# Patient Record
Sex: MALE | Race: WHITE | NOT HISPANIC OR LATINO | Employment: FULL TIME | URBAN - METROPOLITAN AREA
[De-identification: names, ages, dates, MRNs, and addresses within clinical notes are randomized per-mention and may not be internally consistent; named-entity substitution may affect disease eponyms.]

---

## 2017-01-13 ENCOUNTER — OFFICE VISIT (OUTPATIENT)
Dept: ENDOCRINOLOGY | Facility: CLINIC | Age: 42
End: 2017-01-13

## 2017-01-13 VITALS
SYSTOLIC BLOOD PRESSURE: 123 MMHG | BODY MASS INDEX: 47.74 KG/M2 | WEIGHT: 315 LBS | OXYGEN SATURATION: 92 % | DIASTOLIC BLOOD PRESSURE: 67 MMHG | HEIGHT: 68 IN | HEART RATE: 87 BPM | TEMPERATURE: 98.5 F

## 2017-01-13 DIAGNOSIS — E66.01 MORBID OBESITY DUE TO EXCESS CALORIES (H): Primary | ICD-10-CM

## 2017-01-13 ASSESSMENT — ENCOUNTER SYMPTOMS
SINUS CONGESTION: 0
NECK MASS: 0
TROUBLE SWALLOWING: 0
HOARSE VOICE: 0
SORE THROAT: 0
TASTE DISTURBANCE: 0
SMELL DISTURBANCE: 0
SINUS PAIN: 0

## 2017-01-13 ASSESSMENT — PAIN SCALES - GENERAL: PAINLEVEL: NO PAIN (0)

## 2017-01-13 NOTE — MR AVS SNAPSHOT
After Visit Summary   1/13/2017    Refugio Kennedy    MRN: [id number]           Patient Information     Date Of Birth          1975        Visit Information        Provider Department      1/13/2017 11:45 AM Ashish Paez MD Medical Weight Management         Follow-ups after your visit        Your next 10 appointments already scheduled     Jan 23, 2017  8:00 AM   Diabetic Education with [address], RD   [facility][note 3] ([facility][note 3])    [address]  [address]   [phone]            Apr 14, 2017 10:30 AM   (Arrive by 10:15 AM)   Return Visit with Ashish Paez MD   Medical Weight Management ([facility])    [address]  [address] [facility] [address] [phone]              Who to contact     Please call your clinic at [phone] to:    Ask questions about your health    Make or cancel appointments    Discuss your medicines    Learn about your test results    Speak to your doctor   If you have compliments or concerns about an experience at your clinic, or if you wish to file a complaint, please contact [facility] Patient Relations at [phone] or email us at [email or web address]         Additional Information About Your Visit        MyChart Information     [employer] gives you secure access to your electronic health record. If you see a primary care provider, you can also send messages to your care team and make appointments. If you have questions, please call your primary care clinic.  If you do not have a primary care provider, please call [phone] and they will assist you.      [employer] is an electronic gateway that provides easy, online access to your medical records. With [employer], you can request a clinic appointment, read your test results, renew a prescription or communicate with your care team.     To access your existing account, please  "contact your [facility] or call [phone] for assistance.        Care EveryWhere ID     This is your Care EveryWhere ID. This could be used by other organizations to access your [place] medical records  [id number]        Your Vitals Were     Pulse Temperature Height BMI (Body Mass Index) Pulse Oximetry       87 98.5  F (36.9  C) (Oral) 5' 7.5\" 64.08 kg/m2 92%        Blood Pressure from Last 3 Encounters:   01/13/17 123/67   12/20/16 109/68   11/29/16 102/60    Weight from Last 3 Encounters:   01/13/17 415 lb 8 oz   12/20/16 422 lb   12/12/16 422 lb 6.4 oz              Today, you had the following     No orders found for display       Primary Care Provider Office Phone # Fax #    Erika Espino [phone]       [address] [address]        Thank you!     Thank you for choosing MEDICAL WEIGHT MANAGEMENT  for your care. Our goal is always to provide you with excellent care. Hearing back from our patients is one way we can continue to improve our services. Please take a few minutes to complete the written survey that you may receive in the mail after your visit with us. Thank you!             Your Updated Medication List - Protect others around you: Learn how to safely use, store and throw away your medicines at www.disposemymeds.org.          This list is accurate as of: 1/13/17 12:13 PM.  Always use your most recent med list.                   Brand Name Dispense Instructions for use    albuterol 108 (90 BASE) MCG/ACT Inhaler    albuterol    1 Inhaler    Inhale 2 puffs into the lungs every 4 hours as needed for shortness of breath / dyspnea       aspirin 81 MG tablet     90 tablet    Take 1 tablet by mouth daily.       blood glucose monitoring test strip    no brand specified    200 each    1 strip by In Vitro route 3 times daily Test as directed       cetirizine 10 MG tablet    zyrTEC    90 tablet    Take 1 tablet " (10 mg) by mouth every evening       * DIABETIC STERILE LANCETS device     1 Box    1 Device 3 times daily.       * ONE TOUCH LANCETS Misc     270 each    1 lancet 3 times daily.       furosemide 20 MG tablet    LASIX    360 tablet    Take 2 tablets (40 mg) by mouth 2 times daily       insulin pen needle 32G X 4 MM    BD SEVERO U/F    100 each    Use 1 daily or as directed.       liraglutide 18 MG/3ML soln    VICTOZA PEN    1 Month    Inject 1.8 mg Subcutaneous daily       lisinopril 5 MG tablet    PRINIVIL/ZESTRIL    90 tablet    Take 1 tablet (5 mg) by mouth daily       * metFORMIN modified 500 MG 24 hr tablet    GLUMETZA    90 tablet    Take 1 tablet (500 mg) by mouth daily (with breakfast)       * metFORMIN modified 500 MG 24 hr tablet    GLUMETZA    30 tablet    Take 1 tablet (500 mg) by mouth daily (with breakfast) Ok to dispense generic glucophage xr as previous       modafinil 200 MG tablet    PROVIGIL    60 tablet    Take 1/2 tablet by mouth 3-4 times daily as needed for sleepiness.       MULTIVITAMIN PO      Take 1 tablet by mouth daily.       order for DME      Equipment being ordered: BIPAP Refugio P Kennedy received a [employer] AirCurve 10 Bilevel. Pressures were set at 23/14 cm H2O.       order for DME      Auto-BiPAP: IPAP max 21 cm H2O EPAP min 15 cm H2O Pressure support 5 cm Changed in clinic Lifetime need and heated humidity.       simvastatin 10 MG tablet    ZOCOR    90 tablet    Take 1 tablet (10 mg) by mouth At Bedtime       topiramate 25 MG tablet    TOPAMAX    180 tablet    Take 4 tablets (100 mg) by mouth 2 times daily       triamcinolone 0.1 % cream    KENALOG    30 g    Apply sparingly to affected area two times daily as needed       * Notice:  This list has 4 medication(s) that are the same as other medications prescribed for you. Read the directions carefully, and ask your doctor or other care provider to review them with you.

## 2017-01-13 NOTE — Clinical Note
"2017       RE: Refugio Kennedy  [address]  [facility] [address]     Dear Colleague,    Thank you for referring your patient, Refugio Kennedy, to the MEDICAL WEIGHT MANAGEMENT at [facility]. Please see a copy of my visit note below.          Return Medical Weight Management Note     Refugio Kennedy  MRN:  [id number]  :  1975  CRISTI:  10/10/2016    Dear Dr. Espino,    We had the pleasure of seeing your patient Refugio Kennedy.  He is a 41 year old male who we are continuing to see for treatment of obesity related to: DMII, HLD, sleep apnea on CPAP daily, and hypoventilation syndrome, oxygen dependent.     CURRENT WEIGHT:   415 lbs 8 oz    Wt Readings from Last 4 Encounters:   17 188.47 kg (415 lb 8 oz)   16 191.418 kg (422 lb)   16 191.599 kg (422 lb 6.4 oz)   16 190.511 kg (420 lb)     Height:  5' 7.5\"  Body Mass Index:  Body mass index is 64.08 kg/(m^2).  Vitals:  B/P: 138/83, P: 80    Initial consult weight was 454 on 5/15/2015.  Weight change since last seen on 10/10/2016 is down 8 pounds.   Total loss is 39 pounds.    INTERVAL HISTORY:  He notices a significant decrease in cravings and emotional eating since the topamax was increased to 100 mg BID. He does not exercise. He is trying to change his snacking to vegetables and get more vegetables overall.    Diet and Activity Changes Since Last Visit Reviewed With Patient 2017   I have made the following changes to my diet since my last visit: added more greens n cut out pop completly.   With regards to my diet, I am still struggling with: stress eating,but have getting better   For breakfast, I typically eat: coffee,peanutbutter toast and eggs   For lunch, I typically eat: try to make it my salad meal if not sandwich with milk   For supper, I typically eat: mac-n-chz or sm steak with potato n corn   For snack(s), I typically eat: baby carrots or brochli,occasionaly snack bar   I have made the " following changes to my activity/exercise since my last visit: not much,but now that recorvering from hernia repair will be able to join gym to get more needed exercise   With regards to my activity/exercise, I am still struggling with: procastination       ROS    MEDICATIONS:   Current Outpatient Prescriptions   Medication     liraglutide (VICTOZA PEN) 18 MG/3ML soln     simvastatin (ZOCOR) 10 MG tablet     lisinopril (PRINIVIL,ZESTRIL) 5 MG tablet     metFORMIN modified (GLUMETZA) 500 MG 24 hr tablet     metFORMIN modified (GLUMETZA) 500 MG 24 hr tablet     modafinil (PROVIGIL) 200 MG tablet     topiramate (TOPAMAX) 25 MG tablet     furosemide (LASIX) 20 MG tablet     insulin pen needle (BD SEVERO U/F) 32G X 4 MM     cetirizine (ZYRTEC) 10 MG tablet     order for DME     blood glucose monitoring (NO BRAND SPECIFIED) test strip     albuterol (ALBUTEROL) 108 (90 BASE) MCG/ACT inhaler     triamcinolone (KENALOG) 0.1 % cream     ONE TOUCH LANCETS MISC     ORDER FOR DME     aspirin 81 MG tablet     DIABETIC STERILE LANCETS device     Multiple Vitamin (MULTIVITAMIN OR)     No current facility-administered medications for this visit.       Weight Loss Medication History Reviewed With Patient 1/13/2017   Which weight loss medications are you currently taking on a regular basis?  Topamax (topiramate), Victoza (liraglutide)   Are you having any side effects from the weight loss medication that we have prescribed you? No   If you are having side effects please describe: -     ASSESSMENT:   Patient is a 41 year old male being seen in clinic for aggressive weight management due to diminish health problesm to co-morbid conditions.  He still reports intrusive thoughts related to food and continues to eat foods high in carbohydrates and snacks throughout the day.  Maintain topiramate 100 mg BID. Reinforce food plan - focus on no between meal snacking, aggressive lowering of starches and cheese    FOLLOW-UP:    3 months   10/15  minutes spent on counseling and education    Sincerely,          Ashish Paez MD

## 2017-01-13 NOTE — PROGRESS NOTES
"      Return Medical Weight Management Note     Refugio Kennedy  MRN:  [id number]  :  1975  CRISTI:  10/10/2016    Dear Dr. Espino,    We had the pleasure of seeing your patient Refugio Kennedy.  He is a 41 year old male who we are continuing to see for treatment of obesity related to: DMII, HLD, sleep apnea on CPAP daily, and hypoventilation syndrome, oxygen dependent.     CURRENT WEIGHT:   415 lbs 8 oz    Wt Readings from Last 4 Encounters:   17 188.47 kg (415 lb 8 oz)   16 191.418 kg (422 lb)   16 191.599 kg (422 lb 6.4 oz)   16 190.511 kg (420 lb)     Height:  5' 7.5\"  Body Mass Index:  Body mass index is 64.08 kg/(m^2).  Vitals:  B/P: 138/83, P: 80    Initial consult weight was 454 on 5/15/2015.  Weight change since last seen on 10/10/2016 is down 8 pounds.   Total loss is 39 pounds.    INTERVAL HISTORY:  He notices a significant decrease in cravings and emotional eating since the topamax was increased to 100 mg BID. He does not exercise. He is trying to change his snacking to vegetables and get more vegetables overall.    Diet and Activity Changes Since Last Visit Reviewed With Patient 2017   I have made the following changes to my diet since my last visit: added more greens n cut out pop completly.   With regards to my diet, I am still struggling with: stress eating,but have getting better   For breakfast, I typically eat: coffee,peanutbutter toast and eggs   For lunch, I typically eat: try to make it my salad meal if not sandwich with milk   For supper, I typically eat: mac-n-chz or sm steak with potato n corn   For snack(s), I typically eat: baby carrots or brochli,occasionaly snack bar   I have made the following changes to my activity/exercise since my last visit: not much,but now that recorvering from hernia repair will be able to join gym to get more needed exercise   With regards to my activity/exercise, I am still struggling with: procastination       ROS    MEDICATIONS: "   Current Outpatient Prescriptions   Medication     liraglutide (VICTOZA PEN) 18 MG/3ML soln     simvastatin (ZOCOR) 10 MG tablet     lisinopril (PRINIVIL,ZESTRIL) 5 MG tablet     metFORMIN modified (GLUMETZA) 500 MG 24 hr tablet     metFORMIN modified (GLUMETZA) 500 MG 24 hr tablet     modafinil (PROVIGIL) 200 MG tablet     topiramate (TOPAMAX) 25 MG tablet     furosemide (LASIX) 20 MG tablet     insulin pen needle (BD SEVERO U/F) 32G X 4 MM     cetirizine (ZYRTEC) 10 MG tablet     order for DME     blood glucose monitoring (NO BRAND SPECIFIED) test strip     albuterol (ALBUTEROL) 108 (90 BASE) MCG/ACT inhaler     triamcinolone (KENALOG) 0.1 % cream     ONE TOUCH LANCETS MISC     ORDER FOR DME     aspirin 81 MG tablet     DIABETIC STERILE LANCETS device     Multiple Vitamin (MULTIVITAMIN OR)     No current facility-administered medications for this visit.       Weight Loss Medication History Reviewed With Patient 1/13/2017   Which weight loss medications are you currently taking on a regular basis?  Topamax (topiramate), Victoza (liraglutide)   Are you having any side effects from the weight loss medication that we have prescribed you? No   If you are having side effects please describe: -     ASSESSMENT:   Patient is a 41 year old male being seen in clinic for aggressive weight management due to diminish health problesm to co-morbid conditions.  He still reports intrusive thoughts related to food and continues to eat foods high in carbohydrates and snacks throughout the day.  Maintain topiramate 100 mg BID. Reinforce food plan - focus on no between meal snacking, aggressive lowering of starches and cheese    FOLLOW-UP:    3 months   10/15 minutes spent on counseling and education    Sincerely,

## 2017-01-13 NOTE — NURSING NOTE
"Chief Complaint   Patient presents with     Clinic Care Coordination - Follow-up     [facility]       Filed Vitals:    01/13/17 1115   BP: 123/67   Pulse: 87   Temp: 98.5  F (36.9  C)   TempSrc: Oral   Height: 5' 7.5\"   Weight: 415 lb 8 oz   SpO2: 92%       Body mass index is 64.08 kg/(m^2).      Leticia RANDLE LPN                            "

## 2017-01-23 ENCOUNTER — ALLIED HEALTH/NURSE VISIT (OUTPATIENT)
Dept: EDUCATION SERVICES | Facility: CLINIC | Age: 42
End: 2017-01-23
Payer: COMMERCIAL

## 2017-01-23 VITALS — BODY MASS INDEX: 64 KG/M2 | WEIGHT: 315 LBS

## 2017-01-23 DIAGNOSIS — E11.9 TYPE 2 DIABETES MELLITUS WITHOUT COMPLICATION, WITHOUT LONG-TERM CURRENT USE OF INSULIN (H): Primary | ICD-10-CM

## 2017-01-23 PROCEDURE — G0108 DIAB MANAGE TRN  PER INDIV: HCPCS

## 2017-01-23 NOTE — MR AVS SNAPSHOT
After Visit Summary   1/23/2017    Refugio Kennedy    MRN: [id number]           Patient Information     Date Of Birth          1975        Visit Information        Provider Department      1/23/2017 8:00 AM [address], RD [facility]        Care Instructions    1.  Continue to avoid the pop and drink more water,  avoid dry carbohydrates, watch the sweets    2.  Continue the one salad a day    3.  Walk if able        Follow-ups after your visit        Your next 10 appointments already scheduled     Mar 06, 2017  8:00 AM   Diabetic Education with [address], RD   [facility]    [address] [place] [address]   [phone]            Apr 14, 2017 10:30 AM   (Arrive by 10:15 AM)   Return Visit with Ashish Paez MD   [facility] Medical Weight Management ([facility])    [address]  [address] [facility] [address] [phone]              Who to contact     If you have questions or need follow up information about today's clinic visit or your schedule please contact [facility] directly at [phone].  Normal or non-critical lab and imaging results will be communicated to you by MyChart, letter or phone within 4 business days after the clinic has received the results. If you do not hear from us within 7 days, please contact the clinic through "[employer] or phone. If you have a critical or abnormal lab result, we will notify you by phone as soon as possible.  Submit refill requests through [employer] or call your pharmacy and they will forward the refill request to us. Please allow 3 business days for your refill to be completed.          Additional Information About Your Visit        "[employer] Information     [employer] gives you secure access to your electronic health record. If you see a primary care provider, you can also send messages to your care team and make appointments.  If you have questions, please call your primary care clinic.  If you do not have a primary care provider, please call [phone] and they will assist you.        Care EveryWhere ID     This is your Care EveryWhere ID. This could be used by other organizations to access your [place] medical records  [id number]         Blood Pressure from Last 3 Encounters:   01/13/17 123/67   12/20/16 109/68   11/29/16 102/60    Weight from Last 3 Encounters:   01/23/17 188.243 kg (415 lb)   01/13/17 188.47 kg (415 lb 8 oz)   12/20/16 191.418 kg (422 lb)              Today, you had the following     No orders found for display       Primary Care Provider Office Phone # Fax #    Erika Espino [phone]       [facility] [address] [address]        Thank you!     Thank you for choosing [facility]  for your care. Our goal is always to provide you with excellent care. Hearing back from our patients is one way we can continue to improve our services. Please take a few minutes to complete the written survey that you may receive in the mail after your visit with us. Thank you!             Your Updated Medication List - Protect others around you: Learn how to safely use, store and throw away your medicines at www.disposemymeds.org.          This list is accurate as of: 1/23/17  8:31 AM.  Always use your most recent med list.                   Brand Name Dispense Instructions for use    albuterol 108 (90 BASE) MCG/ACT Inhaler    albuterol    1 Inhaler    Inhale 2 puffs into the lungs every 4 hours as needed for shortness of breath / dyspnea       aspirin 81 MG tablet     90 tablet    Take 1 tablet by mouth daily.       blood glucose monitoring test strip    no brand specified    200 each    1 strip by In Vitro route 3 times daily Test as directed       cetirizine 10 MG tablet    zyrTEC    90 tablet    Take 1 tablet (10 mg) by mouth every evening       * DIABETIC  STERILE LANCETS device     1 Box    1 Device 3 times daily.       * ONE TOUCH LANCETS Misc     270 each    1 lancet 3 times daily.       furosemide 20 MG tablet    LASIX    360 tablet    Take 2 tablets (40 mg) by mouth 2 times daily       insulin pen needle 32G X 4 MM    BD SEVERO U/F    100 each    Use 1 daily or as directed.       liraglutide 18 MG/3ML soln    VICTOZA PEN    1 Month    Inject 1.8 mg Subcutaneous daily       lisinopril 5 MG tablet    PRINIVIL/ZESTRIL    90 tablet    Take 1 tablet (5 mg) by mouth daily       * metFORMIN modified 500 MG 24 hr tablet    GLUMETZA    90 tablet    Take 1 tablet (500 mg) by mouth daily (with breakfast)       * metFORMIN modified 500 MG 24 hr tablet    GLUMETZA    30 tablet    Take 1 tablet (500 mg) by mouth daily (with breakfast) Ok to dispense generic glucophage xr as previous       modafinil 200 MG tablet    PROVIGIL    60 tablet    Take 1/2 tablet by mouth 3-4 times daily as needed for sleepiness.       MULTIVITAMIN PO      Take 1 tablet by mouth daily.       order for DME      Equipment being ordered: BIPAP Refugio P Kennedy received a [employer] AirCurve 10 Bilevel. Pressures were set at 23/14 cm H2O.       order for DME      Auto-BiPAP: IPAP max 21 cm H2O EPAP min 15 cm H2O Pressure support 5 cm Changed in clinic Lifetime need and heated humidity.       simvastatin 10 MG tablet    ZOCOR    90 tablet    Take 1 tablet (10 mg) by mouth At Bedtime       topiramate 25 MG tablet    TOPAMAX    180 tablet    Take 4 tablets (100 mg) by mouth 2 times daily       triamcinolone 0.1 % cream    KENALOG    30 g    Apply sparingly to affected area two times daily as needed       * Notice:  This list has 4 medication(s) that are the same as other medications prescribed for you. Read the directions carefully, and ask your doctor or other care provider to review them with you.

## 2017-01-23 NOTE — PATIENT INSTRUCTIONS
1.  Continue to avoid the pop and drink more water,  avoid dry carbohydrates, watch the sweets    2.  Continue the one salad a day    3.  Walk if able

## 2017-02-01 DIAGNOSIS — E11.9 TYPE 2 DIABETES, HBA1C GOAL < 8% (H): Primary | ICD-10-CM

## 2017-02-01 NOTE — TELEPHONE ENCOUNTER
Accu-Chek Softclix Lancets Misc          Last Written Prescription Date: 8/6/16  Last Fill Quantity: 270, # refills: 1  Last Office Visit with G, P or [facility] prescribing provider:  11/29/16        BP Readings from Last 3 Encounters:   01/13/17 123/67   12/20/16 109/68   11/29/16 102/60     MICROL       <5   11/29/2016  No results found for this basename: microalbumin  CREATININE   Date Value Ref Range Status   11/29/2016 1.06 0.66 - 1.25 mg/dL Final   ]  GFR ESTIMATE   Date Value Ref Range Status   11/29/2016 76 >60 mL/min/1.7m2 Final     Comment:     Non  GFR Calc   11/16/2016 84 >60 mL/min/1.7m2 Final     Comment:     Non  GFR Calc   05/23/2016 >90  Non  GFR Calc   >60 mL/min/1.7m2 Final     GFR ESTIMATE IF BLACK   Date Value Ref Range Status   11/29/2016 >90   GFR Calc   >60 mL/min/1.7m2 Final   11/16/2016 >90   GFR Calc   >60 mL/min/1.7m2 Final   05/23/2016 >90   GFR Calc   >60 mL/min/1.7m2 Final     CHOL      130   11/29/2016  HDL       35   11/29/2016  LDL       59   11/29/2016  LDL       87   6/9/2014  TRIG      179   11/29/2016  CHOLHDLRATIO      3.2   9/29/2015  AST       25   5/23/2016  ALT       30   5/23/2016  A1C      5.9   11/29/2016  A1C      5.8   5/23/2016  A1C      6.0   9/29/2015  A1C      6.4   3/24/2015  A1C      6.5   10/24/2014  POTASSIUM   Date Value Ref Range Status   11/29/2016 3.7 3.4 - 5.3 mmol/L Final     Thank you!  Swati Rapp   [facility]  P: [phone] F: [phone]

## 2017-02-27 DIAGNOSIS — E11.9 DIABETES MELLITUS WITHOUT COMPLICATION (H): Primary | ICD-10-CM

## 2017-02-27 NOTE — TELEPHONE ENCOUNTER
Accu-Chek Cheryl Plus Strip      Last Written Prescription Date: 1/15/16  Last Fill Quantity: 200,  # refills: 3   Last Office Visit with [facility], [facility] or [facility] prescribing provider: 11/29/16    Anastacia Mathis [facility]

## 2017-02-27 NOTE — TELEPHONE ENCOUNTER
Prescription approved per [facility] Refill Protocol.    Monica Do  Pharm.D.  [facility]  [place] Pharmacist  On Behalf of [facility]

## 2017-02-28 ENCOUNTER — TELEPHONE (OUTPATIENT)
Dept: SLEEP MEDICINE | Facility: CLINIC | Age: 42
End: 2017-02-28

## 2017-02-28 NOTE — TELEPHONE ENCOUNTER
PA needed on:Modafinil 200mg qty 60 for 30 days NDC [id number]  Insurance: pmap  Ins. Phone:[phone]  Patient ID:[id number]  Please let us know when PA is granted/denied.  Thank you!  Taty Weathers CPhT  [facility] [place]  Ph [phone] Fx [phone]  [email or web address]

## 2017-03-01 NOTE — TELEPHONE ENCOUNTER
"SUBJECTIVE:  Chief Complaint   Patient presents with     Formulary Issue     Modafinil      OBJECTIVE:  Called number provided by pharmacist.   [facility] help line.   Spoke with \"B\" She informed me this is a renewal and is approved from today 03/01/2017 for one year.     ASSESSMENT/PLAN:  Pharmacy notified by phone.     "

## 2017-03-02 ENCOUNTER — OFFICE VISIT (OUTPATIENT)
Dept: FAMILY MEDICINE | Facility: CLINIC | Age: 42
End: 2017-03-02
Payer: COMMERCIAL

## 2017-03-02 VITALS
DIASTOLIC BLOOD PRESSURE: 70 MMHG | HEIGHT: 68 IN | SYSTOLIC BLOOD PRESSURE: 122 MMHG | WEIGHT: 315 LBS | HEART RATE: 80 BPM | BODY MASS INDEX: 47.74 KG/M2

## 2017-03-02 DIAGNOSIS — M72.2 PLANTAR FASCIITIS: ICD-10-CM

## 2017-03-02 DIAGNOSIS — E11.9 TYPE 2 DIABETES MELLITUS WITHOUT COMPLICATION, WITHOUT LONG-TERM CURRENT USE OF INSULIN (H): Primary | ICD-10-CM

## 2017-03-02 LAB — HBA1C MFR BLD: 5.6 % (ref 4.3–6)

## 2017-03-02 PROCEDURE — 36415 COLL VENOUS BLD VENIPUNCTURE: CPT | Performed by: FAMILY MEDICINE

## 2017-03-02 PROCEDURE — 99213 OFFICE O/P EST LOW 20 MIN: CPT | Performed by: FAMILY MEDICINE

## 2017-03-02 PROCEDURE — 83036 HEMOGLOBIN GLYCOSYLATED A1C: CPT | Performed by: FAMILY MEDICINE

## 2017-03-02 NOTE — NURSING NOTE
"Chief Complaint   Patient presents with     Diabetes     recheck       Initial /70 (BP Location: Right arm, Patient Position: Chair, Cuff Size: Adult Large)  Pulse 80  Ht 5' 7.5\" (1.715 m)  Wt (!) 418 lb 6.4 oz (189.8 kg)  BMI 64.56 kg/m2 Estimated body mass index is 64.56 kg/(m^2) as calculated from the following:    Height as of this encounter: 5' 7.5\" (1.715 m).    Weight as of this encounter: 418 lb 6.4 oz (189.8 kg).  Medication Reconciliation: complete     Prisca Grady, CMA      "

## 2017-03-02 NOTE — PROGRESS NOTES
SUBJECTIVE:                                                    Refugio Kennedy is a 41 year old male who presents to clinic today for the following health issues:  He comes with left heel pain       Diabetes Follow-up    Patient is checking blood sugars: once daily.  Results are as follows:         am -     Diabetic concerns: None     Symptoms of hypoglycemia (low blood sugar): none     Paresthesias (numbness or burning in feet) or sores: Yes foot pain and swelling in ankles and feet. Left foot pain X 1 week.      Date of last diabetic eye exam: 6/7/2016       Amount of exercise or physical activity: some walking    Problems taking medications regularly: No    Medication side effects: none  Diet: regular (no restrictions)        Problem list and histories reviewed & adjusted, as indicated.  Additional history: as documented    Patient Active Problem List   Diagnosis     FARZANA (obstructive sleep apnea)/Hypoventilation Syndrome- Severe     Morbid obesity (H)     Methamphetamine abuse     Chronic respiratory failure (H)     Hyperlipidemia LDL goal <100     Venous stasis     Pulmonary hypertension (H)     Mild intermittent asthma without complication     Type 2 diabetes mellitus without complication, without long-term current use of insulin (H)     Past Surgical History   Procedure Laterality Date     Surgical history of -   1998     UVPP     Laparoscopic herniorrhaphy ventral N/A 5/17/2016     Procedure: LAPAROSCOPIC HERNIORRHAPHY VENTRAL;  Surgeon: Yves Jimenes MD;  Location: [facility]     Laparoscopic herniorrhaphy ventral N/A 12/20/2016     Procedure: LAPAROSCOPIC HERNIORRHAPHY VENTRAL;  Surgeon: Yves Jimenes MD;  Location: [facility]       Social History   Substance Use Topics     Smoking status: Former Smoker     Packs/day: 0.50     Years: 1.00     Types: Cigarettes, Cigars     Quit date: 5/21/2012     Smokeless tobacco: Never Used     Alcohol use No     Family History   Problem Relation Age of Onset     HEART  DISEASE Maternal Grandfather      DIABETES Maternal Grandfather      Hypertension Maternal Grandfather      Other Cancer Maternal Grandfather      CANCER Paternal Grandfather      unknown     Other Cancer Paternal Grandfather      Anxiety Disorder Mother      Asthma Mother      Depression Mother      Thyroid Disease Mother      Anxiety Disorder Father      Substance Abuse Father      Depression Father      Hypertension Father      Anxiety Disorder Brother      Substance Abuse Brother      MENTAL ILLNESS Brother      Depression Brother      Other Cancer Brother      Obesity No family hx of          Current Outpatient Prescriptions   Medication Sig Dispense Refill     liraglutide (VICTOZA PEN) 18 MG/3ML soln Inject 1.8 mg Subcutaneous daily 1 Month 4     simvastatin (ZOCOR) 10 MG tablet Take 1 tablet (10 mg) by mouth At Bedtime 90 tablet 2     lisinopril (PRINIVIL,ZESTRIL) 5 MG tablet Take 1 tablet (5 mg) by mouth daily 90 tablet 1     metFORMIN modified (GLUMETZA) 500 MG 24 hr tablet Take 1 tablet (500 mg) by mouth daily (with breakfast) 90 tablet 3     metFORMIN modified (GLUMETZA) 500 MG 24 hr tablet Take 1 tablet (500 mg) by mouth daily (with breakfast) Ok to dispense generic glucophage xr as previous 30 tablet 0     modafinil (PROVIGIL) 200 MG tablet Take 1/2 tablet by mouth 3-4 times daily as needed for sleepiness. 60 tablet 5     topiramate (TOPAMAX) 25 MG tablet Take 4 tablets (100 mg) by mouth 2 times daily 180 tablet 5     furosemide (LASIX) 20 MG tablet Take 2 tablets (40 mg) by mouth 2 times daily 360 tablet 3     cetirizine (ZYRTEC) 10 MG tablet Take 1 tablet (10 mg) by mouth every evening 90 tablet 3     albuterol (ALBUTEROL) 108 (90 BASE) MCG/ACT inhaler Inhale 2 puffs into the lungs every 4 hours as needed for shortness of breath / dyspnea 1 Inhaler 5     triamcinolone (KENALOG) 0.1 % cream Apply sparingly to affected area two times daily as needed 30 g 2     aspirin 81 MG tablet Take 1 tablet by mouth  "daily. 90 tablet 3     Multiple Vitamin (MULTIVITAMIN OR) Take 1 tablet by mouth daily.       blood glucose monitoring (NO BRAND SPECIFIED) test strip 1 strip by In Vitro route 3 times daily Test as directed 200 each 0     ONE TOUCH LANCETS MISC 1 lancet 3 times daily 270 each 2     insulin pen needle (BD SEVERO U/F) 32G X 4 MM Use 1 daily or as directed. 100 each prn     order for DME Equipment being ordered: BIPAP Refugio P Kennedy received a Resmed AirCurve 10 Bilevel. Pressures were set at 23/14 cm H2O.       ORDER FOR DME Auto-BiPAP:  IPAP max 21 cm H2O  EPAP min 15 cm H2O  Pressure support 5 cm  Changed in clinic  Lifetime need and heated humidity.           DIABETIC STERILE LANCETS device 1 Device 3 times daily. 1 Box 12       Reviewed and updated as needed this visit by clinical staff  Tobacco  Allergies  Med Hx  Surg Hx  Fam Hx  Soc Hx      Reviewed and updated as needed this visit by Provider         ROS:  C: NEGATIVE for fever, chills, change in weight  E/M: NEGATIVE for ear, mouth and throat problems  R: NEGATIVE for significant cough or SOB  CV: NEGATIVE for chest pain, palpitations or peripheral edema    OBJECTIVE:                                                    /70 (BP Location: Right arm, Patient Position: Chair, Cuff Size: Adult Large)  Pulse 80  Ht 5' 7.5\" (1.715 m)  Wt (!) 418 lb 6.4 oz (189.8 kg)  BMI 64.56 kg/m2  Body mass index is 64.56 kg/(m^2).  GENERAL: healthy, alert and no distress  NECK: no adenopathy, no asymmetry, masses, or scars and thyroid normal to palpation  RESP: lungs clear to auscultation - no rales, rhonchi or wheezes  MS: left heel tenderness .          ASSESSMENT/PLAN:                                                      ASSESSMENT/PLAN:      ICD-10-CM    1. Type 2 diabetes mellitus without complication, without long-term current use of insulin (H) E11.9 Hemoglobin A1c   2. Plantar fasciitis M72.2        Patient Instructions   1. Some ibuprofen  800 mg three times a day " for one week.    2. Do the exercises    3. Call if not improving in two weeks.    4. Possible PT then.                       Bhaskar Snyder MD  [facility]

## 2017-03-02 NOTE — PATIENT INSTRUCTIONS
1. Some ibuprofen  800 mg three times a day for one week.    2. Do the exercises    3. Call if not improving in two weeks.    4. Possible PT then.

## 2017-03-02 NOTE — MR AVS SNAPSHOT
After Visit Summary   3/2/2017    Refugio Kennedy    MRN: [id number]           Patient Information     Date Of Birth          1975        Visit Information        Provider Department      3/2/2017 9:20 AM Bhaskar Snyder MD [facility]        Care Instructions    1. Some ibuprofen  800 mg three times a day for one week.    2. Do the exercises    3. Call if not improving in two weeks.    4. Possible PT then.         Follow-ups after your visit        Your next 10 appointments already scheduled     Mar 06, 2017  8:00 AM CST   Diabetic Education with [address], RD   [facility] ([facility])    [address] [place] [id number]   [phone]            Apr 14, 2017 10:30 AM CDT   (Arrive by 10:15 AM)   Return Visit with Ashish Paez MD   [facility] Medical Weight Management ([facility] [place] [facility])    [address]  [address] [place] [facility] [address] [phone]              Who to contact     If you have questions or need follow up information about today's clinic visit or your schedule please contact [facility] directly at [phone].  Normal or non-critical lab and imaging results will be communicated to you by MyChart, letter or phone within 4 business days after the clinic has received the results. If you do not hear from us within 7 days, please contact the clinic through MyChart or phone. If you have a critical or abnormal lab result, we will notify you by phone as soon as possible.  Submit refill requests through [employer] or call your pharmacy and they will forward the refill request to us. Please allow 3 business days for your refill to be completed.          Additional Information About Your Visit        [employer] Information     [employer] gives you secure access to your electronic health record. If you see a primary care provider, you can also send messages to your care team  "and make appointments. If you have questions, please call your primary care clinic.  If you do not have a primary care provider, please call [phone] and they will assist you.        Care EveryWhere ID     This is your Care EveryWhere ID. This could be used by other organizations to access your [facility] medical records  [id number]        Your Vitals Were     Pulse Height BMI (Body Mass Index)             80 5' 7.5\" (1.715 m) 64.56 kg/m2          Blood Pressure from Last 3 Encounters:   03/02/17 122/70   01/13/17 123/67   12/20/16 109/68    Weight from Last 3 Encounters:   03/02/17 (!) 418 lb 6.4 oz (189.8 kg)   01/23/17 (!) 415 lb (188.2 kg)   01/13/17 (!) 415 lb 8 oz (188.5 kg)              Today, you had the following     No orders found for display       Primary Care Provider Office Phone # Fax #    Erika Espino [phone]       [facility] [address] [place] [id number]        Thank you!     Thank you for choosing [facility]  for your care. Our goal is always to provide you with excellent care. Hearing back from our patients is one way we can continue to improve our services. Please take a few minutes to complete the written survey that you may receive in the mail after your visit with us. Thank you!             Your Updated Medication List - Protect others around you: Learn how to safely use, store and throw away your medicines at www.disposemymeds.org.          This list is accurate as of: 3/2/17 10:07 AM.  Always use your most recent med list.                   Brand Name Dispense Instructions for use    albuterol 108 (90 BASE) MCG/ACT Inhaler    albuterol    1 Inhaler    Inhale 2 puffs into the lungs every 4 hours as needed for shortness of breath / dyspnea       aspirin 81 MG tablet     90 tablet    Take 1 tablet by mouth daily.       blood glucose monitoring test strip    no brand specified    200 each    1 strip by In Vitro route 3 " times daily Test as directed       cetirizine 10 MG tablet    zyrTEC    90 tablet    Take 1 tablet (10 mg) by mouth every evening       * DIABETIC STERILE LANCETS device     1 Box    1 Device 3 times daily.       * ONE TOUCH LANCETS Misc     270 each    1 lancet 3 times daily       furosemide 20 MG tablet    LASIX    360 tablet    Take 2 tablets (40 mg) by mouth 2 times daily       insulin pen needle 32G X 4 MM    BD SEVERO U/F    100 each    Use 1 daily or as directed.       liraglutide 18 MG/3ML soln    VICTOZA PEN    1 Month    Inject 1.8 mg Subcutaneous daily       lisinopril 5 MG tablet    PRINIVIL/ZESTRIL    90 tablet    Take 1 tablet (5 mg) by mouth daily       * metFORMIN modified 500 MG 24 hr tablet    GLUMETZA    90 tablet    Take 1 tablet (500 mg) by mouth daily (with breakfast)       * metFORMIN modified 500 MG 24 hr tablet    GLUMETZA    30 tablet    Take 1 tablet (500 mg) by mouth daily (with breakfast) Ok to dispense generic glucophage xr as previous       modafinil 200 MG tablet    PROVIGIL    60 tablet    Take 1/2 tablet by mouth 3-4 times daily as needed for sleepiness.       MULTIVITAMIN PO      Take 1 tablet by mouth daily.       order for DME      Equipment being ordered: BIPAP Refugio P Kennedy received a Resmed AirCurve 10 Bilevel. Pressures were set at 23/14 cm H2O.       order for DME      Auto-BiPAP: IPAP max 21 cm H2O EPAP min 15 cm H2O Pressure support 5 cm Changed in clinic Lifetime need and heated humidity.       simvastatin 10 MG tablet    ZOCOR    90 tablet    Take 1 tablet (10 mg) by mouth At Bedtime       topiramate 25 MG tablet    TOPAMAX    180 tablet    Take 4 tablets (100 mg) by mouth 2 times daily       triamcinolone 0.1 % cream    KENALOG    30 g    Apply sparingly to affected area two times daily as needed       * Notice:  This list has 4 medication(s) that are the same as other medications prescribed for you. Read the directions carefully, and ask your doctor or other care provider  to review them with you.

## 2017-03-06 ENCOUNTER — ALLIED HEALTH/NURSE VISIT (OUTPATIENT)
Dept: EDUCATION SERVICES | Facility: CLINIC | Age: 42
End: 2017-03-06
Payer: COMMERCIAL

## 2017-03-06 VITALS — WEIGHT: 315 LBS | BODY MASS INDEX: 64.72 KG/M2

## 2017-03-06 DIAGNOSIS — E11.9 TYPE 2 DIABETES MELLITUS WITHOUT COMPLICATION, WITHOUT LONG-TERM CURRENT USE OF INSULIN (H): Primary | ICD-10-CM

## 2017-03-06 PROCEDURE — G0108 DIAB MANAGE TRN  PER INDIV: HCPCS

## 2017-03-06 NOTE — MR AVS SNAPSHOT
After Visit Summary   3/6/2017    Refugio Kennedy    MRN: [id number]           Patient Information     Date Of Birth          1975        Visit Information        Provider Department      3/6/2017 8:00 AM [address], RD [facility]        Care Instructions    1.  Will be packing to move so will be more active    2. Work hard on getting away from the habit of crackers at night.  Try to only eat fruit at night        Follow-ups after your visit        Your next 10 appointments already scheduled     Apr 14, 2017 10:30 AM CDT   (Arrive by 10:15 AM)   Return Visit with Ashish Paez MD   [facility] Medical Weight Management ([facility] [place])    [address]  4th [facility] [id number] [phone]            Apr 17, 2017  8:00 AM CDT   Diabetic Education with [address], RD   [facility] ([facility])    [address] [place] [address] [phone]              Who to contact     If you have questions or need follow up information about today's clinic visit or your schedule please contact [facility] directly at [phone].  Normal or non-critical lab and imaging results will be communicated to you by MyChart, letter or phone within 4 business days after the clinic has received the results. If you do not hear from us within 7 days, please contact the clinic through [employer] or phone. If you have a critical or abnormal lab result, we will notify you by phone as soon as possible.  Submit refill requests through [employer] or call your pharmacy and they will forward the refill request to us. Please allow 3 business days for your refill to be completed.          Additional Information About Your Visit        [employer] Information     [employer] gives you secure access to your electronic health record. If you see a primary care provider, you can also send messages to your care team and make  appointments. If you have questions, please call your primary care clinic.  If you do not have a primary care provider, please call [phone] and they will assist you.        Care EveryWhere ID     This is your Care EveryWhere ID. This could be used by other organizations to access your [facility] medical records  [id number]        Your Vitals Were     BMI (Body Mass Index)                   64.72 kg/m2            Blood Pressure from Last 3 Encounters:   03/02/17 122/70   01/13/17 123/67   12/20/16 109/68    Weight from Last 3 Encounters:   03/06/17 (!) 190.2 kg (419 lb 6.4 oz)   03/02/17 (!) 189.8 kg (418 lb 6.4 oz)   01/23/17 (!) 188.2 kg (415 lb)              Today, you had the following     No orders found for display       Primary Care Provider Office Phone # Fax #    Erika Espino [phone]       [address] [address]        Thank you!     Thank you for choosing [facility]  for your care. Our goal is always to provide you with excellent care. Hearing back from our patients is one way we can continue to improve our services. Please take a few minutes to complete the written survey that you may receive in the mail after your visit with us. Thank you!             Your Updated Medication List - Protect others around you: Learn how to safely use, store and throw away your medicines at www.disposemymeds.org.          This list is accurate as of: 3/6/17  8:33 AM.  Always use your most recent med list.                   Brand Name Dispense Instructions for use    albuterol 108 (90 BASE) MCG/ACT Inhaler    albuterol    1 Inhaler    Inhale 2 puffs into the lungs every 4 hours as needed for shortness of breath / dyspnea       aspirin 81 MG tablet     90 tablet    Take 1 tablet by mouth daily.       blood glucose monitoring test strip    no brand specified    200 each    1 strip by In Vitro route 3 times daily Test as directed        cetirizine 10 MG tablet    zyrTEC    90 tablet    Take 1 tablet (10 mg) by mouth every evening       * DIABETIC STERILE LANCETS device     1 Box    1 Device 3 times daily.       * ONE TOUCH LANCETS Misc     270 each    1 lancet 3 times daily       furosemide 20 MG tablet    LASIX    360 tablet    Take 2 tablets (40 mg) by mouth 2 times daily       insulin pen needle 32G X 4 MM    BD SEVERO U/F    100 each    Use 1 daily or as directed.       liraglutide 18 MG/3ML soln    VICTOZA PEN    1 Month    Inject 1.8 mg Subcutaneous daily       lisinopril 5 MG tablet    PRINIVIL/ZESTRIL    90 tablet    Take 1 tablet (5 mg) by mouth daily       * metFORMIN modified 500 MG 24 hr tablet    GLUMETZA    90 tablet    Take 1 tablet (500 mg) by mouth daily (with breakfast)       * metFORMIN modified 500 MG 24 hr tablet    GLUMETZA    30 tablet    Take 1 tablet (500 mg) by mouth daily (with breakfast) Ok to dispense generic glucophage xr as previous       modafinil 200 MG tablet    PROVIGIL    60 tablet    Take 1/2 tablet by mouth 3-4 times daily as needed for sleepiness.       MULTIVITAMIN PO      Take 1 tablet by mouth daily.       order for DME      Equipment being ordered: BIPAP Refugio P Kennedy received a [employer] AirCurve 10 Bilevel. Pressures were set at 23/14 cm H2O.       order for DME      Auto-BiPAP: IPAP max 21 cm H2O EPAP min 15 cm H2O Pressure support 5 cm Changed in clinic Lifetime need and heated humidity.       simvastatin 10 MG tablet    ZOCOR    90 tablet    Take 1 tablet (10 mg) by mouth At Bedtime       topiramate 25 MG tablet    TOPAMAX    180 tablet    Take 4 tablets (100 mg) by mouth 2 times daily       triamcinolone 0.1 % cream    KENALOG    30 g    Apply sparingly to affected area two times daily as needed       * Notice:  This list has 4 medication(s) that are the same as other medications prescribed for you. Read the directions carefully, and ask your doctor or other care provider to review them with you.

## 2017-03-06 NOTE — PROGRESS NOTES
Diabetes Self Management Training: Follow-up Visit, Wt loss     Refugio Kennedy presents today for education related to Type 2 diabetes and wt loss    He is accompanied by self    Patient's diabetes management related comments/concerns: has some swelling in feet, some plantar fascitis    Patient's emotional response to diabetes: expresses readiness to learn    Patient would like this visit to be focused around the following diabetes-related behaviors and goals: Healthy Eating, Being Active and Monitoring    ASSESSMENT:  Patient Problem List and Family Medical History reviewed for relevant medical history, current medical status, and diabetes risk factors.    Current Diabetes Management per Patient:  Taking diabetes medications?   yes:     Diabetes Medication(s)     Biguanides Sig    metFORMIN modified (GLUMETZA) 500 MG 24 hr tablet Take 1 tablet (500 mg) by mouth daily (with breakfast)    metFORMIN modified (GLUMETZA) 500 MG 24 hr tablet Take 1 tablet (500 mg) by mouth daily (with breakfast) Ok to dispense generic glucophage xr as previous    Incretin Mimetic Agents (GLP-1 Receptor Agonists) Sig    liraglutide (VICTOZA PEN) 18 MG/3ML soln Inject 1.8 mg Subcutaneous daily          *Abbreviated insulin dose documentation key: Insulin Trade Name (gbjawalpw-puokv-etfszb-bedtime) - i.e. Humalog 5-5-5-0 (Humalog 5 units at breakfast, 5 units at lunch, and 5 units at dinner).    Past Diabetes Education: Yes    Patient glucose self monitoring as follows: one time daily.   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    Vitals:  Wt (!) 190.2 kg (419 lb 6.4 oz)  BMI 64.72 kg/m2  Estimated body mass index is 64.72 kg/(m^2) as calculated from the following:    Height as of 3/2/17: 1.715 m (5' 7.5\").    Weight as of this encounter: 190.2 kg (419 lb 6.4 oz).   Last 3 BP:   BP Readings from Last 3 Encounters:   03/02/17 122/70   01/13/17 123/67   12/20/16 109/68       History   Smoking Status     Former Smoker     Packs/day: 0.50     Years: 1.00     Types: Cigarettes, Cigars     Quit date: 5/21/2012   Smokeless Tobacco     Never Used       Labs:  Lab Results   Component Value Date    A1C 5.6 03/02/2017     Lab Results   Component Value Date     11/29/2016     Lab Results   Component Value Date    LDL 59 11/29/2016    LDL 87 06/09/2014     HDL Cholesterol   Date Value Ref Range Status   11/29/2016 35 (L) >39 mg/dL Final   ]  GFR Estimate   Date Value Ref Range Status   11/29/2016 76 >60 mL/min/1.7m2 Final     Comment:     Non  GFR Calc     GFR Estimate If Black   Date Value Ref Range Status   11/29/2016 >90   GFR Calc   >60 mL/min/1.7m2 Final     Lab Results   Component Value Date    CR 1.06 11/29/2016     No results found for: MICROALBUMIN    Socio/Economic Considerations:    Support system: family    Health Beliefs and Attitudes:   Patient Activation Measure Survey Score:  MARY Score (Last Two) 1/25/2011   MARY Raw Score 36   Activation Score 47.4   MARY Level 2       Stage of Change: ACTION (Actively working towards change)      Diabetes knowledge and skills assessment:     Patient is knowledgeable in diabetes management concepts related to: Healthy Eating, Being Active and Monitoring    Patient needs further education on the following diabetes management concepts: Healthy Eating and Being Active    Barriers to Learning Assessment: No Barriers " identified    Based on learning assessment above, most appropriate setting for further diabetes education would be: Individual setting.    INTERVENTION:   Has had more out to eat recently, crackers in the evening  Is moving in the next month so has a lot of packing and moving to do, so will be more active  Education provided today on:  AADE Self-Care Behaviors:  Healthy Eating: consistency in amount, composition, and timing of food intake and food choices.  Has had more out to eat and crackers in evening.  Being Active: relationship to blood glucose and describe appropriate activity program    Opportunities for ongoing education and support in diabetes-self management were discussed.    Pt verbalized understanding of concepts discussed and recommendations provided today.       Education Materials Provided:  No new materials provided today    PLAN:  See Patient Instructions for co-developed, patient-stated behavior change goals.  AVS printed and provided to patient today.    FOLLOW-UP:  Follow-up appointment scheduled in one month.    Ongoing plan for education and support: Follow-up visit with diabetes educator in one month      Time Spent: 30 minutes  Encounter Type: Individual    Any diabetes medication dose changes were made via the CDE Protocol and Collaborative Practice Agreement with the patient's primary care provider. A copy of this encounter was shared with the provider.

## 2017-03-06 NOTE — PATIENT INSTRUCTIONS
1.  Will be packing to move so will be more active    2. Work hard on getting away from the habit of crackers at night.  Try to only eat fruit at night

## 2017-03-25 DIAGNOSIS — E66.01 MORBID OBESITY DUE TO EXCESS CALORIES (H): ICD-10-CM

## 2017-03-25 RX ORDER — TOPIRAMATE 25 MG/1
100 TABLET, FILM COATED ORAL 2 TIMES DAILY
Qty: 168 TABLET | Refills: 0 | Status: SHIPPED | OUTPATIENT
Start: 2017-03-25 | End: 2017-05-02

## 2017-03-25 NOTE — TELEPHONE ENCOUNTER
topiramate (TOPAMAX) 25 MG tablet  Last Written Prescription Date:  10/10/16  Last Fill Quantity: 180,   # refills: 5  Last Office Visit with [facility], [facility] or [facility] prescribing provider:  1/13/17  Future Office visit:   4/14/17

## 2017-04-04 ENCOUNTER — MEDICAL CORRESPONDENCE (OUTPATIENT)
Dept: HEALTH INFORMATION MANAGEMENT | Facility: CLINIC | Age: 42
End: 2017-04-04

## 2017-04-06 ENCOUNTER — TELEPHONE (OUTPATIENT)
Dept: FAMILY MEDICINE | Facility: CLINIC | Age: 42
End: 2017-04-06

## 2017-04-17 ENCOUNTER — ALLIED HEALTH/NURSE VISIT (OUTPATIENT)
Dept: EDUCATION SERVICES | Facility: CLINIC | Age: 42
End: 2017-04-17
Payer: COMMERCIAL

## 2017-04-17 VITALS — BODY MASS INDEX: 65.46 KG/M2 | WEIGHT: 315 LBS

## 2017-04-17 DIAGNOSIS — E11.9 TYPE 2 DIABETES MELLITUS WITHOUT COMPLICATION, WITHOUT LONG-TERM CURRENT USE OF INSULIN (H): Primary | ICD-10-CM

## 2017-04-17 DIAGNOSIS — J30.1 ALLERGIC RHINITIS DUE TO POLLEN: ICD-10-CM

## 2017-04-17 DIAGNOSIS — I27.20 PULMONARY HYPERTENSION (H): Primary | ICD-10-CM

## 2017-04-17 PROCEDURE — G0108 DIAB MANAGE TRN  PER INDIV: HCPCS

## 2017-04-17 RX ORDER — CETIRIZINE HYDROCHLORIDE 10 MG/1
10 TABLET ORAL EVERY EVENING
Qty: 90 TABLET | Refills: 3 | Status: SHIPPED | OUTPATIENT
Start: 2017-04-17 | End: 2018-04-27

## 2017-04-17 RX ORDER — LISINOPRIL 5 MG/1
5 TABLET ORAL DAILY
Qty: 90 TABLET | Refills: 0 | Status: SHIPPED | OUTPATIENT
Start: 2017-04-17 | End: 2017-07-14

## 2017-04-17 NOTE — TELEPHONE ENCOUNTER
Prescription approved per [facility]ill Protocol.    Cindi Restrepo, Pharm.D.  on behalf of [facility] Pharmacist   [email or web address]

## 2017-04-17 NOTE — TELEPHONE ENCOUNTER
Cetirizine 10mg       Last Written Prescription Date:  4/4/16  Last Fill Quantity: 90,   # refills: 3  Last Office Visit with [facility], P or [facility] prescribing provider: 3/2/17  Future Office visit:         Thank you!  Swati Rapp   [facility]  P: [phone] F: [phone]

## 2017-04-17 NOTE — MR AVS SNAPSHOT
After Visit Summary   4/17/2017    Refugio Kennedy    MRN: [id number]           Patient Information     Date Of Birth          1975        Visit Information        Provider Department      4/17/2017 8:00 AM [address], RD [facility]        Care Instructions    1.  Eat more vegetables.  If you make your meal like your rice meal yesterday, make a vegetable with it.  That has less calorie value and can be used to help keep you satisfied.    2.  Work on being more active, take some walks.           Follow-ups after your visit        Your next 10 appointments already scheduled     May 15, 2017 11:30 AM CDT   (Arrive by 11:15 AM)   Return Visit with Ashish Paez MD   [facility] ([facility])    [address] [place] [address]  4th [facility] [address]   [phone]            Jun 12, 2017  1:00 PM CDT   Diabetic Education with [address], RD   [facility] ([facility])    [address] [place] [address] [phone]              Who to contact     If you have questions or need follow up information about today's clinic visit or your schedule please contact [facility] directly at [phone].  Normal or non-critical lab and imaging results will be communicated to you by MyChart, letter or phone within 4 business days after the clinic has received the results. If you do not hear from us within 7 days, please contact the clinic through Sendmailhart or phone. If you have a critical or abnormal lab result, we will notify you by phone as soon as possible.  Submit refill requests through EasyProperty or call your pharmacy and they will forward the refill request to us. Please allow 3 business days for your refill to be completed.          Additional Information About Your Visit        SendmailharFly [facility]ishing [facility] Information     EasyProperty gives you secure access to your electronic health record. If you see a  primary care provider, you can also send messages to your care team and make appointments. If you have questions, please call your primary care clinic.  If you do not have a primary care provider, please call [phone] and they will assist you.        Care EveryWhere ID     This is your Care EveryWhere ID. This could be used by other organizations to access your [place] medical records  [id number]        Your Vitals Were     BMI (Body Mass Index)                   65.46 kg/m2            Blood Pressure from Last 3 Encounters:   03/02/17 122/70   01/13/17 123/67   12/20/16 109/68    Weight from Last 3 Encounters:   04/17/17 (!) 192.4 kg (424 lb 3.2 oz)   03/06/17 (!) 190.2 kg (419 lb 6.4 oz)   03/02/17 (!) 189.8 kg (418 lb 6.4 oz)              Today, you had the following     No orders found for display       Primary Care Provider Office Phone # Fax #    Erika Espino [phone]       [facility] [address] [place] [address]        Thank you!     Thank you for choosing [facility]  for your care. Our goal is always to provide you with excellent care. Hearing back from our patients is one way we can continue to improve our services. Please take a few minutes to complete the written survey that you may receive in the mail after your visit with us. Thank you!             Your Updated Medication List - Protect others around you: Learn how to safely use, store and throw away your medicines at www.disposemymeds.org.          This list is accurate as of: 4/17/17  8:57 AM.  Always use your most recent med list.                   Brand Name Dispense Instructions for use    albuterol 108 (90 BASE) MCG/ACT Inhaler    albuterol    1 Inhaler    Inhale 2 puffs into the lungs every 4 hours as needed for shortness of breath / dyspnea       aspirin 81 MG tablet     90 tablet    Take 1 tablet by mouth daily.       blood glucose monitoring test strip    no brand  specified    200 each    1 strip by In Vitro route 3 times daily Test as directed       cetirizine 10 MG tablet    zyrTEC    90 tablet    Take 1 tablet (10 mg) by mouth every evening       * DIABETIC STERILE LANCETS device     1 Box    1 Device 3 times daily.       * ONE TOUCH LANCETS Misc     270 each    1 lancet 3 times daily       furosemide 20 MG tablet    LASIX    360 tablet    Take 2 tablets (40 mg) by mouth 2 times daily       insulin pen needle 32G X 4 MM    BD SEVERO U/F    100 each    Use 1 daily or as directed.       liraglutide 18 MG/3ML soln    VICTOZA PEN    1 Month    Inject 1.8 mg Subcutaneous daily       lisinopril 5 MG tablet    PRINIVIL/ZESTRIL    90 tablet    Take 1 tablet (5 mg) by mouth daily       * metFORMIN modified 500 MG 24 hr tablet    GLUMETZA    90 tablet    Take 1 tablet (500 mg) by mouth daily (with breakfast)       * metFORMIN modified 500 MG 24 hr tablet    GLUMETZA    30 tablet    Take 1 tablet (500 mg) by mouth daily (with breakfast) Ok to dispense generic glucophage xr as previous       modafinil 200 MG tablet    PROVIGIL    60 tablet    Take 1/2 tablet by mouth 3-4 times daily as needed for sleepiness.       MULTIVITAMIN PO      Take 1 tablet by mouth daily.       order for DME      Equipment being ordered: BIPAP Refugio P Kennedy received a [employer] AirCurve 10 Bilevel. Pressures were set at 23/14 cm H2O.       order for DME      Auto-BiPAP: IPAP max 21 cm H2O EPAP min 15 cm H2O Pressure support 5 cm Changed in clinic Lifetime need and heated humidity.       simvastatin 10 MG tablet    ZOCOR    90 tablet    Take 1 tablet (10 mg) by mouth At Bedtime       topiramate 25 MG tablet    TOPAMAX    168 tablet    Take 4 tablets (100 mg) by mouth 2 times daily       triamcinolone 0.1 % cream    KENALOG    30 g    Apply sparingly to affected area two times daily as needed       * Notice:  This list has 4 medication(s) that are the same as other medications prescribed for you. Read the directions  carefully, and ask your doctor or other care provider to review them with you.

## 2017-04-17 NOTE — TELEPHONE ENCOUNTER
Lisinopril 5 mg      Last Written Prescription Date: 10/17/16  Last Fill Quantity: 90, # refills: 1  Last Office Visit with G, [facility] or [facility] prescribing provider: 3/2/17       Potassium   Date Value Ref Range Status   11/29/2016 3.7 3.4 - 5.3 mmol/L Final     Creatinine   Date Value Ref Range Status   11/29/2016 1.06 0.66 - 1.25 mg/dL Final     BP Readings from Last 3 Encounters:   03/02/17 122/70   01/13/17 123/67   12/20/16 109/68

## 2017-04-17 NOTE — PATIENT INSTRUCTIONS
1.  Eat more vegetables.  If you make your meal like your rice meal yesterday, make a vegetable with it.  That has less calorie value and can be used to help keep you satisfied.    2.  Work on being more active, take some walks.

## 2017-04-17 NOTE — PROGRESS NOTES
Diabetes Self Management Training: Individual Review Visit    Refugio Kennedy presents today for education related to Type 2 diabetes.    He is accompanied by self    Patient's diabetes management related comments/concerns: stress, just moved, vehicle issues    Patient's emotional response to diabetes: expresses readiness to learn    Patient would like this visit to be focused around the following diabetes-related behaviors and goals: Healthy Eating, Being Active, Monitoring and Taking Medication    ASSESSMENT:  Patient Problem List and Family Medical History reviewed for relevant medical history, current medical status, and diabetes risk factors.    Current Diabetes Management per Patient:  Taking diabetes medications?   yes:     Diabetes Medication(s)     Biguanides Sig    metFORMIN modified (GLUMETZA) 500 MG 24 hr tablet Take 1 tablet (500 mg) by mouth daily (with breakfast)    metFORMIN modified (GLUMETZA) 500 MG 24 hr tablet Take 1 tablet (500 mg) by mouth daily (with breakfast) Ok to dispense generic glucophage xr as previous    Incretin Mimetic Agents (GLP-1 Receptor Agonists) Sig    liraglutide (VICTOZA PEN) 18 MG/3ML soln Inject 1.8 mg Subcutaneous daily          *Abbreviated insulin dose documentation key: Insulin Trade Name (nczsyjful-gmmio-nzlgfo-bedtime) - i.e. Humalog 5-5-5-0 (Humalog 5 units at breakfast, 5 units at lunch, and 5 units at dinner).    Past Diabetes Education: Yes    Patient glucose self monitoring as follows: one time daily.   BG meter: Accu-Chek Cheryl meter  BG results:   Breakfast pp Lunch pp Supper  pp HS   7-Apr 112         11-Apr 112         12-Apr 111         13-Apr 152         14-Apr 118         15-Apr 135         16-Apr          17-Apr 130          #DIV/0! #DIV/0! #DIV/0! #DIV/0! #DIV/0! #DIV/0!       BG values are: higher due to eating out and poor eating due to moving   Patient's most recent   Lab Results   Component Value Date    A1C 5.6 03/02/2017    is meeting goal of  "<7.0    Nutrition:  Breakfast- toast and coffee,   Lunch-eating out random snack food and junk food  Supper- rice pouch bag with cream of chicken soup, prepackaged meals like lasagna, hamburgers.    Has been doing a lot of out to eat lately.  Physical Activity:    Limitations: wt, breathing  Recently moved so has been more active lately    Diabetes Risk Factors:  family history, age over 45 years and overweight/obesity    Diabetes Complications:  Not discussed today.    Vitals:  Wt (!) 192.4 kg (424 lb 3.2 oz)  BMI 65.46 kg/m2  Estimated body mass index is 65.46 kg/(m^2) as calculated from the following:    Height as of 3/2/17: 1.715 m (5' 7.5\").    Weight as of this encounter: 192.4 kg (424 lb 3.2 oz).   Last 3 BP:   BP Readings from Last 3 Encounters:   03/02/17 122/70   01/13/17 123/67   12/20/16 109/68       History   Smoking Status     Former Smoker     Packs/day: 0.50     Years: 1.00     Types: Cigarettes, Cigars     Quit date: 5/21/2012   Smokeless Tobacco     Never Used       Labs:  Lab Results   Component Value Date    A1C 5.6 03/02/2017     Lab Results   Component Value Date     11/29/2016     Lab Results   Component Value Date    LDL 59 11/29/2016     HDL Cholesterol   Date Value Ref Range Status   11/29/2016 35 (L) >39 mg/dL Final   ]  GFR Estimate   Date Value Ref Range Status   11/29/2016 76 >60 mL/min/1.7m2 Final     Comment:     Non  GFR Calc     GFR Estimate If Black   Date Value Ref Range Status   11/29/2016 >90   GFR Calc   >60 mL/min/1.7m2 Final     Lab Results   Component Value Date    CR 1.06 11/29/2016     No results found for: MICROALBUMIN    Socio/Economic Considerations:    Support system: family    Health Beliefs and Attitudes:   Patient Activation Measure Survey Score:  MARY Score (Last Two) 1/25/2011   MARY Raw Score 36   Activation Score 47.4   MARY Level 2       Stage of Change: ACTION (Actively working towards change)      Diabetes knowledge and " skills assessment:     Patient is knowledgeable in diabetes management concepts related to: Healthy Eating, Being Active, Monitoring and Taking Medication    Patient needs further education on the following diabetes management concepts: Taking Medication, Problem Solving, Reducing Risks and Healthy Coping    Barriers to Learning Assessment: No Barriers identified    Based on learning assessment above, most appropriate setting for further diabetes education would be: Individual setting.    INTERVENTION:   Cant do anything until the vehicle situation is better ,needs to get decent groceries  [facility] with [facility] due to no vehicle safe to drive to cities  Strongly encouraged he get himself to the grocery store.  Also encouraged adding a vegetable that he has at home with his meals.  Half plate should be vegetable.    Asked pt if he is still planning on getting surgery and he stated he would discuss with MD at next appt    Education provided today on:  AADE Self-Care Behaviors:  Healthy Eating: choosing healthy items, avoid the out to eat  Being Active: relationship to blood glucose and describe appropriate activity program    Opportunities for ongoing education and support in diabetes-self management were discussed.    Pt verbalized understanding of concepts discussed and recommendations provided today.       Education Materials Provided:  No new materials provided today    PLAN:  See Patient Instructions for co-developed, patient-stated behavior change goals.  AVS printed and provided to patient today.    FOLLOW-UP:  Follow-up appointment scheduled on JUne 12th.    Ongoing plan for education and support: Follow-up visit with diabetes educator in June 12      Time Spent: 30 minutes  Encounter Type: Individual    Any diabetes medication dose changes were made via the CDE Protocol and Collaborative Practice Agreement with the patient's primary care provider. A copy of this encounter was shared with the  provider.

## 2017-05-02 DIAGNOSIS — E66.01 MORBID OBESITY DUE TO EXCESS CALORIES (H): ICD-10-CM

## 2017-05-02 RX ORDER — TOPIRAMATE 25 MG/1
100 TABLET, FILM COATED ORAL 2 TIMES DAILY
Qty: 120 TABLET | Refills: 0 | Status: SHIPPED | OUTPATIENT
Start: 2017-05-02 | End: 2017-05-15

## 2017-05-02 NOTE — TELEPHONE ENCOUNTER
topiramate  Last Written Prescription Date:  3/25/17  Last Fill Quantity: 168,   # refills: 0  Last Office Visit : 1/13/17  appt cancelled 4/14/17  Future Office visit:  5/15/17  Refilled to appt date

## 2017-05-15 ENCOUNTER — OFFICE VISIT (OUTPATIENT)
Dept: ENDOCRINOLOGY | Facility: CLINIC | Age: 42
End: 2017-05-15

## 2017-05-15 VITALS
WEIGHT: 315 LBS | HEART RATE: 71 BPM | OXYGEN SATURATION: 96 % | DIASTOLIC BLOOD PRESSURE: 50 MMHG | HEIGHT: 68 IN | BODY MASS INDEX: 47.74 KG/M2 | TEMPERATURE: 98.2 F | SYSTOLIC BLOOD PRESSURE: 107 MMHG

## 2017-05-15 DIAGNOSIS — E66.01 MORBID OBESITY DUE TO EXCESS CALORIES (H): ICD-10-CM

## 2017-05-15 RX ORDER — TOPIRAMATE 50 MG/1
150 TABLET, FILM COATED ORAL 2 TIMES DAILY
Qty: 180 TABLET | Refills: 5 | Status: SHIPPED | OUTPATIENT
Start: 2017-05-15 | End: 2017-11-30

## 2017-05-15 RX ORDER — METFORMIN HCL 500 MG
TABLET, EXTENDED RELEASE 24 HR ORAL
COMMUNITY
Start: 2017-04-17 | End: 2017-08-31

## 2017-05-15 ASSESSMENT — ENCOUNTER SYMPTOMS
FEVER: 0
INCREASED ENERGY: 1
FATIGUE: 0
NIGHT SWEATS: 0
LIGHT-HEADEDNESS: 0
WEIGHT GAIN: 1
HALLUCINATIONS: 0
NECK PAIN: 0
DECREASED APPETITE: 0
LEG PAIN: 1
ARTHRALGIAS: 1
HYPOTENSION: 0
ALTERED TEMPERATURE REGULATION: 0
EXERCISE INTOLERANCE: 1
MUSCLE WEAKNESS: 0
POLYDIPSIA: 1
MUSCLE CRAMPS: 1
LEG SWELLING: 1
PALPITATIONS: 0
STIFFNESS: 1
SYNCOPE: 0
WEIGHT LOSS: 0
CLAUDICATION: 1
SLEEP DISTURBANCES DUE TO BREATHING: 0
ORTHOPNEA: 0
JOINT SWELLING: 1
MYALGIAS: 1
HYPERTENSION: 0
BACK PAIN: 1
TACHYCARDIA: 1
CHILLS: 0
POLYPHAGIA: 1

## 2017-05-15 ASSESSMENT — PAIN SCALES - GENERAL: PAINLEVEL: NO PAIN (0)

## 2017-05-15 NOTE — MR AVS SNAPSHOT
After Visit Summary   5/15/2017    Refugio Kennedy    MRN: [id number]           Patient Information     Date Of Birth          1975        Visit Information        Provider Department      5/15/2017 11:30 AM Ashish Paez MD [facility]        Today's Diagnoses     Morbid obesity due to excess calories (H)          Care Instructions    Increase Topiramate 150 mg twice daily (50 mg tabs)    See Taty Fowler, Health Psychology. Call [place] to schedule [phone]        Follow-ups after your visit        Follow-up notes from your care team     Return in about 3 months (around 8/15/2017).      Your next 10 appointments already scheduled     Jun 12, 2017  1:00 PM CDT   Diabetic Education with [address], MAGNOLIA   [facility] ([facility]    [address] [place] [address] [phone]              Who to contact     Please call your clinic at [phone] to:    Ask questions about your health    Make or cancel appointments    Discuss your medicines    Learn about your test results    Speak to your doctor   If you have compliments or concerns about an experience at your clinic, or if you wish to file a complaint, please contact [facility] Patient Relations at [phone] or email us at [email or web address]         Additional Information About Your Visit        MyChart Information     [employer] SYSTEM[employer] gives you secure access to your electronic health record. If you see a primary care provider, you can also send messages to your care team and make appointments. If you have questions, please call your primary care clinic.  If you do not have a primary care provider, please call [phone] and they will assist you.      Dasher is an electronic gateway that provides easy, online access to your medical records. With Dasher, you can request a clinic appointment, read your test results, renew a prescription or  "communicate with your care team.     To access your existing account, please contact your [facility] or call [phone] for assistance.        Care EveryWhere ID     This is your Care EveryWhere ID. This could be used by other organizations to access your [facility] medical records  [id number]        Your Vitals Were     Pulse Temperature Height Pulse Oximetry BMI (Body Mass Index)       71 98.2  F (36.8  C) 1.715 m (5' 7.5\") 96% 65.97 kg/m2        Blood Pressure from Last 3 Encounters:   05/15/17 107/50   03/02/17 122/70   01/13/17 123/67    Weight from Last 3 Encounters:   05/15/17 (!) 193.9 kg (427 lb 8 oz)   04/17/17 (!) 192.4 kg (424 lb 3.2 oz)   03/06/17 (!) 190.2 kg (419 lb 6.4 oz)              Today, you had the following     No orders found for display         Today's Medication Changes          These changes are accurate as of: 5/15/17 11:51 AM.  If you have any questions, ask your nurse or doctor.               These medicines have changed or have updated prescriptions.        Dose/Directions    topiramate 50 MG tablet   Commonly known as:  TOPAMAX   This may have changed:    - medication strength  - how much to take   Used for:  Morbid obesity due to excess calories (H)   Changed by:  Ashish Paez MD        Dose:  150 mg   Take 3 tablets (150 mg) by mouth 2 times daily   Quantity:  180 tablet   Refills:  5            Where to get your medicines      These medications were sent to [facility] [place] [id number]     Phone:  [phone]     topiramate 50 MG tablet                Primary Care Provider Office Phone # Fax #    Erika Espino [phone]       [address] [address]        Thank you!     Thank you for choosing [facility]  for your care. Our goal is always to provide you with " excellent care. Hearing back from our patients is one way we can continue to improve our services. Please take a few minutes to complete the written survey that you may receive in the mail after your visit with us. Thank you!             Your Updated Medication List - Protect others around you: Learn how to safely use, store and throw away your medicines at www.disposemymeds.org.          This list is accurate as of: 5/15/17 11:51 AM.  Always use your most recent med list.                   Brand Name Dispense Instructions for use    albuterol 108 (90 BASE) MCG/ACT Inhaler    albuterol    1 Inhaler    Inhale 2 puffs into the lungs every 4 hours as needed for shortness of breath / dyspnea       aspirin 81 MG tablet     90 tablet    Take 1 tablet by mouth daily.       blood glucose monitoring test strip    no brand specified    200 each    1 strip by In Vitro route 3 times daily Test as directed       cetirizine 10 MG tablet    zyrTEC    90 tablet    Take 1 tablet (10 mg) by mouth every evening       * DIABETIC STERILE LANCETS device     1 Box    1 Device 3 times daily.       * ONE TOUCH LANCETS Misc     270 each    1 lancet 3 times daily       furosemide 20 MG tablet    LASIX    360 tablet    Take 2 tablets (40 mg) by mouth 2 times daily       insulin pen needle 32G X 4 MM    BD SEVERO U/F    100 each    Use 1 daily or as directed.       liraglutide 18 MG/3ML soln    VICTOZA PEN    1 Month    Inject 1.8 mg Subcutaneous daily       lisinopril 5 MG tablet    PRINIVIL/ZESTRIL    90 tablet    Take 1 tablet (5 mg) by mouth daily       * metFORMIN modified 500 MG 24 hr tablet    GLUMETZA    90 tablet    Take 1 tablet (500 mg) by mouth daily (with breakfast)       * metFORMIN modified 500 MG 24 hr tablet    GLUMETZA    30 tablet    Take 1 tablet (500 mg) by mouth daily (with breakfast) Ok to dispense generic glucophage xr as previous       * metFORMIN 500 MG 24 hr tablet    GLUCOPHAGE-XR         modafinil 200 MG tablet     PROVIGIL    60 tablet    Take 1/2 tablet by mouth 3-4 times daily as needed for sleepiness.       MULTIVITAMIN PO      Take 1 tablet by mouth daily.       order for DME      Equipment being ordered: BIPAP Refugio P Kennedy received a [employer] AirCurve 10 Bilevel. Pressures were set at 23/14 cm H2O.       order for DME      Auto-BiPAP: IPAP max 21 cm H2O EPAP min 15 cm H2O Pressure support 5 cm Changed in clinic Lifetime need and heated humidity.       simvastatin 10 MG tablet    ZOCOR    90 tablet    Take 1 tablet (10 mg) by mouth At Bedtime       topiramate 50 MG tablet    TOPAMAX    180 tablet    Take 3 tablets (150 mg) by mouth 2 times daily       triamcinolone 0.1 % cream    KENALOG    30 g    Apply sparingly to affected area two times daily as needed       * Notice:  This list has 5 medication(s) that are the same as other medications prescribed for you. Read the directions carefully, and ask your doctor or other care provider to review them with you.

## 2017-05-15 NOTE — PROGRESS NOTES
"      Return Medical Weight Management Note     Refugio Kennedy  MRN:  [id number]  :  1975  CRISTI:  5/15/2017    Dear Dr. Espino,    We had the pleasure of seeing your patient Refugio Kennedy.  He is a 41 year old male who we are continuing to see for treatment of obesity related to: DMII, HLD, sleep apnea on CPAP daily, and hypoventilation syndrome, oxygen dependent.     CURRENT WEIGHT:   427 lbs 8 oz    Wt Readings from Last 4 Encounters:   05/15/17 (!) 193.9 kg (427 lb 8 oz)   17 (!) 192.4 kg (424 lb 3.2 oz)   17 (!) 190.2 kg (419 lb 6.4 oz)   17 (!) 189.8 kg (418 lb 6.4 oz)     Height:  5' 7.5\"  Body Mass Index:  Body mass index is 65.97 kg/(m^2).  Vitals:  B/P: 138/83, P: 80    Initial consult weight was 454 on 5/15/2015.  Weight change since last seen on 2017 is up 12 pounds.   Total loss is 27 pounds.    INTERVAL HISTORY:  Tolerating 100 mg BID. He does not exercise. He is not now trying to change his snacking to vegetables and get more vegetables overall.    Diet and Activity Changes Since Last Visit Reviewed With Patient 5/15/2017   I have made the following changes to my diet since my last visit: not enuf,snacking increased with stress-buying new house etc poor food choices   With regards to my diet, I am still struggling with: dry carbs anot enuf veggies and exercise   For breakfast, I typically eat: coffee toast and eggs   For lunch, I typically eat: sandwich and milk or hamburger and chips occasional salad   For supper, I typically eat: tacos,mac n cheese,pizza   For snack(s), I typically eat: snack packs of chips or cookies   I have made the following changes to my activity/exercise since my last visit: summer here my activity will increase tremndoulsy with flea markets n auctions but hernia is still an issue as 2nd attempt to repair did not work   With regards to my activity/exercise, I am still struggling with: just actually starting doing and maintaing even if just walking big " procatonator       MEDICATIONS:   Current Outpatient Prescriptions   Medication     metFORMIN (GLUCOPHAGE-XR) 500 MG 24 hr tablet     topiramate (TOPAMAX) 25 MG tablet     lisinopril (PRINIVIL/ZESTRIL) 5 MG tablet     cetirizine (ZYRTEC) 10 MG tablet     blood glucose monitoring (NO BRAND SPECIFIED) test strip     ONE TOUCH LANCETS MISC     liraglutide (VICTOZA PEN) 18 MG/3ML soln     simvastatin (ZOCOR) 10 MG tablet     metFORMIN modified (GLUMETZA) 500 MG 24 hr tablet     metFORMIN modified (GLUMETZA) 500 MG 24 hr tablet     modafinil (PROVIGIL) 200 MG tablet     furosemide (LASIX) 20 MG tablet     insulin pen needle (BD SEVERO U/F) 32G X 4 MM     order for DME     albuterol (ALBUTEROL) 108 (90 BASE) MCG/ACT inhaler     triamcinolone (KENALOG) 0.1 % cream     ORDER FOR DME     aspirin 81 MG tablet     DIABETIC STERILE LANCETS device     Multiple Vitamin (MULTIVITAMIN OR)     No current facility-administered medications for this visit.        Weight Loss Medication History Reviewed With Patient 5/15/2017   Which weight loss medications are you currently taking on a regular basis?  Topamax (topiramate), Victoza (liraglutide)   Are you having any side effects from the weight loss medication that we have prescribed you? No   If you are having side effects please describe: -     ASSESSMENT:   He has been busy with house buying and other issues, not paying attention to his diet.  Increase topiramate 150 mg BID. Reinforce food plan - focus on no between meal snacking, aggressive lowering of starches and cheese.   Refer to healthy psychology to assist with barriers to self care.    FOLLOW-UP:    3 months   10/15 minutes spent on counseling and education    Sincerely,

## 2017-05-15 NOTE — NURSING NOTE
"Chief Complaint   Patient presents with     Weight Problem     RMWM        Vitals:    05/15/17 1117   BP: 107/50   BP Location: Left arm   Patient Position: Chair   Cuff Size: Adult Large   Pulse: 71   Temp: 98.2  F (36.8  C)   SpO2: 96%   Weight: (!) 427 lb 8 oz   Height: 5' 7.5\"       Body mass index is 65.97 kg/(m^2).    Jana Ospina                          "

## 2017-05-15 NOTE — LETTER
"5/15/2017       RE: Refugio Kennedy  [address]  [facility] [address]     Dear Colleague,    Thank you for referring your patient, Refugio Kennedy, to the [facility] MEDICAL WEIGHT MANAGEMENT at [facility]. Please see a copy of my visit note below.          Return Medical Weight Management Note     Refugio Kennedy  MRN:  [id number]  :  1975  CRISTI:  5/15/2017    Dear Dr. Espino,    We had the pleasure of seeing your patient Refugio Kennedy.  He is a 41 year old male who we are continuing to see for treatment of obesity related to: DMII, HLD, sleep apnea on CPAP daily, and hypoventilation syndrome, oxygen dependent.     CURRENT WEIGHT:   427 lbs 8 oz    Wt Readings from Last 4 Encounters:   05/15/17 (!) 193.9 kg (427 lb 8 oz)   17 (!) 192.4 kg (424 lb 3.2 oz)   17 (!) 190.2 kg (419 lb 6.4 oz)   17 (!) 189.8 kg (418 lb 6.4 oz)     Height:  5' 7.5\"  Body Mass Index:  Body mass index is 65.97 kg/(m^2).  Vitals:  B/P: 138/83, P: 80    Initial consult weight was 454 on 5/15/2015.  Weight change since last seen on 2017 is up 12 pounds.   Total loss is 27 pounds.    INTERVAL HISTORY:  Tolerating 100 mg BID. He does not exercise. He is not now trying to change his snacking to vegetables and get more vegetables overall.    Diet and Activity Changes Since Last Visit Reviewed With Patient 5/15/2017   I have made the following changes to my diet since my last visit: not enuf,snacking increased with stress-buying new house etc poor food choices   With regards to my diet, I am still struggling with: dry carbs anot enuf veggies and exercise   For breakfast, I typically eat: coffee toast and eggs   For lunch, I typically eat: sandwich and milk or hamburger and chips occasional salad   For supper, I typically eat: tacos,mac n cheese,pizza   For snack(s), I typically eat: snack packs of chips or cookies   I have made the following changes to my activity/exercise since my last " visit: summer here my activity will increase tremndoulsy with [employer] n auctions but hernia is still an issue as 2nd attempt to repair did not work   With regards to my activity/exercise, I am still struggling with: just actually starting doing and maintaing even if just walking big procatonator       MEDICATIONS:   Current Outpatient Prescriptions   Medication     metFORMIN (GLUCOPHAGE-XR) 500 MG 24 hr tablet     topiramate (TOPAMAX) 25 MG tablet     lisinopril (PRINIVIL/ZESTRIL) 5 MG tablet     cetirizine (ZYRTEC) 10 MG tablet     blood glucose monitoring (NO BRAND SPECIFIED) test strip     ONE TOUCH LANCETS MISC     liraglutide (VICTOZA PEN) 18 MG/3ML soln     simvastatin (ZOCOR) 10 MG tablet     metFORMIN modified (GLUMETZA) 500 MG 24 hr tablet     metFORMIN modified (GLUMETZA) 500 MG 24 hr tablet     modafinil (PROVIGIL) 200 MG tablet     furosemide (LASIX) 20 MG tablet     insulin pen needle (BD SEVERO U/F) 32G X 4 MM     order for DME     albuterol (ALBUTEROL) 108 (90 BASE) MCG/ACT inhaler     triamcinolone (KENALOG) 0.1 % cream     ORDER FOR DME     aspirin 81 MG tablet     DIABETIC STERILE LANCETS device     Multiple Vitamin (MULTIVITAMIN OR)     No current facility-administered medications for this visit.        Weight Loss Medication History Reviewed With Patient 5/15/2017   Which weight loss medications are you currently taking on a regular basis?  Topamax (topiramate), Victoza (liraglutide)   Are you having any side effects from the weight loss medication that we have prescribed you? No   If you are having side effects please describe: -     ASSESSMENT:   He has been busy with house buying and other issues, not paying attention to his diet.  Increase topiramate 150 mg BID. Reinforce food plan - focus on no between meal snacking, aggressive lowering of starches and cheese.   Refer to healthy psychology to assist with barriers to self care.    FOLLOW-UP:    3 months   10/15 minutes spent on counseling  and education    Sincerely,      Ashish Paez MD

## 2017-05-16 DIAGNOSIS — G47.33 OBSTRUCTIVE SLEEP APNEA-HYPOPNEA SYNDROME: ICD-10-CM

## 2017-05-16 RX ORDER — MODAFINIL 200 MG/1
TABLET ORAL
Qty: 60 TABLET | Refills: 5 | Status: SHIPPED | OUTPATIENT
Start: 2017-05-16 | End: 2017-11-20

## 2017-05-16 NOTE — TELEPHONE ENCOUNTER
Refill request received via fax by pharmacy    Pending Prescriptions:                       Disp   Refills    modafinil (PROVIGIL) 200 MG tablet        60 tab*5            Sig: Take 1/2 tablet by mouth 3-4 times daily as           needed for sleepiness.         Last Written Prescription Date:  10/14/2016  Last Fill Quantity: 60 per 30 days,   # refills: 5  Last Office Visit with [facility] or [facility] prescribing provider: 10/13/2016  Future Office visit: one year recommended      Routing refill request to provider for review/approval because:  Drug not on the [facility], [facility] or [facility] refill protocol or controlled substance

## 2017-06-09 DIAGNOSIS — G47.33 OSA (OBSTRUCTIVE SLEEP APNEA): Primary | ICD-10-CM

## 2017-06-12 ENCOUNTER — ALLIED HEALTH/NURSE VISIT (OUTPATIENT)
Dept: EDUCATION SERVICES | Facility: CLINIC | Age: 42
End: 2017-06-12

## 2017-06-12 DIAGNOSIS — I87.8 VENOUS STASIS: ICD-10-CM

## 2017-06-12 DIAGNOSIS — E11.9 TYPE 2 DIABETES MELLITUS WITHOUT COMPLICATION, WITHOUT LONG-TERM CURRENT USE OF INSULIN (H): Primary | ICD-10-CM

## 2017-06-12 RX ORDER — FUROSEMIDE 20 MG
TABLET ORAL
Qty: 360 TABLET | Refills: 1 | Status: SHIPPED | OUTPATIENT
Start: 2017-06-12 | End: 2018-02-26

## 2017-06-12 NOTE — MR AVS SNAPSHOT
After Visit Summary   6/12/2017    Refugio Kennedy    MRN: [id number]           Patient Information     Date Of Birth          1975        Visit Information        Provider Department      6/12/2017 1:00 PM [address], [address] [facility]        Today's Diagnoses     Type 2 diabetes mellitus without complication, without long-term current use of insulin (H)    -  1       Follow-ups after your visit        Your next 10 appointments already scheduled     Jun 26, 2017  3:00 PM CDT   (Arrive by 2:45 PM)   New Patient Visit with Taty Fowler, PhD   [facility] Gastroenterology and IBD ([facility])    [address] [place] [id number] [phone]            Aug 21, 2017 10:30 AM CDT   (Arrive by 10:15 AM)   Return Visit with Ashish Paez MD   [facility] Medical Weight Management ([facility])    [address] [place] [id number] [phone]              Who to contact     If you have questions or need follow up information about today's clinic visit or your schedule please contact [facility]note 2] directly at [phone].  Normal or non-critical lab and imaging results will be communicated to you by MyChart, letter or phone within 4 business days after the clinic has received the results. If you do not hear from us within 7 days, please contact the clinic through MyChart or phone. If you have a critical or abnormal lab result, we will notify you by phone as soon as possible.  Submit refill requests through [email or web address] or call your pharmacy and they will forward the refill request to us. Please allow 3 business days for your refill to be completed.          Additional Information About Your Visit        [facility] Information     [email or web address] gives you secure access to your electronic health record. If you see a primary care provider, you can also send messages to your care team and  make appointments. If you have questions, please call your primary care clinic.  If you do not have a primary care provider, please call [phone] and they will assist you.        Care EveryWhere ID     This is your Care EveryWhere ID. This could be used by other organizations to access your [facility] medical records  [id number]        Your Vitals Were     BMI (Body Mass Index)                   64.35 kg/m2            Blood Pressure from Last 3 Encounters:   05/15/17 107/50   03/02/17 122/70   01/13/17 123/67    Weight from Last 3 Encounters:   06/13/17 (!) 189.1 kg (417 lb)   05/15/17 (!) 193.9 kg (427 lb 8 oz)   04/17/17 (!) 192.4 kg (424 lb 3.2 oz)              We Performed the Following     MNT INDIVIDUAL F/U REASSESS, EA 15 MIN          Today's Medication Changes          These changes are accurate as of: 6/12/17 11:59 PM.  If you have any questions, ask your nurse or doctor.               These medicines have changed or have updated prescriptions.        Dose/Directions    furosemide 20 MG tablet   Commonly known as:  LASIX   This may have changed:  See the new instructions.   Used for:  Venous stasis   Changed by:  Erika Espino MD        TAKE TWO TABLETS BY MOUTH TWICE A DAY   Quantity:  360 tablet   Refills:  1            Where to get your medicines      These medications were sent to [facility] [place] [address]     Phone:  [phone]     furosemide 20 MG tablet                Primary Care Provider Office Phone # Fax #    Erika Espino [phone]       [address] [address]        Thank you!     Thank you for choosing [facility]  for your care. Our goal is always to provide you with excellent care. Hearing back from our patients is one way we can continue to improve our services. Please take a few minutes to complete the written  survey that you may receive in the mail after your visit with us. Thank you!             Your Updated Medication List - Protect others around you: Learn how to safely use, store and throw away your medicines at www.disposemymeds.org.          This list is accurate as of: 6/12/17 11:59 PM.  Always use your most recent med list.                   Brand Name Dispense Instructions for use    albuterol 108 (90 BASE) MCG/ACT Inhaler    albuterol    1 Inhaler    Inhale 2 puffs into the lungs every 4 hours as needed for shortness of breath / dyspnea       aspirin 81 MG tablet     90 tablet    Take 1 tablet by mouth daily.       blood glucose monitoring test strip    no brand specified    200 each    1 strip by In Vitro route 3 times daily Test as directed       cetirizine 10 MG tablet    zyrTEC    90 tablet    Take 1 tablet (10 mg) by mouth every evening       * DIABETIC STERILE LANCETS device     1 Box    1 Device 3 times daily.       * ONE TOUCH LANCETS Misc     270 each    1 lancet 3 times daily       furosemide 20 MG tablet    LASIX    360 tablet    TAKE TWO TABLETS BY MOUTH TWICE A DAY       insulin pen needle 32G X 4 MM    BD SEVERO U/F    100 each    Use 1 daily or as directed.       liraglutide 18 MG/3ML soln    VICTOZA PEN    1 Month    Inject 1.8 mg Subcutaneous daily       lisinopril 5 MG tablet    PRINIVIL/ZESTRIL    90 tablet    Take 1 tablet (5 mg) by mouth daily       * metFORMIN modified 500 MG 24 hr tablet    GLUMETZA    90 tablet    Take 1 tablet (500 mg) by mouth daily (with breakfast)       * metFORMIN modified 500 MG 24 hr tablet    GLUMETZA    30 tablet    Take 1 tablet (500 mg) by mouth daily (with breakfast) Ok to dispense generic glucophage xr as previous       * metFORMIN 500 MG 24 hr tablet    GLUCOPHAGE-XR         modafinil 200 MG tablet    PROVIGIL    60 tablet    Take 1/2 tablet by mouth 3-4 times daily as needed for sleepiness.       MULTIVITAMIN PO      Take 1 tablet by mouth daily.       order  for DME      Equipment being ordered: BIPAP Refugio P Kennedy received a Resmed AirCurve 10 Bilevel. Pressures were set at 23/14 cm H2O.       order for DME      Auto-BiPAP: IPAP max 21 cm H2O EPAP min 15 cm H2O Pressure support 5 cm Changed in clinic Lifetime need and heated humidity.       simvastatin 10 MG tablet    ZOCOR    90 tablet    Take 1 tablet (10 mg) by mouth At Bedtime       topiramate 50 MG tablet    TOPAMAX    180 tablet    Take 3 tablets (150 mg) by mouth 2 times daily       triamcinolone 0.1 % cream    KENALOG    30 g    Apply sparingly to affected area two times daily as needed       * Notice:  This list has 5 medication(s) that are the same as other medications prescribed for you. Read the directions carefully, and ask your doctor or other care provider to review them with you.

## 2017-06-12 NOTE — TELEPHONE ENCOUNTER
furosemide (LASIX) 20 MG tablet      Last Written Prescription Date: 5/23/16  Last Fill Quantity: 360, # refills: 3  Last Office Visit with G, P or [facility] prescribing provider: 5/15/17       Potassium   Date Value Ref Range Status   11/29/2016 3.7 3.4 - 5.3 mmol/L Final     Creatinine   Date Value Ref Range Status   11/29/2016 1.06 0.66 - 1.25 mg/dL Final     BP Readings from Last 3 Encounters:   05/15/17 107/50   03/02/17 122/70   01/13/17 123/67

## 2017-06-13 VITALS — WEIGHT: 315 LBS | BODY MASS INDEX: 64.35 KG/M2

## 2017-06-14 NOTE — PROGRESS NOTES
Medical Nutrition Therapy for Diabetes  Visit Type:Reassessment and intervention    Refugio Kennedy presents today for MNT and education related to type 2 diabetes.   He is accompanied by significant other.     ASSESSMENT:   Patient comments/concerns relating to nutrition: Dr. Paez was upset over wt gain again, discussed sending to psyciatrist for issues with eating.  BG runnin-136.  Struggles with eating when mad or frustrated with dad or sig other. Blames choices available on others.    NUTRITION HISTORY:    Breakfast: toast, cereal, eggs  Lunch: mac and cheese, hotdog  Dinner: meat, starch no veggies, rare  Snacks: struggles with sweets and dry carbs like crackers, donuts        Misses meals? No    Eats out:  seldom     Previous diet education:  Yes     Food allergies/intolerances: none    Diet is high in: calories and fat (saturated)  Diet is low in: fiber, fruits and vegetables    EXERCISE: minimal exercise    SOCIO/ECONOMIC:   Lives with: father and significant other    BLOOD GLUCOSE MONITORING:  Patient glucose self monitoring as follows: one time daily.   BG meter: Accu-chek Severo meter  BG results:      BG values are: In goal  Patient's most recent   Lab Results   Component Value Date    A1C 5.6 2017    is meeting goal of <7.0    MEDICATIONS:  Current Outpatient Prescriptions   Medication     furosemide (LASIX) 20 MG tablet     modafinil (PROVIGIL) 200 MG tablet     metFORMIN (GLUCOPHAGE-XR) 500 MG 24 hr tablet     topiramate (TOPAMAX) 50 MG tablet     lisinopril (PRINIVIL/ZESTRIL) 5 MG tablet     cetirizine (ZYRTEC) 10 MG tablet     blood glucose monitoring (NO BRAND SPECIFIED) test strip     ONE TOUCH LANCETS MISC     liraglutide (VICTOZA PEN) 18 MG/3ML soln     simvastatin (ZOCOR) 10 MG tablet     metFORMIN modified (GLUMETZA) 500 MG 24 hr tablet     metFORMIN modified (GLUMETZA) 500 MG 24 hr tablet     insulin pen needle (BD SEVERO U/F) 32G X 4 MM     order for DME     albuterol (ALBUTEROL)  108 (90 BASE) MCG/ACT inhaler     triamcinolone (KENALOG) 0.1 % cream     ORDER FOR DME     aspirin 81 MG tablet     DIABETIC STERILE LANCETS device     Multiple Vitamin (MULTIVITAMIN OR)     No current facility-administered medications for this visit.        LABS:  Lab Results   Component Value Date    A1C 5.6 03/02/2017     Lab Results   Component Value Date     11/29/2016     Lab Results   Component Value Date    LDL 59 11/29/2016     HDL Cholesterol   Date Value Ref Range Status   11/29/2016 35 (L) >39 mg/dL Final   ]  GFR Estimate   Date Value Ref Range Status   11/29/2016 76 >60 mL/min/1.7m2 Final     Comment:     Non  GFR Calc     GFR Estimate If Black   Date Value Ref Range Status   11/29/2016 >90   GFR Calc   >60 mL/min/1.7m2 Final     Lab Results   Component Value Date    CR 1.06 11/29/2016     No results found for: MICROALBUMIN    ANTHROPOMETRICS:  Vitals: There were no vitals taken for this visit.  There is no height or weight on file to calculate BMI.      Wt Readings from Last 5 Encounters:   05/15/17 (!) 193.9 kg (427 lb 8 oz)   04/17/17 (!) 192.4 kg (424 lb 3.2 oz)   03/06/17 (!) 190.2 kg (419 lb 6.4 oz)   03/02/17 (!) 189.8 kg (418 lb 6.4 oz)   01/23/17 (!) 188.2 kg (415 lb)       Weight Change: down today    ESTIMATED KCAL REQUIREMENTS:  1800 kcal per day    NUTRITION DIAGNOSIS: Excessive energy intake related to morbid obesity as evidenced by eating larger quantities of food than normal servings    NUTRITION INTERVENTION:  Worked on strategies to help with choices, portions and activity    PATIENT'S BEHAVIOR CHANGE GOALS:   See Patient Instructions for patient stated behavior change goals. AVS was printed and given to patient at today's appointment.    MONITOR / EVALUATE:  RD will monitor/evaluate:  Food / Beverage / Nutrient intake   Progress toward meeting stated nutrition-related goals  Work on taking walks in am and when at flea market  Eat more  vegetables, make at supper daily, jonelle will remind you    PUt some of what you make in frig before supper to prevent you from finishing it even though you are full  FOLLOW-UP:  Follow-up appointment scheduled in july.       Time spent in minutes: 30  Encounter: Individual

## 2017-06-26 ENCOUNTER — OFFICE VISIT (OUTPATIENT)
Dept: GASTROENTEROLOGY | Facility: CLINIC | Age: 42
End: 2017-06-26

## 2017-06-26 VITALS — BODY MASS INDEX: 64.27 KG/M2 | WEIGHT: 315 LBS

## 2017-06-26 DIAGNOSIS — F54 PSYCHOLOGICAL FACTORS AFFECTING MEDICAL CONDITION: Primary | ICD-10-CM

## 2017-06-26 DIAGNOSIS — E66.01 MORBID OBESITY, UNSPECIFIED OBESITY TYPE (H): ICD-10-CM

## 2017-06-26 ASSESSMENT — ANXIETY QUESTIONNAIRES
GAD7 TOTAL SCORE: 3
2. NOT BEING ABLE TO STOP OR CONTROL WORRYING: NOT AT ALL
3. WORRYING TOO MUCH ABOUT DIFFERENT THINGS: NOT AT ALL
6. BECOMING EASILY ANNOYED OR IRRITABLE: MORE THAN HALF THE DAYS
IF YOU CHECKED OFF ANY PROBLEMS ON THIS QUESTIONNAIRE, HOW DIFFICULT HAVE THESE PROBLEMS MADE IT FOR YOU TO DO YOUR WORK, TAKE CARE OF THINGS AT HOME, OR GET ALONG WITH OTHER PEOPLE: SOMEWHAT DIFFICULT
1. FEELING NERVOUS, ANXIOUS, OR ON EDGE: SEVERAL DAYS
7. FEELING AFRAID AS IF SOMETHING AWFUL MIGHT HAPPEN: NOT AT ALL
5. BEING SO RESTLESS THAT IT IS HARD TO SIT STILL: NOT AT ALL
4. TROUBLE RELAXING: NOT AT ALL

## 2017-06-26 NOTE — MR AVS SNAPSHOT
After Visit Summary   6/26/2017    Refugio Kennedy    MRN: [id number]           Patient Information     Date Of Birth          1975        Visit Information        Provider Department      6/26/2017 3:00 PM Taty Fowler, PhD [facility]        Today's Diagnoses     Psychological factors affecting medical condition    -  1    Morbid obesity, unspecified obesity type (H)           Follow-ups after your visit        Your next 10 appointments already scheduled     Jul 17, 2017  8:20 AM CDT   SHORT with Erika Espino MD   [facility] ([facility]    [address] [place] [id number]   [phone]            Jul 24, 2017  2:00 PM CDT   (Arrive by 1:45 PM)   Return Visit with Taty Fowler PhD   [facility] ([facility])    [address] [place] [id number] [phone]            Aug 21, 2017 10:30 AM CDT   (Arrive by 10:15 AM)   Return Visit with Ashish Paez MD   [facility] ([facility])    [address] [place] [id number] [phone]              Who to contact     Please call your clinic at [phone] to:    Ask questions about your health    Make or cancel appointments    Discuss your medicines    Learn about your test results    Speak to your doctor   If you have compliments or concerns about an experience at your clinic, or if you wish to file a complaint, please contact [facility] Patient Relations at [phone] or email us at [email or web address]         Additional Information About Your Visit        MyChart Information     I Just Sharedhart gives you secure access to your electronic health record. If you see a primary care provider, you can also send messages to your care team and make appointments. If you have questions,  please call your primary care clinic.  If you do not have a primary care provider, please call [phone] and they will assist you.      [employer] is an electronic gateway that provides easy, online access to your medical records. With [employer], you can request a clinic appointment, read your test results, renew a prescription or communicate with your care team.     To access your existing account, please contact your [facility] or call [phone] for assistance.        Care EveryWhere ID     This is your Care EveryWhere ID. This could be used by other organizations to access your [facility] medical records  [id number]        Your Vitals Were     BMI (Body Mass Index)                   64.27 kg/m2            Blood Pressure from Last 3 Encounters:   05/15/17 107/50   03/02/17 122/70   01/13/17 123/67    Weight from Last 3 Encounters:   06/26/17 (!) 188.9 kg (416 lb 8 oz)   06/13/17 (!) 189.1 kg (417 lb)   05/15/17 (!) 193.9 kg (427 lb 8 oz)              Today, you had the following     No orders found for display       Primary Care Provider Office Phone # Fax #    Erika Espino [phone]       [address] [address]        Equal Access to Services     RICARDO HALL AH: Hadii aad ku hadasho Soomaali, waaxda luqadaha, qaybta kaalmada adeegyada, waxay idiin hayaan eleni winter. So [facility] [phone].    ATENCIÓN: Si habla español, tiene a clinton disposición servicios gratuitos de asistencia lingüística. Llame al [phone].    We comply with applicable federal civil rights laws and [place] laws. We do not discriminate on the basis of race, color, national origin, age, disability sex, sexual orientation or gender identity.            Thank you!     Thank you for choosing [facility]  for your care. Our goal is always to provide you with excellent care. Hearing back from our patients is one way we  can continue to improve our services. Please take a few minutes to complete the written survey that you may receive in the mail after your visit with us. Thank you!             Your Updated Medication List - Protect others around you: Learn how to safely use, store and throw away your medicines at www.disposemymeds.org.          This list is accurate as of: 6/26/17 11:59 PM.  Always use your most recent med list.                   Brand Name Dispense Instructions for use Diagnosis    albuterol 108 (90 BASE) MCG/ACT Inhaler    albuterol    1 Inhaler    Inhale 2 puffs into the lungs every 4 hours as needed for shortness of breath / dyspnea    Mild intermittent asthma without complication       aspirin 81 MG tablet     90 tablet    Take 1 tablet by mouth daily.    Type 2 diabetes, HbA1C goal < 8% (H)       blood glucose monitoring test strip    no brand specified    200 each    1 strip by In Vitro route 3 times daily Test as directed    Diabetes mellitus without complication (H)       cetirizine 10 MG tablet    zyrTEC    90 tablet    Take 1 tablet (10 mg) by mouth every evening    Allergic rhinitis due to pollen       * DIABETIC STERILE LANCETS device     1 Box    1 Device 3 times daily.    Type 2 diabetes, HbA1C goal < 8% (H)       * ONE TOUCH LANCETS Misc     270 each    1 lancet 3 times daily    Type 2 diabetes, HbA1C goal < 8% (H)       furosemide 20 MG tablet    LASIX    360 tablet    TAKE TWO TABLETS BY MOUTH TWICE A DAY    Venous stasis       insulin pen needle 32G X 4 MM    BD SEVERO U/F    100 each    Use 1 daily or as directed.    Type 2 diabetes mellitus without complications (H)       liraglutide 18 MG/3ML soln    VICTOZA PEN    1 Month    Inject 1.8 mg Subcutaneous daily    Type 2 diabetes mellitus without complications (H)       lisinopril 5 MG tablet    PRINIVIL/ZESTRIL    90 tablet    Take 1 tablet (5 mg) by mouth daily    Pulmonary hypertension (H)       * metFORMIN modified 500 MG 24 hr tablet     GLUMETZA    90 tablet    Take 1 tablet (500 mg) by mouth daily (with breakfast)    Type 2 diabetes mellitus without complication, without long-term current use of insulin (H)       * metFORMIN modified 500 MG 24 hr tablet    GLUMETZA    30 tablet    Take 1 tablet (500 mg) by mouth daily (with breakfast) Ok to dispense generic glucophage xr as previous    Type 2 diabetes mellitus without complications (H)       * metFORMIN 500 MG 24 hr tablet    GLUCOPHAGE-XR          modafinil 200 MG tablet    PROVIGIL    60 tablet    Take 1/2 tablet by mouth 3-4 times daily as needed for sleepiness.    Obstructive sleep apnea-hypopnea syndrome       MULTIVITAMIN PO      Take 1 tablet by mouth daily.        order for DME      Equipment being ordered: BIPAP Refugio P Kennedy received a [employer] AirCurve 10 Bilevel. Pressures were set at 23/14 cm H2O.        order for DME      Auto-BiPAP: IPAP max 21 cm H2O EPAP min 15 cm H2O Pressure support 5 cm Changed in clinic Lifetime need and heated humidity.    FARZANA (obstructive sleep apnea)       simvastatin 10 MG tablet    ZOCOR    90 tablet    Take 1 tablet (10 mg) by mouth At Bedtime    Hyperlipidemia LDL goal <100       topiramate 50 MG tablet    TOPAMAX    180 tablet    Take 3 tablets (150 mg) by mouth 2 times daily    Morbid obesity due to excess calories (H)       triamcinolone 0.1 % cream    KENALOG    30 g    Apply sparingly to affected area two times daily as needed    Venous stasis       * Notice:  This list has 5 medication(s) that are the same as other medications prescribed for you. Read the directions carefully, and ask your doctor or other care provider to review them with you.
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
## 2017-06-27 ASSESSMENT — PATIENT HEALTH QUESTIONNAIRE - PHQ9: SUM OF ALL RESPONSES TO PHQ QUESTIONS 1-9: 7

## 2017-06-27 ASSESSMENT — ANXIETY QUESTIONNAIRES: GAD7 TOTAL SCORE: 3

## 2017-07-13 ENCOUNTER — TRANSFERRED RECORDS (OUTPATIENT)
Dept: HEALTH INFORMATION MANAGEMENT | Facility: CLINIC | Age: 42
End: 2017-07-13

## 2017-07-13 DIAGNOSIS — E11.9 TYPE 2 DIABETES MELLITUS WITHOUT COMPLICATION, WITHOUT LONG-TERM CURRENT USE OF INSULIN (H): Primary | ICD-10-CM

## 2017-07-14 DIAGNOSIS — I27.20 PULMONARY HYPERTENSION (H): ICD-10-CM

## 2017-07-14 NOTE — TELEPHONE ENCOUNTER
lisinopril (PRINIVIL/ZESTRIL) 5 MG tablet      Last Written Prescription Date: 04/17/2017  Last Fill Quantity:90 Tablet, # refills: 0  Last Office Visit with [facility], P or [facility] prescribing provider: 03/02/2017  Next 5 appointments (look out 90 days)     Jul 17, 2017  8:20 AM CDT   [facility] with Erika Espino MD   [facility]    [address] [place] [id number] [phone]                   Potassium   Date Value Ref Range Status   11/29/2016 3.7 3.4 - 5.3 mmol/L Final     Creatinine   Date Value Ref Range Status   11/29/2016 1.06 0.66 - 1.25 mg/dL Final     BP Readings from Last 3 Encounters:   05/15/17 107/50   03/02/17 122/70   01/13/17 123/67

## 2017-07-15 DIAGNOSIS — E11.9 TYPE 2 DIABETES MELLITUS WITHOUT COMPLICATIONS (H): ICD-10-CM

## 2017-07-17 ENCOUNTER — OFFICE VISIT (OUTPATIENT)
Dept: FAMILY MEDICINE | Facility: CLINIC | Age: 42
End: 2017-07-17
Payer: COMMERCIAL

## 2017-07-17 VITALS
TEMPERATURE: 98.5 F | SYSTOLIC BLOOD PRESSURE: 106 MMHG | HEART RATE: 89 BPM | WEIGHT: 315 LBS | OXYGEN SATURATION: 96 % | BODY MASS INDEX: 64.12 KG/M2 | DIASTOLIC BLOOD PRESSURE: 62 MMHG

## 2017-07-17 DIAGNOSIS — G47.33 OSA (OBSTRUCTIVE SLEEP APNEA): ICD-10-CM

## 2017-07-17 DIAGNOSIS — E11.9 TYPE 2 DIABETES MELLITUS WITHOUT COMPLICATION, WITHOUT LONG-TERM CURRENT USE OF INSULIN (H): Primary | ICD-10-CM

## 2017-07-17 DIAGNOSIS — E66.01 MORBID OBESITY, UNSPECIFIED OBESITY TYPE (H): ICD-10-CM

## 2017-07-17 DIAGNOSIS — J45.20 MILD INTERMITTENT ASTHMA WITHOUT COMPLICATION: ICD-10-CM

## 2017-07-17 DIAGNOSIS — G89.29 CHRONIC BILATERAL LOW BACK PAIN WITHOUT SCIATICA: ICD-10-CM

## 2017-07-17 DIAGNOSIS — M54.50 CHRONIC BILATERAL LOW BACK PAIN WITHOUT SCIATICA: ICD-10-CM

## 2017-07-17 DIAGNOSIS — I27.20 PULMONARY HYPERTENSION (H): ICD-10-CM

## 2017-07-17 DIAGNOSIS — J96.11 CHRONIC RESPIRATORY FAILURE WITH HYPOXIA (H): ICD-10-CM

## 2017-07-17 DIAGNOSIS — F15.11 METHAMPHETAMINE ABUSE IN REMISSION (H): ICD-10-CM

## 2017-07-17 LAB
ANION GAP SERPL CALCULATED.3IONS-SCNC: 7 MMOL/L (ref 3–14)
BUN SERPL-MCNC: 21 MG/DL (ref 7–30)
CALCIUM SERPL-MCNC: 9.3 MG/DL (ref 8.5–10.1)
CHLORIDE SERPL-SCNC: 109 MMOL/L (ref 94–109)
CO2 SERPL-SCNC: 24 MMOL/L (ref 20–32)
CREAT SERPL-MCNC: 0.85 MG/DL (ref 0.66–1.25)
GFR SERPL CREATININE-BSD FRML MDRD: NORMAL ML/MIN/1.7M2
GLUCOSE SERPL-MCNC: 91 MG/DL (ref 70–99)
HBA1C MFR BLD: 5.6 % (ref 4.3–6)
POTASSIUM SERPL-SCNC: 3.6 MMOL/L (ref 3.4–5.3)
SODIUM SERPL-SCNC: 140 MMOL/L (ref 133–144)

## 2017-07-17 PROCEDURE — 99214 OFFICE O/P EST MOD 30 MIN: CPT | Performed by: FAMILY MEDICINE

## 2017-07-17 PROCEDURE — 83036 HEMOGLOBIN GLYCOSYLATED A1C: CPT | Performed by: FAMILY MEDICINE

## 2017-07-17 PROCEDURE — 36415 COLL VENOUS BLD VENIPUNCTURE: CPT | Performed by: FAMILY MEDICINE

## 2017-07-17 PROCEDURE — 80048 BASIC METABOLIC PNL TOTAL CA: CPT | Performed by: FAMILY MEDICINE

## 2017-07-17 PROCEDURE — 82043 UR ALBUMIN QUANTITATIVE: CPT | Performed by: FAMILY MEDICINE

## 2017-07-17 RX ORDER — LIRAGLUTIDE 6 MG/ML
INJECTION SUBCUTANEOUS
Qty: 10 ML | Refills: 0 | Status: SHIPPED | OUTPATIENT
Start: 2017-07-17 | End: 2017-08-15

## 2017-07-17 RX ORDER — LISINOPRIL 5 MG/1
TABLET ORAL
Qty: 90 TABLET | Refills: 0 | Status: SHIPPED | OUTPATIENT
Start: 2017-07-17 | End: 2017-10-17

## 2017-07-17 NOTE — LETTER
My Asthma Action Plan  Name: Refugio Kennedy   YOB: 1975  Date: 7/17/2017   My doctor: Erika Espino MD   My clinic: [facility]        My Control Medicine: none  My Rescue Medicine: Albuterol (Proair/Ventolin/Proventil) inhaler as needed   My Asthma Severity: intermittent  Avoid your asthma triggers               GREEN ZONE   Good Control    I feel good    No cough or wheeze    Can work, sleep and play without asthma symptoms       Take your asthma control medicine every day.     1. If exercise triggers your asthma, take your rescue medication    15 minutes before exercise or sports, and    During exercise if you have asthma symptoms  2. Spacer to use with inhaler: If you have a spacer, make sure to use it with your inhaler             YELLOW ZONE Getting Worse  I have ANY of these:    I do not feel good    Cough or wheeze    Chest feels tight    Wake up at night   1. Keep taking your Green Zone medications  2. Start taking your rescue medicine:    every 20 minutes for up to 1 hour. Then every 4 hours for 24-48 hours.  3. If you stay in the Yellow Zone for more than 12-24 hours, contact your doctor.  4. If you do not return to the Green Zone in 12-24 hours or you get worse, start taking your oral steroid medicine if prescribed by your provider.           RED ZONE Medical Alert - Get Help  I have ANY of these:    I feel awful    Medicine is not helping    Breathing getting harder    Trouble walking or talking    Nose opens wide to breathe       1. Take your rescue medicine NOW  2. If your provider has prescribed an oral steroid medicine, start taking it NOW  3. Call your doctor NOW  4. If you are still in the Red Zone after 20 minutes and you have not reached your doctor:    Take your rescue medicine again and    Call 911 or go to the emergency room right away    See your regular doctor within 2 weeks of an Emergency Room or Urgent Care visit for follow-up treatment.         Electronically signed by: Astrid Restrepo, July 17, 2017    Annual Reminders:  Meet with Asthma Educator,  Flu Shot in the Fall, consider Pneumonia Vaccination for patients with asthma (aged 19 and older).    Pharmacy: [place], [place] - [address] [place] [address]                    Asthma Triggers  How To Control Things That Make Your Asthma Worse    Triggers are things that make your asthma worse.  Look at the list below to help you find your triggers and what you can do about them.  You can help prevent asthma flare-ups by staying away from your triggers.      Trigger                                                          What you can do   Cigarette Smoke  Tobacco smoke can make asthma worse. Do not allow smoking in your home, car or around you.  Be sure no one smokes at a child s day care or school.  If you smoke, ask your health care provider for ways to help you quit.  Ask family members to quit too.  Ask your health care provider for a referral to Quit Plan to help you quit smoking, or call 5-503-587-PLAN.     Colds, Flu, Bronchitis  These are common triggers of asthma. Wash your hands often.  Don t touch your eyes, nose or mouth.  Get a flu shot every year.     Dust Mites  These are tiny bugs that live in cloth or carpet. They are too small to see. Wash sheets and blankets in hot water every week.   Encase pillows and mattress in dust mite proof covers.  Avoid having carpet if you can. If you have carpet, vacuum weekly.   Use a dust mask and HEPA vacuum.   Pollen and Outdoor Mold  Some people are allergic to trees, grass, or weed pollen, or molds. Try to keep your windows closed.  Limit time out doors when pollen count is high.   Ask you health care provider about taking medicine during allergy season.     Animal Dander  Some people are allergic to skin flakes, urine or saliva from pets with fur or feathers. Keep pets with fur or feathers out of your home.    If you can t keep  the pet outdoors, then keep the pet out of your bedroom.  Keep the bedroom door closed.  Keep pets off cloth furniture and away from stuffed toys.     Mice, Rats, and Cockroaches  Some people are allergic to the waste from these pests.   Cover food and garbage.  Clean up spills and food crumbs.  Store grease in the refrigerator.   Keep food out of the bedroom.   Indoor Mold  This can be a trigger if your home has high moisture. Fix leaking faucets, pipes, or other sources of water.   Clean moldy surfaces.  Dehumidify basement if it is damp and smelly.   Smoke, Strong Odors, and Sprays  These can reduce air quality. Stay away from strong odors and sprays, such as perfume, powder, hair spray, paints, smoke incense, paint, cleaning products, candles and new carpet.   Exercise or Sports  Some people with asthma have this trigger. Be active!  Ask your doctor about taking medicine before sports or exercise to prevent symptoms.    Warm up for 5-10 minutes before and after sports or exercise.     Other Triggers of Asthma  Cold air:  Cover your nose and mouth with a scarf.  Sometimes laughing or crying can be a trigger.  Some medicines and food can trigger asthma.

## 2017-07-17 NOTE — MR AVS SNAPSHOT
After Visit Summary   7/17/2017    Refugio Kennedy    MRN: [id number]           Patient Information     Date Of Birth          1975        Visit Information        Provider Department      7/17/2017 8:20 AM Erika Espino MD [facility]        Today's Diagnoses     Type 2 diabetes mellitus without complication, without long-term current use of insulin (H)    -  1    Pulmonary hypertension (H)        Chronic respiratory failure with hypoxia (H)        Mild intermittent asthma without complication        FARZANA (obstructive sleep apnea)/Hypoventilation Syndrome- Severe        Morbid obesity, unspecified obesity type (H)        Methamphetamine abuse in remission        Chronic bilateral low back pain without sciatica          Care Instructions    Labs today     No med changes at this time     Continue with weight loss efforts and following with Dr Paez    Set up Physical Therapy for low back pain           Follow-ups after your visit        Additional Services     PHYSICAL THERAPY REFERRAL       *This therapy referral will be filtered to a centralized scheduling office at [facility] and the patient will receive a call to schedule an appointment at a [place] location most convenient for them. *     [facility] provides Physical Therapy evaluation and treatment and many specialty services across the [place] system.  If requesting a specialty program, please choose from the list below.    If you have not heard from the scheduling office within 2 business days, please call [phone] for all locations, with the exception of [place], please call [phone].  Treatment: Evaluation & Treatment  Special Instructions/Modalities:   Special Programs: None    Please be aware that coverage of these services is subject to the terms and limitations of your health insurance plan.  Call member services at your health plan with any benefit or  "coverage questions.      **Note to Provider:  If you are referring outside of [facility] for the therapy appointment, please list the name of the location in the \"special instructions\" above, print the referral and give to the patient to schedule the appointment.                  Your next 10 appointments already scheduled     Jul 24, 2017  2:00 PM CDT   (Arrive by 1:45 PM)   Return Visit with Taty Fowler PhD   [facility] ([facility])    [address] [place] [id number] [phone]            Aug 21, 2017 10:30 AM CDT   (Arrive by 10:15 AM)   Return Visit with Ashish Paez MD   [facility] ([facility])    [address] [place] [id number] [phone]              Who to contact     If you have questions or need follow up information about today's clinic visit or your schedule please contact [facility] directly at [phone].  Normal or non-critical lab and imaging results will be communicated to you by MyChart, letter or phone within 4 business days after the clinic has received the results. If you do not hear from us within 7 days, please contact the clinic through [facility] or phone. If you have a critical or abnormal lab result, we will notify you by phone as soon as possible.  Submit refill requests through payworks or call your pharmacy and they will forward the refill request to us. Please allow 3 business days for your refill to be completed.          Additional Information About Your Visit        [facility] Information     payworks gives you secure access to your electronic health record. If you see a primary care provider, you can also send messages to your care team and make appointments. If you have questions, please call your primary care clinic.  If you do not have a primary care provider, please call [phone] and they " will assist you.        Care EveryWhere ID     This is your Care EveryWhere ID. This could be used by other organizations to access your [place] medical records  [id number]        Your Vitals Were     Pulse Temperature Pulse Oximetry BMI (Body Mass Index)          89 98.5  F (36.9  C) (Tympanic) 96% 64.12 kg/m2         Blood Pressure from Last 3 Encounters:   07/17/17 106/62   05/15/17 107/50   03/02/17 122/70    Weight from Last 3 Encounters:   07/17/17 (!) 415 lb 8 oz (188.5 kg)   06/26/17 (!) 416 lb 8 oz (188.9 kg)   06/13/17 (!) 417 lb (189.1 kg)              We Performed the Following     Albumin Random Urine Quantitative     Asthma Action Plan (AAP)     Basic metabolic panel     Hemoglobin A1c     PHYSICAL THERAPY REFERRAL        Primary Care Provider Office Phone # Fax #    Erika Espino [phone]       [address] [address]        Equal Access to Services     RICARDO HALL : Hadii aad ku hadasho Soomaali, waaxda luqadaha, qaybta kaalmada adeegyada, waxay idiin hayshruthin eleni rider . So [facility] [phone].    ATENCIÓN: Si habla español, tiene a clinton disposición servicios gratuitos de asistencia lingüística. Llame al [phone].    We comply with applicable federal civil rights laws and [place] laws. We do not discriminate on the basis of race, color, national origin, age, disability sex, sexual orientation or gender identity.            Thank you!     Thank you for choosing [facility]  for your care. Our goal is always to provide you with excellent care. Hearing back from our patients is one way we can continue to improve our services. Please take a few minutes to complete the written survey that you may receive in the mail after your visit with us. Thank you!             Your Updated Medication List - Protect others around you: Learn how to safely use, store and throw away your medicines at  www.disposemymeds.org.          This list is accurate as of: 7/17/17  8:51 AM.  Always use your most recent med list.                   Brand Name Dispense Instructions for use Diagnosis    albuterol 108 (90 BASE) MCG/ACT Inhaler    albuterol    1 Inhaler    Inhale 2 puffs into the lungs every 4 hours as needed for shortness of breath / dyspnea    Mild intermittent asthma without complication       aspirin 81 MG tablet     90 tablet    Take 1 tablet by mouth daily.    Type 2 diabetes, HbA1C goal < 8% (H)       blood glucose monitoring test strip    no brand specified    200 each    1 strip by In Vitro route 3 times daily Test as directed    Diabetes mellitus without complication (H)       cetirizine 10 MG tablet    zyrTEC    90 tablet    Take 1 tablet (10 mg) by mouth every evening    Allergic rhinitis due to pollen       * DIABETIC STERILE LANCETS device     1 Box    1 Device 3 times daily.    Type 2 diabetes, HbA1C goal < 8% (H)       * ONE TOUCH LANCETS Misc     270 each    1 lancet 3 times daily    Type 2 diabetes, HbA1C goal < 8% (H)       furosemide 20 MG tablet    LASIX    360 tablet    TAKE TWO TABLETS BY MOUTH TWICE A DAY    Venous stasis       insulin pen needle 32G X 4 MM    BD SEVERO U/F    100 each    Use 1 daily or as directed.    Type 2 diabetes mellitus without complications (H)       liraglutide 18 MG/3ML soln    VICTOZA PEN    1 Month    Inject 1.8 mg Subcutaneous daily    Type 2 diabetes mellitus without complications (H)       lisinopril 5 MG tablet    PRINIVIL/ZESTRIL    90 tablet    Take 1 tablet (5 mg) by mouth daily    Pulmonary hypertension (H)       * metFORMIN modified 500 MG 24 hr tablet    GLUMETZA    90 tablet    Take 1 tablet (500 mg) by mouth daily (with breakfast)    Type 2 diabetes mellitus without complication, without long-term current use of insulin (H)       * metFORMIN modified 500 MG 24 hr tablet    GLUMETZA    30 tablet    Take 1 tablet (500 mg) by mouth daily (with breakfast)  Ok to dispense generic glucophage xr as previous    Type 2 diabetes mellitus without complications (H)       * metFORMIN 500 MG 24 hr tablet    GLUCOPHAGE-XR          modafinil 200 MG tablet    PROVIGIL    60 tablet    Take 1/2 tablet by mouth 3-4 times daily as needed for sleepiness.    Obstructive sleep apnea-hypopnea syndrome       MULTIVITAMIN PO      Take 1 tablet by mouth daily.        order for DME      Equipment being ordered: BIPAP Refugio P Kennedy received a Resmed AirCurve 10 Bilevel. Pressures were set at 23/14 cm H2O.        order for DME      Auto-BiPAP: IPAP max 21 cm H2O EPAP min 15 cm H2O Pressure support 5 cm Changed in clinic Lifetime need and heated humidity.    FARZANA (obstructive sleep apnea)       simvastatin 10 MG tablet    ZOCOR    90 tablet    Take 1 tablet (10 mg) by mouth At Bedtime    Hyperlipidemia LDL goal <100       topiramate 50 MG tablet    TOPAMAX    180 tablet    Take 3 tablets (150 mg) by mouth 2 times daily    Morbid obesity due to excess calories (H)       triamcinolone 0.1 % cream    KENALOG    30 g    Apply sparingly to affected area two times daily as needed    Venous stasis       * Notice:  This list has 5 medication(s) that are the same as other medications prescribed for you. Read the directions carefully, and ask your doctor or other care provider to review them with you.

## 2017-07-17 NOTE — PATIENT INSTRUCTIONS
Labs today     No med changes at this time     Continue with weight loss efforts and following with Dr Paez    Set up Physical Therapy for low back pain

## 2017-07-17 NOTE — TELEPHONE ENCOUNTER
Victoza pen         Last Written Prescription Date: 12/21/16  Last Fill Quantity: 1 month, # refills: 4  Last Office Visit with G, P or [facility] prescribing provider:  TODAY        BP Readings from Last 3 Encounters:   07/17/17 106/62   05/15/17 107/50   03/02/17 122/70     Lab Results   Component Value Date    MICROL <5 11/29/2016     Lab Results   Component Value Date    UMALCR Unable to calculate due to low value 11/29/2016     Creatinine   Date Value Ref Range Status   11/29/2016 1.06 0.66 - 1.25 mg/dL Final   ]  GFR Estimate   Date Value Ref Range Status   11/29/2016 76 >60 mL/min/1.7m2 Final     Comment:     Non  GFR Calc   11/16/2016 84 >60 mL/min/1.7m2 Final     Comment:     Non  GFR Calc   05/23/2016 >90  Non  GFR Calc   >60 mL/min/1.7m2 Final     GFR Estimate If Black   Date Value Ref Range Status   11/29/2016 >90   GFR Calc   >60 mL/min/1.7m2 Final   11/16/2016 >90   GFR Calc   >60 mL/min/1.7m2 Final   05/23/2016 >90   GFR Calc   >60 mL/min/1.7m2 Final     Lab Results   Component Value Date    CHOL 130 11/29/2016     Lab Results   Component Value Date    HDL 35 11/29/2016     Lab Results   Component Value Date    LDL 59 11/29/2016     Lab Results   Component Value Date    TRIG 179 11/29/2016     Lab Results   Component Value Date    CHOLHDLRATIO 3.2 09/29/2015     Lab Results   Component Value Date    AST 25 05/23/2016     Lab Results   Component Value Date    ALT 30 05/23/2016     Lab Results   Component Value Date    A1C 5.6 07/17/2017    A1C 5.6 03/02/2017    A1C 5.9 11/29/2016    A1C 5.8 05/23/2016    A1C 6.0 09/29/2015     Potassium   Date Value Ref Range Status   11/29/2016 3.7 3.4 - 5.3 mmol/L Final

## 2017-07-17 NOTE — TELEPHONE ENCOUNTER
Prescription approved per [facility] Refill Protocol.    Kaylin WEI RN

## 2017-07-17 NOTE — NURSING NOTE
"Chief Complaint   Patient presents with     Diabetes       Initial /62 (BP Location: Right arm, Patient Position: Chair, Cuff Size: Adult Large)  Pulse 89  Temp 98.5  F (36.9  C) (Tympanic)  Wt (!) 415 lb 8 oz (188.5 kg)  SpO2 96%  BMI 64.12 kg/m2 Estimated body mass index is 64.12 kg/(m^2) as calculated from the following:    Height as of 5/15/17: 5' 7.5\" (1.715 m).    Weight as of this encounter: 415 lb 8 oz (188.5 kg).  Medication Reconciliation: complete    Health Maintenance that is potentially due pending provider review:  ACT    Possibly completing today per provider review.    "

## 2017-07-17 NOTE — PROGRESS NOTES
SUBJECTIVE:                                                    Refugio Kennedy is a 42 year old male who presents to clinic today for the following health issues:      Diabetes Follow-up    Patient is checking blood sugars: once daily.  Results are as follows:         am -     Diabetic concerns: None     Symptoms of hypoglycemia (low blood sugar): dizzy, Symptoms occur if sugars < 80.     Paresthesias (numbness or burning in feet) or sores: Yes random tingling and burning     Date of last diabetic eye exam: up to date      Amount of exercise or physical activity: 2-3 days/week for an average of 15-30 minutes    Problems taking medications regularly: No    Medication side effects: none    Diet: carbohydrate counting      Musculoskeletal problem/pain      Duration: many months     Description  Location: low back and tail bone area     Intensity:  moderate    Accompanying signs and symptoms: none    History  Previous similar problem: no   Previous evaluation:  none    Precipitating or alleviating factors:  Trauma or overuse: no   Aggravating factors include: sitting    Therapies tried and outcome: rest/inactivity    Asthma Follow-Up    Was ACT completed today?    Yes    ACT Total Scores 7/17/2017   ACT TOTAL SCORE (Goal Greater than or Equal to 20) 22   In the past 12 months, how many times did you visit the emergency room for your asthma without being admitted to the hospital? 0   In the past 12 months, how many times were you hospitalized overnight because of your asthma? 0       Recent asthma triggers that patient is dealing with: None      Obesity   He is following with endo   He has lost approx 15 lbs since visit in May which is a big win for him    Pulmonary HTN and chronic resp failure   currently on Oxygen now and doing well   No swelling no change in VICTORIA with Oxygen this is better   Using CPAP     Meth abuse - still sober     Problem list and histories reviewed & adjusted, as indicated.  Additional  history: as documented    Labs reviewed in EPIC    Reviewed and updated as needed this visit by clinical staff  Tobacco  Allergies  Meds  Problems  Med Hx  Surg Hx  Fam Hx  Soc Hx        Reviewed and updated as needed this visit by Provider  Allergies  Meds  Problems         ROS:  Constitutional, HEENT, cardiovascular, pulmonary, gi and gu systems are negative, except as otherwise noted.    OBJECTIVE:                                                    /62 (BP Location: Right arm, Patient Position: Chair, Cuff Size: Adult Large)  Pulse 89  Temp 98.5  F (36.9  C) (Tympanic)  Wt (!) 415 lb 8 oz (188.5 kg)  SpO2 96%  BMI 64.12 kg/m2  Body mass index is 64.12 kg/(m^2).  GENERAL APPEARANCE: healthy, alert and no distress  RESP: lungs clear to auscultation - no rales, rhonchi or wheezes  CV: regular rates and rhythm, normal S1 S2, no S3 or S4 and no murmur, click or rub  MS: pitting 1+ lower extremity edema bilaterally  ORTHO: Lumber/Thoracic Spine Exam: Tender:  Tender in the saccral area   Non-tender:    Range of Motion:  intact  Strength:  able to heel walk, able to toe walk  Special tests:  negative straight leg raises    Hip Exam: Hip ROM full    PSYCH: mentation appears normal and affect normal/bright         ASSESSMENT/PLAN:                                                    1. Type 2 diabetes mellitus without complication, without long-term current use of insulin (H)  Good control   - Hemoglobin A1c  - Basic metabolic panel  - Albumin Random Urine Quantitative    2. Pulmonary hypertension (H)  On oxygen now and stable     3. Chronic respiratory failure with hypoxia (H)  On oxygen now and stable     4. Mild intermittent asthma without complication  Stable no change in treatment plan.     5. FARZANA (obstructive sleep apnea)/Hypoventilation Syndrome- Severe  Using CPAP     6. Morbid obesity, unspecified obesity type (H)  Has now had some weight loss and is motivated to continue     7. Methamphetamine  abuse in remission  sober    8. Chronic bilateral low back pain without sciatica    - PHYSICAL THERAPY REFERRAL      Patient Instructions   Labs today     No med changes at this time     Continue with weight loss efforts and following with Dr Paez    Set up Physical Therapy for low back pain     Risks, benefits, side effects and rationale for treatment plan fully discussed with the patient and understanding expressed.   Erika Espino MD  [facility]

## 2017-07-18 LAB
CREAT UR-MCNC: 17 MG/DL
MICROALBUMIN UR-MCNC: <5 MG/L
MICROALBUMIN/CREAT UR: NORMAL MG/G CR (ref 0–17)

## 2017-07-18 ASSESSMENT — ASTHMA QUESTIONNAIRES: ACT_TOTALSCORE: 22

## 2017-07-20 ENCOUNTER — HOSPITAL ENCOUNTER (OUTPATIENT)
Dept: PHYSICAL THERAPY | Facility: CLINIC | Age: 42
Setting detail: THERAPIES SERIES
End: 2017-07-20
Attending: FAMILY MEDICINE
Payer: COMMERCIAL

## 2017-07-20 PROCEDURE — 97140 MANUAL THERAPY 1/> REGIONS: CPT | Mod: GP | Performed by: PHYSICAL THERAPIST

## 2017-07-20 PROCEDURE — 40000718 ZZHC STATISTIC PT DEPARTMENT ORTHO VISIT: Performed by: PHYSICAL THERAPIST

## 2017-07-20 PROCEDURE — 97162 PT EVAL MOD COMPLEX 30 MIN: CPT | Mod: GP | Performed by: PHYSICAL THERAPIST

## 2017-07-20 PROCEDURE — 97110 THERAPEUTIC EXERCISES: CPT | Mod: GP | Performed by: PHYSICAL THERAPIST

## 2017-07-20 NOTE — PROGRESS NOTES
07/20/17 0700   General Information   Type of Visit Initial OP Ortho PT Evaluation   Start of Care Date 07/20/17   Referring Physician Erika Espino MD    Patient/Family Goals Statement be able sit w/o pain   Orders Evaluate and Treat   Date of Order 07/17/17   Insurance Type [employer];[place]   Insurance Comments/Visits Authorized 365 - no    Medical Diagnosis Chronic bilateral low back pain without sciatica M54.5, G89.29   Surgical/Medical history reviewed Yes   Precautions/Limitations no known precautions/limitations   Body Part(s)   Body Part(s) Lumbar Spine/SI   Presentation and Etiology   Pertinent history of current problem (include personal factors and/or comorbidities that impact the POC) Pt relates LBP for years, however tailbone pain started approx. 1 month ago. Pain is worse with sitting, driving and sleeping. Pt relates occasional pain in hips and down L leg. Pt relates he tried to get a new bed but his back still hurts. Pt has no imaging at this time and does not have to return to the MD for f/u. PMH: asthma, broken collar bone, chemical dependency, diabetes, pulmonary hypertension, uvella removed, pallate trimmed, 2 umbilical hernia, waiting to get 3rd one, on O2   Impairments A. Pain;C. Swelling;D. Decreased ROM;E. Decreased flexibility   Functional Limitations perform activities of daily living;perform required work activities   Symptom Location lower back and tailbone   How/Where did it occur From insidious onset   Onset date of current episode/exacerbation 06/20/17   Chronicity Chronic   Pain rating (0-10 point scale) Worst (/10);Best (/10)   Best (/10) 2   Worst (/10) 7   Pain quality C. Aching;E. Shooting   Frequency of pain/symptoms D. Other   Pain frequency comment w activity, after sitting down, waking up   Pain/symptoms exacerbated by A. Sitting;B. Walking;M. Other   Pain exacerbation comment bed/sleeping   Pain/symptoms eased by E. Changing positions   Progression of symptoms  since onset: Worsened   Current Level of Function   Current Community Support Family/friend caregiver   Patient role/employment history H. Other  (patial disabled, self employed)   Living environment [facility]   Fall Risk Screen   Fall screen completed by PT   Per patient - Fall 2 or more times in past year? No   Per patient - Fall with injury in past year? No   Is patient a fall risk? No   System Outcome Measures   Outcome Measures Low Back Pain (see Oswestry and Buddy)   Lumbar Spine/SI Objective Findings   Observation Pt uses nasal canula and portable O2   Gait/Locomotion trendelenburg gait w increased lateral sway   Balance/Proprioception (Single Leg Stance) WNL   Flexion ROM 12 in from floor w pain   Extension %   Pelvic Screen gilets: neg, L posterior innominate rot, scaral mobs- relieved pressure w anterior tilt    Hip Flexion (L2) Strength 5/5   Hip Abduction Strength L: 3/5 R: 4/5   Knee Flexion Strength 5/5   Knee Extension (L3) Strength 5/5   Ankle Dorsiflexion (L4) Strength 5/5   Ankle Plantar Flexion (S1) Strength 5/5   Hamstring Flexibility R: 45 L:60    Slump Test neg   Palpation TTP bi SI, denies TTP bi pirformi s   Planned Therapy Interventions   Planned Therapy Interventions balance training;gait training;joint mobilization;manual therapy;neuromuscular re-education;ROM;strengthening;stretching   Planned Modality Interventions   Planned Modality Interventions Cryotherapy;Hot packs;TENS;Traction   Clinical Impression   Criteria for Skilled Therapeutic Interventions Met yes, treatment indicated   PT Diagnosis Chronic LBP w SI joint dysfunction   Influenced by the following impairments pain, limited ROM, weakness   Functional limitations due to impairments walking, sitting, standing   Clinical Presentation Evolving/Changing   Clinical Presentation Rationale Pt is pleasant 42 yr old man min chronic LBP and occasional radicular symptoms. Complicating factors include extensie co-morbidities,  current hernia needing repair and worsening symptoms.   Clinical Decision Making (Complexity) Moderate complexity   Therapy Frequency 1 time/week   Predicted Duration of Therapy Intervention (days/wks) 8 weeks   Risk & Benefits of therapy have been explained Yes   Patient, Family & other staff in agreement with plan of care Yes   Education Assessment   Preferred Learning Style Listening;Reading;Pictures/video;Demonstration   Barriers to Learning No barriers   ORTHO GOALS   PT Ortho Eval Goals 1;2;3;4   Ortho Goal 1   Goal Identifier lumbar ROM   Goal Description Pt will be able to demonstrate full lumbar ROM in order to be able to  object off of the ground without pain or radicular symptoms   Target Date 08/17/17   Ortho Goal 2   Goal Identifier sitting   Goal Description Pt will be able to sit for 45+ min with less than 1/10 back pain in order to be able to sit and eat dinner with family   Target Date 08/17/17   Ortho Goal 3   Goal Identifier walking    Goal Description Pt will be able to walk/stand 60 min with less than 1/10 pain/fatigue in order to be able to work at [employer]   Target Date 09/14/17   Ortho Goal 4   Goal Identifier ALINA   Goal Description Pt will report <15% disability on ALINA to demonstrate improved ability to complete daily activities and demonstrate significant clinical improvement.    Target Date 09/14/17   Total Evaluation Time   Total Evaluation Time 50( 20 eval, 15 TE, 15 MT)   Therapy Certification   Certification date from 07/20/17   Certification date to 09/14/17   Medical Diagnosis Chronic bilateral low back pain without sciatica M54.5, G89.29     Violet Royal  Physical Therapist  [address] [address]  [email or web address]   [email or web address]   Office: [phone] Fax: [phone]

## 2017-07-20 NOTE — PROGRESS NOTES
[facility] [place]          OUTPATIENT PHYSICAL THERAPY ORTHOPEDIC EVALUATION  PLAN OF TREATMENT FOR OUTPATIENT REHABILITATION  (COMPLETE FOR INITIAL CLAIMS ONLY)  Patient's Last Name, First Name, M.I.  YOB: 1975  Refugio Kennedy    Provider s Name:  [facility] [place]   Medical Record No.  [id number]   Start of Care Date:  07/20/17   Onset Date:  06/20/17   Type:     _X__PT   ___OT   ___SLP Medical Diagnosis:  Chronic bilateral low back pain without sciatica M54.5, G89.29     PT Diagnosis:  Chronic LBP w SI joint dysfunction   Visits from SOC:  1      _________________________________________________________________________________  Plan of Treatment/Functional Goals:  balance training, gait training, joint mobilization, manual therapy, neuromuscular re-education, ROM, strengthening, stretching     Cryotherapy, Hot packs, TENS, Traction     Goals  Goal Identifier: lumbar ROM  Goal Description: Pt will be able to demonstrate full lumbar ROM in order to be able to  object off of the ground without pain or radicular symptoms  Target Date: 08/17/17    Goal Identifier: sitting  Goal Description: Pt will be able to sit for 45+ min with less than 1/10 back pain in order to be able to sit and eat dinner with family  Target Date: 08/17/17    Goal Identifier: walking   Goal Description: Pt will be able to walk/stand 60 min with less than 1/10 pain/fatigue in order to be able to work at fle[employer]  Target Date: 09/14/17    Goal Identifier: ALINA  Goal Description: Pt will report <15% disability on ALINA to demonstrate improved ability to complete daily activities and demonstrate significant clinical improvement.   Target Date: 09/14/17           Therapy Frequency:  1 time/week  Predicted Duration of Therapy Intervention:  8 weeks    Violet Royal, PT                 I CERTIFY THE NEED FOR THESE SERVICES FURNISHED UNDER        THIS PLAN OF TREATMENT AND WHILE UNDER MY CARE      (Physician co-signature of this document indicates review and certification of the therapy plan).                         Certification Date From:  07/20/17   Certification Date To:  09/14/17    Referring Provider:  Erika Espino MD     Initial Assessment        See Epic Evaluation Start of Care Date: 07/20/17

## 2017-07-24 ENCOUNTER — OFFICE VISIT (OUTPATIENT)
Dept: GASTROENTEROLOGY | Facility: CLINIC | Age: 42
End: 2017-07-24

## 2017-07-24 VITALS — BODY MASS INDEX: 63.7 KG/M2 | WEIGHT: 315 LBS

## 2017-07-24 DIAGNOSIS — E66.01 MORBID OBESITY, UNSPECIFIED OBESITY TYPE (H): ICD-10-CM

## 2017-07-24 DIAGNOSIS — F54 PSYCHOLOGICAL FACTORS AFFECTING MEDICAL CONDITION: Primary | ICD-10-CM

## 2017-07-24 NOTE — PROGRESS NOTES
Health Psychology                  Clinic    Department of Medicine  Shireen Valentine, PhD, LP [phone]                          Clinics and Surgery Center  [facility] Alicja Barragan, PhD, LP [phone]                  3rd Floor  [place] Mail Code 741   Zenon Cornelius, PhD, ABPP, LP [phone]     [address] [facility],   [address] [facility],  Taty Fowler,  PhD, LP [phone]            [place] [address] Karen Muro, PhD, LP [phone]    Health Psychology Follow-Up Note    SUBJECTIVE:  Refugio Kennedy is a 41-year-old male with morbid obesity, multiple medical comorbidities, and history of substance use disorder in remission seen for individual psychotherapy for weight management and management of stress. A treatment plan was completed in collaboration with the patient in this session. Current weight is 412.8#, which was a decrease from the last clinic visit.  Reported this was due to reduced portion size and increased consumption of fresh produce. Reported increased stress related to relationship with girlfriend as she is not exhibiting behaviors that contribute to the family or may support their relationship. Discussed things he can and can't control in this relationship, distinguishing between problem focused or emotion focused coping strategies and matching coping to controllability of situation. Discussed alternatives/emotion focused coping strategies - worked to create plan to write list of 5-7 alternative coping strategies. Encouraged him to consider how to identify his boundaries in relationships and work to ask for needs/enforce boundaries.     OBJECTIVE/MSE:  Appearance/Behavior/Orientation:  Patient was casually groomed and dressed and demonstrated good eye contact. Alert and oriented to person, place, time, and situation. No evidence of psychomotor agitation.    Cooperation/Reliability:  Patient appeared to honestly respond to questions about  psychosocial functioning and is deemed a reliable historian.   Cognition/Memory/Judgment: Not formally assessed.  No difficulties apparent upon interview. Fund of knowledge consistent with age, level of education, and life experience. Abstract reasoning appropriate, no difficulties with judgment apparent.  Speech/Language: Speech was clear, logical and coherent, of normal rate, rhythm and volume.   Thought Content/Form: Appropriate to interview and situation. Overall logical and organized. Patient denied any difficulties with a thought disorder, including auditory or visual hallucinations.   Mood/Affect: Mood euthymic; appropriate range of affect.   Insight/Motivation: Appropriate to situation.   Suicide/Assault: Patient denies suicidal or assaultive ideation, plan, or intent.     ASSESSMENT:  Refugio Kennedy is a 41-year-old male with morbid obesity, multiple medical comorbidities, and history of substance use disorder in remission. He has experienced some success with weight loss through the use of topiramate, although he is experienced weight gain in recent months which she attributed to eating to cope with stress. His main triggers include conflict with his father and girlfriend. It appears he would benefit from developing further stress management techniques but did not include using food to cope with emotional distress. It does not appear he meets criteria for an eating disorder at this time, but this will continue to be monitored.    DIAGNOSIS:  Psychological factors affecting medical condition; morbid obesity; methamphetamine use disorder, in full remission    PLAN:  RTC for individual CBT every 2-4 weeks for weight management including stress management, relaxation training, and identification and changing of unhelpful thoughts and behaviors for mood and weight.     Time in: 2:12  Time out: 3:11  Extended session due to complexity of case and length of interval.    Taty Fowler, PhD, [facility]  Psychologist    Tx plan completed: 7/24/17  Tx plan due:  10/24/17

## 2017-07-24 NOTE — LETTER
7/24/2017       RE: Refugio Kennedy  [address]  [facility] [address]     Dear Colleague,    Thank you for referring your patient, Refugio Kennedy, to the [facility] GASTROENTEROLOGY AND IBD at [facility]. Please see a copy of my visit note below.    Health Psychology Follow-Up Note    SUBJECTIVE:  Refugio Kennedy is a 41-year-old male with morbid obesity, multiple medical comorbidities, and history of substance use disorder in remission seen for individual psychotherapy for weight management and management of stress. A treatment plan was completed in collaboration with the patient in this session. Current weight is 412.8#, which was a decrease from the last clinic visit.  Reported this was due to reduced portion size and increased consumption of fresh produce. Reported increased stress related to relationship with girlfriend as she is not exhibiting behaviors that contribute to the family or may support their relationship. Discussed things he can and can't control in this relationship, distinguishing between problem focused or emotion focused coping strategies and matching coping to controllability of situation. Discussed alternatives/emotion focused coping strategies - worked to create plan to write list of 5-7 alternative coping strategies. Encouraged him to consider how to identify his boundaries in relationships and work to ask for needs/enforce boundaries.     OBJECTIVE/MSE:  Appearance/Behavior/Orientation:  Patient was casually groomed and dressed and demonstrated good eye contact. Alert and oriented to person, place, time, and situation. No evidence of psychomotor agitation.    Cooperation/Reliability:  Patient appeared to honestly respond to questions about psychosocial functioning and is deemed a reliable historian.   Cognition/Memory/Judgment: Not formally assessed.  No difficulties apparent upon interview. Fund of knowledge consistent with age, level of education, and  life experience. Abstract reasoning appropriate, no difficulties with judgment apparent.  Speech/Language: Speech was clear, logical and coherent, of normal rate, rhythm and volume.   Thought Content/Form: Appropriate to interview and situation. Overall logical and organized. Patient denied any difficulties with a thought disorder, including auditory or visual hallucinations.   Mood/Affect: Mood euthymic; appropriate range of affect.   Insight/Motivation: Appropriate to situation.   Suicide/Assault: Patient denies suicidal or assaultive ideation, plan, or intent.     ASSESSMENT:  Refugio Kennedy is a 41-year-old male with morbid obesity, multiple medical comorbidities, and history of substance use disorder in remission. He has experienced some success with weight loss through the use of topiramate, although he is experienced weight gain in recent months which she attributed to eating to cope with stress. His main triggers include conflict with his father and girlfriend. It appears he would benefit from developing further stress management techniques but did not include using food to cope with emotional distress. It does not appear he meets criteria for an eating disorder at this time, but this will continue to be monitored.    DIAGNOSIS:  Psychological factors affecting medical condition; morbid obesity; methamphetamine use disorder, in full remission    PLAN:  RTC for individual CBT every 2-4 weeks for weight management including stress management, relaxation training, and identification and changing of unhelpful thoughts and behaviors for mood and weight.     Time in: 2:12  Time out: 3:11  Extended session due to complexity of case and length of interval.    Taty Fowler, PhD,   Clinical Health Psychologist    Tx plan completed: 7/24/17  Tx plan due:  10/24/17

## 2017-07-24 NOTE — MR AVS SNAPSHOT
After Visit Summary   7/24/2017    Refugio Kennedy    MRN: [id number]           Patient Information     Date Of Birth          1975        Visit Information        Provider Department      7/24/2017 2:00 PM Taty Fowler, PhD  Health Gastroenterology and IBD        Today's Diagnoses     Psychological factors affecting medical condition    -  1    Morbid obesity, unspecified obesity type (H)           Follow-ups after your visit        Your next 10 appointments already scheduled     Jul 27, 2017  9:30 AM CDT   Ortho Treatment with Violet Royal PT   [facility] ([facility])    [address] [place] [address]   [phone]            Aug 21, 2017 10:30 AM CDT   (Arrive by 10:15 AM)   Return Visit with Ashish Paez MD   [facility] Medical Weight Management ([facility])    [address] [place] [address] [phone]              Who to contact     Please call your clinic at [phone] to:    Ask questions about your health    Make or cancel appointments    Discuss your medicines    Learn about your test results    Speak to your doctor   If you have compliments or concerns about an experience at your clinic, or if you wish to file a complaint, please contact [facility] Phys[facility] Patient Relations at [phone] or email us at [email or web address]         Additional Information About Your Visit        MyChart Information     [facility] gives you secure access to your electronic health record. If you see a primary care provider, you can also send messages to your care team and make appointments. If you have questions, please call your primary care clinic.  If you do not have a primary care provider, please call [phone] and they will assist you.      [facility] is an electronic gateway that provides easy, online access to your medical records. With [facility],  you can request a clinic appointment, read your test results, renew a prescription or communicate with your care team.     To access your existing account, please contact your [facility] or call [phone] for assistance.        Care EveryWhere ID     This is your Care EveryWhere ID. This could be used by other organizations to access your [facility] medical records  [id number]        Your Vitals Were     BMI (Body Mass Index)                   63.7 kg/m2            Blood Pressure from Last 3 Encounters:   07/17/17 106/62   05/15/17 107/50   03/02/17 122/70    Weight from Last 3 Encounters:   07/24/17 (!) 187.2 kg (412 lb 12.8 oz)   07/17/17 (!) 188.5 kg (415 lb 8 oz)   06/26/17 (!) 188.9 kg (416 lb 8 oz)              Today, you had the following     No orders found for display       Primary Care Provider Office Phone # Fax #    Erika Espino [phone]       [address] [address]        Equal Access to Services     [facility] : Hadii aad ku hadasho Sobryan, waaxda luqadaha, qaybta kaalmada manjit, consuelo rider . So [facility] [phone].    ATENCIÓN: Si habla español, tiene a clinton disposición servicios gratuitos de asistencia lingüística. Shukri al [phone].    We comply with applicable federal civil rights laws and [place] laws. We do not discriminate on the basis of race, color, national origin, age, disability sex, sexual orientation or gender identity.            Thank you!     Thank you for choosing [facility] GASTROENTEROLOGY AND IBD  for your care. Our goal is always to provide you with excellent care. Hearing back from our patients is one way we can continue to improve our services. Please take a few minutes to complete the written survey that you may receive in the mail after your visit with us. Thank you!             Your Updated Medication List - Protect others around you:  Learn how to safely use, store and throw away your medicines at www.disposemymeds.org.          This list is accurate as of: 7/24/17  3:12 PM.  Always use your most recent med list.                   Brand Name Dispense Instructions for use Diagnosis    albuterol 108 (90 BASE) MCG/ACT Inhaler    albuterol    1 Inhaler    Inhale 2 puffs into the lungs every 4 hours as needed for shortness of breath / dyspnea    Mild intermittent asthma without complication       aspirin 81 MG tablet     90 tablet    Take 1 tablet by mouth daily.    Type 2 diabetes, HbA1C goal < 8% (H)       blood glucose monitoring test strip    no brand specified    200 each    1 strip by In Vitro route 3 times daily Test as directed    Diabetes mellitus without complication (H)       cetirizine 10 MG tablet    zyrTEC    90 tablet    Take 1 tablet (10 mg) by mouth every evening    Allergic rhinitis due to pollen       * DIABETIC STERILE LANCETS device     1 Box    1 Device 3 times daily.    Type 2 diabetes, HbA1C goal < 8% (H)       * ONE TOUCH LANCETS Misc     270 each    1 lancet 3 times daily    Type 2 diabetes, HbA1C goal < 8% (H)       furosemide 20 MG tablet    LASIX    360 tablet    TAKE TWO TABLETS BY MOUTH TWICE A DAY    Venous stasis       insulin pen needle 32G X 4 MM    BD SEVERO U/F    100 each    Use 1 daily or as directed.    Type 2 diabetes mellitus without complications (H)       lisinopril 5 MG tablet    PRINIVIL/ZESTRIL    90 tablet    TAKE ONE TABLET BY MOUTH EVERY DAY    Pulmonary hypertension (H)       * metFORMIN modified 500 MG 24 hr tablet    GLUMETZA    90 tablet    Take 1 tablet (500 mg) by mouth daily (with breakfast)    Type 2 diabetes mellitus without complication, without long-term current use of insulin (H)       * metFORMIN modified 500 MG 24 hr tablet    GLUMETZA    30 tablet    Take 1 tablet (500 mg) by mouth daily (with breakfast) Ok to dispense generic glucophage xr as previous    Type 2 diabetes mellitus without  complications (H)       * metFORMIN 500 MG 24 hr tablet    GLUCOPHAGE-XR          modafinil 200 MG tablet    PROVIGIL    60 tablet    Take 1/2 tablet by mouth 3-4 times daily as needed for sleepiness.    Obstructive sleep apnea-hypopnea syndrome       MULTIVITAMIN PO      Take 1 tablet by mouth daily.        order for DME      Equipment being ordered: BIPAP Refugio P Kennedy received a Resmed AirCurve 10 Bilevel. Pressures were set at 23/14 cm H2O.        order for DME      Auto-BiPAP: IPAP max 21 cm H2O EPAP min 15 cm H2O Pressure support 5 cm Changed in clinic Lifetime need and heated humidity.    FARZANA (obstructive sleep apnea)       simvastatin 10 MG tablet    ZOCOR    90 tablet    Take 1 tablet (10 mg) by mouth At Bedtime    Hyperlipidemia LDL goal <100       topiramate 50 MG tablet    TOPAMAX    180 tablet    Take 3 tablets (150 mg) by mouth 2 times daily    Morbid obesity due to excess calories (H)       triamcinolone 0.1 % cream    KENALOG    30 g    Apply sparingly to affected area two times daily as needed    Venous stasis       VICTOZA PEN 18 MG/3ML soln   Generic drug:  liraglutide     10 mL    INJECT 1.8MG UNDER THE SKIN ONCE DAILY    Type 2 diabetes mellitus without complications (H)       * Notice:  This list has 5 medication(s) that are the same as other medications prescribed for you. Read the directions carefully, and ask your doctor or other care provider to review them with you.

## 2017-07-27 ENCOUNTER — HOSPITAL ENCOUNTER (OUTPATIENT)
Dept: PHYSICAL THERAPY | Facility: CLINIC | Age: 42
Setting detail: THERAPIES SERIES
End: 2017-07-27
Attending: FAMILY MEDICINE
Payer: COMMERCIAL

## 2017-07-27 PROCEDURE — 97110 THERAPEUTIC EXERCISES: CPT | Mod: GP | Performed by: PHYSICAL THERAPIST

## 2017-07-27 PROCEDURE — 40000718 ZZHC STATISTIC PT DEPARTMENT ORTHO VISIT: Performed by: PHYSICAL THERAPIST

## 2017-08-03 ENCOUNTER — HOSPITAL ENCOUNTER (OUTPATIENT)
Dept: PHYSICAL THERAPY | Facility: CLINIC | Age: 42
Setting detail: THERAPIES SERIES
End: 2017-08-03
Attending: FAMILY MEDICINE
Payer: COMMERCIAL

## 2017-08-03 PROCEDURE — 40000718 ZZHC STATISTIC PT DEPARTMENT ORTHO VISIT: Performed by: PHYSICAL THERAPIST

## 2017-08-03 PROCEDURE — 97110 THERAPEUTIC EXERCISES: CPT | Mod: GP | Performed by: PHYSICAL THERAPIST

## 2017-08-11 DIAGNOSIS — E11.9 TYPE 2 DIABETES MELLITUS WITHOUT COMPLICATIONS (H): ICD-10-CM

## 2017-08-11 NOTE — TELEPHONE ENCOUNTER
insulin pen needle (BD SEVERO U/F) 32G X 4 MM         Last Written Prescription Date: 5/16/17  Last Fill Quantity: 100, # refills: prn  Last Office Visit with [facility], [facility] or [facility] prescribing provider:  6/26/17        BP Readings from Last 3 Encounters:   07/17/17 106/62   05/15/17 107/50   03/02/17 122/70     Lab Results   Component Value Date    MICROL <5 07/17/2017     Lab Results   Component Value Date    UMALCR Unable to calculate due to low value 07/17/2017     Creatinine   Date Value Ref Range Status   07/17/2017 0.85 0.66 - 1.25 mg/dL Final   ]  GFR Estimate   Date Value Ref Range Status   07/17/2017 >90  Non  GFR Calc   >60 mL/min/1.7m2 Final   11/29/2016 76 >60 mL/min/1.7m2 Final     Comment:     Non  GFR Calc   11/16/2016 84 >60 mL/min/1.7m2 Final     Comment:     Non  GFR Calc     GFR Estimate If Black   Date Value Ref Range Status   07/17/2017 >90   GFR Calc   >60 mL/min/1.7m2 Final   11/29/2016 >90   GFR Calc   >60 mL/min/1.7m2 Final   11/16/2016 >90   GFR Calc   >60 mL/min/1.7m2 Final     Lab Results   Component Value Date    CHOL 130 11/29/2016     Lab Results   Component Value Date    HDL 35 11/29/2016     Lab Results   Component Value Date    LDL 59 11/29/2016     Lab Results   Component Value Date    TRIG 179 11/29/2016     Lab Results   Component Value Date    CHOLHDLRATIO 3.2 09/29/2015     Lab Results   Component Value Date    AST 25 05/23/2016     Lab Results   Component Value Date    ALT 30 05/23/2016     Lab Results   Component Value Date    A1C 5.6 07/17/2017    A1C 5.6 03/02/2017    A1C 5.9 11/29/2016    A1C 5.8 05/23/2016    A1C 6.0 09/29/2015     Potassium   Date Value Ref Range Status   07/17/2017 3.6 3.4 - 5.3 mmol/L Final

## 2017-08-14 ENCOUNTER — HOSPITAL ENCOUNTER (OUTPATIENT)
Dept: PHYSICAL THERAPY | Facility: CLINIC | Age: 42
Setting detail: THERAPIES SERIES
End: 2017-08-14
Attending: FAMILY MEDICINE
Payer: COMMERCIAL

## 2017-08-14 ENCOUNTER — OFFICE VISIT (OUTPATIENT)
Dept: GASTROENTEROLOGY | Facility: CLINIC | Age: 42
End: 2017-08-14

## 2017-08-14 VITALS — WEIGHT: 315 LBS | BODY MASS INDEX: 63.05 KG/M2

## 2017-08-14 DIAGNOSIS — F54 PSYCHOLOGICAL FACTORS AFFECTING MEDICAL CONDITION: Primary | ICD-10-CM

## 2017-08-14 DIAGNOSIS — E66.01 MORBID OBESITY, UNSPECIFIED OBESITY TYPE (H): ICD-10-CM

## 2017-08-14 PROCEDURE — 40000718 ZZHC STATISTIC PT DEPARTMENT ORTHO VISIT: Performed by: PHYSICAL THERAPIST

## 2017-08-14 PROCEDURE — 97535 SELF CARE MNGMENT TRAINING: CPT | Mod: GP | Performed by: PHYSICAL THERAPIST

## 2017-08-14 PROCEDURE — 97140 MANUAL THERAPY 1/> REGIONS: CPT | Mod: GP | Performed by: PHYSICAL THERAPIST

## 2017-08-14 RX ORDER — PEN NEEDLE, DIABETIC 32GX 5/32"
NEEDLE, DISPOSABLE MISCELLANEOUS
Qty: 100 EACH | Refills: 3 | Status: SHIPPED | OUTPATIENT
Start: 2017-08-14 | End: 2018-09-24

## 2017-08-14 NOTE — MR AVS SNAPSHOT
After Visit Summary   8/14/2017    Refugio Kennedy    MRN: [id number]           Patient Information     Date Of Birth          1975        Visit Information        Provider Department      8/14/2017 3:00 PM Taty Fowler, PhD [facility]        Today's Diagnoses     Psychological factors affecting medical condition    -  1    Morbid obesity, unspecified obesity type (H)           Follow-ups after your visit        Your next 10 appointments already scheduled     Aug 21, 2017 10:30 AM CDT   (Arrive by 10:15 AM)   Return Visit with Ashish Paez MD   [facility] ([facility] [place])    [address]  4th [facility] [id number] [phone]            Aug 28, 2017 12:30 PM CDT   Ortho Treatment with Violet Royal PT   [facility] ([facility])    [address] [place] [address] [phone]              Who to contact     Please call your clinic at [phone] to:    Ask questions about your health    Make or cancel appointments    Discuss your medicines    Learn about your test results    Speak to your doctor   If you have compliments or concerns about an experience at your clinic, or if you wish to file a complaint, please contact [facility] Patient Relations at [phone] or email us at [email or web address]         Additional Information About Your Visit        MyChart Information     adQ gives you secure access to your electronic health record. If you see a primary care provider, you can also send messages to your care team and make appointments. If you have questions, please call your primary care clinic.  If you do not have a primary care provider, please call [phone] and they will assist you.      adQ is an electronic gateway that provides easy, online access to your medical records. With adQ,  you can request a clinic appointment, read your test results, renew a prescription or communicate with your care team.     To access your existing account, please contact your [facility] or call [phone] for assistance.        Care EveryWhere ID     This is your Care EveryWhere ID. This could be used by other organizations to access your [place] medical records  [id number]        Your Vitals Were     BMI (Body Mass Index)                   63.05 kg/m2            Blood Pressure from Last 3 Encounters:   07/17/17 106/62   05/15/17 107/50   03/02/17 122/70    Weight from Last 3 Encounters:   08/14/17 (!) 185.3 kg (408 lb 9.6 oz)   07/24/17 (!) 187.2 kg (412 lb 12.8 oz)   07/17/17 (!) 188.5 kg (415 lb 8 oz)              Today, you had the following     No orders found for display       Primary Care Provider Office Phone # Fax #    Erika Espino [phone] [address] [place], [place] [address]        Equal Access to Services     [facility]: Hadii aad ku hadasho Sobryan, waaxda luqadaha, qaybta kaalmadusty saravia, consuelo rider . So [facility] [phone].    ATENCIÓN: Si habla español, tiene a clinton disposición servicios gratuitos de asistencia lingüística. Llame al [phone].    We comply with applicable federal civil rights laws and [place] laws. We do not discriminate on the basis of race, color, national origin, age, disability sex, sexual orientation or gender identity.            Thank you!     Thank you for choosing [facility]  for your care. Our goal is always to provide you with excellent care. Hearing back from our patients is one way we can continue to improve our services. Please take a few minutes to complete the written survey that you may receive in the mail after your visit with us. Thank you!             Your Updated Medication List - Protect others around you: Learn how to safely use,  store and throw away your medicines at www.disposemymeds.org.          This list is accurate as of: 8/14/17  3:59 PM.  Always use your most recent med list.                   Brand Name Dispense Instructions for use Diagnosis    albuterol 108 (90 BASE) MCG/ACT Inhaler    albuterol    1 Inhaler    Inhale 2 puffs into the lungs every 4 hours as needed for shortness of breath / dyspnea    Mild intermittent asthma without complication       aspirin 81 MG tablet     90 tablet    Take 1 tablet by mouth daily.    Type 2 diabetes, HbA1C goal < 8% (H)       BD SEVERO U/F 32G X 4 MM   Generic drug:  insulin pen needle     100 each    USE ONE DAILY OR AS DIRECTED    Type 2 diabetes mellitus without complications (H)       blood glucose monitoring test strip    no brand specified    200 each    1 strip by In Vitro route 3 times daily Test as directed    Diabetes mellitus without complication (H)       cetirizine 10 MG tablet    zyrTEC    90 tablet    Take 1 tablet (10 mg) by mouth every evening    Allergic rhinitis due to pollen       * DIABETIC STERILE LANCETS device     1 Box    1 Device 3 times daily.    Type 2 diabetes, HbA1C goal < 8% (H)       * ONE TOUCH LANCETS Misc     270 each    1 lancet 3 times daily    Type 2 diabetes, HbA1C goal < 8% (H)       furosemide 20 MG tablet    LASIX    360 tablet    TAKE TWO TABLETS BY MOUTH TWICE A DAY    Venous stasis       lisinopril 5 MG tablet    PRINIVIL/ZESTRIL    90 tablet    TAKE ONE TABLET BY MOUTH EVERY DAY    Pulmonary hypertension (H)       * metFORMIN modified 500 MG 24 hr tablet    GLUMETZA    90 tablet    Take 1 tablet (500 mg) by mouth daily (with breakfast)    Type 2 diabetes mellitus without complication, without long-term current use of insulin (H)       * metFORMIN modified 500 MG 24 hr tablet    GLUMETZA    30 tablet    Take 1 tablet (500 mg) by mouth daily (with breakfast) Ok to dispense generic glucophage xr as previous    Type 2 diabetes mellitus without  complications (H)       * metFORMIN 500 MG 24 hr tablet    GLUCOPHAGE-XR          modafinil 200 MG tablet    PROVIGIL    60 tablet    Take 1/2 tablet by mouth 3-4 times daily as needed for sleepiness.    Obstructive sleep apnea-hypopnea syndrome       MULTIVITAMIN PO      Take 1 tablet by mouth daily.        order for DME      Equipment being ordered: BIPAP Refugio P Kennedy received a Resmed AirCurve 10 Bilevel. Pressures were set at 23/14 cm H2O.        order for DME      Auto-BiPAP: IPAP max 21 cm H2O EPAP min 15 cm H2O Pressure support 5 cm Changed in clinic Lifetime need and heated humidity.    FARZANA (obstructive sleep apnea)       simvastatin 10 MG tablet    ZOCOR    90 tablet    Take 1 tablet (10 mg) by mouth At Bedtime    Hyperlipidemia LDL goal <100       topiramate 50 MG tablet    TOPAMAX    180 tablet    Take 3 tablets (150 mg) by mouth 2 times daily    Morbid obesity due to excess calories (H)       triamcinolone 0.1 % cream    KENALOG    30 g    Apply sparingly to affected area two times daily as needed    Venous stasis       VICTOZA PEN 18 MG/3ML soln   Generic drug:  liraglutide     10 mL    INJECT 1.8MG UNDER THE SKIN ONCE DAILY    Type 2 diabetes mellitus without complications (H)       * Notice:  This list has 5 medication(s) that are the same as other medications prescribed for you. Read the directions carefully, and ask your doctor or other care provider to review them with you.

## 2017-08-14 NOTE — LETTER
8/14/2017       RE: Refugio Kennedy  [address]  [facility] [address]     Dear Colleague,    Thank you for referring your patient, Refugio Kennedy, to the [facility] at [facility]. Please see a copy of my visit note below.    Health Psychology Follow-Up Note    SUBJECTIVE:  Refugio Kennedy is a 41-year-old male with morbid obesity, multiple medical comorbidities, and history of substance use disorder in remission seen for individual psychotherapy for weight management and management of stress. Reported positive health behaviors since last session were purchasing vegetables, asking for father to bring home muffins less often, throwing out unhealthy food (frosting, muffins). Reported relationship stress is better, but continues in some ways. He attributed this to adjusting expectations related to his girlfriend - discussed ways to change expectations and appraisals of situation in order to reduce stress. He created list of alternative coping strategies , which include prayer, engage in PT exercises or activity, get rid of bad snack food.  [place] included ways to add more alternative coping strategies to his list, which included exercise, playing cards, clean and organize garage, acts of kindness for others, and travel to a location like the library.  Goals include increased vegetable consumption and review list of alternative activities to eating to cope with emotion. Goal is to get to 399. Current weight was 408.6, which he attributed to less junk food and more walking.     OBJECTIVE/MSE:  Appearance/Behavior/Orientation:  Patient was casually groomed and dressed and demonstrated good eye contact. Alert and oriented to person, place, time, and situation. No evidence of psychomotor agitation.    Cooperation/Reliability:  Patient appeared to honestly respond to questions about psychosocial functioning and is deemed a reliable historian.    Cognition/Memory/Judgment: Not formally assessed.  No difficulties apparent upon interview. Fund of knowledge consistent with age, level of education, and life experience. Abstract reasoning appropriate, no difficulties with judgment apparent.  Speech/Language: Speech was clear, logical and coherent, of normal rate, rhythm and volume.   Thought Content/Form: Appropriate to interview and situation. Overall logical and organized. Patient denied any difficulties with a thought disorder, including auditory or visual hallucinations.   Mood/Affect: Mood euthymic; appropriate range of affect.   Insight/Motivation: Appropriate to situation.   Suicide/Assault: Patient denies suicidal or assaultive ideation, plan, or intent.     ASSESSMENT:  Refugio Kennedy is a 41-year-old male with morbid obesity, multiple medical comorbidities, and history of substance use disorder in remission. He has experienced some success with weight loss through the use of topiramate, although he is experienced weight gain in recent months which she attributed to eating to cope with stress. His main triggers include conflict with his father and girlfriend. It appears he would benefit from developing further stress management techniques but did not include using food to cope with emotional distress. It does not appear he meets criteria for an eating disorder at this time, but this will continue to be monitored.    DIAGNOSIS:  Psychological factors affecting medical condition; morbid obesity; methamphetamine use disorder, in full remission    PLAN:  RTC for individual CBT every 2-4 weeks for weight management including stress management, relaxation training, and identification and changing of unhelpful thoughts and behaviors for mood and weight.     Time in: 3:15  Time out: 3:58  Extended session due to complexity of case and length of interval.    Taty Fowler, PhD,   Clinical Health Psychologist    Tx plan completed: 7/24/17  Tx plan  due:  10/24/17

## 2017-08-14 NOTE — PROGRESS NOTES
Health Psychology                  Clinic    Department of Medicine  Shireen Valentine, PhD, LP [phone]                          Clinics and Surgery Center  [facility] Alicja Barragan, PhD, LP [phone]                  3rd Floor  [place] Mail Code 741   Zenon Cornelius, PhD, ABPP, LP [phone]     [address] [facility],   [address] [facility],  Taty Fowler,  PhD, LP [phone]            [id number]  [place] [address] Karen Muro, PhD, LP [phone]    Health Psychology Follow-Up Note    SUBJECTIVE:  Refugio Kennedy is a 41-year-old male with morbid obesity, multiple medical comorbidities, and history of substance use disorder in remission seen for individual psychotherapy for weight management and management of stress. Reported positive health behaviors since last session were purchasing vegetables, asking for father to bring home muffins less often, throwing out unhealthy food (frosting, muffins). Reported relationship stress is better, but continues in some ways. He attributed this to adjusting expectations related to his girlfriend - discussed ways to change expectations and appraisals of situation in order to reduce stress. He created list of alternative coping strategies , which include prayer, engage in PT exercises or activity, get rid of bad snack food.  [place] included ways to add more alternative coping strategies to his list, which included exercise, playing cards, clean and organize garage, acts of kindness for others, and travel to a location like the library.  Goals include increased vegetable consumption and review list of alternative activities to eating to cope with emotion. Goal is to get to 399. Current weight was 408.6, which he attributed to less junk food and more walking.     OBJECTIVE/MSE:  Appearance/Behavior/Orientation:  Patient was casually groomed and dressed and demonstrated good eye contact. Alert and oriented to person, place, time, and  situation. No evidence of psychomotor agitation.    Cooperation/Reliability:  Patient appeared to honestly respond to questions about psychosocial functioning and is deemed a reliable historian.   Cognition/Memory/Judgment: Not formally assessed.  No difficulties apparent upon interview. Fund of knowledge consistent with age, level of education, and life experience. Abstract reasoning appropriate, no difficulties with judgment apparent.  Speech/Language: Speech was clear, logical and coherent, of normal rate, rhythm and volume.   Thought Content/Form: Appropriate to interview and situation. Overall logical and organized. Patient denied any difficulties with a thought disorder, including auditory or visual hallucinations.   Mood/Affect: Mood euthymic; appropriate range of affect.   Insight/Motivation: Appropriate to situation.   Suicide/Assault: Patient denies suicidal or assaultive ideation, plan, or intent.     ASSESSMENT:  Refugio Kennedy is a 41-year-old male with morbid obesity, multiple medical comorbidities, and history of substance use disorder in remission. He has experienced some success with weight loss through the use of topiramate, although he is experienced weight gain in recent months which she attributed to eating to cope with stress. His main triggers include conflict with his father and girlfriend. It appears he would benefit from developing further stress management techniques but did not include using food to cope with emotional distress. It does not appear he meets criteria for an eating disorder at this time, but this will continue to be monitored.    DIAGNOSIS:  Psychological factors affecting medical condition; morbid obesity; methamphetamine use disorder, in full remission    PLAN:  RTC for individual CBT every 2-4 weeks for weight management including stress management, relaxation training, and identification and changing of unhelpful thoughts and behaviors for mood and weight.     Time in:  3:15  Time out: 3:58  Extended session due to complexity of case and length of interval.    Taty Fowler, PhD, LP  Clinical Health Psychologist    Tx plan completed: 7/24/17  Tx plan due:  10/24/17

## 2017-08-15 DIAGNOSIS — E11.9 TYPE 2 DIABETES MELLITUS WITHOUT COMPLICATIONS (H): ICD-10-CM

## 2017-08-15 RX ORDER — LIRAGLUTIDE 6 MG/ML
INJECTION SUBCUTANEOUS
Qty: 9 ML | Refills: 3 | Status: SHIPPED | OUTPATIENT
Start: 2017-08-15 | End: 2017-12-16

## 2017-08-15 NOTE — TELEPHONE ENCOUNTER
VICTOZA PEN 18 MG/3ML soln         Last Written Prescription Date: 7/17/17  Last Fill Quantity: 10 ml, # refills: 0  Last Office Visit with G, P or [facility] prescribing provider:  7/17/17        BP Readings from Last 3 Encounters:   07/17/17 106/62   05/15/17 107/50   03/02/17 122/70     Lab Results   Component Value Date    MICROL <5 07/17/2017     Lab Results   Component Value Date    UMALCR Unable to calculate due to low value 07/17/2017     Creatinine   Date Value Ref Range Status   07/17/2017 0.85 0.66 - 1.25 mg/dL Final   ]  GFR Estimate   Date Value Ref Range Status   07/17/2017 >90  Non  GFR Calc   >60 mL/min/1.7m2 Final   11/29/2016 76 >60 mL/min/1.7m2 Final     Comment:     Non  GFR Calc   11/16/2016 84 >60 mL/min/1.7m2 Final     Comment:     Non  GFR Calc     GFR Estimate If Black   Date Value Ref Range Status   07/17/2017 >90   GFR Calc   >60 mL/min/1.7m2 Final   11/29/2016 >90   GFR Calc   >60 mL/min/1.7m2 Final   11/16/2016 >90   GFR Calc   >60 mL/min/1.7m2 Final     Lab Results   Component Value Date    CHOL 130 11/29/2016     Lab Results   Component Value Date    HDL 35 11/29/2016     Lab Results   Component Value Date    LDL 59 11/29/2016     Lab Results   Component Value Date    TRIG 179 11/29/2016     Lab Results   Component Value Date    CHOLHDLRATIO 3.2 09/29/2015     Lab Results   Component Value Date    AST 25 05/23/2016     Lab Results   Component Value Date    ALT 30 05/23/2016     Lab Results   Component Value Date    A1C 5.6 07/17/2017    A1C 5.6 03/02/2017    A1C 5.9 11/29/2016    A1C 5.8 05/23/2016    A1C 6.0 09/29/2015     Potassium   Date Value Ref Range Status   07/17/2017 3.6 3.4 - 5.3 mmol/L Final

## 2017-08-21 ENCOUNTER — OFFICE VISIT (OUTPATIENT)
Dept: ENDOCRINOLOGY | Facility: CLINIC | Age: 42
End: 2017-08-21

## 2017-08-21 VITALS
SYSTOLIC BLOOD PRESSURE: 124 MMHG | HEART RATE: 83 BPM | WEIGHT: 315 LBS | TEMPERATURE: 98.8 F | BODY MASS INDEX: 47.74 KG/M2 | DIASTOLIC BLOOD PRESSURE: 73 MMHG | OXYGEN SATURATION: 95 % | HEIGHT: 68 IN

## 2017-08-21 DIAGNOSIS — E66.01 MORBID OBESITY (H): Primary | ICD-10-CM

## 2017-08-21 ASSESSMENT — ENCOUNTER SYMPTOMS
RESPIRATORY PAIN: 0
STIFFNESS: 1
SHORTNESS OF BREATH: 1
MYALGIAS: 1
JOINT SWELLING: 1
DYSPNEA ON EXERTION: 1
MUSCLE WEAKNESS: 0
NECK PAIN: 1
ARTHRALGIAS: 1
SPUTUM PRODUCTION: 0
HEMOPTYSIS: 0
BACK PAIN: 1
COUGH: 1
MUSCLE CRAMPS: 0
POSTURAL DYSPNEA: 0
WHEEZING: 0
SNORES LOUDLY: 1
COUGH DISTURBING SLEEP: 0

## 2017-08-21 ASSESSMENT — PAIN SCALES - GENERAL: PAINLEVEL: MILD PAIN (2)

## 2017-08-21 NOTE — MR AVS SNAPSHOT
After Visit Summary   8/21/2017    Refugio Kennedy    MRN: [id number]           Patient Information     Date Of Birth          1975        Visit Information        Provider Department      8/21/2017 10:30 AM Ashish Paez MD M [facility] Medical Weight Management         Follow-ups after your visit        Your next 10 appointments already scheduled     Aug 28, 2017 12:30 PM CDT   Ortho Treatment with Violet Royal, PT   [facility] ([facility])    [address] [place] [id number]   [phone]            Sep 22, 2017  2:00 PM CDT   (Arrive by 1:45 PM)   Return Visit with Taty Fowler, PhD   [facility] Gastroenterology and IBD ([facility])    [address] [place] [id number] [phone]            Dec 04, 2017 10:30 AM CST   (Arrive by 10:15 AM)   Return Visit with MD DOROTHEA Manrique [facility] Medical Weight Management ([facility])    [address] [place] [id number] [phone]              Who to contact     Please call your clinic at [phone] to:    Ask questions about your health    Make or cancel appointments    Discuss your medicines    Learn about your test results    Speak to your doctor   If you have compliments or concerns about an experience at your clinic, or if you wish to file a complaint, please contact [facility] Patient Relations at [phone] or email us at [email or web address]         Additional Information About Your Visit        MyChart Information     [employer] gives you secure access to your electronic health record. If you see a primary care provider, you can also send messages to your care team and make appointments. If you have questions, please call your primary care clinic.  If you do not have a primary care provider, please call [phone] and they will  "assist you.      [employer] is an electronic gateway that provides easy, online access to your medical records. With [employer], you can request a clinic appointment, read your test results, renew a prescription or communicate with your care team.     To access your existing account, please contact your [facility] or call [phone] for assistance.        Care EveryWhere ID     This is your Care EveryWhere ID. This could be used by other organizations to access your [facility] medical records  [id number]        Your Vitals Were     Pulse Temperature Height Pulse Oximetry BMI (Body Mass Index)       83 98.8  F (37.1  C) 5' 8\" 95% 62.34 kg/m2        Blood Pressure from Last 3 Encounters:   08/21/17 124/73   07/17/17 106/62   05/15/17 107/50    Weight from Last 3 Encounters:   08/21/17 (!) 410 lb   08/14/17 (!) 408 lb 9.6 oz   07/24/17 (!) 412 lb 12.8 oz              Today, you had the following     No orders found for display       Primary Care Provider Office Phone # Fax #    Erika Espino [phone] [address] [place], [place] [address]        Equal Access to Services     RICARDO HALL AH: Hadii rickie fongo Sobryan, waaxda luqadaha, qaybta kaalmada adeegyada, consuelo winter. So [facility] [phone].    ATENCIÓN: Si habla español, tiene a clinton disposición servicios gratuitos de asistencia lingüística. Llame al [phone].    We comply with applicable federal civil rights laws and [place] laws. We do not discriminate on the basis of race, color, national origin, age, disability sex, sexual orientation or gender identity.            Thank you!     Thank you for choosing [facility] MEDICAL WEIGHT MANAGEMENT  for your care. Our goal is always to provide you with excellent care. Hearing back from our patients is one way we can continue to improve our services. Please take a few minutes to complete the written survey that you may receive in " the mail after your visit with us. Thank you!             Your Updated Medication List - Protect others around you: Learn how to safely use, store and throw away your medicines at www.disposemymeds.org.          This list is accurate as of: 8/21/17 11:29 AM.  Always use your most recent med list.                   Brand Name Dispense Instructions for use Diagnosis    albuterol 108 (90 BASE) MCG/ACT Inhaler    albuterol    1 Inhaler    Inhale 2 puffs into the lungs every 4 hours as needed for shortness of breath / dyspnea    Mild intermittent asthma without complication       aspirin 81 MG tablet     90 tablet    Take 1 tablet by mouth daily.    Type 2 diabetes, HbA1C goal < 8% (H)       BD SEVERO U/F 32G X 4 MM   Generic drug:  insulin pen needle     100 each    USE ONE DAILY OR AS DIRECTED    Type 2 diabetes mellitus without complications (H)       blood glucose monitoring test strip    no brand specified    200 each    1 strip by In Vitro route 3 times daily Test as directed    Diabetes mellitus without complication (H)       cetirizine 10 MG tablet    zyrTEC    90 tablet    Take 1 tablet (10 mg) by mouth every evening    Allergic rhinitis due to pollen       * DIABETIC STERILE LANCETS device     1 Box    1 Device 3 times daily.    Type 2 diabetes, HbA1C goal < 8% (H)       * ONE TOUCH LANCETS Misc     270 each    1 lancet 3 times daily    Type 2 diabetes, HbA1C goal < 8% (H)       furosemide 20 MG tablet    LASIX    360 tablet    TAKE TWO TABLETS BY MOUTH TWICE A DAY    Venous stasis       lisinopril 5 MG tablet    PRINIVIL/ZESTRIL    90 tablet    TAKE ONE TABLET BY MOUTH EVERY DAY    Pulmonary hypertension (H)       * metFORMIN modified 500 MG 24 hr tablet    GLUMETZA    90 tablet    Take 1 tablet (500 mg) by mouth daily (with breakfast)    Type 2 diabetes mellitus without complication, without long-term current use of insulin (H)       * metFORMIN 500 MG 24 hr tablet    GLUCOPHAGE-XR          modafinil 200 MG  tablet    PROVIGIL    60 tablet    Take 1/2 tablet by mouth 3-4 times daily as needed for sleepiness.    Obstructive sleep apnea-hypopnea syndrome       MULTIVITAMIN PO      Take 1 tablet by mouth daily.        order for DME      Equipment being ordered: BIPAP Refugio P Kennedy received a [employer] AirCurve 10 Bilevel. Pressures were set at 23/14 cm H2O.        order for DME      Auto-BiPAP: IPAP max 21 cm H2O EPAP min 15 cm H2O Pressure support 5 cm Changed in clinic Lifetime need and heated humidity.    FARZANA (obstructive sleep apnea)       simvastatin 10 MG tablet    ZOCOR    90 tablet    Take 1 tablet (10 mg) by mouth At Bedtime    Hyperlipidemia LDL goal <100       topiramate 50 MG tablet    TOPAMAX    180 tablet    Take 3 tablets (150 mg) by mouth 2 times daily    Morbid obesity due to excess calories (H)       triamcinolone 0.1 % cream    KENALOG    30 g    Apply sparingly to affected area two times daily as needed    Venous stasis       VICTOZA PEN 18 MG/3ML soln   Generic drug:  liraglutide     9 mL    INJECT 1.8MG UNDER THE SKIN ONCE DAILY    Type 2 diabetes mellitus without complications (H)       * Notice:  This list has 4 medication(s) that are the same as other medications prescribed for you. Read the directions carefully, and ask your doctor or other care provider to review them with you.

## 2017-08-21 NOTE — LETTER
"2017       RE: Refugio Kennedy  [address]  [facility] [address]     Dear Colleague,    Thank you for referring your patient, Refugio Kennedy, to the [facility] MEDICAL WEIGHT MANAGEMENT at [facility]. Please see a copy of my visit note below.          Return Medical Weight Management Note     Refugio Kennedy  MRN:  [id number]  :  1975  CRISTI:  2017    Dear Dr. Espino,    We had the pleasure of seeing your patient Refugio Kennedy.  He is a 41 year old male who we are continuing to see for treatment of obesity related to: DMII, HLD, sleep apnea on CPAP daily, and hypoventilation syndrome, oxygen dependent.     CURRENT WEIGHT:   410 lbs 0 oz    Wt Readings from Last 4 Encounters:   17 (!) 186 kg (410 lb)   17 (!) 185.3 kg (408 lb 9.6 oz)   17 (!) 187.2 kg (412 lb 12.8 oz)   17 (!) 188.5 kg (415 lb 8 oz)     Height:  5' 8\"  Body Mass Index:  Body mass index is 62.34 kg/(m^2).  Vitals:  B/P: 138/83, P: 80    Initial consult weight was 454 on 5/15/2015.  Weight change since last seen on 5/15/2017 is down 17 pounds.   Total loss is 44 pounds.    INTERVAL HISTORY:  Tolerating topiramate 150 mg BID. He does not exercise. He is not now trying to change his snacking to vegetables and get more vegetables overall.    Diet and Activity Changes Since Last Visit Reviewed With Patient 2017   I have made the following changes to my diet since my last visit: less junk food and snacking attempting to make better choices when eating   With regards to my diet, I am still struggling with: eating more veggies   For breakfast, I typically eat: coffee toast eggs sometimes meraz or spam   For lunch, I typically eat: sandwich and glass milk try or try to eat salad instead   For supper, I typically eat: tacos or mac n chz pizza spaghetti am trying to have veggies 3x week with meal   For snack(s), I typically eat: cheese sugar free jelo cups wheat saltines   I have made " the following changes to my activity/exercise since my last visit: i have started exercising working pt for back issues   With regards to my activity/exercise, I am still struggling with: increasing amount but that will come with time       MEDICATIONS:   Current Outpatient Prescriptions   Medication     VICTOZA PEN 18 MG/3ML soln     BD SEVERO U/F 32G X 4 MM insulin pen needle     lisinopril (PRINIVIL/ZESTRIL) 5 MG tablet     furosemide (LASIX) 20 MG tablet     modafinil (PROVIGIL) 200 MG tablet     metFORMIN (GLUCOPHAGE-XR) 500 MG 24 hr tablet     topiramate (TOPAMAX) 50 MG tablet     cetirizine (ZYRTEC) 10 MG tablet     blood glucose monitoring (NO BRAND SPECIFIED) test strip     ONE TOUCH LANCETS MISC     simvastatin (ZOCOR) 10 MG tablet     metFORMIN modified (GLUMETZA) 500 MG 24 hr tablet     [DISCONTINUED] metFORMIN modified (GLUMETZA) 500 MG 24 hr tablet     order for DME     albuterol (ALBUTEROL) 108 (90 BASE) MCG/ACT inhaler     triamcinolone (KENALOG) 0.1 % cream     ORDER FOR DME     aspirin 81 MG tablet     DIABETIC STERILE LANCETS device     Multiple Vitamin (MULTIVITAMIN OR)     No current facility-administered medications for this visit.        Weight Loss Medication History Reviewed With Patient 8/21/2017   Which weight loss medications are you currently taking on a regular basis?  Topamax (topiramate), Victoza (liraglutide)   Are you having any side effects from the weight loss medication that we have prescribed you? No   If you are having side effects please describe: -     ASSESSMENT:   Maintain topiramate 150 mg BID. Reinforce food plan - focus on no between meal snacking, aggressive lowering of starches and cheese.     FOLLOW-UP:    3 months   10/15 minutes spent on counseling and education    Sincerely,        Ashish Paez MD

## 2017-08-21 NOTE — NURSING NOTE
"Chief Complaint   Patient presents with     RECHECK     weight loss       Vitals:    08/21/17 1041   BP: 124/73   BP Location: Left arm   Patient Position: Chair   Cuff Size: Adult Regular   Pulse: 83   Temp: 98.8  F (37.1  C)   SpO2: 95%   Weight: (!) 410 lb   Height: 5' 8\"       Body mass index is 62.34 kg/(m^2).    Davina Watt MA                            "

## 2017-08-21 NOTE — PROGRESS NOTES
"      Return Medical Weight Management Note     Refugio Kennedy  MRN:  [id number]  :  1975  CRISTI:  2017    Dear Dr. Espino,    We had the pleasure of seeing your patient Refugio Kennedy.  He is a 41 year old male who we are continuing to see for treatment of obesity related to: DMII, HLD, sleep apnea on CPAP daily, and hypoventilation syndrome, oxygen dependent.     CURRENT WEIGHT:   410 lbs 0 oz    Wt Readings from Last 4 Encounters:   17 (!) 186 kg (410 lb)   17 (!) 185.3 kg (408 lb 9.6 oz)   17 (!) 187.2 kg (412 lb 12.8 oz)   17 (!) 188.5 kg (415 lb 8 oz)     Height:  5' 8\"  Body Mass Index:  Body mass index is 62.34 kg/(m^2).  Vitals:  B/P: 138/83, P: 80    Initial consult weight was 454 on 5/15/2015.  Weight change since last seen on 5/15/2017 is down 17 pounds.   Total loss is 44 pounds.    INTERVAL HISTORY:  Tolerating topiramate 150 mg BID. He does not exercise. He is not now trying to change his snacking to vegetables and get more vegetables overall.    Diet and Activity Changes Since Last Visit Reviewed With Patient 2017   I have made the following changes to my diet since my last visit: less junk food and snacking attempting to make better choices when eating   With regards to my diet, I am still struggling with: eating more veggies   For breakfast, I typically eat: coffee toast eggs sometimes meraz or spam   For lunch, I typically eat: sandwich and glass milk try or try to eat salad instead   For supper, I typically eat: tacos or mac n chz pizza spaghetti am trying to have veggies 3x week with meal   For snack(s), I typically eat: cheese sugar free jelo cups wheat saltines   I have made the following changes to my activity/exercise since my last visit: i have started exercising working pt for back issues   With regards to my activity/exercise, I am still struggling with: increasing amount but that will come with time       MEDICATIONS:   Current Outpatient Prescriptions "   Medication     VICTOZA PEN 18 MG/3ML soln     BD SEVERO U/F 32G X 4 MM insulin pen needle     lisinopril (PRINIVIL/ZESTRIL) 5 MG tablet     furosemide (LASIX) 20 MG tablet     modafinil (PROVIGIL) 200 MG tablet     metFORMIN (GLUCOPHAGE-XR) 500 MG 24 hr tablet     topiramate (TOPAMAX) 50 MG tablet     cetirizine (ZYRTEC) 10 MG tablet     blood glucose monitoring (NO BRAND SPECIFIED) test strip     ONE TOUCH LANCETS MISC     simvastatin (ZOCOR) 10 MG tablet     metFORMIN modified (GLUMETZA) 500 MG 24 hr tablet     [DISCONTINUED] metFORMIN modified (GLUMETZA) 500 MG 24 hr tablet     order for DME     albuterol (ALBUTEROL) 108 (90 BASE) MCG/ACT inhaler     triamcinolone (KENALOG) 0.1 % cream     ORDER FOR DME     aspirin 81 MG tablet     DIABETIC STERILE LANCETS device     Multiple Vitamin (MULTIVITAMIN OR)     No current facility-administered medications for this visit.        Weight Loss Medication History Reviewed With Patient 8/21/2017   Which weight loss medications are you currently taking on a regular basis?  Topamax (topiramate), Victoza (liraglutide)   Are you having any side effects from the weight loss medication that we have prescribed you? No   If you are having side effects please describe: -     ASSESSMENT:   Maintain topiramate 150 mg BID. Reinforce food plan - focus on no between meal snacking, aggressive lowering of starches and cheese.     FOLLOW-UP:    3 months   10/15 minutes spent on counseling and education    Sincerely,

## 2017-08-31 ENCOUNTER — OFFICE VISIT (OUTPATIENT)
Dept: FAMILY MEDICINE | Facility: CLINIC | Age: 42
End: 2017-08-31
Payer: COMMERCIAL

## 2017-08-31 VITALS
OXYGEN SATURATION: 94 % | HEART RATE: 87 BPM | SYSTOLIC BLOOD PRESSURE: 112 MMHG | TEMPERATURE: 98.6 F | DIASTOLIC BLOOD PRESSURE: 62 MMHG | WEIGHT: 315 LBS | BODY MASS INDEX: 61.94 KG/M2

## 2017-08-31 DIAGNOSIS — J20.9 ACUTE BRONCHITIS WITH SYMPTOMS GREATER THAN 10 DAYS: Primary | ICD-10-CM

## 2017-08-31 DIAGNOSIS — J45.20 MILD INTERMITTENT ASTHMA WITHOUT COMPLICATION: ICD-10-CM

## 2017-08-31 PROCEDURE — 99214 OFFICE O/P EST MOD 30 MIN: CPT | Performed by: PHYSICIAN ASSISTANT

## 2017-08-31 RX ORDER — ALBUTEROL SULFATE 90 UG/1
2 AEROSOL, METERED RESPIRATORY (INHALATION) EVERY 4 HOURS PRN
Qty: 1 INHALER | Refills: 5 | Status: SHIPPED | OUTPATIENT
Start: 2017-08-31 | End: 2020-12-08

## 2017-08-31 RX ORDER — AZITHROMYCIN 250 MG/1
TABLET, FILM COATED ORAL
Qty: 6 TABLET | Refills: 0 | Status: SHIPPED | OUTPATIENT
Start: 2017-08-31 | End: 2017-12-19

## 2017-08-31 ASSESSMENT — ENCOUNTER SYMPTOMS
EYE PAIN: 0
WHEEZING: 0
SPUTUM PRODUCTION: 1
HEADACHES: 1
WEAKNESS: 0
EYE DISCHARGE: 0
SORE THROAT: 0
NAUSEA: 0
STRIDOR: 0
FEVER: 0
COUGH: 1
EYE REDNESS: 0
CHILLS: 0
PSYCHIATRIC NEGATIVE: 1
VOMITING: 0
SHORTNESS OF BREATH: 0
MUSCULOSKELETAL NEGATIVE: 1

## 2017-08-31 NOTE — MR AVS SNAPSHOT
After Visit Summary   8/31/2017    Refugio Kenneyd    MRN: [id number]           Patient Information     Date Of Birth          1975        Visit Information        Provider Department      8/31/2017 12:40 PM Ulises Lu PA-C [facility]        Today's Diagnoses     Acute bronchitis with symptoms greater than 10 days    -  1    Mild intermittent asthma without complication           Follow-ups after your visit        Follow-up notes from your care team     Return if symptoms worsen or fail to improve.      Your next 10 appointments already scheduled     Sep 07, 2017  1:30 PM CDT   Ortho Treatment with Violet Royal, PT   [facility] Physical Therapy ([facility])    [address] [place] [phone]   [phone]            Sep 22, 2017  2:00 PM CDT   (Arrive by 1:45 PM)   Return Visit with Taty Fowler, PhD   [facility] Gastroenterology and IBD Clinic ([facility])    [address] [place] [id number] [phone]            Dec 04, 2017 10:30 AM CST   (Arrive by 10:15 AM)   Return Visit with Ashish Paez MD   [facility] Medical Weight Management ([facility])    [address] [place] [id number] [phone]              Who to contact     If you have questions or need follow up information about today's clinic visit or your schedule please contact [facility] directly at [phone].  Normal or non-critical lab and imaging results will be communicated to you by MyChart, letter or phone within 4 business days after the clinic has received the results. If you do not hear from us within 7 days, please contact the clinic through MyChart or phone. If you have a critical or abnormal lab result, we will notify you by phone as soon as possible.  Submit refill requests through [employer] or call your pharmacy and they will  forward the refill request to us. Please allow 3 business days for your refill to be completed.          Additional Information About Your Visit        iTher[employer] Information     [employer] gives you secure access to your electronic health record. If you see a primary care provider, you can also send messages to your care team and make appointments. If you have questions, please call your primary care clinic.  If you do not have a primary care provider, please call [phone] and they will assist you.        Care EveryWhere ID     This is your Care EveryWhere ID. This could be used by other organizations to access your [place] medical records  [id number]        Your Vitals Were     Pulse Temperature Pulse Oximetry BMI (Body Mass Index)          87 98.6  F (37  C) (Tympanic) 94% 61.94 kg/m2         Blood Pressure from Last 3 Encounters:   08/31/17 112/62   08/21/17 124/73   07/17/17 106/62    Weight from Last 3 Encounters:   08/31/17 (!) 407 lb 6.4 oz (184.8 kg)   08/21/17 (!) 410 lb (186 kg)   08/14/17 (!) 408 lb 9.6 oz (185.3 kg)              Today, you had the following     No orders found for display         Today's Medication Changes          These changes are accurate as of: 8/31/17  1:53 PM.  If you have any questions, ask your nurse or doctor.               Start taking these medicines.        Dose/Directions    azithromycin 250 MG tablet   Commonly known as:  ZITHROMAX   Used for:  Acute bronchitis with symptoms greater than 10 days   Started by:  Ulises Lu PA-C        Two tablets first day, then one tablet daily for four days.   Quantity:  6 tablet   Refills:  0         These medicines have changed or have updated prescriptions.        Dose/Directions    metFORMIN modified 500 MG 24 hr tablet   Commonly known as:  GLUMETZA   This may have changed:  Another medication with the same name was removed. Continue taking this medication, and follow the directions you see here.   Used for:  Type 2 diabetes  mellitus without complication, without long-term current use of insulin (H)   Changed by:  Erika Espino MD        Dose:  500 mg   Take 1 tablet (500 mg) by mouth daily (with breakfast)   Quantity:  90 tablet   Refills:  3            Where to get your medicines      These medications were sent to [facility] [address] [address]     Phone:  [phone]     albuterol 108 (90 BASE) MCG/ACT Inhaler    azithromycin 250 MG tablet                Primary Care Provider Office Phone # Fax #    Erika Espino [phone] [address] [place] [address]        Equal Access to Services     LENI [facility] CenterALICIA : Barb fongo Sobryan, waaxda clarissaqadaha, qaybta kaalmada eleniyadusty, consuelo rider . So [facility] [phone].    ATENCIÓN: Si habla español, tiene a clinton disposición servicios gratuitos de asistencia lingüística. Llame al [phone].    We comply with applicable federal civil rights laws and [place] laws. We do not discriminate on the basis of race, color, national origin, age, disability sex, sexual orientation or gender identity.            Thank you!     Thank you for choosing [facility]  for your care. Our goal is always to provide you with excellent care. Hearing back from our patients is one way we can continue to improve our services. Please take a few minutes to complete the written survey that you may receive in the mail after your visit with us. Thank you!             Your Updated Medication List - Protect others around you: Learn how to safely use, store and throw away your medicines at www.disposemymeds.org.          This list is accurate as of: 8/31/17  1:53 PM.  Always use your most recent med list.                   Brand Name Dispense Instructions for use Diagnosis    albuterol 108 (90 BASE) MCG/ACT Inhaler    PROAIR HFA    1 Inhaler     Inhale 2 puffs into the lungs every 4 hours as needed for shortness of breath / dyspnea    Mild intermittent asthma without complication       aspirin 81 MG tablet     90 tablet    Take 1 tablet by mouth daily.    Type 2 diabetes, HbA1C goal < 8% (H)       azithromycin 250 MG tablet    ZITHROMAX    6 tablet    Two tablets first day, then one tablet daily for four days.    Acute bronchitis with symptoms greater than 10 days       BD SEVERO U/F 32G X 4 MM   Generic drug:  insulin pen needle     100 each    USE ONE DAILY OR AS DIRECTED    Type 2 diabetes mellitus without complications (H)       blood glucose monitoring test strip    no brand specified    200 each    1 strip by In Vitro route 3 times daily Test as directed    Diabetes mellitus without complication (H)       cetirizine 10 MG tablet    zyrTEC    90 tablet    Take 1 tablet (10 mg) by mouth every evening    Allergic rhinitis due to pollen       * DIABETIC STERILE LANCETS device     1 Box    1 Device 3 times daily.    Type 2 diabetes, HbA1C goal < 8% (H)       * ONE TOUCH LANCETS Misc     270 each    1 lancet 3 times daily    Type 2 diabetes, HbA1C goal < 8% (H)       furosemide 20 MG tablet    LASIX    360 tablet    TAKE TWO TABLETS BY MOUTH TWICE A DAY    Venous stasis       lisinopril 5 MG tablet    PRINIVIL/ZESTRIL    90 tablet    TAKE ONE TABLET BY MOUTH EVERY DAY    Pulmonary hypertension (H)       metFORMIN modified 500 MG 24 hr tablet    GLUMETZA    90 tablet    Take 1 tablet (500 mg) by mouth daily (with breakfast)    Type 2 diabetes mellitus without complication, without long-term current use of insulin (H)       modafinil 200 MG tablet    PROVIGIL    60 tablet    Take 1/2 tablet by mouth 3-4 times daily as needed for sleepiness.    Obstructive sleep apnea-hypopnea syndrome       MULTIVITAMIN PO      Take 1 tablet by mouth daily.        order for DME      Equipment being ordered: BIPAP Refugio P Kennedy received a [employer] AirCurve 10 Bilevel. Pressures were  set at 23/14 cm H2O.        order for DME      Auto-BiPAP: IPAP max 21 cm H2O EPAP min 15 cm H2O Pressure support 5 cm Changed in clinic Lifetime need and heated humidity.    FARZANA (obstructive sleep apnea)       simvastatin 10 MG tablet    ZOCOR    90 tablet    Take 1 tablet (10 mg) by mouth At Bedtime    Hyperlipidemia LDL goal <100       topiramate 50 MG tablet    TOPAMAX    180 tablet    Take 3 tablets (150 mg) by mouth 2 times daily    Morbid obesity due to excess calories (H)       triamcinolone 0.1 % cream    KENALOG    30 g    Apply sparingly to affected area two times daily as needed    Venous stasis       VICTOZA PEN 18 MG/3ML soln   Generic drug:  liraglutide     9 mL    INJECT 1.8MG UNDER THE SKIN ONCE DAILY    Type 2 diabetes mellitus without complications (H)       * Notice:  This list has 2 medication(s) that are the same as other medications prescribed for you. Read the directions carefully, and ask your doctor or other care provider to review them with you.

## 2017-08-31 NOTE — PROGRESS NOTES
HPI    SUBJECTIVE:   Refugio Kennedy is a 42 year old male who presents to clinic today for nearly 2 week history of cough congestion sputum production and nasal congestion. He's been using oxygen because of the flareup of her respiratory symptoms. He denies fever    ENT Symptoms             Symptoms: cc Present Absent Comment   Fever/Chills    Thinks he may have had a low grade fever    Fatigue  x     Muscle Aches  x     Eye Irritation   x    Sneezing  x     Nasal Jesse/Drg  x     Sinus Pressure/Pain  x     Loss of smell  x     Dental pain   x    Sore Throat  x     Swollen Glands   x    Ear Pain/Fullness   x    Cough  x     Wheeze  x     Chest Pain  x  Mild pressure    Shortness of breath  x     Rash       Other         Symptom duration:  About 2 weeks   Symptom severity:     Treatments tried:     Contacts:  significant other      Problem list and histories reviewed & adjusted, as indicated.  Additional history: as documented    Patient Active Problem List   Diagnosis     FARZANA (obstructive sleep apnea)/Hypoventilation Syndrome- Severe     Morbid obesity (H)     Chronic respiratory failure (H)     Hyperlipidemia LDL goal <100     Venous stasis     Pulmonary hypertension (H)     Mild intermittent asthma without complication     Type 2 diabetes mellitus without complication, without long-term current use of insulin (H)     Methamphetamine abuse in remission     Past Surgical History:   Procedure Laterality Date     LAPAROSCOPIC HERNIORRHAPHY VENTRAL N/A 5/17/2016    Procedure: LAPAROSCOPIC HERNIORRHAPHY VENTRAL;  Surgeon: Yves Jimenes MD;  Location: [facility]     LAPAROSCOPIC HERNIORRHAPHY VENTRAL N/A 12/20/2016    Procedure: LAPAROSCOPIC HERNIORRHAPHY VENTRAL;  Surgeon: Yves Jimenes MD;  Location: [facility] OR     SURGICAL HISTORY OF -   1998    UVPP       Social History   Substance Use Topics     Smoking status: Former Smoker     Packs/day: 0.50     Years: 1.00     Types: Cigarettes, Cigars     Quit date: 5/21/2012     Smokeless  tobacco: Never Used     Alcohol use No     Family History   Problem Relation Age of Onset     HEART DISEASE Maternal Grandfather      DIABETES Maternal Grandfather      Hypertension Maternal Grandfather      Other Cancer Maternal Grandfather      CANCER Paternal Grandfather      unknown     Other Cancer Paternal Grandfather      Anxiety Disorder Mother      Asthma Mother      Depression Mother      Thyroid Disease Mother      Anxiety Disorder Father      Substance Abuse Father      Depression Father      Hypertension Father      Anxiety Disorder Brother      Substance Abuse Brother      MENTAL ILLNESS Brother      Depression Brother      Other Cancer Brother      Obesity No family hx of          Current Outpatient Prescriptions   Medication Sig Dispense Refill     albuterol (PROAIR HFA) 108 (90 BASE) MCG/ACT Inhaler Inhale 2 puffs into the lungs every 4 hours as needed for shortness of breath / dyspnea 1 Inhaler 5     azithromycin (ZITHROMAX) 250 MG tablet Two tablets first day, then one tablet daily for four days. 6 tablet 0     VICTOZA PEN 18 MG/3ML soln INJECT 1.8MG UNDER THE SKIN ONCE DAILY 9 mL 3     BD SEVERO U/F 32G X 4 MM insulin pen needle USE ONE DAILY OR AS DIRECTED 100 each 3     lisinopril (PRINIVIL/ZESTRIL) 5 MG tablet TAKE ONE TABLET BY MOUTH EVERY DAY 90 tablet 0     furosemide (LASIX) 20 MG tablet TAKE TWO TABLETS BY MOUTH TWICE A  tablet 1     modafinil (PROVIGIL) 200 MG tablet Take 1/2 tablet by mouth 3-4 times daily as needed for sleepiness. 60 tablet 5     topiramate (TOPAMAX) 50 MG tablet Take 3 tablets (150 mg) by mouth 2 times daily 180 tablet 5     cetirizine (ZYRTEC) 10 MG tablet Take 1 tablet (10 mg) by mouth every evening 90 tablet 3     blood glucose monitoring (NO BRAND SPECIFIED) test strip 1 strip by In Vitro route 3 times daily Test as directed 200 each 0     ONE TOUCH LANCETS MISC 1 lancet 3 times daily 270 each 2     simvastatin (ZOCOR) 10 MG tablet Take 1 tablet (10 mg) by  mouth At Bedtime 90 tablet 2     metFORMIN modified (GLUMETZA) 500 MG 24 hr tablet Take 1 tablet (500 mg) by mouth daily (with breakfast) 90 tablet 3     order for DME Equipment being ordered: BIPAP Refugio P Kennedy received a [employer] AirCurve 10 Bilevel. Pressures were set at 23/14 cm H2O.       triamcinolone (KENALOG) 0.1 % cream Apply sparingly to affected area two times daily as needed 30 g 2     ORDER FOR DME Auto-BiPAP:  IPAP max 21 cm H2O  EPAP min 15 cm H2O  Pressure support 5 cm  Changed in clinic  Lifetime need and heated humidity.           aspirin 81 MG tablet Take 1 tablet by mouth daily. 90 tablet 3     DIABETIC STERILE LANCETS device 1 Device 3 times daily. 1 Box 12     Multiple Vitamin (MULTIVITAMIN OR) Take 1 tablet by mouth daily.       [DISCONTINUED] metFORMIN (GLUCOPHAGE-XR) 500 MG 24 hr tablet        [DISCONTINUED] albuterol (ALBUTEROL) 108 (90 BASE) MCG/ACT inhaler Inhale 2 puffs into the lungs every 4 hours as needed for shortness of breath / dyspnea 1 Inhaler 5     No Known Allergies  Labs reviewed in EPIC      Reviewed and updated as needed this visit by clinical staffTobacco  Allergies  Meds  Med Hx  Surg Hx  Fam Hx  Soc Hx      Reviewed and updated as needed this visit by Provider       Review of Systems   Constitutional: Negative for chills and fever.   HENT: Positive for congestion. Negative for ear discharge, ear pain, hearing loss and sore throat.    Eyes: Negative for pain, discharge and redness.   Respiratory: Positive for cough and sputum production. Negative for shortness of breath, wheezing and stridor.    Cardiovascular: Negative for chest pain.   Gastrointestinal: Negative for nausea and vomiting.   Genitourinary: Negative.    Musculoskeletal: Negative.    Skin: Negative for itching and rash.   Neurological: Positive for headaches. Negative for weakness.   Endo/Heme/Allergies: Negative.    Psychiatric/Behavioral: Negative.          Physical Exam   Constitutional: He is oriented  to person, place, and time and well-developed, well-nourished, and in no distress.   HENT:   Head: Normocephalic and atraumatic.   Right Ear: Hearing, tympanic membrane, external ear and ear canal normal.   Left Ear: Hearing, tympanic membrane, external ear and ear canal normal.   Nose: Rhinorrhea present. No septal deviation. Right sinus exhibits maxillary sinus tenderness. Left sinus exhibits maxillary sinus tenderness.   Mouth/Throat: Oropharyngeal exudate, posterior oropharyngeal edema and posterior oropharyngeal erythema present. No tonsillar abscesses.   Eyes: Conjunctivae and EOM are normal. Pupils are equal, round, and reactive to light. Right eye exhibits no discharge. Left eye exhibits no discharge. No scleral icterus.   Neck: Normal range of motion. Neck supple. No thyromegaly present.   Cardiovascular: Normal rate, regular rhythm, normal heart sounds and intact distal pulses.  Exam reveals no gallop and no friction rub.    No murmur heard.  Pulmonary/Chest: Effort normal and breath sounds normal. No respiratory distress. He has no wheezes. He has no rales. He exhibits no tenderness.   Abdominal: Soft. Bowel sounds are normal. He exhibits no distension and no mass. There is no tenderness. There is no rebound and no guarding.   Musculoskeletal: Normal range of motion. He exhibits no edema or tenderness.   Lymphadenopathy:     He has no cervical adenopathy.   Neurological: He is alert and oriented to person, place, and time. He has normal reflexes. No cranial nerve deficit. He exhibits normal muscle tone. Gait normal. Coordination normal.   Skin: Skin is warm and dry. No rash noted. No erythema.   Psychiatric: Mood, memory, affect and judgment normal.       (J20.9) Acute bronchitis with symptoms greater than 10 days  (primary encounter diagnosis)  Comment:   Plan: azithromycin (ZITHROMAX) 250 MG tablet            (J45.20) Mild intermittent asthma without complication  Comment:   Plan: albuterol (PROAIR HFA)  108 (90 BASE) MCG/ACT         Inhaler            Follow-up if not improving

## 2017-08-31 NOTE — NURSING NOTE
"Chief Complaint   Patient presents with     URI       Initial /62 (BP Location: Right arm, Patient Position: Chair, Cuff Size: Adult Large)  Pulse 87  Temp 98.6  F (37  C) (Tympanic)  Wt (!) 407 lb 6.4 oz (184.8 kg)  SpO2 94%  BMI 61.94 kg/m2 Estimated body mass index is 61.94 kg/(m^2) as calculated from the following:    Height as of 8/21/17: 5' 8\" (1.727 m).    Weight as of this encounter: 407 lb 6.4 oz (184.8 kg).  Medication Reconciliation: complete    Health Maintenance that is potentially due pending provider review:  NONE    n/a    Is there anyone who you would like to be able to receive your results? Yes  If yes have patient fill out SVEN    Arlet HUTSON CMA    "

## 2017-09-20 DIAGNOSIS — E78.5 HYPERLIPIDEMIA LDL GOAL <100: ICD-10-CM

## 2017-09-20 RX ORDER — SIMVASTATIN 10 MG
TABLET ORAL
Qty: 90 TABLET | Refills: 0 | Status: SHIPPED | OUTPATIENT
Start: 2017-09-20 | End: 2018-01-01

## 2017-09-20 NOTE — TELEPHONE ENCOUNTER
simvastatin (ZOCOR) 10 MG tablet     Last Written Prescription Date: 12/09/2016  Last Fill Quantity: 90 tablet, # refills: 2  Last Office Visit with G, P or [facility] prescribing provider: 08/31/2017       Lab Results   Component Value Date    CHOL 130 11/29/2016     Lab Results   Component Value Date    HDL 35 11/29/2016     Lab Results   Component Value Date    LDL 59 11/29/2016     Lab Results   Component Value Date    TRIG 179 11/29/2016     Lab Results   Component Value Date    CHOLHDLRATIO 3.2 09/29/2015

## 2017-09-22 ENCOUNTER — OFFICE VISIT (OUTPATIENT)
Dept: GASTROENTEROLOGY | Facility: CLINIC | Age: 42
End: 2017-09-22

## 2017-09-22 VITALS — BODY MASS INDEX: 61.75 KG/M2 | WEIGHT: 315 LBS

## 2017-09-22 DIAGNOSIS — E11.9 DIABETES MELLITUS WITHOUT COMPLICATION (H): ICD-10-CM

## 2017-09-22 DIAGNOSIS — F54 PSYCHOLOGICAL FACTORS AFFECTING MEDICAL CONDITION: Primary | ICD-10-CM

## 2017-09-22 DIAGNOSIS — E66.01 MORBID OBESITY, UNSPECIFIED OBESITY TYPE (H): ICD-10-CM

## 2017-09-22 RX ORDER — BLOOD SUGAR DIAGNOSTIC
STRIP MISCELLANEOUS
Qty: 200 EACH | Refills: 0 | Status: SHIPPED | OUTPATIENT
Start: 2017-09-22 | End: 2018-04-05

## 2017-09-22 ASSESSMENT — ENCOUNTER SYMPTOMS
SHORTNESS OF BREATH: 1
NECK PAIN: 0
SNORES LOUDLY: 1
COUGH: 1
JOINT SWELLING: 1
MUSCLE WEAKNESS: 0
HEMOPTYSIS: 0
SPUTUM PRODUCTION: 0
COUGH DISTURBING SLEEP: 0
BACK PAIN: 1
MYALGIAS: 1
RESPIRATORY PAIN: 0
ARTHRALGIAS: 1
MUSCLE CRAMPS: 0
STIFFNESS: 1
DYSPNEA ON EXERTION: 1
WHEEZING: 1
POSTURAL DYSPNEA: 0

## 2017-09-22 NOTE — MR AVS SNAPSHOT
After Visit Summary   9/22/2017    Refugio Kennedy    MRN: [id number]           Patient Information     Date Of Birth          1975        Visit Information        Provider Department      9/22/2017 2:00 PM Taty Fowler, PhD [facility]        Today's Diagnoses     Psychological factors affecting medical condition    -  1    Morbid obesity, unspecified obesity type (H)           Follow-ups after your visit        Your next 10 appointments already scheduled     Oct 02, 2017  8:00 AM CDT   Diabetic Education with [address], RD   [facility]    [address] [place] [id number]   [phone]            Dec 04, 2017 10:30 AM CST   (Arrive by 10:15 AM)   Return Visit with Ashish Paez MD   [facility] ([facility])    [address] [address] [phone]              Who to contact     Please call your clinic at [phone] to:    Ask questions about your health    Make or cancel appointments    Discuss your medicines    Learn about your test results    Speak to your doctor   If you have compliments or concerns about an experience at your clinic, or if you wish to file a complaint, please contact [facility] Patient Relations at [phone] or email us at [email or web address]         Additional Information About Your Visit        MyChart Information     [employer] gives you secure access to your electronic health record. If you see a primary care provider, you can also send messages to your care team and make appointments. If you have questions, please call your primary care clinic.  If you do not have a primary care provider, please call [phone] and they will assist you.      Unda is an electronic gateway that provides easy, online access to your medical records. With Unda, you can  request a clinic appointment, read your test results, renew a prescription or communicate with your care team.     To access your existing account, please contact your [facility] or call [phone] for assistance.        Care EveryWhere ID     This is your Care EveryWhere ID. This could be used by other organizations to access your [facility] medical records  [id number]        Your Vitals Were     BMI (Body Mass Index)                   61.75 kg/m2            Blood Pressure from Last 3 Encounters:   08/31/17 112/62   08/21/17 124/73   07/17/17 106/62    Weight from Last 3 Encounters:   09/22/17 (!) 184.2 kg (406 lb 1.6 oz)   08/31/17 (!) 184.8 kg (407 lb 6.4 oz)   08/21/17 (!) 186 kg (410 lb)              Today, you had the following     No orders found for display         Today's Medication Changes          These changes are accurate as of: 9/22/17  3:01 PM.  If you have any questions, ask your nurse or doctor.               These medicines have changed or have updated prescriptions.        Dose/Directions    ACCU-CHEK CHRIS PLUS test strip   This may have changed:  See the new instructions.   Used for:  Diabetes mellitus without complication (H)   Generic drug:  blood glucose monitoring   Changed by:  Erika Espino MD        USE TO TEST BLOOD SUGARS THREE TIMES A DAY AS DIRECTED   Quantity:  200 each   Refills:  0            Where to get your medicines      These medications were sent to [facility] [place] [id number]     Phone:  [phone]     ACCU-CHEK CHRIS PLUS test strip                Primary Care Provider Office Phone # Fax #    Erika Espino [phone]       [address] [place] [address]        Equal Access to Services     RICARDO HALL AH: Barb Lin, torie wills, consuelo yi  ah. So [facility] [phone].    ATENCIÓN: Si kellee dougherty, tiene a clinton disposición servicios gratuitos de asistencia lingüística. Shukri baron [phone].    We comply with applicable federal civil rights laws and [place] laws. We do not discriminate on the basis of race, color, national origin, age, disability sex, sexual orientation or gender identity.            Thank you!     Thank you for choosing [facility]  for your care. Our goal is always to provide you with excellent care. Hearing back from our patients is one way we can continue to improve our services. Please take a few minutes to complete the written survey that you may receive in the mail after your visit with us. Thank you!             Your Updated Medication List - Protect others around you: Learn how to safely use, store and throw away your medicines at www.disposemymeds.org.          This list is accurate as of: 9/22/17  3:01 PM.  Always use your most recent med list.                   Brand Name Dispense Instructions for use Diagnosis    ACCU-CHEK CHRIS PLUS test strip   Generic drug:  blood glucose monitoring     200 each    USE TO TEST BLOOD SUGARS THREE TIMES A DAY AS DIRECTED    Diabetes mellitus without complication (H)       albuterol 108 (90 BASE) MCG/ACT Inhaler    PROAIR HFA    1 Inhaler    Inhale 2 puffs into the lungs every 4 hours as needed for shortness of breath / dyspnea    Mild intermittent asthma without complication       aspirin 81 MG tablet     90 tablet    Take 1 tablet by mouth daily.    Type 2 diabetes, HbA1C goal < 8% (H)       azithromycin 250 MG tablet    ZITHROMAX    6 tablet    Two tablets first day, then one tablet daily for four days.    Acute bronchitis with symptoms greater than 10 days       BD SEVERO U/F 32G X 4 MM   Generic drug:  insulin pen needle     100 each    USE ONE DAILY OR AS DIRECTED    Type 2 diabetes mellitus without complications (H)       cetirizine 10 MG tablet    zyrTEC     90 tablet    Take 1 tablet (10 mg) by mouth every evening    Allergic rhinitis due to pollen       * DIABETIC STERILE LANCETS device     1 Box    1 Device 3 times daily.    Type 2 diabetes, HbA1C goal < 8% (H)       * ONE TOUCH LANCETS Misc     270 each    1 lancet 3 times daily    Type 2 diabetes, HbA1C goal < 8% (H)       furosemide 20 MG tablet    LASIX    360 tablet    TAKE TWO TABLETS BY MOUTH TWICE A DAY    Venous stasis       lisinopril 5 MG tablet    PRINIVIL/ZESTRIL    90 tablet    TAKE ONE TABLET BY MOUTH EVERY DAY    Pulmonary hypertension (H)       metFORMIN modified 500 MG 24 hr tablet    GLUMETZA    90 tablet    Take 1 tablet (500 mg) by mouth daily (with breakfast)    Type 2 diabetes mellitus without complication, without long-term current use of insulin (H)       modafinil 200 MG tablet    PROVIGIL    60 tablet    Take 1/2 tablet by mouth 3-4 times daily as needed for sleepiness.    Obstructive sleep apnea-hypopnea syndrome       MULTIVITAMIN PO      Take 1 tablet by mouth daily.        order for DME      Equipment being ordered: BIPAP Refugio P Kennedy received a [employer] AirCurve 10 Bilevel. Pressures were set at 23/14 cm H2O.        order for DME      Auto-BiPAP: IPAP max 21 cm H2O EPAP min 15 cm H2O Pressure support 5 cm Changed in clinic Lifetime need and heated humidity.    FARZANA (obstructive sleep apnea)       simvastatin 10 MG tablet    ZOCOR    90 tablet    TAKE ONE TABLET BY MOUTH AT BEDTIME    Hyperlipidemia LDL goal <100       topiramate 50 MG tablet    TOPAMAX    180 tablet    Take 3 tablets (150 mg) by mouth 2 times daily    Morbid obesity due to excess calories (H)       triamcinolone 0.1 % cream    KENALOG    30 g    Apply sparingly to affected area two times daily as needed    Venous stasis       VICTOZA PEN 18 MG/3ML soln   Generic drug:  liraglutide     9 mL    INJECT 1.8MG UNDER THE SKIN ONCE DAILY    Type 2 diabetes mellitus without complications (H)       * Notice:  This list has 2  medication(s) that are the same as other medications prescribed for you. Read the directions carefully, and ask your doctor or other care provider to review them with you.

## 2017-09-22 NOTE — PROGRESS NOTES
Health Psychology                  Clinic    Department of Medicine  Shireen Valentine, PhD, LP [phone]                          Clinics and Surgery Center  [facility] Alicja Barragan, PhD, LP [phone]                  3rd Floor  [place] Mail [address]   Zenon Cornelius, PhD, ABPP, LP [phone]     [address] [facility],   [address] [facility],  Taty Fowler,  PhD, LP [phone]            [place] [address] Karen Muro, PhD, LP [phone]    Health Psychology Follow-Up Note    SUBJECTIVE:  Refugio Kennedy is a 42-year-old male with morbid obesity, multiple medical comorbidities, and history of substance use disorder in remission seen for individual psychotherapy for weight management and management of stress. Current weight was 406.1#, down 2.5 lbs since last session 4 weeks ago, which he attributed to continuing to be mindful of food choices and increased activity. He reported some dissatisfaction in slower than preferred weight loss due to episodes of eating to cope with emotion and eating fast food around family.  Reported positive health behaviors since last session were continued engagement in physical therapy, walking around flea market, plan to join gym (3 free months at a gym near home), purchasing vegetables and keeping them on the counter to increase likelihood of eating it, and minimizing keeping snack foods at home.  We agreed to focus this session on strategies to manage stress. Reported relationship stress has improved, and he reported recognizing that he takes her irritability personally but recognizes that other factors may be contributing more than factors related to him. Provided psycho education about impact of stress of weight management. Discussed strategies to manage stress, including cognitive strategies and relaxation strategies (diaphragmatic breathing. Encouraged recognition of aspects in his control vs out of control in stressful  situations. Encouraged creation of implementation intention for practice of diaphragmatic breathing to reduce emotional eating (if I feel an urge to eat, I will practice 10 deep breaths).     OBJECTIVE/MSE:  Appearance/Behavior/Orientation:  Patient was casually groomed and dressed and demonstrated good eye contact. Alert and oriented to person, place, time, and situation. No evidence of psychomotor agitation.    Cooperation/Reliability:  Patient appeared to honestly respond to questions about psychosocial functioning and is deemed a reliable historian.   Cognition/Memory/Judgment: Not formally assessed.  No difficulties apparent upon interview. Fund of knowledge consistent with age, level of education, and life experience. Abstract reasoning appropriate, no difficulties with judgment apparent.  Speech/Language: Speech was clear, logical and coherent, of normal rate, rhythm and volume.   Thought Content/Form: Appropriate to interview and situation. Overall logical and organized. Patient denied any difficulties with a thought disorder, including auditory or visual hallucinations.   Mood/Affect: Mood euthymic; appropriate range of affect.   Insight/Motivation: Appropriate to situation.   Suicide/Assault: Patient denies suicidal or assaultive ideation, plan, or intent.     ASSESSMENT:  Refugio Kennedy is a 42-year-old male with morbid obesity, multiple medical comorbidities, and history of substance use disorder in remission. He has experienced some success with weight loss through the use of topiramate, although he is experienced weight gain in recent months which she attributed to eating to cope with stress. His main triggers include conflict with his father and girlfriend. It appears he would benefit from developing further stress management techniques but did not include using food to cope with emotional distress. It does not appear he meets criteria for an eating disorder at this time, but this will continue to be  monitored.    DIAGNOSIS:  Psychological factors affecting medical condition; morbid obesity; methamphetamine use disorder, in full remission    PLAN:  RTC for individual CBT every 2-4 weeks for weight management including stress management, relaxation training, and identification and changing of unhelpful thoughts and behaviors for mood and weight.     Time in: 2:05  Time out: 3:00  Extended session due to complexity of case and length of interval.    Taty Fowler, PhD,   Clinical Health Psychologist    Tx plan completed: 7/24/17  Tx plan due:  10/24/17

## 2017-09-22 NOTE — LETTER
9/22/2017       RE: Refugio Kennedy  [address]  [facility] [address]     Dear Colleague,    Thank you for referring your patient, Refugio Kennedy, to the [facility] GASTROENTEROLOGY AND IBD CLINIC at [facility]. Please see a copy of my visit note below.      Health Psychology                  Clinic    Department of Medicine  Shireen Valentine, PhD, LP [phone]                          Clinics and Surgery Center  [facility] Alicja Barragan, PhD, LP [phone]                  3rd Floor  [place] Mail Code 749   Zenon Cornelius, PhD, ABPP, LP [phone]     [address] [facility],   [address] [facility],  Taty Fowler,  PhD, LP [phone]            [place] [address]  [place] [address] Karen Muro, PhD, LP [phone]    Health Psychology Follow-Up Note    SUBJECTIVE:  Refugio Kennedy is a 42-year-old male with morbid obesity, multiple medical comorbidities, and history of substance use disorder in remission seen for individual psychotherapy for weight management and management of stress. Current weight was 406.1#, down 2.5 lbs since last session 4 weeks ago, which he attributed to continuing to be mindful of food choices and increased activity. He reported some dissatisfaction in slower than preferred weight loss due to episodes of eating to cope with emotion and eating fast food around family.  Reported positive health behaviors since last session were continued engagement in physical therapy, walking around flea market, plan to join gym (3 free months at a gym near home), purchasing vegetables and keeping them on the counter to increase likelihood of eating it, and minimizing keeping snack foods at home.  We agreed to focus this session on strategies to manage stress. Reported relationship stress has improved, and he reported recognizing that he takes her irritability personally but recognizes that other factors may be contributing more than  factors related to him. Provided psycho education about impact of stress of weight management. Discussed strategies to manage stress, including cognitive strategies and relaxation strategies (diaphragmatic breathing. Encouraged recognition of aspects in his control vs out of control in stressful situations. Encouraged creation of implementation intention for practice of diaphragmatic breathing to reduce emotional eating (if I feel an urge to eat, I will practice 10 deep breaths).     OBJECTIVE/MSE:  Appearance/Behavior/Orientation:  Patient was casually groomed and dressed and demonstrated good eye contact. Alert and oriented to person, place, time, and situation. No evidence of psychomotor agitation.    Cooperation/Reliability:  Patient appeared to honestly respond to questions about psychosocial functioning and is deemed a reliable historian.   Cognition/Memory/Judgment: Not formally assessed.  No difficulties apparent upon interview. Fund of knowledge consistent with age, level of education, and life experience. Abstract reasoning appropriate, no difficulties with judgment apparent.  Speech/Language: Speech was clear, logical and coherent, of normal rate, rhythm and volume.   Thought Content/Form: Appropriate to interview and situation. Overall logical and organized. Patient denied any difficulties with a thought disorder, including auditory or visual hallucinations.   Mood/Affect: Mood euthymic; appropriate range of affect.   Insight/Motivation: Appropriate to situation.   Suicide/Assault: Patient denies suicidal or assaultive ideation, plan, or intent.     ASSESSMENT:  Refugio Kennedy is a 42-year-old male with morbid obesity, multiple medical comorbidities, and history of substance use disorder in remission. He has experienced some success with weight loss through the use of topiramate, although he is experienced weight gain in recent months which she attributed to eating to cope with stress. His main triggers  include conflict with his father and girlfriend. It appears he would benefit from developing further stress management techniques but did not include using food to cope with emotional distress. It does not appear he meets criteria for an eating disorder at this time, but this will continue to be monitored.    DIAGNOSIS:  Psychological factors affecting medical condition; morbid obesity; methamphetamine use disorder, in full remission    PLAN:  RTC for individual CBT every 2-4 weeks for weight management including stress management, relaxation training, and identification and changing of unhelpful thoughts and behaviors for mood and weight.     Time in: 2:05  Time out: 3:00  Extended session due to complexity of case and length of interval.    Taty Fowler, PhD,   Clinical Health Psychologist    Tx plan completed: 7/24/17  Tx plan due:  10/24/17        Again, thank you for allowing me to participate in the care of your patient.      Sincerely,    Taty Fowler, PhD

## 2017-10-02 ENCOUNTER — ALLIED HEALTH/NURSE VISIT (OUTPATIENT)
Dept: EDUCATION SERVICES | Facility: CLINIC | Age: 42
End: 2017-10-02
Payer: COMMERCIAL

## 2017-10-02 VITALS — BODY MASS INDEX: 61.73 KG/M2 | WEIGHT: 315 LBS

## 2017-10-02 DIAGNOSIS — E11.9 TYPE 2 DIABETES MELLITUS WITHOUT COMPLICATION, WITHOUT LONG-TERM CURRENT USE OF INSULIN (H): Primary | ICD-10-CM

## 2017-10-02 PROCEDURE — G0108 DIAB MANAGE TRN  PER INDIV: HCPCS

## 2017-10-02 NOTE — MR AVS SNAPSHOT
After Visit Summary   10/2/2017    Refugio Kennedy    MRN: [id number]           Patient Information     Date Of Birth          1975        Visit Information        Provider Department      10/2/2017 8:00 AM [address], RD [facility]        Care Instructions    1.  Get to the gym once flea market is done.   Goal at gym: 2-3 days.  Probably in the morning before cards.    2.  Put your can of veggies on the counter daily so you remember to fix it for your meal.          Follow-ups after your visit        Your next 10 appointments already scheduled     Oct 20, 2017  2:00 PM CDT   (Arrive by 1:45 PM)   Return Visit with Taty Fowler, PhD   [facility] Gastroenterology and IBD Clinic ([facility])    [address] [place] [id number] [phone]            Nov 13, 2017  8:00 AM CST   Diabetic Education with [address], RD   [facility] ([facility])    [address] [place] [address] [id number] [phone]            Dec 04, 2017 10:30 AM CST   (Arrive by 10:15 AM)   Return Visit with Ashish Paez MD   [facility] Medical Weight Management ([facility])    [address] [place] [id number] [phone]              Who to contact     If you have questions or need follow up information about today's clinic visit or your schedule please contact [facility] directly at [phone].  Normal or non-critical lab and imaging results will be communicated to you by MyChart, letter or phone within 4 business days after the clinic has received the results. If you do not hear from us within 7 days, please contact the clinic through [email or web address] or phone. If you have a critical or abnormal lab result, we will notify you by phone as soon as possible.  Submit refill requests through [employer] or call your pharmacy and they will forward the refill request  to us. Please allow 3 business days for your refill to be completed.          Additional Information About Your Visit        MyChart Information     [employer] gives you secure access to your electronic health record. If you see a primary care provider, you can also send messages to your care team and make appointments. If you have questions, please call your primary care clinic.  If you do not have a primary care provider, please call [phone] and they will assist you.        Care EveryWhere ID     This is your Care EveryWhere ID. This could be used by other organizations to access your [facility] medical records  [id number]        Your Vitals Were     BMI (Body Mass Index)                   61.73 kg/m2            Blood Pressure from Last 3 Encounters:   08/31/17 112/62   08/21/17 124/73   07/17/17 106/62    Weight from Last 3 Encounters:   10/02/17 (!) 184.2 kg (406 lb)   09/22/17 (!) 184.2 kg (406 lb 1.6 oz)   08/31/17 (!) 184.8 kg (407 lb 6.4 oz)              Today, you had the following     No orders found for display       Primary Care Provider Office Phone # Fax #    Erika Espino [phone] [address] [place], [place] [address]        Equal Access to Services     RICARDO HALL : Hadii aad ku hadasho Soomaali, waaxda luqadaha, qaybta kaalmada adeegyada, waxay shelbiin hayshruthin eleni chawla lafabricio winter. So [facility] [phone].    ATENCIÓN: Si habla español, tiene a clinton disposición servicios gratuitos de asistencia lingüística. Llame al [phone].    We comply with applicable federal civil rights laws and [place] laws. We do not discriminate on the basis of race, color, national origin, age, disability, sex, sexual orientation, or gender identity.            Thank you!     Thank you for choosing [facility]  for your care. Our goal is always to provide you with excellent care. Hearing back from our patients is one way we can continue to improve our services. Please take  a few minutes to complete the written survey that you may receive in the mail after your visit with us. Thank you!             Your Updated Medication List - Protect others around you: Learn how to safely use, store and throw away your medicines at www.disposemymeds.org.          This list is accurate as of: 10/2/17  8:44 AM.  Always use your most recent med list.                   Brand Name Dispense Instructions for use Diagnosis    ACCU-CHEK CHRIS PLUS test strip   Generic drug:  blood glucose monitoring     200 each    USE TO TEST BLOOD SUGARS THREE TIMES A DAY AS DIRECTED    Diabetes mellitus without complication (H)       albuterol 108 (90 BASE) MCG/ACT Inhaler    PROAIR HFA    1 Inhaler    Inhale 2 puffs into the lungs every 4 hours as needed for shortness of breath / dyspnea    Mild intermittent asthma without complication       aspirin 81 MG tablet     90 tablet    Take 1 tablet by mouth daily.    Type 2 diabetes, HbA1C goal < 8% (H)       azithromycin 250 MG tablet    ZITHROMAX    6 tablet    Two tablets first day, then one tablet daily for four days.    Acute bronchitis with symptoms greater than 10 days       BD SEVERO U/F 32G X 4 MM   Generic drug:  insulin pen needle     100 each    USE ONE DAILY OR AS DIRECTED    Type 2 diabetes mellitus without complications (H)       cetirizine 10 MG tablet    zyrTEC    90 tablet    Take 1 tablet (10 mg) by mouth every evening    Allergic rhinitis due to pollen       * DIABETIC STERILE LANCETS device     1 Box    1 Device 3 times daily.    Type 2 diabetes, HbA1C goal < 8% (H)       * ONE TOUCH LANCETS Misc     270 each    1 lancet 3 times daily    Type 2 diabetes, HbA1C goal < 8% (H)       furosemide 20 MG tablet    LASIX    360 tablet    TAKE TWO TABLETS BY MOUTH TWICE A DAY    Venous stasis       lisinopril 5 MG tablet    PRINIVIL/ZESTRIL    90 tablet    TAKE ONE TABLET BY MOUTH EVERY DAY    Pulmonary hypertension       metFORMIN modified 500 MG 24 hr tablet     GLUMETZA    90 tablet    Take 1 tablet (500 mg) by mouth daily (with breakfast)    Type 2 diabetes mellitus without complication, without long-term current use of insulin (H)       modafinil 200 MG tablet    PROVIGIL    60 tablet    Take 1/2 tablet by mouth 3-4 times daily as needed for sleepiness.    Obstructive sleep apnea-hypopnea syndrome       MULTIVITAMIN PO      Take 1 tablet by mouth daily.        order for DME      Equipment being ordered: BIPAP Refugio P Kennedy received a Resmed AirCurve 10 Bilevel. Pressures were set at 23/14 cm H2O.        order for DME      Auto-BiPAP: IPAP max 21 cm H2O EPAP min 15 cm H2O Pressure support 5 cm Changed in clinic Lifetime need and heated humidity.    FARZANA (obstructive sleep apnea)       simvastatin 10 MG tablet    ZOCOR    90 tablet    TAKE ONE TABLET BY MOUTH AT BEDTIME    Hyperlipidemia LDL goal <100       topiramate 50 MG tablet    TOPAMAX    180 tablet    Take 3 tablets (150 mg) by mouth 2 times daily    Morbid obesity due to excess calories (H)       triamcinolone 0.1 % cream    KENALOG    30 g    Apply sparingly to affected area two times daily as needed    Venous stasis       VICTOZA PEN 18 MG/3ML soln   Generic drug:  liraglutide     9 mL    INJECT 1.8MG UNDER THE SKIN ONCE DAILY    Type 2 diabetes mellitus without complications (H)       * Notice:  This list has 2 medication(s) that are the same as other medications prescribed for you. Read the directions carefully, and ask your doctor or other care provider to review them with you.

## 2017-10-02 NOTE — PATIENT INSTRUCTIONS
1.  Get to the gym once flea market is done.   Goal at gym: 2-3 days.  Probably in the morning before cards.    2.  Put your can of veggies on the counter daily so you remember to fix it for your meal.

## 2017-10-02 NOTE — PROGRESS NOTES
Diabetes Self Management Training, wt loss : Individual Review Visit    Refugio Kennedy presents today for education related to Type 2 diabetes.    He is accompanied by self    Patient's diabetes management related comments/concerns: wt is on the decline    Patient's emotional response to diabetes: expresses readiness to learn    Patient would like this visit to be focused around the following diabetes-related behaviors and goals: Healthy Eating, Being Active, Monitoring and Taking Medication    ASSESSMENT:  Patient Problem List and Family Medical History reviewed for relevant medical history, current medical status, and diabetes risk factors.    Current Diabetes Management per Patient:  Taking diabetes medications?   yes:     Diabetes Medication(s)     Biguanides Sig    metFORMIN modified (GLUMETZA) 500 MG 24 hr tablet Take 1 tablet (500 mg) by mouth daily (with breakfast)    Incretin Mimetic Agents (GLP-1 Receptor Agonists) Sig    VICTOZA PEN 18 MG/3ML soln INJECT 1.8MG UNDER THE SKIN ONCE DAILY          *Abbreviated insulin dose documentation key: Insulin Trade Name (utrwrwfen-gtsjz-ieloav-bedtime) - i.e. Humalog 5-5-5-0 (Humalog 5 units at breakfast, 5 units at lunch, and 5 units at dinner).    Past Diabetes Education: Yes    Patient glucose self monitoring as follows: one time daily.   BG meter: Accu-Chek Cheryl meter  BG results:    -120 on average, occasionally higher with sweet eating  BG values are: In goal  Patient's most recent   Lab Results   Component Value Date    A1C 5.6 07/17/2017    is meeting goal of <7.0    Nutrition:  Breakfast- coffee and toast, peanut butter then do later bkfst: eggs, meraz  Lunch- some fast food: two fish at [employer], diet pop   Supper- sub sandwich (meat, cheese and light keene) or pizza, veggies/sandwiches.  Physical Activity:    Gym members he hopes to get once October is done    Diabetes Risk Factors:  family history, overweight/obesity and inactivity    Diabetes  "Complications:  Not discussed today.    Vitals:  Wt (!) 184.2 kg (406 lb)  BMI 61.73 kg/m2  Estimated body mass index is 61.73 kg/(m^2) as calculated from the following:    Height as of 8/21/17: 1.727 m (5' 8\").    Weight as of this encounter: 184.2 kg (406 lb).   Last 3 BP:   BP Readings from Last 3 Encounters:   08/31/17 112/62   08/21/17 124/73   07/17/17 106/62       History   Smoking Status     Former Smoker     Packs/day: 0.50     Years: 1.00     Types: Cigarettes, Cigars     Quit date: 5/21/2012   Smokeless Tobacco     Never Used       Labs:  Lab Results   Component Value Date    A1C 5.6 07/17/2017     Lab Results   Component Value Date    GLC 91 07/17/2017     Lab Results   Component Value Date    LDL 59 11/29/2016     HDL Cholesterol   Date Value Ref Range Status   11/29/2016 35 (L) >39 mg/dL Final   ]  GFR Estimate   Date Value Ref Range Status   07/17/2017 >90  Non  GFR Calc   >60 mL/min/1.7m2 Final     GFR Estimate If Black   Date Value Ref Range Status   07/17/2017 >90   GFR Calc   >60 mL/min/1.7m2 Final     Lab Results   Component Value Date    CR 0.85 07/17/2017     No results found for: MICROALBUMIN    Socio/Economic Considerations:    Support system: family    Health Beliefs and Attitudes:   Patient Activation Measure Survey Score:  MARY Score (Last Two) 1/25/2011   MARY Raw Score 36   Activation Score 47.4   MARY Level 2       Stage of Change: ACTION (Actively working towards change)      Diabetes knowledge and skills assessment:     Patient is knowledgeable in diabetes management concepts related to: Healthy Eating, Being Active, Monitoring and Taking Medication    Patient needs further education on the following diabetes management concepts: Healthy Eating and Being Active    Barriers to Learning Assessment: No Barriers identified    Based on learning assessment above, most appropriate setting for further diabetes education would be: Individual " setting.    INTERVENTION:   -eating more veggies  -planning his replacement activity once [place] is done  - are thoughts working against him or for him for eating  Doing better, wt is going down  Education provided today on:  AADE Self-Care Behaviors:  Healthy Eating: portion control  Being Active: relationship to blood glucose and describe appropriate activity program    Opportunities for ongoing education and support in diabetes-self management were discussed.    Pt verbalized understanding of concepts discussed and recommendations provided today.       Education Materials Provided:  No new materials provided today    PLAN:  See Patient Instructions for co-developed, patient-stated behavior change goals.  AVS printed and provided to patient today.    FOLLOW-UP:  Follow-up appointment scheduled on November 13.    Ongoing plan for education and support: Follow-up visit with diabetes educator in one month      Time Spent: 30 minutes  Encounter Type: Individual

## 2017-10-17 DIAGNOSIS — I27.20 PULMONARY HYPERTENSION (H): ICD-10-CM

## 2017-10-17 RX ORDER — LISINOPRIL 5 MG/1
TABLET ORAL
Qty: 90 TABLET | Refills: 1 | Status: SHIPPED | OUTPATIENT
Start: 2017-10-17 | End: 2018-04-15

## 2017-10-17 NOTE — TELEPHONE ENCOUNTER
lisinopril (PRINIVIL/ZESTRIL) 5 MG tablet      Last Written Prescription Date: 7/17/2017  Last Fill Quantity: 90, # refills: 0  Last Office Visit with G, P or [facility] prescribing provider: 8/31/2017       Potassium   Date Value Ref Range Status   07/17/2017 3.6 3.4 - 5.3 mmol/L Final     Creatinine   Date Value Ref Range Status   07/17/2017 0.85 0.66 - 1.25 mg/dL Final     BP Readings from Last 3 Encounters:   08/31/17 112/62   08/21/17 124/73   07/17/17 106/62

## 2017-10-20 ENCOUNTER — OFFICE VISIT (OUTPATIENT)
Dept: GASTROENTEROLOGY | Facility: CLINIC | Age: 42
End: 2017-10-20

## 2017-10-20 VITALS — WEIGHT: 315 LBS | BODY MASS INDEX: 62.49 KG/M2

## 2017-10-20 DIAGNOSIS — F54 PSYCHOLOGICAL FACTORS AFFECTING MEDICAL CONDITION: Primary | ICD-10-CM

## 2017-10-20 DIAGNOSIS — E66.01 MORBID OBESITY (H): ICD-10-CM

## 2017-10-20 ASSESSMENT — ENCOUNTER SYMPTOMS
POSTURAL DYSPNEA: 0
COUGH: 1
MUSCLE WEAKNESS: 0
JOINT SWELLING: 1
RESPIRATORY PAIN: 0
ARTHRALGIAS: 1
NECK PAIN: 0
WHEEZING: 1
STIFFNESS: 1
MYALGIAS: 1
SHORTNESS OF BREATH: 1
BACK PAIN: 1
SPUTUM PRODUCTION: 0
DYSPNEA ON EXERTION: 1
MUSCLE CRAMPS: 0
HEMOPTYSIS: 0
COUGH DISTURBING SLEEP: 0
SNORES LOUDLY: 1

## 2017-10-20 NOTE — LETTER
10/20/2017       RE: Refugio Kennedy  [address]  [facility] [id number]     Dear Colleague,    Thank you for referring your patient, Refugio Kennedy, to the [facility]STROENTEROLOGY [facility]INIC at [facility]. Please see a copy of my visit note below.      Health Psychology                  Clinic    Department of Medicine  Shireen Valentine, PhD, LP [phone]                          Clinics and Surgery Center  [facility] Alicja Barragan, PhD, LP [phone]                  3rd Floor  [place] Mail Code 744   Zenon Cornelius, PhD, ABPP, LP [phone]     [address] [facility],   [address] [facility],  Taty Fowler,  PhD, LP [phone]            [place] [address]  [place] [address] Karen Muro, PhD, LP [phone]    Health Psychology Follow-Up Note    SUBJECTIVE:  Refugio Kennedy is a 42-year-old male with morbid obesity, multiple medical comorbidities, and history of substance use disorder in remission seen for individual psychotherapy for weight management and management of stress. Current weight was 411.1#, which is up from 406 at last session. He attributed this weight gain to eating a restaurants while on a trip to [place] for a gambling tournament. Reviewed use of stress management strategies, including consideration of controllability of the situation as well as deep breathing.     [place] session focused on discussing pros and cons of staying in the relationship versus  from his girlfriend (Khushbu). Completed decisional balance exercise related to this decision in order to clarify his opinions about this situation. In this exercise, he recognized stress in this relationship but a benefit in continuing in it due to companionship.     Verbally reviewed treatment plan, and he reported current plan (goals = stress management, relaxation training, and identification and changing of unhelpful thoughts and behaviors for mood and  weight) is satisfactory and that he doesn't have any changes or additions to the plan at this time.     OBJECTIVE/MSE:  Appearance/Behavior/Orientation:  Patient was casually groomed and dressed and demonstrated good eye contact. Alert and oriented to person, place, time, and situation. No evidence of psychomotor agitation.    Cooperation/Reliability:  Patient appeared to honestly respond to questions about psychosocial functioning and is deemed a reliable historian.   Cognition/Memory/Judgment: Not formally assessed.  No difficulties apparent upon interview. Fund of knowledge consistent with age, level of education, and life experience. Abstract reasoning appropriate, no difficulties with judgment apparent.  Speech/Language: Speech was clear, logical and coherent, of normal rate, rhythm and volume.   Thought Content/Form: Appropriate to interview and situation. Overall logical and organized. Patient denied any difficulties with a thought disorder, including auditory or visual hallucinations.   Mood/Affect: Mood euthymic; appropriate range of affect.   Insight/Motivation: Appropriate to situation.   Suicide/Assault: Patient denies suicidal or assaultive ideation, plan, or intent.     ASSESSMENT:  Refugio Kennedy is a 42-year-old male with morbid obesity, multiple medical comorbidities, and history of substance use disorder in remission. He has experienced some success with weight loss through the use of topiramate, although he is experienced weight gain in recent months which she attributed to eating to cope with stress. His main triggers include conflict with his father and girlfriend. It appears he would benefit from developing further stress management techniques but did not include using food to cope with emotional distress. It does not appear he meets criteria for an eating disorder at this time, but this will continue to be monitored.    DIAGNOSIS:  Psychological factors affecting medical condition; morbid obesity;  methamphetamine use disorder, in full remission    PLAN:  RTC for individual CBT every 2-4 weeks for weight management including stress management, relaxation training, and identification and changing of unhelpful thoughts and behaviors for mood and weight.     He plans to better manage stress by 1) joining a gym and attending 2x per week to exercise on the elliptical for 10 minutes and 2) increase vegetable intake by eating a salad or a vegetable serving 1-2 times per week.     Time in: 2:05  Time out: 2:59  Extended session due to complexity of case and length of interval.    Taty Fowler, PhD,   Clinical Health Psychologist    Initial Tx plan completed: 7/24/17  Tx plan reviewed:  10/20/17  Verbal review next due: 1/20/18        Again, thank you for allowing me to participate in the care of your patient.      Sincerely,    Taty Fowler, PhD

## 2017-10-20 NOTE — PROGRESS NOTES
Health Psychology                  Clinic    Department of Medicine  Shireen Valentine, PhD, LP [phone]                          Clinics and Surgery Center  [facility] Alicja Barragan, PhD, LP [phone]                  3rd Floor  [place] Mail Code 747   Zenon Cornelius, PhD, ABPP, LP [phone]     [address] [facility],   [address] [facility],  Taty Fowler,  PhD, LP [phone]            [place] [address] Karen Muro, PhD, LP [phone]    Health Psychology Follow-Up Note    SUBJECTIVE:  Refugio Kennedy is a 42-year-old male with morbid obesity, multiple medical comorbidities, and history of substance use disorder in remission seen for individual psychotherapy for weight management and management of stress. Current weight was 411.1#, which is up from 406 at last session. He attributed this weight gain to eating a restaurants while on a trip to [place] for a gambling tournament. Reviewed use of stress management strategies, including consideration of controllability of the situation as well as deep breathing.     Latty session focused on discussing pros and cons of staying in the relationship versus  from his girlfriend (Khushbu). Completed decisional balance exercise related to this decision in order to clarify his opinions about this situation. In this exercise, he recognized stress in this relationship but a benefit in continuing in it due to companionship.     Verbally reviewed treatment plan, and he reported current plan (goals = stress management, relaxation training, and identification and changing of unhelpful thoughts and behaviors for mood and weight) is satisfactory and that he doesn't have any changes or additions to the plan at this time.     OBJECTIVE/MSE:  Appearance/Behavior/Orientation:  Patient was casually groomed and dressed and demonstrated good eye contact. Alert and oriented to person, place, time, and situation. No evidence of  psychomotor agitation.    Cooperation/Reliability:  Patient appeared to honestly respond to questions about psychosocial functioning and is deemed a reliable historian.   Cognition/Memory/Judgment: Not formally assessed.  No difficulties apparent upon interview. Fund of knowledge consistent with age, level of education, and life experience. Abstract reasoning appropriate, no difficulties with judgment apparent.  Speech/Language: Speech was clear, logical and coherent, of normal rate, rhythm and volume.   Thought Content/Form: Appropriate to interview and situation. Overall logical and organized. Patient denied any difficulties with a thought disorder, including auditory or visual hallucinations.   Mood/Affect: Mood euthymic; appropriate range of affect.   Insight/Motivation: Appropriate to situation.   Suicide/Assault: Patient denies suicidal or assaultive ideation, plan, or intent.     ASSESSMENT:  Refugio Kennedy is a 42-year-old male with morbid obesity, multiple medical comorbidities, and history of substance use disorder in remission. He has experienced some success with weight loss through the use of topiramate, although he is experienced weight gain in recent months which she attributed to eating to cope with stress. His main triggers include conflict with his father and girlfriend. It appears he would benefit from developing further stress management techniques but did not include using food to cope with emotional distress. It does not appear he meets criteria for an eating disorder at this time, but this will continue to be monitored.    DIAGNOSIS:  Psychological factors affecting medical condition; morbid obesity; methamphetamine use disorder, in full remission    PLAN:  RTC for individual CBT every 2-4 weeks for weight management including stress management, relaxation training, and identification and changing of unhelpful thoughts and behaviors for mood and weight.     He plans to better manage stress by 1)  joining a gym and attending 2x per week to exercise on the elliptical for 10 minutes and 2) increase vegetable intake by eating a salad or a vegetable serving 1-2 times per week.     Time in: 2:05  Time out: 2:59  Extended session due to complexity of case and length of interval.    Taty Fowler, PhD,   Clinical Health Psychologist    Initial Tx plan completed: 7/24/17  Tx plan reviewed:  10/20/17  Verbal review next due: 1/20/18

## 2017-10-20 NOTE — MR AVS SNAPSHOT
After Visit Summary   10/20/2017    Refugio Kennedy    MRN: [id number]           Patient Information     Date Of Birth          1975        Visit Information        Provider Department      10/20/2017 2:00 PM Taty Fowler, PhD [facility]        Today's Diagnoses     Psychological factors affecting medical condition    -  1    Morbid obesity (H)           Follow-ups after your visit        Your next 10 appointments already scheduled     Nov 13, 2017  8:00 AM CST   Diabetic Education with Violet Bowen RD   [facility] ([facility])    [address] [place] [id number]   [phone]            Dec 04, 2017 10:30 AM CST   (Arrive by 10:15 AM)   Return Visit with Ashish Paez MD   [facility] ([facility])    [address] [address] [phone]              Who to contact     Please call your clinic at [phone] to:    Ask questions about your health    Make or cancel appointments    Discuss your medicines    Learn about your test results    Speak to your doctor   If you have compliments or concerns about an experience at your clinic, or if you wish to file a complaint, please contact [facility] Patient Relations at [phone] or email us at [email or web address]         Additional Information About Your Visit        MyChart Information     [employer]With gives you secure access to your electronic health record. If you see a primary care provider, you can also send messages to your care team and make appointments. If you have questions, please call your primary care clinic.  If you do not have a primary care provider, please call [phone] and they will assist you.      EatWith is an electronic gateway that provides easy, online access to your medical records. With EatWith, you can request a clinic  appointment, read your test results, renew a prescription or communicate with your care team.     To access your existing account, please contact your [facility] or call [phone] for assistance.        Care EveryWhere ID     This is your Care EveryWhere ID. This could be used by other organizations to access your [facility] medical records  [id number]        Your Vitals Were     BMI (Body Mass Index)                   62.49 kg/m2            Blood Pressure from Last 3 Encounters:   08/31/17 112/62   08/21/17 124/73   07/17/17 106/62    Weight from Last 3 Encounters:   10/20/17 (!) 186.4 kg (411 lb)   10/02/17 (!) 184.2 kg (406 lb)   09/22/17 (!) 184.2 kg (406 lb 1.6 oz)              Today, you had the following     No orders found for display       Primary Care Provider Office Phone # Fax #    Erika Espino [phone] [address] [place], [place] [address]        Equal Access to Services     RICARDO HALL : Hadii aad ku hadasho Soomaali, waaxda luqadaha, qaybta kaalmada adeegyada, consuelo rider . So [facility] [phone].    ATENCIÓN: Si habla español, tiene a clinton disposición servicios gratuitos de asistencia lingüística. Llame al [phone].    We comply with applicable federal civil rights laws and [place] laws. We do not discriminate on the basis of race, color, national origin, age, disability, sex, sexual orientation, or gender identity.            Thank you!     Thank you for choosing [facility]  for your care. Our goal is always to provide you with excellent care. Hearing back from our patients is one way we can continue to improve our services. Please take a few minutes to complete the written survey that you may receive in the mail after your visit with us. Thank you!             Your Updated Medication List - Protect others around you: Learn how to safely use, store and throw away your  medicines at www.disposemymeds.org.          This list is accurate as of: 10/20/17  2:59 PM.  Always use your most recent med list.                   Brand Name Dispense Instructions for use Diagnosis    ACCU-CHEK CHRIS PLUS test strip   Generic drug:  blood glucose monitoring     200 each    USE TO TEST BLOOD SUGARS THREE TIMES A DAY AS DIRECTED    Diabetes mellitus without complication (H)       albuterol 108 (90 BASE) MCG/ACT Inhaler    PROAIR HFA    1 Inhaler    Inhale 2 puffs into the lungs every 4 hours as needed for shortness of breath / dyspnea    Mild intermittent asthma without complication       aspirin 81 MG tablet     90 tablet    Take 1 tablet by mouth daily.    Type 2 diabetes, HbA1C goal < 8% (H)       azithromycin 250 MG tablet    ZITHROMAX    6 tablet    Two tablets first day, then one tablet daily for four days.    Acute bronchitis with symptoms greater than 10 days       BD SEVERO U/F 32G X 4 MM   Generic drug:  insulin pen needle     100 each    USE ONE DAILY OR AS DIRECTED    Type 2 diabetes mellitus without complications (H)       cetirizine 10 MG tablet    zyrTEC    90 tablet    Take 1 tablet (10 mg) by mouth every evening    Allergic rhinitis due to pollen       * DIABETIC STERILE LANCETS device     1 Box    1 Device 3 times daily.    Type 2 diabetes, HbA1C goal < 8% (H)       * ONE TOUCH LANCETS Misc     270 each    1 lancet 3 times daily    Type 2 diabetes, HbA1C goal < 8% (H)       furosemide 20 MG tablet    LASIX    360 tablet    TAKE TWO TABLETS BY MOUTH TWICE A DAY    Venous stasis       lisinopril 5 MG tablet    PRINIVIL/ZESTRIL    90 tablet    TAKE ONE TABLET BY MOUTH EVERY DAY    Pulmonary hypertension       metFORMIN modified 500 MG 24 hr tablet    GLUMETZA    90 tablet    Take 1 tablet (500 mg) by mouth daily (with breakfast)    Type 2 diabetes mellitus without complication, without long-term current use of insulin (H)       modafinil 200 MG tablet    PROVIGIL    60 tablet    Take  1/2 tablet by mouth 3-4 times daily as needed for sleepiness.    Obstructive sleep apnea-hypopnea syndrome       MULTIVITAMIN PO      Take 1 tablet by mouth daily.        order for DME      Equipment being ordered: BIPAP Refugio P Kennedy received a Resmed AirCurve 10 Bilevel. Pressures were set at 23/14 cm H2O.        order for DME      Auto-BiPAP: IPAP max 21 cm H2O EPAP min 15 cm H2O Pressure support 5 cm Changed in clinic Lifetime need and heated humidity.    FARZANA (obstructive sleep apnea)       simvastatin 10 MG tablet    ZOCOR    90 tablet    TAKE ONE TABLET BY MOUTH AT BEDTIME    Hyperlipidemia LDL goal <100       topiramate 50 MG tablet    TOPAMAX    180 tablet    Take 3 tablets (150 mg) by mouth 2 times daily    Morbid obesity due to excess calories (H)       triamcinolone 0.1 % cream    KENALOG    30 g    Apply sparingly to affected area two times daily as needed    Venous stasis       VICTOZA PEN 18 MG/3ML soln   Generic drug:  liraglutide     9 mL    INJECT 1.8MG UNDER THE SKIN ONCE DAILY    Type 2 diabetes mellitus without complications (H)       * Notice:  This list has 2 medication(s) that are the same as other medications prescribed for you. Read the directions carefully, and ask your doctor or other care provider to review them with you.

## 2017-11-13 ENCOUNTER — ALLIED HEALTH/NURSE VISIT (OUTPATIENT)
Dept: EDUCATION SERVICES | Facility: CLINIC | Age: 42
End: 2017-11-13
Payer: COMMERCIAL

## 2017-11-13 VITALS — BODY MASS INDEX: 62.34 KG/M2 | WEIGHT: 315 LBS

## 2017-11-13 DIAGNOSIS — E11.9 TYPE 2 DIABETES MELLITUS WITHOUT COMPLICATION, WITHOUT LONG-TERM CURRENT USE OF INSULIN (H): Primary | ICD-10-CM

## 2017-11-13 PROCEDURE — G0108 DIAB MANAGE TRN  PER INDIV: HCPCS | Performed by: DIETITIAN, REGISTERED

## 2017-11-13 NOTE — PATIENT INSTRUCTIONS
1.  Get that GYM membership....walk through the door.    2.  Continue to work on veggies with meals and the dry carbs

## 2017-11-13 NOTE — MR AVS SNAPSHOT
After Visit Summary   11/13/2017    Refugio Kenneyd    MRN: [id number]           Patient Information     Date Of Birth          1975        Visit Information        Provider Department      11/13/2017 8:00 AM Violet Bowen RD [facility]        Care Instructions    1.  Get that GYM membership....walk through the door.    2.  Continue to work on veggies with meals and the dry carbs          Follow-ups after your visit        Your next 10 appointments already scheduled     Dec 04, 2017 10:30 AM CST   (Arrive by 10:15 AM)   Return Visit with Ashish Paez MD   [facility] ([facility])    [address] [place] [id number] [phone]            Dec 05, 2017  1:00 PM CST   (Arrive by 12:45 PM)   Return Visit with Taty Fowler PhD   [facility] ([facility])    [address] [place] [id number] [phone]              Who to contact     If you have questions or need follow up information about today's clinic visit or your schedule please contact [facility] directly at [phone].  Normal or non-critical lab and imaging results will be communicated to you by iTherXhart, letter or phone within 4 business days after the clinic has received the results. If you do not hear from us within 7 days, please contact the clinic through iTherXhart or phone. If you have a critical or abnormal lab result, we will notify you by phone as soon as possible.  Submit refill requests through [employer] or call your pharmacy and they will forward the refill request to us. Please allow 3 business days for your refill to be completed.          Additional Information About Your Visit        MyChart Information     [employer] gives you secure access to your electronic health record. If you see a primary care provider, you can also send messages  to your care team and make appointments. If you have questions, please call your primary care clinic.  If you do not have a primary care provider, please call [phone] and they will assist you.        Care EveryWhere ID     This is your Care EveryWhere ID. This could be used by other organizations to access your [facility] medical records  [id number]        Your Vitals Were     BMI (Body Mass Index)                   62.34 kg/m2            Blood Pressure from Last 3 Encounters:   08/31/17 112/62   08/21/17 124/73   07/17/17 106/62    Weight from Last 3 Encounters:   11/13/17 (!) 186 kg (410 lb)   10/20/17 (!) 186.4 kg (411 lb)   10/02/17 (!) 184.2 kg (406 lb)              Today, you had the following     No orders found for display       Primary Care Provider Office Phone # Fax #    Erika Espino [phone] [address] [place], [place] [id number]        Equal Access to Services     LENI HALL : Hadii aad ku hadasho Soomaali, waaxda luqadaha, qaybta kaalmada adeegyada, waxay shelbiin hayshruthin eleni rider . So [facility] [phone].    ATENCIÓN: Si habla español, tiene a clinton disposición servicios gratuitos de asistencia lingüística. Llame al [phone].    We comply with applicable federal civil rights laws and [place] laws. We do not discriminate on the basis of race, color, national origin, age, disability, sex, sexual orientation, or gender identity.            Thank you!     Thank you for choosing [facility]  for your care. Our goal is always to provide you with excellent care. Hearing back from our patients is one way we can continue to improve our services. Please take a few minutes to complete the written survey that you may receive in the mail after your visit with us. Thank you!             Your Updated Medication List - Protect others around you: Learn how to safely use, store and throw away your medicines at www.disposemymeds.org.          This list is  accurate as of: 11/13/17  8:43 AM.  Always use your most recent med list.                   Brand Name Dispense Instructions for use Diagnosis    ACCU-CHEK CHRIS PLUS test strip   Generic drug:  blood glucose monitoring     200 each    USE TO TEST BLOOD SUGARS THREE TIMES A DAY AS DIRECTED    Diabetes mellitus without complication (H)       albuterol 108 (90 BASE) MCG/ACT Inhaler    PROAIR HFA    1 Inhaler    Inhale 2 puffs into the lungs every 4 hours as needed for shortness of breath / dyspnea    Mild intermittent asthma without complication       aspirin 81 MG tablet     90 tablet    Take 1 tablet by mouth daily.    Type 2 diabetes, HbA1C goal < 8% (H)       azithromycin 250 MG tablet    ZITHROMAX    6 tablet    Two tablets first day, then one tablet daily for four days.    Acute bronchitis with symptoms greater than 10 days       BD SEVERO U/F 32G X 4 MM   Generic drug:  insulin pen needle     100 each    USE ONE DAILY OR AS DIRECTED    Type 2 diabetes mellitus without complications (H)       cetirizine 10 MG tablet    zyrTEC    90 tablet    Take 1 tablet (10 mg) by mouth every evening    Allergic rhinitis due to pollen       * DIABETIC STERILE LANCETS device     1 Box    1 Device 3 times daily.    Type 2 diabetes, HbA1C goal < 8% (H)       * ONETOUCH LANCETS Misc     270 each    1 lancet 3 times daily    Type 2 diabetes, HbA1C goal < 8% (H)       furosemide 20 MG tablet    LASIX    360 tablet    TAKE TWO TABLETS BY MOUTH TWICE A DAY    Venous stasis       lisinopril 5 MG tablet    PRINIVIL/ZESTRIL    90 tablet    TAKE ONE TABLET BY MOUTH EVERY DAY    Pulmonary hypertension       metFORMIN modified 500 MG 24 hr tablet    GLUMETZA    90 tablet    Take 1 tablet (500 mg) by mouth daily (with breakfast)    Type 2 diabetes mellitus without complication, without long-term current use of insulin (H)       modafinil 200 MG tablet    PROVIGIL    60 tablet    Take 1/2 tablet by mouth 3-4 times daily as needed for  sleepiness.    Obstructive sleep apnea-hypopnea syndrome       MULTIVITAMIN PO      Take 1 tablet by mouth daily.        order for DME      Equipment being ordered: BIPAP Refugio P Kennedy received a Resmed AirCurve 10 Bilevel. Pressures were set at 23/14 cm H2O.        order for DME      Auto-BiPAP: IPAP max 21 cm H2O EPAP min 15 cm H2O Pressure support 5 cm Changed in clinic Lifetime need and heated humidity.    FARZANA (obstructive sleep apnea)       simvastatin 10 MG tablet    ZOCOR    90 tablet    TAKE ONE TABLET BY MOUTH AT BEDTIME    Hyperlipidemia LDL goal <100       topiramate 50 MG tablet    TOPAMAX    180 tablet    Take 3 tablets (150 mg) by mouth 2 times daily    Morbid obesity due to excess calories (H)       triamcinolone 0.1 % cream    KENALOG    30 g    Apply sparingly to affected area two times daily as needed    Venous stasis       VICTOZA PEN 18 MG/3ML soln   Generic drug:  liraglutide     9 mL    INJECT 1.8MG UNDER THE SKIN ONCE DAILY    Type 2 diabetes mellitus without complications (H)       * Notice:  This list has 2 medication(s) that are the same as other medications prescribed for you. Read the directions carefully, and ask your doctor or other care provider to review them with you.

## 2017-11-13 NOTE — PROGRESS NOTES
Diabetes Self Management Training: Individual Review Visit    Refugio Kennedy presents today for education related to Type 2 diabetes.    He is accompanied by self    Patient's diabetes management related comments/concerns: working on continued wt loss    Patient's emotional response to diabetes: expresses readiness to learn    Patient would like this visit to be focused around the following diabetes-related behaviors and goals: Healthy Eating, Being Active, Monitoring and Taking Medication    ASSESSMENT:  Patient Problem List and Family Medical History reviewed for relevant medical history, current medical status, and diabetes risk factors.    Current Diabetes Management per Patient:  Taking diabetes medications?   yes:     Diabetes Medication(s)     Biguanides Sig    metFORMIN modified (GLUMETZA) 500 MG 24 hr tablet Take 1 tablet (500 mg) by mouth daily (with breakfast)    Incretin Mimetic Agents (GLP-1 Receptor Agonists) Sig    VICTOZA PEN 18 MG/3ML soln INJECT 1.8MG UNDER THE SKIN ONCE DAILY          *Abbreviated insulin dose documentation key: Insulin Trade Name (pstktnayc-xipsz-tqqlzw-bedtime) - i.e. Humalog 5-5-5-0 (Humalog 5 units at breakfast, 5 units at lunch, and 5 units at dinner).    Past Diabetes Education: Yes    Patient glucose self monitoring as follows: one time daily.   BG meter: Accu-Chek Cheryl meter  BG results:                                    BG values are: In goal  Patient's most recent   Lab Results   Component Value Date    A1C 5.6 07/17/2017    is meeting goal of <7.0    Nutrition:  Had a trip to [place] and wt went up 5 lbs, working on getting that back off    Breakfast- eggs, sausage or eggs/toast  Lunch- ham sandwich  Supper- two cheese burgers, mashed potatoes  Snack- ice cream for dessert, has not had ice cream in two months  Beef sticks occasionally, cheese    Has started eating some vegetables, will continue to work on making a veggie with meals.  Physical Activity:    Limitations:  "wt  Still working on joining the GYM    Diabetes Risk Factors:  family history, overweight/obesity and inactivity    Diabetes Complications:  Not discussed today.    Vitals:  Wt (!) 186 kg (410 lb)  BMI 62.34 kg/m2  Estimated body mass index is 62.34 kg/(m^2) as calculated from the following:    Height as of 8/21/17: 1.727 m (5' 8\").    Weight as of this encounter: 186 kg (410 lb).   Last 3 BP:   BP Readings from Last 3 Encounters:   08/31/17 112/62   08/21/17 124/73   07/17/17 106/62       History   Smoking Status     Former Smoker     Packs/day: 0.50     Years: 1.00     Types: Cigarettes, Cigars     Quit date: 5/21/2012   Smokeless Tobacco     Never Used       Labs:  Lab Results   Component Value Date    A1C 5.6 07/17/2017     Lab Results   Component Value Date    GLC 91 07/17/2017     Lab Results   Component Value Date    LDL 59 11/29/2016     HDL Cholesterol   Date Value Ref Range Status   11/29/2016 35 (L) >39 mg/dL Final   ]  GFR Estimate   Date Value Ref Range Status   07/17/2017 >90  Non  GFR Calc   >60 mL/min/1.7m2 Final     GFR Estimate If Black   Date Value Ref Range Status   07/17/2017 >90   GFR Calc   >60 mL/min/1.7m2 Final     Lab Results   Component Value Date    CR 0.85 07/17/2017     No results found for: MICROALBUMIN    Socio/Economic Considerations:    Support system: family    Health Beliefs and Attitudes:   Patient Activation Measure Survey Score:  MARY Score (Last Two) 1/25/2011   MARY Raw Score 36   Activation Score 47.4   MARY Level 2       Stage of Change: ACTION (Actively working towards change)      Diabetes knowledge and skills assessment:     Patient is knowledgeable in diabetes management concepts related to: Healthy Eating, Being Active, Monitoring and Taking Medication    Patient needs further education on the following diabetes management concepts: Healthy Eating and Being Active    Barriers to Learning Assessment: No Barriers identified    Based on " learning assessment above, most appropriate setting for further diabetes education would be: Individual setting.    INTERVENTION:   He had a set back with his trip to [place] for cards, wt up 5 lbs  He is working on dropping that back down was 406 now 410  He is still working on avoiding dry carbs, trying to have vegetables with meals  He has a three month membership to the gym he bought on a silent auction so my biggest goal is to go and get signed up and start going.  Education provided today on:  AADE Self-Care Behaviors:  Healthy Eating: consistency in amount, composition, and timing of food intake  Being Active: relationship to blood glucose and describe appropriate activity program    Opportunities for ongoing education and support in diabetes-self management were discussed.    Pt verbalized understanding of concepts discussed and recommendations provided today.       Education Materials Provided:  No new materials provided today    PLAN:  See Patient Instructions for co-developed, patient-stated behavior change goals.  AVS printed and provided to patient today.    FOLLOW-UP:  Follow-up appointment scheduled in jan.    Ongoing plan for education and support: Follow-up visit with diabetes educator in two months      Time Spent: 30 minutes  Encounter Type: Individual    Any diabetes medication dose changes were made via the CDE Protocol and Collaborative Practice Agreement with the patient's primary care provider. A copy of this encounter was shared with the provider.

## 2017-11-16 DIAGNOSIS — F15.11 METHAMPHETAMINE ABUSE IN REMISSION (H): Primary | ICD-10-CM

## 2017-11-16 RX ORDER — METFORMIN HCL 500 MG
TABLET, EXTENDED RELEASE 24 HR ORAL
Qty: 90 TABLET | Refills: 3 | Status: SHIPPED | OUTPATIENT
Start: 2017-11-16 | End: 2018-11-26

## 2017-11-20 DIAGNOSIS — G47.33 OBSTRUCTIVE SLEEP APNEA-HYPOPNEA SYNDROME: ICD-10-CM

## 2017-11-21 ENCOUNTER — MYC MEDICAL ADVICE (OUTPATIENT)
Dept: SLEEP MEDICINE | Facility: CLINIC | Age: 42
End: 2017-11-21

## 2017-11-21 RX ORDER — MODAFINIL 200 MG/1
TABLET ORAL
Qty: 60 TABLET | Refills: 1 | Status: SHIPPED | OUTPATIENT
Start: 2017-11-21 | End: 2018-01-29

## 2017-11-21 NOTE — TELEPHONE ENCOUNTER
Chief Complaint   Patient presents with     Refill Request     Modafinil        Pending Prescriptions:                       Disp   Refills    modafinil (PROVIGIL) 200 MG tablet        60 tab*1            Sig: Take 1/2 tablet by mouth 3-4 times daily as           needed for sleepiness.         Last Written Prescription Date:  05/16/2017  Last Fill Quantity: 60 per 30 days,   # refills: 5  Last Office Visit with prescribing provider: 10/13/2016  Future Office visit:       Routing refill request to provider for review/approval because:  Drug not on the [facility] refill protocol or controlled substance

## 2017-11-24 ENCOUNTER — TELEPHONE (OUTPATIENT)
Dept: SLEEP MEDICINE | Facility: CLINIC | Age: 42
End: 2017-11-24

## 2017-11-24 NOTE — TELEPHONE ENCOUNTER
Patient is calling for a refill on modafinil (PROVIGIL) 200 MG tablet. Please follow up with patient

## 2017-11-27 NOTE — TELEPHONE ENCOUNTER
A prescription was faxed to [facility], [place] - [address] [place] [address] on 11/21/2017. Refugio has an appointment scheduled with the physician on 12/21/2017. Will reach out to patient for clarification.     Refugio informed of message via telephone call.

## 2017-11-30 DIAGNOSIS — E66.01 MORBID OBESITY DUE TO EXCESS CALORIES (H): ICD-10-CM

## 2017-12-04 ENCOUNTER — OFFICE VISIT (OUTPATIENT)
Dept: ENDOCRINOLOGY | Facility: CLINIC | Age: 42
End: 2017-12-04

## 2017-12-04 VITALS
BODY MASS INDEX: 47.74 KG/M2 | HEIGHT: 68 IN | HEART RATE: 86 BPM | DIASTOLIC BLOOD PRESSURE: 82 MMHG | OXYGEN SATURATION: 100 % | WEIGHT: 315 LBS | SYSTOLIC BLOOD PRESSURE: 133 MMHG

## 2017-12-04 DIAGNOSIS — E66.01 MORBID OBESITY (H): Primary | ICD-10-CM

## 2017-12-04 RX ORDER — TOPIRAMATE 50 MG/1
TABLET, FILM COATED ORAL
Qty: 180 TABLET | Refills: 5 | Status: SHIPPED | OUTPATIENT
Start: 2017-12-04 | End: 2018-04-16

## 2017-12-04 ASSESSMENT — ENCOUNTER SYMPTOMS
JOINT SWELLING: 1
STIFFNESS: 1
POOR WOUND HEALING: 0
MUSCLE CRAMPS: 0
SKIN CHANGES: 0
NECK PAIN: 0
ARTHRALGIAS: 1
MYALGIAS: 1
MUSCLE WEAKNESS: 0
NAIL CHANGES: 0
BACK PAIN: 0

## 2017-12-04 ASSESSMENT — PAIN SCALES - GENERAL: PAINLEVEL: NO PAIN (0)

## 2017-12-04 NOTE — PROGRESS NOTES
"      Return Medical Weight Management Note     Refugio Kennedy  MRN:  [id number]  :  1975  CRISTI:  2017    Dear Dr. Espino,    We had the pleasure of seeing your patient Refugio Kennedy.  He is a 41 year old male who we are continuing to see for treatment of obesity related to: DMII, HLD, sleep apnea on CPAP daily, and hypoventilation syndrome, oxygen dependent.     CURRENT WEIGHT:   411 lbs 9.6 oz    Wt Readings from Last 4 Encounters:   17 (!) 186.7 kg (411 lb 9.6 oz)   17 (!) 186 kg (410 lb)   10/20/17 (!) 186.4 kg (411 lb)   10/02/17 (!) 184.2 kg (406 lb)     Height:  5' 8\"  Body Mass Index:  Body mass index is 62.58 kg/(m^2).  Vitals:  B/P: 138/83, P: 80    Initial consult weight was 454 on 5/15/2015.  Weight change since last seen on 2017 is up 1 pounds.   Total loss is 43 pounds.    INTERVAL HISTORY:  Tolerating topiramate 150 mg BID. Now  exercise. He is not now trying to change his snacking to vegetables and get more vegetables overall.    Diet and Activity Changes Since Last Visit Reviewed With Patient 2017   I have made the following changes to my diet since my last visit: slowy adding more veggies to meals and snacks and i joined a gym last friday   With regards to my diet, I am still struggling with: increasing portion size of veggies and greens on plate and also number times weekly that happens   For breakfast, I typically eat: coffee toast scrambled eggs   For lunch, I typically eat: sandwich with glass of milk try to sub salad or veggies-work in progress   For supper, I typically eat: tacos,mac n chz,burgers and try include veggies 3+xweek   For snack(s), I typically eat: yougurt,beef stick   I have made the following changes to my activity/exercise since my last visit: joined gym last friday   With regards to my activity/exercise, I am still struggling with: now that i joined making myself accountable and go 2-3x week        MEDICATIONS:   Current Outpatient Prescriptions "   Medication     modafinil (PROVIGIL) 200 MG tablet     metFORMIN (GLUCOPHAGE-XR) 500 MG 24 hr tablet     lisinopril (PRINIVIL/ZESTRIL) 5 MG tablet     ACCU-CHEK CHRIS PLUS test strip     simvastatin (ZOCOR) 10 MG tablet     albuterol (PROAIR HFA) 108 (90 BASE) MCG/ACT Inhaler     azithromycin (ZITHROMAX) 250 MG tablet     VICTOZA PEN 18 MG/3ML soln     BD SEVERO U/F 32G X 4 MM insulin pen needle     furosemide (LASIX) 20 MG tablet     topiramate (TOPAMAX) 50 MG tablet     cetirizine (ZYRTEC) 10 MG tablet     ONE TOUCH LANCETS MISC     metFORMIN modified (GLUMETZA) 500 MG 24 hr tablet     order for DME     triamcinolone (KENALOG) 0.1 % cream     ORDER FOR DME     aspirin 81 MG tablet     DIABETIC STERILE LANCETS device     Multiple Vitamin (MULTIVITAMIN OR)     No current facility-administered medications for this visit.        Weight Loss Medication History Reviewed With Patient 12/4/2017   Which weight loss medications are you currently taking on a regular basis?  Topamax (topiramate)   Are you having any side effects from the weight loss medication that we have prescribed you? No   If you are having side effects please describe: -     ASSESSMENT:   Maintain topiramate 150 mg BID. Reinforce food plan - focus on no between meal snacking, aggressive lowering of starches and cheese.     FOLLOW-UP:    3 months   10/15 minutes spent on counseling and education    Sincerely,

## 2017-12-04 NOTE — MR AVS SNAPSHOT
After Visit Summary   12/4/2017    Refugio Kennedy    MRN: [id number]           Patient Information     Date Of Birth          1975        Visit Information        Provider Department      12/4/2017 10:30 AM Ashish Paez MD [facility] Medical Weight Management        Today's Diagnoses     Morbid obesity (H)    -  1       Follow-ups after your visit        Follow-up notes from your care team     Return in about 3 months (around 3/4/2018).      Your next 10 appointments already scheduled     Dec 05, 2017  1:00 PM CST   (Arrive by 12:45 PM)   Return Visit with Taty Fowler, PhD   [facility] ([facility])    [address] [place] [id number] [phone]            Dec 19, 2017  2:00 PM CST   SHORT with Erika Espino MD   [facility] ([facility])    [address] [place] [id number]   [phone]            Dec 21, 2017  9:30 AM CST   Return Sleep Patient with Bhaskar Johnson MD   [facility] ([facility])    [address] [facility] [address]   [phone]            Jan 08, 2018  9:00 AM CST   Diabetic Education with Violet Bowen RD   [facility] ([facility])    [address] [place] [address]   [phone]            Apr 16, 2018  8:00 AM CDT   (Arrive by 7:45 AM)   Return Visit with MD DOROTHEA Manrique [facility] Management ([facility])    [address] [place] [id number] [phone]              Who to contact     Please call your clinic at [phone] to:    Ask questions about your health    Make or cancel appointments    Discuss your medicines    Learn about your test results    Speak to your doctor   If you have compliments or concerns about an experience at your clinic, or if  "you wish to file a complaint, please contact [facility] Patient Relations at [phone] or email us at [email or web address]         Additional Information About Your Visit        [email or web address] Information     [employer] gives you secure access to your electronic health record. If you see a primary care provider, you can also send messages to your care team and make appointments. If you have questions, please call your primary care clinic.  If you do not have a primary care provider, please call [phone] and they will assist you.      [employer] is an electronic gateway that provides easy, online access to your medical records. With [employer], you can request a clinic appointment, read your test results, renew a prescription or communicate with your care team.     To access your existing account, please contact your [facility] or call [phone] for assistance.        Care EveryWhere ID     This is your Care EveryWhere ID. This could be used by other organizations to access your [facility] medical records  [id number]        Your Vitals Were     Pulse Height Pulse Oximetry BMI (Body Mass Index)          86 1.727 m (5' 8\") 100% 62.58 kg/m2         Blood Pressure from Last 3 Encounters:   12/04/17 133/82   08/31/17 112/62   08/21/17 124/73    Weight from Last 3 Encounters:   12/04/17 (!) 186.7 kg (411 lb 9.6 oz)   11/13/17 (!) 186 kg (410 lb)   10/20/17 (!) 186.4 kg (411 lb)              Today, you had the following     No orders found for display       Primary Care Provider Office Phone # Fax #    Erika Espino [phone] [address] [place], [place] [address]        Equal Access to Services     RICARDO HALL : Barb Lin, torie wills, gamaliel kaalmada manjit, consuelo winter. So [facility] [phone].    ATENCIÓN: Si habla español, tiene a clinton disposición servicios gratuitos de " vlada lingüística. Shukri al [phone].    We comply with applicable federal civil rights laws and [place] laws. We do not discriminate on the basis of race, color, national origin, age, disability, sex, sexual orientation, or gender identity.            Thank you!     Thank you for choosing [facility]ICAL [facility]NAG[facility]  for your care. Our goal is always to provide you with excellent care. Hearing back from our patients is one way we can continue to improve our services. Please take a few minutes to complete the written survey that you may receive in the mail after your visit with us. Thank you!             Your Updated Medication List - Protect others around you: Learn how to safely use, store and throw away your medicines at www.disposemymeds.org.          This list is accurate as of: 12/4/17 11:37 AM.  Always use your most recent med list.                   Brand Name Dispense Instructions for use Diagnosis    ACCU-CHEK CHRIS PLUS test strip   Generic drug:  blood glucose monitoring     200 each    USE TO TEST BLOOD SUGARS THREE TIMES A DAY AS DIRECTED    Diabetes mellitus without complication (H)       albuterol 108 (90 BASE) MCG/ACT Inhaler    PROAIR HFA    1 Inhaler    Inhale 2 puffs into the lungs every 4 hours as needed for shortness of breath / dyspnea    Mild intermittent asthma without complication       aspirin 81 MG tablet     90 tablet    Take 1 tablet by mouth daily.    Type 2 diabetes, HbA1C goal < 8% (H)       azithromycin 250 MG tablet    ZITHROMAX    6 tablet    Two tablets first day, then one tablet daily for four days.    Acute bronchitis with symptoms greater than 10 days       BD SEVERO U/F 32G X 4 MM   Generic drug:  insulin pen needle     100 each    USE ONE DAILY OR AS DIRECTED    Type 2 diabetes mellitus without complications (H)       cetirizine 10 MG tablet    zyrTEC    90 tablet    Take 1 tablet (10 mg) by mouth every evening    Allergic rhinitis due to pollen       *  DIABETIC STERILE LANCETS device     1 Box    1 Device 3 times daily.    Type 2 diabetes, HbA1C goal < 8% (H)       * ONETOUCH LANCETS Misc     270 each    1 lancet 3 times daily    Type 2 diabetes, HbA1C goal < 8% (H)       furosemide 20 MG tablet    LASIX    360 tablet    TAKE TWO TABLETS BY MOUTH TWICE A DAY    Venous stasis       lisinopril 5 MG tablet    PRINIVIL/ZESTRIL    90 tablet    TAKE ONE TABLET BY MOUTH EVERY DAY    Pulmonary hypertension       * metFORMIN modified 500 MG 24 hr tablet    GLUMETZA    90 tablet    Take 1 tablet (500 mg) by mouth daily (with breakfast)    Type 2 diabetes mellitus without complication, without long-term current use of insulin (H)       * metFORMIN 500 MG 24 hr tablet    GLUCOPHAGE-XR    90 tablet    TAKE ONE TABLET BY MOUTH EVERY DAY WITH BREAKFAST (NEED TO KEEP APPOINTMENT WITH DR. SANCHES END OF NOVEMBER)    Methamphetamine abuse in remission       modafinil 200 MG tablet    PROVIGIL    60 tablet    Take 1/2 tablet by mouth 3-4 times daily as needed for sleepiness.    Obstructive sleep apnea-hypopnea syndrome       MULTIVITAMIN PO      Take 1 tablet by mouth daily.        order for DME      Equipment being ordered: BIPAP Refugio P Kennedy received a [employer] AirCurve 10 Bilevel. Pressures were set at 23/14 cm H2O.        order for DME      Auto-BiPAP: IPAP max 21 cm H2O EPAP min 15 cm H2O Pressure support 5 cm Changed in clinic Lifetime need and heated humidity.    FARZANA (obstructive sleep apnea)       simvastatin 10 MG tablet    ZOCOR    90 tablet    TAKE ONE TABLET BY MOUTH AT BEDTIME    Hyperlipidemia LDL goal <100       topiramate 50 MG tablet    TOPAMAX    180 tablet    Take 3 tablets (150 mg) by mouth 2 times daily    Morbid obesity due to excess calories (H)       triamcinolone 0.1 % cream    KENALOG    30 g    Apply sparingly to affected area two times daily as needed    Venous stasis       VICTOZA PEN 18 MG/3ML soln   Generic drug:  liraglutide     9 mL    INJECT 1.8MG UNDER  THE SKIN ONCE DAILY    Type 2 diabetes mellitus without complications (H)       * Notice:  This list has 4 medication(s) that are the same as other medications prescribed for you. Read the directions carefully, and ask your doctor or other care provider to review them with you.

## 2017-12-04 NOTE — NURSING NOTE
"(   Chief Complaint   Patient presents with     RECHECK     weight loss f/u     )    ( Weight: (!) 411 lb 9.6 oz )  ( Height: 5' 8\" )  ( BMI (Calculated): 62.71 )  (   )  (   )  (   )  (   )  (   )  (   )    ( BP: 133/82 )  (   )  (   )  (   )  ( Pulse: 86 )  (   )  ( SpO2: 100 % )    (   Patient Active Problem List   Diagnosis     FARZANA (obstructive sleep apnea)/Hypoventilation Syndrome- Severe     Morbid obesity (H)     Chronic respiratory failure (H)     Hyperlipidemia LDL goal <100     Venous stasis     Pulmonary hypertension     Mild intermittent asthma without complication     Type 2 diabetes mellitus without complication, without long-term current use of insulin (H)     Methamphetamine abuse in remission    )  (   Current Outpatient Prescriptions   Medication Sig Dispense Refill     modafinil (PROVIGIL) 200 MG tablet Take 1/2 tablet by mouth 3-4 times daily as needed for sleepiness. 60 tablet 1     metFORMIN (GLUCOPHAGE-XR) 500 MG 24 hr tablet TAKE ONE TABLET BY MOUTH EVERY DAY WITH BREAKFAST (NEED TO KEEP APPOINTMENT WITH DR. SANCHES END OF NOVEMBER) 90 tablet 3     lisinopril (PRINIVIL/ZESTRIL) 5 MG tablet TAKE ONE TABLET BY MOUTH EVERY DAY 90 tablet 1     ACCU-CHEK CHRIS PLUS test strip USE TO TEST BLOOD SUGARS THREE TIMES A DAY AS DIRECTED 200 each 0     simvastatin (ZOCOR) 10 MG tablet TAKE ONE TABLET BY MOUTH AT BEDTIME 90 tablet 0     albuterol (PROAIR HFA) 108 (90 BASE) MCG/ACT Inhaler Inhale 2 puffs into the lungs every 4 hours as needed for shortness of breath / dyspnea 1 Inhaler 5     azithromycin (ZITHROMAX) 250 MG tablet Two tablets first day, then one tablet daily for four days. 6 tablet 0     VICTOZA PEN 18 MG/3ML soln INJECT 1.8MG UNDER THE SKIN ONCE DAILY 9 mL 3     BD SEVERO U/F 32G X 4 MM insulin pen needle USE ONE DAILY OR AS DIRECTED 100 each 3     furosemide (LASIX) 20 MG tablet TAKE TWO TABLETS BY MOUTH TWICE A  tablet 1     topiramate (TOPAMAX) 50 MG tablet Take 3 tablets (150 mg) by " mouth 2 times daily 180 tablet 5     cetirizine (ZYRTEC) 10 MG tablet Take 1 tablet (10 mg) by mouth every evening 90 tablet 3     ONE TOUCH LANCETS MISC 1 lancet 3 times daily 270 each 2     metFORMIN modified (GLUMETZA) 500 MG 24 hr tablet Take 1 tablet (500 mg) by mouth daily (with breakfast) 90 tablet 3     order for DME Equipment being ordered: BIPAP Refugio P Kennedy received a [employer] AirCurve 10 Bilevel. Pressures were set at 23/14 cm H2O.       triamcinolone (KENALOG) 0.1 % cream Apply sparingly to affected area two times daily as needed 30 g 2     ORDER FOR DME Auto-BiPAP:  IPAP max 21 cm H2O  EPAP min 15 cm H2O  Pressure support 5 cm  Changed in clinic  Lifetime need and heated humidity.           aspirin 81 MG tablet Take 1 tablet by mouth daily. 90 tablet 3     DIABETIC STERILE LANCETS device 1 Device 3 times daily. 1 Box 12     Multiple Vitamin (MULTIVITAMIN OR) Take 1 tablet by mouth daily.      )  ( Diabetes Eval:    )    ( Pain Eval:  No Pain (0) )    ( Wound Eval:       )    (   History   Smoking Status     Former Smoker     Packs/day: 0.50     Years: 1.00     Types: Cigarettes, Cigars     Quit date: 5/21/2012   Smokeless Tobacco     Never Used    )    ( Signed By:  Steve Baires; December 4, 2017; 10:53 AM )

## 2017-12-06 NOTE — PROGRESS NOTES
Outpatient Physical Therapy Discharge Note     Patient: Refugio JACKSON Kenndey  : 1975    Beginning/End Dates of Reporting Period:  17 to 2017    Referring Provider: Dr. Espino    Therapy Diagnosis: Chronic LBP w SI joint dysfunction     Client Self Report: Pt relates overall he is doing better howver his biggest c/o is pain with sitting. He relates doing about 50% of HEP.  Pt relates he feels weaker on L compared to R    Objective Measurements:  Objective Measure: ALINA     Objective Measure: lumbar ROM  Details: flex: 8 in from floor     Objective Measure: hip abd     Objective Measure: hip scour: neg on L, Post on R         Goals:Goal Identifier lumbar ROM   Goal Description Pt will be able to demonstrate full lumbar ROM in order to be able to  object off of the ground without pain or radicular symptoms   Target Date 17   Date Met      Progress:not met see above     Goal Identifier sitting   Goal Description Pt will be able to sit for 45+ min with less than 1/10 back pain in order to be able to sit and eat dinner with family   Target Date 17   Date Met      Progress:not met     Goal Identifier walking    Goal Description Pt will be able to walk/stand 60 min with less than 1/10 pain/fatigue in order to be able to work at [employer]   Target Date 17   Date Met      Progress:not assessed at last visit     Goal Identifier ALINA   Goal Description Pt will report <15% disability on ALINA to demonstrate improved ability to complete daily activities and demonstrate significant clinical improvement.    Target Date 17   Date Met      Progress:not assessed at last visit        Progress Toward Goals:   Progress this reporting period:Pt was seen for 4 visits in physical therapy over this POC. Objective measures are all taken from last visit. At time of last visit focused on MT due to pain and we were planning on increasing strength at next visit however  pt has failed to schedule f/u visits  within 30 days from last visit as instructed thus is being d/c from therapy at this time. Current status is unknown at this time.        Plan:  Discharge from therapy.    Discharge:    Reason for Discharge: Patient has failed to schedule further appointments.    Equipment Issued: NA    Discharge Plan: Patient to continue home program.    Violet Royal  Physical Therapist  [facility]  [address] [place] [id number]  [email or web address]   [email or web address]   Office: [phone] Fax: [phone]

## 2017-12-16 DIAGNOSIS — E11.9 TYPE 2 DIABETES MELLITUS WITHOUT COMPLICATIONS (H): ICD-10-CM

## 2017-12-18 RX ORDER — LIRAGLUTIDE 6 MG/ML
INJECTION SUBCUTANEOUS
Qty: 9 ML | Refills: 3 | Status: SHIPPED | OUTPATIENT
Start: 2017-12-18 | End: 2018-04-15

## 2017-12-19 ENCOUNTER — OFFICE VISIT (OUTPATIENT)
Dept: FAMILY MEDICINE | Facility: CLINIC | Age: 42
End: 2017-12-19
Payer: COMMERCIAL

## 2017-12-19 VITALS
HEART RATE: 96 BPM | WEIGHT: 315 LBS | TEMPERATURE: 99.4 F | DIASTOLIC BLOOD PRESSURE: 56 MMHG | BODY MASS INDEX: 61.96 KG/M2 | SYSTOLIC BLOOD PRESSURE: 102 MMHG

## 2017-12-19 DIAGNOSIS — E78.5 HYPERLIPIDEMIA LDL GOAL <100: ICD-10-CM

## 2017-12-19 DIAGNOSIS — E66.01 MORBID OBESITY (H): ICD-10-CM

## 2017-12-19 DIAGNOSIS — Z23 NEED FOR PROPHYLACTIC VACCINATION AND INOCULATION AGAINST INFLUENZA: ICD-10-CM

## 2017-12-19 DIAGNOSIS — I87.8 VENOUS STASIS: ICD-10-CM

## 2017-12-19 DIAGNOSIS — Z13.89 SCREENING FOR DIABETIC PERIPHERAL NEUROPATHY: ICD-10-CM

## 2017-12-19 DIAGNOSIS — E11.9 TYPE 2 DIABETES MELLITUS WITHOUT COMPLICATION, WITHOUT LONG-TERM CURRENT USE OF INSULIN (H): Primary | ICD-10-CM

## 2017-12-19 LAB — HBA1C MFR BLD: 5.7 % (ref 4.3–6)

## 2017-12-19 PROCEDURE — 80061 LIPID PANEL: CPT | Performed by: FAMILY MEDICINE

## 2017-12-19 PROCEDURE — 36415 COLL VENOUS BLD VENIPUNCTURE: CPT | Performed by: FAMILY MEDICINE

## 2017-12-19 PROCEDURE — 83036 HEMOGLOBIN GLYCOSYLATED A1C: CPT | Performed by: FAMILY MEDICINE

## 2017-12-19 PROCEDURE — 99207 C FOOT EXAM  NO CHARGE: CPT | Performed by: FAMILY MEDICINE

## 2017-12-19 PROCEDURE — 90686 IIV4 VACC NO PRSV 0.5 ML IM: CPT | Performed by: FAMILY MEDICINE

## 2017-12-19 PROCEDURE — 99214 OFFICE O/P EST MOD 30 MIN: CPT | Mod: 25 | Performed by: FAMILY MEDICINE

## 2017-12-19 PROCEDURE — 80048 BASIC METABOLIC PNL TOTAL CA: CPT | Performed by: FAMILY MEDICINE

## 2017-12-19 PROCEDURE — 90471 IMMUNIZATION ADMIN: CPT | Performed by: FAMILY MEDICINE

## 2017-12-19 RX ORDER — TRIAMCINOLONE ACETONIDE 1 MG/G
CREAM TOPICAL
Qty: 30 G | Refills: 2 | Status: SHIPPED | OUTPATIENT
Start: 2017-12-19 | End: 2021-09-20

## 2017-12-19 NOTE — NURSING NOTE
"Chief Complaint   Patient presents with     Diabetes       Initial /56 (BP Location: Right arm, Patient Position: Chair, Cuff Size: Adult Large)  Pulse 96  Temp 99.4  F (37.4  C) (Tympanic)  Wt (!) 407 lb 8 oz (184.8 kg)  BMI 61.96 kg/m2 Estimated body mass index is 61.96 kg/(m^2) as calculated from the following:    Height as of 12/4/17: 5' 8\" (1.727 m).    Weight as of this encounter: 407 lb 8 oz (184.8 kg).  Medication Reconciliation: complete    Health Maintenance that is potentially due pending provider review:  ACT    Possibly completing today per provider review.    Is there anyone who you would like to be able to receive your results? No  If yes have patient fill out SVEN    "

## 2017-12-19 NOTE — PROGRESS NOTES

## 2017-12-19 NOTE — PROGRESS NOTES
SUBJECTIVE:   Refugio Kennedy is a 42 year old male who presents to clinic today for the following health issues:      Diabetes Follow-up    Patient is checking blood sugars: once daily.  Results are as follows:       am - 140        Diabetic concerns: None     Symptoms of hypoglycemia (low blood sugar): none     Paresthesias (numbness or burning in feet) or sores: Yes right foot itches, derm issues left lower leg     Date of last diabetic eye exam: up to date  BP Readings from Last 2 Encounters:   12/19/17 102/56   12/04/17 133/82     Hemoglobin A1C (%)   Date Value   12/19/2017 5.7   07/17/2017 5.6     LDL Cholesterol Calculated (mg/dL)   Date Value   11/29/2016 59   09/29/2015 63         Amount of exercise or physical activity: 4-5 days/week for an average of 15-30 minutes    Problems taking medications regularly: No    Medication side effects: none    Diet: carbohydrate counting        Hyperlipidemia Follow-Up      Rate your low fat/cholesterol diet?: good    Taking statin?  Yes, no muscle aches from statin    Other lipid medications/supplements?:  none    Obesity   Working with weight loss clinic and has lost 20 lbs. He is now exercising and he is watching portions     Venous stasis   No edema  Doing well         Problem list and histories reviewed & adjusted, as indicated.  Additional history: as documented    Labs reviewed in EPIC    Reviewed and updated as needed this visit by clinical staffTobacco  Allergies  Meds  Problems  Med Hx  Surg Hx  Fam Hx  Soc Hx        Reviewed and updated as needed this visit by Provider  Allergies  Meds  Problems         ROS:  Constitutional, HEENT, cardiovascular, pulmonary, gi and gu systems are negative, except as otherwise noted.      OBJECTIVE:                                                    /56 (BP Location: Right arm, Patient Position: Chair, Cuff Size: Adult Large)  Pulse 96  Temp 99.4  F (37.4  C) (Tympanic)  Wt (!) 407 lb 8 oz (184.8 kg)  BMI  61.96 kg/m2  Body mass index is 61.96 kg/(m^2).  GENERAL APPEARANCE: healthy, alert and no distress  RESP: lungs clear to auscultation - no rales, rhonchi or wheezes  CV: regular rates and rhythm, normal S1 S2, no S3 or S4 and no murmur, click or rub  ABDOMEN: soft, nontender, without hepatosplenomegaly or masses and bowel sounds normal  MS: varicose veins bilaterally and some venous stasis hyperpig   DIABETIC FOOT EXAM: normal DP and PT pulses, no trophic changes or ulcerative lesions and normal sensory exam  PSYCH: mentation appears normal and affect normal/bright         ASSESSMENT/PLAN:                                                    1. Type 2 diabetes mellitus without complication, without long-term current use of insulin (H)  Stable no change in treatment plan.   - Basic metabolic panel  - Hemoglobin A1c    2. Screening for diabetic peripheral neuropathy    - FOOT EXAM  NO CHARGE [[id number]]    3. Venous stasis  Stable no change in treatment plan.   - triamcinolone (KENALOG) 0.1 % cream; Apply sparingly to affected area two times daily as needed  Dispense: 30 g; Refill: 2    4. Hyperlipidemia LDL goal <100  Stable no change in treatment plan.   - Lipid panel reflex to direct LDL Fasting    5. Morbid obesity (H)  Needs to really make a dent in weight loss and he is aware and motivated     6. Need for prophylactic vaccination and inoculation against influenza    - FLU VAC, SPLIT VIRUS IM > 3 YO (QUADRIVALENT) [37317]  - Vaccine Administration, Initial [39408]      Patient Instructions   Stay on the same meds     Flu shot today     Labs today     See me back in 6 months   Risks, benefits, side effects and rationale for treatment plan fully discussed with the patient and understanding expressed.     Erika Espino MD  [facility]

## 2017-12-19 NOTE — MR AVS SNAPSHOT
After Visit Summary   12/19/2017    Refugio Kennedy    MRN: [id number]           Patient Information     Date Of Birth          1975        Visit Information        Provider Department      12/19/2017 2:00 PM Erika Espino MD [facility]        Today's Diagnoses     Type 2 diabetes mellitus without complication, without long-term current use of insulin (H)    -  1    Screening for diabetic peripheral neuropathy        Venous stasis        Hyperlipidemia LDL goal <100          Care Instructions    Stay on the same meds     Flu shot today     Labs today     See me back in 6 months           Follow-ups after your visit        Your next 10 appointments already scheduled     Dec 21, 2017  9:30 AM CST   Return Sleep Patient with Bhaskar Johnson MD   [facility] ([facility])    [address]  [facility] [address]   [phone]            Jan 08, 2018  9:00 AM CST   Diabetic Education with Violet Bowen RD   [facility] ([facility])    [address] [place] [id number]   [phone]            Jan 29, 2018  2:00 PM CST   (Arrive by 1:45 PM)   Return Visit with Taty Fowler, PhD   [facility] ([facility])    [address] [place] [id number] [phone]            Apr 16, 2018  8:00 AM CDT   (Arrive by 7:45 AM)   Return Visit with Ashish Paez MD   [facility] ([facility])    [address] [place] [id number] [phone]              Who to contact     If you have questions or need follow up information about today's clinic visit or your schedule please contact [facility] directly at [phone].  Normal or non-critical lab and imaging results will be communicated to you by MyChart, letter or phone  within 4 business days after the clinic has received the results. If you do not hear from us within 7 days, please contact the clinic through [employer] or phone. If you have a critical or abnormal lab result, we will notify you by phone as soon as possible.  Submit refill requests through [employer] or call your pharmacy and they will forward the refill request to us. Please allow 3 business days for your refill to be completed.          Additional Information About Your Visit        [employer] Information     [employer] gives you secure access to your electronic health record. If you see a primary care provider, you can also send messages to your care team and make appointments. If you have questions, please call your primary care clinic.  If you do not have a primary care provider, please call [phone] and they will assist you.        Care EveryWhere ID     This is your Care EveryWhere ID. This could be used by other organizations to access your [facility] medical records  [id number]        Your Vitals Were     Pulse Temperature BMI (Body Mass Index)             96 99.4  F (37.4  C) (Tympanic) 61.96 kg/m2          Blood Pressure from Last 3 Encounters:   12/19/17 102/56   12/04/17 133/82   08/31/17 112/62    Weight from Last 3 Encounters:   12/19/17 (!) 407 lb 8 oz (184.8 kg)   12/04/17 (!) 411 lb 9.6 oz (186.7 kg)   11/13/17 (!) 410 lb (186 kg)              We Performed the Following     Basic metabolic panel     FOOT EXAM  NO CHARGE [[id number]]     Hemoglobin A1c     Lipid panel reflex to direct LDL Fasting          Where to get your medicines      These medications were sent to [facility] [place] [id number]     Phone:  [phone]     triamcinolone 0.1 % cream          Primary Care Provider Office Phone # Fax #    Erika Espino [phone]       [address] [place] [address]        Equal Access to  Services     [facility]: Hadii rickie douglas ham Sorachelali, waaxda luqadaha, qaybta kaalmada careykaridusty, consuelo rider . So [facility] [phone].    ATENCIÓN: Si habla ladonna, tiene a clinton disposición servicios gratuitos de asistencia lingüística. Llame al [phone].    We comply with applicable federal civil rights laws and [place] laws. We do not discriminate on the basis of race, color, national origin, age, disability, sex, sexual orientation, or gender identity.            Thank you!     Thank you for choosing [facility]  for your care. Our goal is always to provide you with excellent care. Hearing back from our patients is one way we can continue to improve our services. Please take a few minutes to complete the written survey that you may receive in the mail after your visit with us. Thank you!             Your Updated Medication List - Protect others around you: Learn how to safely use, store and throw away your medicines at www.disposemymeds.org.          This list is accurate as of: 12/19/17  2:26 PM.  Always use your most recent med list.                   Brand Name Dispense Instructions for use Diagnosis    ACCU-CHEK CHRIS PLUS test strip   Generic drug:  blood glucose monitoring     200 each    USE TO TEST BLOOD SUGARS THREE TIMES A DAY AS DIRECTED    Diabetes mellitus without complication (H)       albuterol 108 (90 BASE) MCG/ACT Inhaler    PROAIR HFA    1 Inhaler    Inhale 2 puffs into the lungs every 4 hours as needed for shortness of breath / dyspnea    Mild intermittent asthma without complication       aspirin 81 MG tablet     90 tablet    Take 1 tablet by mouth daily.    Type 2 diabetes, HbA1C goal < 8% (H)       BD SEVERO U/F 32G X 4 MM   Generic drug:  insulin pen needle     100 each    USE ONE DAILY OR AS DIRECTED    Type 2 diabetes mellitus without complications (H)       cetirizine 10 MG tablet    zyrTEC    90 tablet    Take 1 tablet (10 mg) by  mouth every evening    Allergic rhinitis due to pollen       * DIABETIC STERILE LANCETS device     1 Box    1 Device 3 times daily.    Type 2 diabetes, HbA1C goal < 8% (H)       * ONETOUCH LANCETS Misc     270 each    1 lancet 3 times daily    Type 2 diabetes, HbA1C goal < 8% (H)       furosemide 20 MG tablet    LASIX    360 tablet    TAKE TWO TABLETS BY MOUTH TWICE A DAY    Venous stasis       lisinopril 5 MG tablet    PRINIVIL/ZESTRIL    90 tablet    TAKE ONE TABLET BY MOUTH EVERY DAY    Pulmonary hypertension       * metFORMIN modified 500 MG 24 hr tablet    GLUMETZA    90 tablet    Take 1 tablet (500 mg) by mouth daily (with breakfast)    Type 2 diabetes mellitus without complication, without long-term current use of insulin (H)       * metFORMIN 500 MG 24 hr tablet    GLUCOPHAGE-XR    90 tablet    TAKE ONE TABLET BY MOUTH EVERY DAY WITH BREAKFAST (NEED TO KEEP APPOINTMENT WITH DR. SANCHES END OF NOVEMBER)    Methamphetamine abuse in remission       modafinil 200 MG tablet    PROVIGIL    60 tablet    Take 1/2 tablet by mouth 3-4 times daily as needed for sleepiness.    Obstructive sleep apnea-hypopnea syndrome       MULTIVITAMIN PO      Take 1 tablet by mouth daily.        order for DME      Equipment being ordered: BIPAP Refugio P Kennedy received a [employer] AirCurve 10 Bilevel. Pressures were set at 23/14 cm H2O.        order for DME      Auto-BiPAP: IPAP max 21 cm H2O EPAP min 15 cm H2O Pressure support 5 cm Changed in clinic Lifetime need and heated humidity.    FARZANA (obstructive sleep apnea)       simvastatin 10 MG tablet    ZOCOR    90 tablet    TAKE ONE TABLET BY MOUTH AT BEDTIME    Hyperlipidemia LDL goal <100       topiramate 50 MG tablet    TOPAMAX    180 tablet    TAKE 3 TABLETS BY MOUTH TWICE DAILY    Morbid obesity due to excess calories (H)       triamcinolone 0.1 % cream    KENALOG    30 g    Apply sparingly to affected area two times daily as needed    Venous stasis       VICTOZA PEN 18 MG/3ML soln    Generic drug:  liraglutide     9 mL    INJECT 1.8MG UNDER THE SKIN ONCE DAILY    Type 2 diabetes mellitus without complications (H)       * Notice:  This list has 4 medication(s) that are the same as other medications prescribed for you. Read the directions carefully, and ask your doctor or other care provider to review them with you.

## 2017-12-20 LAB
ANION GAP SERPL CALCULATED.3IONS-SCNC: 6 MMOL/L (ref 3–14)
BUN SERPL-MCNC: 21 MG/DL (ref 7–30)
CALCIUM SERPL-MCNC: 9 MG/DL (ref 8.5–10.1)
CHLORIDE SERPL-SCNC: 106 MMOL/L (ref 94–109)
CHOLEST SERPL-MCNC: 126 MG/DL
CO2 SERPL-SCNC: 27 MMOL/L (ref 20–32)
CREAT SERPL-MCNC: 0.94 MG/DL (ref 0.66–1.25)
GFR SERPL CREATININE-BSD FRML MDRD: 87 ML/MIN/1.7M2
GLUCOSE SERPL-MCNC: 85 MG/DL (ref 70–99)
HDLC SERPL-MCNC: 45 MG/DL
LDLC SERPL CALC-MCNC: 58 MG/DL
NONHDLC SERPL-MCNC: 81 MG/DL
POTASSIUM SERPL-SCNC: 3.5 MMOL/L (ref 3.4–5.3)
SODIUM SERPL-SCNC: 139 MMOL/L (ref 133–144)
TRIGL SERPL-MCNC: 115 MG/DL

## 2017-12-20 ASSESSMENT — ASTHMA QUESTIONNAIRES: ACT_TOTALSCORE: 23

## 2017-12-21 ENCOUNTER — OFFICE VISIT (OUTPATIENT)
Dept: SLEEP MEDICINE | Facility: CLINIC | Age: 42
End: 2017-12-21
Payer: COMMERCIAL

## 2017-12-21 VITALS
WEIGHT: 315 LBS | HEART RATE: 79 BPM | OXYGEN SATURATION: 96 % | SYSTOLIC BLOOD PRESSURE: 114 MMHG | BODY MASS INDEX: 47.74 KG/M2 | HEIGHT: 68 IN | DIASTOLIC BLOOD PRESSURE: 76 MMHG

## 2017-12-21 DIAGNOSIS — G47.33 OSA (OBSTRUCTIVE SLEEP APNEA): Primary | ICD-10-CM

## 2017-12-21 PROCEDURE — 99214 OFFICE O/P EST MOD 30 MIN: CPT | Performed by: FAMILY MEDICINE

## 2017-12-21 NOTE — PROGRESS NOTES
Obstructive Sleep Apnea - PAP Follow-Up Visit:    Chief Complaint   Patient presents with     CPAP Follow Up     Yearly follow up for his sleep apnea and medication . Needs orders for supplies       41 yo M with history of severe obesity (BMI > 60), severe FARZANA with profound desaturations, obesity hypoventilation, recurrent history of methamphetamine abuse, DM II, CHF and pulmonary HTN who presents for follow-up of treatment with bilevel PAP.  He has a history of very severe FARZANA with profound oxygen desaturations and hypoventilation. Earliest PSG seen was ~1998 with weight 379 lbs, AHI 34.9, guille desat 58%, elevated TCM but with CPAP 14 felt to be effective. Appears to have been treated with UPPP. Had repeat split-night PSG on 2/7/2011 with weight 420 lbs, AHI 54.9, guille desat 51%, hypoventilation (pCO2 by TCM up to 68 mmHg) with CPAP 18 nor bilevel 20/16 fully effective. Had bilevel titration PSG on 7/6/2011 with weight 420 lbs, bilevel 23/18 and 21/17 appearing effective, included supine REM, and TCM improved to mid-50's from zenith of ~60. Was started on treated with bilevel and not seen in our clinic since that time. Bilevel titration PSG on 6/25/2014 with weight 450 lbs, bilevel titrated to 23/14 cm H2O appeared optimal, included supine REM with control of obstructive events (with minimization of mask leak) and normalization of SpO2.     3/20/2015 - He feels the bilevel continues to work well. Some residual sleepiness and he reports has been previously treated with Modafinil. Last use of amphetamines of 3+ months. Review of bilevel download shows AHI < 5. Continues to work toward hopeful bariatric surgery.  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     3/7/2016 - Returns for routine follow-up.  Overall, doing well.  Some concerns that machine is starting to malfunction, multiple error messages.  Weight is down to 420  "lbs.  His machine does not appear to be recording data currently at this time. Now sober from amphetamines for ~14 months.  He feels the modafinil has been very beneficial, no concerns.  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     4/25/2016 - Returns for compliance visit for new bilevel machine.  Continues to do well, weight is currently at ~420 lbs (zenith of ~470 lbs as of 3/2015) and feels the inspiratory pressure can be too high.  Feels the modafinil continues to work very well.  Verified on prescription monitoring program that no extra rx's.  He continues to use daytime oxygen \"as needed\" at 2 LPM, he is wearing it today.  Plans to pursue an umbilical hernia repair, having significant issues with pain with defecation, able to disimpact manually.  Bilevel download from 3/25/2016 - 4/23/2016 on 23/14 cm H2O spontaneous on room air.  Average daily usage of 5:04, used >= 4 hours on 84% of nights.  Leak 95th%ile of 46.9 L/min.  AHI 9.3, with AI of 9.1 of which centrals are 7.1.  A/P plan to adjust bilevel to 21/14 cm H2O, continue Modafinil 200mg 1/2 tablet TID to QID.     10/13/2016 - Returns for 6 month follow-up.  Reports doing very well with no concerns today, modafinil continues to be effective.  Nearly 2 years sober from methamphetamines, has norman on his phone that helps keep track of this.  Bilevel download from 9/12/2016 - 10/11/2016 on 21/14 cm H2O spontaneous.  Average daily usage of 4:28, used >= 4 hours on 63% of nights.  AHI 1.  A/P to continue bilevel PAP 21/14 spontaneous, increase overall usage, continue modafinil 200mg 1/2 tablet TID to QID.    Today - Returns for annual follow-up.  Reports doing well until ~2 weeks ago with difficulty with heated hose no longer heating.  Modafinil continues to be effective.  Bilevel PAP download from 11/19/2017 - 12/18/2017 on 21/14 cm H2O spontaneous.  Used " on 30/30 days, average daily usage of 3:57, used >= 4 hours on 53% of nights.  AHI 1.  Weight is down ~15 lbs compared to last visit.        Prior Sleep Testing:  ~1998 - Split-night PSG.  Weight 379 lbs, AHI 34.9, guille desat 58%, elevated TCM, CPAP 14 effective  2/7/2011 - Split-night PSG with weight 420 lbs, AHI 54.9, guille desat 51%, hypoventilation (pCO2 by TCM up to 68 mmHg) with CPAP 18 nor bilevel 20/16 fully effective  7/6/2011 - Bilevel titration PSG with weight 420 lbs, bilevel 23/18 and 21/17 appearing effective, included supine REM, and TCM improved to mid-50's from zenith of ~60  6/25/2014 - Bilevel titration PSG with weight 450 lbs, bilevel titrated to 23/14 cm H2O appeared optimal, included supine REM with control of obstructive events (with minimization of mask leak) and normalization of SpO2.      Problem List:  Patient Active Problem List    Diagnosis Date Noted     Methamphetamine abuse in remission 07/17/2017     Priority: Medium     Type 2 diabetes mellitus without complication, without long-term current use of insulin (H) 10/17/2016     Priority: Medium     Mild intermittent asthma without complication 03/24/2015     Priority: Medium     Pulmonary hypertension 06/22/2014     Priority: Medium     Hyperlipidemia LDL goal <100 06/08/2012     Priority: Medium     Venous stasis 06/08/2012     Priority: Medium     Chronic respiratory failure (H) 02/11/2011     Priority: Medium     ABG 2/11- 7.38/51/75 RA       FARZANA (obstructive sleep apnea)/Hypoventilation Syndrome- Severe      Priority: Medium     Sleep Study 1998- AHI 51. Rx 16 cm. S/p UVPP 1998, weight loss. Sleep study 9/10/03 (weight 379)- AHI 34.9. Lo2 58%, with reported hypoventilation.  Sleep study 9/29/03- CPAP 14 cm appeared effective. MSLT- 1.8 minutes, 2 sleep onset REMs, diagnosed with 'narcolepsy', placed on Provigil (though he did not tolerate CPAP on 1st study 9/03).   Sleep study 2/11- AHI 54.9, with desaturations to 51%.  "Transcutaneous CO2 monitoring showed some elevations consistent with hypoventilation. CPAP titration: No fully effective pressure was found. CPAP was titrated to 18 cm with improvement in severe desaturations, but some persistent hypopneas and arousals. Supine REM was noted at this pressure. Brief trial of 20/16 showed persistent events. Efficacy was limited by mask leak throughout most of the study.        Morbid obesity (H)      Priority: Medium     Morbid obesity            /76  Pulse 79  Ht 1.727 m (5' 7.99\")  Wt (!) 184.8 kg (407 lb 8 oz)  SpO2 96%  BMI 61.97 kg/m2    Impression/Plan:    1.) Very severe FARZANA with profound oxygen desaturations, nocturnal hypoventilation and significant obesity hypoventilation   - Appears adequately controlled on bilevel 21/14 cm H2O spontaneous, though borderline compliance.  Discussed and recommended using on a nightly basis and increasing usage time.  - In regards to potential hernia surgery, he appears to be optimally treated at this time.     2.) Excessive daytime sleepiness  - Assumed multi-factorial with severe obesity, potential obstructive sleep apnea hypopnea syndrome  - Complicated by long-standing history of recurrent methamphetamine abuse. Patient reports prior use of Modafinil and was well tolerated.  - Sober from methamphetamines x 22 months, reviewed prescription monitoring program and no concerns.  - Continue Modafinil 200mg 1/2 tablet TID to QID (how dosed previously per patient and worked well)         Refugio JACKSON Kennedy will follow up in about 1 year(s).     Twenty-five minutes spent with patient, all of which were spent face-to-face counseling, consulting, coordinating plan of care.      Bhaskar Johnson MD, MD    CC:  Erika Espino  "

## 2017-12-21 NOTE — MR AVS SNAPSHOT
After Visit Summary   12/21/2017    Refugio Kennedy    MRN: [id number]           Patient Information     Date Of Birth          1975        Visit Information        Provider Department      12/21/2017 9:30 AM Bhaskar Johnson MD [facility]        Today's Diagnoses     FARZANA (obstructive sleep apnea)    -  1      Care Instructions      Your BMI is Body mass index is 61.97 kg/(m^2).  Weight management is a personal decision.  If you are interested in exploring weight loss strategies, the following discussion covers the approaches that may be successful. Body mass index (BMI) is one way to tell whether you are at a healthy weight, overweight, or obese. It measures your weight in relation to your height.  A BMI of 18.5 to 24.9 is in the healthy range. A person with a BMI of 25 to 29.9 is considered overweight, and someone with a BMI of 30 or greater is considered obese. More than two-thirds of American adults are considered overweight or obese.  Being overweight or obese increases the risk for further weight gain. Excess weight may lead to heart disease and diabetes.  Creating and following plans for healthy eating and physical activity may help you improve your health.  Weight control is part of healthy lifestyle and includes exercise, emotional health, and healthy eating habits. Careful eating habits lifelong are the mainstay of weight control. Though there are significant health benefits from weight loss, long-term weight loss with diet alone may be very difficult to achieve- studies show long-term success with dietary management in less than 10% of people. Attaining a healthy weight may be especially difficult to achieve in those with severe obesity. In some cases, medications, devices and surgical management might be considered.  What can you do?  If you are overweight or obese and are interested in methods for weight loss, you should discuss this with your provider.      Consider reducing daily calorie intake by 500 calories.     Keep a food journal.     Avoiding skipping meals, consider cutting portions instead.    Diet combined with exercise helps maintain muscle while optimizing fat loss. Strength training is particularly important for building and maintaining muscle mass. Exercise helps reduce stress, increase energy, and improves fitness. Increasing exercise without diet control, however, may not burn enough calories to loose weight.       Start walking three days a week 10-20 minutes at a time    Work towards walking thirty minutes five days a week     Eventually, increase the speed of your walking for 1-2 minutes at time    In addition, we recommend that you review healthy lifestyles and methods for weight loss available through the National Institutes of Health patient information sites:  http://win.niddk.nih.gov/publications/index.htm    And look into health and wellness programs that may be available through your health insurance provider, employer, local community center, or greg club.    Weight management plan: Patient was referred to their PCP to discuss a diet and exercise plan.      Your Body mass index is 61.97 kg/(m^2).  Weight management is a personal decision.  If you are interested in exploring weight loss strategies, the following discussion covers the approaches that may be successful. Body mass index (BMI) is one way to tell whether you are at a healthy weight, overweight, or obese. It measures your weight in relation to your height.  A BMI of 18.5 to 24.9 is in the healthy range. A person with a BMI of 25 to 29.9 is considered overweight, and someone with a BMI of 30 or greater is considered obese. More than two-thirds of American adults are considered overweight or obese.  Being overweight or obese increases the risk for further weight gain. Excess weight may lead to heart disease and diabetes.  Creating and following plans for healthy eating and  physical activity may help you improve your health.  Weight control is part of healthy lifestyle and includes exercise, emotional health, and healthy eating habits. Careful eating habits lifelong are the mainstay of weight control. Though there are significant health benefits from weight loss, long-term weight loss with diet alone may be very difficult to achieve- studies show long-term success with dietary management in less than 10% of people. Attaining a healthy weight may be especially difficult to achieve in those with severe obesity. In some cases, medications, devices and surgical management might be considered.  What can you do?  If you are overweight or obese and are interested in methods for weight loss, you should discuss this with your provider.     Consider reducing daily calorie intake by 500 calories.     Keep a food journal.     Avoiding skipping meals, consider cutting portions instead.    Diet combined with exercise helps maintain muscle while optimizing fat loss. Strength training is particularly important for building and maintaining muscle mass. Exercise helps reduce stress, increase energy, and improves fitness. Increasing exercise without diet control, however, may not burn enough calories to loose weight.       Start walking three days a week 10-20 minutes at a time    Work towards walking thirty minutes five days a week     Eventually, increase the speed of your walking for 1-2 minutes at time    In addition, we recommend that you review healthy lifestyles and methods for weight loss available through the National Institutes of Health patient information sites:  http://win.niddk.nih.gov/publications/index.htm    And look into health and wellness programs that may be available through your health insurance provider, employer, local community center, or greg club.                  Follow-ups after your visit        Follow-up notes from your care team     Return in about 1 year (around  12/21/2018), or if symptoms worsen or fail to improve.      Your next 10 appointments already scheduled     Jan 08, 2018  9:00 AM CST   Diabetic Education with Violet Bowen RD   [facility] ([facility])    [address] [place] [id number]   [phone]            Jan 29, 2018  2:00 PM CST   (Arrive by 1:45 PM)   Return Visit with Taty Fowler PhD   [facility] ([facility])    [address] [place] [id number] [phone]            Apr 16, 2018  8:00 AM CDT   (Arrive by 7:45 AM)   Return Visit with Ashish Paez MD   [facility] ([facility])    [address] [place] [id number] [phone]              Who to contact     If you have questions or need follow up information about today's clinic visit or your schedule please contact [facility] directly at [phone].  Normal or non-critical lab and imaging results will be communicated to you by MyChart, letter or phone within 4 business days after the clinic has received the results. If you do not hear from us within 7 days, please contact the clinic through [facility] or phone. If you have a critical or abnormal lab result, we will notify you by phone as soon as possible.  Submit refill requests through [facility] or call your pharmacy and they will forward the refill request to us. Please allow 3 business days for your refill to be completed.          Additional Information About Your Visit        [facility] Information     [facility] gives you secure access to your electronic health record. If you see a primary care provider, you can also send messages to your care team and make appointments. If you have questions, please call your primary care clinic.  If you do not have a primary care provider, please call [phone] and they will assist you.       "  Care EveryWhere ID     This is your Care EveryWhere ID. This could be used by other organizations to access your [facility] medical records  [id number]        Your Vitals Were     Pulse Height Pulse Oximetry BMI (Body Mass Index)          79 1.727 m (5' 7.99\") 96% 61.97 kg/m2         Blood Pressure from Last 3 Encounters:   12/21/17 114/76   12/19/17 102/56   12/04/17 133/82    Weight from Last 3 Encounters:   12/21/17 (!) 184.8 kg (407 lb 8 oz)   12/19/17 (!) 184.8 kg (407 lb 8 oz)   12/04/17 (!) 186.7 kg (411 lb 9.6 oz)              We Performed the Following     Sleep Comprehensive DME        Primary Care Provider Office Phone # Fax #    Erika Espino [phone] [address] [address]        Equal Access to Services     RICARDO HALL : Hadii rickie fongo Sobryan, waaxda luqadaha, qaybta kaalmada adejose albertoyada, consuelo rider . So [facility] [phone].    ATENCIÓN: Si juanla espedna, tiene a clinton disposición servicios gratuitos de asistencia lingüística. Llame al [phone].    We comply with applicable federal civil rights laws and [place] laws. We do not discriminate on the basis of race, color, national origin, age, disability, sex, sexual orientation, or gender identity.            Thank you!     Thank you for choosing [facility]  for your care. Our goal is always to provide you with excellent care. Hearing back from our patients is one way we can continue to improve our services. Please take a few minutes to complete the written survey that you may receive in the mail after your visit with us. Thank you!             Your Updated Medication List - Protect others around you: Learn how to safely use, store and throw away your medicines at www.disposemymeds.org.          This list is accurate as of: 12/21/17 10:54 AM.  Always use your most recent med list.                   Brand Name Dispense Instructions for use Diagnosis "    ACCU-CHEK CHRIS PLUS test strip   Generic drug:  blood glucose monitoring     200 each    USE TO TEST BLOOD SUGARS THREE TIMES A DAY AS DIRECTED    Diabetes mellitus without complication (H)       albuterol 108 (90 BASE) MCG/ACT Inhaler    PROAIR HFA    1 Inhaler    Inhale 2 puffs into the lungs every 4 hours as needed for shortness of breath / dyspnea    Mild intermittent asthma without complication       aspirin 81 MG tablet     90 tablet    Take 1 tablet by mouth daily.    Type 2 diabetes, HbA1C goal < 8% (H)       BD SEVERO U/F 32G X 4 MM   Generic drug:  insulin pen needle     100 each    USE ONE DAILY OR AS DIRECTED    Type 2 diabetes mellitus without complications (H)       cetirizine 10 MG tablet    zyrTEC    90 tablet    Take 1 tablet (10 mg) by mouth every evening    Allergic rhinitis due to pollen       * DIABETIC STERILE LANCETS device     1 Box    1 Device 3 times daily.    Type 2 diabetes, HbA1C goal < 8% (H)       * ONETOUCH LANCETS Misc     270 each    1 lancet 3 times daily    Type 2 diabetes, HbA1C goal < 8% (H)       furosemide 20 MG tablet    LASIX    360 tablet    TAKE TWO TABLETS BY MOUTH TWICE A DAY    Venous stasis       lisinopril 5 MG tablet    PRINIVIL/ZESTRIL    90 tablet    TAKE ONE TABLET BY MOUTH EVERY DAY    Pulmonary hypertension       * metFORMIN modified 500 MG 24 hr tablet    GLUMETZA    90 tablet    Take 1 tablet (500 mg) by mouth daily (with breakfast)    Type 2 diabetes mellitus without complication, without long-term current use of insulin (H)       * metFORMIN 500 MG 24 hr tablet    GLUCOPHAGE-XR    90 tablet    TAKE ONE TABLET BY MOUTH EVERY DAY WITH BREAKFAST (NEED TO KEEP APPOINTMENT WITH DR. SANCHES END OF NOVEMBER)    Methamphetamine abuse in remission       modafinil 200 MG tablet    PROVIGIL    60 tablet    Take 1/2 tablet by mouth 3-4 times daily as needed for sleepiness.    Obstructive sleep apnea-hypopnea syndrome       MULTIVITAMIN PO      Take 1 tablet by mouth  daily.        order for DME      Equipment being ordered: BIPAP Refugio P Kennedy received a Resmed AirCurve 10 Bilevel. Pressures were set at 23/14 cm H2O.        order for DME      Auto-BiPAP: IPAP max 21 cm H2O EPAP min 15 cm H2O Pressure support 5 cm Changed in clinic Lifetime need and heated humidity.    FARZANA (obstructive sleep apnea)       simvastatin 10 MG tablet    ZOCOR    90 tablet    TAKE ONE TABLET BY MOUTH AT BEDTIME    Hyperlipidemia LDL goal <100       topiramate 50 MG tablet    TOPAMAX    180 tablet    TAKE 3 TABLETS BY MOUTH TWICE DAILY    Morbid obesity due to excess calories (H)       triamcinolone 0.1 % cream    KENALOG    30 g    Apply sparingly to affected area two times daily as needed    Venous stasis       VICTOZA PEN 18 MG/3ML soln   Generic drug:  liraglutide     9 mL    INJECT 1.8MG UNDER THE SKIN ONCE DAILY    Type 2 diabetes mellitus without complications (H)       * Notice:  This list has 4 medication(s) that are the same as other medications prescribed for you. Read the directions carefully, and ask your doctor or other care provider to review them with you.

## 2017-12-21 NOTE — NURSING NOTE
"Chief Complaint   Patient presents with     CPAP Follow Up     Yearly follow up for his sleep apnea and medication . Needs refills and orders for supplies       Initial /76  Pulse 79  Ht 1.727 m (5' 7.99\")  Wt (!) 184.8 kg (407 lb 8 oz)  SpO2 96%  BMI 61.97 kg/m2 Estimated body mass index is 61.97 kg/(m^2) as calculated from the following:    Height as of this encounter: 1.727 m (5' 7.99\").    Weight as of this encounter: 184.8 kg (407 lb 8 oz).  Medication Reconciliation: complete  "

## 2017-12-21 NOTE — PATIENT INSTRUCTIONS

## 2018-01-01 DIAGNOSIS — E78.5 HYPERLIPIDEMIA LDL GOAL <100: ICD-10-CM

## 2018-01-02 RX ORDER — SIMVASTATIN 10 MG
TABLET ORAL
Qty: 90 TABLET | Refills: 3 | Status: SHIPPED | OUTPATIENT
Start: 2018-01-02 | End: 2019-01-04

## 2018-01-08 ENCOUNTER — ALLIED HEALTH/NURSE VISIT (OUTPATIENT)
Dept: EDUCATION SERVICES | Facility: CLINIC | Age: 43
End: 2018-01-08
Payer: COMMERCIAL

## 2018-01-08 VITALS — BODY MASS INDEX: 61.62 KG/M2 | WEIGHT: 315 LBS

## 2018-01-08 DIAGNOSIS — E11.9 TYPE 2 DIABETES MELLITUS WITHOUT COMPLICATION, WITHOUT LONG-TERM CURRENT USE OF INSULIN (H): Primary | ICD-10-CM

## 2018-01-08 PROCEDURE — G0108 DIAB MANAGE TRN  PER INDIV: HCPCS | Performed by: DIETITIAN, REGISTERED

## 2018-01-08 NOTE — PROGRESS NOTES
Diabetes Self Management Training: Individual Review Visit    Refugio Kennedy presents today for education related to Type 2 diabetes.    He is accompanied by self    Patient's diabetes management related comments/concerns: now working out    Patient's emotional response to diabetes: expresses readiness to learn    Patient would like this visit to be focused around the following diabetes-related behaviors and goals: Healthy Eating, Being Active, Monitoring and Taking Medication    ASSESSMENT:  Patient Problem List and Family Medical History reviewed for relevant medical history, current medical status, and diabetes risk factors.    Current Diabetes Management per Patient:  Taking diabetes medications?   yes:     Diabetes Medication(s)     Biguanides Sig    metFORMIN (GLUCOPHAGE-XR) 500 MG 24 hr tablet TAKE ONE TABLET BY MOUTH EVERY DAY WITH BREAKFAST (NEED TO KEEP APPOINTMENT WITH DR. SANCHES END OF NOVEMBER)    metFORMIN modified (GLUMETZA) 500 MG 24 hr tablet Take 1 tablet (500 mg) by mouth daily (with breakfast)    Incretin Mimetic Agents (GLP-1 Receptor Agonists) Sig    VICTOZA PEN 18 MG/3ML soln INJECT 1.8MG UNDER THE SKIN ONCE DAILY          *Abbreviated insulin dose documentation key: Insulin Trade Name (oulgtrlvu-rlyyo-musbpa-bedtime) - i.e. Humalog 5-5-5-0 (Humalog 5 units at breakfast, 5 units at lunch, and 5 units at dinner).    Past Diabetes Education: Yes    Patient glucose self monitoring as follows: one time daily.   BG meter: Accu-Chek Cheryl meter  BG results:       Breakfast pp Lunch pp Supper  pp HS   1-Jan 111         2-Jan 107         3-Jan 100         4-Jan 102         5-Jan 126         6-Jan 115         7-Jan 99         8-Jan 112          #DIV/0! #DIV/0! #DIV/0! #DIV/0! #DIV/0! #DIV/0!         BG values are: In goal  Patient's most recent   Lab Results   Component Value Date    A1C 5.7 12/19/2017    is meeting goal of <7.0    Nutrition:  Breakfast- toast and peanut butter toast  Scrambled  "eggs or meraz or sausage (this may be lunch too)  Lunch- veggie and sandwich or occasionally mac and cheese, not as often as he used to do this  Supper- white castle or tacos  Snacks- trying not to snack but does do some almond butter biscuits.  He has really cut the snack item out, he does not buy them himself.    Has been leaving vegetables on the counter so he remembers to eat them  Has cut back on his cheese consumption by 90%  Physical Activity:    Limitations: wt  Has started the gym, is up to 1 mile, about 32 minutes.  Trying to do 3-5 times per week    Diabetes Risk Factors:  family history and overweight/obesity    Diabetes Complications:  Not discussed today.    Vitals:  Wt (!) 183.8 kg (405 lb 3.2 oz)  BMI 61.62 kg/m2  Estimated body mass index is 61.62 kg/(m^2) as calculated from the following:    Height as of 12/21/17: 1.727 m (5' 7.99\").    Weight as of this encounter: 183.8 kg (405 lb 3.2 oz).   Last 3 BP:   BP Readings from Last 3 Encounters:   12/21/17 114/76   12/19/17 102/56   12/04/17 133/82       History   Smoking Status     Former Smoker     Packs/day: 0.50     Years: 1.00     Types: Cigarettes, Cigars     Quit date: 5/21/2012   Smokeless Tobacco     Never Used       Labs:  Lab Results   Component Value Date    A1C 5.7 12/19/2017     Lab Results   Component Value Date    GLC 85 12/19/2017     Lab Results   Component Value Date    LDL 58 12/19/2017     HDL Cholesterol   Date Value Ref Range Status   12/19/2017 45 >39 mg/dL Final   ]  GFR Estimate   Date Value Ref Range Status   12/19/2017 87 >60 mL/min/1.7m2 Final     Comment:     Non  GFR Calc     GFR Estimate If Black   Date Value Ref Range Status   12/19/2017 >90 >60 mL/min/1.7m2 Final     Comment:      GFR Calc     Lab Results   Component Value Date    CR 0.94 12/19/2017     No results found for: MICROALBUMIN    Socio/Economic Considerations:    Support system: family    Health Beliefs and Attitudes: "   Patient Activation Measure Survey Score:  MARY Score (Last Two) 1/25/2011   MARY Raw Score 36   Activation Score 47.4   MARY Level 2       Stage of Change: ACTION (Actively working towards change)      Diabetes knowledge and skills assessment:     Patient is knowledgeable in diabetes management concepts related to: Healthy Eating, Being Active, Monitoring and Taking Medication    Patient needs further education on the following diabetes management concepts: Healthy Eating, Being Active, Monitoring, Taking Medication, Problem Solving, Reducing Risks and Healthy Coping    Barriers to Learning Assessment: No Barriers identified    Based on learning assessment above, most appropriate setting for further diabetes education would be: Individual setting.    INTERVENTION:   PT doing very well with changes  -he has started the gym and is going 3-5 times a week  -he is really trying to pay attention to snacking: trying to avoid for the most part all together  -he has cut back 90% on cheese intake  Wt down 2.5 lbs in last 2.5 weeks  Education provided today on:  AADE Self-Care Behaviors:  Healthy Eating: consistency in amount, composition, and timing of food intake and portion control  Being Active: relationship to blood glucose and describe appropriate activity program    Opportunities for ongoing education and support in diabetes-self management were discussed.    Pt verbalized understanding of concepts discussed and recommendations provided today.       Education Materials Provided:  No new materials provided today    PLAN:  See Patient Instructions for co-developed, patient-stated behavior change goals.  AVS printed and provided to patient today.    FOLLOW-UP:  Follow-up appointment scheduled on feb 5th.    Ongoing plan for education and support: Follow-up visit with diabetes educator in 4 weeks      Time Spent: 30 minutes  Encounter Type: Individual    Any diabetes medication dose changes were made via the CDE Protocol and  Collaborative Practice Agreement with the patient's primary care provider. A copy of this encounter was shared with the provider.

## 2018-01-08 NOTE — MR AVS SNAPSHOT
After Visit Summary   1/8/2018    Refugio Kennedy    MRN: [id number]           Patient Information     Date Of Birth          1975        Visit Information        Provider Department      1/8/2018 9:00 AM Violet Bowen RD [facility]        Care Instructions    1.  Continue to do what you have been:    - Avoiding dry snacks  -eating vegetable with meals  -watching portions    Going to the gym.  Continue to work on increasing speed or time.  -            Follow-ups after your visit        Your next 10 appointments already scheduled     Jan 29, 2018  2:00 PM CST   (Arrive by 1:45 PM)   Return Visit with Taty Fowler, PhD   [facility] ([facility])    [address] [place] [id number] [phone]            Feb 05, 2018  8:00 AM CST   Diabetic Education with Violet Bowen RD   [facility] ([facility])    [address] [place] [id number] [phone]            Apr 16, 2018  8:00 AM CDT   (Arrive by 7:45 AM)   Return Visit with Ashish Paez MD   [facility] ([facility])    [address] [place] [address] [phone]              Who to contact     If you have questions or need follow up information about today's clinic visit or your schedule please contact [facility] directly at [phone].  Normal or non-critical lab and imaging results will be communicated to you by MyChart, letter or phone within 4 business days after the clinic has received the results. If you do not hear from us within 7 days, please contact the clinic through [employer] or phone. If you have a critical or abnormal lab result, we will notify you by phone as soon as possible.  Submit refill requests through [employer] or call your pharmacy and they will forward the  refill request to us. Please allow 3 business days for your refill to be completed.          Additional Information About Your Visit        MyChart Information     [employer] gives you secure access to your electronic health record. If you see a primary care provider, you can also send messages to your care team and make appointments. If you have questions, please call your primary care clinic.  If you do not have a primary care provider, please call [phone] and they will assist you.        Care EveryWhere ID     This is your Care EveryWhere ID. This could be used by other organizations to access your [facility] medical records  [id number]        Your Vitals Were     BMI (Body Mass Index)                   61.62 kg/m2            Blood Pressure from Last 3 Encounters:   12/21/17 114/76   12/19/17 102/56   12/04/17 133/82    Weight from Last 3 Encounters:   01/08/18 (!) 183.8 kg (405 lb 3.2 oz)   12/21/17 (!) 184.8 kg (407 lb 8 oz)   12/19/17 (!) 184.8 kg (407 lb 8 oz)              Today, you had the following     No orders found for display       Primary Care Provider Office Phone # Fax #    Erika Espino [phone] [address] [place] [address]        Equal Access to Services     RICARDO HALL : Hadii aad ku hadasho Soomaali, waaxda luqadaha, qaybta kaalmada adeegyada, waxay idiin hayshruthin eleni winter. So [facility] [phone].    ATENCIÓN: Si habla español, tiene a clinton disposición servicios gratuitos de asistencia lingüística. Llame al [phone].    We comply with applicable federal civil rights laws and [place] laws. We do not discriminate on the basis of race, color, national origin, age, disability, sex, sexual orientation, or gender identity.            Thank you!     Thank you for choosing [facility]  for your care. Our goal is always to provide you with excellent care. Hearing back from our patients is one way we can continue to  improve our services. Please take a few minutes to complete the written survey that you may receive in the mail after your visit with us. Thank you!             Your Updated Medication List - Protect others around you: Learn how to safely use, store and throw away your medicines at www.disposemymeds.org.          This list is accurate as of: 1/8/18  9:35 AM.  Always use your most recent med list.                   Brand Name Dispense Instructions for use Diagnosis    ACCU-CHEK CHRIS PLUS test strip   Generic drug:  blood glucose monitoring     200 each    USE TO TEST BLOOD SUGARS THREE TIMES A DAY AS DIRECTED    Diabetes mellitus without complication (H)       albuterol 108 (90 BASE) MCG/ACT Inhaler    PROAIR HFA    1 Inhaler    Inhale 2 puffs into the lungs every 4 hours as needed for shortness of breath / dyspnea    Mild intermittent asthma without complication       aspirin 81 MG tablet     90 tablet    Take 1 tablet by mouth daily.    Type 2 diabetes, HbA1C goal < 8% (H)       BD SEVERO U/F 32G X 4 MM   Generic drug:  insulin pen needle     100 each    USE ONE DAILY OR AS DIRECTED    Type 2 diabetes mellitus without complications (H)       cetirizine 10 MG tablet    zyrTEC    90 tablet    Take 1 tablet (10 mg) by mouth every evening    Allergic rhinitis due to pollen       * DIABETIC STERILE LANCETS device     1 Box    1 Device 3 times daily.    Type 2 diabetes, HbA1C goal < 8% (H)       * ONETOUCH LANCETS Misc     270 each    1 lancet 3 times daily    Type 2 diabetes, HbA1C goal < 8% (H)       furosemide 20 MG tablet    LASIX    360 tablet    TAKE TWO TABLETS BY MOUTH TWICE A DAY    Venous stasis       lisinopril 5 MG tablet    PRINIVIL/ZESTRIL    90 tablet    TAKE ONE TABLET BY MOUTH EVERY DAY    Pulmonary hypertension       * metFORMIN modified 500 MG 24 hr tablet    GLUMETZA    90 tablet    Take 1 tablet (500 mg) by mouth daily (with breakfast)    Type 2 diabetes mellitus without complication, without  long-term current use of insulin (H)       * metFORMIN 500 MG 24 hr tablet    GLUCOPHAGE-XR    90 tablet    TAKE ONE TABLET BY MOUTH EVERY DAY WITH BREAKFAST (NEED TO KEEP APPOINTMENT WITH DR. SANCHES END OF NOVEMBER)    Methamphetamine abuse in remission       modafinil 200 MG tablet    PROVIGIL    60 tablet    Take 1/2 tablet by mouth 3-4 times daily as needed for sleepiness.    Obstructive sleep apnea-hypopnea syndrome       MULTIVITAMIN PO      Take 1 tablet by mouth daily.        order for DME      Equipment being ordered: BIPAP Refugio P Kennedy received a CarZen AirCurve 10 Bilevel. Pressures were set at 23/14 cm H2O.        order for DME      Auto-BiPAP: IPAP max 21 cm H2O EPAP min 15 cm H2O Pressure support 5 cm Changed in clinic Lifetime need and heated humidity.    FARZANA (obstructive sleep apnea)       simvastatin 10 MG tablet    ZOCOR    90 tablet    TAKE ONE TABLET BY MOUTH AT BEDTIME    Hyperlipidemia LDL goal <100       topiramate 50 MG tablet    TOPAMAX    180 tablet    TAKE 3 TABLETS BY MOUTH TWICE DAILY    Morbid obesity due to excess calories (H)       triamcinolone 0.1 % cream    KENALOG    30 g    Apply sparingly to affected area two times daily as needed    Venous stasis       VICTOZA PEN 18 MG/3ML soln   Generic drug:  liraglutide     9 mL    INJECT 1.8MG UNDER THE SKIN ONCE DAILY    Type 2 diabetes mellitus without complications (H)       * Notice:  This list has 4 medication(s) that are the same as other medications prescribed for you. Read the directions carefully, and ask your doctor or other care provider to review them with you.

## 2018-01-08 NOTE — LETTER
1/8/2018         RE: Refugio Kennedy  [address]  [facility] [address]        Dear Colleague,    Thank you for referring your patient, Refugio Kennedy, to the [facility]. Please see a copy of my visit note below.    Diabetes Self Management Training: Individual Review Visit    Refugio Kennedy presents today for education related to Type 2 diabetes.    He is accompanied by self    Patient's diabetes management related comments/concerns: now working out    Patient's emotional response to diabetes: expresses readiness to learn    Patient would like this visit to be focused around the following diabetes-related behaviors and goals: Healthy Eating, Being Active, Monitoring and Taking Medication    ASSESSMENT:  Patient Problem List and Family Medical History reviewed for relevant medical history, current medical status, and diabetes risk factors.    Current Diabetes Management per Patient:  Taking diabetes medications?   yes:     Diabetes Medication(s)     Biguanides Sig    metFORMIN (GLUCOPHAGE-XR) 500 MG 24 hr tablet TAKE ONE TABLET BY MOUTH EVERY DAY WITH BREAKFAST (NEED TO KEEP APPOINTMENT WITH DR. SANCHES END OF NOVEMBER)    metFORMIN modified (GLUMETZA) 500 MG 24 hr tablet Take 1 tablet (500 mg) by mouth daily (with breakfast)    Incretin Mimetic Agents (GLP-1 Receptor Agonists) Sig    VICTOZA PEN 18 MG/3ML soln INJECT 1.8MG UNDER THE SKIN ONCE DAILY          *Abbreviated insulin dose documentation key: Insulin Trade Name (yhjvvkkzq-yqkwz-qgyegm-bedtime) - i.e. Humalog 5-5-5-0 (Humalog 5 units at breakfast, 5 units at lunch, and 5 units at dinner).    Past Diabetes Education: Yes    Patient glucose self monitoring as follows: one time daily.   BG meter: Accu-Chek Cheryl meter  BG results:       Breakfast pp Lunch pp Supper  pp HS   1-Jan 111         2-Jan 107         3-Jan 100         4-Jan 102         5-Jan 126         6-Jan 115         7-Jan 99         8-Jan 112          #DIV/0!  "#DIV/0! #DIV/0! #DIV/0! #DIV/0! #DIV/0!         BG values are: In goal  Patient's most recent   Lab Results   Component Value Date    A1C 5.7 12/19/2017    is meeting goal of <7.0    Nutrition:  Breakfast- toast and peanut butter toast  Scrambled eggs or meraz or sausage (this may be lunch too)  Lunch- veggie and sandwich or occasionally mac and cheese, not as often as he used to do this  Supper- white castle or tacos  Snacks- trying not to snack but does do some almond butter biscuits.  He has really cut the snack item out, he does not buy them himself.    Has been leaving vegetables on the counter so he remembers to eat them  Has cut back on his cheese consumption by 90%  Physical Activity:    Limitations: wt  Has started the gym, is up to 1 mile, about 32 minutes.  Trying to do 3-5 times per week    Diabetes Risk Factors:  family history and overweight/obesity    Diabetes Complications:  Not discussed today.    Vitals:  Wt (!) 183.8 kg (405 lb 3.2 oz)  BMI 61.62 kg/m2  Estimated body mass index is 61.62 kg/(m^2) as calculated from the following:    Height as of 12/21/17: 1.727 m (5' 7.99\").    Weight as of this encounter: 183.8 kg (405 lb 3.2 oz).   Last 3 BP:   BP Readings from Last 3 Encounters:   12/21/17 114/76   12/19/17 102/56   12/04/17 133/82       History   Smoking Status     Former Smoker     Packs/day: 0.50     Years: 1.00     Types: Cigarettes, Cigars     Quit date: 5/21/2012   Smokeless Tobacco     Never Used       Labs:  Lab Results   Component Value Date    A1C 5.7 12/19/2017     Lab Results   Component Value Date    GLC 85 12/19/2017     Lab Results   Component Value Date    LDL 58 12/19/2017     HDL Cholesterol   Date Value Ref Range Status   12/19/2017 45 >39 mg/dL Final   ]  GFR Estimate   Date Value Ref Range Status   12/19/2017 87 >60 mL/min/1.7m2 Final     Comment:     Non  GFR Calc     GFR Estimate If Black   Date Value Ref Range Status   12/19/2017 >90 >60 mL/min/1.7m2 " Final     Comment:      GFR Calc     Lab Results   Component Value Date    CR 0.94 12/19/2017     No results found for: MICROALBUMIN    Socio/Economic Considerations:    Support system: family    Health Beliefs and Attitudes:   Patient Activation Measure Survey Score:  MARY Score (Last Two) 1/25/2011   MARY Raw Score 36   Activation Score 47.4   MARY Level 2       Stage of Change: ACTION (Actively working towards change)      Diabetes knowledge and skills assessment:     Patient is knowledgeable in diabetes management concepts related to: Healthy Eating, Being Active, Monitoring and Taking Medication    Patient needs further education on the following diabetes management concepts: Healthy Eating, Being Active, Monitoring, Taking Medication, Problem Solving, Reducing Risks and Healthy Coping    Barriers to Learning Assessment: No Barriers identified    Based on learning assessment above, most appropriate setting for further diabetes education would be: Individual setting.    INTERVENTION:   PT doing very well with changes  -he has started the gym and is going 3-5 times a week  -he is really trying to pay attention to snacking: trying to avoid for the most part all together  -he has cut back 90% on cheese intake  Wt down 2.5 lbs in last 2.5 weeks  Education provided today on:  AADE Self-Care Behaviors:  Healthy Eating: consistency in amount, composition, and timing of food intake and portion control  Being Active: relationship to blood glucose and describe appropriate activity program    Opportunities for ongoing education and support in diabetes-self management were discussed.    Pt verbalized understanding of concepts discussed and recommendations provided today.       Education Materials Provided:  No new materials provided today    PLAN:  See Patient Instructions for co-developed, patient-stated behavior change goals.  AVS printed and provided to patient today.    FOLLOW-UP:  Follow-up appointment  scheduled on feb 5th.    Ongoing plan for education and support: Follow-up visit with diabetes educator in 4 weeks      Time Spent: 30 minutes  Encounter Type: Individual    Any diabetes medication dose changes were made via the CDE Protocol and Collaborative Practice Agreement with the patient's primary care provider. A copy of this encounter was shared with the provider.

## 2018-01-08 NOTE — PATIENT INSTRUCTIONS
1.  Continue to do what you have been:    - Avoiding dry snacks  -eating vegetable with meals  -watching portions    Going to the gym.  Continue to work on increasing speed or time.  -

## 2018-01-29 ENCOUNTER — OFFICE VISIT (OUTPATIENT)
Dept: GASTROENTEROLOGY | Facility: CLINIC | Age: 43
End: 2018-01-29
Payer: COMMERCIAL

## 2018-01-29 DIAGNOSIS — G47.33 OBSTRUCTIVE SLEEP APNEA-HYPOPNEA SYNDROME: ICD-10-CM

## 2018-01-29 DIAGNOSIS — E66.01 MORBID OBESITY (H): ICD-10-CM

## 2018-01-29 DIAGNOSIS — F54 PSYCHOLOGICAL FACTOR AFFECTING PHYSICAL CONDITION: Primary | ICD-10-CM

## 2018-01-29 RX ORDER — MODAFINIL 200 MG/1
TABLET ORAL
Qty: 60 TABLET | Refills: 1 | Status: SHIPPED | OUTPATIENT
Start: 2018-01-29 | End: 2018-04-13

## 2018-01-29 NOTE — PROGRESS NOTES
Health Psychology                  Clinic    Department of Medicine  Shireen Valentine, PhD, LP [phone]                          Clinics and Surgery Center  [facility] Alicja Barragan, PhD, LP [phone]                  3rd Floor  [place] Mail Code [address]   Zenon Cornelius, PhD, ABPP, LP [phone]     [address] [facility],   [address] [facility],  Taty Fowler,  PhD, LP [phone]            [place] [address] Karen Muro, PhD, LP [phone]    Health Psychology Follow-Up Note    SUBJECTIVE:  Refugio Kennedy is a 42-year-old male with morbid obesity, multiple medical comorbidities, and history of substance use disorder in remission seen for individual psychotherapy for weight management and management of stress. Current weight was 406.5# at last session. He reported joining a gym in late Dec. He walks on the treadmill for exercise (.75- 1.25 over 30 minutes each session) and he has attended 3-5 times per week since he joined. He also eating vegetables at a meal 3 times per week. He also reported reducing intake of cheese and carbs since his last session with Dr. Paez in Dec 2017. He also reported less stress eating, with only 2 episodes in the last 2 weeks. Checked-in about relationship, he is still dating his girlfriend (Khushbu) and he reported that he is seeing positive changes in her behavior, which makes him satisfied in the relationship currently. He also reported increased honesty about his needs, which helps him set and keep boundaries in the relationship.     Verbally reviewed treatment plan, and he reported current plan (goals = stress management, relaxation training, and identification and changing of unhelpful thoughts and behaviors for mood and weight) is satisfactory and that he doesn't have any changes or additions to the plan at this time.     [place] session focused on how to deal with time management at home, since his girlfriend asks a lot of  "him, which can take away from his ability to care for his own health needs. Discussed assertive communication and boundary setting in order to better align actions with values. Discussed empathetic understanding to his partner may help him function better in his relationship. Action plan is to practice using \"I statements\" 2-3 times per week.     OBJECTIVE/MSE:  Appearance/Behavior/Orientation:  Patient was casually groomed and dressed and demonstrated good eye contact. Alert and oriented to person, place, time, and situation. No evidence of psychomotor agitation.    Cooperation/Reliability:  Patient appeared to honestly respond to questions about psychosocial functioning and is deemed a reliable historian.   Cognition/Memory/Judgment: Not formally assessed.  No difficulties apparent upon interview. Fund of knowledge consistent with age, level of education, and life experience. Abstract reasoning appropriate, no difficulties with judgment apparent.  Speech/Language: Speech was clear, logical and coherent, of normal rate, rhythm and volume.   Thought Content/Form: Appropriate to interview and situation. Overall logical and organized. Patient denied any difficulties with a thought disorder, including auditory or visual hallucinations.   Mood/Affect: Mood euthymic; appropriate range of affect.   Insight/Motivation: Appropriate to situation.   Suicide/Assault: Patient denies suicidal or assaultive ideation, plan, or intent.     ASSESSMENT:  Refugio Kennedy is a 42-year-old male with morbid obesity, multiple medical comorbidities, and history of substance use disorder in remission. He has experienced some success with weight loss through the use of topiramate, although he is experienced weight gain in recent months which she attributed to eating to cope with stress. His main triggers include conflict with his father and girlfriend. It appears he would benefit from developing further stress management techniques but did not " include using food to cope with emotional distress. It does not appear he meets criteria for an eating disorder at this time, but this will continue to be monitored.    DIAGNOSIS:  Psychological factors affecting medical condition; morbid obesity; methamphetamine use disorder, in full remission    PLAN:  RTC for individual CBT every 2-4 weeks for weight management including stress management, relaxation training, and identification and changing of unhelpful thoughts and behaviors for mood and weight.      Time in: 2:14  Time out: 3:09  Extended session due to complexity of case and length of interval.    Taty Fowler, PhD,   Clinical Health Psychologist    Initial Tx plan completed: 7/24/17  Tx plan reviewed:  1/29/18  Verbal review next due: 4/29/18

## 2018-01-29 NOTE — LETTER
1/29/2018       RE: Refugio Kennedy  [address]  [facility] [address]     Dear Colleague,    Thank you for referring your patient, Refugio Kennedy, to the [facility] at [facility]. Please see a copy of my visit note below.      Health Psychology                  Clinic    Department of Medicine  Shireen Valentine, PhD, LP [phone]                          Clinics and Surgery Center  [facility] Alicja Barragan, PhD, LP [phone]                  3rd Floor  [place] Mail Code [address]   Zenon Cornelius, PhD, ABPP, LP [phone]     [address] [facility],   [address] [facility],  Taty Fowler,  PhD, LP [phone]            [place] [address]  [place] [address] Karen Muro, PhD, LP [phone]    Health Psychology Follow-Up Note    SUBJECTIVE:  Refugio Kennedy is a 42-year-old male with morbid obesity, multiple medical comorbidities, and history of substance use disorder in remission seen for individual psychotherapy for weight management and management of stress. Current weight was 406.5# at last session. He reported joining a gym in late Dec. He walks on the treadmill for exercise (.75- 1.25 over 30 minutes each session) and he has attended 3-5 times per week since he joined. He also eating vegetables at a meal 3 times per week. He also reported reducing intake of cheese and carbs since his last session with Dr. Paez in Dec 2017. He also reported less stress eating, with only 2 episodes in the last 2 weeks. Checked-in about relationship, he is still dating his girlfriend (Khushbu) and he reported that he is seeing positive changes in her behavior, which makes him satisfied in the relationship currently. He also reported increased honesty about his needs, which helps him set and keep boundaries in the relationship.     Verbally reviewed treatment plan, and he reported current plan (goals = stress management, relaxation  "training, and identification and changing of unhelpful thoughts and behaviors for mood and weight) is satisfactory and that he doesn't have any changes or additions to the plan at this time.     [place] session focused on how to deal with time management at home, since his girlfriend asks a lot of him, which can take away from his ability to care for his own health needs. Discussed assertive communication and boundary setting in order to better align actions with values. Discussed empathetic understanding to his partner may help him function better in his relationship. Action plan is to practice using \"I statements\" 2-3 times per week.     OBJECTIVE/MSE:  Appearance/Behavior/Orientation:  Patient was casually groomed and dressed and demonstrated good eye contact. Alert and oriented to person, place, time, and situation. No evidence of psychomotor agitation.    Cooperation/Reliability:  Patient appeared to honestly respond to questions about psychosocial functioning and is deemed a reliable historian.   Cognition/Memory/Judgment: Not formally assessed.  No difficulties apparent upon interview. Fund of knowledge consistent with age, level of education, and life experience. Abstract reasoning appropriate, no difficulties with judgment apparent.  Speech/Language: Speech was clear, logical and coherent, of normal rate, rhythm and volume.   Thought Content/Form: Appropriate to interview and situation. Overall logical and organized. Patient denied any difficulties with a thought disorder, including auditory or visual hallucinations.   Mood/Affect: Mood euthymic; appropriate range of affect.   Insight/Motivation: Appropriate to situation.   Suicide/Assault: Patient denies suicidal or assaultive ideation, plan, or intent.     ASSESSMENT:  Refugio Kennedy is a 42-year-old male with morbid obesity, multiple medical comorbidities, and history of substance use disorder in remission. He has experienced some success with weight loss " through the use of topiramate, although he is experienced weight gain in recent months which she attributed to eating to cope with stress. His main triggers include conflict with his father and girlfriend. It appears he would benefit from developing further stress management techniques but did not include using food to cope with emotional distress. It does not appear he meets criteria for an eating disorder at this time, but this will continue to be monitored.    DIAGNOSIS:  Psychological factors affecting medical condition; morbid obesity; methamphetamine use disorder, in full remission    PLAN:  RTC for individual CBT every 2-4 weeks for weight management including stress management, relaxation training, and identification and changing of unhelpful thoughts and behaviors for mood and weight.      Time in: 2:14  Time out: 3:09  Extended session due to complexity of case and length of interval.    Taty Fowler, PhD,   Clinical Health Psychologist    Initial Tx plan completed: 7/24/17  Tx plan reviewed:  1/29/18  Verbal review next due: 4/29/18        Again, thank you for allowing me to participate in the care of your patient.      Sincerely,    Taty Fowler, PhD

## 2018-01-29 NOTE — MR AVS SNAPSHOT
After Visit Summary   1/29/2018    Refugio Kennedy    MRN: [id number]           Patient Information     Date Of Birth          1975        Visit Information        Provider Department      1/29/2018 2:00 PM Taty Fowler, PhD [facility]        Today's Diagnoses     Psychological factor affecting physical condition    -  1    Morbid obesity (H)           Follow-ups after your visit        Your next 10 appointments already scheduled     Feb 05, 2018  8:00 AM CST   Diabetic Education with Violet Bowen RD   [facility] [place] ([facility])    [address] [place] [address]   [phone]            Apr 16, 2018  8:00 AM CDT   (Arrive by 7:45 AM)   Return Visit with Ashish Paez MD   [facility] ([facility] [place])    [address] [address] [phone]              Who to contact     Please call your clinic at [phone] to:    Ask questions about your health    Make or cancel appointments    Discuss your medicines    Learn about your test results    Speak to your doctor   If you have compliments or concerns about an experience at your clinic, or if you wish to file a complaint, please contact [facility] Patient Relations at [phone] or email us at [email or web address]         Additional Information About Your Visit        MyChart Information     Glyde gives you secure access to your electronic health record. If you see a primary care provider, you can also send messages to your care team and make appointments. If you have questions, please call your primary care clinic.  If you do not have a primary care provider, please call [phone] and they will assist you.      Glyde is an electronic gateway that provides easy, online access to your medical records. With Glyde, you can request a clinic  appointment, read your test results, renew a prescription or communicate with your care team.     To access your existing account, please contact your [facility] or call [phone] for assistance.        Care EveryWhere ID     This is your Care EveryWhere ID. This could be used by other organizations to access your [place] medical records  [id number]         Blood Pressure from Last 3 Encounters:   12/21/17 114/76   12/19/17 102/56   12/04/17 133/82    Weight from Last 3 Encounters:   01/08/18 (!) 183.8 kg (405 lb 3.2 oz)   12/21/17 (!) 184.8 kg (407 lb 8 oz)   12/19/17 (!) 184.8 kg (407 lb 8 oz)              Today, you had the following     No orders found for display         Where to get your medicines      Some of these will need a paper prescription and others can be bought over the counter.  Ask your nurse if you have questions.     Bring a paper prescription for each of these medications     modafinil 200 MG tablet          Primary Care Provider Office Phone # Fax #    Erika Espino [phone] [address] [place], [place] [address]        Equal Access to Services     RICARDO HALL : Hadii rickie fongo Sobryan, waaxda luqadaha, qaybta kaalmada manjit, consuelo winter. So [facility] [phone].    ATENCIÓN: Si habla español, tiene a clinton disposición servicios gratuitos de asistencia lingüística. Llame al [phone].    We comply with applicable federal civil rights laws and [place] laws. We do not discriminate on the basis of race, color, national origin, age, disability, sex, sexual orientation, or gender identity.            Thank you!     Thank you for choosing [facility]  for your care. Our goal is always to provide you with excellent care. Hearing back from our patients is one way we can continue to improve our services. Please take a few minutes to complete the written survey that  you may receive in the mail after your visit with us. Thank you!             Your Updated Medication List - Protect others around you: Learn how to safely use, store and throw away your medicines at www.disposemymeds.org.          This list is accurate as of 1/29/18  3:09 PM.  Always use your most recent med list.                   Brand Name Dispense Instructions for use Diagnosis    ACCU-CHEK CHRIS PLUS test strip   Generic drug:  blood glucose monitoring     200 each    USE TO TEST BLOOD SUGARS THREE TIMES A DAY AS DIRECTED    Diabetes mellitus without complication (H)       albuterol 108 (90 BASE) MCG/ACT Inhaler    PROAIR HFA    1 Inhaler    Inhale 2 puffs into the lungs every 4 hours as needed for shortness of breath / dyspnea    Mild intermittent asthma without complication       aspirin 81 MG tablet     90 tablet    Take 1 tablet by mouth daily.    Type 2 diabetes, HbA1C goal < 8% (H)       BD SEVERO U/F 32G X 4 MM   Generic drug:  insulin pen needle     100 each    USE ONE DAILY OR AS DIRECTED    Type 2 diabetes mellitus without complications (H)       cetirizine 10 MG tablet    zyrTEC    90 tablet    Take 1 tablet (10 mg) by mouth every evening    Allergic rhinitis due to pollen       * DIABETIC STERILE LANCETS device     1 Box    1 Device 3 times daily.    Type 2 diabetes, HbA1C goal < 8% (H)       * ONETOUCH LANCETS Misc     270 each    1 lancet 3 times daily    Type 2 diabetes, HbA1C goal < 8% (H)       furosemide 20 MG tablet    LASIX    360 tablet    TAKE TWO TABLETS BY MOUTH TWICE A DAY    Venous stasis       lisinopril 5 MG tablet    PRINIVIL/ZESTRIL    90 tablet    TAKE ONE TABLET BY MOUTH EVERY DAY    Pulmonary hypertension       * metFORMIN modified 500 MG 24 hr tablet    GLUMETZA    90 tablet    Take 1 tablet (500 mg) by mouth daily (with breakfast)    Type 2 diabetes mellitus without complication, without long-term current use of insulin (H)       * metFORMIN 500 MG 24 hr tablet    GLUCOPHAGE-XR     90 tablet    TAKE ONE TABLET BY MOUTH EVERY DAY WITH BREAKFAST (NEED TO KEEP APPOINTMENT WITH DR. SANCHES END OF NOVEMBER)    Methamphetamine abuse in remission       modafinil 200 MG tablet    PROVIGIL    60 tablet    Take 1/2 tablet by mouth 3-4 times daily as needed for sleepiness.    Obstructive sleep apnea-hypopnea syndrome       MULTIVITAMIN PO      Take 1 tablet by mouth daily.        order for DME      Equipment being ordered: BIPAP Refugio P Kennedy received a Resmed AirCurve 10 Bilevel. Pressures were set at 23/14 cm H2O.        order for DME      Auto-BiPAP: IPAP max 21 cm H2O EPAP min 15 cm H2O Pressure support 5 cm Changed in clinic Lifetime need and heated humidity.    FARZANA (obstructive sleep apnea)       simvastatin 10 MG tablet    ZOCOR    90 tablet    TAKE ONE TABLET BY MOUTH AT BEDTIME    Hyperlipidemia LDL goal <100       topiramate 50 MG tablet    TOPAMAX    180 tablet    TAKE 3 TABLETS BY MOUTH TWICE DAILY    Morbid obesity due to excess calories (H)       triamcinolone 0.1 % cream    KENALOG    30 g    Apply sparingly to affected area two times daily as needed    Venous stasis       VICTOZA PEN 18 MG/3ML soln   Generic drug:  liraglutide     9 mL    INJECT 1.8MG UNDER THE SKIN ONCE DAILY    Type 2 diabetes mellitus without complications (H)       * Notice:  This list has 4 medication(s) that are the same as other medications prescribed for you. Read the directions carefully, and ask your doctor or other care provider to review them with you.

## 2018-01-29 NOTE — TELEPHONE ENCOUNTER
provigil      Last Written Prescription Date:  11/21/2017  Last Fill Quantity: 60,   # refills: 1  Last Office Visit: 12/21/2017  Future Office visit:       Routing refill request to provider for review/approval because:  Controlled medication

## 2018-02-05 ENCOUNTER — ALLIED HEALTH/NURSE VISIT (OUTPATIENT)
Dept: EDUCATION SERVICES | Facility: CLINIC | Age: 43
End: 2018-02-05
Payer: COMMERCIAL

## 2018-02-05 VITALS — WEIGHT: 315 LBS | BODY MASS INDEX: 61.44 KG/M2

## 2018-02-05 DIAGNOSIS — E11.9 TYPE 2 DIABETES MELLITUS WITHOUT COMPLICATION, WITHOUT LONG-TERM CURRENT USE OF INSULIN (H): Primary | ICD-10-CM

## 2018-02-05 PROCEDURE — G0108 DIAB MANAGE TRN  PER INDIV: HCPCS | Performed by: DIETITIAN, REGISTERED

## 2018-02-05 NOTE — LETTER
2/5/2018         RE: Refugio Kennedy  [address]  [facility] [address]        Dear Colleague,    Thank you for referring your patient, Refugio Kennedy, to the [facility]. Please see a copy of my visit note below.    Diabetes Self Management Training: Individual Review Visit    Refugio Kennedy presents today for education related to Type 2 diabetes.    He is accompanied by self    Patient's diabetes management related comments/concerns: none, working hard on wt loss doing gym now    Patient's emotional response to diabetes: expresses readiness to learn    Patient would like this visit to be focused around the following diabetes-related behaviors and goals: Healthy Eating, Being Active and Monitoring    ASSESSMENT:  Patient Problem List and Family Medical History reviewed for relevant medical history, current medical status, and diabetes risk factors.    Current Diabetes Management per Patient:  Taking diabetes medications?   yes:     Diabetes Medication(s)     Biguanides Sig    metFORMIN (GLUCOPHAGE-XR) 500 MG 24 hr tablet TAKE ONE TABLET BY MOUTH EVERY DAY WITH BREAKFAST (NEED TO KEEP APPOINTMENT WITH DR. SANCHES END OF NOVEMBER)    metFORMIN modified (GLUMETZA) 500 MG 24 hr tablet Take 1 tablet (500 mg) by mouth daily (with breakfast)    Incretin Mimetic Agents (GLP-1 Receptor Agonists) Sig    VICTOZA PEN 18 MG/3ML soln INJECT 1.8MG UNDER THE SKIN ONCE DAILY          *Abbreviated insulin dose documentation key: Insulin Trade Name (pnnbnjnbq-lremi-jallpj-bedtime) - i.e. Humalog 5-5-5-0 (Humalog 5 units at breakfast, 5 units at lunch, and 5 units at dinner).    Past Diabetes Education: Yes    Patient glucose self monitoring as follows: one time daily.   BG meter: Accu-Chek Cheryl meter  BG results:                                BG values are: In goal  Patient's most recent   Lab Results   Component Value Date    A1C 5.7 12/19/2017    is meeting goal of <7.0    Nutrition:  Breakfast first  "PB toast earlier then- scrambled eggs, meraz or chix sausage  Lunch- deli meat sandwiches, 2-5 times a week a veggie  Supper- tacos, mac and cheese with tuna and peas    Still doing good with cheese, has cut potatoes down a lot (chips, fries)  Physical Activity:    Limitations: breathing  How many days are you goin-4 days  Usually: 30-40 minutes    Diabetes Risk Factors:  family history and overweight/obesity    Diabetes Complications:  Not discussed today.    Vitals:  Wt (!) 183.3 kg (404 lb)  BMI 61.44 kg/m2  Estimated body mass index is 61.44 kg/(m^2) as calculated from the following:    Height as of 17: 1.727 m (5' 7.99\").    Weight as of this encounter: 183.3 kg (404 lb).   Last 3 BP:   BP Readings from Last 3 Encounters:   17 114/76   17 102/56   17 133/82       History   Smoking Status     Former Smoker     Packs/day: 0.50     Years: 1.00     Types: Cigarettes, Cigars     Quit date: 2012   Smokeless Tobacco     Never Used       Labs:  Lab Results   Component Value Date    A1C 5.7 2017     Lab Results   Component Value Date    GLC 85 2017     Lab Results   Component Value Date    LDL 58 2017     HDL Cholesterol   Date Value Ref Range Status   2017 45 >39 mg/dL Final   ]  GFR Estimate   Date Value Ref Range Status   2017 87 >60 mL/min/1.7m2 Final     Comment:     Non  GFR Calc     GFR Estimate If Black   Date Value Ref Range Status   2017 >90 >60 mL/min/1.7m2 Final     Comment:      GFR Calc     Lab Results   Component Value Date    CR 0.94 2017     No results found for: MICROALBUMIN    Socio/Economic Considerations:    Support system: family    Health Beliefs and Attitudes:   Patient Activation Measure Survey Score:  MARY Score (Last Two) 2011   MARY Raw Score 36   Activation Score 47.4   MARY Level 2       Stage of Change: ACTION (Actively working towards change)      Diabetes knowledge and skills " assessment:     Patient is knowledgeable in diabetes management concepts related to: Healthy Eating, Being Active, Monitoring, Taking Medication, Problem Solving, Reducing Risks and Healthy Coping    Patient needs further education on the following diabetes management concepts: Healthy Eating and Being Active    Barriers to Learning Assessment: No Barriers identified    Based on learning assessment above, most appropriate setting for further diabetes education would be: Individual setting.    INTERVENTION:   Pt doing well  Continues to watch starches and eat more veggies  IS getting to the gym 2-4 times a week    Education provided today on:  AADE Self-Care Behaviors:  Healthy Eating: carbohydrate counting, consistency in amount, composition, and timing of food intake and label reading  Being Active: relationship to blood glucose and describe appropriate activity program      Opportunities for ongoing education and support in diabetes-self management were discussed.    Pt verbalized understanding of concepts discussed and recommendations provided today.       Education Materials Provided:  No new materials provided today    PLAN:  See Patient Instructions for co-developed, patient-stated behavior change goals.  AVS printed and provided to patient today.    FOLLOW-UP:  Follow-up appointment scheduled beginning of April, has appt with Jeannine in march.    Ongoing plan for education and support: Follow-up visit with diabetes educator in 2 months      Time Spent: 30 minutes  Encounter Type: Individual    Any diabetes medication dose changes were made via the CDE Protocol and Collaborative Practice Agreement with the patient's primary care provider. A copy of this encounter was shared with the provider.

## 2018-02-05 NOTE — PROGRESS NOTES
Diabetes Self Management Training: Individual Review Visit    Refugio Kennedy presents today for education related to Type 2 diabetes.    He is accompanied by self    Patient's diabetes management related comments/concerns: none, working hard on wt loss doing gym now    Patient's emotional response to diabetes: expresses readiness to learn    Patient would like this visit to be focused around the following diabetes-related behaviors and goals: Healthy Eating, Being Active and Monitoring    ASSESSMENT:  Patient Problem List and Family Medical History reviewed for relevant medical history, current medical status, and diabetes risk factors.    Current Diabetes Management per Patient:  Taking diabetes medications?   yes:     Diabetes Medication(s)     Biguanides Sig    metFORMIN (GLUCOPHAGE-XR) 500 MG 24 hr tablet TAKE ONE TABLET BY MOUTH EVERY DAY WITH BREAKFAST (NEED TO KEEP APPOINTMENT WITH DR. SANCHES END OF NOVEMBER)    metFORMIN modified (GLUMETZA) 500 MG 24 hr tablet Take 1 tablet (500 mg) by mouth daily (with breakfast)    Incretin Mimetic Agents (GLP-1 Receptor Agonists) Sig    VICTOZA PEN 18 MG/3ML soln INJECT 1.8MG UNDER THE SKIN ONCE DAILY          *Abbreviated insulin dose documentation key: Insulin Trade Name (xvosdmymj-fvfib-vjfqum-bedtime) - i.e. Humalog 5-5-5-0 (Humalog 5 units at breakfast, 5 units at lunch, and 5 units at dinner).    Past Diabetes Education: Yes    Patient glucose self monitoring as follows: one time daily.   BG meter: Accu-Chek Cheryl meter  BG results:                                BG values are: In goal  Patient's most recent   Lab Results   Component Value Date    A1C 5.7 12/19/2017    is meeting goal of <7.0    Nutrition:  Breakfast first PB toast earlier then- scrambled eggs, meraz or chix sausage  Lunch- deli meat sandwiches, 2-5 times a week a veggie  Supper- tacos, mac and cheese with tuna and peas    Still doing good with cheese, has cut potatoes down a lot (chips,  "fries)  Physical Activity:    Limitations: breathing  How many days are you goin-4 days  Usually: 30-40 minutes    Diabetes Risk Factors:  family history and overweight/obesity    Diabetes Complications:  Not discussed today.    Vitals:  Wt (!) 183.3 kg (404 lb)  BMI 61.44 kg/m2  Estimated body mass index is 61.44 kg/(m^2) as calculated from the following:    Height as of 17: 1.727 m (5' 7.99\").    Weight as of this encounter: 183.3 kg (404 lb).   Last 3 BP:   BP Readings from Last 3 Encounters:   17 114/76   17 102/56   17 133/82       History   Smoking Status     Former Smoker     Packs/day: 0.50     Years: 1.00     Types: Cigarettes, Cigars     Quit date: 2012   Smokeless Tobacco     Never Used       Labs:  Lab Results   Component Value Date    A1C 5.7 2017     Lab Results   Component Value Date    GLC 85 2017     Lab Results   Component Value Date    LDL 58 2017     HDL Cholesterol   Date Value Ref Range Status   2017 45 >39 mg/dL Final   ]  GFR Estimate   Date Value Ref Range Status   2017 87 >60 mL/min/1.7m2 Final     Comment:     Non  GFR Calc     GFR Estimate If Black   Date Value Ref Range Status   2017 >90 >60 mL/min/1.7m2 Final     Comment:      GFR Calc     Lab Results   Component Value Date    CR 0.94 2017     No results found for: MICROALBUMIN    Socio/Economic Considerations:    Support system: family    Health Beliefs and Attitudes:   Patient Activation Measure Survey Score:  MARY Score (Last Two) 2011   MARY Raw Score 36   Activation Score 47.4   MARY Level 2       Stage of Change: ACTION (Actively working towards change)      Diabetes knowledge and skills assessment:     Patient is knowledgeable in diabetes management concepts related to: Healthy Eating, Being Active, Monitoring, Taking Medication, Problem Solving, Reducing Risks and Healthy Coping    Patient needs further education on the " following diabetes management concepts: Healthy Eating and Being Active    Barriers to Learning Assessment: No Barriers identified    Based on learning assessment above, most appropriate setting for further diabetes education would be: Individual setting.    INTERVENTION:   Pt doing well  Continues to watch starches and eat more veggies  IS getting to the gym 2-4 times a week    Education provided today on:  AADE Self-Care Behaviors:  Healthy Eating: carbohydrate counting, consistency in amount, composition, and timing of food intake and label reading  Being Active: relationship to blood glucose and describe appropriate activity program      Opportunities for ongoing education and support in diabetes-self management were discussed.    Pt verbalized understanding of concepts discussed and recommendations provided today.       Education Materials Provided:  No new materials provided today    PLAN:  See Patient Instructions for co-developed, patient-stated behavior change goals.  AVS printed and provided to patient today.    FOLLOW-UP:  Follow-up appointment scheduled beginning of April, has appt with Jeannine in march.    Ongoing plan for education and support: Follow-up visit with diabetes educator in 2 months      Time Spent: 30 minutes  Encounter Type: Individual    Any diabetes medication dose changes were made via the CDE Protocol and Collaborative Practice Agreement with the patient's primary care provider. A copy of this encounter was shared with the provider.

## 2018-02-05 NOTE — MR AVS SNAPSHOT
After Visit Summary   2/5/2018    Refugio Kennedy    MRN: [id number]           Patient Information     Date Of Birth          1975        Visit Information        Provider Department      2/5/2018 8:00 AM Violet Bowen RD [facility]        Care Instructions    1.  Work on more vegetables still, still placing it on counter to remember to eat it    2.  Continue to work on exercise at gym, doing great with that.            Follow-ups after your visit        Your next 10 appointments already scheduled     Mar 06, 2018  3:00 PM CST   (Arrive by 2:45 PM)   Return Visit with Taty Fowler, PhD   [facility] ([facility])    [address] [place] [id number] [phone]            Apr 02, 2018  8:00 AM CDT   Diabetic Education with Violet Bowen RD   [facility] ([facility])    [address] [place] [id number] [phone]            Apr 16, 2018  8:00 AM CDT   (Arrive by 7:45 AM)   Return Visit with Ashish Paez MD   [facility] ([facility])    [address] [place] [id number] [phone]              Who to contact     If you have questions or need follow up information about today's clinic visit or your schedule please contact [facility] directly at [phone].  Normal or non-critical lab and imaging results will be communicated to you by MyChart, letter or phone within 4 business days after the clinic has received the results. If you do not hear from us within 7 days, please contact the clinic through MyChart or phone. If you have a critical or abnormal lab result, we will notify you by phone as soon as possible.  Submit refill requests through [employer] or call your pharmacy and they will forward the refill request to us. Please allow  3 business days for your refill to be completed.          Additional Information About Your Visit        MyChart Information     [employer] gives you secure access to your electronic health record. If you see a primary care provider, you can also send messages to your care team and make appointments. If you have questions, please call your primary care clinic.  If you do not have a primary care provider, please call [phone] and they will assist you.        Care EveryWhere ID     This is your Care EveryWhere ID. This could be used by other organizations to access your [facility] medical records  [id number]        Your Vitals Were     BMI (Body Mass Index)                   61.44 kg/m2            Blood Pressure from Last 3 Encounters:   12/21/17 114/76   12/19/17 102/56   12/04/17 133/82    Weight from Last 3 Encounters:   02/05/18 (!) 183.3 kg (404 lb)   01/08/18 (!) 183.8 kg (405 lb 3.2 oz)   12/21/17 (!) 184.8 kg (407 lb 8 oz)              Today, you had the following     No orders found for display       Primary Care Provider Office Phone # Fax #    Erika Espino [phone] [address] [place], [place] [id number]        Equal Access to Services     RICARDO HALL : Hadii rickie douglas hadasho Soomaali, waaxda luqadaha, qaybta kaalmada adeegyada, consuelo winter. So [facility] [phone].    ATENCIÓN: Si habla español, tiene a clinton disposición servicios gratuitos de asistencia lingüística. Llame al [phone].    We comply with applicable federal civil rights laws and [place] laws. We do not discriminate on the basis of race, color, national origin, age, disability, sex, sexual orientation, or gender identity.            Thank you!     Thank you for choosing [facility]  for your care. Our goal is always to provide you with excellent care. Hearing back from our patients is one way we can continue to improve our services. Please take a few  minutes to complete the written survey that you may receive in the mail after your visit with us. Thank you!             Your Updated Medication List - Protect others around you: Learn how to safely use, store and throw away your medicines at www.disposemymeds.org.          This list is accurate as of 2/5/18  8:39 AM.  Always use your most recent med list.                   Brand Name Dispense Instructions for use Diagnosis    ACCU-CHEK CHRIS PLUS test strip   Generic drug:  blood glucose monitoring     200 each    USE TO TEST BLOOD SUGARS THREE TIMES A DAY AS DIRECTED    Diabetes mellitus without complication (H)       albuterol 108 (90 BASE) MCG/ACT Inhaler    PROAIR HFA    1 Inhaler    Inhale 2 puffs into the lungs every 4 hours as needed for shortness of breath / dyspnea    Mild intermittent asthma without complication       aspirin 81 MG tablet     90 tablet    Take 1 tablet by mouth daily.    Type 2 diabetes, HbA1C goal < 8% (H)       BD SEVERO U/F 32G X 4 MM   Generic drug:  insulin pen needle     100 each    USE ONE DAILY OR AS DIRECTED    Type 2 diabetes mellitus without complications (H)       cetirizine 10 MG tablet    zyrTEC    90 tablet    Take 1 tablet (10 mg) by mouth every evening    Allergic rhinitis due to pollen       * DIABETIC STERILE LANCETS device     1 Box    1 Device 3 times daily.    Type 2 diabetes, HbA1C goal < 8% (H)       * ONETOUCH LANCETS Misc     270 each    1 lancet 3 times daily    Type 2 diabetes, HbA1C goal < 8% (H)       furosemide 20 MG tablet    LASIX    360 tablet    TAKE TWO TABLETS BY MOUTH TWICE A DAY    Venous stasis       lisinopril 5 MG tablet    PRINIVIL/ZESTRIL    90 tablet    TAKE ONE TABLET BY MOUTH EVERY DAY    Pulmonary hypertension       * metFORMIN modified 500 MG 24 hr tablet    GLUMETZA    90 tablet    Take 1 tablet (500 mg) by mouth daily (with breakfast)    Type 2 diabetes mellitus without complication, without long-term current use of insulin (H)       *  metFORMIN 500 MG 24 hr tablet    GLUCOPHAGE-XR    90 tablet    TAKE ONE TABLET BY MOUTH EVERY DAY WITH BREAKFAST (NEED TO KEEP APPOINTMENT WITH DR. SANCHES END OF NOVEMBER)    Methamphetamine abuse in remission       modafinil 200 MG tablet    PROVIGIL    60 tablet    Take 1/2 tablet by mouth 3-4 times daily as needed for sleepiness.    Obstructive sleep apnea-hypopnea syndrome       MULTIVITAMIN PO      Take 1 tablet by mouth daily.        order for DME      Equipment being ordered: BIPAP Refugio P Kennedy received a [employer] AirCurve 10 Bilevel. Pressures were set at 23/14 cm H2O.        order for DME      Auto-BiPAP: IPAP max 21 cm H2O EPAP min 15 cm H2O Pressure support 5 cm Changed in clinic Lifetime need and heated humidity.    FARZANA (obstructive sleep apnea)       simvastatin 10 MG tablet    ZOCOR    90 tablet    TAKE ONE TABLET BY MOUTH AT BEDTIME    Hyperlipidemia LDL goal <100       topiramate 50 MG tablet    TOPAMAX    180 tablet    TAKE 3 TABLETS BY MOUTH TWICE DAILY    Morbid obesity due to excess calories (H)       triamcinolone 0.1 % cream    KENALOG    30 g    Apply sparingly to affected area two times daily as needed    Venous stasis       VICTOZA PEN 18 MG/3ML soln   Generic drug:  liraglutide     9 mL    INJECT 1.8MG UNDER THE SKIN ONCE DAILY    Type 2 diabetes mellitus without complications (H)       * Notice:  This list has 4 medication(s) that are the same as other medications prescribed for you. Read the directions carefully, and ask your doctor or other care provider to review them with you.

## 2018-02-05 NOTE — PATIENT INSTRUCTIONS
1.  Work on more vegetables still, still placing it on counter to remember to eat it    2.  Continue to work on exercise at gym, doing great with that.

## 2018-02-26 DIAGNOSIS — I87.8 VENOUS STASIS: ICD-10-CM

## 2018-02-26 RX ORDER — FUROSEMIDE 20 MG
TABLET ORAL
Qty: 360 TABLET | Refills: 1 | Status: SHIPPED | OUTPATIENT
Start: 2018-02-26 | End: 2018-11-04

## 2018-04-02 ENCOUNTER — ALLIED HEALTH/NURSE VISIT (OUTPATIENT)
Dept: EDUCATION SERVICES | Facility: CLINIC | Age: 43
End: 2018-04-02
Payer: COMMERCIAL

## 2018-04-02 VITALS — WEIGHT: 315 LBS | BODY MASS INDEX: 61.47 KG/M2

## 2018-04-02 DIAGNOSIS — E11.9 TYPE 2 DIABETES MELLITUS WITHOUT COMPLICATION, WITHOUT LONG-TERM CURRENT USE OF INSULIN (H): Primary | ICD-10-CM

## 2018-04-02 PROCEDURE — G0108 DIAB MANAGE TRN  PER INDIV: HCPCS | Performed by: DIETITIAN, REGISTERED

## 2018-04-02 NOTE — PATIENT INSTRUCTIONS
1.  Work on doing three times a week of at least 20 minutes of activity: snow shoveling, walking around the store in the next month.    2. Continue to work on 3 suppers a week of adding a vegetable

## 2018-04-02 NOTE — PROGRESS NOTES
Diabetes Self Management Training: Individual Review Visit    Refugio Kennedy presents today for education related to Type 2 diabetes.    He is accompanied by self    Patient's diabetes management related comments/concerns:     Patient's emotional response to diabetes: expresses readiness to learn    Patient would like this visit to be focused around the following diabetes-related behaviors and goals: Healthy Eating, Being Active, Monitoring and Taking Medication    ASSESSMENT:  Patient Problem List and Family Medical History reviewed for relevant medical history, current medical status, and diabetes risk factors.    Current Diabetes Management per Patient:  Taking diabetes medications?   yes:     Diabetes Medication(s)     Biguanides Sig    metFORMIN (GLUCOPHAGE-XR) 500 MG 24 hr tablet TAKE ONE TABLET BY MOUTH EVERY DAY WITH BREAKFAST (NEED TO KEEP APPOINTMENT WITH DR. SANCHES END OF NOVEMBER)    metFORMIN modified (GLUMETZA) 500 MG 24 hr tablet Take 1 tablet (500 mg) by mouth daily (with breakfast)    Incretin Mimetic Agents (GLP-1 Receptor Agonists) Sig    VICTOZA PEN 18 MG/3ML soln INJECT 1.8MG UNDER THE SKIN ONCE DAILY          *Abbreviated insulin dose documentation key: Insulin Trade Name (gevtxbnkn-kndfe-nsbley-bedtime) - i.e. Humalog 5-5-5-0 (Humalog 5 units at breakfast, 5 units at lunch, and 5 units at dinner).    Past Diabetes Education: Yes    Patient glucose self monitoring as follows: one time daily.   BG meter: Accu-Chek Cheryl meter  BG results:    Breakfast pp Lunch pp Supper  pp HS   26-Mar 116         27-Mar 129         28-Mar 105         29-Mar 116         30-Mar 91         31-Mar 123         1-Apr 104         2-Apr 108          #DIV/0! #DIV/0! #DIV/0! #DIV/0! #DIV/0! #DIV/0!       BG values are: In goal  Patient's most recent   Lab Results   Component Value Date    A1C 5.7 12/19/2017    is meeting goal of <8.0    Nutrition:  Breakfast- toast and pb  Eggs later or meraz or sausage  Lunch-  "sandwich and vegetables  Supper- try for vegetable one to 3 times a week: cheeseburger and macaroni or hamburgers, chicken over potatoes  Snacks- has had a few snacks: saltine crackers  Had a few candies at [facility], got rid of some of the candy he was given           Physical Activity:    Has been going to gym but needs to decide which one he is going to pay for once his free one is up    Diabetes Risk Factors:  family history and overweight/obesity    Diabetes Complications:  Not discussed today.    Vitals:  Wt (!) 183.3 kg (404 lb 3.2 oz)  BMI 61.47 kg/m2  Estimated body mass index is 61.47 kg/(m^2) as calculated from the following:    Height as of 12/21/17: 1.727 m (5' 7.99\").    Weight as of this encounter: 183.3 kg (404 lb 3.2 oz).   Last 3 BP:   BP Readings from Last 3 Encounters:   12/21/17 114/76   12/19/17 102/56   12/04/17 133/82       History   Smoking Status     Former Smoker     Packs/day: 0.50     Years: 1.00     Types: Cigarettes, Cigars     Quit date: 5/21/2012   Smokeless Tobacco     Never Used       Labs:  Lab Results   Component Value Date    A1C 5.7 12/19/2017     Lab Results   Component Value Date    GLC 85 12/19/2017     Lab Results   Component Value Date    LDL 58 12/19/2017     HDL Cholesterol   Date Value Ref Range Status   12/19/2017 45 >39 mg/dL Final   ]  GFR Estimate   Date Value Ref Range Status   12/19/2017 87 >60 mL/min/1.7m2 Final     Comment:     Non  GFR Calc     GFR Estimate If Black   Date Value Ref Range Status   12/19/2017 >90 >60 mL/min/1.7m2 Final     Comment:      GFR Calc     Lab Results   Component Value Date    CR 0.94 12/19/2017     No results found for: MICROALBUMIN    Socio/Economic Considerations:    Support system: family    Health Beliefs and Attitudes:   Patient Activation Measure Survey Score:  MARY Score (Last Two) 1/25/2011   MARY Raw Score 36   Activation Score 47.4   MARY Level 2       Stage of Change: ACTION (Actively working " towards change)      Diabetes knowledge and skills assessment:     Patient is knowledgeable in diabetes management concepts related to: Healthy Eating, Being Active, Monitoring and Taking Medication    Patient needs further education on the following diabetes management concepts: Healthy Eating, Being Active, Monitoring, Taking Medication, Problem Solving, Reducing Risks and Healthy Coping    Barriers to Learning Assessment: No Barriers identified    Based on learning assessment above, most appropriate setting for further diabetes education would be: Individual setting.    INTERVENTION:   Pt is running out of his free gym membership he is working on figuring out which gym will be the best for him to join, I encouraged him about the one that includes going to the hotel to use their pool.  He likes the water and that will be easy on his joints.  Continue to work on more vegetables and avoiding the unhealthy snacking: chips, crackers...mindless eating.  Education provided today on:  AADE Self-Care Behaviors:  Healthy Eating: consistency in amount, composition, and timing of food intake and portion control  Being Active: relationship to blood glucose and describe appropriate activity program    Opportunities for ongoing education and support in diabetes-self management were discussed.    Pt verbalized understanding of concepts discussed and recommendations provided today.       Education Materials Provided:  No new materials provided today    PLAN:  See Patient Instructions for co-developed, patient-stated behavior change goals.  AVS printed and provided to patient today.    FOLLOW-UP:  Follow-up appointment scheduled on may  14th.    Ongoing plan for education and support: Follow-up visit with diabetes educator in one month      Time Spent: 45 minutes  Encounter Type: Individual    Any diabetes medication dose changes were made via the CDE Protocol and Collaborative Practice Agreement with the patient's primary care  provider. A copy of this encounter was shared with the provider.

## 2018-04-02 NOTE — MR AVS SNAPSHOT
After Visit Summary   4/2/2018    Refugio Kennedy    MRN: [id number]           Patient Information     Date Of Birth          1975        Visit Information        Provider Department      4/2/2018 8:00 AM Violet Bowen RD [facility]        Care Instructions    1.  Work on doing three times a week of at least 20 minutes of activity: snow shoveling, walking around the store in the next month.    2. Continue to work on 3 suppers a week of adding a vegetable          Follow-ups after your visit        Your next 10 appointments already scheduled     Apr 16, 2018  8:00 AM CDT   (Arrive by 7:45 AM)   Return Visit with Ashish Paez MD   [facility] ([facility])    [address] [place] [id number]   [phone]            May 08, 2018  2:00 PM CDT   (Arrive by 1:45 PM)   Return Visit with Taty Fowler PhD   [facility] ([facility])    [address] [place] [id number]   [phone]            May 14, 2018  8:00 AM CDT   Diabetic Education with Violet Bowen RD   [facility] ([facility])    [address] [place] [address] [phone]              Who to contact     If you have questions or need follow up information about today's clinic visit or your schedule please contact [facility] directly at [phone].  Normal or non-critical lab and imaging results will be communicated to you by MyChart, letter or phone within 4 business days after the clinic has received the results. If you do not hear from us within 7 days, please contact the clinic through MyChart or phone. If you have a critical or abnormal lab result, we will notify you by phone as soon as possible.  Submit refill requests through [employer] or call your pharmacy and they will  forward the refill request to us. Please allow 3 business days for your refill to be completed.          Additional Information About Your Visit        [employer] Information     [employer] gives you secure access to your electronic health record. If you see a primary care provider, you can also send messages to your care team and make appointments. If you have questions, please call your primary care clinic.  If you do not have a primary care provider, please call [phone] and they will assist you.        Care EveryWhere ID     This is your Care EveryWhere ID. This could be used by other organizations to access your [facility] medical records  [id number]        Your Vitals Were     BMI (Body Mass Index)                   61.47 kg/m2            Blood Pressure from Last 3 Encounters:   12/21/17 114/76   12/19/17 102/56   12/04/17 133/82    Weight from Last 3 Encounters:   04/02/18 (!) 183.3 kg (404 lb 3.2 oz)   02/05/18 (!) 183.3 kg (404 lb)   01/08/18 (!) 183.8 kg (405 lb 3.2 oz)              Today, you had the following     No orders found for display       Primary Care Provider Office Phone # Fax #    Erika Espino [phone] [address] [place] [address]        Equal Access to Services     RICARDO HALL : Hadii rickie ku hadasho Soomaali, waaxda luqadaha, qaybta kaalmada adeegyada, consuelo zhun eleni winter. So [facility] [phone].    ATENCIÓN: Si habla español, tiene a clinton disposición servicios gratuitos de asistencia lingüística. Llame al [phone].    We comply with applicable federal civil rights laws and [place] laws. We do not discriminate on the basis of race, color, national origin, age, disability, sex, sexual orientation, or gender identity.            Thank you!     Thank you for choosing [facility]  for your care. Our goal is always to provide you with excellent care. Hearing back from our patients is one way we can continue  to improve our services. Please take a few minutes to complete the written survey that you may receive in the mail after your visit with us. Thank you!             Your Updated Medication List - Protect others around you: Learn how to safely use, store and throw away your medicines at www.disposemymeds.org.          This list is accurate as of 4/2/18  8:41 AM.  Always use your most recent med list.                   Brand Name Dispense Instructions for use Diagnosis    ACCU-CHEK CHRIS PLUS test strip   Generic drug:  blood glucose monitoring     200 each    USE TO TEST BLOOD SUGARS THREE TIMES A DAY AS DIRECTED    Diabetes mellitus without complication (H)       albuterol 108 (90 BASE) MCG/ACT Inhaler    PROAIR HFA    1 Inhaler    Inhale 2 puffs into the lungs every 4 hours as needed for shortness of breath / dyspnea    Mild intermittent asthma without complication       aspirin 81 MG tablet     90 tablet    Take 1 tablet by mouth daily.    Type 2 diabetes, HbA1C goal < 8% (H)       BD SEVERO U/F 32G X 4 MM   Generic drug:  insulin pen needle     100 each    USE ONE DAILY OR AS DIRECTED    Type 2 diabetes mellitus without complications (H)       cetirizine 10 MG tablet    zyrTEC    90 tablet    Take 1 tablet (10 mg) by mouth every evening    Allergic rhinitis due to pollen       * DIABETIC STERILE LANCETS device     1 Box    1 Device 3 times daily.    Type 2 diabetes, HbA1C goal < 8% (H)       * ONETOUCH LANCETS Misc     270 each    1 lancet 3 times daily    Type 2 diabetes, HbA1C goal < 8% (H)       furosemide 20 MG tablet    LASIX    360 tablet    TAKE TWO TABLETS BY MOUTH TWICE A DAY    Venous stasis       lisinopril 5 MG tablet    PRINIVIL/ZESTRIL    90 tablet    TAKE ONE TABLET BY MOUTH EVERY DAY    Pulmonary hypertension       * metFORMIN modified 500 MG 24 hr tablet    GLUMETZA    90 tablet    Take 1 tablet (500 mg) by mouth daily (with breakfast)    Type 2 diabetes mellitus without complication, without  long-term current use of insulin (H)       * metFORMIN 500 MG 24 hr tablet    GLUCOPHAGE-XR    90 tablet    TAKE ONE TABLET BY MOUTH EVERY DAY WITH BREAKFAST (NEED TO KEEP APPOINTMENT WITH DR. SANCHES END OF NOVEMBER)    Methamphetamine abuse in remission       modafinil 200 MG tablet    PROVIGIL    60 tablet    Take 1/2 tablet by mouth 3-4 times daily as needed for sleepiness.    Obstructive sleep apnea-hypopnea syndrome       MULTIVITAMIN PO      Take 1 tablet by mouth daily.        order for DME      Equipment being ordered: BIPAP Refugio P Kennedy received a [employer] AirCurve 10 Bilevel. Pressures were set at 23/14 cm H2O.        order for DME      Auto-BiPAP: IPAP max 21 cm H2O EPAP min 15 cm H2O Pressure support 5 cm Changed in clinic Lifetime need and heated humidity.    FARZANA (obstructive sleep apnea)       simvastatin 10 MG tablet    ZOCOR    90 tablet    TAKE ONE TABLET BY MOUTH AT BEDTIME    Hyperlipidemia LDL goal <100       topiramate 50 MG tablet    TOPAMAX    180 tablet    TAKE 3 TABLETS BY MOUTH TWICE DAILY    Morbid obesity due to excess calories (H)       triamcinolone 0.1 % cream    KENALOG    30 g    Apply sparingly to affected area two times daily as needed    Venous stasis       VICTOZA PEN 18 MG/3ML soln   Generic drug:  liraglutide     9 mL    INJECT 1.8MG UNDER THE SKIN ONCE DAILY    Type 2 diabetes mellitus without complications (H)       * Notice:  This list has 4 medication(s) that are the same as other medications prescribed for you. Read the directions carefully, and ask your doctor or other care provider to review them with you.

## 2018-04-13 DIAGNOSIS — G47.33 OBSTRUCTIVE SLEEP APNEA-HYPOPNEA SYNDROME: ICD-10-CM

## 2018-04-13 NOTE — TELEPHONE ENCOUNTER
Chief Complaint   Patient presents with     Refill Request      Refill request received via fax by pharmacy [facility], [place] - [address] tel:[phone]    Pending Prescriptions:                       Disp   Refills    modafinil (PROVIGIL) 200 MG tablet        60 tab*1            Sig: Take 1/2 tablet by mouth 3-4 times daily as           needed for sleepiness.         Last Written Prescription Date:  01/29/2018  Last Fill Quantity: 60 per 30 days,   # refills: 1  Last Office Visit with prescribing provider: 12/21/2017  Future Office visit:       Routing refill request to provider for review/approval because:  Drug not on the G, P or [facility] refill protocol or controlled substance

## 2018-04-15 ENCOUNTER — NURSE TRIAGE (OUTPATIENT)
Dept: NURSING | Facility: CLINIC | Age: 43
End: 2018-04-15

## 2018-04-15 DIAGNOSIS — E11.9 TYPE 2 DIABETES MELLITUS WITHOUT COMPLICATIONS (H): ICD-10-CM

## 2018-04-15 DIAGNOSIS — I27.20 PULMONARY HYPERTENSION (H): ICD-10-CM

## 2018-04-15 NOTE — TELEPHONE ENCOUNTER
Reason for Disposition    Can't reduce the hernia (NO pain, local tenderness, or vomiting)    Additional Information    Negative: [1] Swelling of scrotum AND [2] has not previously been diagnosed with a hernia    Negative: SEVERE abdominal pain    Negative: Hernia is painful or tender to touch    Negative: [1] Vomiting AND [2] can't reduce the hernia    Negative: [1] Vomiting AND [2] abdomen looks much more swollen than usual    Negative: [1] Swollen lump in groin AND [2] pulsating (like heartbeat)    Negative: Patient sounds very sick or weak to the triager    Negative: [1] Constant abdominal pain AND [2] present > 2 hours  (NO pain or tenderness of hernia)    Protocols used: HERNIA-ADULT-  Has appointment tomorrow per guidelines.  Lisandra Cheung RN  [facility]

## 2018-04-16 ENCOUNTER — OFFICE VISIT (OUTPATIENT)
Dept: ENDOCRINOLOGY | Facility: CLINIC | Age: 43
End: 2018-04-16
Payer: COMMERCIAL

## 2018-04-16 VITALS
HEART RATE: 81 BPM | OXYGEN SATURATION: 100 % | BODY MASS INDEX: 49.44 KG/M2 | DIASTOLIC BLOOD PRESSURE: 64 MMHG | HEIGHT: 67 IN | WEIGHT: 315 LBS | SYSTOLIC BLOOD PRESSURE: 112 MMHG

## 2018-04-16 DIAGNOSIS — E66.01 MORBID OBESITY DUE TO EXCESS CALORIES (H): ICD-10-CM

## 2018-04-16 RX ORDER — MODAFINIL 200 MG/1
TABLET ORAL
Qty: 60 TABLET | Refills: 1 | Status: SHIPPED | OUTPATIENT
Start: 2018-04-16 | End: 2018-07-02

## 2018-04-16 RX ORDER — TOPIRAMATE 50 MG/1
200 TABLET, FILM COATED ORAL 2 TIMES DAILY
Qty: 240 TABLET | Refills: 5 | Status: SHIPPED | OUTPATIENT
Start: 2018-04-16 | End: 2018-11-04

## 2018-04-16 RX ORDER — LIRAGLUTIDE 6 MG/ML
INJECTION SUBCUTANEOUS
Qty: 9 ML | Refills: 3 | Status: SHIPPED | OUTPATIENT
Start: 2018-04-16 | End: 2018-08-16

## 2018-04-16 RX ORDER — LISINOPRIL 5 MG/1
TABLET ORAL
Qty: 90 TABLET | Refills: 1 | Status: SHIPPED | OUTPATIENT
Start: 2018-04-16 | End: 2018-07-24

## 2018-04-16 ASSESSMENT — ENCOUNTER SYMPTOMS
MUSCLE CRAMPS: 1
ABDOMINAL PAIN: 1
CONSTIPATION: 0
BACK PAIN: 1
VOMITING: 0
MUSCLE WEAKNESS: 0
NAUSEA: 0
BOWEL INCONTINENCE: 0
NECK PAIN: 0
BLOATING: 1
JAUNDICE: 0
BLOOD IN STOOL: 0
DIARRHEA: 0
JOINT SWELLING: 1
MYALGIAS: 0
HEARTBURN: 0
RECTAL PAIN: 0
STIFFNESS: 1
ARTHRALGIAS: 1

## 2018-04-16 ASSESSMENT — PAIN SCALES - GENERAL: PAINLEVEL: NO PAIN (0)

## 2018-04-16 NOTE — NURSING NOTE
"(   Chief Complaint   Patient presents with     Weight Check     f/u     )    ( Weight: (!) 182.3 kg (401 lb 12.8 oz) )  ( Height: 170.2 cm (5' 7\") )  ( BMI (Calculated): 63.06 )  (   )  (   )  (   )  (   )  (   )  (   )    ( BP: 112/64 )  (   )  (   )  (   )  ( Pulse: 81 )  (   )  ( SpO2: 100 % )    (   Patient Active Problem List   Diagnosis     FARZANA (obstructive sleep apnea)/Hypoventilation Syndrome- Severe     Morbid obesity (H)     Chronic respiratory failure (H)     Hyperlipidemia LDL goal <100     Venous stasis     Pulmonary hypertension     Mild intermittent asthma without complication     Type 2 diabetes mellitus without complication, without long-term current use of insulin (H)     Methamphetamine abuse in remission    )  (   Current Outpatient Prescriptions   Medication Sig Dispense Refill     ACCU-CHEK CHRIS PLUS test strip USE TO TEST BLOOD SUGARS THREE TIMES A DAY AS DIRECTED 200 each 0     blood glucose monitoring (SOFTCLIX) lancets TEST THREE TIMES A DAY OR AS DIRECTED 300 each 2     furosemide (LASIX) 20 MG tablet TAKE TWO TABLETS BY MOUTH TWICE A  tablet 1     modafinil (PROVIGIL) 200 MG tablet Take 1/2 tablet by mouth 3-4 times daily as needed for sleepiness. 60 tablet 1     simvastatin (ZOCOR) 10 MG tablet TAKE ONE TABLET BY MOUTH AT BEDTIME 90 tablet 3     triamcinolone (KENALOG) 0.1 % cream Apply sparingly to affected area two times daily as needed 30 g 2     VICTOZA PEN 18 MG/3ML soln INJECT 1.8MG UNDER THE SKIN ONCE DAILY 9 mL 3     topiramate (TOPAMAX) 50 MG tablet TAKE 3 TABLETS BY MOUTH TWICE DAILY 180 tablet 5     metFORMIN (GLUCOPHAGE-XR) 500 MG 24 hr tablet TAKE ONE TABLET BY MOUTH EVERY DAY WITH BREAKFAST (NEED TO KEEP APPOINTMENT WITH DR. SANCHES END OF NOVEMBER) 90 tablet 3     lisinopril (PRINIVIL/ZESTRIL) 5 MG tablet TAKE ONE TABLET BY MOUTH EVERY DAY 90 tablet 1     albuterol (PROAIR HFA) 108 (90 BASE) MCG/ACT Inhaler Inhale 2 puffs into the lungs every 4 hours as needed for " shortness of breath / dyspnea 1 Inhaler 5     BD SEVERO U/F 32G X 4 MM insulin pen needle USE ONE DAILY OR AS DIRECTED 100 each 3     cetirizine (ZYRTEC) 10 MG tablet Take 1 tablet (10 mg) by mouth every evening 90 tablet 3     metFORMIN modified (GLUMETZA) 500 MG 24 hr tablet Take 1 tablet (500 mg) by mouth daily (with breakfast) 90 tablet 3     order for DME Equipment being ordered: BIPAP Refugio P Kennedy received a [employer] AirCurve 10 Bilevel. Pressures were set at 23/14 cm H2O.       ORDER FOR DME Auto-BiPAP:  IPAP max 21 cm H2O  EPAP min 15 cm H2O  Pressure support 5 cm  Changed in clinic  Lifetime need and heated humidity.           aspirin 81 MG tablet Take 1 tablet by mouth daily. 90 tablet 3     DIABETIC STERILE LANCETS device 1 Device 3 times daily. 1 Box 12     Multiple Vitamin (MULTIVITAMIN OR) Take 1 tablet by mouth daily.      )  ( Diabetes Eval:    )    ( Pain Eval:  No Pain (0) )    ( Wound Eval:       )    (   History   Smoking Status     Former Smoker     Packs/day: 0.50     Years: 1.00     Types: Cigarettes, Cigars     Quit date: 5/21/2012   Smokeless Tobacco     Never Used    )    ( Signed By:  Steve Baires; April 16, 2018; 8:06 AM )

## 2018-04-16 NOTE — LETTER
"2018       RE: Refugio Kennedy  [address]  [facility] [address]     Dear Colleague,    Thank you for referring your patient, Refugio Kennedy, to the [facility] MEDICAL WEIGHT MANAGEMENT at [facility]. Please see a copy of my visit note below.          Return Medical Weight Management Note     Refugio Kennedy  MRN:  [id number]  :  1975  CRISTI:  2017    Dear Dr. Espino,    We had the pleasure of seeing your patient Refugio Kennedy.  He is a 41 year old male who we are continuing to see for treatment of obesity related to: DMII, HLD, sleep apnea on CPAP daily, and hypoventilation syndrome, oxygen dependent.     CURRENT WEIGHT:   401 lbs 12.8 oz    Wt Readings from Last 4 Encounters:   18 (!) 182.3 kg (401 lb 12.8 oz)   18 (!) 183.3 kg (404 lb 3.2 oz)   18 (!) 183.3 kg (404 lb)   18 (!) 183.8 kg (405 lb 3.2 oz)     Height:  5' 7\"  Body Mass Index:  Body mass index is 62.93 kg/(m^2).  Vitals:  B/P: 138/83, P: 80    Initial consult weight was 454 on 5/15/2015.  Weight change since last seen on 2017 is down 10 pounds.   Total loss is 53 pounds.    INTERVAL HISTORY:  Tolerating topiramate 150 mg BID. Now  exercise. He is not now trying to change his snacking to vegetables and get more vegetables overall.    Diet and Activity Changes Since Last Visit Reviewed With Patient 2018   I have made the following changes to my diet since my last visit: cut down deep coy foods 95%+ along with working on cutting way back on cheese and dry carb snacks and trying increase veggie intake   With regards to my diet, I am still struggling with: increasing amount of veggies and exercise and daily while trying to eliminate snacking along with bad foods   For breakfast, I typically eat: coffee peanut butter toast and eggs sometimes meraz or sausage   For lunch, I typically eat: sandwinch and milk try to add veggie or just have  a salad working on increasing salad and " veggie days   For supper, I typically eat: tacos or have trying bake chicken and other stuff to eat better and veggies 1-4x week working on increasing that also   For snack(s), I typically eat: yogurt no salt wheat crackers chocklate-i know its bad and its work in progress   I have made the following changes to my activity/exercise since my last visit: i joined gym and been using treadmill    With regards to my activity/exercise, I am still struggling with: keeping regular workout routine looking into working with  or accountability partner       MEDICATIONS:   Current Outpatient Prescriptions   Medication     ACCU-CHEK CHRIS PLUS test strip     blood glucose monitoring (SOFTCLIX) lancets     furosemide (LASIX) 20 MG tablet     simvastatin (ZOCOR) 10 MG tablet     triamcinolone (KENALOG) 0.1 % cream     VICTOZA PEN 18 MG/3ML soln     topiramate (TOPAMAX) 50 MG tablet     metFORMIN (GLUCOPHAGE-XR) 500 MG 24 hr tablet     lisinopril (PRINIVIL/ZESTRIL) 5 MG tablet     albuterol (PROAIR HFA) 108 (90 BASE) MCG/ACT Inhaler     BD SEVERO U/F 32G X 4 MM insulin pen needle     cetirizine (ZYRTEC) 10 MG tablet     metFORMIN modified (GLUMETZA) 500 MG 24 hr tablet     order for DME     ORDER FOR DME     aspirin 81 MG tablet     DIABETIC STERILE LANCETS device     Multiple Vitamin (MULTIVITAMIN OR)     modafinil (PROVIGIL) 200 MG tablet     No current facility-administered medications for this visit.        Weight Loss Medication History Reviewed With Patient 4/16/2018   Which weight loss medications are you currently taking on a regular basis?  Topamax (topiramate), Victoza (liraglutide)   Are you having any side effects from the weight loss medication that we have prescribed you? No   If you are having side effects please describe: -     ASSESSMENT:   Increase topiramate to 200 mg BID. Reinforce food plan - focus on no between meal snacking, aggressive lowering of starches and cheese.     FOLLOW-UP:    3 months   10/15  minutes spent on counseling and education    Sincerely,    Ashish Paez MD

## 2018-04-16 NOTE — PROGRESS NOTES
"      Return Medical Weight Management Note     Refugio Kennedy  MRN:  [id number]  :  1975  CRISTI:  2017    Dear Dr. Espino,    We had the pleasure of seeing your patient Refugio Kennedy.  He is a 41 year old male who we are continuing to see for treatment of obesity related to: DMII, HLD, sleep apnea on CPAP daily, and hypoventilation syndrome, oxygen dependent.     CURRENT WEIGHT:   401 lbs 12.8 oz    Wt Readings from Last 4 Encounters:   18 (!) 182.3 kg (401 lb 12.8 oz)   18 (!) 183.3 kg (404 lb 3.2 oz)   18 (!) 183.3 kg (404 lb)   18 (!) 183.8 kg (405 lb 3.2 oz)     Height:  5' 7\"  Body Mass Index:  Body mass index is 62.93 kg/(m^2).  Vitals:  B/P: 138/83, P: 80    Initial consult weight was 454 on 5/15/2015.  Weight change since last seen on 2017 is down 10 pounds.   Total loss is 53 pounds.    INTERVAL HISTORY:  Tolerating topiramate 150 mg BID. Now  exercise. He is not now trying to change his snacking to vegetables and get more vegetables overall.    Diet and Activity Changes Since Last Visit Reviewed With Patient 2018   I have made the following changes to my diet since my last visit: cut down deep coy foods 95%+ along with working on cutting way back on cheese and dry carb snacks and trying increase veggie intake   With regards to my diet, I am still struggling with: increasing amount of veggies and exercise and daily while trying to eliminate snacking along with bad foods   For breakfast, I typically eat: coffee peanut butter toast and eggs sometimes meraz or sausage   For lunch, I typically eat: sandwinch and milk try to add veggie or just have  a salad working on increasing salad and veggie days   For supper, I typically eat: tacos or have trying bake chicken and other stuff to eat better and veggies 1-4x week working on increasing that also   For snack(s), I typically eat: yogurt no salt wheat crackers chocklate-i know its bad and its work in progress   I have made " the following changes to my activity/exercise since my last visit: i joined gym and been using treadmill    With regards to my activity/exercise, I am still struggling with: keeping regular workout routine looking into working with  or accountability partner       MEDICATIONS:   Current Outpatient Prescriptions   Medication     ACCU-CHEK CHRIS PLUS test strip     blood glucose monitoring (SOFTCLIX) lancets     furosemide (LASIX) 20 MG tablet     simvastatin (ZOCOR) 10 MG tablet     triamcinolone (KENALOG) 0.1 % cream     VICTOZA PEN 18 MG/3ML soln     topiramate (TOPAMAX) 50 MG tablet     metFORMIN (GLUCOPHAGE-XR) 500 MG 24 hr tablet     lisinopril (PRINIVIL/ZESTRIL) 5 MG tablet     albuterol (PROAIR HFA) 108 (90 BASE) MCG/ACT Inhaler     BD SEVERO U/F 32G X 4 MM insulin pen needle     cetirizine (ZYRTEC) 10 MG tablet     metFORMIN modified (GLUMETZA) 500 MG 24 hr tablet     order for DME     ORDER FOR DME     aspirin 81 MG tablet     DIABETIC STERILE LANCETS device     Multiple Vitamin (MULTIVITAMIN OR)     modafinil (PROVIGIL) 200 MG tablet     No current facility-administered medications for this visit.        Weight Loss Medication History Reviewed With Patient 4/16/2018   Which weight loss medications are you currently taking on a regular basis?  Topamax (topiramate), Victoza (liraglutide)   Are you having any side effects from the weight loss medication that we have prescribed you? No   If you are having side effects please describe: -     ASSESSMENT:   Increase topiramate to 200 mg BID. Reinforce food plan - focus on no between meal snacking, aggressive lowering of starches and cheese.     FOLLOW-UP:    3 months   10/15 minutes spent on counseling and education    Sincerely,

## 2018-04-16 NOTE — MR AVS SNAPSHOT
After Visit Summary   4/16/2018    Refugio Kennedy    MRN: [id number]           Patient Information     Date Of Birth          1975        Visit Information        Provider Department      4/16/2018 8:00 AM Ashish Paez MD M [facility] Medical Weight Management        Today's Diagnoses     Morbid obesity due to excess calories (H)           Follow-ups after your visit        Follow-up notes from your care team     Return in about 4 months (around 8/16/2018).      Your next 10 appointments already scheduled     May 08, 2018  2:00 PM CDT   (Arrive by 1:45 PM)   Return Visit with Taty Fowler, PhD   [facility] ([facility])    [address] [place] [id number] [phone]            May 14, 2018  8:00 AM CDT   Diabetic Education with Violet Bowen RD   [facility] ([facility])    [address] [place] [address]   [phone]            Aug 20, 2018  9:45 AM CDT   (Arrive by 9:30 AM)   Return Visit with Ashish Paez MD   [facility] Medical [facility] Management ([facility])    [address] [place] [id number] [phone]              Who to contact     Please call your clinic at [phone] to:    Ask questions about your health    Make or cancel appointments    Discuss your medicines    Learn about your test results    Speak to your doctor            Additional Information About Your Visit        [employer] Information     [employer] gives you secure access to your electronic health record. If you see a primary care provider, you can also send messages to your care team and make appointments. If you have questions, please call your primary care clinic.  If you do not have a primary care provider, please call [phone] and they will assist you.      [employer] is an electronic gateway that provides easy, online  "access to your medical records. With [employer], you can request a clinic appointment, read your test results, renew a prescription or communicate with your care team.     To access your existing account, please contact your [facility] or call [phone] for assistance.        Care EveryWhere ID     This is your Care EveryWhere ID. This could be used by other organizations to access your [facility] medical records  [id number]        Your Vitals Were     Pulse Height Pulse Oximetry BMI (Body Mass Index)          81 1.702 m (5' 7\") 100% 62.93 kg/m2         Blood Pressure from Last 3 Encounters:   04/16/18 112/64   12/21/17 114/76   12/19/17 102/56    Weight from Last 3 Encounters:   04/16/18 (!) 182.3 kg (401 lb 12.8 oz)   04/02/18 (!) 183.3 kg (404 lb 3.2 oz)   02/05/18 (!) 183.3 kg (404 lb)              Today, you had the following     No orders found for display         Today's Medication Changes          These changes are accurate as of 4/16/18  8:49 AM.  If you have any questions, ask your nurse or doctor.               These medicines have changed or have updated prescriptions.        Dose/Directions    topiramate 50 MG tablet   Commonly known as:  TOPAMAX   This may have changed:  See the new instructions.   Used for:  Morbid obesity due to excess calories (H)   Changed by:  Ashish Paez MD        Dose:  200 mg   Take 4 tablets (200 mg) by mouth 2 times daily   Quantity:  240 tablet   Refills:  5            Where to get your medicines      These medications were sent to [facility] [place] [address]     Phone:  [phone]     topiramate 50 MG tablet                Primary Care Provider Office Phone # Fax #    Erika Espino [phone]       [address] [place], [place] [id number]        Equal Access to Services     RICARDO HALL AH: Barb Lin, " wajose alejandroda lughazaladaha, qaybta kabrianda saravia, consuelo sullivanmaeve ah. So [place] [phone].    ATENCIÓN: Si kellee dougherty, tiene a clinton disposición servicios gratuitos de asistencia lingüística. Shukri al [phone].    We comply with applicable federal civil rights laws and [place] laws. We do not discriminate on the basis of race, color, national origin, age, disability, sex, sexual orientation, or gender identity.            Thank you!     Thank you for choosing [facility] MEDICAL WEIGHT MANAGEMENT  for your care. Our goal is always to provide you with excellent care. Hearing back from our patients is one way we can continue to improve our services. Please take a few minutes to complete the written survey that you may receive in the mail after your visit with us. Thank you!             Your Updated Medication List - Protect others around you: Learn how to safely use, store and throw away your medicines at www.disposemymeds.org.          This list is accurate as of 4/16/18  8:49 AM.  Always use your most recent med list.                   Brand Name Dispense Instructions for use Diagnosis    ACCU-CHEK CHRIS PLUS test strip   Generic drug:  blood glucose monitoring     200 each    USE TO TEST BLOOD SUGARS THREE TIMES A DAY AS DIRECTED    Diabetes mellitus without complication (H)       albuterol 108 (90 Base) MCG/ACT Inhaler    PROAIR HFA    1 Inhaler    Inhale 2 puffs into the lungs every 4 hours as needed for shortness of breath / dyspnea    Mild intermittent asthma without complication       aspirin 81 MG tablet     90 tablet    Take 1 tablet by mouth daily.    Type 2 diabetes, HbA1C goal < 8% (H)       BD SEVERO U/F 32G X 4 MM   Generic drug:  insulin pen needle     100 each    USE ONE DAILY OR AS DIRECTED    Type 2 diabetes mellitus without complications (H)       cetirizine 10 MG tablet    zyrTEC    90 tablet    Take 1 tablet (10 mg) by mouth every evening    Allergic rhinitis due to pollen        * DIABETIC STERILE LANCETS device     1 Box    1 Device 3 times daily.    Type 2 diabetes, HbA1C goal < 8% (H)       * blood glucose monitoring lancets     300 each    TEST THREE TIMES A DAY OR AS DIRECTED    Type 2 diabetes, HbA1C goal < 8% (H)       furosemide 20 MG tablet    LASIX    360 tablet    TAKE TWO TABLETS BY MOUTH TWICE A DAY    Venous stasis       lisinopril 5 MG tablet    PRINIVIL/ZESTRIL    90 tablet    TAKE ONE TABLET BY MOUTH EVERY DAY    Pulmonary hypertension       * metFORMIN modified 500 MG 24 hr tablet    GLUMETZA    90 tablet    Take 1 tablet (500 mg) by mouth daily (with breakfast)    Type 2 diabetes mellitus without complication, without long-term current use of insulin (H)       * metFORMIN 500 MG 24 hr tablet    GLUCOPHAGE-XR    90 tablet    TAKE ONE TABLET BY MOUTH EVERY DAY WITH BREAKFAST (NEED TO KEEP APPOINTMENT WITH DR. SANCHES END OF NOVEMBER)    Methamphetamine abuse in remission       modafinil 200 MG tablet    PROVIGIL    60 tablet    Take 1/2 tablet by mouth 3-4 times daily as needed for sleepiness.    Obstructive sleep apnea-hypopnea syndrome       MULTIVITAMIN PO      Take 1 tablet by mouth daily.        order for DME      Equipment being ordered: BIPAP Refugio P Kennedy received a [employer] AirCurve 10 Bilevel. Pressures were set at 23/14 cm H2O.        order for DME      Auto-BiPAP: IPAP max 21 cm H2O EPAP min 15 cm H2O Pressure support 5 cm Changed in clinic Lifetime need and heated humidity.    FARZANA (obstructive sleep apnea)       simvastatin 10 MG tablet    ZOCOR    90 tablet    TAKE ONE TABLET BY MOUTH AT BEDTIME    Hyperlipidemia LDL goal <100       topiramate 50 MG tablet    TOPAMAX    240 tablet    Take 4 tablets (200 mg) by mouth 2 times daily    Morbid obesity due to excess calories (H)       triamcinolone 0.1 % cream    KENALOG    30 g    Apply sparingly to affected area two times daily as needed    Venous stasis       VICTOZA PEN 18 MG/3ML soln   Generic drug:  liraglutide      9 mL    INJECT 1.8MG UNDER THE SKIN ONCE DAILY    Type 2 diabetes mellitus without complications (H)       * Notice:  This list has 4 medication(s) that are the same as other medications prescribed for you. Read the directions carefully, and ask your doctor or other care provider to review them with you.

## 2018-04-16 NOTE — TELEPHONE ENCOUNTER
"Requested Prescriptions   Pending Prescriptions Disp Refills     VICTOZA PEN 18 MG/3ML soln [Pharmacy Med Name: VICTOZA 18MG/3ML SOPN] 9 mL 3     Sig: INJECT 1.8MG UNDER THE SKIN ONCE DAILY    GLP-1 Agonists Protocol Passed    4/15/2018  7:11 AM       Passed - Blood pressure less than 140/90 in past 6 months    BP Readings from Last 3 Encounters:   04/16/18 112/64   12/21/17 114/76   12/19/17 102/56                Passed - LDL on file in past 12 months    Recent Labs   Lab Test  12/19/17   1429   LDL  58            Passed - Microalbumin on file in past 12 months    Recent Labs   Lab Test  07/17/17   0850   MICROL  <5   UMALCR  Unable to calculate due to low value            Passed - HgbA1C in past 3 or 6 months    Recent Labs   Lab Test  12/19/17   1429   A1C  5.7            Passed - Patient is age 18 or older       Passed - Normal serum creatinine on file in past 12 months    Recent Labs   Lab Test  12/19/17   1429   CR  0.94            Passed - Recent (6 mo) or future (30 days) visit within the authorizing provider's specialty    Patient had office visit in the last 6 months or has a visit in the next 30 days with authorizing provider.  See \"Patient Info\" tab in inbasket, or \"Choose Columns\" in Meds & Orders section of the refill encounter.     Last Written Prescription Date:  12/18/17  Last Fill Quantity: 9 ml,  # refills: 3   Last office visit: 12/19/2017 with prescribing provider:     Future Office Visit:               lisinopril (PRINIVIL/ZESTRIL) 5 MG tablet [Pharmacy Med Name: LISINOPRIL 5MG TABS] 90 tablet 1     Sig: TAKE ONE TABLET BY MOUTH EVERY DAY    ACE Inhibitors (Including Combos) Protocol Passed    4/15/2018  7:11 AM       Passed - Blood pressure under 140/90 in past 12 months    BP Readings from Last 3 Encounters:   04/16/18 112/64   12/21/17 114/76   12/19/17 102/56                Passed - Recent (12 mo) or future (30 days) visit within the authorizing provider's specialty    Patient had office " "visit in the last 12 months or has a visit in the next 30 days with authorizing provider or within the authorizing provider's specialty.  See \"Patient Info\" tab in inbasket, or \"Choose Columns\" in Meds & Orders section of the refill encounter.     Last Written Prescription Date:  10/17/17  Last Fill Quantity: 90,  # refills: 1   Last office visit: 12/19/2017 with prescribing provider:     Future Office Visit:              Passed - Patient is age 18 or older       Passed - Normal serum creatinine on file in past 12 months    Recent Labs   Lab Test  12/19/17   1429   CR  0.94            Passed - Normal serum potassium on file in past 12 months    Recent Labs   Lab Test  12/19/17   1429   POTASSIUM  3.5               "

## 2018-04-18 ENCOUNTER — TELEPHONE (OUTPATIENT)
Dept: FAMILY MEDICINE | Facility: CLINIC | Age: 43
End: 2018-04-18

## 2018-04-18 DIAGNOSIS — I27.20 PULMONARY HYPERTENSION (H): ICD-10-CM

## 2018-04-18 RX ORDER — LISINOPRIL 5 MG/1
TABLET ORAL
Qty: 90 TABLET | Refills: 1 | Status: SHIPPED | OUTPATIENT
Start: 2018-04-18 | End: 2018-11-24

## 2018-04-18 NOTE — TELEPHONE ENCOUNTER
"Requested Prescriptions   Pending Prescriptions Disp Refills     lisinopril (PRINIVIL/ZESTRIL) 5 MG tablet [Pharmacy Med Name: LISINOPRIL 5MG TABS] 90 tablet 1     Sig: TAKE ONE TABLET BY MOUTH EVERY DAY    ACE Inhibitors (Including Combos) Protocol Passed    4/18/2018  6:09 AM       Passed - Blood pressure under 140/90 in past 12 months    BP Readings from Last 3 Encounters:   04/16/18 112/64   12/21/17 114/76   12/19/17 102/56                Passed - Recent (12 mo) or future (30 days) visit within the authorizing provider's specialty    Patient had office visit in the last 12 months or has a visit in the next 30 days with authorizing provider or within the authorizing provider's specialty.  See \"Patient Info\" tab in inbasket, or \"Choose Columns\" in Meds & Orders section of the refill encounter.           Passed - Patient is age 18 or older       Passed - Normal serum creatinine on file in past 12 months    Recent Labs   Lab Test  12/19/17   1429   CR  0.94            Passed - Normal serum potassium on file in past 12 months    Recent Labs   Lab Test  12/19/17   1429   POTASSIUM  3.5             Last Written Prescription Date:  4/18/18  Last Fill Quantity: 90,  # refills: 1   Last office visit: 12/19/2017 with prescribing provider:  Srinivas   Future Office Visit:      "

## 2018-04-24 ENCOUNTER — MEDICAL CORRESPONDENCE (OUTPATIENT)
Dept: HEALTH INFORMATION MANAGEMENT | Facility: CLINIC | Age: 43
End: 2018-04-24

## 2018-04-27 DIAGNOSIS — J30.1 ALLERGIC RHINITIS DUE TO POLLEN: ICD-10-CM

## 2018-04-30 RX ORDER — CETIRIZINE HYDROCHLORIDE 10 MG/1
TABLET ORAL
Qty: 90 TABLET | Refills: 3 | Status: SHIPPED | OUTPATIENT
Start: 2018-04-30 | End: 2019-05-03

## 2018-05-14 ENCOUNTER — ALLIED HEALTH/NURSE VISIT (OUTPATIENT)
Dept: EDUCATION SERVICES | Facility: CLINIC | Age: 43
End: 2018-05-14
Payer: COMMERCIAL

## 2018-05-14 VITALS — BODY MASS INDEX: 62.81 KG/M2 | WEIGHT: 315 LBS

## 2018-05-14 DIAGNOSIS — E11.9 TYPE 2 DIABETES MELLITUS WITHOUT COMPLICATION, WITHOUT LONG-TERM CURRENT USE OF INSULIN (H): Primary | ICD-10-CM

## 2018-05-14 PROCEDURE — G0108 DIAB MANAGE TRN  PER INDIV: HCPCS | Performed by: DIETITIAN, REGISTERED

## 2018-05-14 NOTE — PROGRESS NOTES
Diabetes Self Management Training: Individual Review Visit    Refugio Kennedy presents today for education related to Type 2 diabetes.    He is accompanied by self    Patient's diabetes management related comments/concerns: is more active    Patient's emotional response to diabetes: expresses readiness to learn    Patient would like this visit to be focused around the following diabetes-related behaviors and goals: Healthy Eating, Being Active, Monitoring and Taking Medication    ASSESSMENT:  Patient Problem List and Family Medical History reviewed for relevant medical history, current medical status, and diabetes risk factors.    Current Diabetes Management per Patient:  Taking diabetes medications?   yes:     Diabetes Medication(s)     Biguanides Sig    metFORMIN (GLUCOPHAGE-XR) 500 MG 24 hr tablet TAKE ONE TABLET BY MOUTH EVERY DAY WITH BREAKFAST (NEED TO KEEP APPOINTMENT WITH DR. SANCHES END OF NOVEMBER)    metFORMIN modified (GLUMETZA) 500 MG 24 hr tablet Take 1 tablet (500 mg) by mouth daily (with breakfast)    Incretin Mimetic Agents (GLP-1 Receptor Agonists) Sig    VICTOZA PEN 18 MG/3ML soln INJECT 1.8MG UNDER THE SKIN ONCE DAILY          *Abbreviated insulin dose documentation key: Insulin Trade Name (falqfbifk-jvgev-xouffj-bedtime) - i.e. Humalog 5-5-5-0 (Humalog 5 units at breakfast, 5 units at lunch, and 5 units at dinner).    Past Diabetes Education: Yes    Patient glucose self monitoring as follows: one time daily.   BG meter: Accu-Chek Cheryl meter  BG results:                                BG values are: In goal  Patient's most recent   Lab Results   Component Value Date    A1C 5.7 12/19/2017    is meeting goal of <7.0    Nutrition:  Breakfast- toast  Regular bkfst: scrambled eggs, meat  Lunch- sandwiches  Supper- turkey cheddar brat  Snack- garlic bread sticks with sour cream and milk and cheese  Has been eating more junk with snacking, feels it is due to stress with family and girlfriend  Physical  "Activity:    Limitations: none  Has been busier with flea market stuff    Diabetes Risk Factors:  family history and age over 45 years    Diabetes Complications:  Not discussed today.    Vitals:  There were no vitals taken for this visit.  Estimated body mass index is 62.93 kg/(m^2) as calculated from the following:    Height as of 4/16/18: 1.702 m (5' 7\").    Weight as of 4/16/18: 182.3 kg (401 lb 12.8 oz).   Last 3 BP:   BP Readings from Last 3 Encounters:   04/16/18 112/64   12/21/17 114/76   12/19/17 102/56       History   Smoking Status     Former Smoker     Packs/day: 0.50     Years: 1.00     Types: Cigarettes, Cigars     Quit date: 5/21/2012   Smokeless Tobacco     Never Used       Labs:  Lab Results   Component Value Date    A1C 5.7 12/19/2017     Lab Results   Component Value Date    GLC 85 12/19/2017     Lab Results   Component Value Date    LDL 58 12/19/2017     HDL Cholesterol   Date Value Ref Range Status   12/19/2017 45 >39 mg/dL Final   ]  GFR Estimate   Date Value Ref Range Status   12/19/2017 87 >60 mL/min/1.7m2 Final     Comment:     Non  GFR Calc     GFR Estimate If Black   Date Value Ref Range Status   12/19/2017 >90 >60 mL/min/1.7m2 Final     Comment:      GFR Calc     Lab Results   Component Value Date    CR 0.94 12/19/2017     No results found for: MICROALBUMIN    Socio/Economic Considerations:    Support system: family    Health Beliefs and Attitudes:   Patient Activation Measure Survey Score:  MARY Score (Last Two) 1/25/2011   MARY Raw Score 36   Activation Score 47.4   MARY Level 2       Stage of Change: MAINTENANCE (Working to maintain change, with risk of relapse)      Diabetes knowledge and skills assessment:     Patient is knowledgeable in diabetes management concepts related to: Healthy Eating, Being Active, Monitoring and Taking Medication    Patient needs further education on the following diabetes management concepts: Healthy Eating, Being Active, " Monitoring, Taking Medication, Problem Solving, Reducing Risks and Healthy Coping    Barriers to Learning Assessment: No Barriers identified    Based on learning assessment above, most appropriate setting for further diabetes education would be: Individual setting.    INTERVENTION:   Pt still struggles with 'outside stress' for his eating triggers: girlfriend, dad  He has slipped back a little with his snacking...doing some dry carbs and cheese again will work on that  His wt is down about 1/2 a lb he would like to be losing about 3 lbs a week.  He was surprised that he lost any wt at all with how he had been eating.  We discussed ways to stay busy and he will work on getting the junk out of the house so he is not tempted to eat it  Education provided today on:  AADE Self-Care Behaviors:  Healthy Eating: consistency in amount, composition, and timing of food intake and portion control  Being Active: relationship to blood glucose and describe appropriate activity program    Opportunities for ongoing education and support in diabetes-self management were discussed.    Pt verbalized understanding of concepts discussed and recommendations provided today.       Education Materials Provided:  No new materials provided today    PLAN:  See Patient Instructions for co-developed, patient-stated behavior change goals.  AVS printed and provided to patient today.    FOLLOW-UP:  Follow-up appointment scheduled in one month.    Ongoing plan for education and support: Follow-up visit with diabetes educator in [facility]      Time Spent: 30 minutes  Encounter Type: Individual    Any diabetes medication dose changes were made via the CDE Protocol and Collaborative Practice Agreement with the patient's primary care provider. A copy of this encounter was shared with the provider.

## 2018-05-14 NOTE — PATIENT INSTRUCTIONS
1.  Work on taking walks outside, try mornings when you are less busy.  Check with  to see if he can do a walk with you.    2.  Work on getting snacks back out of the house, watch the dry carbs and cheese.  Bring veggies and fruit to the flea markets when you are working for your snacks.    IF you can stay busy without all the snacking you should be able to reach your weight goal

## 2018-05-14 NOTE — MR AVS SNAPSHOT
After Visit Summary   5/14/2018    Refugio Kennedy    MRN: [id number]           Patient Information     Date Of Birth          1975        Visit Information        Provider Department      5/14/2018 8:00 AM Violet Bowen RD [place] [facility]        Care Instructions    1.  Work on taking walks outside, try mornings when you are less busy.  Check with  to see if he can do a walk with you.    2.  Work on getting snacks back out of the house, watch the dry carbs and cheese.  Bring veggies and fruit to the flea markets when you are working for your snacks.    IF you can stay busy without all the snacking you should be able to reach your weight goal                Follow-ups after your visit        Your next 10 appointments already scheduled     Jun 28, 2018  8:00 AM CDT   Diabetic Education with Violet Bowen RD   [place] [facility] ([facility])    [address] [place] [id number]   [phone]            Aug 20, 2018  9:45 AM CDT   (Arrive by 9:30 AM)   Return Visit with Ashish Paez MD   [facility] ([facility])    [address]  [address] [facility] [address] [phone]              Who to contact     If you have questions or need follow up information about today's clinic visit or your schedule please contact [facility] directly at [phone].  Normal or non-critical lab and imaging results will be communicated to you by MyChart, letter or phone within 4 business days after the clinic has received the results. If you do not hear from us within 7 days, please contact the clinic through MyChart or phone. If you have a critical or abnormal lab result, we will notify you by phone as soon as possible.  Submit refill requests through [employer] or call your pharmacy and they will forward the refill request to us. Please allow 3  business days for your refill to be completed.          Additional Information About Your Visit        MyChart Information     [employer] gives you secure access to your electronic health record. If you see a primary care provider, you can also send messages to your care team and make appointments. If you have questions, please call your primary care clinic.  If you do not have a primary care provider, please call [phone] and they will assist you.        Care EveryWhere ID     This is your Care EveryWhere ID. This could be used by other organizations to access your [facility] medical records  [id number]        Your Vitals Were     BMI (Body Mass Index)                   62.81 kg/m2            Blood Pressure from Last 3 Encounters:   04/16/18 112/64   12/21/17 114/76   12/19/17 102/56    Weight from Last 3 Encounters:   05/14/18 (!) 181.9 kg (401 lb)   04/16/18 (!) 182.3 kg (401 lb 12.8 oz)   04/02/18 (!) 183.3 kg (404 lb 3.2 oz)              Today, you had the following     No orders found for display       Primary Care Provider Office Phone # Fax #    Erika Espino [phone] [address] [place], [place] [address]        Equal Access to Services     RICARDO HALL : Hadii aad ku hadasho Soomaali, waaxda luqadaha, qaybta kaalmada adeegyada, consuelo zhun eleni winter. So [facility] [phone].    ATENCIÓN: Si habla español, tiene a clinton disposición servicios gratuitos de asistencia lingüística. Llame al [phone].    We comply with applicable federal civil rights laws and [place] laws. We do not discriminate on the basis of race, color, national origin, age, disability, sex, sexual orientation, or gender identity.            Thank you!     Thank you for choosing [facility]  for your care. Our goal is always to provide you with excellent care. Hearing back from our patients is one way we can continue to improve our services. Please take a few  minutes to complete the written survey that you may receive in the mail after your visit with us. Thank you!             Your Updated Medication List - Protect others around you: Learn how to safely use, store and throw away your medicines at www.disposemymeds.org.          This list is accurate as of 5/14/18  8:50 AM.  Always use your most recent med list.                   Brand Name Dispense Instructions for use Diagnosis    ACCU-CHEK CHRIS PLUS test strip   Generic drug:  blood glucose monitoring     200 each    USE TO TEST BLOOD SUGARS THREE TIMES A DAY AS DIRECTED    Diabetes mellitus without complication (H)       albuterol 108 (90 Base) MCG/ACT Inhaler    PROAIR HFA    1 Inhaler    Inhale 2 puffs into the lungs every 4 hours as needed for shortness of breath / dyspnea    Mild intermittent asthma without complication       aspirin 81 MG tablet     90 tablet    Take 1 tablet by mouth daily.    Type 2 diabetes, HbA1C goal < 8% (H)       BD SEVERO U/F 32G X 4 MM   Generic drug:  insulin pen needle     100 each    USE ONE DAILY OR AS DIRECTED    Type 2 diabetes mellitus without complications (H)       cetirizine 10 MG tablet    zyrTEC    90 tablet    TAKE ONE TABLET (10 MG) BY MOUTH EVERY EVENING    Allergic rhinitis due to pollen       * DIABETIC STERILE LANCETS device     1 Box    1 Device 3 times daily.    Type 2 diabetes, HbA1C goal < 8% (H)       * blood glucose monitoring lancets     300 each    TEST THREE TIMES A DAY OR AS DIRECTED    Type 2 diabetes, HbA1C goal < 8% (H)       furosemide 20 MG tablet    LASIX    360 tablet    TAKE TWO TABLETS BY MOUTH TWICE A DAY    Venous stasis       * lisinopril 5 MG tablet    PRINIVIL/ZESTRIL    90 tablet    TAKE ONE TABLET BY MOUTH EVERY DAY    Pulmonary hypertension       * lisinopril 5 MG tablet    PRINIVIL/ZESTRIL    90 tablet    TAKE ONE TABLET BY MOUTH EVERY DAY    Pulmonary hypertension       * metFORMIN modified 500 MG 24 hr tablet    GLUMETZA    90 tablet     Take 1 tablet (500 mg) by mouth daily (with breakfast)    Type 2 diabetes mellitus without complication, without long-term current use of insulin (H)       * metFORMIN 500 MG 24 hr tablet    GLUCOPHAGE-XR    90 tablet    TAKE ONE TABLET BY MOUTH EVERY DAY WITH BREAKFAST (NEED TO KEEP APPOINTMENT WITH DR. SANCHES END OF NOVEMBER)    Methamphetamine abuse in remission       modafinil 200 MG tablet    PROVIGIL    60 tablet    Take 1/2 tablet by mouth 3-4 times daily as needed for sleepiness.    Obstructive sleep apnea-hypopnea syndrome       MULTIVITAMIN PO      Take 1 tablet by mouth daily.        order for DME      Equipment being ordered: BIPAP Refugio P Kennedy received a [employer] AirCurve 10 Bilevel. Pressures were set at 23/14 cm H2O.        order for DME      Auto-BiPAP: IPAP max 21 cm H2O EPAP min 15 cm H2O Pressure support 5 cm Changed in clinic Lifetime need and heated humidity.    FARZANA (obstructive sleep apnea)       simvastatin 10 MG tablet    ZOCOR    90 tablet    TAKE ONE TABLET BY MOUTH AT BEDTIME    Hyperlipidemia LDL goal <100       topiramate 50 MG tablet    TOPAMAX    240 tablet    Take 4 tablets (200 mg) by mouth 2 times daily    Morbid obesity due to excess calories (H)       triamcinolone 0.1 % cream    KENALOG    30 g    Apply sparingly to affected area two times daily as needed    Venous stasis       VICTOZA PEN 18 MG/3ML soln   Generic drug:  liraglutide     9 mL    INJECT 1.8MG UNDER THE SKIN ONCE DAILY    Type 2 diabetes mellitus without complications (H)       * Notice:  This list has 6 medication(s) that are the same as other medications prescribed for you. Read the directions carefully, and ask your doctor or other care provider to review them with you.

## 2018-05-21 ENCOUNTER — OFFICE VISIT (OUTPATIENT)
Dept: FAMILY MEDICINE | Facility: CLINIC | Age: 43
End: 2018-05-21
Payer: COMMERCIAL

## 2018-05-21 ENCOUNTER — RADIANT APPOINTMENT (OUTPATIENT)
Dept: GENERAL RADIOLOGY | Facility: CLINIC | Age: 43
End: 2018-05-21
Attending: NURSE PRACTITIONER
Payer: COMMERCIAL

## 2018-05-21 VITALS
SYSTOLIC BLOOD PRESSURE: 108 MMHG | BODY MASS INDEX: 49.44 KG/M2 | TEMPERATURE: 98.3 F | WEIGHT: 315 LBS | HEART RATE: 80 BPM | HEIGHT: 67 IN | RESPIRATION RATE: 24 BRPM | DIASTOLIC BLOOD PRESSURE: 70 MMHG

## 2018-05-21 DIAGNOSIS — J20.9 ACUTE BRONCHITIS, UNSPECIFIED ORGANISM: ICD-10-CM

## 2018-05-21 DIAGNOSIS — J01.90 ACUTE SINUSITIS WITH SYMPTOMS > 10 DAYS: Primary | ICD-10-CM

## 2018-05-21 DIAGNOSIS — R05.9 COUGH: ICD-10-CM

## 2018-05-21 PROCEDURE — 71046 X-RAY EXAM CHEST 2 VIEWS: CPT | Mod: FY

## 2018-05-21 PROCEDURE — 99214 OFFICE O/P EST MOD 30 MIN: CPT | Performed by: NURSE PRACTITIONER

## 2018-05-21 RX ORDER — PREDNISONE 20 MG/1
TABLET ORAL
Qty: 10 TABLET | Refills: 0 | Status: SHIPPED | OUTPATIENT
Start: 2018-05-21 | End: 2018-05-21

## 2018-05-21 RX ORDER — LEVOFLOXACIN 750 MG/1
750 TABLET, FILM COATED ORAL DAILY
Qty: 5 TABLET | Refills: 0 | Status: SHIPPED | OUTPATIENT
Start: 2018-05-21 | End: 2018-06-08

## 2018-05-21 RX ORDER — FLUTICASONE PROPIONATE 50 MCG
1-2 SPRAY, SUSPENSION (ML) NASAL DAILY
Qty: 16 G | Refills: 0 | Status: SHIPPED | OUTPATIENT
Start: 2018-05-21

## 2018-05-21 NOTE — NURSING NOTE
"Chief Complaint   Patient presents with     Cough       Initial /70  Pulse 80  Temp 98.3  F (36.8  C) (Tympanic)  Resp 24  Ht 5' 7\" (1.702 m)  Wt (!) 399 lb (181 kg)  BMI 62.49 kg/m2 Estimated body mass index is 62.49 kg/(m^2) as calculated from the following:    Height as of this encounter: 5' 7\" (1.702 m).    Weight as of this encounter: 399 lb (181 kg).      Health Maintenance that is potentially due pending provider review:  NONE    n/a    Is there anyone who you would like to be able to receive your results? No  If yes have patient fill out SVEN      "

## 2018-05-21 NOTE — PROGRESS NOTES
SUBJECTIVE:   Refugio Kennedy is a 42 year old male who presents to clinic today for the following health issues:      Cough       Duration: last Tuesday     Description (location/character/radiation): cough and chest congestion     Intensity:  moderate    Accompanying signs and symptoms: nasal congestion, sinus pressure, headache, post nasal drip, migrated to chest, hurts to cough, phlegm, raspy voice, runny nose, no fever, no ear pain, extra shortness of breath and wheezing, had to take more breaks from doing things     History (similar episodes/previous evaluation): None    Precipitating or alleviating factors: None    Therapies tried and outcome: nyquil, dayquil, cold and sinus medicine       Problem list and histories reviewed & adjusted, as indicated.  Additional history: as documented    Patient Active Problem List   Diagnosis     FARZANA (obstructive sleep apnea)/Hypoventilation Syndrome- Severe     Morbid obesity (H)     Chronic respiratory failure (H)     Hyperlipidemia LDL goal <100     Venous stasis     Pulmonary hypertension     Mild intermittent asthma without complication     Type 2 diabetes mellitus without complication, without long-term current use of insulin (H)     Methamphetamine abuse in remission     Past Surgical History:   Procedure Laterality Date     LAPAROSCOPIC HERNIORRHAPHY VENTRAL N/A 5/17/2016    Procedure: LAPAROSCOPIC HERNIORRHAPHY VENTRAL;  Surgeon: Yves Jimenes MD;  Location: [facility]     LAPAROSCOPIC HERNIORRHAPHY VENTRAL N/A 12/20/2016    Procedure: LAPAROSCOPIC HERNIORRHAPHY VENTRAL;  Surgeon: Yves Jimenes MD;  Location: [facility]     SURGICAL HISTORY OF -   1998    [facility]       Social History   Substance Use Topics     Smoking status: Former Smoker     Packs/day: 0.50     Years: 1.00     Types: Cigarettes, Cigars     Quit date: 5/21/2012     Smokeless tobacco: Never Used     Alcohol use No     Family History   Problem Relation Age of Onset     HEART DISEASE Maternal Grandfather       DIABETES Maternal Grandfather      Hypertension Maternal Grandfather      Other Cancer Maternal Grandfather      CANCER Paternal Grandfather      unknown     Other Cancer Paternal Grandfather      Anxiety Disorder Mother      Asthma Mother      Depression Mother      Thyroid Disease Mother      Anxiety Disorder Father      Substance Abuse Father      Depression Father      Hypertension Father      Anxiety Disorder Brother      Substance Abuse Brother      MENTAL ILLNESS Brother      Depression Brother      Other Cancer Brother      Obesity No family hx of          Current Outpatient Prescriptions   Medication Sig Dispense Refill     ACCU-CHEK CHRIS PLUS test strip USE TO TEST BLOOD SUGARS THREE TIMES A DAY AS DIRECTED 200 each 0     albuterol (PROAIR HFA) 108 (90 BASE) MCG/ACT Inhaler Inhale 2 puffs into the lungs every 4 hours as needed for shortness of breath / dyspnea 1 Inhaler 5     aspirin 81 MG tablet Take 1 tablet by mouth daily. 90 tablet 3     BD SEVERO U/F 32G X 4 MM insulin pen needle USE ONE DAILY OR AS DIRECTED 100 each 3     blood glucose monitoring (SOFTCLIX) lancets TEST THREE TIMES A DAY OR AS DIRECTED 300 each 2     cetirizine (ZYRTEC) 10 MG tablet TAKE ONE TABLET (10 MG) BY MOUTH EVERY EVENING 90 tablet 3     DIABETIC STERILE LANCETS device 1 Device 3 times daily. 1 Box 12     furosemide (LASIX) 20 MG tablet TAKE TWO TABLETS BY MOUTH TWICE A  tablet 1     lisinopril (PRINIVIL/ZESTRIL) 5 MG tablet TAKE ONE TABLET BY MOUTH EVERY DAY 90 tablet 1     lisinopril (PRINIVIL/ZESTRIL) 5 MG tablet TAKE ONE TABLET BY MOUTH EVERY DAY 90 tablet 1     metFORMIN (GLUCOPHAGE-XR) 500 MG 24 hr tablet TAKE ONE TABLET BY MOUTH EVERY DAY WITH BREAKFAST (NEED TO KEEP APPOINTMENT WITH DR. SANCHES END OF NOVEMBER) 90 tablet 3     metFORMIN modified (GLUMETZA) 500 MG 24 hr tablet Take 1 tablet (500 mg) by mouth daily (with breakfast) 90 tablet 3     modafinil (PROVIGIL) 200 MG tablet Take 1/2 tablet by mouth 3-4 times  "daily as needed for sleepiness. 60 tablet 1     Multiple Vitamin (MULTIVITAMIN OR) Take 1 tablet by mouth daily.       ORDER FOR DME Auto-BiPAP:  IPAP max 21 cm H2O  EPAP min 15 cm H2O  Pressure support 5 cm  Changed in clinic  Lifetime need and heated humidity.           order for DME Equipment being ordered: BIPAP Refugio P Kennedy received a Resmed AirCurve 10 Bilevel. Pressures were set at 23/14 cm H2O.       simvastatin (ZOCOR) 10 MG tablet TAKE ONE TABLET BY MOUTH AT BEDTIME 90 tablet 3     topiramate (TOPAMAX) 50 MG tablet Take 4 tablets (200 mg) by mouth 2 times daily 240 tablet 5     triamcinolone (KENALOG) 0.1 % cream Apply sparingly to affected area two times daily as needed 30 g 2     VICTOZA PEN 18 MG/3ML soln INJECT 1.8MG UNDER THE SKIN ONCE DAILY 9 mL 3     No Known Allergies    Reviewed and updated as needed this visit by clinical staff  Tobacco  Allergies  Meds  Med Hx  Surg Hx  Fam Hx  Soc Hx      Reviewed and updated as needed this visit by Provider         ROS:  Constitutional, HEENT, cardiovascular, pulmonary, gi and gu systems are negative, except as otherwise noted.    OBJECTIVE:     /70  Pulse 80  Temp 98.3  F (36.8  C) (Tympanic)  Resp 24  Ht 5' 7\" (1.702 m)  Wt (!) 399 lb (181 kg)  BMI 62.49 kg/m2  Body mass index is 62.49 kg/(m^2).   GENERAL: healthy, alert and no distress  EYES: Eyes grossly normal to inspection, PERRL and conjunctivae and sclerae normal  HENT: ear canals and TM's normal, nose and mouth without ulcers or lesions  NECK: no adenopathy, no asymmetry, masses, or scars and thyroid normal to palpation  RESP: lungs clear to auscultation - no rales, rhonchi or wheezes  CV: regular rate and rhythm, normal S1 S2, no S3 or S4, no murmur, click or rub, no peripheral edema and peripheral pulses strong  MS: no gross musculoskeletal defects noted, no edema  SKIN: no suspicious lesions or rashes  NEURO: Normal strength and tone, mentation intact and speech normal  PSYCH: " mentation appears normal, affect normal/bright    CHEST TWO VIEWS 5/21/2018 12:06 PM      HISTORY: Fevers. Cough.     COMPARISON: 2/7/2015     FINDINGS: Heart size and pulmonary vascularity are within normal  limits. The lungs are clear. No pneumothorax or pleural effusion.          IMPRESSION: No radiographic evidence of acute chest abnormality.   ASSESSMENT:       ICD-10-CM    1. Acute sinusitis with symptoms > 10 days J01.90 fluticasone (FLONASE) 50 MCG/ACT spray     levofloxacin (LEVAQUIN) 750 MG tablet   2. Acute bronchitis, unspecified organism J20.9 DISCONTINUED: predniSONE (DELTASONE) 20 MG tablet   3. Cough R05 XR Chest 2 Views         PLAN:     Patient Instructions   Medications as directed   Symptom Relief: Afdrin do not use greater then 3 days  Intranasal corticosteroid   Flonase has  been prescribed.     Tylenol or Ibuprofen if able to take for fevers and discomfort.  Do not exceed 4 grams of Tylenol in a 24 hour period.  Take Ibuprofen with food.  Hydrate with fluids, rest, cool humidifier.  May use acetaminophen, ibuprofen prn.    For your Cough   The Buckwheat Honey Dose: Given   hour Prior to Bedtime  For children age under 1 year -Do not use due to botulism risk     2-5 years -  tsp (2.5 mL)    6-11 years -1 tsp (5 mL)    12-18 years -2 tsp (10 mL)     Guaifenesin     Adult dose -Not to exceed 2.4 g (2400mg) per day    Child age 6-12 years -100 mg every 4 hr, not to exceed 1.2 g (1200mg) per day     Pediatric, 2-6 years -50 mg every 4 hr as needed, not to exceed 600 mg per day    Cough medications is not recommended in children under 2 years.  With use of cough medications have combination medications be aware of products in the cough medication you are using to avoid overdose    Follow up with PCP in 2 weeks  .    Go to Emergency Room if sx worsen or change, Shortness of breath, chest pain, persistent fevers, or painful breathing occur.     Patient voiced understanding of instructions given.       Follow-up with your primary care provider next week and as needed.    Indications for emergent return to emergency department discussed with patient, who verbalized good understanding and agreement.  Patient understands the limitations of today's evaluation.         Sinusitis (Antibiotic Treatment)    The sinuses are air-filled spaces within the bones of the face. They connect to the inside of the nose. Sinusitis is an inflammation of the tissue that lines the sinuses. Sinusitis can occur during a cold. It can also happen due to allergies to pollens and other particles in the air. Sinusitis can cause symptoms of sinus congestion and a feeling of fullness. A sinus infection causes fever, headache, and facial pain. There is often green or yellow fluid draining from the nose or into the back of the throat (post-nasal drip). You have been given antibiotics to treat this condition.  Home care    Take the full course of antibiotics as instructed. Do not stop taking them, even when you feel better.    Drink plenty of water, hot tea, and other liquids. This may help thin nasal mucus. It also may help your sinuses drain fluids.    Heat may help soothe painful areas of your face. Use a towel soaked in hot water. Or,  the shower and direct the warm spray onto your face. Using a vaporizer along with a menthol rub at night may also help soothe symptoms.     An expectorant with guaifenesin may help thin nasal mucus and help your sinuses drain fluids.    You can use an over-the-counter decongestant, unless a similar medicine was prescribed to you. Nasal sprays work the fastest. Use one that contains phenylephrine or oxymetazoline. First blow your nose gently. Then use the spray. Do not use these medicines more often than directed on the label. If you do, your symptoms may get worse. You may also take pills that contain pseudoephedrine. Don t use products that combine multiple medicines. This is because side effects may  be increased. Read labels. You can also ask the pharmacist for help. (People with high blood pressure should not use decongestants. They can raise blood pressure.)    Over-the-counter antihistamines may help if allergies contributed to your sinusitis.      Do not use nasal rinses or irrigation during an acute sinus infection, unless your healthcare provider tells you to. Rinsing may spread the infection to other areas in your sinuses.    Use acetaminophen or ibuprofen to control pain, unless another pain medicine was prescribed to you. If you have chronic liver or kidney disease or ever had a stomach ulcer, talk with your healthcare provider before using these medicines. (Aspirin should never be taken by anyone under age 18 who is ill with a fever. It may cause severe liver damage.)    Don't smoke. This can make symptoms worse.  Follow-up care  Follow up with your healthcare provider or our staff if you are better in 1 week.  When to seek medical advice  Call your healthcare provider if any of these occur:    Facial pain or headache that gets worse    Stiff neck    Unusual drowsiness or confusion    Swelling of your forehead or eyelids    Vision problems, such as blurred or double vision    Fever of 100.4 F (38 C) or higher, or as directed by your healthcare provider    Seizure    Breathing problems    Symptoms don't go away in 10 days  Prevention  Here are steps you can take to help prevent an infection:    Keep good hand washing habits.    Don t have close contact with people who have sore throats, colds, or other upper respiratory infections.    Don t smoke, and stay away from secondhand smoke.    Stay up to date with of your vaccines.  Date Last Reviewed: 11/1/2017 2000-2017 [employer]. [address] [place] [address], [place] [address]. All rights reserved. This information is not intended as a substitute for professional medical care. Always follow your healthcare professional's  instructions.        Bronchitis, Antibiotic Treatment (Adult)    Bronchitis is an infection of the air passages (bronchial tubes) in your lungs. It often occurs when you have a cold. This illness is contagious during the first few days and is spread through the air by coughing and sneezing, or by direct contact (touching the sick person and then touching your own eyes, nose, or mouth).  Symptoms of bronchitis include cough with mucus (phlegm) and low-grade fever. Bronchitis usually lasts 7 to 14 days. Mild cases can be treated with simple home remedies. More severe infection is treated with an antibiotic.  Home care  Follow these guidelines when caring for yourself at home:    If your symptoms are severe, rest at home for the first 2 to 3 days. When you go back to your usual activities, don't let yourself get too tired.    Do not smoke. Also avoid being exposed to secondhand smoke.    You may use over-the-counter medicines to control fever or pain, unless another medicine was prescribed. If you have chronic liver or kidney disease or have ever had a stomach ulcer or gastrointestinal bleeding, talk with your healthcare provider before using these medicines. Also talk to your provider if you are taking medicine to prevent blood clots. Aspirin should never be given to anyone younger than 18 years of age who is ill with a viral infection or fever. It may cause severe liver or brain damage.    Your appetite may be poor, so a light diet is fine. Avoid dehydration by drinking 6 to 8 glasses of fluids per day (such as water, soft drinks, sports drinks, juices, tea, or soup). Extra fluids will help loosen secretions in the nose and lungs.    Over-the-counter cough, cold, and sore-throat medicines will not shorten the length of the illness, but they may be helpful to reduce symptoms. (Note: Do not use decongestants if you have high blood pressure.)    Finish all antibiotic medicine. Do this even if you are feeling better  after only a few days.  Follow-up care  Follow up with your healthcare provider, or as advised. If you had an X-ray or ECG (electrocardiogram), a specialist will review it. You will be notified of any new findings that may affect your care.  If you are age 65 or older, or if you have a chronic lung disease or condition that affects your immune system, or you smoke, ask your healthcare provider about getting a pneumococcal vaccine and a yearly flu shot (influenza vaccine).  When to seek medical advice  Call your healthcare provider right away if any of these occur:    Fever of 100.4 F (38 C) or higher, or as directed by your healthcare provider    Coughing up increased amounts of colored sputum    Weakness, drowsiness, headache, facial pain, ear pain, or a stiff neck  Call 911  Call 911 if any of these occur.    Coughing up blood    Worsening weakness, drowsiness, headache, or stiff neck    Trouble breathing, wheezing, or pain with breathing  Date Last Reviewed: 9/13/2015 2000-2017 [employer]. [address] [place] [address], [place] [address]. All rights reserved. This information is not intended as a substitute for professional medical care. Always follow your healthcare professional's instructions.          NANCY Bliss [facility]

## 2018-05-21 NOTE — MR AVS SNAPSHOT
After Visit Summary   5/21/2018    Refugio Kennedy    MRN: [id number]           Patient Information     Date Of Birth          1975        Visit Information        Provider Department      5/21/2018 11:20 AM Stacey Willingham APRN [facility]        Today's Diagnoses     Cough    -  1    Acute sinusitis with symptoms > 10 days        Acute bronchitis, unspecified organism          Care Instructions    Medications as directed   Symptom Relief: Afdrin do not use greater then 3 days  Intranasal corticosteroid   Flonase has  been prescribed.     Tylenol or Ibuprofen if able to take for fevers and discomfort.  Do not exceed 4 grams of Tylenol in a 24 hour period.  Take Ibuprofen with food.  Hydrate with fluids, rest, cool humidifier.  May use acetaminophen, ibuprofen prn.    For your Cough   The Buckwheat Honey Dose: Given   hour Prior to Bedtime  For children age under 1 year -Do not use due to botulism risk     2-5 years -  tsp (2.5 mL)    6-11 years -1 tsp (5 mL)    12-18 years -2 tsp (10 mL)     Guaifenesin     Adult dose -Not to exceed 2.4 g (2400mg) per day    Child age 6-12 years -100 mg every 4 hr, not to exceed 1.2 g (1200mg) per day     Pediatric, 2-6 years -50 mg every 4 hr as needed, not to exceed 600 mg per day    Cough medications is not recommended in children under 2 years.  With use of cough medications have combination medications be aware of products in the cough medication you are using to avoid overdose    Follow up with PCP in 2 weeks  .    Go to Emergency Room if sx worsen or change, Shortness of breath, chest pain, persistent fevers, or painful breathing occur.     Patient voiced understanding of instructions given.      Follow-up with your primary care provider next week and as needed.    Indications for emergent return to emergency department discussed with patient, who verbalized good understanding and agreement.  Patient understands the limitations of  today's evaluation.         Sinusitis (Antibiotic Treatment)    The sinuses are air-filled spaces within the bones of the face. They connect to the inside of the nose. Sinusitis is an inflammation of the tissue that lines the sinuses. Sinusitis can occur during a cold. It can also happen due to allergies to pollens and other particles in the air. Sinusitis can cause symptoms of sinus congestion and a feeling of fullness. A sinus infection causes fever, headache, and facial pain. There is often green or yellow fluid draining from the nose or into the back of the throat (post-nasal drip). You have been given antibiotics to treat this condition.  Home care    Take the full course of antibiotics as instructed. Do not stop taking them, even when you feel better.    Drink plenty of water, hot tea, and other liquids. This may help thin nasal mucus. It also may help your sinuses drain fluids.    Heat may help soothe painful areas of your face. Use a towel soaked in hot water. Or,  the shower and direct the warm spray onto your face. Using a vaporizer along with a menthol rub at night may also help soothe symptoms.     An expectorant with guaifenesin may help thin nasal mucus and help your sinuses drain fluids.    You can use an over-the-counter decongestant, unless a similar medicine was prescribed to you. Nasal sprays work the fastest. Use one that contains phenylephrine or oxymetazoline. First blow your nose gently. Then use the spray. Do not use these medicines more often than directed on the label. If you do, your symptoms may get worse. You may also take pills that contain pseudoephedrine. Don t use products that combine multiple medicines. This is because side effects may be increased. Read labels. You can also ask the pharmacist for help. (People with high blood pressure should not use decongestants. They can raise blood pressure.)    Over-the-counter antihistamines may help if allergies contributed to your  sinusitis.      Do not use nasal rinses or irrigation during an acute sinus infection, unless your healthcare provider tells you to. Rinsing may spread the infection to other areas in your sinuses.    Use acetaminophen or ibuprofen to control pain, unless another pain medicine was prescribed to you. If you have chronic liver or kidney disease or ever had a stomach ulcer, talk with your healthcare provider before using these medicines. (Aspirin should never be taken by anyone under age 18 who is ill with a fever. It may cause severe liver damage.)    Don't smoke. This can make symptoms worse.  Follow-up care  Follow up with your healthcare provider or our staff if you are better in 1 week.  When to seek medical advice  Call your healthcare provider if any of these occur:    Facial pain or headache that gets worse    Stiff neck    Unusual drowsiness or confusion    Swelling of your forehead or eyelids    Vision problems, such as blurred or double vision    Fever of 100.4 F (38 C) or higher, or as directed by your healthcare provider    Seizure    Breathing problems    Symptoms don't go away in 10 days  Prevention  Here are steps you can take to help prevent an infection:    Keep good hand washing habits.    Don t have close contact with people who have sore throats, colds, or other upper respiratory infections.    Don t smoke, and stay away from secondhand smoke.    Stay up to date with of your vaccines.  Date Last Reviewed: 11/1/2017 2000-2017 [employer]. [address] [place] [address]. All rights reserved. This information is not intended as a substitute for professional medical care. Always follow your healthcare professional's instructions.        Bronchitis, Antibiotic Treatment (Adult)    Bronchitis is an infection of the air passages (bronchial tubes) in your lungs. It often occurs when you have a cold. This illness is contagious during the first few days and is spread through the  air by coughing and sneezing, or by direct contact (touching the sick person and then touching your own eyes, nose, or mouth).  Symptoms of bronchitis include cough with mucus (phlegm) and low-grade fever. Bronchitis usually lasts 7 to 14 days. Mild cases can be treated with simple home remedies. More severe infection is treated with an antibiotic.  Home care  Follow these guidelines when caring for yourself at home:    If your symptoms are severe, rest at home for the first 2 to 3 days. When you go back to your usual activities, don't let yourself get too tired.    Do not smoke. Also avoid being exposed to secondhand smoke.    You may use over-the-counter medicines to control fever or pain, unless another medicine was prescribed. If you have chronic liver or kidney disease or have ever had a stomach ulcer or gastrointestinal bleeding, talk with your healthcare provider before using these medicines. Also talk to your provider if you are taking medicine to prevent blood clots. Aspirin should never be given to anyone younger than 18 years of age who is ill with a viral infection or fever. It may cause severe liver or brain damage.    Your appetite may be poor, so a light diet is fine. Avoid dehydration by drinking 6 to 8 glasses of fluids per day (such as water, soft drinks, sports drinks, juices, tea, or soup). Extra fluids will help loosen secretions in the nose and lungs.    Over-the-counter cough, cold, and sore-throat medicines will not shorten the length of the illness, but they may be helpful to reduce symptoms. (Note: Do not use decongestants if you have high blood pressure.)    Finish all antibiotic medicine. Do this even if you are feeling better after only a few days.  Follow-up care  Follow up with your healthcare provider, or as advised. If you had an X-ray or ECG (electrocardiogram), a specialist will review it. You will be notified of any new findings that may affect your care.  If you are age 65 or  older, or if you have a chronic lung disease or condition that affects your immune system, or you smoke, ask your healthcare provider about getting a pneumococcal vaccine and a yearly flu shot (influenza vaccine).  When to seek medical advice  Call your healthcare provider right away if any of these occur:    Fever of 100.4 F (38 C) or higher, or as directed by your healthcare provider    Coughing up increased amounts of colored sputum    Weakness, drowsiness, headache, facial pain, ear pain, or a stiff neck  Call 911  Call 911 if any of these occur.    Coughing up blood    Worsening weakness, drowsiness, headache, or stiff neck    Trouble breathing, wheezing, or pain with breathing  Date Last Reviewed: 9/13/2015 2000-2017 [employer]. [address] [place] [address]. All rights reserved. This information is not intended as a substitute for professional medical care. Always follow your healthcare professional's instructions.                Follow-ups after your visit        Your next 10 appointments already scheduled     Jun 28, 2018  8:00 AM CDT   Diabetic Education with Violet Bowen RD   [facility] ([facility])    [address] [place] [id number]   [phone]            Aug 20, 2018  9:45 AM CDT   (Arrive by 9:30 AM)   Return Visit with Ashish Paez MD   [facility] ([facility])    [address] [place] [id number] [phone]              Who to contact     If you have questions or need follow up information about today's clinic visit or your schedule please contact [facility] directly at [phone].  Normal or non-critical lab and imaging results will be communicated to you by MyChart, letter or phone within 4 business days after the clinic has received the results. If you do not hear from us within 7 days, please  "contact the clinic through [employer] or phone. If you have a critical or abnormal lab result, we will notify you by phone as soon as possible.  Submit refill requests through [employer] or call your pharmacy and they will forward the refill request to us. Please allow 3 business days for your refill to be completed.          Additional Information About Your Visit        [employer] Information     [employer] gives you secure access to your electronic health record. If you see a primary care provider, you can also send messages to your care team and make appointments. If you have questions, please call your primary care clinic.  If you do not have a primary care provider, please call [phone] and they will assist you.        Care EveryWhere ID     This is your Care EveryWhere ID. This could be used by other organizations to access your [place] medical records  [id number]        Your Vitals Were     Pulse Temperature Respirations Height BMI (Body Mass Index)       80 98.3  F (36.8  C) (Tympanic) 24 5' 7\" (1.702 m) 62.49 kg/m2        Blood Pressure from Last 3 Encounters:   05/21/18 108/70   04/16/18 112/64   12/21/17 114/76    Weight from Last 3 Encounters:   05/21/18 (!) 399 lb (181 kg)   05/14/18 (!) 401 lb (181.9 kg)   04/16/18 (!) 401 lb 12.8 oz (182.3 kg)                 Today's Medication Changes          These changes are accurate as of 5/21/18 12:07 PM.  If you have any questions, ask your nurse or doctor.               Start taking these medicines.        Dose/Directions    fluticasone 50 MCG/ACT spray   Commonly known as:  FLONASE   Used for:  Acute sinusitis with symptoms > 10 days   Started by:  Stacey Willingham APRN CNP        Dose:  1-2 spray   Spray 1-2 sprays into both nostrils daily   Quantity:  16 g   Refills:  0       levofloxacin 750 MG tablet   Commonly known as:  LEVAQUIN   Used for:  Acute sinusitis with symptoms > 10 days   Started by:  Satcey Willingham APRN CNP        Dose:  750 mg   Take 1 " tablet (750 mg) by mouth daily   Quantity:  5 tablet   Refills:  0            Where to get your medicines      These medications were sent to [facility] [address] [address]     Phone:  [phone]     fluticasone 50 MCG/ACT spray    levofloxacin 750 MG tablet                Primary Care Provider Office Phone # Fax #    Erika Espino [phone] [address] [place] [id number]        Equal Access to Services     [facility] HospitalALICIA : Hadii aad ku hadasho Soomaali, waaxda luqadaha, qaybta kaalmada adeegyada, waxay shelbiin hayshruthin eleni rider . So [facility] [phone].    ATENCIÓN: Si habla español, tiene a clinton disposición servicios gratuitos de asistencia lingüística. [facility] [phone].    We comply with applicable federal civil rights laws and [place] laws. We do not discriminate on the basis of race, color, national origin, age, disability, sex, sexual orientation, or gender identity.            Thank you!     Thank you for choosing [facility]  for your care. Our goal is always to provide you with excellent care. Hearing back from our patients is one way we can continue to improve our services. Please take a few minutes to complete the written survey that you may receive in the mail after your visit with us. Thank you!             Your Updated Medication List - Protect others around you: Learn how to safely use, store and throw away your medicines at www.disposemymeds.org.          This list is accurate as of 5/21/18 12:07 PM.  Always use your most recent med list.                   Brand Name Dispense Instructions for use Diagnosis    ACCU-CHEK CHRIS PLUS test strip   Generic drug:  blood glucose monitoring     200 each    USE TO TEST BLOOD SUGARS THREE TIMES A DAY AS DIRECTED    Diabetes mellitus without complication (H)       albuterol 108 (90 Base) MCG/ACT Inhaler    PROAIR  HFA    1 Inhaler    Inhale 2 puffs into the lungs every 4 hours as needed for shortness of breath / dyspnea    Mild intermittent asthma without complication       aspirin 81 MG tablet     90 tablet    Take 1 tablet by mouth daily.    Type 2 diabetes, HbA1C goal < 8% (H)       BD SEVERO U/F 32G X 4 MM   Generic drug:  insulin pen needle     100 each    USE ONE DAILY OR AS DIRECTED    Type 2 diabetes mellitus without complications (H)       cetirizine 10 MG tablet    zyrTEC    90 tablet    TAKE ONE TABLET (10 MG) BY MOUTH EVERY EVENING    Allergic rhinitis due to pollen       * DIABETIC STERILE LANCETS device     1 Box    1 Device 3 times daily.    Type 2 diabetes, HbA1C goal < 8% (H)       * blood glucose monitoring lancets     300 each    TEST THREE TIMES A DAY OR AS DIRECTED    Type 2 diabetes, HbA1C goal < 8% (H)       fluticasone 50 MCG/ACT spray    FLONASE    16 g    Spray 1-2 sprays into both nostrils daily    Acute sinusitis with symptoms > 10 days       furosemide 20 MG tablet    LASIX    360 tablet    TAKE TWO TABLETS BY MOUTH TWICE A DAY    Venous stasis       levofloxacin 750 MG tablet    LEVAQUIN    5 tablet    Take 1 tablet (750 mg) by mouth daily    Acute sinusitis with symptoms > 10 days       * lisinopril 5 MG tablet    PRINIVIL/ZESTRIL    90 tablet    TAKE ONE TABLET BY MOUTH EVERY DAY    Pulmonary hypertension       * lisinopril 5 MG tablet    PRINIVIL/ZESTRIL    90 tablet    TAKE ONE TABLET BY MOUTH EVERY DAY    Pulmonary hypertension       * metFORMIN modified 500 MG 24 hr tablet    GLUMETZA    90 tablet    Take 1 tablet (500 mg) by mouth daily (with breakfast)    Type 2 diabetes mellitus without complication, without long-term current use of insulin (H)       * metFORMIN 500 MG 24 hr tablet    GLUCOPHAGE-XR    90 tablet    TAKE ONE TABLET BY MOUTH EVERY DAY WITH BREAKFAST (NEED TO KEEP APPOINTMENT WITH DR. SANCHES END OF NOVEMBER)    Methamphetamine abuse in remission       modafinil 200 MG tablet     PROVIGIL    60 tablet    Take 1/2 tablet by mouth 3-4 times daily as needed for sleepiness.    Obstructive sleep apnea-hypopnea syndrome       MULTIVITAMIN PO      Take 1 tablet by mouth daily.        order for DME      Equipment being ordered: BIPAP Refugio P Kennedy received a [employer]med AirCurve 10 Bilevel. Pressures were set at 23/14 cm H2O.        order for DME      Auto-BiPAP: IPAP max 21 cm H2O EPAP min 15 cm H2O Pressure support 5 cm Changed in clinic Lifetime need and heated humidity.    FARZANA (obstructive sleep apnea)       simvastatin 10 MG tablet    ZOCOR    90 tablet    TAKE ONE TABLET BY MOUTH AT BEDTIME    Hyperlipidemia LDL goal <100       topiramate 50 MG tablet    TOPAMAX    240 tablet    Take 4 tablets (200 mg) by mouth 2 times daily    Morbid obesity due to excess calories (H)       triamcinolone 0.1 % cream    KENALOG    30 g    Apply sparingly to affected area two times daily as needed    Venous stasis       VICTOZA PEN 18 MG/3ML soln   Generic drug:  liraglutide     9 mL    INJECT 1.8MG UNDER THE SKIN ONCE DAILY    Type 2 diabetes mellitus without complications (H)       * Notice:  This list has 6 medication(s) that are the same as other medications prescribed for you. Read the directions carefully, and ask your doctor or other care provider to review them with you.

## 2018-05-21 NOTE — PATIENT INSTRUCTIONS
Medications as directed   Symptom Relief: Afdrin do not use greater then 3 days  Intranasal corticosteroid   Flonase has  been prescribed.     Tylenol or Ibuprofen if able to take for fevers and discomfort.  Do not exceed 4 grams of Tylenol in a 24 hour period.  Take Ibuprofen with food.  Hydrate with fluids, rest, cool humidifier.  May use acetaminophen, ibuprofen prn.    For your Cough   The [employer] Honey Dose: Given   hour Prior to Bedtime  For children age under 1 year -Do not use due to botulism risk     2-5 years -  tsp (2.5 mL)    6-11 years -1 tsp (5 mL)    12-18 years -2 tsp (10 mL)     Guaifenesin     Adult dose -Not to exceed 2.4 g (2400mg) per day    Child age 6-12 years -100 mg every 4 hr, not to exceed 1.2 g (1200mg) per day     Pediatric, 2-6 years -50 mg every 4 hr as needed, not to exceed 600 mg per day    Cough medications is not recommended in children under 2 years.  With use of cough medications have combination medications be aware of products in the cough medication you are using to avoid overdose    Follow up with PCP in 2 weeks  .    Go to Emergency Room if sx worsen or change, Shortness of breath, chest pain, persistent fevers, or painful breathing occur.     Patient voiced understanding of instructions given.      Follow-up with your primary care provider next week and as needed.    Indications for emergent return to emergency department discussed with patient, who verbalized good understanding and agreement.  Patient understands the limitations of today's evaluation.         Sinusitis (Antibiotic Treatment)    The sinuses are air-filled spaces within the bones of the face. They connect to the inside of the nose. Sinusitis is an inflammation of the tissue that lines the sinuses. Sinusitis can occur during a cold. It can also happen due to allergies to pollens and other particles in the air. Sinusitis can cause symptoms of sinus congestion and a feeling of fullness. A sinus infection  causes fever, headache, and facial pain. There is often green or yellow fluid draining from the nose or into the back of the throat (post-nasal drip). You have been given antibiotics to treat this condition.  Home care    Take the full course of antibiotics as instructed. Do not stop taking them, even when you feel better.    Drink plenty of water, hot tea, and other liquids. This may help thin nasal mucus. It also may help your sinuses drain fluids.    Heat may help soothe painful areas of your face. Use a towel soaked in hot water. Or,  the shower and direct the warm spray onto your face. Using a vaporizer along with a menthol rub at night may also help soothe symptoms.     An expectorant with guaifenesin may help thin nasal mucus and help your sinuses drain fluids.    You can use an over-the-counter decongestant, unless a similar medicine was prescribed to you. Nasal sprays work the fastest. Use one that contains phenylephrine or oxymetazoline. First blow your nose gently. Then use the spray. Do not use these medicines more often than directed on the label. If you do, your symptoms may get worse. You may also take pills that contain pseudoephedrine. Don t use products that combine multiple medicines. This is because side effects may be increased. Read labels. You can also ask the pharmacist for help. (People with high blood pressure should not use decongestants. They can raise blood pressure.)    Over-the-counter antihistamines may help if allergies contributed to your sinusitis.      Do not use nasal rinses or irrigation during an acute sinus infection, unless your healthcare provider tells you to. Rinsing may spread the infection to other areas in your sinuses.    Use acetaminophen or ibuprofen to control pain, unless another pain medicine was prescribed to you. If you have chronic liver or kidney disease or ever had a stomach ulcer, talk with your healthcare provider before using these medicines.  (Aspirin should never be taken by anyone under age 18 who is ill with a fever. It may cause severe liver damage.)    Don't smoke. This can make symptoms worse.  Follow-up care  Follow up with your healthcare provider or our staff if you are better in 1 week.  When to seek medical advice  Call your healthcare provider if any of these occur:    Facial pain or headache that gets worse    Stiff neck    Unusual drowsiness or confusion    Swelling of your forehead or eyelids    Vision problems, such as blurred or double vision    Fever of 100.4 F (38 C) or higher, or as directed by your healthcare provider    Seizure    Breathing problems    Symptoms don't go away in 10 days  Prevention  Here are steps you can take to help prevent an infection:    Keep good hand washing habits.    Don t have close contact with people who have sore throats, colds, or other upper respiratory infections.    Don t smoke, and stay away from secondhand smoke.    Stay up to date with of your vaccines.  Date Last Reviewed: 11/1/2017 2000-2017 [employer]. [address] [place] [address]. All rights reserved. This information is not intended as a substitute for professional medical care. Always follow your healthcare professional's instructions.        Bronchitis, Antibiotic Treatment (Adult)    Bronchitis is an infection of the air passages (bronchial tubes) in your lungs. It often occurs when you have a cold. This illness is contagious during the first few days and is spread through the air by coughing and sneezing, or by direct contact (touching the sick person and then touching your own eyes, nose, or mouth).  Symptoms of bronchitis include cough with mucus (phlegm) and low-grade fever. Bronchitis usually lasts 7 to 14 days. Mild cases can be treated with simple home remedies. More severe infection is treated with an antibiotic.  Home care  Follow these guidelines when caring for yourself at home:    If your symptoms  are severe, rest at home for the first 2 to 3 days. When you go back to your usual activities, don't let yourself get too tired.    Do not smoke. Also avoid being exposed to secondhand smoke.    You may use over-the-counter medicines to control fever or pain, unless another medicine was prescribed. If you have chronic liver or kidney disease or have ever had a stomach ulcer or gastrointestinal bleeding, talk with your healthcare provider before using these medicines. Also talk to your provider if you are taking medicine to prevent blood clots. Aspirin should never be given to anyone younger than 18 years of age who is ill with a viral infection or fever. It may cause severe liver or brain damage.    Your appetite may be poor, so a light diet is fine. Avoid dehydration by drinking 6 to 8 glasses of fluids per day (such as water, soft drinks, sports drinks, juices, tea, or soup). Extra fluids will help loosen secretions in the nose and lungs.    Over-the-counter cough, cold, and sore-throat medicines will not shorten the length of the illness, but they may be helpful to reduce symptoms. (Note: Do not use decongestants if you have high blood pressure.)    Finish all antibiotic medicine. Do this even if you are feeling better after only a few days.  Follow-up care  Follow up with your healthcare provider, or as advised. If you had an X-ray or ECG (electrocardiogram), a specialist will review it. You will be notified of any new findings that may affect your care.  If you are age 65 or older, or if you have a chronic lung disease or condition that affects your immune system, or you smoke, ask your healthcare provider about getting a pneumococcal vaccine and a yearly flu shot (influenza vaccine).  When to seek medical advice  Call your healthcare provider right away if any of these occur:    Fever of 100.4 F (38 C) or higher, or as directed by your healthcare provider    Coughing up increased amounts of colored  sputum    Weakness, drowsiness, headache, facial pain, ear pain, or a stiff neck  Call 911  Call 911 if any of these occur.    Coughing up blood    Worsening weakness, drowsiness, headache, or stiff neck    Trouble breathing, wheezing, or pain with breathing  Date Last Reviewed: 9/13/2015 2000-2017 [employer]. [address] [place], [place] [id number]. All rights reserved. This information is not intended as a substitute for professional medical care. Always follow your healthcare professional's instructions.

## 2018-06-08 ENCOUNTER — OFFICE VISIT (OUTPATIENT)
Dept: FAMILY MEDICINE | Facility: CLINIC | Age: 43
End: 2018-06-08
Payer: COMMERCIAL

## 2018-06-08 ENCOUNTER — TELEPHONE (OUTPATIENT)
Dept: FAMILY MEDICINE | Facility: CLINIC | Age: 43
End: 2018-06-08

## 2018-06-08 VITALS
TEMPERATURE: 99.5 F | DIASTOLIC BLOOD PRESSURE: 58 MMHG | HEART RATE: 86 BPM | SYSTOLIC BLOOD PRESSURE: 112 MMHG | WEIGHT: 315 LBS | RESPIRATION RATE: 20 BRPM | OXYGEN SATURATION: 95 % | BODY MASS INDEX: 62.18 KG/M2

## 2018-06-08 DIAGNOSIS — J45.41 MODERATE PERSISTENT ASTHMA WITH EXACERBATION: Primary | ICD-10-CM

## 2018-06-08 PROCEDURE — 94640 AIRWAY INHALATION TREATMENT: CPT | Performed by: NURSE PRACTITIONER

## 2018-06-08 PROCEDURE — 99214 OFFICE O/P EST MOD 30 MIN: CPT | Mod: 25 | Performed by: NURSE PRACTITIONER

## 2018-06-08 RX ORDER — ALBUTEROL SULFATE 0.83 MG/ML
2.5 SOLUTION RESPIRATORY (INHALATION) EVERY 6 HOURS PRN
Qty: 1 VIAL | Refills: 0 | Status: SHIPPED | OUTPATIENT
Start: 2018-06-08 | End: 2018-07-24

## 2018-06-08 RX ORDER — ALBUTEROL SULFATE 0.83 MG/ML
2.5 SOLUTION RESPIRATORY (INHALATION) EVERY 4 HOURS PRN
Qty: 25 VIAL | Refills: 0 | Status: SHIPPED | OUTPATIENT
Start: 2018-06-08

## 2018-06-08 NOTE — TELEPHONE ENCOUNTER
Please notify patient last seen he was treated with Levaquin and prednisone and has an albuterol inhaler if not improving would recommend he be  Reevaluated.    Based on medical history unsure why he is still not having good breathing control would require further workup rather than just prescribing medications over the phone as we are limited on what we can prescribe without seeing.    Stacey Willingham CNP

## 2018-06-08 NOTE — PATIENT INSTRUCTIONS
Continue to monitor if not improving follow-up with your Primary     Follow-up with your primary care provider next week and as needed.    Indications for emergent return to emergency department discussed with patient, who verbalized good understanding and agreement.  Patient understands the limitations of today's evaluation.         Discharge Instructions for Asthma  You have been diagnosed with an asthma attack. With the help of your healthcare provider, you can keep your asthma under control and have less emergency department visits and stays in the hospital.    Managing asthma    Take your asthma medicines exactly as your provider tells you. Do this even if you feel that your athma is under control.    Learn how to monitor your asthma. Some people watch for early changes of symptoms getting worse. Some use a peak flow meter. Your healthcare provider may decide to give you an asthma action plan.    Be sure to always have a quick-relief inhaler with you. If you were given a prescription, make sure you go to a pharmacy to get it filled as soon as possible.  Controlling asthma triggers  Triggers are those things that make your asthma symptoms worse or cause asthma attacks. Many people with asthma have allergies that can be triggers. Your healthcare provider may have you get allergy testing to find out what you are allergic to. This can help you stay away from triggers.  Dust or dust mites are a common asthma trigger. To avoid a dust mites, do the following:    Use dust-proof covers on your mattress and pillows. Wash the sheets and blankets on your bed once a week in very hot water.    Don t sleep or lie on cloth-covered cushions or furniture.    Ask someone else to vacuum and dust your house.    If you do vacuum and dust yourself, wear a dust mask. You can buy them from the [employer] store.    Use a vacuum with a double-layered bag or HEPA (high-efficiency particulate air) filter.  Pets with fur or feathers are  triggers for some people. If you must have pets, take these precautions:    Keep pets out of your bedroom and off your bed. Keep the bedroom door closed.    Cover the air vents in your bedroom with heavy material to filter the air.    Don't use carpets or cloth-covered furniture in your home. If this is not possible, keep pets out of rooms with these items.    Have someone bathe your pets every week. And brush them often.  If you smoke, do your best to quit.    Enroll in a stop-smoking program to increase your chance of success.    Ask your healthcare provider about medicines or other methods to help you quit.    Ask family members to quit smoking as well.    Don t allow anyone to smoke in your home, in your car, or around you.  Other steps to take    Make sure you know what to do if exercise is a trigger for you. Many people use quick-relief inhalers before exercise or physical activity.    Get a flu shot every year and get pneumonia shots as advised by your healthcare provider.    Try to keep your windows closed during pollen seasons and when mold counts are high.    On cold or windy days, cover your nose and mouth with a scarf.    Try to stay away from people who are sick with colds or the flu. Wash your hands often or use a hand . If respiratory infections like colds or flu trigger your asthma, use your quick-relief medicines as soon as you begin to notice respiratory symptoms. They may include a runny or stuffy nose, sore throat, or a cough.  Follow-up care  Make a follow-up appointment as directed. Follow your asthma action plan if you were given one.  Call 911  Call 911 right away if you have:    Severe wheezing    Shortness of breath that is not relieved by your quick-relief medicine    Trouble walking or talking because of shortness of breath    Blue lips or fingernails    If you monitor symptoms with a peak flow meter, readings less than 50% of your personal best   Date Last Reviewed: 2/3/2017     [id number] [employer]. [address] [place] [address]. All rights reserved. This information is not intended as a substitute for professional medical care. Always follow your healthcare professional's instructions.        Understanding Asthma    Asthma causes swelling and narrowing of the airways in your lungs. Medical experts are not exactly sure what causes asthma. It is believed to be caused by a mix of inherited and environmental factors.  Healthy lungs  Inside the lungs there are branching airways made of stretchy tissue. Each airway is wrapped with bands of muscle. The airways become more narrow as they go deeper into the lungs. The smallest airways end in clusters of tiny balloon-like air sacs (alveoli). These clusters are surrounded by blood vessels. When you breathe in (inhale), air enters the lungs. It travels down through the airways until it reaches the air sacs. When you breathe out (exhale), air travels up through the airways and out of the lungs. The airways produce mucus that traps particles you breathe in. Normally, the mucus is then swept out of the lungs by tiny hairs (cilia) that line the airways. The mucus is swallowed or coughed up.  What the lungs do  The air you inhale contains oxygen. When oxygen reaches the air sacs, it passes into the blood vessels surrounding the sacs. Your blood then delivers oxygen to all of your cells. As you exhale, carbon dioxide is removed in a similar way from the blood in the air sacs, and from the body.  When you have asthma  People with asthma have very sensitive airways. This means the airways react to certain things called triggers (such as pollen, dust, or smoke) and become swollen and narrowed. Inflammation makes the airways swollen and narrowed. This is a long-lasting (chronic) problem. The airways may not always be narrowed enough to notice breathing problems.  Symptoms of chronic inflammation:     Coughing    Chest  tightness    Shortness of breath    Wheezing (a whistling noise, especially when breathing out)    Low energy or feeling tired  In some people, over time chronic mild inflammation can lead to lasting (permanent) scarring of airways and loss of lung function.  Moderate flare-ups  When sensitive airways are irritated by a trigger, the muscles around the airways tighten. The lining of the airways swells. Thick, sticky mucus increases and partly clogs the airways. All of this makes you work harder to keep breathing.  Symptoms of moderate flare-ups:    Coughing, especially at night    Getting tired or out of breath easily    Wheezing    Chest tightness    Faster breathing when at rest  Severe flare-ups  Severe flare-ups are life-threatening. In a severe flare-up, the muscle tightening, swelling, and mucus production are even worse. It s very hard to breathe. Your body can't get enough oxygen and can't remove carbon dioxide. Waste gas is trapped in the alveoli, and gas exchange can t occur. The body is not getting enough oxygen. Without oxygen, body tissues, especially brain tissue, begin to get damaged. If this goes on for long, it can lead to severe brain damage or death.  Call 911 (or have someone call for you) if you have any of these symptoms and they are not relieved right away by taking your quick-relief medicine as prescribed:    Severe trouble breathing    Too short of breath to talk or walk    Lips or fingers turning blue    Feeling lightheaded or dizzy, as though you are about to pass out    Peak flow less than 50% of your personal best, if you use peak flow monitoring  Asthma is a long-term condition. So it s important to work with your healthcare provider to manage it. If you smoke, get help to quit. Know your triggers and figure out how to avoid them. It s also very important to take your medicines as directed. That means taking them even when you feel good.  Date Last Reviewed: 12/1/2016 2000-2017 [employer]. [address] [place] [address], [place] [address]. All rights reserved. This information is not intended as a substitute for professional medical care. Always follow your healthcare professional's instructions.

## 2018-06-08 NOTE — NURSING NOTE
Prior to injection verified patient identity using patient's name and date of birth.  Due to injection administration, patient instructed to remain in clinic for 15 minutes  afterwards, and to report any adverse reaction to me immediately.    Carlos Peterson M.A.

## 2018-06-08 NOTE — TELEPHONE ENCOUNTER
Reason for Call:  Other call back    Detailed comments: Pt requesting a call back as he was seen 5/21 for sinus problems and antibiotic did not seem to clear symptoms, he is feeling better but has a cough that will not go away, wondering if something could be done without returning to clinic. Pt would use [facility].    Phone Number Patient can be reached at: Home number on file [phone] (home)    Best Time: any    Can we leave a detailed message on this number? YES    Call taken on 6/8/2018 at 9:11 AM by Brittney Metz

## 2018-06-08 NOTE — MR AVS SNAPSHOT
After Visit Summary   6/8/2018    Refugio Kennedy    MRN: [id number]           Patient Information     Date Of Birth          1975        Visit Information        Provider Department      6/8/2018 1:40 PM Stacey Willingham APRN [facility]        Today's Diagnoses     Moderate persistent asthma with exacerbation    -  1      Care Instructions    Continue to monitor if not improving follow-up with your Primary     Follow-up with your primary care provider next week and as needed.    Indications for emergent return to emergency department discussed with patient, who verbalized good understanding and agreement.  Patient understands the limitations of today's evaluation.         Discharge Instructions for Asthma  You have been diagnosed with an asthma attack. With the help of your healthcare provider, you can keep your asthma under control and have less emergency department visits and stays in the hospital.    Managing asthma    Take your asthma medicines exactly as your provider tells you. Do this even if you feel that your athma is under control.    Learn how to monitor your asthma. Some people watch for early changes of symptoms getting worse. Some use a peak flow meter. Your healthcare provider may decide to give you an asthma action plan.    Be sure to always have a quick-relief inhaler with you. If you were given a prescription, make sure you go to a pharmacy to get it filled as soon as possible.  Controlling asthma triggers  Triggers are those things that make your asthma symptoms worse or cause asthma attacks. Many people with asthma have allergies that can be triggers. Your healthcare provider may have you get allergy testing to find out what you are allergic to. This can help you stay away from triggers.  Dust or dust mites are a common asthma trigger. To avoid a dust mites, do the following:    Use dust-proof covers on your mattress and pillows. Wash the sheets and blankets  on your bed once a week in very hot water.    Don t sleep or lie on cloth-covered cushions or furniture.    Ask someone else to vacuum and dust your house.    If you do vacuum and dust yourself, wear a dust mask. You can buy them from the [employer] store.    Use a vacuum with a double-layered bag or HEPA (high-efficiency particulate air) filter.  Pets with fur or feathers are triggers for some people. If you must have pets, take these precautions:    Keep pets out of your bedroom and off your bed. Keep the bedroom door closed.    Cover the air vents in your bedroom with heavy material to filter the air.    Don't use carpets or cloth-covered furniture in your home. If this is not possible, keep pets out of rooms with these items.    Have someone bathe your pets every week. And brush them often.  If you smoke, do your best to quit.    Enroll in a stop-smoking program to increase your chance of success.    Ask your healthcare provider about medicines or other methods to help you quit.    Ask family members to quit smoking as well.    Don t allow anyone to smoke in your home, in your car, or around you.  Other steps to take    Make sure you know what to do if exercise is a trigger for you. Many people use quick-relief inhalers before exercise or physical activity.    Get a flu shot every year and get pneumonia shots as advised by your healthcare provider.    Try to keep your windows closed during pollen seasons and when mold counts are high.    On cold or windy days, cover your nose and mouth with a scarf.    Try to stay away from people who are sick with colds or the flu. Wash your hands often or use a hand . If respiratory infections like colds or flu trigger your asthma, use your quick-relief medicines as soon as you begin to notice respiratory symptoms. They may include a runny or stuffy nose, sore throat, or a cough.  Follow-up care  Make a follow-up appointment as directed. Follow your asthma action plan  if you were given one.  Call 911  Call 911 right away if you have:    Severe wheezing    Shortness of breath that is not relieved by your quick-relief medicine    Trouble walking or talking because of shortness of breath    Blue lips or fingernails    If you monitor symptoms with a peak flow meter, readings less than 50% of your personal best   Date Last Reviewed: 2/3/2017    4235-8654 [employer]. [address] [place] [address], [place] [address]. All rights reserved. This information is not intended as a substitute for professional medical care. Always follow your healthcare professional's instructions.        Understanding Asthma    Asthma causes swelling and narrowing of the airways in your lungs. Medical experts are not exactly sure what causes asthma. It is believed to be caused by a mix of inherited and environmental factors.  Healthy lungs  Inside the lungs there are branching airways made of stretchy tissue. Each airway is wrapped with bands of muscle. The airways become more narrow as they go deeper into the lungs. The smallest airways end in clusters of tiny balloon-like air sacs (alveoli). These clusters are surrounded by blood vessels. When you breathe in (inhale), air enters the lungs. It travels down through the airways until it reaches the air sacs. When you breathe out (exhale), air travels up through the airways and out of the lungs. The airways produce mucus that traps particles you breathe in. Normally, the mucus is then swept out of the lungs by tiny hairs (cilia) that line the airways. The mucus is swallowed or coughed up.  What the lungs do  The air you inhale contains oxygen. When oxygen reaches the air sacs, it passes into the blood vessels surrounding the sacs. Your blood then delivers oxygen to all of your cells. As you exhale, carbon dioxide is removed in a similar way from the blood in the air sacs, and from the body.  When you have asthma  People with asthma have very sensitive  airways. This means the airways react to certain things called triggers (such as pollen, dust, or smoke) and become swollen and narrowed. Inflammation makes the airways swollen and narrowed. This is a long-lasting (chronic) problem. The airways may not always be narrowed enough to notice breathing problems.  Symptoms of chronic inflammation:     Coughing    Chest tightness    Shortness of breath    Wheezing (a whistling noise, especially when breathing out)    Low energy or feeling tired  In some people, over time chronic mild inflammation can lead to lasting (permanent) scarring of airways and loss of lung function.  Moderate flare-ups  When sensitive airways are irritated by a trigger, the muscles around the airways tighten. The lining of the airways swells. Thick, sticky mucus increases and partly clogs the airways. All of this makes you work harder to keep breathing.  Symptoms of moderate flare-ups:    Coughing, especially at night    Getting tired or out of breath easily    Wheezing    Chest tightness    Faster breathing when at rest  Severe flare-ups  Severe flare-ups are life-threatening. In a severe flare-up, the muscle tightening, swelling, and mucus production are even worse. It s very hard to breathe. Your body can't get enough oxygen and can't remove carbon dioxide. Waste gas is trapped in the alveoli, and gas exchange can t occur. The body is not getting enough oxygen. Without oxygen, body tissues, especially brain tissue, begin to get damaged. If this goes on for long, it can lead to severe brain damage or death.  Call 911 (or have someone call for you) if you have any of these symptoms and they are not relieved right away by taking your quick-relief medicine as prescribed:    Severe trouble breathing    Too short of breath to talk or walk    Lips or fingers turning blue    Feeling lightheaded or dizzy, as though you are about to pass out    Peak flow less than 50% of your personal best, if you use  peak flow monitoring  Asthma is a long-term condition. So it s important to work with your healthcare provider to manage it. If you smoke, get help to quit. Know your triggers and figure out how to avoid them. It s also very important to take your medicines as directed. That means taking them even when you feel good.  Date Last Reviewed: 12/1/2016 2000-2017 [employer]. [address] [place] [address], [place] [address]. All rights reserved. This information is not intended as a substitute for professional medical care. Always follow your healthcare professional's instructions.                Follow-ups after your visit        Your next 10 appointments already scheduled     Jun 28, 2018  8:00 AM CDT   Diabetic Education with Violet Bowen RD   [facility] ([facility])    [address] [place] [id number]   [phone]            Aug 20, 2018  9:45 AM CDT   (Arrive by 9:30 AM)   Return Visit with Ashish Paez MD   [facility] ([facility])    [address] [place] [id number] [phone]              Who to contact     If you have questions or need follow up information about today's clinic visit or your schedule please contact [facility] directly at [phone].  Normal or non-critical lab and imaging results will be communicated to you by MyChart, letter or phone within 4 business days after the clinic has received the results. If you do not hear from us within 7 days, please contact the clinic through MyChart or phone. If you have a critical or abnormal lab result, we will notify you by phone as soon as possible.  Submit refill requests through [employer] or call your pharmacy and they will forward the refill request to us. Please allow 3 business days for your refill to be completed.          Additional Information About Your Visit        [facility]  Information     [employer] gives you secure access to your electronic health record. If you see a primary care provider, you can also send messages to your care team and make appointments. If you have questions, please call your primary care clinic.  If you do not have a primary care provider, please call [phone] and they will assist you.        Care EveryWhere ID     This is your Care EveryWhere ID. This could be used by other organizations to access your [facility] medical records  [id number]        Your Vitals Were     Pulse Temperature Respirations Pulse Oximetry BMI (Body Mass Index)       86 99.5  F (37.5  C) (Tympanic) 20 95% 62.18 kg/m2        Blood Pressure from Last 3 Encounters:   06/08/18 112/58   05/21/18 108/70   04/16/18 112/64    Weight from Last 3 Encounters:   06/08/18 (!) 397 lb (180.1 kg)   05/21/18 (!) 399 lb (181 kg)   05/14/18 (!) 401 lb (181.9 kg)              We Performed the Following     ALBUTEROL UNIT DOSE, 1 MG -      INHALATION/NEBULIZER TREATMENT, INITIAL          Today's Medication Changes          These changes are accurate as of 6/8/18  2:43 PM.  If you have any questions, ask your nurse or doctor.               Start taking these medicines.        Dose/Directions    order for DME   Used for:  Moderate persistent asthma with exacerbation   Started by:  Stacey Willingham APRN CNP        Nebulizer   Quantity:  1 Units   Refills:  0         These medicines have changed or have updated prescriptions.        Dose/Directions    * albuterol 108 (90 Base) MCG/ACT Inhaler   Commonly known as:  PROAIR HFA   This may have changed:  Another medication with the same name was added. Make sure you understand how and when to take each.   Used for:  Mild intermittent asthma without complication   Changed by:  Stacey Willingham APRN CNP        Dose:  2 puff   Inhale 2 puffs into the lungs every 4 hours as needed for shortness of breath / dyspnea   Quantity:  1 Inhaler   Refills:  5       *  albuterol (2.5 MG/3ML) 0.083% neb solution   This may have changed:  You were already taking a medication with the same name, and this prescription was added. Make sure you understand how and when to take each.   Used for:  Moderate persistent asthma with exacerbation   Changed by:  Stacey Willingham APRN CNP        Dose:  2.5 mg   Take 1 vial (2.5 mg) by nebulization every 6 hours as needed for shortness of breath / dyspnea or wheezing   Quantity:  1 vial   Refills:  0       * albuterol (2.5 MG/3ML) 0.083% neb solution   This may have changed:  You were already taking a medication with the same name, and this prescription was added. Make sure you understand how and when to take each.   Used for:  Moderate persistent asthma with exacerbation   Changed by:  Stacey Willingham APRN CNP        Dose:  2.5 mg   Take 1 vial (2.5 mg) by nebulization every 4 hours as needed for shortness of breath / dyspnea or wheezing   Quantity:  25 vial   Refills:  0       * Notice:  This list has 3 medication(s) that are the same as other medications prescribed for you. Read the directions carefully, and ask your doctor or other care provider to review them with you.         Where to get your medicines      These medications were sent to [facility] [id number]     Phone:  [phone]     albuterol (2.5 MG/3ML) 0.083% neb solution         Some of these will need a paper prescription and others can be bought over the counter.  Ask your nurse if you have questions.     Bring a paper prescription for each of these medications     albuterol (2.5 MG/3ML) 0.083% neb solution    order for DME                Primary Care Provider Office Phone # Fax #    Erika Espino [phone]       [address] [place], [place] [id number]        Equal Access to Services     RICARDO HALL AH: Barb Lin, torie wills, gamaliel real  consuelo saraviajose alberto wilson'aan ah. So [facility] [phone].    ATENCIÓN: Si habla ladonna, tiene a clinton disposición servicios gratuitos de asistencia lingüística. Shukri al [phone].    We comply with applicable federal civil rights laws and [place] laws. We do not discriminate on the basis of race, color, national origin, age, disability, sex, sexual orientation, or gender identity.            Thank you!     Thank you for choosing [facility]  for your care. Our goal is always to provide you with excellent care. Hearing back from our patients is one way we can continue to improve our services. Please take a few minutes to complete the written survey that you may receive in the mail after your visit with us. Thank you!             Your Updated Medication List - Protect others around you: Learn how to safely use, store and throw away your medicines at www.disposemymeds.org.          This list is accurate as of 6/8/18  2:43 PM.  Always use your most recent med list.                   Brand Name Dispense Instructions for use Diagnosis    ACCU-CHEK CHRIS PLUS test strip   Generic drug:  blood glucose monitoring     200 each    USE TO TEST BLOOD SUGARS THREE TIMES A DAY AS DIRECTED    Diabetes mellitus without complication (H)       * albuterol 108 (90 Base) MCG/ACT Inhaler    PROAIR HFA    1 Inhaler    Inhale 2 puffs into the lungs every 4 hours as needed for shortness of breath / dyspnea    Mild intermittent asthma without complication       * albuterol (2.5 MG/3ML) 0.083% neb solution     1 vial    Take 1 vial (2.5 mg) by nebulization every 6 hours as needed for shortness of breath / dyspnea or wheezing    Moderate persistent asthma with exacerbation       * albuterol (2.5 MG/3ML) 0.083% neb solution     25 vial    Take 1 vial (2.5 mg) by nebulization every 4 hours as needed for shortness of breath / dyspnea or wheezing    Moderate persistent asthma with exacerbation       aspirin  81 MG tablet     90 tablet    Take 1 tablet by mouth daily.    Type 2 diabetes, HbA1C goal < 8% (H)       BD SEVERO U/F 32G X 4 MM   Generic drug:  insulin pen needle     100 each    USE ONE DAILY OR AS DIRECTED    Type 2 diabetes mellitus without complications (H)       cetirizine 10 MG tablet    zyrTEC    90 tablet    TAKE ONE TABLET (10 MG) BY MOUTH EVERY EVENING    Allergic rhinitis due to pollen       * DIABETIC STERILE LANCETS device     1 Box    1 Device 3 times daily.    Type 2 diabetes, HbA1C goal < 8% (H)       * blood glucose monitoring lancets     300 each    TEST THREE TIMES A DAY OR AS DIRECTED    Type 2 diabetes, HbA1C goal < 8% (H)       fluticasone 50 MCG/ACT spray    FLONASE    16 g    Spray 1-2 sprays into both nostrils daily    Acute sinusitis with symptoms > 10 days       furosemide 20 MG tablet    LASIX    360 tablet    TAKE TWO TABLETS BY MOUTH TWICE A DAY    Venous stasis       * lisinopril 5 MG tablet    PRINIVIL/ZESTRIL    90 tablet    TAKE ONE TABLET BY MOUTH EVERY DAY    Pulmonary hypertension       * lisinopril 5 MG tablet    PRINIVIL/ZESTRIL    90 tablet    TAKE ONE TABLET BY MOUTH EVERY DAY    Pulmonary hypertension       * metFORMIN modified 500 MG 24 hr tablet    GLUMETZA    90 tablet    Take 1 tablet (500 mg) by mouth daily (with breakfast)    Type 2 diabetes mellitus without complication, without long-term current use of insulin (H)       * metFORMIN 500 MG 24 hr tablet    GLUCOPHAGE-XR    90 tablet    TAKE ONE TABLET BY MOUTH EVERY DAY WITH BREAKFAST (NEED TO KEEP APPOINTMENT WITH DR. SANCHES END OF NOVEMBER)    Methamphetamine abuse in remission       modafinil 200 MG tablet    PROVIGIL    60 tablet    Take 1/2 tablet by mouth 3-4 times daily as needed for sleepiness.    Obstructive sleep apnea-hypopnea syndrome       MULTIVITAMIN PO      Take 1 tablet by mouth daily.        order for DME      Equipment being ordered: [facility] received a [employer] AirCurve 10 Bilevel.  Pressures were set at 23/14 cm H2O.        order for DME      Auto-BiPAP: IPAP max 21 cm H2O EPAP min 15 cm H2O Pressure support 5 cm Changed in clinic Lifetime need and heated humidity.    FARZANA (obstructive sleep apnea)       order for DME     1 Units    Nebulizer    Moderate persistent asthma with exacerbation       simvastatin 10 MG tablet    ZOCOR    90 tablet    TAKE ONE TABLET BY MOUTH AT BEDTIME    Hyperlipidemia LDL goal <100       topiramate 50 MG tablet    TOPAMAX    240 tablet    Take 4 tablets (200 mg) by mouth 2 times daily    Morbid obesity due to excess calories (H)       triamcinolone 0.1 % cream    KENALOG    30 g    Apply sparingly to affected area two times daily as needed    Venous stasis       VICTOZA PEN 18 MG/3ML soln   Generic drug:  liraglutide     9 mL    INJECT 1.8MG UNDER THE SKIN ONCE DAILY    Type 2 diabetes mellitus without complications (H)       * Notice:  This list has 9 medication(s) that are the same as other medications prescribed for you. Read the directions carefully, and ask your doctor or other care provider to review them with you.

## 2018-06-08 NOTE — PROGRESS NOTES
SUBJECTIVE:   Refugio Kennedy is a 42 year old male who presents to clinic today for the following health issues:    ENT Symptoms             Symptoms: cc Present Absent Comment   Fever/Chills       Fatigue       Muscle Aches  x  Achy joints    Eye Irritation  x     Sneezing  x     Nasal Jesse/Drg       Sinus Pressure/Pain       Loss of smell       Dental pain       Sore Throat       Swollen Glands       Ear Pain/Fullness       Cough x   Productive- varies colors    Wheeze       Chest Pain  x  Tightness, and pressure    Shortness of breath  x  Worse than usual the last 4 days. On 0.5 L of O2, if walking then on 2 L of O2   Rash       Other         Symptom duration:  Last seen 5/21   Symptom severity:  cough saying the same    Treatments tried:  Levaquin on 5/21/2018 other symptoms improved but cough still there    Contacts:  None          Problem list and histories reviewed & adjusted, as indicated.  Additional history: as documented    Patient Active Problem List   Diagnosis     FARZANA (obstructive sleep apnea)/Hypoventilation Syndrome- Severe     Morbid obesity (H)     Chronic respiratory failure (H)     Hyperlipidemia LDL goal <100     Venous stasis     Pulmonary hypertension     Mild intermittent asthma without complication     Type 2 diabetes mellitus without complication, without long-term current use of insulin (H)     Methamphetamine abuse in remission     Past Surgical History:   Procedure Laterality Date     LAPAROSCOPIC HERNIORRHAPHY VENTRAL N/A 5/17/2016    Procedure: LAPAROSCOPIC HERNIORRHAPHY VENTRAL;  Surgeon: Yves Jimenes MD;  Location: [facility]     LAPAROSCOPIC HERNIORRHAPHY VENTRAL N/A 12/20/2016    Procedure: LAPAROSCOPIC HERNIORRHAPHY VENTRAL;  Surgeon: Yves Jimenes MD;  Location: [facility]     SURGICAL HISTORY OF -   1998    [facility]       Social History   Substance Use Topics     Smoking status: Former Smoker     Packs/day: 0.50     Years: 1.00     Types: Cigarettes, Cigars     Quit date: 5/21/2012      Smokeless tobacco: Never Used     Alcohol use No     Family History   Problem Relation Age of Onset     HEART DISEASE Maternal Grandfather      DIABETES Maternal Grandfather      Hypertension Maternal Grandfather      Other Cancer Maternal Grandfather      CANCER Paternal Grandfather      unknown     Other Cancer Paternal Grandfather      Anxiety Disorder Mother      Asthma Mother      Depression Mother      Thyroid Disease Mother      Anxiety Disorder Father      Substance Abuse Father      Depression Father      Hypertension Father      Anxiety Disorder Brother      Substance Abuse Brother      MENTAL ILLNESS Brother      Depression Brother      Other Cancer Brother      Obesity No family hx of          Current Outpatient Prescriptions   Medication Sig Dispense Refill     ACCU-CHEK CHRIS PLUS test strip USE TO TEST BLOOD SUGARS THREE TIMES A DAY AS DIRECTED 200 each 0     albuterol (PROAIR HFA) 108 (90 BASE) MCG/ACT Inhaler Inhale 2 puffs into the lungs every 4 hours as needed for shortness of breath / dyspnea 1 Inhaler 5     aspirin 81 MG tablet Take 1 tablet by mouth daily. 90 tablet 3     BD SEVERO U/F 32G X 4 MM insulin pen needle USE ONE DAILY OR AS DIRECTED 100 each 3     blood glucose monitoring (SOFTCLIX) lancets TEST THREE TIMES A DAY OR AS DIRECTED 300 each 2     cetirizine (ZYRTEC) 10 MG tablet TAKE ONE TABLET (10 MG) BY MOUTH EVERY EVENING 90 tablet 3     DIABETIC STERILE LANCETS device 1 Device 3 times daily. 1 Box 12     fluticasone (FLONASE) 50 MCG/ACT spray Spray 1-2 sprays into both nostrils daily 16 g 0     furosemide (LASIX) 20 MG tablet TAKE TWO TABLETS BY MOUTH TWICE A  tablet 1     lisinopril (PRINIVIL/ZESTRIL) 5 MG tablet TAKE ONE TABLET BY MOUTH EVERY DAY 90 tablet 1     lisinopril (PRINIVIL/ZESTRIL) 5 MG tablet TAKE ONE TABLET BY MOUTH EVERY DAY 90 tablet 1     metFORMIN (GLUCOPHAGE-XR) 500 MG 24 hr tablet TAKE ONE TABLET BY MOUTH EVERY DAY WITH BREAKFAST (NEED TO KEEP APPOINTMENT  WITH DR. SANCHES END OF NOVEMBER) 90 tablet 3     metFORMIN modified (GLUMETZA) 500 MG 24 hr tablet Take 1 tablet (500 mg) by mouth daily (with breakfast) 90 tablet 3     modafinil (PROVIGIL) 200 MG tablet Take 1/2 tablet by mouth 3-4 times daily as needed for sleepiness. 60 tablet 1     Multiple Vitamin (MULTIVITAMIN OR) Take 1 tablet by mouth daily.       order for DME Equipment being ordered: BIPAP Refugio P Kennedy received a [employer] AirCurve 10 Bilevel. Pressures were set at 23/14 cm H2O.       ORDER FOR DME Auto-BiPAP:  IPAP max 21 cm H2O  EPAP min 15 cm H2O  Pressure support 5 cm  Changed in clinic  Lifetime need and heated humidity.           simvastatin (ZOCOR) 10 MG tablet TAKE ONE TABLET BY MOUTH AT BEDTIME 90 tablet 3     topiramate (TOPAMAX) 50 MG tablet Take 4 tablets (200 mg) by mouth 2 times daily 240 tablet 5     triamcinolone (KENALOG) 0.1 % cream Apply sparingly to affected area two times daily as needed 30 g 2     VICTOZA PEN 18 MG/3ML soln INJECT 1.8MG UNDER THE SKIN ONCE DAILY 9 mL 3     No Known Allergies    Reviewed and updated as needed this visit by clinical staff       Reviewed and updated as needed this visit by Provider         ROS:  Constitutional, HEENT, cardiovascular, pulmonary, gi and gu systems are negative, except as otherwise noted.    OBJECTIVE:     /58 (BP Location: Right arm, Cuff Size: Adult Large)  Pulse 86  Temp 99.5  F (37.5  C) (Tympanic)  Resp 20  Wt (!) 397 lb (180.1 kg)  SpO2 95%  BMI 62.18 kg/m2  Body mass index is 62.18 kg/(m^2).   GENERAL: healthy, alert and no distress  EYES: Eyes grossly normal to inspection, PERRL and conjunctivae and sclerae normal  HENT: ear canals and TM's normal, nose and mouth without ulcers or lesions  NECK: no adenopathy, no asymmetry, masses, or scars and thyroid normal to palpation  RESP: lungs clear to auscultation - no rales, rhonchi or wheezes  CV: regular rate and rhythm, normal S1 S2, no S3 or S4, no murmur, click or rub, no  peripheral edema and peripheral pulses strong  MS: no gross musculoskeletal defects noted, no edema  SKIN: no suspicious lesions or rashes  NEURO: Normal strength and tone, mentation intact and speech normal  PSYCH: mentation appears normal, affect normal/bright        ASSESSMENT:       ICD-10-CM    1. Moderate persistent asthma with exacerbation J45.41 albuterol (2.5 MG/3ML) 0.083% neb solution     ALBUTEROL UNIT DOSE, 1 MG -      INHALATION/NEBULIZER TREATMENT, INITIAL     albuterol (2.5 MG/3ML) 0.083% neb solution     order for DME         PLAN:   Patient was given an in office albuterol nebulizer treatment while in the office with clearing of their wheezing.   Patient recently had an acute bronchitis with an asthma exacerbation was treated with a course of prednisone 5 day course of  40 mg daily and a course of Levaquin.  States he is improved however still continues to have components of an asthma exacerbation but did not use any albuterol during the course of his treatment did have him DuoNeb today which he responded to recommend that he continues with neb treatments for the next 2-3 days to see if his symptoms improve if not he should be re-seen and reevaluated for his asthma is underlying lung functions    Patient Instructions     Continue to monitor if not improving follow-up with your Primary     Follow-up with your primary care provider next week and as needed.    Indications for emergent return to emergency department discussed with patient, who verbalized good understanding and agreement.  Patient understands the limitations of today's evaluation.         Discharge Instructions for Asthma  You have been diagnosed with an asthma attack. With the help of your healthcare provider, you can keep your asthma under control and have less emergency department visits and stays in the hospital.    Managing asthma    Take your asthma medicines exactly as your provider tells you. Do this even if you feel that  your athma is under control.    Learn how to monitor your asthma. Some people watch for early changes of symptoms getting worse. Some use a peak flow meter. Your healthcare provider may decide to give you an asthma action plan.    Be sure to always have a quick-relief inhaler with you. If you were given a prescription, make sure you go to a pharmacy to get it filled as soon as possible.  Controlling asthma triggers  Triggers are those things that make your asthma symptoms worse or cause asthma attacks. Many people with asthma have allergies that can be triggers. Your healthcare provider may have you get allergy testing to find out what you are allergic to. This can help you stay away from triggers.  Dust or dust mites are a common asthma trigger. To avoid a dust mites, do the following:    Use dust-proof covers on your mattress and pillows. Wash the sheets and blankets on your bed once a week in very hot water.    Don t sleep or lie on cloth-covered cushions or furniture.    Ask someone else to vacuum and dust your house.    If you do vacuum and dust yourself, wear a dust mask. You can buy them from the [employer] store.    Use a vacuum with a double-layered bag or HEPA (high-efficiency particulate air) filter.  Pets with fur or feathers are triggers for some people. If you must have pets, take these precautions:    Keep pets out of your bedroom and off your bed. Keep the bedroom door closed.    Cover the air vents in your bedroom with heavy material to filter the air.    Don't use carpets or cloth-covered furniture in your home. If this is not possible, keep pets out of rooms with these items.    Have someone bathe your pets every week. And brush them often.  If you smoke, do your best to quit.    Enroll in a stop-smoking program to increase your chance of success.    Ask your healthcare provider about medicines or other methods to help you quit.    Ask family members to quit smoking as well.    Don t allow anyone to  smoke in your home, in your car, or around you.  Other steps to take    Make sure you know what to do if exercise is a trigger for you. Many people use quick-relief inhalers before exercise or physical activity.    Get a flu shot every year and get pneumonia shots as advised by your healthcare provider.    Try to keep your windows closed during pollen seasons and when mold counts are high.    On cold or windy days, cover your nose and mouth with a scarf.    Try to stay away from people who are sick with colds or the flu. Wash your hands often or use a hand . If respiratory infections like colds or flu trigger your asthma, use your quick-relief medicines as soon as you begin to notice respiratory symptoms. They may include a runny or stuffy nose, sore throat, or a cough.  Follow-up care  Make a follow-up appointment as directed. Follow your asthma action plan if you were given one.  Call 911  Call 911 right away if you have:    Severe wheezing    Shortness of breath that is not relieved by your quick-relief medicine    Trouble walking or talking because of shortness of breath    Blue lips or fingernails    If you monitor symptoms with a peak flow meter, readings less than 50% of your personal best   Date Last Reviewed: 2/3/2017    3623-8584 [employer]. [address] [place] [address], [place] [address]. All rights reserved. This information is not intended as a substitute for professional medical care. Always follow your healthcare professional's instructions.        Understanding Asthma    Asthma causes swelling and narrowing of the airways in your lungs. Medical experts are not exactly sure what causes asthma. It is believed to be caused by a mix of inherited and environmental factors.  Healthy lungs  Inside the lungs there are branching airways made of stretchy tissue. Each airway is wrapped with bands of muscle. The airways become more narrow as they go deeper into the lungs. The smallest airways  end in clusters of tiny balloon-like air sacs (alveoli). These clusters are surrounded by blood vessels. When you breathe in (inhale), air enters the lungs. It travels down through the airways until it reaches the air sacs. When you breathe out (exhale), air travels up through the airways and out of the lungs. The airways produce mucus that traps particles you breathe in. Normally, the mucus is then swept out of the lungs by tiny hairs (cilia) that line the airways. The mucus is swallowed or coughed up.  What the lungs do  The air you inhale contains oxygen. When oxygen reaches the air sacs, it passes into the blood vessels surrounding the sacs. Your blood then delivers oxygen to all of your cells. As you exhale, carbon dioxide is removed in a similar way from the blood in the air sacs, and from the body.  When you have asthma  People with asthma have very sensitive airways. This means the airways react to certain things called triggers (such as pollen, dust, or smoke) and become swollen and narrowed. Inflammation makes the airways swollen and narrowed. This is a long-lasting (chronic) problem. The airways may not always be narrowed enough to notice breathing problems.  Symptoms of chronic inflammation:     Coughing    Chest tightness    Shortness of breath    Wheezing (a whistling noise, especially when breathing out)    Low energy or feeling tired  In some people, over time chronic mild inflammation can lead to lasting (permanent) scarring of airways and loss of lung function.  Moderate flare-ups  When sensitive airways are irritated by a trigger, the muscles around the airways tighten. The lining of the airways swells. Thick, sticky mucus increases and partly clogs the airways. All of this makes you work harder to keep breathing.  Symptoms of moderate flare-ups:    Coughing, especially at night    Getting tired or out of breath easily    Wheezing    Chest tightness    Faster breathing when at rest  Severe  flare-ups  Severe flare-ups are life-threatening. In a severe flare-up, the muscle tightening, swelling, and mucus production are even worse. It s very hard to breathe. Your body can't get enough oxygen and can't remove carbon dioxide. Waste gas is trapped in the alveoli, and gas exchange can t occur. The body is not getting enough oxygen. Without oxygen, body tissues, especially brain tissue, begin to get damaged. If this goes on for long, it can lead to severe brain damage or death.  Call 911 (or have someone call for you) if you have any of these symptoms and they are not relieved right away by taking your quick-relief medicine as prescribed:    Severe trouble breathing    Too short of breath to talk or walk    Lips or fingers turning blue    Feeling lightheaded or dizzy, as though you are about to pass out    Peak flow less than 50% of your personal best, if you use peak flow monitoring  Asthma is a long-term condition. So it s important to work with your healthcare provider to manage it. If you smoke, get help to quit. Know your triggers and figure out how to avoid them. It s also very important to take your medicines as directed. That means taking them even when you feel good.  Date Last Reviewed: 12/1/2016 2000-2017 [employer]. [address] [facility], [place] [address]. All rights reserved. This information is not intended as a substitute for professional medical care. Always follow your healthcare professional's instructions.            NANCY Bliss [facility]

## 2018-06-08 NOTE — NURSING NOTE
"Chief Complaint   Patient presents with     Cough       Initial /58 (BP Location: Right arm, Cuff Size: Adult Large)  Pulse 86  Temp 99.5  F (37.5  C) (Tympanic)  Resp 20  Wt (!) 397 lb (180.1 kg)  SpO2 95%  BMI 62.18 kg/m2 Estimated body mass index is 62.18 kg/(m^2) as calculated from the following:    Height as of 5/21/18: 5' 7\" (1.702 m).    Weight as of this encounter: 397 lb (180.1 kg).      Health Maintenance that is potentially due pending provider review:  NONE    n/a    Is there anyone who you would like to be able to receive your results? Not Applicable  If yes have patient fill out SVEN  Carlos Peterson M.A.          "

## 2018-06-08 NOTE — TELEPHONE ENCOUNTER
Pt called. States he is feeling better. States his chest is still feeling heavy and breathing is not improved. No fever but having muscle and joint aches. Pt states he is coughing up yellow/green sputum. Pt would prefer to not to have to come in today and would like a prescription sen tot he pharmacy. States he saw Stacey last and she told him to call if he wasn't improving. Kadi Plummer RN

## 2018-06-28 ENCOUNTER — ALLIED HEALTH/NURSE VISIT (OUTPATIENT)
Dept: EDUCATION SERVICES | Facility: CLINIC | Age: 43
End: 2018-06-28
Payer: COMMERCIAL

## 2018-06-28 VITALS — BODY MASS INDEX: 62.6 KG/M2 | WEIGHT: 315 LBS

## 2018-06-28 DIAGNOSIS — E11.9 TYPE 2 DIABETES MELLITUS WITHOUT COMPLICATION, WITHOUT LONG-TERM CURRENT USE OF INSULIN (H): Primary | ICD-10-CM

## 2018-06-28 PROCEDURE — G0108 DIAB MANAGE TRN  PER INDIV: HCPCS | Performed by: DIETITIAN, REGISTERED

## 2018-06-28 NOTE — PATIENT INSTRUCTIONS
1.  Get back to the gym    2.  Work on fruits and vegetables     3.  Work on cutting back on the snacking again or making good choices

## 2018-06-28 NOTE — MR AVS SNAPSHOT
After Visit Summary   6/28/2018    Refugio Kennedy    MRN: [id number]           Patient Information     Date Of Birth          1975        Visit Information        Provider Department      6/28/2018 8:00 AM Violet Bowen RD [facility]        Care Instructions    1.  Get back to the gym    2.  Work on fruits and vegetables     3.  Work on cutting back on the snacking again or making good choices          Follow-ups after your visit        Your next 10 appointments already scheduled     Aug 20, 2018  9:45 AM CDT   (Arrive by 9:30 AM)   Return Visit with Ashish Paez MD   [facility] ([facility] [place])    [address]  4th Floor  [facility] [place] [address] [phone]              Who to contact     If you have questions or need follow up information about today's clinic visit or your schedule please contact [facility] directly at [phone].  Normal or non-critical lab and imaging results will be communicated to you by [employer], letter or phone within 4 business days after the clinic has received the results. If you do not hear from us within 7 days, please contact the clinic through [employer] or phone. If you have a critical or abnormal lab result, we will notify you by phone as soon as possible.  Submit refill requests through What [employer] Like or call your pharmacy and they will forward the refill request to us. Please allow 3 business days for your refill to be completed.          Additional Information About Your Visit        MyChart Information     What [employer] Like gives you secure access to your electronic health record. If you see a primary care provider, you can also send messages to your care team and make appointments. If you have questions, please call your primary care clinic.  If you do not have a primary care provider, please call [phone] and they will assist you.        Care EveryWhere  ID     This is your Care EveryWhere ID. This could be used by other organizations to access your [facility] medical records  [id number]        Your Vitals Were     BMI (Body Mass Index)                   62.6 kg/m2            Blood Pressure from Last 3 Encounters:   06/08/18 112/58   05/21/18 108/70   04/16/18 112/64    Weight from Last 3 Encounters:   06/28/18 (!) 181.3 kg (399 lb 11.2 oz)   06/08/18 (!) 180.1 kg (397 lb)   05/21/18 (!) 181 kg (399 lb)              Today, you had the following     No orders found for display       Primary Care Provider Office Phone # Fax #    Erika Espino [phone] [address] [place] [address]        Equal Access to Services     [facility]: Hadii aad ku hadasho Soomaali, waaxda luqadaha, qaybta kaalmada adejose albertoyadusty, consuelo rider . So [facility] [phone].    ATENCIÓN: Si habla español, tiene a clinton disposición servicios gratuitos de asistencia lingüística. Llame al [phone].    We comply with applicable federal civil rights laws and [place] laws. We do not discriminate on the basis of race, color, national origin, age, disability, sex, sexual orientation, or gender identity.            Thank you!     Thank you for choosing [facility]  for your care. Our goal is always to provide you with excellent care. Hearing back from our patients is one way we can continue to improve our services. Please take a few minutes to complete the written survey that you may receive in the mail after your visit with us. Thank you!             Your Updated Medication List - Protect others around you: Learn how to safely use, store and throw away your medicines at www.disposemymeds.org.          This list is accurate as of 6/28/18  8:28 AM.  Always use your most recent med list.                   Brand Name Dispense Instructions for use Diagnosis    ACCU-CHEK CHRIS PLUS test strip   Generic drug:  blood  glucose monitoring     200 each    USE TO TEST BLOOD SUGARS THREE TIMES A DAY AS DIRECTED    Diabetes mellitus without complication (H)       * albuterol 108 (90 Base) MCG/ACT Inhaler    PROAIR HFA    1 Inhaler    Inhale 2 puffs into the lungs every 4 hours as needed for shortness of breath / dyspnea    Mild intermittent asthma without complication       * albuterol (2.5 MG/3ML) 0.083% neb solution     1 vial    Take 1 vial (2.5 mg) by nebulization every 6 hours as needed for shortness of breath / dyspnea or wheezing    Moderate persistent asthma with exacerbation       * albuterol (2.5 MG/3ML) 0.083% neb solution     25 vial    Take 1 vial (2.5 mg) by nebulization every 4 hours as needed for shortness of breath / dyspnea or wheezing    Moderate persistent asthma with exacerbation       aspirin 81 MG tablet     90 tablet    Take 1 tablet by mouth daily.    Type 2 diabetes, HbA1C goal < 8% (H)       BD SEVERO U/F 32G X 4 MM   Generic drug:  insulin pen needle     100 each    USE ONE DAILY OR AS DIRECTED    Type 2 diabetes mellitus without complications (H)       cetirizine 10 MG tablet    zyrTEC    90 tablet    TAKE ONE TABLET (10 MG) BY MOUTH EVERY EVENING    Allergic rhinitis due to pollen       * DIABETIC STERILE LANCETS device     1 Box    1 Device 3 times daily.    Type 2 diabetes, HbA1C goal < 8% (H)       * blood glucose monitoring lancets     300 each    TEST THREE TIMES A DAY OR AS DIRECTED    Type 2 diabetes, HbA1C goal < 8% (H)       fluticasone 50 MCG/ACT spray    FLONASE    16 g    Spray 1-2 sprays into both nostrils daily    Acute sinusitis with symptoms > 10 days       furosemide 20 MG tablet    LASIX    360 tablet    TAKE TWO TABLETS BY MOUTH TWICE A DAY    Venous stasis       * lisinopril 5 MG tablet    PRINIVIL/ZESTRIL    90 tablet    TAKE ONE TABLET BY MOUTH EVERY DAY    Pulmonary hypertension       * lisinopril 5 MG tablet    PRINIVIL/ZESTRIL    90 tablet    TAKE ONE TABLET BY MOUTH EVERY DAY     Pulmonary hypertension       * metFORMIN modified 500 MG 24 hr tablet    GLUMETZA    90 tablet    Take 1 tablet (500 mg) by mouth daily (with breakfast)    Type 2 diabetes mellitus without complication, without long-term current use of insulin (H)       * metFORMIN 500 MG 24 hr tablet    GLUCOPHAGE-XR    90 tablet    TAKE ONE TABLET BY MOUTH EVERY DAY WITH BREAKFAST (NEED TO KEEP APPOINTMENT WITH DR. SANCHES END OF NOVEMBER)    Methamphetamine abuse in remission       modafinil 200 MG tablet    PROVIGIL    60 tablet    Take 1/2 tablet by mouth 3-4 times daily as needed for sleepiness.    Obstructive sleep apnea-hypopnea syndrome       MULTIVITAMIN PO      Take 1 tablet by mouth daily.        order for DME      Equipment being ordered: BIPAP Refugio P Kennedy received a [employer] AirCurve 10 Bilevel. Pressures were set at 23/14 cm H2O.        order for DME      Auto-BiPAP: IPAP max 21 cm H2O EPAP min 15 cm H2O Pressure support 5 cm Changed in clinic Lifetime need and heated humidity.    FARZANA (obstructive sleep apnea)       order for DME     1 Units    Nebulizer    Moderate persistent asthma with exacerbation       simvastatin 10 MG tablet    ZOCOR    90 tablet    TAKE ONE TABLET BY MOUTH AT BEDTIME    Hyperlipidemia LDL goal <100       topiramate 50 MG tablet    TOPAMAX    240 tablet    Take 4 tablets (200 mg) by mouth 2 times daily    Morbid obesity due to excess calories (H)       triamcinolone 0.1 % cream    KENALOG    30 g    Apply sparingly to affected area two times daily as needed    Venous stasis       VICTOZA PEN 18 MG/3ML soln   Generic drug:  liraglutide     9 mL    INJECT 1.8MG UNDER THE SKIN ONCE DAILY    Type 2 diabetes mellitus without complications (H)       * Notice:  This list has 9 medication(s) that are the same as other medications prescribed for you. Read the directions carefully, and ask your doctor or other care provider to review them with you.

## 2018-06-28 NOTE — PROGRESS NOTES
Diabetes Self Management Training: Individual Review Visit    Refugio Kennedy presents today for education related to Type 2 diabetes.    He is accompanied by self    Patient's diabetes management related comments/concerns: review    Patient's emotional response to diabetes: expresses readiness to learn    Patient would like this visit to be focused around the following diabetes-related behaviors and goals: Healthy Eating, Being Active and Monitoring    ASSESSMENT:  Patient Problem List and Family Medical History reviewed for relevant medical history, current medical status, and diabetes risk factors.    Current Diabetes Management per Patient:  Taking diabetes medications?   yes:     Diabetes Medication(s)     Biguanides Sig    metFORMIN (GLUCOPHAGE-XR) 500 MG 24 hr tablet TAKE ONE TABLET BY MOUTH EVERY DAY WITH BREAKFAST (NEED TO KEEP APPOINTMENT WITH DR. SANCHES END OF NOVEMBER)    metFORMIN modified (GLUMETZA) 500 MG 24 hr tablet Take 1 tablet (500 mg) by mouth daily (with breakfast)    Incretin Mimetic Agents (GLP-1 Receptor Agonists) Sig    VICTOZA PEN 18 MG/3ML soln INJECT 1.8MG UNDER THE SKIN ONCE DAILY          Do you have any difficulty affording your medications or glucose monitoring supplies?     No     *Abbreviated insulin dose documentation key: Insulin trade name ([id number]-xeqno-geziid-bedtime) - i.e. Humalog 5-5-5-0 (Humalog 5 units at breakfast, 5 units at lunch, and 5 units at dinner).     Patient glucose self monitoring as follows: one time daily.   BG meter: Accu-Chek Cheryl meter  BG results:                                      BG values are: In goal  Patient's most recent   Lab Results   Component Value Date    A1C 5.7 12/19/2017    is meeting goal of <7.0    Nutrition:  Breakfast- toast/coffee  Eggs and meraz a bit later  Lunch- sandwiches  Supper- mac and cheese and hotdogs, no veggie    He had done well with the veggie for a while, now he need to work on that again. Does better if he sets the  "vegetable out then he remember to make it  Physical Activity:    Limitations: asthma, breathing issues  Not able to do as much lately, especially if hot and humid out.  His dad said he would help him with the gym payment or his insurance may cover it  Diabetes Risk Factors:  family history and age over 45 years    Relevant co-morbidities and related health problems:  Significant for:  none    Diabetes Complications:  Not discussed today.    Recent health service and resource utilization related to diabetes (hyperglycemia, hypoglycemia, etc):   None    Vitals:  There were no vitals taken for this visit.  Estimated body mass index is 62.18 kg/(m^2) as calculated from the following:    Height as of 5/21/18: 1.702 m (5' 7\").    Weight as of 6/8/18: 180.1 kg (397 lb).   Last 3 BP:   BP Readings from Last 3 Encounters:   06/08/18 112/58   05/21/18 108/70   04/16/18 112/64       History   Smoking Status     Former Smoker     Packs/day: 0.50     Years: 1.00     Types: Cigarettes, Cigars     Quit date: 5/21/2012   Smokeless Tobacco     Never Used       Labs:  Lab Results   Component Value Date    A1C 5.7 12/19/2017     Lab Results   Component Value Date    GLC 85 12/19/2017     Lab Results   Component Value Date    LDL 58 12/19/2017     HDL Cholesterol   Date Value Ref Range Status   12/19/2017 45 >39 mg/dL Final   ]  GFR Estimate   Date Value Ref Range Status   12/19/2017 87 >60 mL/min/1.7m2 Final     Comment:     Non  GFR Calc     GFR Estimate If Black   Date Value Ref Range Status   12/19/2017 >90 >60 mL/min/1.7m2 Final     Comment:      GFR Calc     Lab Results   Component Value Date    CR 0.94 12/19/2017     No results found for: MICROALBUMIN    Socio/Economic/Cultural Considerations:    Support system: family    Cultural Influences/Ethnic Background:  American      Health Literacy/Numeracy:  \"With diabetes, it's helpful to use forms and log books to write down blood sugars and what " you're eating at times to help understand how foods affect your blood sugars. With this in mind how confident are you at filling out medical forms, such as these, by yourself?  Extremely    Health Beliefs and Attitudes:   Patient Activation Measure Survey Score:  MARY Score (Last Two) 1/25/2011   MARY Raw Score 36   Activation Score 47.4   MARY Level 2       Stage of Change: ACTION (Actively working towards change)      Diabetes knowledge and skills assessment:     Patient is knowledgeable in diabetes management concepts related to: Healthy Eating, Being Active, Monitoring and Taking Medication    Patient needs further education on the following diabetes management concepts: Healthy Eating, Being Active, Monitoring, Taking Medication, Problem Solving, Reducing Risks and Healthy Coping    Barriers to Learning Assessment: No Barriers identified    Based on learning assessment above, most appropriate setting for further diabetes education would be: Individual setting.    INTERVENTION:   Work on doing veggies at meals again  Figure out a way to do your activity, if you need air conditioning get back to the gym. IF still struggling with breathing/wheezing need to get back in with primary  Wt up slightly due to vacation and not exercising due to asthma  Education provided today on:  AADE Self-Care Behaviors:  Healthy Eating: portion control and choices  Being Active: relationship to blood glucose and describe appropriate activity program    Opportunities for ongoing education and support in diabetes-self management were discussed.    Pt verbalized understanding of concepts discussed and recommendations provided today.       Education Materials Provided:  No new materials provided today    PLAN:  See Patient Instructions for co-developed, patient-stated behavior change goals.  AVS printed and provided to patient today.    FOLLOW-UP:  Follow-up appointment scheduled on July 23rd at 8 am.    Ongoing plan for education and  support: Follow-up visit with diabetes educator in one month      Time Spent: 30 minutes  Encounter Type: Individual    Any diabetes medication dose changes were made via the CDE Protocol and Collaborative Practice Agreement with the patient's primary care provider. A copy of this encounter was shared with the provider.

## 2018-07-02 DIAGNOSIS — G47.33 OBSTRUCTIVE SLEEP APNEA-HYPOPNEA SYNDROME: ICD-10-CM

## 2018-07-02 RX ORDER — MODAFINIL 200 MG/1
TABLET ORAL
Qty: 60 TABLET | Refills: 5 | Status: SHIPPED | OUTPATIENT
Start: 2018-07-02 | End: 2019-01-22

## 2018-07-02 NOTE — TELEPHONE ENCOUNTER
Chief Complaint   Patient presents with     Refill Request     modafinil (PROVIGIL) 200 MG tablet      Refill request received via fax by pharmacy   [facility], [place] - [address] [place] [address] tel:[phone]    Pending Prescriptions:                       Disp   Refills    modafinil (PROVIGIL) 200 MG tablet        60 tab*1            Sig: Take 1/2 tablet by mouth 3-4 times daily as           needed for sleepiness.         Last Written Prescription Date:  04/16/18  Last Fill Quantity: 60 per 30 days,   # refills: 1  Last Office Visit with prescribing provider: 12/2017  Future Office visit:       Routing refill request to provider for review/approval because:  Drug not on the G, P or [facility] refill protocol or controlled substance

## 2018-07-23 ENCOUNTER — ALLIED HEALTH/NURSE VISIT (OUTPATIENT)
Dept: EDUCATION SERVICES | Facility: CLINIC | Age: 43
End: 2018-07-23
Payer: COMMERCIAL

## 2018-07-23 VITALS — BODY MASS INDEX: 63.53 KG/M2 | WEIGHT: 315 LBS

## 2018-07-23 DIAGNOSIS — E11.9 TYPE 2 DIABETES MELLITUS WITHOUT COMPLICATION, WITHOUT LONG-TERM CURRENT USE OF INSULIN (H): Primary | ICD-10-CM

## 2018-07-23 PROCEDURE — G0108 DIAB MANAGE TRN  PER INDIV: HCPCS | Performed by: DIETITIAN, REGISTERED

## 2018-07-23 NOTE — PATIENT INSTRUCTIONS
1.  Work hard on not buying the snacks, so they are not around.  Ask Khushbu to help you with not making poor snack choices.      2.  Get back to the gym, it really made a difference.  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

## 2018-07-23 NOTE — PROGRESS NOTES
Diabetes Self Management Training: Individual Review Visit    Refugio Kennedy presents today for education related to Type 2 diabetes, wt loss    He is accompanied by self    Patient's diabetes management related comments/concerns: struggling with snacking    Patient's emotional response to diabetes: expresses readiness to learn    Patient would like this visit to be focused around the following diabetes-related behaviors and goals: Healthy Eating, Being Active, Monitoring and Taking Medication    ASSESSMENT:  Patient Problem List and Family Medical History reviewed for relevant medical history, current medical status, and diabetes risk factors.    Current Diabetes Management per Patient:  Taking diabetes medications?   yes:     Diabetes Medication(s)     Biguanides Sig    metFORMIN (GLUCOPHAGE-XR) 500 MG 24 hr tablet TAKE ONE TABLET BY MOUTH EVERY DAY WITH BREAKFAST (NEED TO KEEP APPOINTMENT WITH DR. SANCHES END OF NOVEMBER)    metFORMIN modified (GLUMETZA) 500 MG 24 hr tablet Take 1 tablet (500 mg) by mouth daily (with breakfast)    Incretin Mimetic Agents (GLP-1 Receptor Agonists) Sig    VICTOZA PEN 18 MG/3ML soln INJECT 1.8MG UNDER THE SKIN ONCE DAILY          Do you have any difficulty affording your medications or glucose monitoring supplies?     No     *Abbreviated insulin dose documentation key: Insulin trade name (wobienfhg-ykpbf-rhwsyd-bedtime) - i.e. Humalog 5-5-5-0 (Humalog 5 units at breakfast, 5 units at lunch, and 5 units at dinner).     Patient glucose self monitoring as follows: one time daily.   BG meter: Accu-Chek Cheryl meter  BG results:     BG values are: In goal  Patient's most recent   Lab Results   Component Value Date    A1C 5.7 12/19/2017    is meeting goal of <7.0    Nutrition:  Breakfast- peanut butter toast  Later: eggs, meraz or sausage (turkey)  Lunch: sandwiches, mac and cheese  Snack: STRUGGLE area, has been doing sweets, and at the flea market,   Supper: cup of soup and ham  "sandwich  Snacks: has started snacking in the evenings as well  Physical Activity:    Limitations: size, only able to do so much  Need to get back to the gym    Diabetes Risk Factors:  family history    Relevant co-morbidities and related health problems:  Significant for:  obesity and sleep apnea    Diabetes Complications:  Not discussed today.    Recent health service and resource utilization related to diabetes (hyperglycemia, hypoglycemia, etc):   None    Vitals:  Wt (!) 184 kg (405 lb 9.6 oz)  BMI 63.53 kg/m2  Estimated body mass index is 63.53 kg/(m^2) as calculated from the following:    Height as of 5/21/18: 1.702 m (5' 7\").    Weight as of this encounter: 184 kg (405 lb 9.6 oz).   Last 3 BP:   BP Readings from Last 3 Encounters:   06/08/18 112/58   05/21/18 108/70   04/16/18 112/64       History   Smoking Status     Former Smoker     Packs/day: 0.50     Years: 1.00     Types: Cigarettes, Cigars     Quit date: 5/21/2012   Smokeless Tobacco     Never Used       Labs:  Lab Results   Component Value Date    A1C 5.7 12/19/2017     Lab Results   Component Value Date    GLC 85 12/19/2017     Lab Results   Component Value Date    LDL 58 12/19/2017     HDL Cholesterol   Date Value Ref Range Status   12/19/2017 45 >39 mg/dL Final   ]  GFR Estimate   Date Value Ref Range Status   12/19/2017 87 >60 mL/min/1.7m2 Final     Comment:     Non  GFR Calc     GFR Estimate If Black   Date Value Ref Range Status   12/19/2017 >90 >60 mL/min/1.7m2 Final     Comment:      GFR Calc     Lab Results   Component Value Date    CR 0.94 12/19/2017     No results found for: MICROALBUMIN    Socio/Economic/Cultural Considerations:    Support system: family    Cultural Influences/Ethnic Background:  American      Health Literacy/Numeracy:  \"With diabetes, it's helpful to use forms and log books to write down blood sugars and what you're eating at times to help understand how foods affect your blood sugars. " With this in mind how confident are you at filling out medical forms, such as these, by yourself?  Extremely    Health Beliefs and Attitudes:   Patient Activation Measure Survey Score:  MARY Score (Last Two) 1/25/2011   MARY Raw Score 36   Activation Score 47.4   MARY Level 2       Stage of Change: RELAPSE (Returned to unhealthy behavior)      Diabetes knowledge and skills assessment:     Patient is knowledgeable in diabetes management concepts related to: Healthy Eating, Being Active, Monitoring and Taking Medication    Patient needs further education on the following diabetes management concepts: Healthy Eating, Problem Solving and Healthy Coping    Barriers to Learning Assessment: No Barriers identified    Based on learning assessment above, most appropriate setting for further diabetes education would be: Individual setting.    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    Pt verbalized understanding of concepts discussed and recommendations provided today.       Education Materials Provided:  No new materials provided today    PLAN:  See Patient Instructions for co-developed, patient-stated behavior change goals.  AVS printed and provided to patient today.    FOLLOW-UP:  Follow-up appointment scheduled in two months, see's wt loss MD in one month.    Ongoing plan for education and support: Follow-up visit with diabetes educator in two months      Time Spent: 30 minutes  Encounter Type: Individual    Any diabetes medication dose  changes were made via the CDE Protocol and Collaborative Practice Agreement with the patient's primary care provider. A copy of this encounter was shared with the provider.

## 2018-07-23 NOTE — MR AVS SNAPSHOT
After Visit Summary   7/23/2018    Refugio Kennedy    MRN: [id number]           Patient Information     Date Of Birth          1975        Visit Information        Provider Department      7/23/2018 8:00 AM Violet Bowen RD [facility]        Care Instructions    1.  Work hard on not buying the snacks, so they are not around.  Ask Khushbu to help you with not making poor snack choices.      2.  Get back to the gym, it really made a difference.  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          Follow-ups after your visit        Your next 10 appointments already scheduled     Jul 24, 2018  3:20 PM CDT   SHORT with Erika Espino MD   [facility] ([facility])    [address] [place] [address]   [phone]            Aug 20, 2018  9:45 AM CDT   (Arrive by 9:30 AM)   Return Visit with Ashish Paez MD   [facility] ([facility] [place])    [address] [place] [id number] [phone]            Sep 17, 2018  8:00 AM CDT   Diabetic Education with Violet Bowen RD   [facility] ([facility])    [address] [place] [id number] [phone]              Who to contact     If you have questions or need follow up information about today's clinic visit or your schedule please contact [facility] directly at [phone].  Normal or non-critical lab and imaging results will be communicated to you by MyChart, letter or phone within 4 business days after the clinic has received the results. If you do not hear from us within 7 days, please contact the clinic through Advisityhart or phone. If you have a critical or abnormal lab result, we will notify you by phone as soon as possible.  Submit refill requests through [employer] or call your pharmacy and they will forward  the refill request to us. Please allow 3 business days for your refill to be completed.          Additional Information About Your Visit        MyChart Information     [facility] gives you secure access to your electronic health record. If you see a primary care provider, you can also send messages to your care team and make appointments. If you have questions, please call your primary care clinic.  If you do not have a primary care provider, please call [phone] and they will assist you.        Care EveryWhere ID     This is your Care EveryWhere ID. This could be used by other organizations to access your [place] medical records  [id number]        Your Vitals Were     BMI (Body Mass Index)                   63.53 kg/m2            Blood Pressure from Last 3 Encounters:   06/08/18 112/58   05/21/18 108/70   04/16/18 112/64    Weight from Last 3 Encounters:   07/23/18 (!) 184 kg (405 lb 9.6 oz)   06/28/18 (!) 181.3 kg (399 lb 11.2 oz)   06/08/18 (!) 180.1 kg (397 lb)              Today, you had the following     No orders found for display       Primary Care Provider Office Phone # Fax #    Erika Espino [phone] [address] [place], [place] [address]        Equal Access to Services     RICARDO HALL : Hadii rickie ku hadasho Soomaali, waaxda luqadaha, qaybta kaalmada adeegyada, waxkay zhun eleni winter. So [facility] [phone].    ATENCIÓN: Si habla español, tiene a clinton disposición servicios gratuitos de asistencia lingüística. Llame al [phone].    We comply with applicable federal civil rights laws and [place] laws. We do not discriminate on the basis of race, color, national origin, age, disability, sex, sexual orientation, or gender identity.            Thank you!     Thank you for choosing [facility]  for your care. Our goal is always to provide you with excellent care. Hearing back from our patients is one way we can continue to  improve our services. Please take a few minutes to complete the written survey that you may receive in the mail after your visit with us. Thank you!             Your Updated Medication List - Protect others around you: Learn how to safely use, store and throw away your medicines at www.disposemymeds.org.          This list is accurate as of 7/23/18  8:40 AM.  Always use your most recent med list.                   Brand Name Dispense Instructions for use Diagnosis    ACCU-CHEK CHRIS PLUS test strip   Generic drug:  blood glucose monitoring     200 each    USE TO TEST BLOOD SUGARS THREE TIMES A DAY AS DIRECTED    Diabetes mellitus without complication (H)       * albuterol 108 (90 Base) MCG/ACT Inhaler    PROAIR HFA    1 Inhaler    Inhale 2 puffs into the lungs every 4 hours as needed for shortness of breath / dyspnea    Mild intermittent asthma without complication       * albuterol (2.5 MG/3ML) 0.083% neb solution     1 vial    Take 1 vial (2.5 mg) by nebulization every 6 hours as needed for shortness of breath / dyspnea or wheezing    Moderate persistent asthma with exacerbation       * albuterol (2.5 MG/3ML) 0.083% neb solution     25 vial    Take 1 vial (2.5 mg) by nebulization every 4 hours as needed for shortness of breath / dyspnea or wheezing    Moderate persistent asthma with exacerbation       aspirin 81 MG tablet     90 tablet    Take 1 tablet by mouth daily.    Type 2 diabetes, HbA1C goal < 8% (H)       BD SEVERO U/F 32G X 4 MM   Generic drug:  insulin pen needle     100 each    USE ONE DAILY OR AS DIRECTED    Type 2 diabetes mellitus without complications (H)       cetirizine 10 MG tablet    zyrTEC    90 tablet    TAKE ONE TABLET (10 MG) BY MOUTH EVERY EVENING    Allergic rhinitis due to pollen       * DIABETIC STERILE LANCETS device     1 Box    1 Device 3 times daily.    Type 2 diabetes, HbA1C goal < 8% (H)       * blood glucose monitoring lancets     300 each    TEST THREE TIMES A DAY OR AS DIRECTED     Type 2 diabetes, HbA1C goal < 8% (H)       fluticasone 50 MCG/ACT spray    FLONASE    16 g    Spray 1-2 sprays into both nostrils daily    Acute sinusitis with symptoms > 10 days       furosemide 20 MG tablet    LASIX    360 tablet    TAKE TWO TABLETS BY MOUTH TWICE A DAY    Venous stasis       * lisinopril 5 MG tablet    PRINIVIL/ZESTRIL    90 tablet    TAKE ONE TABLET BY MOUTH EVERY DAY    Pulmonary hypertension       * lisinopril 5 MG tablet    PRINIVIL/ZESTRIL    90 tablet    TAKE ONE TABLET BY MOUTH EVERY DAY    Pulmonary hypertension       * metFORMIN modified 500 MG 24 hr tablet    GLUMETZA    90 tablet    Take 1 tablet (500 mg) by mouth daily (with breakfast)    Type 2 diabetes mellitus without complication, without long-term current use of insulin (H)       * metFORMIN 500 MG 24 hr tablet    GLUCOPHAGE-XR    90 tablet    TAKE ONE TABLET BY MOUTH EVERY DAY WITH BREAKFAST (NEED TO KEEP APPOINTMENT WITH DR. SANCHES END OF NOVEMBER)    Methamphetamine abuse in remission       modafinil 200 MG tablet    PROVIGIL    60 tablet    Take 1/2 tablet by mouth 3-4 times daily as needed for sleepiness.    Obstructive sleep apnea-hypopnea syndrome       MULTIVITAMIN PO      Take 1 tablet by mouth daily.        order for DME      Equipment being ordered: BIPAP Refugio P Kennedy received a [employer] AirCurve 10 Bilevel. Pressures were set at 23/14 cm H2O.        order for DME      Auto-BiPAP: IPAP max 21 cm H2O EPAP min 15 cm H2O Pressure support 5 cm Changed in clinic Lifetime need and heated humidity.    FARZANA (obstructive sleep apnea)       order for DME     1 Units    Nebulizer    Moderate persistent asthma with exacerbation       simvastatin 10 MG tablet    ZOCOR    90 tablet    TAKE ONE TABLET BY MOUTH AT BEDTIME    Hyperlipidemia LDL goal <100       topiramate 50 MG tablet    TOPAMAX    240 tablet    Take 4 tablets (200 mg) by mouth 2 times daily    Morbid obesity due to excess calories (H)       triamcinolone 0.1 % cream     KENALOG    30 g    Apply sparingly to affected area two times daily as needed    Venous stasis       VICTOZA PEN 18 MG/3ML soln   Generic drug:  liraglutide     9 mL    INJECT 1.8MG UNDER THE SKIN ONCE DAILY    Type 2 diabetes mellitus without complications (H)       * Notice:  This list has 9 medication(s) that are the same as other medications prescribed for you. Read the directions carefully, and ask your doctor or other care provider to review them with you.

## 2018-07-24 ENCOUNTER — TRANSFERRED RECORDS (OUTPATIENT)
Dept: HEALTH INFORMATION MANAGEMENT | Facility: CLINIC | Age: 43
End: 2018-07-24

## 2018-07-24 ENCOUNTER — OFFICE VISIT (OUTPATIENT)
Dept: FAMILY MEDICINE | Facility: CLINIC | Age: 43
End: 2018-07-24
Payer: COMMERCIAL

## 2018-07-24 VITALS
BODY MASS INDEX: 62.81 KG/M2 | OXYGEN SATURATION: 97 % | SYSTOLIC BLOOD PRESSURE: 120 MMHG | DIASTOLIC BLOOD PRESSURE: 76 MMHG | TEMPERATURE: 97.3 F | HEART RATE: 85 BPM | WEIGHT: 315 LBS | RESPIRATION RATE: 14 BRPM

## 2018-07-24 DIAGNOSIS — I27.20 PULMONARY HYPERTENSION (H): ICD-10-CM

## 2018-07-24 DIAGNOSIS — Z11.3 SCREEN FOR STD (SEXUALLY TRANSMITTED DISEASE): ICD-10-CM

## 2018-07-24 DIAGNOSIS — J96.11 CHRONIC RESPIRATORY FAILURE WITH HYPOXIA (H): ICD-10-CM

## 2018-07-24 DIAGNOSIS — E11.9 TYPE 2 DIABETES MELLITUS WITHOUT COMPLICATION, WITHOUT LONG-TERM CURRENT USE OF INSULIN (H): Primary | ICD-10-CM

## 2018-07-24 LAB
ANION GAP SERPL CALCULATED.3IONS-SCNC: 8 MMOL/L (ref 3–14)
BUN SERPL-MCNC: 22 MG/DL (ref 7–30)
CALCIUM SERPL-MCNC: 8.4 MG/DL (ref 8.5–10.1)
CHLORIDE SERPL-SCNC: 111 MMOL/L (ref 94–109)
CO2 SERPL-SCNC: 22 MMOL/L (ref 20–32)
CREAT SERPL-MCNC: 0.94 MG/DL (ref 0.66–1.25)
CREAT UR-MCNC: 223 MG/DL
GFR SERPL CREATININE-BSD FRML MDRD: 87 ML/MIN/1.7M2
GLUCOSE SERPL-MCNC: 85 MG/DL (ref 70–99)
HBA1C MFR BLD: 5.5 % (ref 0–5.6)
MICROALBUMIN UR-MCNC: 9 MG/L
MICROALBUMIN/CREAT UR: 3.86 MG/G CR (ref 0–17)
POTASSIUM SERPL-SCNC: 4 MMOL/L (ref 3.4–5.3)
SODIUM SERPL-SCNC: 141 MMOL/L (ref 133–144)
TSH SERPL DL<=0.005 MIU/L-ACNC: 1.48 MU/L (ref 0.4–4)

## 2018-07-24 PROCEDURE — 86780 TREPONEMA PALLIDUM: CPT | Performed by: FAMILY MEDICINE

## 2018-07-24 PROCEDURE — 80048 BASIC METABOLIC PNL TOTAL CA: CPT | Performed by: FAMILY MEDICINE

## 2018-07-24 PROCEDURE — 83036 HEMOGLOBIN GLYCOSYLATED A1C: CPT | Performed by: FAMILY MEDICINE

## 2018-07-24 PROCEDURE — 99214 OFFICE O/P EST MOD 30 MIN: CPT | Performed by: FAMILY MEDICINE

## 2018-07-24 PROCEDURE — 36415 COLL VENOUS BLD VENIPUNCTURE: CPT | Performed by: FAMILY MEDICINE

## 2018-07-24 PROCEDURE — 84443 ASSAY THYROID STIM HORMONE: CPT | Performed by: FAMILY MEDICINE

## 2018-07-24 PROCEDURE — 86803 HEPATITIS C AB TEST: CPT | Performed by: FAMILY MEDICINE

## 2018-07-24 PROCEDURE — 87389 HIV-1 AG W/HIV-1&-2 AB AG IA: CPT | Performed by: FAMILY MEDICINE

## 2018-07-24 PROCEDURE — 82043 UR ALBUMIN QUANTITATIVE: CPT | Performed by: FAMILY MEDICINE

## 2018-07-24 ASSESSMENT — PAIN SCALES - GENERAL: PAINLEVEL: NO PAIN (0)

## 2018-07-24 NOTE — MR AVS SNAPSHOT
After Visit Summary   7/24/2018    Refugio Kennedy    MRN: [id number]           Patient Information     Date Of Birth          1975        Visit Information        Provider Department      7/24/2018 3:20 PM Erika Espino MD [facility]        Today's Diagnoses     Type 2 diabetes mellitus without complication, without long-term current use of insulin (H)    -  1    Chronic respiratory failure with hypoxia (H)        Pulmonary hypertension        Screen for STD (sexually transmitted disease)          Care Instructions    Labs today     Stay on the same medication               Follow-ups after your visit        Your next 10 appointments already scheduled     Aug 20, 2018  9:45 AM CDT   (Arrive by 9:30 AM)   Return Visit with Ashish Paez MD   [facility] ([facility] [place])    [address]  [address] [facility] [address] [phone]            Sep 17, 2018  8:00 AM CDT   Diabetic Education with Violet Bowen RD   [facility] ([facility])    [address] [place] [address] [phone]              Who to contact     If you have questions or need follow up information about today's clinic visit or your schedule please contact [facility] directly at [phone].  Normal or non-critical lab and imaging results will be communicated to you by MyChart, letter or phone within 4 business days after the clinic has received the results. If you do not hear from us within 7 days, please contact the clinic through MyChart or phone. If you have a critical or abnormal lab result, we will notify you by phone as soon as possible.  Submit refill requests through [email or web address] or call your pharmacy and they will forward the refill request to us. Please allow 3 business days for your refill to be completed.          Additional Information About Your Visit         [employer] Information     [employer] gives you secure access to your electronic health record. If you see a primary care provider, you can also send messages to your care team and make appointments. If you have questions, please call your primary care clinic.  If you do not have a primary care provider, please call [phone] and they will assist you.        Care EveryWhere ID     This is your Care EveryWhere ID. This could be used by other organizations to access your [place] medical records  [id number]        Your Vitals Were     Pulse Temperature Respirations Pulse Oximetry BMI (Body Mass Index)       85 97.3  F (36.3  C) (Tympanic) 14 97% 62.81 kg/m2        Blood Pressure from Last 3 Encounters:   07/24/18 120/76   06/08/18 112/58   05/21/18 108/70    Weight from Last 3 Encounters:   07/24/18 (!) 401 lb (181.9 kg)   07/23/18 (!) 405 lb 9.6 oz (184 kg)   06/28/18 (!) 399 lb 11.2 oz (181.3 kg)              We Performed the Following     **Hepatitis C Screen Reflex to RNA FUTURE anytime     Albumin Random Urine Quantitative with Creat Ratio     Asthma Action Plan (AAP)     Basic metabolic panel     HEMOGLOBIN A1C     HIV Antigen Antibody Combo     Treponema Abs w Reflex to RPR and Titer     TSH WITH FREE T4 REFLEX          Today's Medication Changes          These changes are accurate as of 7/24/18  3:44 PM.  If you have any questions, ask your nurse or doctor.               These medicines have changed or have updated prescriptions.        Dose/Directions    * albuterol 108 (90 Base) MCG/ACT Inhaler   Commonly known as:  PROAIR HFA   This may have changed:  Another medication with the same name was removed. Continue taking this medication, and follow the directions you see here.   Used for:  Mild intermittent asthma without complication   Changed by:  Erika Espino MD        Dose:  2 puff   Inhale 2 puffs into the lungs every 4 hours as needed for shortness of breath / dyspnea   Quantity:  1 Inhaler    Refills:  5       * albuterol (2.5 MG/3ML) 0.083% neb solution   This may have changed:  Another medication with the same name was removed. Continue taking this medication, and follow the directions you see here.   Used for:  Moderate persistent asthma with exacerbation   Changed by:  Erika Espion MD        Dose:  2.5 mg   Take 1 vial (2.5 mg) by nebulization every 4 hours as needed for shortness of breath / dyspnea or wheezing   Quantity:  25 vial   Refills:  0       lisinopril 5 MG tablet   Commonly known as:  PRINIVIL/ZESTRIL   This may have changed:  Another medication with the same name was removed. Continue taking this medication, and follow the directions you see here.   Used for:  Pulmonary hypertension   Changed by:  Erika Espino MD        TAKE ONE TABLET BY MOUTH EVERY DAY   Quantity:  90 tablet   Refills:  1       * Notice:  This list has 2 medication(s) that are the same as other medications prescribed for you. Read the directions carefully, and ask your doctor or other care provider to review them with you.             Primary Care Provider Office Phone # Fax #    Erika Espino [phone] [address] [place], [place] [address]        Equal Access to Services     LENI HALL : Hadii rickie douglas hadasho Sorachelali, waaxda luqadaha, qaybta kaalmada adeegyada, consuelo winter. So [facility] [phone].    ATENCIÓN: Si habla español, tiene a clinton disposición servicios gratuitos de asistencia lingüística. Llame al [phone].    We comply with applicable federal civil rights laws and [place] laws. We do not discriminate on the basis of race, color, national origin, age, disability, sex, sexual orientation, or gender identity.            Thank you!     Thank you for choosing [facility]  for your care. Our goal is always to provide you with excellent care. Hearing back from our patients is one way we can continue to improve  our services. Please take a few minutes to complete the written survey that you may receive in the mail after your visit with us. Thank you!             Your Updated Medication List - Protect others around you: Learn how to safely use, store and throw away your medicines at www.disposemymeds.org.          This list is accurate as of 7/24/18  3:44 PM.  Always use your most recent med list.                   Brand Name Dispense Instructions for use Diagnosis    ACCU-CHEK CHRIS PLUS test strip   Generic drug:  blood glucose monitoring     200 each    USE TO TEST BLOOD SUGARS THREE TIMES A DAY AS DIRECTED    Diabetes mellitus without complication (H)       * albuterol 108 (90 Base) MCG/ACT Inhaler    PROAIR HFA    1 Inhaler    Inhale 2 puffs into the lungs every 4 hours as needed for shortness of breath / dyspnea    Mild intermittent asthma without complication       * albuterol (2.5 MG/3ML) 0.083% neb solution     25 vial    Take 1 vial (2.5 mg) by nebulization every 4 hours as needed for shortness of breath / dyspnea or wheezing    Moderate persistent asthma with exacerbation       aspirin 81 MG tablet     90 tablet    Take 1 tablet by mouth daily.    Type 2 diabetes, HbA1C goal < 8% (H)       BD SEVERO U/F 32G X 4 MM   Generic drug:  insulin pen needle     100 each    USE ONE DAILY OR AS DIRECTED    Type 2 diabetes mellitus without complications (H)       cetirizine 10 MG tablet    zyrTEC    90 tablet    TAKE ONE TABLET (10 MG) BY MOUTH EVERY EVENING    Allergic rhinitis due to pollen       * DIABETIC STERILE LANCETS device     1 Box    1 Device 3 times daily.    Type 2 diabetes, HbA1C goal < 8% (H)       * blood glucose monitoring lancets     300 each    TEST THREE TIMES A DAY OR AS DIRECTED    Type 2 diabetes, HbA1C goal < 8% (H)       fluticasone 50 MCG/ACT spray    FLONASE    16 g    Spray 1-2 sprays into both nostrils daily    Acute sinusitis with symptoms > 10 days       furosemide 20 MG tablet    LASIX    360  tablet    TAKE TWO TABLETS BY MOUTH TWICE A DAY    Venous stasis       lisinopril 5 MG tablet    PRINIVIL/ZESTRIL    90 tablet    TAKE ONE TABLET BY MOUTH EVERY DAY    Pulmonary hypertension       * metFORMIN modified 500 MG 24 hr tablet    GLUMETZA    90 tablet    Take 1 tablet (500 mg) by mouth daily (with breakfast)    Type 2 diabetes mellitus without complication, without long-term current use of insulin (H)       * metFORMIN 500 MG 24 hr tablet    GLUCOPHAGE-XR    90 tablet    TAKE ONE TABLET BY MOUTH EVERY DAY WITH BREAKFAST (NEED TO KEEP APPOINTMENT WITH DR. SANCHES END OF NOVEMBER)    Methamphetamine abuse in remission       modafinil 200 MG tablet    PROVIGIL    60 tablet    Take 1/2 tablet by mouth 3-4 times daily as needed for sleepiness.    Obstructive sleep apnea-hypopnea syndrome       MULTIVITAMIN PO      Take 1 tablet by mouth daily.        order for DME      Equipment being ordered: BIPAP Refugio P Kennedy received a [employer] AirCurve 10 Bilevel. Pressures were set at 23/14 cm H2O.        order for DME      Auto-BiPAP: IPAP max 21 cm H2O EPAP min 15 cm H2O Pressure support 5 cm Changed in clinic Lifetime need and heated humidity.    FARZANA (obstructive sleep apnea)       order for DME     1 Units    Nebulizer    Moderate persistent asthma with exacerbation       simvastatin 10 MG tablet    ZOCOR    90 tablet    TAKE ONE TABLET BY MOUTH AT BEDTIME    Hyperlipidemia LDL goal <100       topiramate 50 MG tablet    TOPAMAX    240 tablet    Take 4 tablets (200 mg) by mouth 2 times daily    Morbid obesity due to excess calories (H)       triamcinolone 0.1 % cream    KENALOG    30 g    Apply sparingly to affected area two times daily as needed    Venous stasis       VICTOZA PEN 18 MG/3ML soln   Generic drug:  liraglutide     9 mL    INJECT 1.8MG UNDER THE SKIN ONCE DAILY    Type 2 diabetes mellitus without complications (H)       * Notice:  This list has 6 medication(s) that are the same as other medications prescribed  for you. Read the directions carefully, and ask your doctor or other care provider to review them with you.

## 2018-07-24 NOTE — PROGRESS NOTES
SUBJECTIVE:   Refugio Kennedy is a 43 year old male who presents to clinic today for the following health issues:      Diabetes Follow-up    Patient is checking blood sugars: once daily.  Results are as follows:         am - 120    Diabetic concerns: None     Symptoms of hypoglycemia (low blood sugar): none     Paresthesias (numbness or burning in feet) or sores: No     Date of last diabetic eye exam: COMING UP     BP Readings from Last 2 Encounters:   07/24/18 120/76   06/08/18 112/58     Hemoglobin A1C (%)   Date Value   07/24/2018 5.5   12/19/2017 5.7     LDL Cholesterol Calculated (mg/dL)   Date Value   12/19/2017 58   11/29/2016 59     Wt Readings from Last 4 Encounters:   07/24/18 (!) 401 lb (181.9 kg)   07/23/18 (!) 405 lb 9.6 oz (184 kg)   06/28/18 (!) 399 lb 11.2 oz (181.3 kg)   06/08/18 (!) 397 lb (180.1 kg)     Working on weight loss   Met with diabetic education yesterday going well                      Pulmonary HTN   On Oxygen and doing well     Would like STD screen     Problem list and histories reviewed & adjusted, as indicated.  Additional history: as documented    Labs reviewed in EPIC    Reviewed and updated as needed this visit by clinical staff  Tobacco  Allergies  Meds  Problems       Reviewed and updated as needed this visit by Provider  Allergies  Meds  Problems         ROS:  Constitutional, HEENT, cardiovascular, pulmonary, gi and gu systems are negative, except as otherwise noted.    OBJECTIVE:                                                    /76  Pulse 85  Temp 97.3  F (36.3  C) (Tympanic)  Resp 14  Wt (!) 401 lb (181.9 kg)  SpO2 97%  BMI 62.81 kg/m2  Body mass index is 62.81 kg/(m^2).  GENERAL APPEARANCE: healthy, alert and no distress  RESP: lungs clear to auscultation - no rales, rhonchi or wheezes  CV: regular rates and rhythm, normal S1 S2, no S3 or S4 and no murmur, click or rub  MS: extremities normal- no gross deformities noted  SKIN: no suspicious lesions or  rashes  PSYCH: mentation appears normal and affect normal/bright         ASSESSMENT/PLAN:                                                    1. Type 2 diabetes mellitus without complication, without long-term current use of insulin (H)  Adjust meds as indicated by above labs.   - HEMOGLOBIN A1C  - Albumin Random Urine Quantitative with Creat Ratio  - TSH WITH FREE T4 REFLEX  - Basic metabolic panel    2. Chronic respiratory failure with hypoxia (H)  Stable no change in treatment plan. On Oxygen  - Asthma Action Plan (AAP)    3. Pulmonary hypertension  Stable no change in treatment plan.     4. Screen for STD (sexually transmitted disease)    - HIV Antigen Antibody Combo  - Treponema Abs w Reflex to RPR and Titer  - **Hepatitis C Screen Reflex to RNA FUTURE anytime      Patient Instructions   Labs today     Stay on the same medication           Erika Espino MD  [facility]

## 2018-07-25 LAB
HCV AB SERPL QL IA: NONREACTIVE
HIV 1+2 AB+HIV1 P24 AG SERPL QL IA: NONREACTIVE
T PALLIDUM AB SER QL: NONREACTIVE

## 2018-07-25 ASSESSMENT — ASTHMA QUESTIONNAIRES: ACT_TOTALSCORE: 19

## 2018-08-14 DIAGNOSIS — E11.9 TYPE 2 DIABETES MELLITUS WITHOUT COMPLICATION, WITHOUT LONG-TERM CURRENT USE OF INSULIN (H): Primary | ICD-10-CM

## 2018-08-16 DIAGNOSIS — E11.9 TYPE 2 DIABETES MELLITUS WITHOUT COMPLICATION, WITHOUT LONG-TERM CURRENT USE OF INSULIN (H): Primary | ICD-10-CM

## 2018-08-16 RX ORDER — LIRAGLUTIDE 6 MG/ML
INJECTION SUBCUTANEOUS
Qty: 9 ML | Refills: 3 | Status: SHIPPED | OUTPATIENT
Start: 2018-08-16 | End: 2018-12-20

## 2018-08-16 NOTE — TELEPHONE ENCOUNTER
"Requested Prescriptions   Pending Prescriptions Disp Refills     liraglutide (VICTOZA PEN) 18 MG/3ML soln 9 mL 3    GLP-1 Agonists Protocol Passed    8/16/2018  8:33 AM       Passed - Blood pressure less than 140/90 in past 6 months    BP Readings from Last 3 Encounters:   07/24/18 120/76   06/08/18 112/58   05/21/18 108/70                Passed - LDL on file in past 12 months    Recent Labs   Lab Test  12/19/17   1429   LDL  58            Passed - Microalbumin on file in past 12 months    Recent Labs   Lab Test  07/24/18   1550   MICROL  9   UMALCR  3.86            Passed - HgbA1C in past 3 or 6 months    If HgbA1C is 8 or greater, it needs to be on file within the past 3 months.  If less than 8, must be on file within the past 6 months.     Recent Labs   Lab Test  07/24/18   1546   A1C  5.5            Passed - Patient is age 18 or older       Passed - Normal serum creatinine on file in past 12 months    Recent Labs   Lab Test  07/24/18   1546   CR  0.94            Passed - Recent (6 mo) or future (30 days) visit within the authorizing provider's specialty    Patient had office visit in the last 6 months or has a visit in the next 30 days with authorizing provider.  See \"Patient Info\" tab in inbasket, or \"Choose Columns\" in Meds & Orders section of the refill encounter.            Last Written Prescription Date:  4/16/18  Last Fill Quantity: 9 ml,  # refills: 3   Last office visit: 7/24/2018 with prescribing provider:  RENETTA Espino   Future Office Visit:      "

## 2018-08-20 ENCOUNTER — OFFICE VISIT (OUTPATIENT)
Dept: ENDOCRINOLOGY | Facility: CLINIC | Age: 43
End: 2018-08-20
Payer: COMMERCIAL

## 2018-08-20 VITALS
DIASTOLIC BLOOD PRESSURE: 60 MMHG | OXYGEN SATURATION: 94 % | HEIGHT: 68 IN | BODY MASS INDEX: 47.74 KG/M2 | RESPIRATION RATE: 16 BRPM | SYSTOLIC BLOOD PRESSURE: 105 MMHG | WEIGHT: 315 LBS | TEMPERATURE: 98.3 F | HEART RATE: 73 BPM

## 2018-08-20 DIAGNOSIS — E66.01 MORBID OBESITY (H): Primary | ICD-10-CM

## 2018-08-20 ASSESSMENT — PAIN SCALES - GENERAL: PAINLEVEL: MILD PAIN (3)

## 2018-08-20 NOTE — LETTER
"2018       RE: Refugio Kennedy  [address]  [facility] [address]     Dear Colleague,    Thank you for referring your patient, Refugio Kennedy, to the [facility] MEDICAL WEIGHT MANAGEMENT at [facility]. Please see a copy of my visit note below.          Return Medical Weight Management Note     Refugio Kennedy  MRN:  [id number]  :  1975  CRISTI:  2017    Dear Dr. Espino,    We had the pleasure of seeing your patient Refugio Kennedy.  He is a 41 year old male who we are continuing to see for treatment of obesity related to: DMII, HLD, sleep apnea on CPAP daily, and hypoventilation syndrome, oxygen dependent.     CURRENT WEIGHT:   398 lbs 0 oz    Wt Readings from Last 4 Encounters:   18 (!) 180.5 kg (398 lb)   18 (!) 181.9 kg (401 lb)   18 (!) 184 kg (405 lb 9.6 oz)   18 (!) 181.3 kg (399 lb 11.2 oz)     Height:  5' 7.5\"  Body Mass Index:  Body mass index is 61.42 kg/(m^2).  Vitals:  B/P: 138/83, P: 80    Initial consult weight was 454 on 5/15/2015.  Weight change since last seen on 2018 is down 3 pounds.   Total loss is 56 pounds.    INTERVAL HISTORY:  Tolerating topiramate 150 mg BID. Now exercise. He is not now trying to change his snacking to vegetables and get more vegetables overall.    Diet and Activity Changes Since Last Visit Reviewed With Patient 2018   I have made the following changes to my diet since my last visit: still struggling with eating enuff veggies daily with meals and instead of snacking   With regards to my diet, I am still struggling with: snacking and not veggies   For breakfast, I typically eat: -   For lunch, I typically eat: -   For supper, I typically eat: -   For snack(s), I typically eat: -   I have made the following changes to my activity/exercise since my last visit: less of it,i am able to afford joining now and plan on doing so   With regards to my activity/exercise, I am still struggling with: making a " plan and sticking to it for extended time period to achive my goals       MEDICATIONS:   Current Outpatient Prescriptions   Medication     ACCU-CHEK CHRIS PLUS test strip     albuterol (2.5 MG/3ML) 0.083% neb solution     albuterol (PROAIR HFA) 108 (90 BASE) MCG/ACT Inhaler     aspirin 81 MG tablet     BD SEVERO U/F 32G X 4 MM insulin pen needle     blood glucose monitoring (SOFTCLIX) lancets     cetirizine (ZYRTEC) 10 MG tablet     DIABETIC STERILE LANCETS device     fluticasone (FLONASE) 50 MCG/ACT spray     furosemide (LASIX) 20 MG tablet     liraglutide (VICTOZA PEN) 18 MG/3ML soln     lisinopril (PRINIVIL/ZESTRIL) 5 MG tablet     metFORMIN (GLUCOPHAGE-XR) 500 MG 24 hr tablet     metFORMIN modified (GLUMETZA) 500 MG 24 hr tablet     modafinil (PROVIGIL) 200 MG tablet     Multiple Vitamin (MULTIVITAMIN OR)     order for DME     order for DME     ORDER FOR DME     simvastatin (ZOCOR) 10 MG tablet     topiramate (TOPAMAX) 50 MG tablet     triamcinolone (KENALOG) 0.1 % cream     No current facility-administered medications for this visit.        Weight Loss Medication History Reviewed With Patient 8/20/2018   Which weight loss medications are you currently taking on a regular basis?  Topamax (topiramate), Victoza (liraglutide)   Are you having any side effects from the weight loss medication that we have prescribed you? No   If you are having side effects please describe: -     ASSESSMENT:   Maintain topiramate 200 mg BID. Reinforce food plan - focus on no between meal snacking, aggressive lowering of starches and cheese.     FOLLOW-UP:    3 months   10/15 minutes spent on counseling and education    Again, thank you for allowing me to participate in the care of your patient.      Sincerely,    Ashish Paez MD

## 2018-08-20 NOTE — NURSING NOTE
"Chief Complaint   Patient presents with     RECHECK     Pt here for weight managment follow up       Vitals:    08/20/18 0953   BP: 105/60   BP Location: Left arm   Patient Position: Sitting   Cuff Size: Adult Large   Pulse: 73   Resp: 16   Temp: 98.3  F (36.8  C)   TempSrc: Oral   SpO2: 94%   Weight: (!) 398 lb   Height: 5' 7.5\"       Body mass index is 61.42 kg/(m^2).      JUAN CARLOS Appiah, EMT                      "

## 2018-08-20 NOTE — PROGRESS NOTES
"      Return Medical Weight Management Note     Refugio Kennedy  MRN:  [id number]  :  1975  CRISTI:  2017    Dear Dr. Espino,    We had the pleasure of seeing your patient Refugio Kennedy.  He is a 41 year old male who we are continuing to see for treatment of obesity related to: DMII, HLD, sleep apnea on CPAP daily, and hypoventilation syndrome, oxygen dependent.     CURRENT WEIGHT:   398 lbs 0 oz    Wt Readings from Last 4 Encounters:   18 (!) 180.5 kg (398 lb)   18 (!) 181.9 kg (401 lb)   18 (!) 184 kg (405 lb 9.6 oz)   18 (!) 181.3 kg (399 lb 11.2 oz)     Height:  5' 7.5\"  Body Mass Index:  Body mass index is 61.42 kg/(m^2).  Vitals:  B/P: 138/83, P: 80    Initial consult weight was 454 on 5/15/2015.  Weight change since last seen on 2018 is down 3 pounds.   Total loss is 56 pounds.    INTERVAL HISTORY:  Tolerating topiramate 150 mg BID. Now exercise. He is not now trying to change his snacking to vegetables and get more vegetables overall.    Diet and Activity Changes Since Last Visit Reviewed With Patient 2018   I have made the following changes to my diet since my last visit: still struggling with eating enuff veggies daily with meals and instead of snacking   With regards to my diet, I am still struggling with: snacking and not veggies   For breakfast, I typically eat: -   For lunch, I typically eat: -   For supper, I typically eat: -   For snack(s), I typically eat: -   I have made the following changes to my activity/exercise since my last visit: less of it,i am able to afford joining now and plan on doing so   With regards to my activity/exercise, I am still struggling with: making a plan and sticking to it for extended time period to achive my goals       MEDICATIONS:   Current Outpatient Prescriptions   Medication     ACCU-CHEK CHRIS PLUS test strip     albuterol (2.5 MG/3ML) 0.083% neb solution     albuterol (PROAIR HFA) 108 (90 BASE) MCG/ACT Inhaler     aspirin 81 MG " tablet     BD SEVERO U/F 32G X 4 MM insulin pen needle     blood glucose monitoring (SOFTCLIX) lancets     cetirizine (ZYRTEC) 10 MG tablet     DIABETIC STERILE LANCETS device     fluticasone (FLONASE) 50 MCG/ACT spray     furosemide (LASIX) 20 MG tablet     liraglutide (VICTOZA PEN) 18 MG/3ML soln     lisinopril (PRINIVIL/ZESTRIL) 5 MG tablet     metFORMIN (GLUCOPHAGE-XR) 500 MG 24 hr tablet     metFORMIN modified (GLUMETZA) 500 MG 24 hr tablet     modafinil (PROVIGIL) 200 MG tablet     Multiple Vitamin (MULTIVITAMIN OR)     order for DME     order for DME     ORDER FOR DME     simvastatin (ZOCOR) 10 MG tablet     topiramate (TOPAMAX) 50 MG tablet     triamcinolone (KENALOG) 0.1 % cream     No current facility-administered medications for this visit.        Weight Loss Medication History Reviewed With Patient 8/20/2018   Which weight loss medications are you currently taking on a regular basis?  Topamax (topiramate), Victoza (liraglutide)   Are you having any side effects from the weight loss medication that we have prescribed you? No   If you are having side effects please describe: -     ASSESSMENT:   Maintain topiramate 200 mg BID. Reinforce food plan - focus on no between meal snacking, aggressive lowering of starches and cheese.     FOLLOW-UP:    3 months   10/15 minutes spent on counseling and education    Sincerely,

## 2018-08-20 NOTE — MR AVS SNAPSHOT
After Visit Summary   8/20/2018    Refugio Kennedy    MRN: [id number]           Patient Information     Date Of Birth          1975        Visit Information        Provider Department      8/20/2018 9:45 AM Ashish Paez MD [facility] Medical Weight Management        Today's Diagnoses     Morbid obesity (H)    -  1       Follow-ups after your visit        Follow-up notes from your care team     Return in about 4 months (around 12/20/2018).      Your next 10 appointments already scheduled     Sep 17, 2018  8:00 AM CDT   Diabetic Education with Violet Bowen RD   [facility] [place] ([facility])    [address] [place] [address] [phone]              Who to contact     Please call your clinic at [phone] to:    Ask questions about your health    Make or cancel appointments    Discuss your medicines    Learn about your test results    Speak to your doctor            Additional Information About Your Visit        MyChart Information     [employer] gives you secure access to your electronic health record. If you see a primary care provider, you can also send messages to your care team and make appointments. If you have questions, please call your primary care clinic.  If you do not have a primary care provider, please call [phone] and they will assist you.      [employer] is an electronic gateway that provides easy, online access to your medical records. With [employer], you can request a clinic appointment, read your test results, renew a prescription or communicate with your care team.     To access your existing account, please contact your [facility] or call [phone] for assistance.        Care EveryWhere ID     This is your Care EveryWhere ID. This could be used by other organizations to access your [facility] medical records  [id number]        Your Vitals Were     Pulse Temperature Respirations Height Pulse  "Oximetry BMI (Body Mass Index)    73 98.3  F (36.8  C) (Oral) 16 1.715 m (5' 7.5\") 94% 61.42 kg/m2       Blood Pressure from Last 3 Encounters:   08/20/18 105/60   07/24/18 120/76   06/08/18 112/58    Weight from Last 3 Encounters:   08/20/18 (!) 180.5 kg (398 lb)   07/24/18 (!) 181.9 kg (401 lb)   07/23/18 (!) 184 kg (405 lb 9.6 oz)              Today, you had the following     No orders found for display       Primary Care Provider Office Phone # Fax #    Erika Espino [phone] [address] [place] [address]        Equal Access to Services     [facility]: Barb cheek Sobryan, waaxda luqadaha, qaybta kaalmada manjit, consuelo rider . So [facility] [phone].    ATENCIÓN: Si habla español, tiene a clinton disposición servicios gratuitos de asistencia lingüística. [facility] [phone].    We comply with applicable federal civil rights laws and [place] laws. We do not discriminate on the basis of race, color, national origin, age, disability, sex, sexual orientation, or gender identity.            Thank you!     Thank you for choosing [facility] WEIGHT MANAGEMENT  for your care. Our goal is always to provide you with excellent care. Hearing back from our patients is one way we can continue to improve our services. Please take a few minutes to complete the written survey that you may receive in the mail after your visit with us. Thank you!             Your Updated Medication List - Protect others around you: Learn how to safely use, store and throw away your medicines at www.disposemymeds.org.          This list is accurate as of 8/20/18 10:19 AM.  Always use your most recent med list.                   Brand Name Dispense Instructions for use Diagnosis    ACCU-CHEK CHRIS PLUS test strip   Generic drug:  blood glucose monitoring     200 each    USE TO TEST BLOOD SUGARS THREE TIMES A DAY AS DIRECTED    Diabetes mellitus without " complication (H)       * albuterol 108 (90 Base) MCG/ACT inhaler    PROAIR HFA    1 Inhaler    Inhale 2 puffs into the lungs every 4 hours as needed for shortness of breath / dyspnea    Mild intermittent asthma without complication       * albuterol (2.5 MG/3ML) 0.083% neb solution     25 vial    Take 1 vial (2.5 mg) by nebulization every 4 hours as needed for shortness of breath / dyspnea or wheezing    Moderate persistent asthma with exacerbation       aspirin 81 MG tablet     90 tablet    Take 1 tablet by mouth daily.    Type 2 diabetes, HbA1C goal < 8% (H)       BD SEVERO U/F 32G X 4 MM   Generic drug:  insulin pen needle     100 each    USE ONE DAILY OR AS DIRECTED    Type 2 diabetes mellitus without complications (H)       cetirizine 10 MG tablet    zyrTEC    90 tablet    TAKE ONE TABLET (10 MG) BY MOUTH EVERY EVENING    Allergic rhinitis due to pollen       * DIABETIC STERILE LANCETS device     1 Box    1 Device 3 times daily.    Type 2 diabetes, HbA1C goal < 8% (H)       * blood glucose monitoring lancets     300 each    TEST THREE TIMES A DAY OR AS DIRECTED    Type 2 diabetes, HbA1C goal < 8% (H)       fluticasone 50 MCG/ACT spray    FLONASE    16 g    Spray 1-2 sprays into both nostrils daily    Acute sinusitis with symptoms > 10 days       furosemide 20 MG tablet    LASIX    360 tablet    TAKE TWO TABLETS BY MOUTH TWICE A DAY    Venous stasis       liraglutide 18 MG/3ML soln    VICTOZA PEN    9 mL    INJECT 1.8MG UNDER THE SKIN ONCE DAILY    Type 2 diabetes mellitus without complication, without long-term current use of insulin (H)       lisinopril 5 MG tablet    PRINIVIL/ZESTRIL    90 tablet    TAKE ONE TABLET BY MOUTH EVERY DAY    Pulmonary hypertension       * metFORMIN modified 500 MG 24 hr tablet    GLUMETZA    90 tablet    Take 1 tablet (500 mg) by mouth daily (with breakfast)    Type 2 diabetes mellitus without complication, without long-term current use of insulin (H)       * metFORMIN 500 MG 24 hr  tablet    GLUCOPHAGE-XR    90 tablet    TAKE ONE TABLET BY MOUTH EVERY DAY WITH BREAKFAST (NEED TO KEEP APPOINTMENT WITH DR. SANCHES END OF NOVEMBER)    Methamphetamine abuse in remission       modafinil 200 MG tablet    PROVIGIL    60 tablet    Take 1/2 tablet by mouth 3-4 times daily as needed for sleepiness.    Obstructive sleep apnea-hypopnea syndrome       MULTIVITAMIN PO      Take 1 tablet by mouth daily.        order for DME      Equipment being ordered: BIPAP Refugio P Kennedy received a [employer] AirCurve 10 Bilevel. Pressures were set at 23/14 cm H2O.        order for DME      Auto-BiPAP: IPAP max 21 cm H2O EPAP min 15 cm H2O Pressure support 5 cm Changed in clinic Lifetime need and heated humidity.    FARZANA (obstructive sleep apnea)       order for DME     1 Units    Nebulizer    Moderate persistent asthma with exacerbation       simvastatin 10 MG tablet    ZOCOR    90 tablet    TAKE ONE TABLET BY MOUTH AT BEDTIME    Hyperlipidemia LDL goal <100       topiramate 50 MG tablet    TOPAMAX    240 tablet    Take 4 tablets (200 mg) by mouth 2 times daily    Morbid obesity due to excess calories (H)       triamcinolone 0.1 % cream    KENALOG    30 g    Apply sparingly to affected area two times daily as needed    Venous stasis       * Notice:  This list has 6 medication(s) that are the same as other medications prescribed for you. Read the directions carefully, and ask your doctor or other care provider to review them with you.

## 2018-09-16 ENCOUNTER — APPOINTMENT (OUTPATIENT)
Dept: CT IMAGING | Facility: CLINIC | Age: 43
End: 2018-09-16
Attending: EMERGENCY MEDICINE
Payer: COMMERCIAL

## 2018-09-16 ENCOUNTER — OFFICE VISIT (OUTPATIENT)
Dept: URGENT CARE | Facility: URGENT CARE | Age: 43
End: 2018-09-16
Payer: COMMERCIAL

## 2018-09-16 ENCOUNTER — HOSPITAL ENCOUNTER (EMERGENCY)
Facility: CLINIC | Age: 43
Discharge: HOME | End: 2018-09-16
Attending: EMERGENCY MEDICINE | Admitting: EMERGENCY MEDICINE
Payer: COMMERCIAL

## 2018-09-16 VITALS
TEMPERATURE: 98.2 F | WEIGHT: 315 LBS | DIASTOLIC BLOOD PRESSURE: 62 MMHG | BODY MASS INDEX: 61.11 KG/M2 | OXYGEN SATURATION: 93 % | RESPIRATION RATE: 18 BRPM | SYSTOLIC BLOOD PRESSURE: 98 MMHG

## 2018-09-16 VITALS — BODY MASS INDEX: 61.11 KG/M2 | WEIGHT: 315 LBS

## 2018-09-16 DIAGNOSIS — K43.9 VENTRAL HERNIA WITHOUT OBSTRUCTION OR GANGRENE: Primary | ICD-10-CM

## 2018-09-16 DIAGNOSIS — R19.8 LAXITY OF ABDOMINAL WALL: ICD-10-CM

## 2018-09-16 DIAGNOSIS — R10.33 PERIUMBILICAL ABDOMINAL PAIN: ICD-10-CM

## 2018-09-16 LAB
ALBUMIN UR-MCNC: NEGATIVE MG/DL
ANION GAP SERPL CALCULATED.3IONS-SCNC: 4 MMOL/L (ref 3–14)
APPEARANCE UR: CLEAR
BASOPHILS # BLD AUTO: 0.1 10E9/L (ref 0–0.2)
BASOPHILS NFR BLD AUTO: 0.5 %
BILIRUB UR QL STRIP: NEGATIVE
BUN SERPL-MCNC: 23 MG/DL (ref 7–30)
CALCIUM SERPL-MCNC: 8.6 MG/DL (ref 8.5–10.1)
CHLORIDE SERPL-SCNC: 107 MMOL/L (ref 94–109)
CO2 SERPL-SCNC: 26 MMOL/L (ref 20–32)
COLOR UR AUTO: COLORLESS
CREAT SERPL-MCNC: 0.99 MG/DL (ref 0.66–1.25)
DIFFERENTIAL METHOD BLD: ABNORMAL
EOSINOPHIL # BLD AUTO: 0.8 10E9/L (ref 0–0.7)
EOSINOPHIL NFR BLD AUTO: 6.9 %
ERYTHROCYTE [DISTWIDTH] IN BLOOD BY AUTOMATED COUNT: 12.6 % (ref 10–15)
GFR SERPL CREATININE-BSD FRML MDRD: 82 ML/MIN/1.7M2
GLUCOSE SERPL-MCNC: 110 MG/DL (ref 70–99)
GLUCOSE UR STRIP-MCNC: NEGATIVE MG/DL
HCT VFR BLD AUTO: 46.3 % (ref 40–53)
HGB BLD-MCNC: 15.3 G/DL (ref 13.3–17.7)
HGB UR QL STRIP: NEGATIVE
IMM GRANULOCYTES # BLD: 0 10E9/L (ref 0–0.4)
IMM GRANULOCYTES NFR BLD: 0.3 %
KETONES UR STRIP-MCNC: NEGATIVE MG/DL
LEUKOCYTE ESTERASE UR QL STRIP: NEGATIVE
LYMPHOCYTES # BLD AUTO: 3.7 10E9/L (ref 0.8–5.3)
LYMPHOCYTES NFR BLD AUTO: 34 %
MCH RBC QN AUTO: 30.6 PG (ref 26.5–33)
MCHC RBC AUTO-ENTMCNC: 33 G/DL (ref 31.5–36.5)
MCV RBC AUTO: 93 FL (ref 78–100)
MONOCYTES # BLD AUTO: 0.9 10E9/L (ref 0–1.3)
MONOCYTES NFR BLD AUTO: 8.3 %
NEUTROPHILS # BLD AUTO: 5.4 10E9/L (ref 1.6–8.3)
NEUTROPHILS NFR BLD AUTO: 50 %
NITRATE UR QL: NEGATIVE
NRBC # BLD AUTO: 0 10*3/UL
NRBC BLD AUTO-RTO: 0 /100
PH UR STRIP: 5 PH (ref 5–7)
PLATELET # BLD AUTO: 281 10E9/L (ref 150–450)
POTASSIUM SERPL-SCNC: 3.6 MMOL/L (ref 3.4–5.3)
RBC # BLD AUTO: 5 10E12/L (ref 4.4–5.9)
SODIUM SERPL-SCNC: 137 MMOL/L (ref 133–144)
SOURCE: NORMAL
SP GR UR STRIP: 1.01 (ref 1–1.03)
UROBILINOGEN UR STRIP-MCNC: 0 MG/DL (ref 0–2)
WBC # BLD AUTO: 10.8 10E9/L (ref 4–11)

## 2018-09-16 PROCEDURE — 99285 EMERGENCY DEPT VISIT HI MDM: CPT | Mod: 25 | Performed by: EMERGENCY MEDICINE

## 2018-09-16 PROCEDURE — 81003 URINALYSIS AUTO W/O SCOPE: CPT | Performed by: EMERGENCY MEDICINE

## 2018-09-16 PROCEDURE — 25000128 H RX IP 250 OP 636: Performed by: EMERGENCY MEDICINE

## 2018-09-16 PROCEDURE — 99285 EMERGENCY DEPT VISIT HI MDM: CPT | Mod: Z6 | Performed by: EMERGENCY MEDICINE

## 2018-09-16 PROCEDURE — 96374 THER/PROPH/DIAG INJ IV PUSH: CPT | Mod: 59 | Performed by: EMERGENCY MEDICINE

## 2018-09-16 PROCEDURE — 25000125 ZZHC RX 250: Performed by: EMERGENCY MEDICINE

## 2018-09-16 PROCEDURE — 85025 COMPLETE CBC W/AUTO DIFF WBC: CPT | Performed by: EMERGENCY MEDICINE

## 2018-09-16 PROCEDURE — 74177 CT ABD & PELVIS W/CONTRAST: CPT

## 2018-09-16 PROCEDURE — 80048 BASIC METABOLIC PNL TOTAL CA: CPT | Performed by: EMERGENCY MEDICINE

## 2018-09-16 RX ORDER — IOPAMIDOL 755 MG/ML
100 INJECTION, SOLUTION INTRAVASCULAR ONCE
Status: COMPLETED | OUTPATIENT
Start: 2018-09-16 | End: 2018-09-16

## 2018-09-16 RX ORDER — HYDROMORPHONE HYDROCHLORIDE 1 MG/ML
0.5 INJECTION, SOLUTION INTRAMUSCULAR; INTRAVENOUS; SUBCUTANEOUS ONCE
Status: COMPLETED | OUTPATIENT
Start: 2018-09-16 | End: 2018-09-16

## 2018-09-16 RX ADMIN — IOPAMIDOL 100 ML: 755 INJECTION, SOLUTION INTRAVENOUS at 18:07

## 2018-09-16 RX ADMIN — SODIUM CHLORIDE 70 ML: 9 INJECTION, SOLUTION INTRAVENOUS at 18:07

## 2018-09-16 RX ADMIN — Medication 0.5 MG: at 16:50

## 2018-09-16 ASSESSMENT — ENCOUNTER SYMPTOMS
FEVER: 0
APPETITE CHANGE: 0
DIARRHEA: 0
BACK PAIN: 0
DYSURIA: 0
VOMITING: 0
WHEEZING: 0
SHORTNESS OF BREATH: 0
BRUISES/BLEEDS EASILY: 0
WEAKNESS: 0
NAUSEA: 0
NUMBNESS: 0
CONFUSION: 0
ACTIVITY CHANGE: 1
CONSTIPATION: 0
NECK PAIN: 0
HEADACHES: 0
DIZZINESS: 0
TROUBLE SWALLOWING: 0
CHILLS: 0
LIGHT-HEADEDNESS: 0
ABDOMINAL PAIN: 1

## 2018-09-16 NOTE — MR AVS SNAPSHOT
After Visit Summary   9/16/2018    Refugio Kennedy    MRN: [id number]           Patient Information     Date Of Birth          1975        Visit Information        Provider Department      9/16/2018 3:45 PM Mimi Talbert APRN CNP [facility]        Today's Diagnoses     Ventral hernia without obstruction or gangrene    -  1       Follow-ups after your visit        Your next 10 appointments already scheduled     Sep 17, 2018  8:00 AM CDT   Diabetes Education with Violet Bowen RD   [facility] [place] ([facility])    [address] [place] [address]   [phone]            Dec 03, 2018  9:15 AM CST   (Arrive by 9:00 AM)   Return Weight Management Visit with Ashish Paez MD   [facility] Medical Weight Management ([facility])    [address] [facility]  [address] [facility] [address] [phone]              Who to contact     If you have questions or need follow up information about today's clinic visit or your schedule please contact [facility] URGENT CARE directly at [phone].  Normal or non-critical lab and imaging results will be communicated to you by MyChart, letter or phone within 4 business days after the clinic has received the results. If you do not hear from us within 7 days, please contact the clinic through [employer] or phone. If you have a critical or abnormal lab result, we will notify you by phone as soon as possible.  Submit refill requests through [employer] or call your pharmacy and they will forward the refill request to us. Please allow 3 business days for your refill to be completed.          Additional Information About Your Visit        MyChart Information     [employer] gives you secure access to your electronic health record. If you see a primary care provider, you can also send messages to your care team and make appointments. If you have  questions, please call your primary care clinic.  If you do not have a primary care provider, please call [phone] and they will assist you.        Care EveryWhere ID     This is your Care EveryWhere ID. This could be used by other organizations to access your [facility] medical records  [id number]        Your Vitals Were     BMI (Body Mass Index)                   61.11 kg/m2            Blood Pressure from Last 3 Encounters:   08/20/18 105/60   07/24/18 120/76   06/08/18 112/58    Weight from Last 3 Encounters:   09/16/18 (!) 396 lb (179.6 kg)   08/20/18 (!) 398 lb (180.5 kg)   07/24/18 (!) 401 lb (181.9 kg)              Today, you had the following     No orders found for display       Primary Care Provider Office Phone # Fax #    Erika Espino [phone] [address] [address]        Equal Access to Services     RICARDO HALL : Hadii aad ku hadasho Soomaali, waaxda luqadaha, qaybta kaalmada adeegyada, waxay shelbiin hayshruthin eleni rider . So [facility] [phone].    ATENCIÓN: Si habla español, tiene a clinton disposición servicios gratuitos de asistencia lingüística. Llame al [phone].    We comply with applicable federal civil rights laws and [place] laws. We do not discriminate on the basis of race, color, national origin, age, disability, sex, sexual orientation, or gender identity.            Thank you!     Thank you for choosing [facility] URGENT CARE  for your care. Our goal is always to provide you with excellent care. Hearing back from our patients is one way we can continue to improve our services. Please take a few minutes to complete the written survey that you may receive in the mail after your visit with us. Thank you!             Your Updated Medication List - Protect others around you: Learn how to safely use, store and throw away your medicines at www.disposemymeds.org.          This list is accurate as of 9/16/18  4:01 PM.   Always use your most recent med list.                   Brand Name Dispense Instructions for use Diagnosis    ACCU-CHEK CHRIS PLUS test strip   Generic drug:  blood glucose monitoring     200 each    USE TO TEST BLOOD SUGARS THREE TIMES A DAY AS DIRECTED    Diabetes mellitus without complication (H)       * albuterol 108 (90 Base) MCG/ACT inhaler    PROAIR HFA    1 Inhaler    Inhale 2 puffs into the lungs every 4 hours as needed for shortness of breath / dyspnea    Mild intermittent asthma without complication       * albuterol (2.5 MG/3ML) 0.083% neb solution     25 vial    Take 1 vial (2.5 mg) by nebulization every 4 hours as needed for shortness of breath / dyspnea or wheezing    Moderate persistent asthma with exacerbation       aspirin 81 MG tablet     90 tablet    Take 1 tablet by mouth daily.    Type 2 diabetes, HbA1C goal < 8% (H)       BD SEVERO U/F 32G X 4 MM   Generic drug:  insulin pen needle     100 each    USE ONE DAILY OR AS DIRECTED    Type 2 diabetes mellitus without complications (H)       cetirizine 10 MG tablet    zyrTEC    90 tablet    TAKE ONE TABLET (10 MG) BY MOUTH EVERY EVENING    Allergic rhinitis due to pollen       * DIABETIC STERILE LANCETS device     1 Box    1 Device 3 times daily.    Type 2 diabetes, HbA1C goal < 8% (H)       * blood glucose monitoring lancets     300 each    TEST THREE TIMES A DAY OR AS DIRECTED    Type 2 diabetes, HbA1C goal < 8% (H)       fluticasone 50 MCG/ACT spray    FLONASE    16 g    Spray 1-2 sprays into both nostrils daily    Acute sinusitis with symptoms > 10 days       furosemide 20 MG tablet    LASIX    360 tablet    TAKE TWO TABLETS BY MOUTH TWICE A DAY    Venous stasis       liraglutide 18 MG/3ML soln    VICTOZA PEN    9 mL    INJECT 1.8MG UNDER THE SKIN ONCE DAILY    Type 2 diabetes mellitus without complication, without long-term current use of insulin (H)       lisinopril 5 MG tablet    PRINIVIL/ZESTRIL    90 tablet    TAKE ONE TABLET BY MOUTH EVERY  DAY    Pulmonary hypertension       * metFORMIN modified 500 MG 24 hr tablet    GLUMETZA    90 tablet    Take 1 tablet (500 mg) by mouth daily (with breakfast)    Type 2 diabetes mellitus without complication, without long-term current use of insulin (H)       * metFORMIN 500 MG 24 hr tablet    GLUCOPHAGE-XR    90 tablet    TAKE ONE TABLET BY MOUTH EVERY DAY WITH BREAKFAST (NEED TO KEEP APPOINTMENT WITH DR. SANCHES END OF NOVEMBER)    Methamphetamine abuse in remission       modafinil 200 MG tablet    PROVIGIL    60 tablet    Take 1/2 tablet by mouth 3-4 times daily as needed for sleepiness.    Obstructive sleep apnea-hypopnea syndrome       MULTIVITAMIN PO      Take 1 tablet by mouth daily.        order for DME      Equipment being ordered: BIPAP Refugio P Kennedy received a [employer] AirCurve 10 Bilevel. Pressures were set at 23/14 cm H2O.        order for DME      Auto-BiPAP: IPAP max 21 cm H2O EPAP min 15 cm H2O Pressure support 5 cm Changed in clinic Lifetime need and heated humidity.    FARZANA (obstructive sleep apnea)       order for DME     1 Units    Nebulizer    Moderate persistent asthma with exacerbation       simvastatin 10 MG tablet    ZOCOR    90 tablet    TAKE ONE TABLET BY MOUTH AT BEDTIME    Hyperlipidemia LDL goal <100       topiramate 50 MG tablet    TOPAMAX    240 tablet    Take 4 tablets (200 mg) by mouth 2 times daily    Morbid obesity due to excess calories (H)       triamcinolone 0.1 % cream    KENALOG    30 g    Apply sparingly to affected area two times daily as needed    Venous stasis       * Notice:  This list has 6 medication(s) that are the same as other medications prescribed for you. Read the directions carefully, and ask your doctor or other care provider to review them with you.

## 2018-09-16 NOTE — ED NOTES
Pt presents to ED with abd pain.  Hx of umbilical hernia.  Pt states he has had 2 surgeries since 2015.  Pt was setting up for garage sale this weekend when symptoms of pain started, exp pain for approx 24-36 hours.  No nausea or vomiting.  Has not taken anything for pain, states pain is manageable but has intermittent episodes that are 10/10 sharp pains. Last BM last night, normal.  Uses home O2 as needed, 2L.  Pt O2 sats stable on RA.  Afebrile.  A&Ox4.

## 2018-09-16 NOTE — PROGRESS NOTES
Assessed pt and sending him to ER as he has hernia above umbilicus that he has been unable to reduce for the past 24-36 hours and it is painful. His last hernia scan was 11-30-16.

## 2018-09-16 NOTE — ED AVS SNAPSHOT
[facility] Emergency Department    [address] [facility] [address]    Phone:  [phone]    Fax:  [phone]                                       Refugio Kennedy   MRN: [id number]    Department:  [facility]   Date of Visit:  9/16/2018           Patient Information     Date Of Birth          1975        Your diagnoses for this visit were:     Periumbilical abdominal pain     Laxity of abdominal wall with inflammed abdominal fat       You were seen by Gokul Stover MD.      Follow-up Information     Follow up with Erika Espino MD.    Specialty:  Family Practice    Why:  As needed    Contact information:    [address] [place] [address]  [phone]          Follow up with [facility].    Specialty:  EMERGENCY MEDICINE    Why:  If symptoms worsen    Contact information:    [address] [place] [address]  [phone]    Additional information:    The medical center is located at   [address] [place] [address]. (between [address] [place]   [address] in [place], [place] miles north   of [place]).        Discharge Instructions       Return if symptoms worsen or new symptoms develop.  Follow-up with primary care physician next available.  Drink plenty of fluids.  Follow-up with your surgeon next available.  Take pain medication as directed.  If any worsening abdominal pain fevers chills nausea vomiting or other symptoms present please return for recheck.    Your next 10 appointments already scheduled     Sep 17, 2018  8:00 AM CDT   Diabetes Education with Violet Bowen RD   [facility] ([facility])    [address] [facility] [address]   [phone]            Dec 03, 2018  9:15 AM CST   (Arrive by 9:00 AM)   Return Weight Management Visit with Ashish Paez MD   [facility] ([facility])    [address] [place] [address]  [facility] [address] [phone]              24 Hour Appointment Hotline       To make an appointment at any [facility], call [phone] ([phone]). If you don't have a family doctor or clinic, we will help you find one. [facility] clinics are conveniently located to serve the needs of you and your family.          ED Discharge Orders     GENERAL SURG ADULT REFERRAL       Your provider has referred you to: FM[facility]: [facility] [phone]   [email or web address] in the [email or web address]    Please be aware that coverage of these services is subject to the terms and limitations of your health insurance plan.  Call member services at your health plan with any benefit or coverage questions.      Please bring the following with you to your appointment:    (1) Any X-Rays, CTs or MRIs which have been performed.  Contact the facility where they were done to arrange for  prior to your scheduled appointment.   (2) List of current medications   (3) This referral request   (4) Any documents/labs given to you for this referral                     Review of your medicines      START taking        Dose / Directions Last dose taken    oxyCODONE 5 mg   Commonly known as:  ROXICODONE   Dose:  5 mg   Quantity:  8 tablet        Take 1 tablet (5 mg) by mouth every 4 hours as needed for moderate to severe pain   Refills:  0          Our records show that you are taking the medicines listed below. If these are incorrect, please call your family doctor or clinic.        Dose / Directions Last dose taken    ACCU-CHEK CHRIS PLUS test strip   Quantity:  200 each   Generic drug:  blood glucose monitoring        USE TO TEST BLOOD SUGARS THREE TIMES A DAY AS DIRECTED   Refills:  0        * albuterol 108 (90 Base) MCG/ACT inhaler   Commonly known as:  PROAIR HFA   Dose:  2 puff   Quantity:  1 Inhaler        Inhale 2 puffs into the lungs every 4 hours as needed for shortness of breath /  dyspnea   Refills:  5        * albuterol (2.5 MG/3ML) 0.083% neb solution   Dose:  2.5 mg   Quantity:  25 vial        Take 1 vial (2.5 mg) by nebulization every 4 hours as needed for shortness of breath / dyspnea or wheezing   Refills:  0        aspirin 81 MG tablet   Dose:  1 tablet   Quantity:  90 tablet        Take 1 tablet by mouth daily.   Refills:  3        BD SEVERO U/F 32G X 4 MM   Quantity:  100 each   Generic drug:  insulin pen needle        USE ONE DAILY OR AS DIRECTED   Refills:  3        cetirizine 10 MG tablet   Commonly known as:  zyrTEC   Quantity:  90 tablet        TAKE ONE TABLET (10 MG) BY MOUTH EVERY EVENING   Refills:  3        * DIABETIC STERILE LANCETS device   Dose:  1 Device   Quantity:  1 Box        1 Device 3 times daily.   Refills:  12        * blood glucose monitoring lancets   Quantity:  300 each        TEST THREE TIMES A DAY OR AS DIRECTED   Refills:  2        fluticasone 50 MCG/ACT spray   Commonly known as:  FLONASE   Dose:  1-2 spray   Quantity:  16 g        Spray 1-2 sprays into both nostrils daily   Refills:  0        furosemide 20 MG tablet   Commonly known as:  LASIX   Quantity:  360 tablet        TAKE TWO TABLETS BY MOUTH TWICE A DAY   Refills:  1        liraglutide 18 MG/3ML soln   Commonly known as:  VICTOZA PEN   Quantity:  9 mL        INJECT 1.8MG UNDER THE SKIN ONCE DAILY   Refills:  3        lisinopril 5 MG tablet   Commonly known as:  PRINIVIL/ZESTRIL   Quantity:  90 tablet        TAKE ONE TABLET BY MOUTH EVERY DAY   Refills:  1        metFORMIN 500 MG 24 hr tablet   Commonly known as:  GLUCOPHAGE-XR   Quantity:  90 tablet        TAKE ONE TABLET BY MOUTH EVERY DAY WITH BREAKFAST (NEED TO KEEP APPOINTMENT WITH DR. SANCHES END OF NOVEMBER)   Refills:  3        modafinil 200 MG tablet   Commonly known as:  PROVIGIL   Quantity:  60 tablet        Take 1/2 tablet by mouth 3-4 times daily as needed for sleepiness.   Refills:  5        MULTIVITAMIN PO   Dose:  1 tablet        Take  1 tablet by mouth daily.   Refills:  0        order for DME        Equipment being ordered: BIPAP Refugio P Kennedy received a Resmed AirCurve 10 Bilevel. Pressures were set at 23/14 cm H2O.   Refills:  0        order for DME        Auto-BiPAP: IPAP max 21 cm H2O EPAP min 15 cm H2O Pressure support 5 cm Changed in clinic Lifetime need and heated humidity.   Refills:  0        order for DME   Quantity:  1 Units        Nebulizer   Refills:  0        simvastatin 10 MG tablet   Commonly known as:  ZOCOR   Quantity:  90 tablet        TAKE ONE TABLET BY MOUTH AT BEDTIME   Refills:  3        topiramate 50 MG tablet   Commonly known as:  TOPAMAX   Dose:  200 mg   Quantity:  240 tablet        Take 4 tablets (200 mg) by mouth 2 times daily   Refills:  5        triamcinolone 0.1 % cream   Commonly known as:  KENALOG   Quantity:  30 g        Apply sparingly to affected area two times daily as needed   Refills:  2        * Notice:  This list has 4 medication(s) that are the same as other medications prescribed for you. Read the directions carefully, and ask your doctor or other care provider to review them with you.            Information about OPIOIDS     PRESCRIPTION OPIOIDS: WHAT YOU NEED TO KNOW   We gave you an opioid (narcotic) pain medicine. It is important to manage your pain, but opioids are not always the best choice. You should first try all the other options your care team gave you. Take this medicine for as short a time (and as few doses) as possible.    Some activities can increase your pain, such as bandage changes or therapy sessions. It may help to take your pain medicine 30 to 60 minutes before these activities. Reduce your stress by getting enough sleep, working on hobbies you enjoy and practicing relaxation or meditation. Talk to your care team about ways to manage your pain beyond prescription opioids.    These medicines have risks:    DO NOT drive when on new or higher doses of pain medicine. These medicines can  affect your alertness and reaction times, and you could be arrested for driving under the influence (DUI). If you need to use opioids long-term, talk to your care team about driving.    DO NOT operate heavy machinery    DO NOT do any other dangerous activities while taking these medicines.    DO NOT drink any alcohol while taking these medicines.     If the opioid prescribed includes acetaminophen, DO NOT take with any other medicines that contain acetaminophen. Read all labels carefully. Look for the word  acetaminophen  or  Tylenol.  Ask your pharmacist if you have questions or are unsure.    You can get addicted to pain medicines, especially if you have a history of addiction (chemical, alcohol or substance dependence). Talk to your care team about ways to reduce this risk.    All opioids tend to cause constipation. Drink plenty of water and eat foods that have a lot of fiber, such as fruits, vegetables, prune juice, apple juice and high-fiber cereal. Take a laxative (Miralax, milk of magnesia, Colace, Senna) if you don t move your bowels at least every other day. Other side effects include upset stomach, sleepiness, dizziness, throwing up, tolerance (needing more of the medicine to have the same effect), physical dependence and slowed breathing.    Store your pills in a secure place, locked if possible. We will not replace any lost or stolen medicine. If you don t finish your medicine, please throw away (dispose) as directed by your pharmacist. The [place] [employer]ution [employer] has more information about safe disposal: [email or web address]/[email or web address]-medications        Prescriptions were sent or printed at these locations (1 Prescription)                   [facility] [address] [address]    Telephone:  [phone]   Fax:  [phone]   Hours:                  Printed at Department/Unit printer (1 of  1)         oxyCODONE (ROXICODONE) 5 mg                Procedures and tests performed during your visit     Basic metabolic panel    CBC with platelets, differential    CT Abdomen Pelvis w Contrast    UA reflex to Microscopic      Orders Needing Specimen Collection     None      Pending Results     Date and Time Order Name Status Description    9/16/2018 1738 CT Abdomen Pelvis w Contrast Preliminary             Pending Culture Results     No orders found from 9/14/2018 to 9/17/2018.            Pending Results Instructions     If you had any lab results that were not finalized at the time of your Discharge, you can call the ED Lab Result RN at [phone]. You will be contacted by this team for any positive Lab results or changes in treatment. The nurses are available 7 days a week from 10A to 6:30P.  You can leave a message 24 hours per day and they will return your call.        Test Results From Your Hospital Stay        9/16/2018  4:54 PM      Component Results     Component Value Ref Range & Units Status    WBC 10.8 4.0 - 11.0 10e9/L Final    RBC Count 5.00 4.4 - 5.9 10e12/L Final    Hemoglobin 15.3 13.3 - 17.7 g/dL Final    Hematocrit 46.3 40.0 - 53.0 % Final    MCV 93 78 - 100 fl Final    MCH 30.6 26.5 - 33.0 pg Final    MCHC 33.0 31.5 - 36.5 g/dL Final    RDW 12.6 10.0 - 15.0 % Final    Platelet Count 281 150 - 450 10e9/L Final    Diff Method Automated Method  Final    % Neutrophils 50.0 % Final    % Lymphocytes 34.0 % Final    % Monocytes 8.3 % Final    % Eosinophils 6.9 % Final    % Basophils 0.5 % Final    % Immature Granulocytes 0.3 % Final    Nucleated RBCs 0 0 /100 Final    Absolute Neutrophil 5.4 1.6 - 8.3 10e9/L Final    Absolute Lymphocytes 3.7 0.8 - 5.3 10e9/L Final    Absolute Monocytes 0.9 0.0 - 1.3 10e9/L Final    Absolute Eosinophils 0.8 (H) 0.0 - 0.7 10e9/L Final    Absolute Basophils 0.1 0.0 - 0.2 10e9/L Final    Abs Immature Granulocytes 0.0 0 - 0.4 10e9/L Final    Absolute Nucleated RBC 0.0   Final         9/16/2018  5:07 PM      Component Results     Component Value Ref Range & Units Status    Sodium 137 133 - 144 mmol/L Final    Potassium 3.6 3.4 - 5.3 mmol/L Final    Chloride 107 94 - 109 mmol/L Final    Carbon Dioxide 26 20 - 32 mmol/L Final    Anion Gap 4 3 - 14 mmol/L Final    Glucose 110 (H) 70 - 99 mg/dL Final    Urea Nitrogen 23 7 - 30 mg/dL Final    Creatinine 0.99 0.66 - 1.25 mg/dL Final    GFR Estimate 82 >60 mL/min/1.7m2 Final    Non  GFR Calc    GFR Estimate If Black >90 >60 mL/min/1.7m2 Final    African American GFR Calc    Calcium 8.6 8.5 - 10.1 mg/dL Final         9/16/2018  5:46 PM      Component Results     Component Value Ref Range & Units Status    Color Urine Colorless  Final    Appearance Urine Clear  Final    Glucose Urine Negative NEG^Negative mg/dL Final    Bilirubin Urine Negative NEG^Negative Final    Ketones Urine Negative NEG^Negative mg/dL Final    Specific Gravity Urine 1.008 1.003 - 1.035 Final    Blood Urine Negative NEG^Negative Final    pH Urine 5.0 5.0 - 7.0 pH Final    Protein Albumin Urine Negative NEG^Negative mg/dL Final    Urobilinogen mg/dL 0.0 0.0 - 2.0 mg/dL Final    Nitrite Urine Negative NEG^Negative Final    Leukocyte Esterase Urine Negative NEG^Negative Final    Source Midstream Urine  Final         9/16/2018  6:46 PM      Narrative     CT ABDOMEN PELVIS WITH CONTRAST 9/16/2018 6:36 PM    HISTORY: Periumbilical abdominal pain. Evaluate for hernia.    TECHNIQUE: Helical axial scans from dome of liver through pubic  symphysis with 100 mL Isovue 370 IV contrast. Radiation dose for this  scan was reduced using automated exposure control, adjustment of the  mA and/or kV according to patient size, or iterative reconstruction  technique.    COMPARISON: 11/30/2016.    FINDINGS: The liver, spleen, pancreas, bilateral adrenal glands and  left kidney are unremarkable. There are at least three small right  renal cysts, largest measuring 1.5 cm. These  are difficult to detect  on the previous noncontrast study. The right kidney is otherwise  unremarkable. Again seen is focal thinning and laxity of the midline  anterior abdominal wall fascia at the level of the umbilicus. This  shows some outpouching and is similar in size to the prior exam.  However, the fat contained within this outpouching now shows some mild  induration which could indicate inflammatory change or ischemia. No  definite hernia defect is seen. The remainder of the bowel and  mesentery in the upper abdomen are unremarkable.    Scans through the pelvis show no acute abnormality or free fluid. The  appendix is not definitely identified but there is no CT evidence for  appendicitis.        Impression     IMPRESSION:  1. Focal thinning and laxity of midline anterior abdominal wall at the  level of the umbilicus without a definite abdominal wall defect. The  fat in a small area of outpouching now appears indurated which could  indicate nonspecific inflammation or ischemia.  2. Small benign cysts right kidney.                  Thank you for choosing [facility]       Thank you for choosing [facility] for your care. Our goal is always to provide you with excellent care. Hearing back from our patients is one way we can continue to improve our services. Please take a few minutes to complete the written survey that you may receive in the mail after you visit with us. Thank you!        [facility] Information     [facility] gives you secure access to your electronic health record. If you see a primary care provider, you can also send messages to your care team and make appointments. If you have questions, please call your primary care clinic.  If you do not have a primary care provider, please call [phone] and they will assist you.        Care EveryWhere ID     This is your Care EveryWhere ID. This could be used by other organizations to access your [facility] medical records  [id number]        Equal Access to  Services     [facility]: Barb Lin, wajose alejandroda luqadaha, qaybta kaconsuelo omalley. So [facility] [phone].    ATENCIÓN: Si habla español, tiene a clinton disposición servicios gratuitos de asistencia lingüística. Llame al [phone].    We comply with applicable federal civil rights laws and [place] laws. We do not discriminate on the basis of race, color, national origin, age, disability, sex, sexual orientation, or gender identity.            After Visit Summary       This is your record. Keep this with you and show to your community pharmacist(s) and doctor(s) at your next visit.

## 2018-09-16 NOTE — ED AVS SNAPSHOT
[facility] Emergency Department    [address] [facility] [address]    Phone:  [phone]    Fax:  [phone]                                       Refugio Kennedy   MRN: [id number]    Department:  [facility] Emergency Department   Date of Visit:  9/16/2018           After Visit Summary Signature Page     I have received my discharge instructions, and my questions have been answered. I have discussed any challenges I see with this plan with the nurse or doctor.    ..........................................................................................................................................  Patient/Patient Representative Signature      ..........................................................................................................................................  Patient Representative Print Name and Relationship to Patient    ..................................................               ................................................  Date                                   Time    ..........................................................................................................................................  Reviewed by Signature/Title    ...................................................              ..............................................  Date                                               Time          22EPIC Rev 08/18

## 2018-09-16 NOTE — ED PROVIDER NOTES
History     Chief Complaint   Patient presents with     Abdominal Pain     possible hernia  protrucing  tender area at umbilicus    has had pain for 24 to 36 hours  Coming from N/B clinic      HPI  Refugio Kennedy is a 43 year old male who resents the emergency department with umbilical abdominal pain.  Patient has had hernia surgery ×2 around this region and states about a year ago he noticed that the masses come back.  He has had intermittent bulging at that time over the last 24-36 hours has had pain in this region.  The pain is worsened with movement and he cannot seem to get the bulge to go away.  He denies any fever or chills.  He has not had any nausea or vomiting.  He has had normal bowel movements.  He was seen in [facility] and was told to come to the emergency department for further evaluation and care.  Patient currently rates his pain a 5 out of 10 worsened with activity.    Problem List:    Patient Active Problem List    Diagnosis Date Noted     Methamphetamine abuse in remission 07/17/2017     Priority: Medium     Type 2 diabetes mellitus without complication, without long-term current use of insulin (H) 10/17/2016     Priority: Medium     Mild intermittent asthma without complication 03/24/2015     Priority: Medium     Pulmonary hypertension 06/22/2014     Priority: Medium     Hyperlipidemia LDL goal <100 06/08/2012     Priority: Medium     Venous stasis 06/08/2012     Priority: Medium     Chronic respiratory failure (H) 02/11/2011     Priority: Medium     ABG 2/11- 7.38/51/75 RA       FARZANA (obstructive sleep apnea)/Hypoventilation Syndrome- Severe      Priority: Medium     Sleep Study 1998- AHI 51. Rx 16 cm. S/p UVPP 1998, weight loss. Sleep study 9/10/03 (weight 379)- AHI 34.9. Lo2 58%, with reported hypoventilation.  Sleep study 9/29/03- CPAP 14 cm appeared effective. MSLT- 1.8 minutes, 2 sleep onset REMs, diagnosed with 'narcolepsy', placed on Provigil (though he did not tolerate CPAP on  1st study 9/03).   Sleep study 2/11- AHI 54.9, with desaturations to 51%. Transcutaneous CO2 monitoring showed some elevations consistent with hypoventilation. CPAP titration: No fully effective pressure was found. CPAP was titrated to 18 cm with improvement in severe desaturations, but some persistent hypopneas and arousals. Supine REM was noted at this pressure. Brief trial of 20/16 showed persistent events. Efficacy was limited by mask leak throughout most of the study.        Morbid obesity (H)      Priority: Medium     Morbid obesity          Past Medical History:    Past Medical History:   Diagnosis Date     Cataplexy and narcolepsy      Cellulitis      Diabetes      Hypoventilation      Obesity      FARZANA (obstructive sleep apnea)        Past Surgical History:    Past Surgical History:   Procedure Laterality Date     LAPAROSCOPIC HERNIORRHAPHY VENTRAL N/A 5/17/2016    Procedure: LAPAROSCOPIC HERNIORRHAPHY VENTRAL;  Surgeon: Yves Jimenes MD;  Location: [facility] OR     LAPAROSCOPIC HERNIORRHAPHY VENTRAL N/A 12/20/2016    Procedure: LAPAROSCOPIC HERNIORRHAPHY VENTRAL;  Surgeon: Yves Jimenes MD;  Location: [facility] OR     SURGICAL HISTORY OF -   1998    UVPP       Family History:    Family History   Problem Relation Age of Onset     HEART DISEASE Maternal Grandfather      Diabetes Maternal Grandfather      Hypertension Maternal Grandfather      Other Cancer Maternal Grandfather      Cancer Paternal Grandfather      unknown     Other Cancer Paternal Grandfather      Anxiety Disorder Mother      Asthma Mother      Depression Mother      Thyroid Disease Mother      Anxiety Disorder Father      Substance Abuse Father      Depression Father      Hypertension Father      Anxiety Disorder Brother      Substance Abuse Brother      Mental Illness Brother      Depression Brother      Other Cancer Brother      Obesity No family hx of        Social History:  Marital Status:  Single [1]  Social History   Substance Use Topics      Smoking status: Former Smoker     Packs/day: 0.50     Years: 1.00     Types: Cigarettes, Cigars     Quit date: 5/21/2012     Smokeless tobacco: Never Used     Alcohol use No        Medications:      ACCU-CHEK CHRIS PLUS test strip   albuterol (2.5 MG/3ML) 0.083% neb solution   albuterol (PROAIR HFA) 108 (90 BASE) MCG/ACT Inhaler   aspirin 81 MG tablet   BD SEVERO U/F 32G X 4 MM insulin pen needle   blood glucose monitoring (SOFTCLIX) lancets   cetirizine (ZYRTEC) 10 MG tablet   DIABETIC STERILE LANCETS device   fluticasone (FLONASE) 50 MCG/ACT spray   furosemide (LASIX) 20 MG tablet   liraglutide (VICTOZA PEN) 18 MG/3ML soln   lisinopril (PRINIVIL/ZESTRIL) 5 MG tablet   metFORMIN (GLUCOPHAGE-XR) 500 MG 24 hr tablet   metFORMIN modified (GLUMETZA) 500 MG 24 hr tablet   modafinil (PROVIGIL) 200 MG tablet   Multiple Vitamin (MULTIVITAMIN OR)   order for DME   order for DME   ORDER FOR DME   simvastatin (ZOCOR) 10 MG tablet   topiramate (TOPAMAX) 50 MG tablet   triamcinolone (KENALOG) 0.1 % cream         Review of Systems   Constitutional: Positive for activity change. Negative for appetite change, chills and fever.   HENT: Negative for congestion and trouble swallowing.    Respiratory: Negative for shortness of breath and wheezing.    Cardiovascular: Negative for chest pain and leg swelling.   Gastrointestinal: Positive for abdominal pain. Negative for constipation, diarrhea, nausea and vomiting.   Genitourinary: Negative for decreased urine volume and dysuria.   Musculoskeletal: Negative for back pain and neck pain.   Skin: Negative for rash.   Neurological: Negative for dizziness, weakness, light-headedness, numbness and headaches.   Hematological: Does not bruise/bleed easily.   Psychiatric/Behavioral: Negative for confusion.       Physical Exam   BP: 114/75  Heart Rate: 84  Temp: 98.2  F (36.8  C)  Resp: 18  Weight: (!) 179.6 kg (396 lb)  SpO2: 94 %      Physical Exam   Constitutional: He appears well-developed and  well-nourished. No distress.   HENT:   Head: Normocephalic.   Mouth/Throat: Oropharynx is clear and moist.   Eyes: Conjunctivae are normal.   Neck: Normal range of motion. Neck supple. No JVD present.   Cardiovascular: Normal rate, regular rhythm, normal heart sounds and intact distal pulses.    No murmur heard.  Pulmonary/Chest: Effort normal and breath sounds normal. He has no wheezes. He has no rales.   Abdominal: Soft.   Morbidly obese.  There is a supraumbilical bulge noted with tenderness to palpation.  Difficult to feel an obvious mass or herniation.  This area is significantly tender to palpation.  Bowel sounds positive no other tenderness.   Musculoskeletal: Normal range of motion. He exhibits no tenderness.   Neurological: He is alert. He exhibits normal muscle tone.   Skin: Skin is warm and dry. No rash noted.   Psychiatric: He has a normal mood and affect.   Nursing note and vitals reviewed.      ED Course     ED Course     Procedures               Critical Care time:  none               Results for orders placed or performed during the hospital encounter of 09/16/18 (from the past 24 hour(s))   CBC with platelets, differential   Result Value Ref Range    WBC 10.8 4.0 - 11.0 10e9/L    RBC Count 5.00 4.4 - 5.9 10e12/L    Hemoglobin 15.3 13.3 - 17.7 g/dL    Hematocrit 46.3 40.0 - 53.0 %    MCV 93 78 - 100 fl    MCH 30.6 26.5 - 33.0 pg    MCHC 33.0 31.5 - 36.5 g/dL    RDW 12.6 10.0 - 15.0 %    Platelet Count 281 150 - 450 10e9/L    Diff Method Automated Method     % Neutrophils 50.0 %    % Lymphocytes 34.0 %    % Monocytes 8.3 %    % Eosinophils 6.9 %    % Basophils 0.5 %    % Immature Granulocytes 0.3 %    Nucleated RBCs 0 0 /100    Absolute Neutrophil 5.4 1.6 - 8.3 10e9/L    Absolute Lymphocytes 3.7 0.8 - 5.3 10e9/L    Absolute Monocytes 0.9 0.0 - 1.3 10e9/L    Absolute Eosinophils 0.8 (H) 0.0 - 0.7 10e9/L    Absolute Basophils 0.1 0.0 - 0.2 10e9/L    Abs Immature Granulocytes 0.0 0 - 0.4 10e9/L     Absolute Nucleated RBC 0.0    Basic metabolic panel   Result Value Ref Range    Sodium 137 133 - 144 mmol/L    Potassium 3.6 3.4 - 5.3 mmol/L    Chloride 107 94 - 109 mmol/L    Carbon Dioxide 26 20 - 32 mmol/L    Anion Gap 4 3 - 14 mmol/L    Glucose 110 (H) 70 - 99 mg/dL    Urea Nitrogen 23 7 - 30 mg/dL    Creatinine 0.99 0.66 - 1.25 mg/dL    GFR Estimate 82 >60 mL/min/1.7m2    GFR Estimate If Black >90 >60 mL/min/1.7m2    Calcium 8.6 8.5 - 10.1 mg/dL   UA reflex to Microscopic   Result Value Ref Range    Color Urine Colorless     Appearance Urine Clear     Glucose Urine Negative NEG^Negative mg/dL    Bilirubin Urine Negative NEG^Negative    Ketones Urine Negative NEG^Negative mg/dL    Specific Gravity Urine 1.008 1.003 - 1.035    Blood Urine Negative NEG^Negative    pH Urine 5.0 5.0 - 7.0 pH    Protein Albumin Urine Negative NEG^Negative mg/dL    Urobilinogen mg/dL 0.0 0.0 - 2.0 mg/dL    Nitrite Urine Negative NEG^Negative    Leukocyte Esterase Urine Negative NEG^Negative    Source Midstream Urine    CT Abdomen Pelvis w Contrast    Narrative    CT ABDOMEN PELVIS WITH CONTRAST 9/16/2018 6:36 PM    HISTORY: Periumbilical abdominal pain. Evaluate for hernia.    TECHNIQUE: Helical axial scans from dome of liver through pubic  symphysis with 100 mL Isovue 370 IV contrast. Radiation dose for this  scan was reduced using automated exposure control, adjustment of the  mA and/or kV according to patient size, or iterative reconstruction  technique.    COMPARISON: 11/30/2016.    FINDINGS: The liver, spleen, pancreas, bilateral adrenal glands and  left kidney are unremarkable. There are at least three small right  renal cysts, largest measuring 1.5 cm. These are difficult to detect  on the previous noncontrast study. The right kidney is otherwise  unremarkable. Again seen is focal thinning and laxity of the midline  anterior abdominal wall fascia at the level of the umbilicus. This  shows some outpouching and is similar in  size to the prior exam.  However, the fat contained within this outpouching now shows some mild  induration which could indicate inflammatory change or ischemia. No  definite hernia defect is seen. The remainder of the bowel and  mesentery in the upper abdomen are unremarkable.    Scans through the pelvis show no acute abnormality or free fluid. The  appendix is not definitely identified but there is no CT evidence for  appendicitis.      Impression    IMPRESSION:  1. Focal thinning and laxity of midline anterior abdominal wall at the  level of the umbilicus without a definite abdominal wall defect. The  fat in a small area of outpouching now appears indurated which could  indicate nonspecific inflammation or ischemia.  2. Small benign cysts right kidney.      LEELEE EPPS MD       Medications - No data to display    Assessments & Plan (with Medical Decision Making) records were reviewed.  Labs were obtained.  Patient was given Dilaudid for pain.  CT scan of the abdomen pelvis was obtained to rule out incarceration or strangulation.  Patient's white count was 10.8 hemoglobin 15.3 platelet count 281 there is no significant left shift.  Basic metabolic panel are unremarkable.  UA negative.  CT scan of abdomen pelvis revealed focal thinning and laxity of midline anterior abdominal wall at the level of the umbilicus without definite abdominal wall defect the fat in a small area of outpouching now appears indurated which could be indicate nonspecific inflammation or ischemia.  This finding was discussed with Dr. Jimenes general surgeon at [facility] who reviewed the CT and did not think there is any significant inflammatory change noted.  He did not think emergent surgery or antibiotics were warranted and he would give the patient some pain pills.  He is previously operated on this patient and was glad to see him in clinic later this week.  Findings were discussed with patient who had improvement of his pain with  medication.  He will return if worsening pain fever abdominal distention or other symptoms present.     I have reviewed the nursing notes.    I have reviewed the findings, diagnosis, plan and need for follow up with the patient.       New Prescriptions    No medications on file       Final diagnoses:   Periumbilical abdominal pain   Laxity of abdominal wall - with inflammed abdominal fat       9/16/2018   [facility] EMERGENCY DEPARTMENT     Gokul Stover MD  09/17/18 [id number]

## 2018-09-17 ENCOUNTER — ALLIED HEALTH/NURSE VISIT (OUTPATIENT)
Dept: EDUCATION SERVICES | Facility: CLINIC | Age: 43
End: 2018-09-17
Payer: COMMERCIAL

## 2018-09-17 ENCOUNTER — ALLIED HEALTH/NURSE VISIT (OUTPATIENT)
Dept: FAMILY MEDICINE | Facility: CLINIC | Age: 43
End: 2018-09-17
Payer: COMMERCIAL

## 2018-09-17 VITALS — BODY MASS INDEX: 61.01 KG/M2 | WEIGHT: 315 LBS

## 2018-09-17 DIAGNOSIS — Z53.9 DIAGNOSIS NOT YET DEFINED: Primary | ICD-10-CM

## 2018-09-17 DIAGNOSIS — E11.9 TYPE 2 DIABETES MELLITUS WITHOUT COMPLICATION, WITHOUT LONG-TERM CURRENT USE OF INSULIN (H): Primary | ICD-10-CM

## 2018-09-17 PROCEDURE — G0108 DIAB MANAGE TRN  PER INDIV: HCPCS | Performed by: DIETITIAN, REGISTERED

## 2018-09-17 NOTE — PATIENT INSTRUCTIONS
Goals:  Get back to gym at least twice a week  Keep off the snacks  Make sure you have a veggie with main meal

## 2018-09-17 NOTE — DISCHARGE INSTRUCTIONS
Return if symptoms worsen or new symptoms develop.  Follow-up with primary care physician next available.  Drink plenty of fluids.  Follow-up with your surgeon next available.  Take pain medication as directed.  If any worsening abdominal pain fevers chills nausea vomiting or other symptoms present please return for recheck.

## 2018-09-17 NOTE — NURSING NOTE
Patient was seeing Violet Bowen and had nursing questions regarding urethral concerns. Was able to help patient sign up for Mychart and message his careteam for medical advise and see if he needs to be seen for this. Patient was very happy to have help with this service. Amira Restrepo MA

## 2018-09-17 NOTE — MR AVS SNAPSHOT
After Visit Summary   9/17/2018    Refugio Kennedy    MRN: [id number]           Patient Information     Date Of Birth          1975        Visit Information        Provider Department      9/17/2018 8:00 AM Violet Bowen RD [facility]        Care Instructions    Goals:  Get back to gym at least twice a week  Keep off the snacks  Make sure you have a veggie with main meals            Follow-ups after your visit        Your next 10 appointments already scheduled     Oct 29, 2018  9:00 AM CDT   Diabetes Education with Violet Bowen RD   [facility] ([facility])    [address] [place] [id number]   [phone]            Dec 03, 2018  9:15 AM CST   (Arrive by 9:00 AM)   Return Weight Management Visit with Ashish Paez MD   [facility] ([facility])    [address] [facility]  [address] [facility] [address] [phone]              Who to contact     If you have questions or need follow up information about today's clinic visit or your schedule please contact [facility] directly at [phone].  Normal or non-critical lab and imaging results will be communicated to you by MyChart, letter or phone within 4 business days after the clinic has received the results. If you do not hear from us within 7 days, please contact the clinic through MyChart or phone. If you have a critical or abnormal lab result, we will notify you by phone as soon as possible.  Submit refill requests through [employer] or call your pharmacy and they will forward the refill request to us. Please allow 3 business days for your refill to be completed.          Additional Information About Your Visit        [employer] Information     [employer] gives you secure access to your electronic health record. If you see a primary care provider, you can also send messages to your care team  and make appointments. If you have questions, please call your primary care clinic.  If you do not have a primary care provider, please call [phone] and they will assist you.        Care EveryWhere ID     This is your Care EveryWhere ID. This could be used by other organizations to access your [place] medical records  [id number]        Your Vitals Were     BMI (Body Mass Index)                   61.01 kg/m2            Blood Pressure from Last 3 Encounters:   09/16/18 98/62   08/20/18 105/60   07/24/18 120/76    Weight from Last 3 Encounters:   09/17/18 (!) 179.4 kg (395 lb 6.4 oz)   09/16/18 (!) 179.6 kg (396 lb)   09/16/18 (!) 179.6 kg (396 lb)              Today, you had the following     No orders found for display       Primary Care Provider Office Phone # Fax #    Erika Espino [phone] [address] [place] [address]        Goals        Exercise    Exercise 3x per week (30 min per time)     Notes - Note created  9/17/2018  8:20 AM by Violet Bowen, MANGOLIA    Goal Statement: I will go to the gym 2 times a week    Measure of Success: 2 times a week getting to gym, renew membership    Strengths: motivated to lose wt and he knows activity really helps  Ideas to overcome barriers: time: set days and times and money and hernia issue    Date to Achieve By: next month        Equal Access to Services     RICARDO HALL AH: Hadii rickie douglas hadbalo Sobryan, waaxda luqadaha, qaybta kaalmada manjit, consuelo winter. So [facility] [phone].    ATENCIÓN: Si habla español, tiene a clinton disposición servicios gratuitos de asistencia lingüística. Llame al [phone].    We comply with applicable federal civil rights laws and [place] laws. We do not discriminate on the basis of race, color, national origin, age, disability, sex, sexual orientation, or gender identity.            Thank you!     Thank you for choosing [facility]  for your  care. Our goal is always to provide you with excellent care. Hearing back from our patients is one way we can continue to improve our services. Please take a few minutes to complete the written survey that you may receive in the mail after your visit with us. Thank you!             Your Updated Medication List - Protect others around you: Learn how to safely use, store and throw away your medicines at www.disposemymeds.org.          This list is accurate as of 9/17/18  8:32 AM.  Always use your most recent med list.                   Brand Name Dispense Instructions for use Diagnosis    ACCU-CHEK CHRIS PLUS test strip   Generic drug:  blood glucose monitoring     200 each    USE TO TEST BLOOD SUGARS THREE TIMES A DAY AS DIRECTED    Diabetes mellitus without complication (H)       * albuterol 108 (90 Base) MCG/ACT inhaler    PROAIR HFA    1 Inhaler    Inhale 2 puffs into the lungs every 4 hours as needed for shortness of breath / dyspnea    Mild intermittent asthma without complication       * albuterol (2.5 MG/3ML) 0.083% neb solution     25 vial    Take 1 vial (2.5 mg) by nebulization every 4 hours as needed for shortness of breath / dyspnea or wheezing    Moderate persistent asthma with exacerbation       aspirin 81 MG tablet     90 tablet    Take 1 tablet by mouth daily.    Type 2 diabetes, HbA1C goal < 8% (H)       BD SEVERO U/F 32G X 4 MM   Generic drug:  insulin pen needle     100 each    USE ONE DAILY OR AS DIRECTED    Type 2 diabetes mellitus without complications (H)       cetirizine 10 MG tablet    zyrTEC    90 tablet    TAKE ONE TABLET (10 MG) BY MOUTH EVERY EVENING    Allergic rhinitis due to pollen       * DIABETIC STERILE LANCETS device     1 Box    1 Device 3 times daily.    Type 2 diabetes, HbA1C goal < 8% (H)       * blood glucose monitoring lancets     300 each    TEST THREE TIMES A DAY OR AS DIRECTED    Type 2 diabetes, HbA1C goal < 8% (H)       fluticasone 50 MCG/ACT spray    FLONASE    16 g     Spray 1-2 sprays into both nostrils daily    Acute sinusitis with symptoms > 10 days       furosemide 20 MG tablet    LASIX    360 tablet    TAKE TWO TABLETS BY MOUTH TWICE A DAY    Venous stasis       liraglutide 18 MG/3ML soln    VICTOZA PEN    9 mL    INJECT 1.8MG UNDER THE SKIN ONCE DAILY    Type 2 diabetes mellitus without complication, without long-term current use of insulin (H)       lisinopril 5 MG tablet    PRINIVIL/ZESTRIL    90 tablet    TAKE ONE TABLET BY MOUTH EVERY DAY    Pulmonary hypertension       metFORMIN 500 MG 24 hr tablet    GLUCOPHAGE-XR    90 tablet    TAKE ONE TABLET BY MOUTH EVERY DAY WITH BREAKFAST (NEED TO KEEP APPOINTMENT WITH DR. SANCHES END OF NOVEMBER)    Methamphetamine abuse in remission       modafinil 200 MG tablet    PROVIGIL    60 tablet    Take 1/2 tablet by mouth 3-4 times daily as needed for sleepiness.    Obstructive sleep apnea-hypopnea syndrome       MULTIVITAMIN PO      Take 1 tablet by mouth daily.        order for DME      Equipment being ordered: BIPAP Refugio P Kennedy received a Resmed AirCurve 10 Bilevel. Pressures were set at 23/14 cm H2O.        order for DME      Auto-BiPAP: IPAP max 21 cm H2O EPAP min 15 cm H2O Pressure support 5 cm Changed in clinic Lifetime need and heated humidity.    FARZANA (obstructive sleep apnea)       order for DME     1 Units    Nebulizer    Moderate persistent asthma with exacerbation       oxyCODONE 5 mg    ROXICODONE    8 tablet    Take 1 tablet (5 mg) by mouth every 4 hours as needed for moderate to severe pain        simvastatin 10 MG tablet    ZOCOR    90 tablet    TAKE ONE TABLET BY MOUTH AT BEDTIME    Hyperlipidemia LDL goal <100       topiramate 50 MG tablet    TOPAMAX    240 tablet    Take 4 tablets (200 mg) by mouth 2 times daily    Morbid obesity due to excess calories (H)       triamcinolone 0.1 % cream    KENALOG    30 g    Apply sparingly to affected area two times daily as needed    Venous stasis       * Notice:  This list has 4  medication(s) that are the same as other medications prescribed for you. Read the directions carefully, and ask your doctor or other care provider to review them with you.

## 2018-09-17 NOTE — MR AVS SNAPSHOT
After Visit Summary   9/17/2018    Refugio Kennedy    MRN: [id number]           Patient Information     Date Of Birth          1975        Visit Information        Provider Department      9/17/2018 9:00 AM FL SOFIA UGALDE/LPN [facility]        Today's Diagnoses     DIAGNOSIS NOT YET DEFINED    -  1       Follow-ups after your visit        Your next 10 appointments already scheduled     Oct 29, 2018  9:00 AM CDT   Diabetes Education with Violet Bowen RD   [place] [facility] [place] ([facility])    [address] [place] [address]   [phone]            Dec 03, 2018  9:15 AM CST   (Arrive by 9:00 AM)   Return Weight Management Visit with Ashish Paez MD   [facility] ([facility] [place])    [address] [place] [address] [phone]              Who to contact     If you have questions or need follow up information about today's clinic visit or your schedule please contact [facility] directly at [phone].  Normal or non-critical lab and imaging results will be communicated to you by [employer], letter or phone within 4 business days after the clinic has received the results. If you do not hear from us within 7 days, please contact the clinic through [employer] or phone. If you have a critical or abnormal lab result, we will notify you by phone as soon as possible.  Submit refill requests through [employer] or call your pharmacy and they will forward the refill request to us. Please allow 3 business days for your refill to be completed.          Additional Information About Your Visit        MyChart Information     [employer] gives you secure access to your electronic health record. If you see a primary care provider, you can also send messages to your care team and make appointments. If you have questions, please call your primary care clinic.  If you do not have a  primary care provider, please call [phone] and they will assist you.        Care EveryWhere ID     This is your Care EveryWhere ID. This could be used by other organizations to access your [place] medical records  [id number]         Blood Pressure from Last 3 Encounters:   09/16/18 98/62   08/20/18 105/60   07/24/18 120/76    Weight from Last 3 Encounters:   09/17/18 (!) 395 lb 6.4 oz (179.4 kg)   09/16/18 (!) 396 lb (179.6 kg)   09/16/18 (!) 396 lb (179.6 kg)              Today, you had the following     No orders found for display       Primary Care Provider Office Phone # Fax #    Erika Espino [phone] [address] [place] [address]        Goals        Exercise    Exercise 3x per week (30 min per time)     Notes - Note created  9/17/2018  8:20 AM by Violet Bowen, MAGNOLIA    Goal Statement: I will go to the gym 2 times a week    Measure of Success: 2 times a week getting to gym, renew membership    Strengths: motivated to lose wt and he knows activity really helps  Ideas to overcome barriers: time: set days and times and money and hernia issue    Date to Achieve By: next month        Equal Access to Services     RICARDO HALL AH: Hadii rickie ku hadasho Soomaali, waaxda luqadaha, qaybta kaalmada adeegyada, waxay shelbiin mango winter. So [facility] [phone].    ATENCIÓN: Si habla español, tiene a clinton disposición servicios gratuitos de asistencia lingüística. Llame al [phone].    We comply with applicable federal civil rights laws and [place] laws. We do not discriminate on the basis of race, color, national origin, age, disability, sex, sexual orientation, or gender identity.            Thank you!     Thank you for choosing [facility]  for your care. Our goal is always to provide you with excellent care. Hearing back from our patients is one way we can continue to improve our services. Please take a few minutes to complete the written survey  that you may receive in the mail after your visit with us. Thank you!             Your Updated Medication List - Protect others around you: Learn how to safely use, store and throw away your medicines at www.disposemymeds.org.          This list is accurate as of 9/17/18  9:01 AM.  Always use your most recent med list.                   Brand Name Dispense Instructions for use Diagnosis    ACCU-CHEK CHRIS PLUS test strip   Generic drug:  blood glucose monitoring     200 each    USE TO TEST BLOOD SUGARS THREE TIMES A DAY AS DIRECTED    Diabetes mellitus without complication (H)       * albuterol 108 (90 Base) MCG/ACT inhaler    PROAIR HFA    1 Inhaler    Inhale 2 puffs into the lungs every 4 hours as needed for shortness of breath / dyspnea    Mild intermittent asthma without complication       * albuterol (2.5 MG/3ML) 0.083% neb solution     25 vial    Take 1 vial (2.5 mg) by nebulization every 4 hours as needed for shortness of breath / dyspnea or wheezing    Moderate persistent asthma with exacerbation       aspirin 81 MG tablet     90 tablet    Take 1 tablet by mouth daily.    Type 2 diabetes, HbA1C goal < 8% (H)       BD SEVERO U/F 32G X 4 MM   Generic drug:  insulin pen needle     100 each    USE ONE DAILY OR AS DIRECTED    Type 2 diabetes mellitus without complications (H)       cetirizine 10 MG tablet    zyrTEC    90 tablet    TAKE ONE TABLET (10 MG) BY MOUTH EVERY EVENING    Allergic rhinitis due to pollen       * DIABETIC STERILE LANCETS device     1 Box    1 Device 3 times daily.    Type 2 diabetes, HbA1C goal < 8% (H)       * blood glucose monitoring lancets     300 each    TEST THREE TIMES A DAY OR AS DIRECTED    Type 2 diabetes, HbA1C goal < 8% (H)       fluticasone 50 MCG/ACT spray    FLONASE    16 g    Spray 1-2 sprays into both nostrils daily    Acute sinusitis with symptoms > 10 days       furosemide 20 MG tablet    LASIX    360 tablet    TAKE TWO TABLETS BY MOUTH TWICE A DAY    Venous stasis        liraglutide 18 MG/3ML soln    VICTOZA PEN    9 mL    INJECT 1.8MG UNDER THE SKIN ONCE DAILY    Type 2 diabetes mellitus without complication, without long-term current use of insulin (H)       lisinopril 5 MG tablet    PRINIVIL/ZESTRIL    90 tablet    TAKE ONE TABLET BY MOUTH EVERY DAY    Pulmonary hypertension       metFORMIN 500 MG 24 hr tablet    GLUCOPHAGE-XR    90 tablet    TAKE ONE TABLET BY MOUTH EVERY DAY WITH BREAKFAST (NEED TO KEEP APPOINTMENT WITH DR. SANCHES END OF NOVEMBER)    Methamphetamine abuse in remission       modafinil 200 MG tablet    PROVIGIL    60 tablet    Take 1/2 tablet by mouth 3-4 times daily as needed for sleepiness.    Obstructive sleep apnea-hypopnea syndrome       MULTIVITAMIN PO      Take 1 tablet by mouth daily.        order for DME      Equipment being ordered: BIPAP Refugio P Kennedy received a [employer] AirCurve 10 Bilevel. Pressures were set at 23/14 cm H2O.        order for DME      Auto-BiPAP: IPAP max 21 cm H2O EPAP min 15 cm H2O Pressure support 5 cm Changed in clinic Lifetime need and heated humidity.    FARZANA (obstructive sleep apnea)       order for DME     1 Units    Nebulizer    Moderate persistent asthma with exacerbation       oxyCODONE 5 mg    ROXICODONE    8 tablet    Take 1 tablet (5 mg) by mouth every 4 hours as needed for moderate to severe pain        simvastatin 10 MG tablet    ZOCOR    90 tablet    TAKE ONE TABLET BY MOUTH AT BEDTIME    Hyperlipidemia LDL goal <100       topiramate 50 MG tablet    TOPAMAX    240 tablet    Take 4 tablets (200 mg) by mouth 2 times daily    Morbid obesity due to excess calories (H)       triamcinolone 0.1 % cream    KENALOG    30 g    Apply sparingly to affected area two times daily as needed    Venous stasis       * Notice:  This list has 4 medication(s) that are the same as other medications prescribed for you. Read the directions carefully, and ask your doctor or other care provider to review them with you.

## 2018-09-17 NOTE — PROGRESS NOTES
"Diabetes Self-Management Education & Support    Diabetes Education Self Management & Training    SUBJECTIVE/OBJECTIVE:  Presents for: Individual review  Accompanied by: Self  Diabetes education in the past 24mo: Yes  Focus of Visit: Healthy Coping, Healthy Eating, Being Active  Diabetes type: Type 2  Disease course: Stable  How confident are you filling out medical forms by yourself:: Extremely  Transportation concerns: No  Other concerns:: None  Cultural Influences/Ethnic Background:  American      Diabetes Symptoms & Complications  Weight trend: Decreasing steadily  Peripheral neuropathy: Yes    Patient Problem List and Family Medical History reviewed for relevant medical history, current medical status, and diabetes risk factors.    Vitals:  Wt (!) 179.4 kg (395 lb 6.4 oz)  BMI 61.01 kg/m2  Estimated body mass index is 61.01 kg/(m^2) as calculated from the following:    Height as of 8/20/18: 1.715 m (5' 7.5\").    Weight as of this encounter: 179.4 kg (395 lb 6.4 oz).   Last 3 BP:   BP Readings from Last 3 Encounters:   09/16/18 98/62   08/20/18 105/60   07/24/18 120/76       History   Smoking Status     Former Smoker     Packs/day: 0.50     Years: 1.00     Types: Cigarettes, Cigars     Quit date: 5/21/2012   Smokeless Tobacco     Never Used                               Labs:  Lab Results   Component Value Date    A1C 5.5 07/24/2018     Lab Results   Component Value Date     09/16/2018     Lab Results   Component Value Date    LDL 58 12/19/2017     HDL Cholesterol   Date Value Ref Range Status   12/19/2017 45 >39 mg/dL Final   ]  GFR Estimate   Date Value Ref Range Status   09/16/2018 82 >60 mL/min/1.7m2 Final     Comment:     Non  GFR Calc     GFR Estimate If Black   Date Value Ref Range Status   09/16/2018 >90 >60 mL/min/1.7m2 Final     Comment:      GFR Calc     Lab Results   Component Value Date    CR 0.99 09/16/2018     No results found for: " MICROALBUMIN          Taking Medications  Diabetes Medication(s)     Biguanides Sig    metFORMIN (GLUCOPHAGE-XR) 500 MG 24 hr tablet TAKE ONE TABLET BY MOUTH EVERY DAY WITH BREAKFAST (NEED TO KEEP APPOINTMENT WITH DR. SANCHES END OF NOVEMBER)    Incretin Mimetic Agents (GLP-1 Receptor Agonists) Sig    liraglutide (VICTOZA PEN) 18 MG/3ML soln INJECT 1.8MG UNDER THE SKIN ONCE DAILY           meal plan:  Eggs/toast, turkey sausage  [place]  Two chicken sandwiches  Snacks are down at the house, which is good, he will try to keep it that way         Healthy Coping     Patient Activation Measure Survey Score:  MARY Score (Last Two) 1/25/2011   MARY Raw Score 36   Activation Score 47.4   MARY Level 2       ASSESSMENT:  Has been busy which is helping with weight loss  Snacks are down at the house which is helpful for him    Goals:  Get back to gym at least twice a week  Keep off the snacks  Make sure you have a veggie with main meals        Patient's most recent   Lab Results   Component Value Date    A1C 5.5 07/24/2018    is meeting goal of <7.0    INTERVENTION:   Diabetes knowledge and skills assessment:     Patient is knowledgeable in diabetes management concepts related to: Healthy Eating, Being Active, Monitoring and Taking Medication    Patient needs further education on the following diabetes management concepts: Healthy Eating, Being Active, Monitoring, Taking Medication, Problem Solving, Reducing Risks and Healthy Coping    Based on learning assessment above, most appropriate setting for further diabetes education would be: Individual setting.    Education provided today on:  AADE Self-Care Behaviors:  Healthy Eating: carbohydrate counting, consistency in amount, composition, and timing of food intake and label reading  Being Active: relationship to blood glucose and describe appropriate activity program    Opportunities for ongoing education and support in diabetes-self management were discussed.    Pt verbalized  understanding of concepts discussed and recommendations provided today.       Education Materials Provided:  No new materials provided today    PLAN:  See Patient Instructions for co-developed, patient-stated behavior change goals.  AVS printed and provided to patient today. See Follow-Up section for recommended follow-up.      Time Spent: 30 minutes  Encounter Type: Individual    Any diabetes medication dose changes were made via the CDE Protocol and Collaborative Practice Agreement with the patient's primary care provider. A copy of this encounter was shared with the provider.

## 2018-09-24 DIAGNOSIS — E11.9 TYPE 2 DIABETES MELLITUS WITHOUT COMPLICATIONS (H): ICD-10-CM

## 2018-09-24 NOTE — TELEPHONE ENCOUNTER
Requested Prescriptions   Pending Prescriptions Disp Refills     insulin pen needle (BD SEVERO U/F) 32G X 4  each 3     Sig: Use  pen needles daily or as directed.    There is no refill protocol information for this order        Last Written Prescription Date:  8/14/17  Last Fill Quantity: 100,  # refills: 3   Last office visit: No previous visit found with prescribing provider:  7/24/18 Dr. Espino  Future Office Visit:

## 2018-09-28 ENCOUNTER — OFFICE VISIT (OUTPATIENT)
Dept: SURGERY | Facility: CLINIC | Age: 43
End: 2018-09-28
Payer: COMMERCIAL

## 2018-09-28 VITALS
HEART RATE: 83 BPM | DIASTOLIC BLOOD PRESSURE: 79 MMHG | RESPIRATION RATE: 18 BRPM | SYSTOLIC BLOOD PRESSURE: 127 MMHG | TEMPERATURE: 97.9 F

## 2018-09-28 DIAGNOSIS — M62.08 DIASTASIS RECTI: Primary | ICD-10-CM

## 2018-09-28 PROCEDURE — 99212 OFFICE O/P EST SF 10 MIN: CPT | Performed by: SURGERY

## 2018-09-28 NOTE — LETTER
9/28/2018         RE: Refugio Kennedy  [address]  [facility] [address]        Dear Colleague,    Thank you for referring your patient, Refugio Kennedy, to the [facility]. Please see a copy of my visit note below.    43-year-old male here for evaluation of supraumbilical bulge.  Patient has had 2 hernia repairs by me, the last one included a piece of mesh.  Patient was seen in the emergency room with these complaints and a CT scan showed a diastases but no stefan hernia.  I show the CT scan to the patient explained that the diastases was not a true hernia and he had no risk of incarceration or strangulation.  Given his body habitus and supplemental oxygen needs, I do not recommend any additional operative intervention.  I explained that if he did form a hernia, recurrent in this area, the prudent thing to do would be to avoid surgery if at all possible.  If he does herniate, he is welcome to return and we can discuss options.    Yves Jimenes MD     Again, thank you for allowing me to participate in the care of your patient.        Sincerely,        Yves Jimenes MD

## 2018-09-28 NOTE — PROGRESS NOTES
43-year-old male here for evaluation of supraumbilical bulge.  Patient has had 2 hernia repairs by me, the last one included a piece of mesh.  Patient was seen in the emergency room with these complaints and a CT scan showed a diastases but no stefan hernia.  I show the CT scan to the patient explained that the diastases was not a true hernia and he had no risk of incarceration or strangulation.  Given his body habitus and supplemental oxygen needs, I do not recommend any additional operative intervention.  I explained that if he did form a hernia, recurrent in this area, the prudent thing to do would be to avoid surgery if at all possible.  If he does herniate, he is welcome to return and we can discuss options.    Yves Jimenes MD

## 2018-09-28 NOTE — MR AVS SNAPSHOT
After Visit Summary   9/28/2018    Refugio Kennedy    MRN: [id number]           Patient Information     Date Of Birth          1975        Visit Information        Provider Department      9/28/2018 8:30 AM Yves Jimenes MD [facility]        Today's Diagnoses     Diastasis recti    -  1       Follow-ups after your visit        Your next 10 appointments already scheduled     Oct 23, 2018  9:15 AM CDT   New Visit with LIZ Longo MD   [facility] ([facility])    [address] [facility] [address]   [phone]            Oct 29, 2018  9:00 AM CDT   Diabetes Education with Violet Bowen RD   [place] [facility] [place] ([facility])    [address] [place] [address]   [phone]            Dec 03, 2018  9:15 AM CST   (Arrive by 9:00 AM)   Return Weight Management Visit with Ashish Paez MD   [facility] ([facility] [place])    [address] [address] [phone]              Who to contact     If you have questions or need follow up information about today's clinic visit or your schedule please contact [facility] directly at [phone].  Normal or non-critical lab and imaging results will be communicated to you by MyChart, letter or phone within 4 business days after the clinic has received the results. If you do not hear from us within 7 days, please contact the clinic through MyChart or phone. If you have a critical or abnormal lab result, we will notify you by phone as soon as possible.  Submit refill requests through [employer] or call your pharmacy and they will forward the refill request to us. Please allow 3 business days for your refill to be completed.          Additional Information About Your Visit        [employer] Information     [employer] gives you secure access to your electronic health record. If you  see a primary care provider, you can also send messages to your care team and make appointments. If you have questions, please call your primary care clinic.  If you do not have a primary care provider, please call [phone] and they will assist you.        Care EveryWhere ID     This is your Care EveryWhere ID. This could be used by other organizations to access your [place] medical records  [id number]        Your Vitals Were     Pulse Temperature Respirations             83 97.9  F (36.6  C) (Tympanic) 18          Blood Pressure from Last 3 Encounters:   09/28/18 127/79   09/16/18 98/62   08/20/18 105/60    Weight from Last 3 Encounters:   09/17/18 (!) 179.4 kg (395 lb 6.4 oz)   09/16/18 (!) 179.6 kg (396 lb)   09/16/18 (!) 179.6 kg (396 lb)              Today, you had the following     No orders found for display       Primary Care Provider Office Phone # Fax #    Erika Espino [phone] [address] [place] [address]        Goals        Exercise    Exercise 3x per week (30 min per time)     Notes - Note created  9/17/2018  8:20 AM by Violet Bowen RD    Goal Statement: I will go to the gym 2 times a week    Measure of Success: 2 times a week getting to gym, renew membership    Strengths: motivated to lose wt and he knows activity really helps  Ideas to overcome barriers: time: set days and times and money and hernia issue    Date to Achieve By: next month        Equal Access to Services     RICARDO HALL AH: Hadii rickie ku hadasho Soomaali, waaxda luqadaha, qaybta kaalmada qianada, consuelo winter. So [facility] [phone].    ATENCIÓN: Si habla español, tiene a clinton disposición servicios gratuitos de asistencia lingüística. Llame al [phone].    We comply with applicable federal civil rights laws and [place] laws. We do not discriminate on the basis of race, color, national origin, age, disability, sex, sexual orientation, or gender  identity.            Thank you!     Thank you for choosing [facility]  for your care. Our goal is always to provide you with excellent care. Hearing back from our patients is one way we can continue to improve our services. Please take a few minutes to complete the written survey that you may receive in the mail after your visit with us. Thank you!             Your Updated Medication List - Protect others around you: Learn how to safely use, store and throw away your medicines at www.disposemymeds.org.          This list is accurate as of 9/28/18  8:57 AM.  Always use your most recent med list.                   Brand Name Dispense Instructions for use Diagnosis    ACCU-CHEK CHRIS PLUS test strip   Generic drug:  blood glucose monitoring     200 each    USE TO TEST BLOOD SUGARS THREE TIMES A DAY AS DIRECTED    Diabetes mellitus without complication (H)       * albuterol 108 (90 Base) MCG/ACT inhaler    PROAIR HFA    1 Inhaler    Inhale 2 puffs into the lungs every 4 hours as needed for shortness of breath / dyspnea    Mild intermittent asthma without complication       * albuterol (2.5 MG/3ML) 0.083% neb solution     25 vial    Take 1 vial (2.5 mg) by nebulization every 4 hours as needed for shortness of breath / dyspnea or wheezing    Moderate persistent asthma with exacerbation       aspirin 81 MG tablet     90 tablet    Take 1 tablet by mouth daily.    Type 2 diabetes, HbA1C goal < 8% (H)       cetirizine 10 MG tablet    zyrTEC    90 tablet    TAKE ONE TABLET (10 MG) BY MOUTH EVERY EVENING    Allergic rhinitis due to pollen       * DIABETIC STERILE LANCETS device     1 Box    1 Device 3 times daily.    Type 2 diabetes, HbA1C goal < 8% (H)       * blood glucose monitoring lancets     300 each    TEST THREE TIMES A DAY OR AS DIRECTED    Type 2 diabetes, HbA1C goal < 8% (H)       fluticasone 50 MCG/ACT spray    FLONASE    16 g    Spray 1-2 sprays into both nostrils daily    Acute sinusitis with  symptoms > 10 days       furosemide 20 MG tablet    LASIX    360 tablet    TAKE TWO TABLETS BY MOUTH TWICE A DAY    Venous stasis       insulin pen needle 32G X 4 MM    BD SEVERO U/F    100 each    Use 1 pen needles daily or as directed.    Type 2 diabetes mellitus without complications (H)       liraglutide 18 MG/3ML soln    VICTOZA PEN    9 mL    INJECT 1.8MG UNDER THE SKIN ONCE DAILY    Type 2 diabetes mellitus without complication, without long-term current use of insulin (H)       lisinopril 5 MG tablet    PRINIVIL/ZESTRIL    90 tablet    TAKE ONE TABLET BY MOUTH EVERY DAY    Pulmonary hypertension       metFORMIN 500 MG 24 hr tablet    GLUCOPHAGE-XR    90 tablet    TAKE ONE TABLET BY MOUTH EVERY DAY WITH BREAKFAST (NEED TO KEEP APPOINTMENT WITH DR. SANCHES END OF NOVEMBER)    Methamphetamine abuse in remission       modafinil 200 MG tablet    PROVIGIL    60 tablet    Take 1/2 tablet by mouth 3-4 times daily as needed for sleepiness.    Obstructive sleep apnea-hypopnea syndrome       MULTIVITAMIN PO      Take 1 tablet by mouth daily.        order for DME      Equipment being ordered: BIPAP Refugio P Kennedy received a [employer] AirCurve 10 Bilevel. Pressures were set at 23/14 cm H2O.        order for DME      Auto-BiPAP: IPAP max 21 cm H2O EPAP min 15 cm H2O Pressure support 5 cm Changed in clinic Lifetime need and heated humidity.    FARZANA (obstructive sleep apnea)       order for DME     1 Units    Nebulizer    Moderate persistent asthma with exacerbation       oxyCODONE 5 mg    ROXICODONE    8 tablet    Take 1 tablet (5 mg) by mouth every 4 hours as needed for moderate to severe pain        simvastatin 10 MG tablet    ZOCOR    90 tablet    TAKE ONE TABLET BY MOUTH AT BEDTIME    Hyperlipidemia LDL goal <100       topiramate 50 MG tablet    TOPAMAX    240 tablet    Take 4 tablets (200 mg) by mouth 2 times daily    Morbid obesity due to excess calories (H)       triamcinolone 0.1 % cream    KENALOG    30 g    Apply sparingly  to affected area two times daily as needed    Venous stasis       * Notice:  This list has 4 medication(s) that are the same as other medications prescribed for you. Read the directions carefully, and ask your doctor or other care provider to review them with you.

## 2018-09-28 NOTE — NURSING NOTE
"Chief Complaint   Patient presents with     Hernia     Patient has umbillical hernia- 2 previous surgeries. Patient has had discomfort and was seen in the ER they did a CT at that.   Initial /79  Pulse 83  Temp 97.9  F (36.6  C) (Tympanic)  Resp 18 Estimated body mass index is 61.01 kg/(m^2) as calculated from the following:    Height as of 8/20/18: 5' 7.5\" (1.715 m).    Weight as of 9/17/18: 395 lb 6.4 oz (179.4 kg). .      Piper VERA CMA    "

## 2018-10-17 DIAGNOSIS — E11.9 DIABETES MELLITUS WITHOUT COMPLICATION (H): ICD-10-CM

## 2018-10-17 NOTE — TELEPHONE ENCOUNTER
"Requested Prescriptions   Pending Prescriptions Disp Refills     blood glucose monitoring (ACCU-CHEK CHRIS PLUS) test strip 200 each 0     Sig: Use to test blood sugar 4/5/18 times daily or as directed.    Diabetic Supplies Protocol Passed    10/17/2018  8:03 AM       Passed - Patient is 18 years of age or older       Passed - Recent (6 mo) or future (30 days) visit within the authorizing provider's specialty    Patient had office visit in the last 6 months or has a visit in the next 30 days with authorizing provider.  See \"Patient Info\" tab in inbasket, or \"Choose Columns\" in Meds & Orders section of the refill encounter.            Last Written Prescription Date: 4/5/18  Last Fill Quantity:200,  # refills: 0   Last office visit: No previous visit found with prescribing provider:  7/24/18   Future Office Visit:      "

## 2018-10-20 ASSESSMENT — ASTHMA QUESTIONNAIRES: ACT_TOTALSCORE: 22

## 2018-10-23 ENCOUNTER — OFFICE VISIT (OUTPATIENT)
Dept: UROLOGY | Facility: CLINIC | Age: 43
End: 2018-10-23
Payer: COMMERCIAL

## 2018-10-23 VITALS — HEART RATE: 94 BPM | RESPIRATION RATE: 20 BRPM | DIASTOLIC BLOOD PRESSURE: 76 MMHG | SYSTOLIC BLOOD PRESSURE: 121 MMHG

## 2018-10-23 DIAGNOSIS — N48.6 PEYRONIE'S DISEASE: Primary | ICD-10-CM

## 2018-10-23 PROCEDURE — 99203 OFFICE O/P NEW LOW 30 MIN: CPT | Performed by: UROLOGY

## 2018-10-23 NOTE — MR AVS SNAPSHOT
After Visit Summary   10/23/2018    Refugio Kennedy    MRN: [id number]           Patient Information     Date Of Birth          1975        Visit Information        Provider Department      10/23/2018 9:15 AM LIZ Longo MD [facility]        Today's Diagnoses     Peyronie's disease    -  1       Follow-ups after your visit        Your next 10 appointments already scheduled     Oct 29, 2018  9:00 AM CDT   Diabetes Education with Violet Bowen RD   [facility] [place] ([facility])    [address] [place] [id number] [phone]            Dec 03, 2018  9:15 AM CST   (Arrive by 9:00 AM)   Return Weight Management Visit with Ashish Paez MD   [facility] ([facility])    [address] [address] [phone]              Who to contact     If you have questions or need follow up information about today's clinic visit or your schedule please contact [facility] directly at [phone].  Normal or non-critical lab and imaging results will be communicated to you by Tagbrandhart, letter or phone within 4 business days after the clinic has received the results. If you do not hear from us within 7 days, please contact the clinic through Tagbrandhart or phone. If you have a critical or abnormal lab result, we will notify you by phone as soon as possible.  Submit refill requests through [employer] or call your pharmacy and they will forward the refill request to us. Please allow 3 business days for your refill to be completed.          Additional Information About Your Visit        Tagbrandhart Information     Data[employer] gives you secure access to your electronic health record. If you see a primary care provider, you can also send messages to your care team and make appointments. If you have questions, please call your primary care clinic.  If you do not have a primary  care provider, please call [phone] and they will assist you.        Care EveryWhere ID     This is your Care EveryWhere ID. This could be used by other organizations to access your [facility] medical records  [id number]        Your Vitals Were     Pulse Respirations                94 20           Blood Pressure from Last 3 Encounters:   10/23/18 121/76   09/28/18 127/79   09/16/18 98/62    Weight from Last 3 Encounters:   09/17/18 (!) 179.4 kg (395 lb 6.4 oz)   09/16/18 (!) 179.6 kg (396 lb)   09/16/18 (!) 179.6 kg (396 lb)              Today, you had the following     No orders found for display       Primary Care Provider Office Phone # Fax #    Erika Espino [phone] [address] [place] [id number]        Goals        Exercise    Exercise 3x per week (30 min per time)     Notes - Note created  9/17/2018  8:20 AM by Violet Bowen RD    Goal Statement: I will go to the gym 2 times a week    Measure of Success: 2 times a week getting to gym, renew membership    Strengths: motivated to lose wt and he knows activity really helps  Ideas to overcome barriers: time: set days and times and money and hernia issue    Date to Achieve By: next month        Equal Access to Services     RICARDO HALL AH: Hadii rickie fongo Sobryan, waaxda luqadaha, qaybta kaalmada adeegyada, consuelo winter. So [facility] [phone].    ATENCIÓN: Si habla español, tiene a clinton disposición servicios gratuitos de asistencia lingüística. Llame al [phone].    We comply with applicable federal civil rights laws and [place] laws. We do not discriminate on the basis of race, color, national origin, age, disability, sex, sexual orientation, or gender identity.            Thank you!     Thank you for choosing [facility]  for your care. Our goal is always to provide you with excellent care. Hearing back from our patients is one way we can continue to improve our services.  Please take a few minutes to complete the written survey that you may receive in the mail after your visit with us. Thank you!             Your Updated Medication List - Protect others around you: Learn how to safely use, store and throw away your medicines at www.disposemymeds.org.          This list is accurate as of 10/23/18  9:47 PM.  Always use your most recent med list.                   Brand Name Dispense Instructions for use Diagnosis    * albuterol 108 (90 Base) MCG/ACT inhaler    PROAIR HFA    1 Inhaler    Inhale 2 puffs into the lungs every 4 hours as needed for shortness of breath / dyspnea    Mild intermittent asthma without complication       * albuterol (2.5 MG/3ML) 0.083% neb solution     25 vial    Take 1 vial (2.5 mg) by nebulization every 4 hours as needed for shortness of breath / dyspnea or wheezing    Moderate persistent asthma with exacerbation       aspirin 81 MG tablet     90 tablet    Take 1 tablet by mouth daily.    Type 2 diabetes, HbA1C goal < 8% (H)       blood glucose monitoring test strip    ACCU-CHEK CHRIS PLUS    200 each    USE TO TEST BLOOD SUGARS THREE TIMES A DAY AS DIRECTED    Diabetes mellitus without complication (H)       cetirizine 10 MG tablet    zyrTEC    90 tablet    TAKE ONE TABLET (10 MG) BY MOUTH EVERY EVENING    Allergic rhinitis due to pollen       * DIABETIC STERILE LANCETS device     1 Box    1 Device 3 times daily.    Type 2 diabetes, HbA1C goal < 8% (H)       * blood glucose monitoring lancets     300 each    TEST THREE TIMES A DAY OR AS DIRECTED    Type 2 diabetes, HbA1C goal < 8% (H)       fluticasone 50 MCG/ACT spray    FLONASE    16 g    Spray 1-2 sprays into both nostrils daily    Acute sinusitis with symptoms > 10 days       furosemide 20 MG tablet    LASIX    360 tablet    TAKE TWO TABLETS BY MOUTH TWICE A DAY    Venous stasis       insulin pen needle 32G X 4 MM    BD SEVERO U/F    100 each    Use 1 pen needles daily or as directed.    Type 2 diabetes  mellitus without complications (H)       liraglutide 18 MG/3ML soln    VICTOZA PEN    9 mL    INJECT 1.8MG UNDER THE SKIN ONCE DAILY    Type 2 diabetes mellitus without complication, without long-term current use of insulin (H)       lisinopril 5 MG tablet    PRINIVIL/ZESTRIL    90 tablet    TAKE ONE TABLET BY MOUTH EVERY DAY    Pulmonary hypertension (H)       metFORMIN 500 MG 24 hr tablet    GLUCOPHAGE-XR    90 tablet    TAKE ONE TABLET BY MOUTH EVERY DAY WITH BREAKFAST (NEED TO KEEP APPOINTMENT WITH DR. SANCHES END OF NOVEMBER)    Methamphetamine abuse in remission (H)       modafinil 200 MG tablet    PROVIGIL    60 tablet    Take 1/2 tablet by mouth 3-4 times daily as needed for sleepiness.    Obstructive sleep apnea-hypopnea syndrome       MULTIVITAMIN PO      Take 1 tablet by mouth daily.        order for DME      Equipment being ordered: BIPAP Refugio P Kennedy received a Resmed AirCurve 10 Bilevel. Pressures were set at 23/14 cm H2O.        order for DME      Auto-BiPAP: IPAP max 21 cm H2O EPAP min 15 cm H2O Pressure support 5 cm Changed in clinic Lifetime need and heated humidity.    FARZANA (obstructive sleep apnea)       order for DME     1 Units    Nebulizer    Moderate persistent asthma with exacerbation       oxyCODONE 5 mg    ROXICODONE    8 tablet    Take 1 tablet (5 mg) by mouth every 4 hours as needed for moderate to severe pain        simvastatin 10 MG tablet    ZOCOR    90 tablet    TAKE ONE TABLET BY MOUTH AT BEDTIME    Hyperlipidemia LDL goal <100       topiramate 50 MG tablet    TOPAMAX    240 tablet    Take 4 tablets (200 mg) by mouth 2 times daily    Morbid obesity due to excess calories (H)       triamcinolone 0.1 % cream    KENALOG    30 g    Apply sparingly to affected area two times daily as needed    Venous stasis       * Notice:  This list has 4 medication(s) that are the same as other medications prescribed for you. Read the directions carefully, and ask your doctor or other care provider to  review them with you.

## 2018-10-23 NOTE — NURSING NOTE
"Initial /76 (BP Location: Left arm, Patient Position: Chair, Cuff Size: Adult Large)  Pulse 94  Resp 20 Estimated body mass index is 61.01 kg/(m^2) as calculated from the following:    Height as of 8/20/18: 1.715 m (5' 7.5\").    Weight as of 9/17/18: 179.4 kg (395 lb 6.4 oz). .    Patient is here for a consult for pyroines.  ashli weiss LPN    "

## 2018-10-24 NOTE — PROGRESS NOTES
Appointment source: New Patient  Patient name: Refugio JACKSON [place]  Urology Staff: Rubio Longo MD    Subjective: This is a 43 year old year old male complaining of penile mass    Objective:  States that he noticed a induration of the penile shaft and some distortion of the penis during erection. The distortion is not severe enough to prevent intercourse.    Family history: no history of prostate cancer      Social history: reviewed from primary care clinic visit.       ROS: comprehensive 10 point ROS obtained and otherwise negative other than chief complaint and problem list    Examination:     Morbidly obese male  HEENT: anicteric sclera, normal extraocular movements  Respiratory: normal, non labored breathing  Musculoskeletal:Normal muscular movements  Skin normal temperature, no rash  Psychiatric: appropriate affect     Abdomen benign  No evidence of inguinal hernia  No evidence of inguinal adenopathy  Phallus has midline midshaft induration consistent with peyronie's plaque.  Scrotal contents normal    Assessment:  Peyronie's disease    Plan:  No intervention as intercourse is not being prevented.

## 2018-10-29 ENCOUNTER — ALLIED HEALTH/NURSE VISIT (OUTPATIENT)
Dept: EDUCATION SERVICES | Facility: CLINIC | Age: 43
End: 2018-10-29
Payer: COMMERCIAL

## 2018-10-29 ENCOUNTER — OFFICE VISIT (OUTPATIENT)
Dept: FAMILY MEDICINE | Facility: CLINIC | Age: 43
End: 2018-10-29
Payer: COMMERCIAL

## 2018-10-29 ENCOUNTER — ALLIED HEALTH/NURSE VISIT (OUTPATIENT)
Dept: FAMILY MEDICINE | Facility: CLINIC | Age: 43
End: 2018-10-29
Payer: COMMERCIAL

## 2018-10-29 ENCOUNTER — HOSPITAL ENCOUNTER (OUTPATIENT)
Dept: ULTRASOUND IMAGING | Facility: CLINIC | Age: 43
Discharge: HOME | End: 2018-10-29
Attending: NURSE PRACTITIONER | Admitting: NURSE PRACTITIONER
Payer: COMMERCIAL

## 2018-10-29 VITALS
DIASTOLIC BLOOD PRESSURE: 83 MMHG | HEART RATE: 84 BPM | WEIGHT: 315 LBS | TEMPERATURE: 97.9 F | HEIGHT: 68 IN | OXYGEN SATURATION: 97 % | SYSTOLIC BLOOD PRESSURE: 121 MMHG | RESPIRATION RATE: 20 BRPM | BODY MASS INDEX: 47.74 KG/M2

## 2018-10-29 VITALS — BODY MASS INDEX: 61.88 KG/M2 | WEIGHT: 315 LBS

## 2018-10-29 DIAGNOSIS — I83.12 VARICOSE VEINS OF LEFT LOWER EXTREMITY WITH INFLAMMATION: Primary | ICD-10-CM

## 2018-10-29 DIAGNOSIS — Z53.9 DIAGNOSIS NOT YET DEFINED: Primary | ICD-10-CM

## 2018-10-29 DIAGNOSIS — I83.12 VARICOSE VEINS OF LEFT LOWER EXTREMITY WITH INFLAMMATION: ICD-10-CM

## 2018-10-29 DIAGNOSIS — E11.9 TYPE 2 DIABETES MELLITUS WITHOUT COMPLICATION, WITHOUT LONG-TERM CURRENT USE OF INSULIN (H): Primary | ICD-10-CM

## 2018-10-29 PROCEDURE — G0108 DIAB MANAGE TRN  PER INDIV: HCPCS | Performed by: DIETITIAN, REGISTERED

## 2018-10-29 PROCEDURE — 99207 ZZC NO CHARGE NURSE ONLY: CPT

## 2018-10-29 PROCEDURE — 99214 OFFICE O/P EST MOD 30 MIN: CPT | Performed by: NURSE PRACTITIONER

## 2018-10-29 PROCEDURE — 93971 EXTREMITY STUDY: CPT | Mod: LT

## 2018-10-29 NOTE — MR AVS SNAPSHOT
After Visit Summary   10/29/2018    Refugio Kennedy    MRN: [id number]           Patient Information     Date Of Birth          1975        Visit Information        Provider Department      10/29/2018 8:45 AM FL SOFIA UGALDE/LPN [facility]        Today's Diagnoses     DIAGNOSIS NOT YET DEFINED    -  1       Follow-ups after your visit        Your next 10 appointments already scheduled     Oct 29, 2018  3:40 PM CDT   [facility]lin [facility]   [facility]    [address] [place] [id number] [phone]           Bring a current list of meds and any records pertaining to this visit. For Physicals, please bring immunization records and any forms needing to be filled out. Please arrive 10 minutes early to complete paperwork.            Nov 26, 2018  9:00 AM CST   Diabetes Education with Violet Bowen RD   [facility] [place] ([facility])    [address] [place] [id number] [phone]            Dec 03, 2018  9:15 AM CST   (Arrive by 9:00 AM)   Return Weight Management Visit with Ashish Paez MD   [facility] ([facility])    [address] [facility]  [facility] [address] [phone]              Who to contact     If you have questions or need follow up information about today's clinic visit or your schedule please contact [facility] directly at [phone].  Normal or non-critical lab and imaging results will be communicated to you by MyChart, letter or phone within 4 business days after the clinic has received the results. If you do not hear from us within 7 days, please contact the clinic through MyChart or phone. If you have a critical or abnormal lab result, we will notify you by phone as soon as possible.  Submit refill requests through [employer] ([employer]) or call your pharmacy and they  will forward the refill request to us. Please allow 3 business days for your refill to be completed.          Additional Information About Your Visit        Toldohart Information     [employer] gives you secure access to your electronic health record. If you see a primary care provider, you can also send messages to your care team and make appointments. If you have questions, please call your primary care clinic.  If you do not have a primary care provider, please call [phone] and they will assist you.        Care EveryWhere ID     This is your Care EveryWhere ID. This could be used by other organizations to access your [facility] medical records  [id number]         Blood Pressure from Last 3 Encounters:   10/23/18 121/76   09/28/18 127/79   09/16/18 98/62    Weight from Last 3 Encounters:   10/29/18 (!) 401 lb (181.9 kg)   09/17/18 (!) 395 lb 6.4 oz (179.4 kg)   09/16/18 (!) 396 lb (179.6 kg)              Today, you had the following     No orders found for display       Primary Care Provider Office Phone # Fax #    Erika Espino [phone] [address] [place], [place] [id number]        Goals        Exercise    Exercise 3x per week (30 min per time)     Notes - Note edited  10/29/2018  9:13 AM by Violet Bowen, RD    Goal Statement: I will go to the gym 2 times a week    Measure of Success: 2 times a week getting to gym, renew membership    Strengths: motivated to lose wt and he knows activity really helps  Ideas to overcome barriers: time: set days and times and money and hernia issue  GO before cards two days a week.  Date to Achieve By: next month        Equal Access to Services     RICARDO HALL AH: Hadii rickie Lin, wajose alejandroda elma, qaybhorace nguyễnalconsuelo mcqueen. So [facility] [phone].    ATENCIÓN: Si habla español, tiene a clinton disposición servicios gratuitos de asistencia lingüística. Llame al [phone].    We comply with applicable  federal civil rights laws and [place] laws. We do not discriminate on the basis of race, color, national origin, age, disability, sex, sexual orientation, or gender identity.            Thank you!     Thank you for choosing [facility]  for your care. Our goal is always to provide you with excellent care. Hearing back from our patients is one way we can continue to improve our services. Please take a few minutes to complete the written survey that you may receive in the mail after your visit with us. Thank you!             Your Updated Medication List - Protect others around you: Learn how to safely use, store and throw away your medicines at www.disposemymeds.org.          This list is accurate as of 10/29/18 10:18 AM.  Always use your most recent med list.                   Brand Name Dispense Instructions for use Diagnosis    * albuterol 108 (90 Base) MCG/ACT inhaler    PROAIR HFA    1 Inhaler    Inhale 2 puffs into the lungs every 4 hours as needed for shortness of breath / dyspnea    Mild intermittent asthma without complication       * albuterol (2.5 MG/3ML) 0.083% neb solution     25 vial    Take 1 vial (2.5 mg) by nebulization every 4 hours as needed for shortness of breath / dyspnea or wheezing    Moderate persistent asthma with exacerbation       aspirin 81 MG tablet     90 tablet    Take 1 tablet by mouth daily.    Type 2 diabetes, HbA1C goal < 8% (H)       blood glucose monitoring test strip    ACCU-CHEK CHRIS PLUS    200 each    USE TO TEST BLOOD SUGARS THREE TIMES A DAY AS DIRECTED    Diabetes mellitus without complication (H)       cetirizine 10 MG tablet    zyrTEC    90 tablet    TAKE ONE TABLET (10 MG) BY MOUTH EVERY EVENING    Allergic rhinitis due to pollen       * DIABETIC STERILE LANCETS device     1 Box    1 Device 3 times daily.    Type 2 diabetes, HbA1C goal < 8% (H)       * blood glucose monitoring lancets     300 each    TEST THREE TIMES A DAY OR AS DIRECTED    Type 2  diabetes, HbA1C goal < 8% (H)       fluticasone 50 MCG/ACT spray    FLONASE    16 g    Spray 1-2 sprays into both nostrils daily    Acute sinusitis with symptoms > 10 days       furosemide 20 MG tablet    LASIX    360 tablet    TAKE TWO TABLETS BY MOUTH TWICE A DAY    Venous stasis       insulin pen needle 32G X 4 MM    BD SEVERO U/F    100 each    Use 1 pen needles daily or as directed.    Type 2 diabetes mellitus without complications (H)       liraglutide 18 MG/3ML soln    VICTOZA PEN    9 mL    INJECT 1.8MG UNDER THE SKIN ONCE DAILY    Type 2 diabetes mellitus without complication, without long-term current use of insulin (H)       lisinopril 5 MG tablet    PRINIVIL/ZESTRIL    90 tablet    TAKE ONE TABLET BY MOUTH EVERY DAY    Pulmonary hypertension (H)       metFORMIN 500 MG 24 hr tablet    GLUCOPHAGE-XR    90 tablet    TAKE ONE TABLET BY MOUTH EVERY DAY WITH BREAKFAST (NEED TO KEEP APPOINTMENT WITH DR. SANCHES END OF NOVEMBER)    Methamphetamine abuse in remission (H)       modafinil 200 MG tablet    PROVIGIL    60 tablet    Take 1/2 tablet by mouth 3-4 times daily as needed for sleepiness.    Obstructive sleep apnea-hypopnea syndrome       MULTIVITAMIN PO      Take 1 tablet by mouth daily.        order for DME      Equipment being ordered: BIPAP Refugio P Kennedy received a Resmed AirCurve 10 Bilevel. Pressures were set at 23/14 cm H2O.        order for DME      Auto-BiPAP: IPAP max 21 cm H2O EPAP min 15 cm H2O Pressure support 5 cm Changed in clinic Lifetime need and heated humidity.    FARZANA (obstructive sleep apnea)       order for DME     1 Units    Nebulizer    Moderate persistent asthma with exacerbation       oxyCODONE 5 mg    ROXICODONE    8 tablet    Take 1 tablet (5 mg) by mouth every 4 hours as needed for moderate to severe pain        simvastatin 10 MG tablet    ZOCOR    90 tablet    TAKE ONE TABLET BY MOUTH AT BEDTIME    Hyperlipidemia LDL goal <100       topiramate 50 MG tablet    TOPAMAX    240 tablet     Take 4 tablets (200 mg) by mouth 2 times daily    Morbid obesity due to excess calories (H)       triamcinolone 0.1 % cream    KENALOG    30 g    Apply sparingly to affected area two times daily as needed    Venous stasis       * Notice:  This list has 4 medication(s) that are the same as other medications prescribed for you. Read the directions carefully, and ask your doctor or other care provider to review them with you.

## 2018-10-29 NOTE — PROGRESS NOTES
SUBJECTIVE:   Refugio Kennedy is a 43 year old male who presents to clinic today for the following health issues:      Concern - Leg pain  Onset:  2 days    Description:   Pt states that 2 days ago he started to have problems with an area on his left outer thigh. He believes this area is an enlarged vein that has turned red and painful. He states that it is warm to the touch.    Intensity: 3/10    Progression of Symptoms:  worsening and constant    Accompanying Signs & Symptoms:  Redness, Swelling, tenderness, pain at 3/10, warmth    Previous history of similar problem:   Yes, Pt was told that he had a blockage in a vein on the same leg (Left) x 3 years ago.     Precipitating factors:   Worsened by: Walking and touching the area makes the pain increase.    Alleviating factors:  Improved by: Applying pressure to that area helped it feel better last night when he slept on it.    Therapies Tried and outcome: None    Problem list and histories reviewed & adjusted, as indicated.  Additional history: as documented    Patient Active Problem List   Diagnosis     FARZANA (obstructive sleep apnea)/Hypoventilation Syndrome- Severe     Morbid obesity (H)     Chronic respiratory failure (H)     Hyperlipidemia LDL goal <100     Venous stasis     Pulmonary hypertension (H)     Mild intermittent asthma without complication     Type 2 diabetes mellitus without complication, without long-term current use of insulin (H)     Methamphetamine abuse in remission (H)     Past Surgical History:   Procedure Laterality Date     LAPAROSCOPIC HERNIORRHAPHY VENTRAL N/A 5/17/2016    Procedure: LAPAROSCOPIC HERNIORRHAPHY VENTRAL;  Surgeon: Yves Jimenes MD;  Location: [facility]     LAPAROSCOPIC HERNIORRHAPHY VENTRAL N/A 12/20/2016    Procedure: LAPAROSCOPIC HERNIORRHAPHY VENTRAL;  Surgeon: Yves Jimenes MD;  Location: [facility]     SURGICAL HISTORY OF -   1998    UV       Social History   Substance Use Topics     Smoking status: Former Smoker     Packs/day:  0.50     Years: 1.00     Types: Cigarettes, Cigars     Quit date: 5/21/2012     Smokeless tobacco: Never Used     Alcohol use No     Family History   Problem Relation Age of Onset     HEART DISEASE Maternal Grandfather      Diabetes Maternal Grandfather      Hypertension Maternal Grandfather      Other Cancer Maternal Grandfather      Cancer Paternal Grandfather      unknown     Other Cancer Paternal Grandfather      Anxiety Disorder Mother      Asthma Mother      Depression Mother      Thyroid Disease Mother      Anxiety Disorder Father      Substance Abuse Father      Depression Father      Hypertension Father      Anxiety Disorder Brother      Substance Abuse Brother      Mental Illness Brother      Depression Brother      Other Cancer Brother      Obesity No family hx of          Current Outpatient Prescriptions   Medication Sig Dispense Refill     albuterol (2.5 MG/3ML) 0.083% neb solution Take 1 vial (2.5 mg) by nebulization every 4 hours as needed for shortness of breath / dyspnea or wheezing 25 vial 0     albuterol (PROAIR HFA) 108 (90 BASE) MCG/ACT Inhaler Inhale 2 puffs into the lungs every 4 hours as needed for shortness of breath / dyspnea 1 Inhaler 5     aspirin 81 MG tablet Take 1 tablet by mouth daily. 90 tablet 3     blood glucose monitoring (ACCU-CHEK CHRIS PLUS) test strip USE TO TEST BLOOD SUGARS THREE TIMES A DAY AS DIRECTED 200 each 3     blood glucose monitoring (SOFTCLIX) lancets TEST THREE TIMES A DAY OR AS DIRECTED 300 each 2     cetirizine (ZYRTEC) 10 MG tablet TAKE ONE TABLET (10 MG) BY MOUTH EVERY EVENING 90 tablet 3     DIABETIC STERILE LANCETS device 1 Device 3 times daily. 1 Box 12     fluticasone (FLONASE) 50 MCG/ACT spray Spray 1-2 sprays into both nostrils daily 16 g 0     furosemide (LASIX) 20 MG tablet TAKE TWO TABLETS BY MOUTH TWICE A  tablet 1     insulin pen needle (BD SEVERO U/F) 32G X 4 MM Use 1 pen needles daily or as directed. 100 each 3     liraglutide (VICTOZA PEN)  18 MG/3ML soln INJECT 1.8MG UNDER THE SKIN ONCE DAILY 9 mL 3     lisinopril (PRINIVIL/ZESTRIL) 5 MG tablet TAKE ONE TABLET BY MOUTH EVERY DAY 90 tablet 1     metFORMIN (GLUCOPHAGE-XR) 500 MG 24 hr tablet TAKE ONE TABLET BY MOUTH EVERY DAY WITH BREAKFAST (NEED TO KEEP APPOINTMENT WITH DR. SANCHES END OF NOVEMBER) 90 tablet 3     modafinil (PROVIGIL) 200 MG tablet Take 1/2 tablet by mouth 3-4 times daily as needed for sleepiness. 60 tablet 5     Multiple Vitamin (MULTIVITAMIN OR) Take 1 tablet by mouth daily.       order for DME Nebulizer 1 Units 0     order for DME Equipment being ordered: BIPAP Refugio P Kennedy received a Eqlim AirCurve 10 Bilevel. Pressures were set at 23/14 cm H2O.       ORDER FOR DME Auto-BiPAP:  IPAP max 21 cm H2O  EPAP min 15 cm H2O  Pressure support 5 cm  Changed in clinic  Lifetime need and heated humidity.           oxyCODONE (ROXICODONE) 5 mg Take 1 tablet (5 mg) by mouth every 4 hours as needed for moderate to severe pain 8 tablet 0     simvastatin (ZOCOR) 10 MG tablet TAKE ONE TABLET BY MOUTH AT BEDTIME 90 tablet 3     topiramate (TOPAMAX) 50 MG tablet Take 4 tablets (200 mg) by mouth 2 times daily 240 tablet 5     triamcinolone (KENALOG) 0.1 % cream Apply sparingly to affected area two times daily as needed 30 g 2     No Known Allergies  Recent Labs   Lab Test  09/16/18   1631  07/24/18   1546  12/19/17   1429  07/17/17   0901   11/29/16   1438   05/23/16   1329   09/29/15   0748  03/24/15   1040  02/07/15   0410   A1C   --   5.5  5.7  5.6   < >  5.9   --   5.8   --   6.0  6.4*   --    LDL   --    --   58   --    --   59   --    --    --   63   --    --    HDL   --    --   45   --    --   35*   --    --    --   40*   --    --    TRIG   --    --   115   --    --   179*   --    --    --   127   --    --    ALT   --    --    --    --    --    --    --   30   --    --   30  34   CR  0.99  0.94  0.94  0.85   --   1.06   < >  0.86   < >   --   0.79  0.83   GFRESTIMATED  82  87  87  >90  Non   "American GFR Calc     --   76   < >  >90  Non  GFR Calc     < >   --   >90  Non  GFR Calc    >90  Non  GFR Calc     GFRESTBLACK  >90  >90  >90  >90  African American GFR Calc     --   >90   GFR Calc     < >  >90   GFR Calc     < >   --   >90   GFR Calc    >90   GFR Calc     POTASSIUM  3.6  4.0  3.5  3.6   --   3.7   < >  4.0   < >   --   4.4  4.4   TSH   --   1.48   --    --    --    --    --   2.90   --    --    --    --     < > = values in this interval not displayed.      BP Readings from Last 3 Encounters:   10/29/18 121/83   10/23/18 121/76   09/28/18 127/79    Wt Readings from Last 3 Encounters:   10/29/18 (!) 401 lb (181.9 kg)   10/29/18 (!) 401 lb (181.9 kg)   09/17/18 (!) 395 lb 6.4 oz (179.4 kg)         Reviewed and updated as needed this visit by clinical staff  Tobacco  Allergies  Meds  Problems  Med Hx  Surg Hx  Fam Hx  Soc Hx        Reviewed and updated as needed this visit by Provider  Allergies  Meds  Problems         ROS:  CONSTITUTIONAL: NEGATIVE for fever, chills, change in weight  INTEGUMENTARY/SKIN: POSITIVE for upper left leg redness warmth and painful to touch, pt not taking anything for symptoms at this time.  RESP: NEGATIVE for significant cough or SOB  CV: NEGATIVE for chest pain, palpitations or peripheral edema  PSYCHIATRIC: NEGATIVE for changes in mood or affect  ROS otherwise negative    OBJECTIVE:     /83  Pulse 84  Temp 97.9  F (36.6  C) (Tympanic)  Resp 20  Ht 5' 7.5\" (1.715 m)  Wt (!) 401 lb (181.9 kg)  SpO2 97%  BMI 61.88 kg/m2  Body mass index is 61.88 kg/(m^2).  GENERAL: healthy, alert and no distress  RESP: lungs clear to auscultation - no rales, rhonchi or wheezes  CV: regular rate and rhythm, normal S1 S2, no S3 or S4, no murmur, click or rub, no peripheral edema and peripheral pulses strong  MS: Upper left leg anterior to lateral horizontal " vein that is red, warm and painful with palpation, no cellulitis noted on the skin, Patient is obese and has varicose veins in the entire leg  PSYCH: mentation appears normal, affect normal/bright    Diagnostic Test Results:  none     ASSESSMENT/PLAN:     1. Varicose veins of left lower extremity with inflammation  Superficial phlebitis or thrombophlebitis vs. DVT.  Hard to assess swelling due to obesity.  Patient sent for STAT doppler US of left lower extremity.  Instructions given if phlebitis or superficial thrombophlebitis with ibuprofen, ice/heat, elevation and compression with ace bandage with f/u in clinic in 1 week if symptoms are not resolving.  If US positive for DVT, recommend ER for treatment.  - US Lower Extremity Venous Duplex Left; Future    See Patient Instructions    Kaylin Glass NP  [facility]

## 2018-10-29 NOTE — PROGRESS NOTES
"Diabetes Self-Management Education & Support    Diabetes Education Self Management & Training    SUBJECTIVE/OBJECTIVE:  Presents for: Individual review  Accompanied by: Self  Diabetes education in the past 24mo: Yes  Focus of Visit: Healthy Coping, Healthy Eating, Being Active  Diabetes type: Type 2  Disease course: Stable  How confident are you filling out medical forms by yourself:: Extremely  Transportation concerns: No  Other concerns:: None  Cultural Influences/Ethnic Background:  American      Diabetes Symptoms & Complications  Weight trend: Decreasing steadily  Peripheral neuropathy: Yes    Patient Problem List and Family Medical History reviewed for relevant medical history, current medical status, and diabetes risk factors.    Vitals:  Wt (!) 181.9 kg (401 lb)  BMI 61.88 kg/m2  Estimated body mass index is 61.88 kg/(m^2) as calculated from the following:    Height as of 8/20/18: 1.715 m (5' 7.5\").    Weight as of this encounter: 181.9 kg (401 lb).   Last 3 BP:   BP Readings from Last 3 Encounters:   10/23/18 121/76   09/28/18 127/79   09/16/18 98/62       History   Smoking Status     Former Smoker     Packs/day: 0.50     Years: 1.00     Types: Cigarettes, Cigars     Quit date: 5/21/2012   Smokeless Tobacco     Never Used       Labs:  Lab Results   Component Value Date    A1C 5.5 07/24/2018     Lab Results   Component Value Date     09/16/2018     Lab Results   Component Value Date    LDL 58 12/19/2017     HDL Cholesterol   Date Value Ref Range Status   12/19/2017 45 >39 mg/dL Final   ]  GFR Estimate   Date Value Ref Range Status   09/16/2018 82 >60 mL/min/1.7m2 Final     Comment:     Non  GFR Calc     GFR Estimate If Black   Date Value Ref Range Status   09/16/2018 >90 >60 mL/min/1.7m2 Final     Comment:      GFR Calc     Lab Results   Component Value Date    CR 0.99 09/16/2018     No results found for: MICROALBUMIN    Healthy Eating  Healthy Eating Assessed Today: " Yes  Cultural/Latter-day diet restrictions?: No  Meal planning: Avoiding sweets, Smaller portions  Meals include: Breakfast, Lunch, Dinner, Snacks  Breakfast: toast and peanut butter early and then has eggs and meat later  Lunch: sandwich and veggie two days a week  Dinner: chicken fingers, fried chicken/fries.  [place] there'  Snacks: crackers, harder to avoid snacks at cards  Beverages: Water, Diet soda  Has patient met with a dietitian in the past?: Yes    Being Active  Exercise:: Yes (currently has not been doing a much)    Monitoring  Monitoring Assessed Today: Yes  Did patient bring glucose meter to appointment? : Yes  Blood Glucose Meter: Accu-check                                Taking Medications  Diabetes Medication(s)     Biguanides Sig    metFORMIN (GLUCOPHAGE-XR) 500 MG 24 hr tablet TAKE ONE TABLET BY MOUTH EVERY DAY WITH BREAKFAST (NEED TO KEEP APPOINTMENT WITH DR. SANCHES END OF NOVEMBER)    Incretin Mimetic Agents (GLP-1 Receptor Agonists) Sig    liraglutide (VICTOZA PEN) 18 MG/3ML soln INJECT 1.8MG UNDER THE SKIN ONCE DAILY          Problems taking diabetes medications regularly?: No  Diabetes medication side effects?: No  Treatment Compliance: All of the time    Problem Solving     Not assessed            Reducing Risks  Reducing Risks Assessed Today: Yes    Healthy Coping     Patient Activation Measure Survey Score:  MARY Score (Last Two) 1/25/2011   MARY Raw Score 36   Activation Score 47.4   MARY Level 2       ASSESSMENT:  Wt is up, less active since flea market are done for the season, will work hard at getting to the gym.    Goals Addressed as of 10/29/2018 at 9:16 AM       Exercise 3x per week (30 min per time)     Added 9/17/18 by Violet Bowen, RD           Goal Statement: I will go to the gym 2 times a week    Measure of Success: 2 times a week getting to gym, renew membership    Strengths: motivated to lose wt and he knows activity really  helps  Ideas to overcome barriers: time: set days and times and money and hernia issue  GO before cards two days a week.  Date to Achieve By: next month            Patient's most recent   Lab Results   Component Value Date    A1C 5.5 07/24/2018    is meeting goal of <7.0    INTERVENTION:   Diabetes knowledge and skills assessment:     Patient is knowledgeable in diabetes management concepts related to: Healthy Eating, Being Active, Monitoring and Taking Medication    Patient needs further education on the following diabetes management concepts: Taking Medication, Problem Solving, Reducing Risks and Healthy Coping    Based on learning assessment above, most appropriate setting for further diabetes education would be: Individual setting.    Education provided today on:  AADE Self-Care Behaviors:  Healthy Eating: portion control, work on the snacks.  Being Active: relationship to blood glucose and describe appropriate activity program  Monitoring: individual blood glucose targets    Opportunities for ongoing education and support in diabetes-self management were discussed.    Pt verbalized understanding of concepts discussed and recommendations provided today.       Education Materials Provided:  No new materials provided today    PLAN:  See Patient Instructions for co-developed, patient-stated behavior change goals.  AVS printed and provided to patient today. See Follow-Up section for recommended follow-up.      Time Spent: 30 minutes  Encounter Type: Individual    Any diabetes medication dose changes were made via the CDE Protocol and Collaborative Practice Agreement with the patient's primary care provider. A copy of this encounter was shared with the provider.

## 2018-10-29 NOTE — NURSING NOTE
Patient in to see Violet TURCIOS and informed her that he has a vein on his rt leg and that is painful. I looked at his leg and there was a vein bulging, red and warm to touch - I informed him I would message Dr. Espino's nurse and he would probably need to be seen today. Astrid ALVAREZ said to have him see same day and appointment was made with Kaylin VERA this afternoon who had 1 opening left. Violet was pleased with this due to past history of him waiting to long to seek care and being admitted. Amira Restrepo MA

## 2018-10-29 NOTE — MR AVS SNAPSHOT
After Visit Summary   10/29/2018    Refugio Kennedy    MRN: [id number]           Patient Information     Date Of Birth          1975        Visit Information        Provider Department      10/29/2018 3:40 PM Kaylin Glass NP [facility]        Today's Diagnoses     Varicose veins of left lower extremity with inflammation    -  1      Care Instructions    1.  Go to [facility] Radiology department for US.  If there is a clot, you will need to go to ER for treatment.  2.  If there is no clot, use ice/heat 3-4 times daily for 15-20 minutes, ibuprofen as needed, elevation of the left leg and wrap with ace bandage for treatment and follow-up in 1 week in clinic if persistent symptoms or sooner if worsening.            Follow-ups after your visit        Follow-up notes from your care team     Return in about 1 week (around 11/5/2018), or if symptoms worsen or fail to improve.      Your next 10 appointments already scheduled     Nov 26, 2018  9:00 AM CST   Diabetes Education with Violet Bowen RD   [facility] [place] ([facility])    [address] [place] [id number] [phone]            Dec 03, 2018  9:15 AM CST   (Arrive by 9:00 AM)   Return Weight Management Visit with Ashish Paez MD   [facility] ([facility])    [address] [place] [id number] [phone]              Future tests that were ordered for you today     Open Future Orders        Priority Expected Expires Ordered    US Lower Extremity Venous Duplex Left STAT  10/29/2019 10/29/2018            Who to contact     If you have questions or need follow up information about today's clinic visit or your schedule please contact [facility] directly at [phone].  Normal or non-critical lab and imaging results will be communicated to you by MyChart, letter or phone  "within 4 business days after the clinic has received the results. If you do not hear from us within 7 days, please contact the clinic through [employer] or phone. If you have a critical or abnormal lab result, we will notify you by phone as soon as possible.  Submit refill requests through [employer] or call your pharmacy and they will forward the refill request to us. Please allow 3 business days for your refill to be completed.          Additional Information About Your Visit        [employer] Information     [employer] gives you secure access to your electronic health record. If you see a primary care provider, you can also send messages to your care team and make appointments. If you have questions, please call your primary care clinic.  If you do not have a primary care provider, please call [phone] and they will assist you.        Care EveryWhere ID     This is your Care EveryWhere ID. This could be used by other organizations to access your [facility] medical records  [id number]        Your Vitals Were     Pulse Temperature Respirations Height Pulse Oximetry BMI (Body Mass Index)    84 97.9  F (36.6  C) (Tympanic) 20 5' 7.5\" (1.715 m) 97% 61.88 kg/m2       Blood Pressure from Last 3 Encounters:   10/29/18 121/83   10/23/18 121/76   09/28/18 127/79    Weight from Last 3 Encounters:   10/29/18 (!) 401 lb (181.9 kg)   10/29/18 (!) 401 lb (181.9 kg)   09/17/18 (!) 395 lb 6.4 oz (179.4 kg)               Primary Care Provider Office Phone # Fax #    Erika Espino [phone] [address] [place] [id number]        Goals        Exercise    Exercise 3x per week (30 min per time)     Notes - Note edited  10/29/2018  9:13 AM by Violet Bowen RD    Goal Statement: I will go to the gym 2 times a week    Measure of Success: 2 times a week getting to gym, renew membership    Strengths: motivated to lose wt and he knows activity really helps  Ideas to overcome barriers: time: set days and times " and money and hernia issue  GO before cards two days a week.  Date to Achieve By: next month        Equal Access to Services     RICARDO HALL : Hadii rickie Lin, wajose alejandroda marenmelitonha, qaybta kadanielda manjit, consuelo kimashleymaryjane winter. So [facility] [phone].    ATENCIÓN: Si habla español, tiene a clinton disposición servicios gratuitos de asistencia lingüística. Llame al [phone].    We comply with applicable federal civil rights laws and [place] laws. We do not discriminate on the basis of race, color, national origin, age, disability, sex, sexual orientation, or gender identity.            Thank you!     Thank you for choosing [facility]  for your care. Our goal is always to provide you with excellent care. Hearing back from our patients is one way we can continue to improve our services. Please take a few minutes to complete the written survey that you may receive in the mail after your visit with us. Thank you!             Your Updated Medication List - Protect others around you: Learn how to safely use, store and throw away your medicines at www.disposemymeds.org.          This list is accurate as of 10/29/18  4:08 PM.  Always use your most recent med list.                   Brand Name Dispense Instructions for use Diagnosis    * albuterol 108 (90 Base) MCG/ACT inhaler    PROAIR HFA    1 Inhaler    Inhale 2 puffs into the lungs every 4 hours as needed for shortness of breath / dyspnea    Mild intermittent asthma without complication       * albuterol (2.5 MG/3ML) 0.083% neb solution     25 vial    Take 1 vial (2.5 mg) by nebulization every 4 hours as needed for shortness of breath / dyspnea or wheezing    Moderate persistent asthma with exacerbation       aspirin 81 MG tablet     90 tablet    Take 1 tablet by mouth daily.    Type 2 diabetes, HbA1C goal < 8% (H)       blood glucose monitoring test strip    ACCU-CHEK CHRIS PLUS    200 each    USE TO TEST BLOOD SUGARS  THREE TIMES A DAY AS DIRECTED    Diabetes mellitus without complication (H)       cetirizine 10 MG tablet    zyrTEC    90 tablet    TAKE ONE TABLET (10 MG) BY MOUTH EVERY EVENING    Allergic rhinitis due to pollen       * DIABETIC STERILE LANCETS device     1 Box    1 Device 3 times daily.    Type 2 diabetes, HbA1C goal < 8% (H)       * blood glucose monitoring lancets     300 each    TEST THREE TIMES A DAY OR AS DIRECTED    Type 2 diabetes, HbA1C goal < 8% (H)       fluticasone 50 MCG/ACT spray    FLONASE    16 g    Spray 1-2 sprays into both nostrils daily    Acute sinusitis with symptoms > 10 days       furosemide 20 MG tablet    LASIX    360 tablet    TAKE TWO TABLETS BY MOUTH TWICE A DAY    Venous stasis       insulin pen needle 32G X 4 MM    BD SEVERO U/F    100 each    Use 1 pen needles daily or as directed.    Type 2 diabetes mellitus without complications (H)       liraglutide 18 MG/3ML soln    VICTOZA PEN    9 mL    INJECT 1.8MG UNDER THE SKIN ONCE DAILY    Type 2 diabetes mellitus without complication, without long-term current use of insulin (H)       lisinopril 5 MG tablet    PRINIVIL/ZESTRIL    90 tablet    TAKE ONE TABLET BY MOUTH EVERY DAY    Pulmonary hypertension (H)       metFORMIN 500 MG 24 hr tablet    GLUCOPHAGE-XR    90 tablet    TAKE ONE TABLET BY MOUTH EVERY DAY WITH BREAKFAST (NEED TO KEEP APPOINTMENT WITH DR. SANCHES END OF NOVEMBER)    Methamphetamine abuse in remission (H)       modafinil 200 MG tablet    PROVIGIL    60 tablet    Take 1/2 tablet by mouth 3-4 times daily as needed for sleepiness.    Obstructive sleep apnea-hypopnea syndrome       MULTIVITAMIN PO      Take 1 tablet by mouth daily.        order for DME      Equipment being ordered: BIPAP Refugio P Kennedy received a Resmed AirCurve 10 Bilevel. Pressures were set at 23/14 cm H2O.        order for DME      Auto-BiPAP: IPAP max 21 cm H2O EPAP min 15 cm H2O Pressure support 5 cm Changed in clinic Lifetime need and heated humidity.     FARZANA (obstructive sleep apnea)       order for DME     1 Units    Nebulizer    Moderate persistent asthma with exacerbation       oxyCODONE 5 mg    ROXICODONE    8 tablet    Take 1 tablet (5 mg) by mouth every 4 hours as needed for moderate to severe pain        simvastatin 10 MG tablet    ZOCOR    90 tablet    TAKE ONE TABLET BY MOUTH AT BEDTIME    Hyperlipidemia LDL goal <100       topiramate 50 MG tablet    TOPAMAX    240 tablet    Take 4 tablets (200 mg) by mouth 2 times daily    Morbid obesity due to excess calories (H)       triamcinolone 0.1 % cream    KENALOG    30 g    Apply sparingly to affected area two times daily as needed    Venous stasis       * Notice:  This list has 4 medication(s) that are the same as other medications prescribed for you. Read the directions carefully, and ask your doctor or other care provider to review them with you.

## 2018-10-29 NOTE — MR AVS SNAPSHOT
After Visit Summary   10/29/2018    Refugio Kennedy    MRN: [id number]           Patient Information     Date Of Birth          1975        Visit Information        Provider Department      10/29/2018 9:00 AM Violet Bowen RD [facility]        Care Instructions    Work on healthier snacks or no snacks.    Ideas: bring sugar free jello, veggies and dip, boiled eggs to [facility] instead of eating the junk there.    Get to gym 2 days a week before [facility].  Try to do a 10 minute walk after devotions in the am.          Follow-ups after your visit        Your next 10 appointments already scheduled     Oct 29, 2018  3:40 PM CDT   Office Visit with Kaylin Glass NP   [facility] ([facility])    [address] [place] [address] [phone]           Bring a current list of meds and any records pertaining to this visit. For Physicals, please bring immunization records and any forms needing to be filled out. Please arrive 10 minutes early to complete paperwork.            Dec 03, 2018  9:15 AM CST   (Arrive by 9:00 AM)   Return Weight Management Visit with Ashish Paez MD   [facility] ([facility])    [address]  [address] [facility] [address] [phone]              Who to contact     If you have questions or need follow up information about today's clinic visit or your schedule please contact [facility] directly at [phone].  Normal or non-critical lab and imaging results will be communicated to you by MyChart, letter or phone within 4 business days after the clinic has received the results. If you do not hear from us within 7 days, please contact the clinic through [employer] or phone. If you have a critical or abnormal lab result, we will notify you by phone as soon as possible.  Submit refill requests through [employer]  or call your pharmacy and they will forward the refill request to us. Please allow 3 business days for your refill to be completed.          Additional Information About Your Visit        [employer] Information     [employer] gives you secure access to your electronic health record. If you see a primary care provider, you can also send messages to your care team and make appointments. If you have questions, please call your primary care clinic.  If you do not have a primary care provider, please call [phone] and they will assist you.        Care EveryWhere ID     This is your Care EveryWhere ID. This could be used by other organizations to access your [facility] medical records  [id number]        Your Vitals Were     BMI (Body Mass Index)                   61.88 kg/m2            Blood Pressure from Last 3 Encounters:   10/23/18 121/76   09/28/18 127/79   09/16/18 98/62    Weight from Last 3 Encounters:   10/29/18 (!) 181.9 kg (401 lb)   09/17/18 (!) 179.4 kg (395 lb 6.4 oz)   09/16/18 (!) 179.6 kg (396 lb)              Today, you had the following     No orders found for display       Primary Care Provider Office Phone # Fax #    Erika Espino [phone] [address] [place] [id number]        Goals        Exercise    Exercise 3x per week (30 min per time)     Notes - Note edited  10/29/2018  9:13 AM by Violet Bowen, MAGNOLIA    Goal Statement: I will go to the gym 2 times a week    Measure of Success: 2 times a week getting to gym, renew membership    Strengths: motivated to lose wt and he knows activity really helps  Ideas to overcome barriers: time: set days and times and money and hernia issue  GO before [facility] two days a week.  Date to Achieve By: next month        Equal Access to Services     RICARDO HALL AH: Barb Lin, torie wills, consuelo yi. So [facility] [phone].    ATENCIÓN: charles Hagen  clinton disposición servicios gratuitos de asistencia lingüística. Shukri baron [phone].    We comply with applicable federal civil rights laws and [place] laws. We do not discriminate on the basis of race, color, national origin, age, disability, sex, sexual orientation, or gender identity.            Thank you!     Thank you for choosing [facility]  for your care. Our goal is always to provide you with excellent care. Hearing back from our patients is one way we can continue to improve our services. Please take a few minutes to complete the written survey that you may receive in the mail after your visit with us. Thank you!             Your Updated Medication List - Protect others around you: Learn how to safely use, store and throw away your medicines at www.disposemymeds.org.          This list is accurate as of 10/29/18  9:25 AM.  Always use your most recent med list.                   Brand Name Dispense Instructions for use Diagnosis    * albuterol 108 (90 Base) MCG/ACT inhaler    PROAIR HFA    1 Inhaler    Inhale 2 puffs into the lungs every 4 hours as needed for shortness of breath / dyspnea    Mild intermittent asthma without complication       * albuterol (2.5 MG/3ML) 0.083% neb solution     25 vial    Take 1 vial (2.5 mg) by nebulization every 4 hours as needed for shortness of breath / dyspnea or wheezing    Moderate persistent asthma with exacerbation       aspirin 81 MG tablet     90 tablet    Take 1 tablet by mouth daily.    Type 2 diabetes, HbA1C goal < 8% (H)       blood glucose monitoring test strip    ACCU-CHEK CHRIS PLUS    200 each    USE TO TEST BLOOD SUGARS THREE TIMES A DAY AS DIRECTED    Diabetes mellitus without complication (H)       cetirizine 10 MG tablet    zyrTEC    90 tablet    TAKE ONE TABLET (10 MG) BY MOUTH EVERY EVENING    Allergic rhinitis due to pollen       * DIABETIC STERILE LANCETS device     1 Box    1 Device 3 times daily.    Type 2 diabetes, HbA1C  goal < 8% (H)       * blood glucose monitoring lancets     300 each    TEST THREE TIMES A DAY OR AS DIRECTED    Type 2 diabetes, HbA1C goal < 8% (H)       fluticasone 50 MCG/ACT spray    FLONASE    16 g    Spray 1-2 sprays into both nostrils daily    Acute sinusitis with symptoms > 10 days       furosemide 20 MG tablet    LASIX    360 tablet    TAKE TWO TABLETS BY MOUTH TWICE A DAY    Venous stasis       insulin pen needle 32G X 4 MM    BD SEVERO U/F    100 each    Use 1 pen needles daily or as directed.    Type 2 diabetes mellitus without complications (H)       liraglutide 18 MG/3ML soln    VICTOZA PEN    9 mL    INJECT 1.8MG UNDER THE SKIN ONCE DAILY    Type 2 diabetes mellitus without complication, without long-term current use of insulin (H)       lisinopril 5 MG tablet    PRINIVIL/ZESTRIL    90 tablet    TAKE ONE TABLET BY MOUTH EVERY DAY    Pulmonary hypertension (H)       metFORMIN 500 MG 24 hr tablet    GLUCOPHAGE-XR    90 tablet    TAKE ONE TABLET BY MOUTH EVERY DAY WITH BREAKFAST (NEED TO KEEP APPOINTMENT WITH DR. SANCHES END OF NOVEMBER)    Methamphetamine abuse in remission (H)       modafinil 200 MG tablet    PROVIGIL    60 tablet    Take 1/2 tablet by mouth 3-4 times daily as needed for sleepiness.    Obstructive sleep apnea-hypopnea syndrome       MULTIVITAMIN PO      Take 1 tablet by mouth daily.        order for DME      Equipment being ordered: BIPAP Refugio P Kennedy received a Resmed AirCurve 10 Bilevel. Pressures were set at 23/14 cm H2O.        order for DME      Auto-BiPAP: IPAP max 21 cm H2O EPAP min 15 cm H2O Pressure support 5 cm Changed in clinic Lifetime need and heated humidity.    FARZANA (obstructive sleep apnea)       order for DME     1 Units    Nebulizer    Moderate persistent asthma with exacerbation       oxyCODONE 5 mg    ROXICODONE    8 tablet    Take 1 tablet (5 mg) by mouth every 4 hours as needed for moderate to severe pain        simvastatin 10 MG tablet    ZOCOR    90 tablet    TAKE  ONE TABLET BY MOUTH AT BEDTIME    Hyperlipidemia LDL goal <100       topiramate 50 MG tablet    TOPAMAX    240 tablet    Take 4 tablets (200 mg) by mouth 2 times daily    Morbid obesity due to excess calories (H)       triamcinolone 0.1 % cream    KENALOG    30 g    Apply sparingly to affected area two times daily as needed    Venous stasis       * Notice:  This list has 4 medication(s) that are the same as other medications prescribed for you. Read the directions carefully, and ask your doctor or other care provider to review them with you.

## 2018-10-29 NOTE — PATIENT INSTRUCTIONS
Work on healthier snacks or no snacks.    Ideas: bring sugar free jello, veggies and dip, boiled eggs to [facility] instead of eating the junk there.    Get to gym 2 days a week before [facility].  Try to do a 10 minute walk after devotions in the am.

## 2018-11-04 DIAGNOSIS — E66.01 MORBID OBESITY DUE TO EXCESS CALORIES (H): ICD-10-CM

## 2018-11-04 DIAGNOSIS — I87.8 VENOUS STASIS: ICD-10-CM

## 2018-11-04 NOTE — TELEPHONE ENCOUNTER
"Requested Prescriptions   Pending Prescriptions Disp Refills     furosemide (LASIX) 20 MG tablet  Last Written Prescription Date:  2/26/18  Last Fill Quantity: 360,  # refills: 1   Last office visit: 7/24/2018 with prescribing provider:  JERMAINE Espino   Future Office Visit:     360 tablet 1    Diuretics (Including Combos) Protocol Passed    11/4/2018  2:59 PM       Passed - Blood pressure under 140/90 in past 12 months    BP Readings from Last 3 Encounters:   10/29/18 121/83   10/23/18 121/76   09/28/18 127/79                Passed - Recent (12 mo) or future (30 days) visit within the authorizing provider's specialty    Patient had office visit in the last 12 months or has a visit in the next 30 days with authorizing provider or within the authorizing provider's specialty.  See \"Patient Info\" tab in inbasket, or \"Choose Columns\" in Meds & Orders section of the refill encounter.             Passed - Patient is age 18 or older       Passed - Normal serum creatinine on file in past 12 months    Recent Labs   Lab Test  09/16/18   1631   CR  0.99             Passed - Normal serum potassium on file in past 12 months    Recent Labs   Lab Test  09/16/18   1631   POTASSIUM  3.6                   Passed - Normal serum sodium on file in past 12 months    Recent Labs   Lab Test  09/16/18   1631   NA  137                "

## 2018-11-05 RX ORDER — TOPIRAMATE 50 MG/1
200 TABLET, FILM COATED ORAL 2 TIMES DAILY
Qty: 240 TABLET | Refills: 0 | Status: SHIPPED | OUTPATIENT
Start: 2018-11-05 | End: 2018-12-17

## 2018-11-05 RX ORDER — FUROSEMIDE 20 MG
TABLET ORAL
Qty: 360 TABLET | Refills: 1 | Status: SHIPPED | OUTPATIENT
Start: 2018-11-05 | End: 2019-06-07

## 2018-11-08 ENCOUNTER — OFFICE VISIT (OUTPATIENT)
Dept: FAMILY MEDICINE | Facility: CLINIC | Age: 43
End: 2018-11-08
Payer: COMMERCIAL

## 2018-11-08 ENCOUNTER — TELEPHONE (OUTPATIENT)
Dept: OTHER | Facility: CLINIC | Age: 43
End: 2018-11-08

## 2018-11-08 VITALS
DIASTOLIC BLOOD PRESSURE: 72 MMHG | HEART RATE: 82 BPM | WEIGHT: 315 LBS | BODY MASS INDEX: 62.26 KG/M2 | SYSTOLIC BLOOD PRESSURE: 122 MMHG | TEMPERATURE: 98.1 F | OXYGEN SATURATION: 94 % | RESPIRATION RATE: 30 BRPM

## 2018-11-08 DIAGNOSIS — I80.02 THROMBOPHLEBITIS OF SUPERFICIAL VEINS OF LEFT LOWER EXTREMITY: ICD-10-CM

## 2018-11-08 DIAGNOSIS — I83.93 VARICOSE VEINS OF BOTH LOWER EXTREMITIES, UNSPECIFIED WHETHER COMPLICATED: Primary | ICD-10-CM

## 2018-11-08 PROCEDURE — 99214 OFFICE O/P EST MOD 30 MIN: CPT | Performed by: NURSE PRACTITIONER

## 2018-11-08 ASSESSMENT — PAIN SCALES - GENERAL: PAINLEVEL: MILD PAIN (2)

## 2018-11-08 NOTE — PROGRESS NOTES
SUBJECTIVE:   Refugio Kennedy is a 43 year old male who presents to clinic today for the following health issues:      Concern - Leg pain  Onset:  10/27/2018    Description:   Pt states he started to have problems with an area on his left outer thigh. He believes this area is an enlarged vein that has turned red and painful. He states that it is warm to the touch.    Intensity: 3/10    Progression of Symptoms:  worsening and constant    Accompanying Signs & Symptoms:  Redness, Swelling, tenderness, pain at 3/10, warmth    Previous history of similar problem:     Office visit 10/29/2018  10/29/2018 US:IMPRESSION:  1. Superficial clot in mid left lateral thigh.  2. No evidence for deep venous thrombosis. The peroneal veins of the  calf were not visualized.     Yes, Pt was told that he had a blockage in a vein on the same leg (Left) x 3 years ago.     Precipitating factors:   Worsened by: Walking and touching the area makes the pain increase.    Alleviating factors:  Improved by: Applying pressure to that area helped it feel better  when he slept on it.       Therapies Tried and outcome:ice, heat, elevation, ace bandage,  Ace bandage did not stay in place so quit using after 2 evenings, has not injected that leg since redness,   ice and elevation has helped some, continues to have sx  Now lower leg has a dull ache, redness is still there      Problem list and histories reviewed & adjusted, as indicated.  Additional history: as documented    Patient Active Problem List   Diagnosis     FARZANA (obstructive sleep apnea)/Hypoventilation Syndrome- Severe     Morbid obesity (H)     Chronic respiratory failure (H)     Hyperlipidemia LDL goal <100     Venous stasis     Pulmonary hypertension (H)     Mild intermittent asthma without complication     Type 2 diabetes mellitus without complication, without long-term current use of insulin (H)     Methamphetamine abuse in remission (H)     Past Surgical History:   Procedure Laterality  Date     LAPAROSCOPIC HERNIORRHAPHY VENTRAL N/A 5/17/2016    Procedure: LAPAROSCOPIC HERNIORRHAPHY VENTRAL;  Surgeon: Yves Jimenes MD;  Location: [facility]     LAPAROSCOPIC HERNIORRHAPHY VENTRAL N/A 12/20/2016    Procedure: LAPAROSCOPIC HERNIORRHAPHY VENTRAL;  Surgeon: Yves Jimenes MD;  Location: [facility]     SURGICAL HISTORY OF -   1998    [facility]       Social History   Substance Use Topics     Smoking status: Former Smoker     Packs/day: 0.50     Years: 1.00     Types: Cigarettes, Cigars     Quit date: 5/21/2012     Smokeless tobacco: Never Used     Alcohol use No     Family History   Problem Relation Age of Onset     HEART DISEASE Maternal Grandfather      Diabetes Maternal Grandfather      Hypertension Maternal Grandfather      Other Cancer Maternal Grandfather      Cancer Paternal Grandfather      unknown     Other Cancer Paternal Grandfather      Anxiety Disorder Mother      Asthma Mother      Depression Mother      Thyroid Disease Mother      Anxiety Disorder Father      Substance Abuse Father      Depression Father      Hypertension Father      Anxiety Disorder Brother      Substance Abuse Brother      Mental Illness Brother      Depression Brother      Other Cancer Brother      Obesity No family hx of          Current Outpatient Prescriptions   Medication Sig Dispense Refill     albuterol (2.5 MG/3ML) 0.083% neb solution Take 1 vial (2.5 mg) by nebulization every 4 hours as needed for shortness of breath / dyspnea or wheezing 25 vial 0     albuterol (PROAIR HFA) 108 (90 BASE) MCG/ACT Inhaler Inhale 2 puffs into the lungs every 4 hours as needed for shortness of breath / dyspnea 1 Inhaler 5     aspirin 81 MG tablet Take 1 tablet by mouth daily. 90 tablet 3     blood glucose monitoring (ACCU-CHEK CHRIS PLUS) test strip USE TO TEST BLOOD SUGARS THREE TIMES A DAY AS DIRECTED 200 each 3     blood glucose monitoring (SOFTCLIX) lancets TEST THREE TIMES A DAY OR AS DIRECTED 300 each 2     cetirizine (ZYRTEC) 10 MG tablet  TAKE ONE TABLET (10 MG) BY MOUTH EVERY EVENING 90 tablet 3     DIABETIC STERILE LANCETS device 1 Device 3 times daily. 1 Box 12     fluticasone (FLONASE) 50 MCG/ACT spray Spray 1-2 sprays into both nostrils daily 16 g 0     furosemide (LASIX) 20 MG tablet TAKE TWO TABLETS BY MOUTH TWICE A  tablet 1     insulin pen needle (BD SEVERO U/F) 32G X 4 MM Use 1 pen needles daily or as directed. 100 each 3     liraglutide (VICTOZA PEN) 18 MG/3ML soln INJECT 1.8MG UNDER THE SKIN ONCE DAILY 9 mL 3     lisinopril (PRINIVIL/ZESTRIL) 5 MG tablet TAKE ONE TABLET BY MOUTH EVERY DAY 90 tablet 1     metFORMIN (GLUCOPHAGE-XR) 500 MG 24 hr tablet TAKE ONE TABLET BY MOUTH EVERY DAY WITH BREAKFAST (NEED TO KEEP APPOINTMENT WITH DR. SANCHES END OF NOVEMBER) 90 tablet 3     modafinil (PROVIGIL) 200 MG tablet Take 1/2 tablet by mouth 3-4 times daily as needed for sleepiness. 60 tablet 5     Multiple Vitamin (MULTIVITAMIN OR) Take 1 tablet by mouth daily.       order for DME Nebulizer 1 Units 0     order for DME Equipment being ordered: BIPAP Refugio P Kennedy received a Resmed AirCurve 10 Bilevel. Pressures were set at 23/14 cm H2O.       ORDER FOR DME Auto-BiPAP:  IPAP max 21 cm H2O  EPAP min 15 cm H2O  Pressure support 5 cm  Changed in clinic  Lifetime need and heated humidity.           oxyCODONE (ROXICODONE) 5 mg Take 1 tablet (5 mg) by mouth every 4 hours as needed for moderate to severe pain 8 tablet 0     simvastatin (ZOCOR) 10 MG tablet TAKE ONE TABLET BY MOUTH AT BEDTIME 90 tablet 3     topiramate (TOPAMAX) 50 MG tablet Take 4 tablets (200 mg) by mouth 2 times daily 240 tablet 0     triamcinolone (KENALOG) 0.1 % cream Apply sparingly to affected area two times daily as needed 30 g 2     No Known Allergies  Labs reviewed in EPIC    Reviewed and updated as needed this visit by clinical staff  Tobacco  Allergies  Meds  Problems       Reviewed and updated as needed this visit by Provider  Allergies  Meds  Problems          ROS:  Constitutional, HEENT, cardiovascular, pulmonary, GI, , musculoskeletal, neuro, skin, endocrine and psych systems are negative, except as otherwise noted.    OBJECTIVE:     /72  Pulse 82  Temp 98.1  F (36.7  C)  Resp 30  Wt (!) 403 lb 8 oz (183 kg)  SpO2 94%  BMI 62.26 kg/m2  Body mass index is 62.26 kg/(m^2).   GENERAL: healthy, alert and no distress, nontoxic in appearance, on oxygen NC  EYES: Eyes grossly normal to inspection, PERRL and conjunctivae and sclerae normal  HENT: normocephalic  NECK: supple with full ROM  RESP: lungs clear to auscultation - no rales, rhonchi or wheezes  CV: regular rate and rhythm, normal S1 S2, no S3 or S4, no murmur, click or rub, no peripheral edema and peripheral pulses strong  ABDOMEN: soft, nontender body habitus too large to  Accurately assess masses or megaly  MS: no gross musculoskeletal defects noted, no edema  Left lateral thigh has no redness and looks like it is improving but he still has discomfort that comes and goes.    Diagnostic Test Results:  No results found for this or any previous visit (from the past 24 hour(s)).    ASSESSMENT/PLAN:   Pt has never seen a vascular surgeon for his legs. Will refer  Problem List Items Addressed This Visit     None      Visit Diagnoses     Varicose veins of both lower extremities, unspecified whether complicated    -  Primary    Relevant Orders    VASCULAR SURGERY REFERRAL    Thrombophlebitis of superficial veins of left lower extremity        Relevant Orders    VASCULAR SURGERY REFERRAL               Patient Instructions     Call and set up appointment with vascular surgery.    If your symptoms worsen before that appointment follow up with your primary care provider.    Follow-up with your primary care provider next week and as needed.    Indications for emergent return to emergency department discussed with patient, who verbalized good understanding and agreement.  Patient understands the limitations of  today's evaluation.         Varicose Veins     Varicose veins are swollen, enlarged veins most often found in the legs. They are usually blue or purple in color and may bulge, twist, and stand out under the skin.  Normally, veins return blood from the body to the heart. The leg veins have one-way valves that prevent blood from flowing backward in the vein. When the valves are weak or damaged, blood backs up in the veins. This may cause some of the veins to swell and bulge and become varicose veins.  Symptoms  Varicose veins may or may not cause symptoms. If symptoms do occur, they can include:    Legs that feel tired, achy, heavy, or itchy    Leg muscle cramps    Skin changes, such as discoloration, dryness, redness, or rash (in more severe cases, you may also have sores on the skin called venous leg ulcers)  Risk factors  There are a number of factors that increase the risk for varicose veins. These can include:    Being a woman    Being older    Sitting or standing for long periods    Being overweight    Being pregnant    Having a family history of varicose veins  Treatment starts with simple self-help measures (see below). If these don t help, there are many procedures that can be done to shrink or remove varicose veins. Your healthcare provider can tell you more about these options, if needed.  Home care    Support or compression stockings will likely be prescribed. If so, be sure to wear them as directed. They may help improve blood flow.    Exercising helps strengthen your leg muscles and improve blood flow. To get the most benefit, choose exercises such as walking, swimming, or cycling. Also try to exercise for at least 30 minutes on most days.    Raising (elevating) your legs lets gravity help blood flow back to the heart. Sit or lie with your feet above heart level a few times throughout the day, or as directed.    Don't sit or stand for long periods. Change positions often. Also, move your ankles, toes and  knees often. This may also help improve blood flow.    If you are overweight, talk with your healthcare provider about setting up a weight-loss plan. Maintaining a healthy weight can help reduce the strain on your veins. It may also improve symptoms, such as swelling and aching.    If you have dryness and itching, ask your provider about special lotions that can be applied to the skin to help improve symptoms.  Follow-up care  Follow up with your healthcare provider, or as directed. If imaging tests were done, you ll be told the results and if there are any new findings that affect your care.  When to seek medical advice  Call your healthcare provider right away if any of these occur:    Sudden, severe leg swelling, pain, or redness    Symptoms worsen, or they don t improve with self-care    Bleeding from any affected veins    Ulcers form on the legs, ankles, or feet    Fever of 100.4 F (38 C) or higher, or as advised by your provider   Date Last Reviewed: 4/1/2018 2000-2018 [employer]. [address] [place] [address]. All rights reserved. This information is not intended as a substitute for professional medical care. Always follow your healthcare professional's instructions.        Superficial Thrombophlebitis   The superficial veins are the veins near the surface of the skin. Superficial thrombophlebitis is a problem that occurs when one or more of these veins become red, irritated, and swollen. This is most often because of a blood clot.  Causes  The problem may occur after injury to a vein. It may also occur after having an intravenous (IV) line placed. Other factors that can make the problem more likely include:    Varicose veins    Venous insufficiency    Bleeding disorders    Prolonged periods of rest and not moving around    IV drug abuse    Pregnancy    Use of birth control pills or estrogen therapy  Symptoms  Symptoms may appear in the affected area. They can  include:    Pain    Tenderness    Redness    Warmth    Swelling    Hardening of the vein  In most cases, superficial thrombophlebitis resolves on its own with no problems. Treatment is focused on relieving symptoms.  Sometimes, there is a risk that the deep veins in the body may also be involved. This can lead to more serious problems. In such cases, further testing and treatments may be needed. Your healthcare provider can tell you more about this.  Home care  To help relieve pain and swelling, you may be told to:    Apply heat or cold to the affected area. Do this for up to 10 minutes as often as directed.  ? Heat: Use a warm compress, such as a heating pad.  ? Cold: Use a cold compress, such as a cold pack or bag of ice wrapped in a thin towel.    Take nonsteroidal anti-inflammatory drugs (NSAIDS), such as ibuprofen. In some cases, other pain medicines may be prescribed.    Keep the affected limb (arm or leg) raised above heart level as directed.    Wear elastic compression stockings or bandages as directed.    Don't sit or stand for long periods. Get up and walk often.  To help treat a blood clot, a blood thinner (anticoagulant) may be prescribed. If this is needed, be sure to take the medicine exactly as directed.  Follow-up care  Follow up with your healthcare provider as advised. If imaging tests are done, they will be reviewed by a doctor. You ll be told the results and any new findings that may affect your treatment.  When to seek medical advice  Call your healthcare provider right away if any of these occur:    Fever of 100.4 F (38 C) or higher, or as directed by your healthcare provider    Increasing pain, swelling, or tenderness in the affected area    Spreading warmth or redness in the affected area  Call 911  Call 911 if any of these occur:    Trouble breathing    Chest pain or discomfort that worsens with deep breathing or coughing    Coughing (may cough up blood)    Fast or irregular  heartbeat    Sweating    Anxiety    Lightheadedness, dizziness, or fainting    Extreme confusion    Extreme drowsiness or trouble waking up    New pain in the chest, arm, shoulder, neck, or upper back   Date Last Reviewed: 4/1/2018 2000-2018 [employer]. [address] [place] [address]. All rights reserved. This information is not intended as a substitute for professional medical care. Always follow your healthcare professional's instructions.            NANCY Gallagher [facility]

## 2018-11-08 NOTE — PATIENT INSTRUCTIONS
Call and set up appointment with vascular surgery.    If your symptoms worsen before that appointment follow up with your primary care provider.    Follow-up with your primary care provider next week and as needed.    Indications for emergent return to emergency department discussed with patient, who verbalized good understanding and agreement.  Patient understands the limitations of today's evaluation.         Varicose Veins     Varicose veins are swollen, enlarged veins most often found in the legs. They are usually blue or purple in color and may bulge, twist, and stand out under the skin.  Normally, veins return blood from the body to the heart. The leg veins have one-way valves that prevent blood from flowing backward in the vein. When the valves are weak or damaged, blood backs up in the veins. This may cause some of the veins to swell and bulge and become varicose veins.  Symptoms  Varicose veins may or may not cause symptoms. If symptoms do occur, they can include:    Legs that feel tired, achy, heavy, or itchy    Leg muscle cramps    Skin changes, such as discoloration, dryness, redness, or rash (in more severe cases, you may also have sores on the skin called venous leg ulcers)  Risk factors  There are a number of factors that increase the risk for varicose veins. These can include:    Being a woman    Being older    Sitting or standing for long periods    Being overweight    Being pregnant    Having a family history of varicose veins  Treatment starts with simple self-help measures (see below). If these don t help, there are many procedures that can be done to shrink or remove varicose veins. Your healthcare provider can tell you more about these options, if needed.  Home care    Support or compression stockings will likely be prescribed. If so, be sure to wear them as directed. They may help improve blood flow.    Exercising helps strengthen your leg muscles and improve blood flow. To get the most  benefit, choose exercises such as walking, swimming, or cycling. Also try to exercise for at least 30 minutes on most days.    Raising (elevating) your legs lets gravity help blood flow back to the heart. Sit or lie with your feet above heart level a few times throughout the day, or as directed.    Don't sit or stand for long periods. Change positions often. Also, move your ankles, toes and knees often. This may also help improve blood flow.    If you are overweight, talk with your healthcare provider about setting up a weight-loss plan. Maintaining a healthy weight can help reduce the strain on your veins. It may also improve symptoms, such as swelling and aching.    If you have dryness and itching, ask your provider about special lotions that can be applied to the skin to help improve symptoms.  Follow-up care  Follow up with your healthcare provider, or as directed. If imaging tests were done, you ll be told the results and if there are any new findings that affect your care.  When to seek medical advice  Call your healthcare provider right away if any of these occur:    Sudden, severe leg swelling, pain, or redness    Symptoms worsen, or they don t improve with self-care    Bleeding from any affected veins    Ulcers form on the legs, ankles, or feet    Fever of 100.4 F (38 C) or higher, or as advised by your provider   Date Last Reviewed: 4/1/2018 2000-2018 [employer]. [address] [place] [address]. All rights reserved. This information is not intended as a substitute for professional medical care. Always follow your healthcare professional's instructions.        Superficial Thrombophlebitis   The superficial veins are the veins near the surface of the skin. Superficial thrombophlebitis is a problem that occurs when one or more of these veins become red, irritated, and swollen. This is most often because of a blood clot.  Causes  The problem may occur after injury to a vein. It may  also occur after having an intravenous (IV) line placed. Other factors that can make the problem more likely include:    Varicose veins    Venous insufficiency    Bleeding disorders    Prolonged periods of rest and not moving around    IV drug abuse    Pregnancy    Use of birth control pills or estrogen therapy  Symptoms  Symptoms may appear in the affected area. They can include:    Pain    Tenderness    Redness    Warmth    Swelling    Hardening of the vein  In most cases, superficial thrombophlebitis resolves on its own with no problems. Treatment is focused on relieving symptoms.  Sometimes, there is a risk that the deep veins in the body may also be involved. This can lead to more serious problems. In such cases, further testing and treatments may be needed. Your healthcare provider can tell you more about this.  Home care  To help relieve pain and swelling, you may be told to:    Apply heat or cold to the affected area. Do this for up to 10 minutes as often as directed.  ? Heat: Use a warm compress, such as a heating pad.  ? Cold: Use a cold compress, such as a cold pack or bag of ice wrapped in a thin towel.    Take nonsteroidal anti-inflammatory drugs (NSAIDS), such as ibuprofen. In some cases, other pain medicines may be prescribed.    Keep the affected limb (arm or leg) raised above heart level as directed.    Wear elastic compression stockings or bandages as directed.    Don't sit or stand for long periods. Get up and walk often.  To help treat a blood clot, a blood thinner (anticoagulant) may be prescribed. If this is needed, be sure to take the medicine exactly as directed.  Follow-up care  Follow up with your healthcare provider as advised. If imaging tests are done, they will be reviewed by a doctor. You ll be told the results and any new findings that may affect your treatment.  When to seek medical advice  Call your healthcare provider right away if any of these occur:    Fever of 100.4 F (38 C) or  higher, or as directed by your healthcare provider    Increasing pain, swelling, or tenderness in the affected area    Spreading warmth or redness in the affected area  Call 911  Call 911 if any of these occur:    Trouble breathing    Chest pain or discomfort that worsens with deep breathing or coughing    Coughing (may cough up blood)    Fast or irregular heartbeat    Sweating    Anxiety    Lightheadedness, dizziness, or fainting    Extreme confusion    Extreme drowsiness or trouble waking up    New pain in the chest, arm, shoulder, neck, or upper back   Date Last Reviewed: 4/1/2018 2000-2018 [employer]. [address] [place] [address]. All rights reserved. This information is not intended as a substitute for professional medical care. Always follow your healthcare professional's instructions.

## 2018-11-08 NOTE — TELEPHONE ENCOUNTER
"Referral received via EPIC \"in box\", per  guidelines referral forwarded to [facility].     Bisi Vera, NERYN, RN    "

## 2018-11-08 NOTE — MR AVS SNAPSHOT
After Visit Summary   11/8/2018    Refugio Kennedy    MRN: [id number]           Patient Information     Date Of Birth          1975        Visit Information        Provider Department      11/8/2018 2:40 PM Mimi Talbert APRN [facility]        Today's Diagnoses     Varicose veins of both lower extremities, unspecified whether complicated    -  1    Thrombophlebitis of superficial veins of left lower extremity          Care Instructions    Call and set up appointment with vascular surgery.    If your symptoms worsen before that appointment follow up with your primary care provider.    Follow-up with your primary care provider next week and as needed.    Indications for emergent return to emergency department discussed with patient, who verbalized good understanding and agreement.  Patient understands the limitations of today's evaluation.         Varicose Veins     Varicose veins are swollen, enlarged veins most often found in the legs. They are usually blue or purple in color and may bulge, twist, and stand out under the skin.  Normally, veins return blood from the body to the heart. The leg veins have one-way valves that prevent blood from flowing backward in the vein. When the valves are weak or damaged, blood backs up in the veins. This may cause some of the veins to swell and bulge and become varicose veins.  Symptoms  Varicose veins may or may not cause symptoms. If symptoms do occur, they can include:    Legs that feel tired, achy, heavy, or itchy    Leg muscle cramps    Skin changes, such as discoloration, dryness, redness, or rash (in more severe cases, you may also have sores on the skin called venous leg ulcers)  Risk factors  There are a number of factors that increase the risk for varicose veins. These can include:    Being a woman    Being older    Sitting or standing for long periods    Being overweight    Being pregnant    Having a family history of  varicose veins  Treatment starts with simple self-help measures (see below). If these don t help, there are many procedures that can be done to shrink or remove varicose veins. Your healthcare provider can tell you more about these options, if needed.  Home care    Support or compression stockings will likely be prescribed. If so, be sure to wear them as directed. They may help improve blood flow.    Exercising helps strengthen your leg muscles and improve blood flow. To get the most benefit, choose exercises such as walking, swimming, or cycling. Also try to exercise for at least 30 minutes on most days.    Raising (elevating) your legs lets gravity help blood flow back to the heart. Sit or lie with your feet above heart level a few times throughout the day, or as directed.    Don't sit or stand for long periods. Change positions often. Also, move your ankles, toes and knees often. This may also help improve blood flow.    If you are overweight, talk with your healthcare provider about setting up a weight-loss plan. Maintaining a healthy weight can help reduce the strain on your veins. It may also improve symptoms, such as swelling and aching.    If you have dryness and itching, ask your provider about special lotions that can be applied to the skin to help improve symptoms.  Follow-up care  Follow up with your healthcare provider, or as directed. If imaging tests were done, you ll be told the results and if there are any new findings that affect your care.  When to seek medical advice  Call your healthcare provider right away if any of these occur:    Sudden, severe leg swelling, pain, or redness    Symptoms worsen, or they don t improve with self-care    Bleeding from any affected veins    Ulcers form on the legs, ankles, or feet    Fever of 100.4 F (38 C) or higher, or as advised by your provider   Date Last Reviewed: 4/1/2018 2000-2018 [employer]. [address] [place] [address], [place] [address]. All  rights reserved. This information is not intended as a substitute for professional medical care. Always follow your healthcare professional's instructions.        Superficial Thrombophlebitis   The superficial veins are the veins near the surface of the skin. Superficial thrombophlebitis is a problem that occurs when one or more of these veins become red, irritated, and swollen. This is most often because of a blood clot.  Causes  The problem may occur after injury to a vein. It may also occur after having an intravenous (IV) line placed. Other factors that can make the problem more likely include:    Varicose veins    Venous insufficiency    Bleeding disorders    Prolonged periods of rest and not moving around    IV drug abuse    Pregnancy    Use of birth control pills or estrogen therapy  Symptoms  Symptoms may appear in the affected area. They can include:    Pain    Tenderness    Redness    Warmth    Swelling    Hardening of the vein  In most cases, superficial thrombophlebitis resolves on its own with no problems. Treatment is focused on relieving symptoms.  Sometimes, there is a risk that the deep veins in the body may also be involved. This can lead to more serious problems. In such cases, further testing and treatments may be needed. Your healthcare provider can tell you more about this.  Home care  To help relieve pain and swelling, you may be told to:    Apply heat or cold to the affected area. Do this for up to 10 minutes as often as directed.  ? Heat: Use a warm compress, such as a heating pad.  ? Cold: Use a cold compress, such as a cold pack or bag of ice wrapped in a thin towel.    Take nonsteroidal anti-inflammatory drugs (NSAIDS), such as ibuprofen. In some cases, other pain medicines may be prescribed.    Keep the affected limb (arm or leg) raised above heart level as directed.    Wear elastic compression stockings or bandages as directed.    Don't sit or stand for long periods. Get up and walk  often.  To help treat a blood clot, a blood thinner (anticoagulant) may be prescribed. If this is needed, be sure to take the medicine exactly as directed.  Follow-up care  Follow up with your healthcare provider as advised. If imaging tests are done, they will be reviewed by a doctor. You ll be told the results and any new findings that may affect your treatment.  When to seek medical advice  Call your healthcare provider right away if any of these occur:    Fever of 100.4 F (38 C) or higher, or as directed by your healthcare provider    Increasing pain, swelling, or tenderness in the affected area    Spreading warmth or redness in the affected area  Call 911  Call 911 if any of these occur:    Trouble breathing    Chest pain or discomfort that worsens with deep breathing or coughing    Coughing (may cough up blood)    Fast or irregular heartbeat    Sweating    Anxiety    Lightheadedness, dizziness, or fainting    Extreme confusion    Extreme drowsiness or trouble waking up    New pain in the chest, arm, shoulder, neck, or upper back   Date Last Reviewed: 4/1/2018 2000-2018 [employer]. [address] [place] [address]. All rights reserved. This information is not intended as a substitute for professional medical care. Always follow your healthcare professional's instructions.                Follow-ups after your visit        Additional Services     VASCULAR SURGERY REFERRAL       Your provider has referred you to: [facility]: [facility] - Vascular  Services [phone] - Varicose Veins and Other superficial thrombophlebitis    [email or web address]    Please be aware that coverage of these services is subject to the terms and limitations of your health insurance plan.  Call member services at your health plan with any benefit or coverage questions.      Please bring the following with you to your appointment:    (1) Any X-Rays, CTs  or MRIs which have been performed.  Contact the facility where they were done to arrange for  prior to your scheduled appointment.    (2) List of current medications   (3) This referral request   (4) Any documents/labs given to you for this referral                  Follow-up notes from your care team     See patient instructions section of the AVS Return in about 1 week (around 11/15/2018) for Follow up with your specialist.      Your next 10 appointments already scheduled     Nov 26, 2018  9:00 AM CST   Diabetes Education with Violet Bowen RD   [facility] ([facility])    [address] [place] [id number] [phone]            Dec 03, 2018  9:15 AM CST   (Arrive by 9:00 AM)   Return Weight Management Visit with Ashish Paez MD   [facility] ([facility])    [address]  [address] [facility] [address] [phone]              Who to contact     If you have questions or need follow up information about today's clinic visit or your schedule please contact [facility] directly at [phone].  Normal or non-critical lab and imaging results will be communicated to you by MyChart, letter or phone within 4 business days after the clinic has received the results. If you do not hear from us within 7 days, please contact the clinic through MyChart or phone. If you have a critical or abnormal lab result, we will notify you by phone as soon as possible.  Submit refill requests through [employer] or call your pharmacy and they will forward the refill request to us. Please allow 3 business days for your refill to be completed.          Additional Information About Your Visit        MyChart Information     [employer] gives you secure access to your electronic health record. If you see a primary care provider, you can also send messages to your care team and make appointments. If you  have questions, please call your primary care clinic.  If you do not have a primary care provider, please call [phone] and they will assist you.        Care EveryWhere ID     This is your Care EveryWhere ID. This could be used by other organizations to access your [place] medical records  [id number]        Your Vitals Were     Pulse Temperature Respirations Pulse Oximetry BMI (Body Mass Index)       82 98.1  F (36.7  C) 30 94% 62.26 kg/m2        Blood Pressure from Last 3 Encounters:   11/08/18 122/72   10/29/18 121/83   10/23/18 121/76    Weight from Last 3 Encounters:   11/08/18 (!) 403 lb 8 oz (183 kg)   10/29/18 (!) 401 lb (181.9 kg)   10/29/18 (!) 401 lb (181.9 kg)              We Performed the Following     VASCULAR SURGERY REFERRAL        Primary Care Provider Office Phone # Fax #    Erika Espino [phone] [address] [place] [address]        Goals        Exercise    Exercise 3x per week (30 min per time)     Notes - Note edited  10/29/2018  9:13 AM by Violet Bowen RD    Goal Statement: I will go to the gym 2 times a week    Measure of Success: 2 times a week getting to gym, renew membership    Strengths: motivated to lose wt and he knows activity really helps  Ideas to overcome barriers: time: set days and times and money and hernia issue  GO before cards two days a week.  Date to Achieve By: next month        Equal Access to Services     RICARDO HALL AH: Hadii rickie fongo Sobryan, waaxda luqadaha, qaybta kaalmada consuelo saravia . So [facility] [phone].    ATENCIÓN: Si habla aidaañol, tiene a clinton disposición servicios gratuitos de asistencia lingüística. Llame al [phone].    We comply with applicable federal civil rights laws and [place] laws. We do not discriminate on the basis of race, color, national origin, age, disability, sex, sexual orientation, or gender identity.            Thank you!     Thank you for  choosing [facility]  for your care. Our goal is always to provide you with excellent care. Hearing back from our patients is one way we can continue to improve our services. Please take a few minutes to complete the written survey that you may receive in the mail after your visit with us. Thank you!             Your Updated Medication List - Protect others around you: Learn how to safely use, store and throw away your medicines at www.disposemymeds.org.          This list is accurate as of 11/8/18  3:39 PM.  Always use your most recent med list.                   Brand Name Dispense Instructions for use Diagnosis    * albuterol 108 (90 Base) MCG/ACT inhaler    PROAIR HFA    1 Inhaler    Inhale 2 puffs into the lungs every 4 hours as needed for shortness of breath / dyspnea    Mild intermittent asthma without complication       * albuterol (2.5 MG/3ML) 0.083% neb solution     25 vial    Take 1 vial (2.5 mg) by nebulization every 4 hours as needed for shortness of breath / dyspnea or wheezing    Moderate persistent asthma with exacerbation       aspirin 81 MG tablet     90 tablet    Take 1 tablet by mouth daily.    Type 2 diabetes, HbA1C goal < 8% (H)       blood glucose monitoring test strip    ACCU-CHEK CHRIS PLUS    200 each    USE TO TEST BLOOD SUGARS THREE TIMES A DAY AS DIRECTED    Diabetes mellitus without complication (H)       cetirizine 10 MG tablet    zyrTEC    90 tablet    TAKE ONE TABLET (10 MG) BY MOUTH EVERY EVENING    Allergic rhinitis due to pollen       * DIABETIC STERILE LANCETS device     1 Box    1 Device 3 times daily.    Type 2 diabetes, HbA1C goal < 8% (H)       * blood glucose monitoring lancets     300 each    TEST THREE TIMES A DAY OR AS DIRECTED    Type 2 diabetes, HbA1C goal < 8% (H)       fluticasone 50 MCG/ACT spray    FLONASE    16 g    Spray 1-2 sprays into both nostrils daily    Acute sinusitis with symptoms > 10 days       furosemide 20 MG tablet    LASIX    360  tablet    TAKE TWO TABLETS BY MOUTH TWICE A DAY    Venous stasis       insulin pen needle 32G X 4 MM    BD SEVERO U/F    100 each    Use 1 pen needles daily or as directed.    Type 2 diabetes mellitus without complications (H)       liraglutide 18 MG/3ML soln    VICTOZA PEN    9 mL    INJECT 1.8MG UNDER THE SKIN ONCE DAILY    Type 2 diabetes mellitus without complication, without long-term current use of insulin (H)       lisinopril 5 MG tablet    PRINIVIL/ZESTRIL    90 tablet    TAKE ONE TABLET BY MOUTH EVERY DAY    Pulmonary hypertension (H)       metFORMIN 500 MG 24 hr tablet    GLUCOPHAGE-XR    90 tablet    TAKE ONE TABLET BY MOUTH EVERY DAY WITH BREAKFAST (NEED TO KEEP APPOINTMENT WITH DR. SANCHES END OF NOVEMBER)    Methamphetamine abuse in remission (H)       modafinil 200 MG tablet    PROVIGIL    60 tablet    Take 1/2 tablet by mouth 3-4 times daily as needed for sleepiness.    Obstructive sleep apnea-hypopnea syndrome       MULTIVITAMIN PO      Take 1 tablet by mouth daily.        order for DME      Equipment being ordered: BIPAP Refugio P Kennedy received a Resmed AirCurve 10 Bilevel. Pressures were set at 23/14 cm H2O.        order for DME      Auto-BiPAP: IPAP max 21 cm H2O EPAP min 15 cm H2O Pressure support 5 cm Changed in clinic Lifetime need and heated humidity.    FARZANA (obstructive sleep apnea)       order for DME     1 Units    Nebulizer    Moderate persistent asthma with exacerbation       oxyCODONE 5 mg    ROXICODONE    8 tablet    Take 1 tablet (5 mg) by mouth every 4 hours as needed for moderate to severe pain        simvastatin 10 MG tablet    ZOCOR    90 tablet    TAKE ONE TABLET BY MOUTH AT BEDTIME    Hyperlipidemia LDL goal <100       topiramate 50 MG tablet    TOPAMAX    240 tablet    Take 4 tablets (200 mg) by mouth 2 times daily    Morbid obesity due to excess calories (H)       triamcinolone 0.1 % cream    KENALOG    30 g    Apply sparingly to affected area two times daily as needed    Venous  stasis       * Notice:  This list has 4 medication(s) that are the same as other medications prescribed for you. Read the directions carefully, and ask your doctor or other care provider to review them with you.

## 2018-11-24 DIAGNOSIS — I27.20 PULMONARY HYPERTENSION (H): ICD-10-CM

## 2018-11-24 NOTE — TELEPHONE ENCOUNTER
"Requested Prescriptions   Pending Prescriptions Disp Refills     lisinopril (PRINIVIL/ZESTRIL) 5 MG tablet  Last Written Prescription Date:  4/18/18  Last Fill Quantity: 90,  # refills: 1   Last office visit: 7/24/2018 with prescribing provider:  JERMAINE Espino   Future Office Visit:     90 tablet 1     Sig: Take 1 tablet (5 mg) by mouth daily    ACE Inhibitors (Including Combos) Protocol Passed    11/24/2018 12:44 PM       Passed - Blood pressure under 140/90 in past 12 months    BP Readings from Last 3 Encounters:   11/08/18 122/72   10/29/18 121/83   10/23/18 121/76                Passed - Recent (12 mo) or future (30 days) visit within the authorizing provider's specialty    Patient had office visit in the last 12 months or has a visit in the next 30 days with authorizing provider or within the authorizing provider's specialty.  See \"Patient Info\" tab in inbasket, or \"Choose Columns\" in Meds & Orders section of the refill encounter.             Passed - Patient is age 18 or older       Passed - Normal serum creatinine on file in past 12 months    Recent Labs   Lab Test  09/16/18   1631   CR  0.99            Passed - Normal serum potassium on file in past 12 months    Recent Labs   Lab Test  09/16/18   1631   POTASSIUM  3.6               "

## 2018-11-26 ENCOUNTER — ALLIED HEALTH/NURSE VISIT (OUTPATIENT)
Dept: FAMILY MEDICINE | Facility: CLINIC | Age: 43
End: 2018-11-26
Payer: COMMERCIAL

## 2018-11-26 ENCOUNTER — ALLIED HEALTH/NURSE VISIT (OUTPATIENT)
Dept: EDUCATION SERVICES | Facility: CLINIC | Age: 43
End: 2018-11-26
Payer: COMMERCIAL

## 2018-11-26 VITALS — BODY MASS INDEX: 62.09 KG/M2 | WEIGHT: 315 LBS

## 2018-11-26 DIAGNOSIS — E11.9 TYPE 2 DIABETES MELLITUS WITHOUT COMPLICATION, WITHOUT LONG-TERM CURRENT USE OF INSULIN (H): Primary | ICD-10-CM

## 2018-11-26 DIAGNOSIS — Z53.9 DIAGNOSIS NOT YET DEFINED: Primary | ICD-10-CM

## 2018-11-26 DIAGNOSIS — F15.11 METHAMPHETAMINE ABUSE IN REMISSION (H): ICD-10-CM

## 2018-11-26 PROCEDURE — G0108 DIAB MANAGE TRN  PER INDIV: HCPCS | Performed by: DIETITIAN, REGISTERED

## 2018-11-26 PROCEDURE — 99207 ZZC NO CHARGE NURSE ONLY: CPT

## 2018-11-26 RX ORDER — METFORMIN HCL 500 MG
TABLET, EXTENDED RELEASE 24 HR ORAL
Qty: 90 TABLET | Refills: 1 | Status: SHIPPED | OUTPATIENT
Start: 2018-11-26 | End: 2019-05-23

## 2018-11-26 RX ORDER — LISINOPRIL 5 MG/1
5 TABLET ORAL DAILY
Qty: 90 TABLET | Refills: 1 | Status: SHIPPED | OUTPATIENT
Start: 2018-11-26 | End: 2019-05-23

## 2018-11-26 NOTE — MR AVS SNAPSHOT
After Visit Summary   11/26/2018    Refugio Kennedy    MRN: [id number]           Patient Information     Date Of Birth          1975        Visit Information        Provider Department      11/26/2018 9:00 AM Violet Bowen RD [facility]        Care Instructions    1. Check on the insurance to see when the discount would kick in for 20 dollars.  GET TO THE GYM.    2. Work on snack packs.  Veggies, clementines, apples.  Some type of fruits/veggies.    3.  Buy more veggies.            Follow-ups after your visit        Your next 10 appointments already scheduled     Dec 03, 2018  9:15 AM CST   (Arrive by 9:00 AM)   Return Weight Management Visit with Ashish Paez MD   [facility] ([facility] [place])    [address] [place] [id number] [phone]            Jan 07, 2019  8:00 AM CST   Diabetes Education with Violet Bowen RD   [facility] ([facility])    [address] [address] [phone]              Who to contact     If you have questions or need follow up information about today's clinic visit or your schedule please contact [facility] directly at [phone].  Normal or non-critical lab and imaging results will be communicated to you by MyChart, letter or phone within 4 business days after the clinic has received the results. If you do not hear from us within 7 days, please contact the clinic through MyChart or phone. If you have a critical or abnormal lab result, we will notify you by phone as soon as possible.  Submit refill requests through [employer] or call your pharmacy and they will forward the refill request to us. Please allow 3 business days for your refill to be completed.          Additional Information About Your Visit        [employer] Information     [employer] gives you secure access to your  electronic health record. If you see a primary care provider, you can also send messages to your care team and make appointments. If you have questions, please call your primary care clinic.  If you do not have a primary care provider, please call [phone] and they will assist you.        Care EveryWhere ID     This is your Care EveryWhere ID. This could be used by other organizations to access your [place] medical records  [id number]        Your Vitals Were     BMI (Body Mass Index)                   62.09 kg/m2            Blood Pressure from Last 3 Encounters:   11/08/18 122/72   10/29/18 121/83   10/23/18 121/76    Weight from Last 3 Encounters:   11/26/18 (!) 182.5 kg (402 lb 6.4 oz)   11/08/18 (!) 183 kg (403 lb 8 oz)   10/29/18 (!) 181.9 kg (401 lb)              Today, you had the following     No orders found for display       Primary Care Provider Office Phone # Fax #    Erika Espino [phone] [address] [place] [id number]        Goals        Exercise    Exercise 3x per week (30 min per time)     Notes - Note edited  10/29/2018  9:13 AM by Violet Bowen RD    Goal Statement: I will go to the gym 2 times a week    Measure of Success: 2 times a week getting to gym, renew membership    Strengths: motivated to lose wt and he knows activity really helps  Ideas to overcome barriers: time: set days and times and money and hernia issue  GO before cards two days a week.  Date to Achieve By: next month        Equal Access to Services     RICARDO HALL AH: Hadii aad ku hadasho Sorachelali, waaxda luqadaha, qaybta kaalmada adeegyada, consuelo winter. So [facility] [phone].    ATENCIÓN: Si habla español, tiene a clinton disposición servicios gratuitos de asistencia lingüística. Llame al [phone].    We comply with applicable federal civil rights laws and [place] laws. We do not discriminate on the basis of race, color, national origin, age, disability,  sex, sexual orientation, or gender identity.            Thank you!     Thank you for choosing [facility]  for your care. Our goal is always to provide you with excellent care. Hearing back from our patients is one way we can continue to improve our services. Please take a few minutes to complete the written survey that you may receive in the mail after your visit with us. Thank you!             Your Updated Medication List - Protect others around you: Learn how to safely use, store and throw away your medicines at www.disposemymeds.org.          This list is accurate as of 11/26/18  9:30 AM.  Always use your most recent med list.                   Brand Name Dispense Instructions for use Diagnosis    * albuterol 108 (90 Base) MCG/ACT inhaler    PROAIR HFA    1 Inhaler    Inhale 2 puffs into the lungs every 4 hours as needed for shortness of breath / dyspnea    Mild intermittent asthma without complication       * albuterol (2.5 MG/3ML) 0.083% neb solution    PROVENTIL    25 vial    Take 1 vial (2.5 mg) by nebulization every 4 hours as needed for shortness of breath / dyspnea or wheezing    Moderate persistent asthma with exacerbation       aspirin 81 MG tablet    ASA    90 tablet    Take 1 tablet by mouth daily.    Type 2 diabetes, HbA1C goal < 8% (H)       blood glucose monitoring test strip    ACCU-CHEK CHRIS PLUS    200 each    USE TO TEST BLOOD SUGARS THREE TIMES A DAY AS DIRECTED    Diabetes mellitus without complication (H)       cetirizine 10 MG tablet    zyrTEC    90 tablet    TAKE ONE TABLET (10 MG) BY MOUTH EVERY EVENING    Allergic rhinitis due to pollen       * DIABETIC STERILE LANCETS device     1 Box    1 Device 3 times daily.    Type 2 diabetes, HbA1C goal < 8% (H)       * blood glucose monitoring lancets     300 each    TEST THREE TIMES A DAY OR AS DIRECTED    Type 2 diabetes, HbA1C goal < 8% (H)       fluticasone 50 MCG/ACT spray    FLONASE    16 g    Spray 1-2 sprays into  both nostrils daily    Acute sinusitis with symptoms > 10 days       furosemide 20 MG tablet    LASIX    360 tablet    TAKE TWO TABLETS BY MOUTH TWICE A DAY    Venous stasis       insulin pen needle 32G X 4 MM miscellaneous    BD SEVERO U/F    100 each    Use 1 pen needles daily or as directed.    Type 2 diabetes mellitus without complications (H)       liraglutide 18 MG/3ML solution    VICTOZA PEN    9 mL    INJECT 1.8MG UNDER THE SKIN ONCE DAILY    Type 2 diabetes mellitus without complication, without long-term current use of insulin (H)       lisinopril 5 MG tablet    PRINIVIL/ZESTRIL    90 tablet    Take 1 tablet (5 mg) by mouth daily    Pulmonary hypertension (H)       metFORMIN 500 MG 24 hr tablet    GLUCOPHAGE-XR    90 tablet    TAKE ONE TABLET BY MOUTH EVERY DAY WITH BREAKFAST (NEED TO KEEP APPOINTMENT WITH DR. SANCHES END OF NOVEMBER)    Methamphetamine abuse in remission (H)       modafinil 200 MG tablet    PROVIGIL    60 tablet    Take 1/2 tablet by mouth 3-4 times daily as needed for sleepiness.    Obstructive sleep apnea-hypopnea syndrome       MULTIVITAMIN PO      Take 1 tablet by mouth daily.        order for DME      Equipment being ordered: BIPAP Refugio P Kennedy received a [employer] AirCurve 10 Bilevel. Pressures were set at 23/14 cm H2O.        order for DME      Auto-BiPAP: IPAP max 21 cm H2O EPAP min 15 cm H2O Pressure support 5 cm Changed in clinic Lifetime need and heated humidity.    FARZANA (obstructive sleep apnea)       order for DME     1 Units    Nebulizer    Moderate persistent asthma with exacerbation       oxyCODONE 5 mg    ROXICODONE    8 tablet    Take 1 tablet (5 mg) by mouth every 4 hours as needed for moderate to severe pain        simvastatin 10 MG tablet    ZOCOR    90 tablet    TAKE ONE TABLET BY MOUTH AT BEDTIME    Hyperlipidemia LDL goal <100       topiramate 50 MG tablet    TOPAMAX    240 tablet    Take 4 tablets (200 mg) by mouth 2 times daily    Morbid obesity due to excess calories  (H)       triamcinolone 0.1 % cream    KENALOG    30 g    Apply sparingly to affected area two times daily as needed    Venous stasis       * Notice:  This list has 4 medication(s) that are the same as other medications prescribed for you. Read the directions carefully, and ask your doctor or other care provider to review them with you.

## 2018-11-26 NOTE — PATIENT INSTRUCTIONS
1. Check on the insurance to see when the discount would kick in for 20 dollars.  GET TO THE GYM.    2. Work on snack packs.  Veggies, clementines, apples.  Some type of fruits/veggies.    3.  Buy more veggies.

## 2018-11-26 NOTE — PROGRESS NOTES
Patient was seen by Violet Bowen and was wanting to schedule his diabetic follow up with Dr. Espino - Made appointment. Amira Restrepo MA

## 2018-11-26 NOTE — PROGRESS NOTES
"Diabetes Self-Management Education & Support    Diabetes Education Self Management & Training    SUBJECTIVE/OBJECTIVE:  Presents for: Individual review  Accompanied by: Self  Diabetes education in the past 24mo: Yes  Focus of Visit: Healthy Coping, Healthy Eating, Being Active  Diabetes type: Type 2  Disease course: Stable  How confident are you filling out medical forms by yourself:: Extremely  Transportation concerns: No  Other concerns:: None  Cultural Influences/Ethnic Background:  American      Diabetes Symptoms & Complications  Weight trend: Decreasing steadily  Peripheral neuropathy: Yes    Patient Problem List and Family Medical History reviewed for relevant medical history, current medical status, and diabetes risk factors.    Vitals:  Wt (!) 182.5 kg (402 lb 6.4 oz)  BMI 62.09 kg/m2  Estimated body mass index is 62.09 kg/(m^2) as calculated from the following:    Height as of 10/29/18: 1.715 m (5' 7.5\").    Weight as of this encounter: 182.5 kg (402 lb 6.4 oz).   Last 3 BP:   BP Readings from Last 3 Encounters:   11/08/18 122/72   10/29/18 121/83   10/23/18 121/76       History   Smoking Status     Former Smoker     Packs/day: 0.50     Years: 1.00     Types: Cigarettes, Cigars     Quit date: 5/21/2012   Smokeless Tobacco     Never Used       Labs:  Lab Results   Component Value Date    A1C 5.5 07/24/2018     Lab Results   Component Value Date     09/16/2018     Lab Results   Component Value Date    LDL 58 12/19/2017     HDL Cholesterol   Date Value Ref Range Status   12/19/2017 45 >39 mg/dL Final   ]  GFR Estimate   Date Value Ref Range Status   09/16/2018 82 >60 mL/min/1.7m2 Final     Comment:     Non  GFR Calc     GFR Estimate If Black   Date Value Ref Range Status   09/16/2018 >90 >60 mL/min/1.7m2 Final     Comment:      GFR Calc     Lab Results   Component Value Date    CR 0.99 09/16/2018     No results found for: MICROALBUMIN    Healthy Eating   still struggling " with eating stuff at cards and snacking at home.  We discussed this and how it has to be his decision to make the right choice, not anyone elses.  He will work on bringing clementines, carrots/celery to [facility] on so he has something to replace the junk others bring.    Being Active   He will go today and sign up for the gym, I will check with him later to see if he got it accomplished.    Monitoring           Taking Medications  Diabetes Medication(s)     Biguanides Sig    metFORMIN (GLUCOPHAGE-XR) 500 MG 24 hr tablet TAKE ONE TABLET BY MOUTH EVERY DAY WITH BREAKFAST (NEED TO KEEP APPOINTMENT WITH DR. SANCHES END OF NOVEMBER)    Incretin Mimetic Agents (GLP-1 Receptor Agonists) Sig    liraglutide (VICTOZA PEN) 18 MG/3ML soln INJECT 1.8MG UNDER THE SKIN ONCE DAILY               Problem Solving   working on dealing with why he struggles with eating, he will be working with this with therapist            Reducing Risks   not assessed    Healthy Coping     Patient Activation Measure Survey Score:  MARY Score (Last Two) 1/25/2011   MARY Raw Score 36   Activation Score 47.4   MARY Level 2       ASSESSMENT:  Wt up 1 lb from last review  Still not at gym, he will go sign up today, I will check later today with him  Buy $1 dollar frozen veggies at [facility]  Set up fruit or veggies snack bags for cards        Patient's most recent   Lab Results   Component Value Date    A1C 5.5 07/24/2018    is meeting goal of <7.0    INTERVENTION:   Diabetes knowledge and skills assessment:     Patient is knowledgeable in diabetes management concepts related to: Healthy Eating, Being Active, Monitoring and Taking Medication    Patient needs further education on the following diabetes management concepts: Healthy Eating, Being Active, Monitoring, Taking Medication, Problem Solving, Reducing Risks and Healthy Coping    Based on learning assessment above, most appropriate setting for further diabetes education would be: Individual setting.    Education  provided today on:  AADE Self-Care Behaviors:  Healthy Eating: consistency in amount, composition, and timing of food intake and snack choices  Being Active: relationship to blood glucose and describe appropriate activity program    Opportunities for ongoing education and support in diabetes-self management were discussed.    Pt verbalized understanding of concepts discussed and recommendations provided today.       Education Materials Provided:  No new materials provided today    PLAN:  See Patient Instructions for co-developed, patient-stated behavior change goals.  AVS printed and provided to patient today. See Follow-Up section for recommended follow-up.      Time Spent: 30 minutes  Encounter Type: Individual    Any diabetes medication dose changes were made via the CDE Protocol and Collaborative Practice Agreement with the patient's primary care provider. A copy of this encounter was shared with the provider.

## 2018-11-26 NOTE — MR AVS SNAPSHOT
After Visit Summary   11/26/2018    Refugio Kennedy    MRN: [id number]           Patient Information     Date Of Birth          1975        Visit Information        Provider Department      11/26/2018 10:00 AM [place] [facility]        Today's Diagnoses     DIAGNOSIS NOT YET DEFINED    -  1       Follow-ups after your visit        Your next 10 appointments already scheduled     Dec 03, 2018  9:15 AM CST   (Arrive by 9:00 AM)   Return Weight Management Visit with Ashish Paez MD   [facility] ([facility] [place])    [address] [facility]  [address] [facility] [address]   [phone]            Jan 07, 2019  8:00 AM CST   Diabetes Education with Violet Bowen RD   [facility] ([facility])    [address] [place] [id number] [phone]            Jan 14, 2019  3:20 PM CST   Office Visit with Erika Espino MD   [facility] ([facility])    [address] [place] [id number] [phone]           Bring a current list of meds and any records pertaining to this visit. For Physicals, please bring immunization records and any forms needing to be filled out. Please arrive 10 minutes early to complete paperwork.              Who to contact     If you have questions or need follow up information about today's clinic visit or your schedule please contact [facility] directly at [phone].  Normal or non-critical lab and imaging results will be communicated to you by MyChart, letter or phone within 4 business days after the clinic has received the results. If you do not hear from us within 7 days, please contact the clinic through MyChart or phone. If you have a critical or abnormal lab result, we will notify you by phone as soon as possible.  Submit refill requests through [employer] or call your pharmacy and they  will forward the refill request to us. Please allow 3 business days for your refill to be completed.          Additional Information About Your Visit        [employer] Information     [employer] gives you secure access to your electronic health record. If you see a primary care provider, you can also send messages to your care team and make appointments. If you have questions, please call your primary care clinic.  If you do not have a primary care provider, please call [phone] and they will assist you.        Care EveryWhere ID     This is your Care EveryWhere ID. This could be used by other organizations to access your [place] medical records  [id number]         Blood Pressure from Last 3 Encounters:   11/08/18 122/72   10/29/18 121/83   10/23/18 121/76    Weight from Last 3 Encounters:   11/26/18 (!) 402 lb 6.4 oz (182.5 kg)   11/08/18 (!) 403 lb 8 oz (183 kg)   10/29/18 (!) 401 lb (181.9 kg)              Today, you had the following     No orders found for display       Primary Care Provider Office Phone # Fax #    Erika Espino [phone] [address] [place], [place] [id number]        Goals        Exercise    Exercise 3x per week (30 min per time)     Notes - Note edited  10/29/2018  9:13 AM by Violet Bowen, RD    Goal Statement: I will go to the gym 2 times a week    Measure of Success: 2 times a week getting to gym, renew membership    Strengths: motivated to lose wt and he knows activity really helps  Ideas to overcome barriers: time: set days and times and money and hernia issue  GO before cards two days a week.  Date to Achieve By: next month        Equal Access to Services     RICARDO HALL AH: Hadii rickie Lin, waaxda luqadaha, qaybta kaalconsuelo mcqueen. So [facility] [phone].    ATENCIÓN: Si habla español, tiene a clinton disposición servicios gratuitos de asistencia lingüística. Llame al [phone].    We comply with  applicable federal civil rights laws and [place] laws. We do not discriminate on the basis of race, color, national origin, age, disability, sex, sexual orientation, or gender identity.            Thank you!     Thank you for choosing [facility]  for your care. Our goal is always to provide you with excellent care. Hearing back from our patients is one way we can continue to improve our services. Please take a few minutes to complete the written survey that you may receive in the mail after your visit with us. Thank you!             Your Updated Medication List - Protect others around you: Learn how to safely use, store and throw away your medicines at www.disposemymeds.org.          This list is accurate as of 11/26/18 10:43 AM.  Always use your most recent med list.                   Brand Name Dispense Instructions for use Diagnosis    * albuterol 108 (90 Base) MCG/ACT inhaler    PROAIR HFA    1 Inhaler    Inhale 2 puffs into the lungs every 4 hours as needed for shortness of breath / dyspnea    Mild intermittent asthma without complication       * albuterol (2.5 MG/3ML) 0.083% neb solution    PROVENTIL    25 vial    Take 1 vial (2.5 mg) by nebulization every 4 hours as needed for shortness of breath / dyspnea or wheezing    Moderate persistent asthma with exacerbation       aspirin 81 MG tablet    ASA    90 tablet    Take 1 tablet by mouth daily.    Type 2 diabetes, HbA1C goal < 8% (H)       blood glucose monitoring test strip    ACCU-CHEK CHRIS PLUS    200 each    USE TO TEST BLOOD SUGARS THREE TIMES A DAY AS DIRECTED    Diabetes mellitus without complication (H)       cetirizine 10 MG tablet    zyrTEC    90 tablet    TAKE ONE TABLET (10 MG) BY MOUTH EVERY EVENING    Allergic rhinitis due to pollen       * DIABETIC STERILE LANCETS device     1 Box    1 Device 3 times daily.    Type 2 diabetes, HbA1C goal < 8% (H)       * blood glucose monitoring lancets     300 each    TEST THREE TIMES A DAY  OR AS DIRECTED    Type 2 diabetes, HbA1C goal < 8% (H)       fluticasone 50 MCG/ACT spray    FLONASE    16 g    Spray 1-2 sprays into both nostrils daily    Acute sinusitis with symptoms > 10 days       furosemide 20 MG tablet    LASIX    360 tablet    TAKE TWO TABLETS BY MOUTH TWICE A DAY    Venous stasis       insulin pen needle 32G X 4 MM miscellaneous    BD SEVERO U/F    100 each    Use 1 pen needles daily or as directed.    Type 2 diabetes mellitus without complications (H)       liraglutide 18 MG/3ML solution    VICTOZA PEN    9 mL    INJECT 1.8MG UNDER THE SKIN ONCE DAILY    Type 2 diabetes mellitus without complication, without long-term current use of insulin (H)       lisinopril 5 MG tablet    PRINIVIL/ZESTRIL    90 tablet    Take 1 tablet (5 mg) by mouth daily    Pulmonary hypertension (H)       metFORMIN 500 MG 24 hr tablet    GLUCOPHAGE-XR    90 tablet    TAKE ONE TABLET BY MOUTH EVERY DAY WITH BREAKFAST (NEED TO KEEP APPOINTMENT WITH DR. SANCHES END OF NOVEMBER)    Methamphetamine abuse in remission (H)       modafinil 200 MG tablet    PROVIGIL    60 tablet    Take 1/2 tablet by mouth 3-4 times daily as needed for sleepiness.    Obstructive sleep apnea-hypopnea syndrome       MULTIVITAMIN PO      Take 1 tablet by mouth daily.        order for DME      Equipment being ordered: BIPAP Refugio P Kennedy received a [employer] AirCurve 10 Bilevel. Pressures were set at 23/14 cm H2O.        order for DME      Auto-BiPAP: IPAP max 21 cm H2O EPAP min 15 cm H2O Pressure support 5 cm Changed in clinic Lifetime need and heated humidity.    FARZANA (obstructive sleep apnea)       order for DME     1 Units    Nebulizer    Moderate persistent asthma with exacerbation       oxyCODONE 5 mg    ROXICODONE    8 tablet    Take 1 tablet (5 mg) by mouth every 4 hours as needed for moderate to severe pain        simvastatin 10 MG tablet    ZOCOR    90 tablet    TAKE ONE TABLET BY MOUTH AT BEDTIME    Hyperlipidemia LDL goal <100        topiramate 50 MG tablet    TOPAMAX    240 tablet    Take 4 tablets (200 mg) by mouth 2 times daily    Morbid obesity due to excess calories (H)       triamcinolone 0.1 % cream    KENALOG    30 g    Apply sparingly to affected area two times daily as needed    Venous stasis       * Notice:  This list has 4 medication(s) that are the same as other medications prescribed for you. Read the directions carefully, and ask your doctor or other care provider to review them with you.

## 2018-12-03 ENCOUNTER — OFFICE VISIT (OUTPATIENT)
Dept: ENDOCRINOLOGY | Facility: CLINIC | Age: 43
End: 2018-12-03
Payer: COMMERCIAL

## 2018-12-03 VITALS
HEART RATE: 83 BPM | DIASTOLIC BLOOD PRESSURE: 53 MMHG | BODY MASS INDEX: 47.74 KG/M2 | HEIGHT: 68 IN | WEIGHT: 315 LBS | TEMPERATURE: 98.9 F | SYSTOLIC BLOOD PRESSURE: 106 MMHG | OXYGEN SATURATION: 94 % | RESPIRATION RATE: 19 BRPM

## 2018-12-03 DIAGNOSIS — E66.01 MORBID OBESITY (H): Primary | ICD-10-CM

## 2018-12-03 ASSESSMENT — PAIN SCALES - GENERAL: PAINLEVEL: NO PAIN (0)

## 2018-12-03 NOTE — PROGRESS NOTES
"      Return Medical Weight Management Note     Refugio Kennedy  MRN:  [id number]  :  1975  CRISTI:  2017    Dear Dr. Espino,    We had the pleasure of seeing your patient Refugio Kennedy.  He is a 41 year old male who we are continuing to see for treatment of obesity related to: DMII, HLD, sleep apnea on CPAP daily, and hypoventilation syndrome, oxygen dependent.     CURRENT WEIGHT:   404 lbs 4.8 oz    Wt Readings from Last 4 Encounters:   18 (!) 183.4 kg (404 lb 4.8 oz)   18 (!) 182.5 kg (402 lb 6.4 oz)   18 (!) 183 kg (403 lb 8 oz)   10/29/18 (!) 181.9 kg (401 lb)     Height:  5' 7.5\"  Body Mass Index:  Body mass index is 62.39 kg/(m^2).  Vitals:  B/P: 138/83, P: 80    Initial consult weight was 454 on 5/15/2015.  Weight change since last seen on 2018 is up 6 pounds.   Total loss is 50 pounds.    INTERVAL HISTORY:  Tolerating topiramate 200 mg BID. Again considering exercise. He is may now try to change to vegetables and get more vegetables overall.    Diet and Activity Changes Since Last Visit Reviewed With Patient 12/3/2018   I have made the following changes to my diet since my last visit: added more veggies in last 2 weeks   With regards to my diet, I am still struggling with: snacking-not as much as before but still ongoing struggle   For breakfast, I typically eat: -   For lunch, I typically eat: -   For supper, I typically eat: -   For snack(s), I typically eat: -   I have made the following changes to my activity/exercise since my last visit: i finally got my gym membership back and have started going to the gym   With regards to my activity/exercise, I am still struggling with: a strong accountability partner to hold me accountalbe about my exercise -going to gym 2-3x week is the goal       MEDICATIONS:   Current Outpatient Prescriptions   Medication     albuterol (2.5 MG/3ML) 0.083% neb solution     albuterol (PROAIR HFA) 108 (90 BASE) MCG/ACT Inhaler     aspirin 81 MG tablet     " blood glucose monitoring (ACCU-CHEK CHRIS PLUS) test strip     blood glucose monitoring (SOFTCLIX) lancets     cetirizine (ZYRTEC) 10 MG tablet     DIABETIC STERILE LANCETS device     fluticasone (FLONASE) 50 MCG/ACT spray     furosemide (LASIX) 20 MG tablet     insulin pen needle (BD SEVERO U/F) 32G X 4 MM     liraglutide (VICTOZA PEN) 18 MG/3ML soln     lisinopril (PRINIVIL/ZESTRIL) 5 MG tablet     metFORMIN (GLUCOPHAGE-XR) 500 MG 24 hr tablet     modafinil (PROVIGIL) 200 MG tablet     Multiple Vitamin (MULTIVITAMIN OR)     order for DME     order for DME     ORDER FOR DME     oxyCODONE (ROXICODONE) 5 mg     simvastatin (ZOCOR) 10 MG tablet     topiramate (TOPAMAX) 50 MG tablet     triamcinolone (KENALOG) 0.1 % cream     No current facility-administered medications for this visit.        Weight Loss Medication History Reviewed With Patient 12/3/2018   Which weight loss medications are you currently taking on a regular basis?  Topamax (topiramate), Victoza (liraglutide)   Are you having any side effects from the weight loss medication that we have prescribed you? No   If you are having side effects please describe: -     ASSESSMENT:   Maintain topiramate 200 mg BID. Reinforce food plan - focus on no between meal snacking, aggressive lowering of starches and cheese.     FOLLOW-UP:    3 months   10/15 minutes spent on counseling and education    Sincerely,

## 2018-12-03 NOTE — LETTER
"12/3/2018       RE: Refugio Kennedy  [address]  [facility] [address]     Dear Colleague,    Thank you for referring your patient, Refugio Kennedy, to the [facility] MEDICAL WEIGHT MANAGEMENT at [facility]. Please see a copy of my visit note below.          Return Medical Weight Management Note     Refugio Kennedy  MRN:  [id number]  :  1975  CRISTI:  2017    Dear Dr. Espino,    We had the pleasure of seeing your patient Refugio Kennedy.  He is a 41 year old male who we are continuing to see for treatment of obesity related to: DMII, HLD, sleep apnea on CPAP daily, and hypoventilation syndrome, oxygen dependent.     CURRENT WEIGHT:   404 lbs 4.8 oz    Wt Readings from Last 4 Encounters:   18 (!) 183.4 kg (404 lb 4.8 oz)   18 (!) 182.5 kg (402 lb 6.4 oz)   18 (!) 183 kg (403 lb 8 oz)   10/29/18 (!) 181.9 kg (401 lb)     Height:  5' 7.5\"  Body Mass Index:  Body mass index is 62.39 kg/(m^2).  Vitals:  B/P: 138/83, P: 80    Initial consult weight was 454 on 5/15/2015.  Weight change since last seen on 2018 is up 6 pounds.   Total loss is 50 pounds.    INTERVAL HISTORY:  Tolerating topiramate 200 mg BID. Again considering exercise. He is may now try to change to vegetables and get more vegetables overall.    Diet and Activity Changes Since Last Visit Reviewed With Patient 12/3/2018   I have made the following changes to my diet since my last visit: added more veggies in last 2 weeks   With regards to my diet, I am still struggling with: snacking-not as much as before but still ongoing struggle   For breakfast, I typically eat: -   For lunch, I typically eat: -   For supper, I typically eat: -   For snack(s), I typically eat: -   I have made the following changes to my activity/exercise since my last visit: i finally got my gym membership back and have started going to the gym   With regards to my activity/exercise, I am still struggling with: a strong " accountability partner to hold me accountalbe about my exercise -going to gym 2-3x week is the goal       MEDICATIONS:   Current Outpatient Prescriptions   Medication     albuterol (2.5 MG/3ML) 0.083% neb solution     albuterol (PROAIR HFA) 108 (90 BASE) MCG/ACT Inhaler     aspirin 81 MG tablet     blood glucose monitoring (ACCU-CHEK CHRIS PLUS) test strip     blood glucose monitoring (SOFTCLIX) lancets     cetirizine (ZYRTEC) 10 MG tablet     DIABETIC STERILE LANCETS device     fluticasone (FLONASE) 50 MCG/ACT spray     furosemide (LASIX) 20 MG tablet     insulin pen needle (BD SEVERO U/F) 32G X 4 MM     liraglutide (VICTOZA PEN) 18 MG/3ML soln     lisinopril (PRINIVIL/ZESTRIL) 5 MG tablet     metFORMIN (GLUCOPHAGE-XR) 500 MG 24 hr tablet     modafinil (PROVIGIL) 200 MG tablet     Multiple Vitamin (MULTIVITAMIN OR)     order for DME     order for DME     ORDER FOR DME     oxyCODONE (ROXICODONE) 5 mg     simvastatin (ZOCOR) 10 MG tablet     topiramate (TOPAMAX) 50 MG tablet     triamcinolone (KENALOG) 0.1 % cream     No current facility-administered medications for this visit.        Weight Loss Medication History Reviewed With Patient 12/3/2018   Which weight loss medications are you currently taking on a regular basis?  Topamax (topiramate), Victoza (liraglutide)   Are you having any side effects from the weight loss medication that we have prescribed you? No   If you are having side effects please describe: -     ASSESSMENT:   Maintain topiramate 200 mg BID. Reinforce food plan - focus on no between meal snacking, aggressive lowering of starches and cheese.     FOLLOW-UP:    3 months   10/15 minutes spent on counseling and education      Ashish Paez MD

## 2018-12-03 NOTE — MR AVS SNAPSHOT
After Visit Summary   12/3/2018    Refugio Kennedy    MRN: [id number]           Patient Information     Date Of Birth          1975        Visit Information        Provider Department      12/3/2018 9:15 AM Ashish Paez MD M [facility] Medical Weight Management        Today's Diagnoses     Morbid obesity (H)    -  1       Follow-ups after your visit        Follow-up notes from your care team     Return in about 4 months (around 4/3/2019).      Your next 10 appointments already scheduled     Jan 07, 2019  8:00 AM CST   Diabetes Education with Violet Bowen RD   [facility] ([facility])    [address] [place] [id number]   [phone]            Jan 14, 2019  3:20 PM CST   Office Visit with Erika Espino MD   [facility] ([facility]    [address] [place] [address]   [phone]           Bring a current list of meds and any records pertaining to this visit. For Physicals, please bring immunization records and any forms needing to be filled out. Please arrive 10 minutes early to complete paperwork.            Apr 01, 2019  9:45 AM CDT   (Arrive by 9:30 AM)   Return Weight Management Visit with MD DOROTHEA Manrique [facility] Medical Weight Management ([facility])    [address] [address] [phone]              Who to contact     Please call your clinic at [phone] to:    Ask questions about your health    Make or cancel appointments    Discuss your medicines    Learn about your test results    Speak to your doctor            Additional Information About Your Visit        MyChart Information     [employer] gives you secure access to your electronic health record. If you see a primary care provider, you can also send messages to your care team and make appointments. If you have questions, please call your primary  "care clinic.  If you do not have a primary care provider, please call [phone] and they will assist you.      [employer] is an electronic gateway that provides easy, online access to your medical records. With [employer], you can request a clinic appointment, read your test results, renew a prescription or communicate with your care team.     To access your existing account, please contact your [facility] or call [phone] for assistance.        Care EveryWhere ID     This is your Care EveryWhere ID. This could be used by other organizations to access your [place] medical records  [id number]        Your Vitals Were     Pulse Temperature Respirations Height Pulse Oximetry BMI (Body Mass Index)    83 98.9  F (37.2  C) (Oral) 19 1.715 m (5' 7.5\") 94% 62.39 kg/m2       Blood Pressure from Last 3 Encounters:   12/03/18 106/53   11/08/18 122/72   10/29/18 121/83    Weight from Last 3 Encounters:   12/03/18 (!) 183.4 kg (404 lb 4.8 oz)   11/26/18 (!) 182.5 kg (402 lb 6.4 oz)   11/08/18 (!) 183 kg (403 lb 8 oz)              Today, you had the following     No orders found for display       Primary Care Provider Office Phone # Fax #    Erika Espino [phone] [address] [place], [place] [id number]        Goals        Exercise    Exercise 3x per week (30 min per time)     Notes - Note edited  10/29/2018  9:13 AM by Violet Bowen RD    Goal Statement: I will go to the gym 2 times a week    Measure of Success: 2 times a week getting to gym, renew membership    Strengths: motivated to lose wt and he knows activity really helps  Ideas to overcome barriers: time: set days and times and money and hernia issue  GO before cards two days a week.  Date to Achieve By: next month        Equal Access to Services     RICARDO HALL AH: Barb Lin, waaxda luqadaha, qaybta kaalmadusty knowlesyaconsuelo montano ah. So wa " [phone].    ATENCIÓN: Si kellee dougherty, tiene a clinton disposición servicios gratuitos de asistencia lingüística. Shukri baron [phone].    We comply with applicable federal civil rights laws and [place] laws. We do not discriminate on the basis of race, color, national origin, age, disability, sex, sexual orientation, or gender identity.            Thank you!     Thank you for choosing [facility] MEDICAL WEIGHT MANAGEMENT  for your care. Our goal is always to provide you with excellent care. Hearing back from our patients is one way we can continue to improve our services. Please take a few minutes to complete the written survey that you may receive in the mail after your visit with us. Thank you!             Your Updated Medication List - Protect others around you: Learn how to safely use, store and throw away your medicines at www.disposemymeds.org.          This list is accurate as of 12/3/18 11:57 AM.  Always use your most recent med list.                   Brand Name Dispense Instructions for use Diagnosis    * albuterol 108 (90 Base) MCG/ACT inhaler    PROAIR HFA    1 Inhaler    Inhale 2 puffs into the lungs every 4 hours as needed for shortness of breath / dyspnea    Mild intermittent asthma without complication       * albuterol (2.5 MG/3ML) 0.083% neb solution    PROVENTIL    25 vial    Take 1 vial (2.5 mg) by nebulization every 4 hours as needed for shortness of breath / dyspnea or wheezing    Moderate persistent asthma with exacerbation       aspirin 81 MG tablet    ASA    90 tablet    Take 1 tablet by mouth daily.    Type 2 diabetes, HbA1C goal < 8% (H)       blood glucose monitoring test strip    ACCU-CHEK CHRIS PLUS    200 each    USE TO TEST BLOOD SUGARS THREE TIMES A DAY AS DIRECTED    Diabetes mellitus without complication (H)       cetirizine 10 MG tablet    zyrTEC    90 tablet    TAKE ONE TABLET (10 MG) BY MOUTH EVERY EVENING    Allergic rhinitis due to pollen       * DIABETIC STERILE LANCETS  device     1 Box    1 Device 3 times daily.    Type 2 diabetes, HbA1C goal < 8% (H)       * blood glucose monitoring lancets     300 each    TEST THREE TIMES A DAY OR AS DIRECTED    Type 2 diabetes, HbA1C goal < 8% (H)       fluticasone 50 MCG/ACT nasal spray    FLONASE    16 g    Spray 1-2 sprays into both nostrils daily    Acute sinusitis with symptoms > 10 days       furosemide 20 MG tablet    LASIX    360 tablet    TAKE TWO TABLETS BY MOUTH TWICE A DAY    Venous stasis       insulin pen needle 32G X 4 MM miscellaneous    BD SEVERO U/F    100 each    Use 1 pen needles daily or as directed.    Type 2 diabetes mellitus without complications (H)       liraglutide 18 MG/3ML solution    VICTOZA PEN    9 mL    INJECT 1.8MG UNDER THE SKIN ONCE DAILY    Type 2 diabetes mellitus without complication, without long-term current use of insulin (H)       lisinopril 5 MG tablet    PRINIVIL/ZESTRIL    90 tablet    Take 1 tablet (5 mg) by mouth daily    Pulmonary hypertension (H)       metFORMIN 500 MG 24 hr tablet    GLUCOPHAGE-XR    90 tablet    TAKE ONE TABLET BY MOUTH EVERY DAY WITH BREAKFAST (NEED TO KEEP APPOINTMENT WITH DR. SANCHES END OF NOVEMBER)    Type 2 diabetes mellitus without complication, without long-term current use of insulin (H)       modafinil 200 MG tablet    PROVIGIL    60 tablet    Take 1/2 tablet by mouth 3-4 times daily as needed for sleepiness.    Obstructive sleep apnea-hypopnea syndrome       MULTIVITAMIN PO      Take 1 tablet by mouth daily.        order for DME      Equipment being ordered: BIPAP Refugio P Kennedy received a Resmed AirCurve 10 Bilevel. Pressures were set at 23/14 cm H2O.        order for DME      Auto-BiPAP: IPAP max 21 cm H2O EPAP min 15 cm H2O Pressure support 5 cm Changed in clinic Lifetime need and heated humidity.    FARZANA (obstructive sleep apnea)       order for DME     1 Units    Nebulizer    Moderate persistent asthma with exacerbation       oxyCODONE 5 mg    ROXICODONE    8 tablet     Take 1 tablet (5 mg) by mouth every 4 hours as needed for moderate to severe pain        simvastatin 10 MG tablet    ZOCOR    90 tablet    TAKE ONE TABLET BY MOUTH AT BEDTIME    Hyperlipidemia LDL goal <100       topiramate 50 MG tablet    TOPAMAX    240 tablet    Take 4 tablets (200 mg) by mouth 2 times daily    Morbid obesity due to excess calories (H)       triamcinolone 0.1 % external cream    KENALOG    30 g    Apply sparingly to affected area two times daily as needed    Venous stasis       * Notice:  This list has 4 medication(s) that are the same as other medications prescribed for you. Read the directions carefully, and ask your doctor or other care provider to review them with you.

## 2018-12-03 NOTE — NURSING NOTE
"Chief Complaint   Patient presents with     Weight Problem     PT here for weight management follow up       Vitals:    12/03/18 0931   BP: 106/53   BP Location: Left arm   Patient Position: Sitting   Cuff Size: Adult Large   Pulse: 83   Resp: 19   Temp: 98.9  F (37.2  C)   TempSrc: Oral   SpO2: 94%   Weight: (!) 404 lb 4.8 oz   Height: 5' 7.5\"       Body mass index is 62.39 kg/(m^2).      JUAN CARLOS Appiah, EMT                      "

## 2018-12-05 DIAGNOSIS — E66.01 MORBID OBESITY DUE TO EXCESS CALORIES (H): ICD-10-CM

## 2018-12-10 RX ORDER — TOPIRAMATE 100 MG/1
200 TABLET, FILM COATED ORAL 2 TIMES DAILY
Qty: 120 TABLET | Refills: 3 | Status: CANCELLED | OUTPATIENT
Start: 2018-12-10

## 2018-12-10 NOTE — TELEPHONE ENCOUNTER
topiramate (TOPAMAX) 50 MG   Last Written Prescription Date:  11/5/18  Last Fill Quantity: 240,   # refills: 0  Last Office Visit : 12/3/18  Future Office visit:  4/1/19    Routing refill request to provider for review/approval because:  PLEASE AUTH/SIGN CHG (PT REQUEST)  CHG FROM 50 MG TABS  MG TABS    PENDING  RF UNTIL NEXT APPT.

## 2018-12-15 DIAGNOSIS — E66.01 MORBID OBESITY DUE TO EXCESS CALORIES (H): ICD-10-CM

## 2018-12-15 NOTE — TELEPHONE ENCOUNTER
Reason for call:  Medication   If this is a refill request, has the caller requested the refill from the pharmacy already? Yes  Will the patient be using a [facility]?yes   Name of the pharmacy and phone number for the current request:  [facility]     Name of the medication requested: topiramate (TOPAMAX) 50 MG tablet     Other request:     Phone number to reach patient:  Cell number on file:    Telephone Information:   Mobile [phone]       Best Time:  Anytime     Can we leave a detailed message on this number?  YES

## 2018-12-17 RX ORDER — TOPIRAMATE 50 MG/1
200 TABLET, FILM COATED ORAL 2 TIMES DAILY
Qty: 240 TABLET | Refills: 3 | Status: SHIPPED | OUTPATIENT
Start: 2018-12-17 | End: 2019-04-05

## 2018-12-17 NOTE — TELEPHONE ENCOUNTER
topiramate (TOPAMAX) 50 MG tablet  Last Written Prescription Date:  11/5/18  Last Fill Quantity: 240,   # refills: 0  Last Office Visit : 12/3/18  Future Office visit: 4/1/19    Routed because:   Alt/ast

## 2018-12-20 DIAGNOSIS — E11.9 TYPE 2 DIABETES MELLITUS WITHOUT COMPLICATION, WITHOUT LONG-TERM CURRENT USE OF INSULIN (H): ICD-10-CM

## 2018-12-20 RX ORDER — LIRAGLUTIDE 6 MG/ML
INJECTION SUBCUTANEOUS
Qty: 9 ML | Refills: 3 | Status: SHIPPED | OUTPATIENT
Start: 2018-12-20 | End: 2019-04-19

## 2018-12-20 NOTE — TELEPHONE ENCOUNTER
"Requested Prescriptions   Pending Prescriptions Disp Refills     liraglutide (VICTOZA PEN) 18 MG/3ML solution 9 mL 3     Sig: INJECT 1.8MG UNDER THE SKIN ONCE DAILY    GLP-1 Agonists Protocol Failed - 12/20/2018 10:55 AM       Failed - LDL on file in past 12 months    Recent Labs   Lab Test 12/19/17  1429   LDL 58            Passed - Blood pressure less than 140/90 in past 6 months    BP Readings from Last 3 Encounters:   12/03/18 106/53   11/08/18 122/72   10/29/18 121/83                Passed - Microalbumin on file in past 12 months    Recent Labs   Lab Test 07/24/18  1550   MICROL 9   UMALCR 3.86            Passed - HgbA1C in past 3 or 6 months    If HgbA1C is 8 or greater, it needs to be on file within the past 3 months.  If less than 8, must be on file within the past 6 months.     Recent Labs   Lab Test 07/24/18  1546   A1C 5.5            Passed - Patient is age 18 or older       Passed - Normal serum creatinine on file in past 12 months    Recent Labs   Lab Test 09/16/18  1631   CR 0.99            Passed - Recent (6 mo) or future (30 days) visit within the authorizing provider's specialty    Patient had office visit in the last 6 months or has a visit in the next 30 days with authorizing provider.  See \"Patient Info\" tab in inbasket, or \"Choose Columns\" in Meds & Orders section of the refill encounter.      Last Written Prescription Date:  8/16/18  Last Fill Quantity: 9 ml,  # refills: 3   Last office visit: 11/26/2018 with prescribing provider:     Future Office Visit:   Next 5 appointments (look out 90 days)    Jan 14, 2019  3:20 PM CST  Office Visit with Erika [facility]ino MD  [facility] [address] [place] [address] [phone]                   "

## 2019-01-04 DIAGNOSIS — E11.9 TYPE 2 DIABETES MELLITUS WITHOUT COMPLICATION, WITHOUT LONG-TERM CURRENT USE OF INSULIN (H): Primary | ICD-10-CM

## 2019-01-04 DIAGNOSIS — E78.5 HYPERLIPIDEMIA LDL GOAL <100: ICD-10-CM

## 2019-01-04 RX ORDER — SIMVASTATIN 10 MG
TABLET ORAL
Qty: 90 TABLET | Refills: 0 | Status: SHIPPED | OUTPATIENT
Start: 2019-01-04 | End: 2019-04-05

## 2019-01-04 NOTE — TELEPHONE ENCOUNTER
"Requested Prescriptions   Pending Prescriptions Disp Refills     simvastatin (ZOCOR) 10 MG tablet 90 tablet 3     Sig: Take 1 tablet (10 mg) by mouth    Statins Protocol Failed - 1/4/2019  7:53 AM       Failed - LDL on file in past 12 months    Recent Labs   Lab Test 12/19/17  1429   LDL 58            Passed - No abnormal creatine kinase in past 12 months    No lab results found.            Passed - Recent (12 mo) or future (30 days) visit within the authorizing provider's specialty    Patient had office visit in the last 12 months or has a visit in the next 30 days with authorizing provider or within the authorizing provider's specialty.  See \"Patient Info\" tab in inbasket, or \"Choose Columns\" in Meds & Orders section of the refill encounter.             Passed - Patient is age 18 or older      Last Written Prescription Date:  01/02/2018  Last Fill Quantity: 90,  # refills: 3   Last office visit: 11/26/2018 with prescribing provider:  Nitish   Future Office Visit:   Next 5 appointments (look out 90 days)    Jan 14, 2019  3:20 PM CST  Office Visit with Erika Espino MD  [facility]) [address] [place] [address] [phone]           "

## 2019-01-07 ENCOUNTER — ALLIED HEALTH/NURSE VISIT (OUTPATIENT)
Dept: EDUCATION SERVICES | Facility: CLINIC | Age: 44
End: 2019-01-07
Payer: COMMERCIAL

## 2019-01-07 ENCOUNTER — TELEPHONE (OUTPATIENT)
Dept: ENDOCRINOLOGY | Facility: CLINIC | Age: 44
End: 2019-01-07

## 2019-01-07 VITALS — WEIGHT: 315 LBS | BODY MASS INDEX: 63.27 KG/M2

## 2019-01-07 DIAGNOSIS — E11.9 TYPE 2 DIABETES MELLITUS WITHOUT COMPLICATION, WITHOUT LONG-TERM CURRENT USE OF INSULIN (H): Primary | ICD-10-CM

## 2019-01-07 PROCEDURE — G0108 DIAB MANAGE TRN  PER INDIV: HCPCS | Performed by: DIETITIAN, REGISTERED

## 2019-01-07 RX ORDER — ORLISTAT 120 MG/1
CAPSULE ORAL
Qty: 90 CAPSULE | Refills: 3 | COMMUNITY
Start: 2019-01-07 | End: 2019-02-04

## 2019-01-07 NOTE — TELEPHONE ENCOUNTER
Called and spoke to patient in regards to Xenical prescription. Per Dr. Paez, patient can try Xenical 120mg tabs to be taken up to 3 times daily with fatty foods. Advised patient of potential side effects. Will call into pharmacy.

## 2019-01-07 NOTE — PROGRESS NOTES
"Diabetes Self-Management Education & Support    Diabetes Education Self Management & Training    SUBJECTIVE/OBJECTIVE:  Presents for: Individual review  Accompanied by: Self  Diabetes education in the past 24mo: Yes  Focus of Visit: Healthy Coping, Healthy Eating, Being Active  Diabetes type: Type 2  Disease course: Stable  How confident are you filling out medical forms by yourself:: Extremely  Transportation concerns: No  Other concerns:: None  Cultural Influences/Ethnic Background:  American      Diabetes Symptoms & Complications  Weight trend: Decreasing steadily  Peripheral neuropathy: Yes    Patient Problem List and Family Medical History reviewed for relevant medical history, current medical status, and diabetes risk factors.    Vitals:  Wt (!) 186 kg (410 lb)   BMI 63.27 kg/m    Estimated body mass index is 63.27 kg/m  as calculated from the following:    Height as of 12/3/18: 1.715 m (5' 7.5\").    Weight as of this encounter: 186 kg (410 lb).   Last 3 BP:   BP Readings from Last 3 Encounters:   12/03/18 106/53   11/08/18 122/72   10/29/18 121/83       History   Smoking Status     Former Smoker     Packs/day: 0.50     Years: 1.00     Types: Cigarettes, Cigars     Quit date: 5/21/2012   Smokeless Tobacco     Never Used       Labs:  Lab Results   Component Value Date    A1C 5.5 07/24/2018     Lab Results   Component Value Date     09/16/2018     Lab Results   Component Value Date    LDL 58 12/19/2017     HDL Cholesterol   Date Value Ref Range Status   12/19/2017 45 >39 mg/dL Final   ]  GFR Estimate   Date Value Ref Range Status   09/16/2018 82 >60 mL/min/1.7m2 Final     Comment:     Non  GFR Calc     GFR Estimate If Black   Date Value Ref Range Status   09/16/2018 >90 >60 mL/min/1.7m2 Final     Comment:      GFR Calc     Lab Results   Component Value Date    CR 0.99 09/16/2018     No results found for: MICROALBUMIN    Healthy Eating  Healthy Eating Assessed Today: " Yes  Cultural/Moravian diet restrictions?: No  Meal planning: Avoiding sweets, Smaller portions  Meals include: Breakfast, Lunch, Dinner, Snacks  Breakfast: toast and peanut butter early and then has eggs and meat later  Lunch: sandwiches and vegetables  Dinner: turkey burgers and vegetables. Eating the vegetables first. Last night soup and brats.  Snacks: has struggled over the holidays, eating more cheese as well.  got rid of deep fried foods.  Beverages: Water, Diet soda  Has patient met with a dietitian in the past?: Yes    Being Active  Exercise:: Yes(currently has not been doing a much)  Days per week of moderate to strenuous exercise (like a brisk walk): 3  On average, minutes per day of exercise at this level: 20(25 minute mile)  How intense was your typical exercise? : Light (like stretching or slow walking)  Exercise Minutes per Week: 60  Was able to do 1 mile, 25 minutes at gym the last time he went, encouraged him about this, doing well.  Monitoring  Monitoring Assessed Today: Yes  Did patient bring glucose meter to appointment? : Yes  Blood Glucose Meter: Accu-check                            Taking Medications  Diabetes Medication(s)     Biguanides Sig    metFORMIN (GLUCOPHAGE-XR) 500 MG 24 hr tablet TAKE ONE TABLET BY MOUTH EVERY DAY WITH BREAKFAST (NEED TO KEEP APPOINTMENT WITH DR. SANCHES END OF NOVEMBER)    Incretin Mimetic Agents (GLP-1 Receptor Agonists) Sig    liraglutide (VICTOZA PEN) 18 MG/3ML solution INJECT 1.8MG UNDER THE SKIN ONCE DAILY          Problems taking diabetes medications regularly?: No  Diabetes medication side effects?: No  Treatment Compliance: All of the time    Problem Solving                 Reducing Risks  Reducing Risks Assessed Today: Yes    Healthy Coping     Patient Activation Measure Survey Score:  MARY Score (Last Two) 1/25/2011   MARY Raw Score 36   Activation Score 47.4   MARY Level 2       ASSESSMENT:  Wt up due to holidays  He is back on focusing on meal plan.   Doing more vegetables, trying to do half the plate and eating veggies first.  Was interested in Xenical, he will ask Dr. Paez if this is something that would be beneficial for him.  He is motivated to get the wt he gained over holidays back off.  He is striving to cut out snacks, not have all the cheese in house as this is a weakness for him.        Patient's most recent   Lab Results   Component Value Date    A1C 5.5 07/24/2018    is not meeting goal of <7.0    INTERVENTION:   Diabetes knowledge and skills assessment:     Patient is knowledgeable in diabetes management concepts related to: Healthy Eating, Being Active, Monitoring and Taking Medication    Patient needs further education on the following diabetes management concepts: Healthy Eating, Being Active, Monitoring, Taking Medication, Problem Solving, Reducing Risks and Healthy Coping    Based on learning assessment above, most appropriate setting for further diabetes education would be: Individual setting.    Education provided today on:  AADE Self-Care Behaviors:  Healthy Eating: consistency in amount, composition, and timing of food intake and portion control.  Work on the veggies again, avoiding snacks.  Asked about Alexandru.  Being Active: discussed plan, wants to do three times week    Opportunities for ongoing education and support in diabetes-self management were discussed.    Pt verbalized understanding of concepts discussed and recommendations provided today.       Education Materials Provided:  No new materials provided today    PLAN:  See Patient Instructions for co-developed, patient-stated behavior change goals.  AVS printed and provided to patient today. See Follow-Up section for recommended follow-up.      Time Spent: 30 minutes  Encounter Type: Individual    Any diabetes medication dose changes were made via the CDE Protocol and Collaborative Practice Agreement with the patient's primary care provider. A copy of this encounter was shared  with the provider.

## 2019-01-07 NOTE — PATIENT INSTRUCTIONS
1.  Ask Dr. Paez about using Xenical, see if it can be ordered and what cost would be.    2.  Continue to work on eating vegetables with meals, eating that part of meal first.  Vegetables should be at least 50% of your plate.  Work on lean meats.    3.  Get to the gym at least three times per week, keep working towards the mile

## 2019-01-07 NOTE — TELEPHONE ENCOUNTER
Reason for call:  Other   Patient called regarding (reason for call): call back  Additional comments: Patient wants to try Xenical - was told by diabatic doctor to check with Dr. Paez to see if this would be good for him.      Phone number to reach patient:  Home number on file [phone] (home)    Best Time:  anytime    Can we leave a detailed message on this number?  YES

## 2019-01-09 ENCOUNTER — TELEPHONE (OUTPATIENT)
Dept: ENDOCRINOLOGY | Facility: CLINIC | Age: 44
End: 2019-01-09

## 2019-01-10 NOTE — TELEPHONE ENCOUNTER
PA Initiation    Medication: orlistat (XENICAL) 120 MG capsule -   Insurance Company: [employer] - Phone [phone] Fax [phone]  Pharmacy Filling the Rx: [facility] [place], [place] - [address] [place] [address]  Filling Pharmacy Phone: [phone]  Filling Pharmacy Fax: [phone]  Start Date: 1/10/2019

## 2019-01-11 NOTE — TELEPHONE ENCOUNTER
PRIOR AUTHORIZATION DENIED    Medication: orlistat (XENICAL) 120 MG capsule - DENIED    Denial Date: 1/10/2019    Denial Rational:         Appeal Information:

## 2019-01-14 ENCOUNTER — OFFICE VISIT (OUTPATIENT)
Dept: FAMILY MEDICINE | Facility: CLINIC | Age: 44
End: 2019-01-14
Payer: COMMERCIAL

## 2019-01-14 VITALS
WEIGHT: 315 LBS | DIASTOLIC BLOOD PRESSURE: 60 MMHG | TEMPERATURE: 98.2 F | SYSTOLIC BLOOD PRESSURE: 106 MMHG | HEART RATE: 89 BPM | OXYGEN SATURATION: 96 % | HEIGHT: 68 IN | BODY MASS INDEX: 47.74 KG/M2

## 2019-01-14 DIAGNOSIS — E66.01 MORBID OBESITY (H): ICD-10-CM

## 2019-01-14 DIAGNOSIS — Z23 NEED FOR PROPHYLACTIC VACCINATION AND INOCULATION AGAINST INFLUENZA: ICD-10-CM

## 2019-01-14 DIAGNOSIS — E11.9 TYPE 2 DIABETES MELLITUS WITHOUT COMPLICATION, WITHOUT LONG-TERM CURRENT USE OF INSULIN (H): Primary | ICD-10-CM

## 2019-01-14 DIAGNOSIS — I27.20 PULMONARY HYPERTENSION (H): ICD-10-CM

## 2019-01-14 DIAGNOSIS — Z13.89 SCREENING FOR DIABETIC PERIPHERAL NEUROPATHY: ICD-10-CM

## 2019-01-14 DIAGNOSIS — G47.33 OSA (OBSTRUCTIVE SLEEP APNEA): ICD-10-CM

## 2019-01-14 LAB
ALBUMIN SERPL-MCNC: 3.7 G/DL (ref 3.4–5)
ALP SERPL-CCNC: 120 U/L (ref 40–150)
ALT SERPL W P-5'-P-CCNC: 29 U/L (ref 0–70)
ANION GAP SERPL CALCULATED.3IONS-SCNC: 7 MMOL/L (ref 3–14)
AST SERPL W P-5'-P-CCNC: 15 U/L (ref 0–45)
BASOPHILS # BLD AUTO: 0 10E9/L (ref 0–0.2)
BASOPHILS NFR BLD AUTO: 0.3 %
BILIRUB DIRECT SERPL-MCNC: <0.1 MG/DL (ref 0–0.2)
BILIRUB SERPL-MCNC: 0.3 MG/DL (ref 0.2–1.3)
BUN SERPL-MCNC: 25 MG/DL (ref 7–30)
CALCIUM SERPL-MCNC: 8.8 MG/DL (ref 8.5–10.1)
CHLORIDE SERPL-SCNC: 107 MMOL/L (ref 94–109)
CHOLEST SERPL-MCNC: 133 MG/DL
CO2 SERPL-SCNC: 27 MMOL/L (ref 20–32)
CREAT SERPL-MCNC: 0.96 MG/DL (ref 0.66–1.25)
CREAT UR-MCNC: 166 MG/DL
DIFFERENTIAL METHOD BLD: NORMAL
EOSINOPHIL # BLD AUTO: 0.5 10E9/L (ref 0–0.7)
EOSINOPHIL NFR BLD AUTO: 5.2 %
ERYTHROCYTE [DISTWIDTH] IN BLOOD BY AUTOMATED COUNT: 13.3 % (ref 10–15)
GFR SERPL CREATININE-BSD FRML MDRD: >90 ML/MIN/{1.73_M2}
GLUCOSE SERPL-MCNC: 92 MG/DL (ref 70–99)
HBA1C MFR BLD: 5.7 % (ref 0–5.6)
HCT VFR BLD AUTO: 43.7 % (ref 40–53)
HDLC SERPL-MCNC: 37 MG/DL
HGB BLD-MCNC: 14.5 G/DL (ref 13.3–17.7)
LDLC SERPL CALC-MCNC: 55 MG/DL
LYMPHOCYTES # BLD AUTO: 3.2 10E9/L (ref 0.8–5.3)
LYMPHOCYTES NFR BLD AUTO: 31.9 %
MCH RBC QN AUTO: 30.9 PG (ref 26.5–33)
MCHC RBC AUTO-ENTMCNC: 33.2 G/DL (ref 31.5–36.5)
MCV RBC AUTO: 93 FL (ref 78–100)
MICROALBUMIN UR-MCNC: 7 MG/L
MICROALBUMIN/CREAT UR: 4.23 MG/G CR (ref 0–17)
MONOCYTES # BLD AUTO: 1 10E9/L (ref 0–1.3)
MONOCYTES NFR BLD AUTO: 10.5 %
NEUTROPHILS # BLD AUTO: 5.2 10E9/L (ref 1.6–8.3)
NEUTROPHILS NFR BLD AUTO: 52.1 %
NONHDLC SERPL-MCNC: 96 MG/DL
PLATELET # BLD AUTO: 253 10E9/L (ref 150–450)
POTASSIUM SERPL-SCNC: 3.8 MMOL/L (ref 3.4–5.3)
PROT SERPL-MCNC: 7.8 G/DL (ref 6.8–8.8)
RBC # BLD AUTO: 4.69 10E12/L (ref 4.4–5.9)
SODIUM SERPL-SCNC: 141 MMOL/L (ref 133–144)
TRIGL SERPL-MCNC: 207 MG/DL
TSH SERPL DL<=0.005 MIU/L-ACNC: 1.66 MU/L (ref 0.4–4)
WBC # BLD AUTO: 9.9 10E9/L (ref 4–11)

## 2019-01-14 PROCEDURE — 83036 HEMOGLOBIN GLYCOSYLATED A1C: CPT | Performed by: FAMILY MEDICINE

## 2019-01-14 PROCEDURE — 99207 C FOOT EXAM  NO CHARGE: CPT | Performed by: FAMILY MEDICINE

## 2019-01-14 PROCEDURE — 84443 ASSAY THYROID STIM HORMONE: CPT | Performed by: FAMILY MEDICINE

## 2019-01-14 PROCEDURE — 99214 OFFICE O/P EST MOD 30 MIN: CPT | Mod: 25 | Performed by: FAMILY MEDICINE

## 2019-01-14 PROCEDURE — 82043 UR ALBUMIN QUANTITATIVE: CPT | Performed by: FAMILY MEDICINE

## 2019-01-14 PROCEDURE — 90686 IIV4 VACC NO PRSV 0.5 ML IM: CPT | Performed by: FAMILY MEDICINE

## 2019-01-14 PROCEDURE — 80076 HEPATIC FUNCTION PANEL: CPT | Performed by: FAMILY MEDICINE

## 2019-01-14 PROCEDURE — 80048 BASIC METABOLIC PNL TOTAL CA: CPT | Performed by: FAMILY MEDICINE

## 2019-01-14 PROCEDURE — 90471 IMMUNIZATION ADMIN: CPT | Performed by: FAMILY MEDICINE

## 2019-01-14 PROCEDURE — 85025 COMPLETE CBC W/AUTO DIFF WBC: CPT | Performed by: FAMILY MEDICINE

## 2019-01-14 PROCEDURE — 80061 LIPID PANEL: CPT | Performed by: FAMILY MEDICINE

## 2019-01-14 PROCEDURE — 36415 COLL VENOUS BLD VENIPUNCTURE: CPT | Performed by: FAMILY MEDICINE

## 2019-01-14 ASSESSMENT — MIFFLIN-ST. JEOR: SCORE: 2716.75

## 2019-01-14 NOTE — NURSING NOTE
"Chief Complaint   Patient presents with     Diabetes       Initial /60 (BP Location: Right arm, Patient Position: Chair, Cuff Size: Thigh)   Pulse 89   Temp 98.2  F (36.8  C) (Tympanic)   Ht 1.715 m (5' 7.5\")   Wt (!) 186.5 kg (411 lb 3.2 oz)   SpO2 96%   BMI 63.45 kg/m   Estimated body mass index is 63.45 kg/m  as calculated from the following:    Height as of this encounter: 1.715 m (5' 7.5\").    Weight as of this encounter: 186.5 kg (411 lb 3.2 oz).    Patient presents to the clinic using No DME    Health Maintenance that is potentially due pending provider review:  NONE    n/a    Is there anyone who you would like to be able to receive your results? Yes  If yes have patient fill out SVEN    "

## 2019-01-14 NOTE — PROGRESS NOTES

## 2019-01-14 NOTE — PROGRESS NOTES
SUBJECTIVE:   Refugio Kennedy is a 43 year old male who presents to clinic today for the following health issues:      Diabetes Follow-up    Patient is checking blood sugars: once daily.  Results are as follows:         am -     Diabetic concerns: None     Symptoms of hypoglycemia (low blood sugar): none     Paresthesias (numbness or burning in feet) or sores: Yes burning and tingling     Date of last diabetic eye exam: up to date    BP Readings from Last 2 Encounters:   01/14/19 106/60   12/03/18 106/53     Hemoglobin A1C (%)   Date Value   01/14/2019 5.7 (H)   07/24/2018 5.5     LDL Cholesterol Calculated (mg/dL)   Date Value   12/19/2017 58   11/29/2016 59       Diabetes Management Resources    Amount of exercise or physical activity: 2-3 days/week for an average of 15-30 minutes    Problems taking medications regularly: No    Medication side effects: none    Diet: diabetic        Hyperlipidemia Follow-Up      Rate your low fat/cholesterol diet?: good    Taking statin?  Yes, no muscle aches from statin    Other lipid medications/supplements?:  none    Hypertension Follow-up      Outpatient blood pressures are not being checked.    Low Salt Diet: no added salt    Pulmonary HTN   Stable on Oxygen     Obesity   Still really not making much progress on weight loss  Is working with bariatric clinic     Problem list and histories reviewed & adjusted, as indicated.  Additional history: as documented    Labs reviewed in EPIC    Reviewed and updated as needed this visit by clinical staff  Tobacco  Allergies  Meds  Problems  Med Hx  Surg Hx  Fam Hx  Soc Hx        Reviewed and updated as needed this visit by Provider  Tobacco  Allergies  Meds  Problems  Med Hx  Surg Hx  Fam Hx         ROS:  Constitutional, HEENT, cardiovascular, pulmonary, gi and gu systems are negative, except as otherwise noted.    OBJECTIVE:                                                    /60 (BP Location: Right arm, Patient  "Position: Chair, Cuff Size: Thigh)   Pulse 89   Temp 98.2  F (36.8  C) (Tympanic)   Ht 1.715 m (5' 7.5\")   Wt (!) 186.5 kg (411 lb 3.2 oz)   SpO2 96%   BMI 63.45 kg/m     Body mass index is 63.45 kg/m .  GENERAL APPEARANCE: healthy, alert and no distress  NECK: no adenopathy, no asymmetry, masses, or scars and thyroid normal to palpation  RESP: lungs clear to auscultation - no rales, rhonchi or wheezes  CV: regular rates and rhythm, normal S1 S2, no S3 or S4 and no murmur, click or rub  DIABETIC FOOT EXAM: normal DP and PT pulses, no trophic changes or ulcerative lesions and normal sensory exam  PSYCH: mentation appears normal and affect normal/bright         ASSESSMENT/PLAN:                                                    1. Type 2 diabetes mellitus without complication, without long-term current use of insulin (H)  Stable no change in treatment plan.   - HEMOGLOBIN A1C  - Lipid panel reflex to direct LDL Fasting  - Basic metabolic panel  - Hepatic panel  - TSH with free T4 reflex  - CBC with platelets differential  - Albumin Random Urine Quantitative with Creat Ratio    2. Pulmonary hypertension (H)  Stable no change in treatment plan.     3. FARZANA (obstructive sleep apnea)/Hypoventilation Syndrome- Severe  Needs to recheck with sleep for mechanical issues with his machine     4. Morbid obesity (H)  Working on diet and with bariatrics     5. Screening for diabetic peripheral neuropathy    - FOOT EXAM  NO CHARGE [04259.114]    6. Need for prophylactic vaccination and inoculation against influenza    - FLU VACCINE, SPLIT VIRUS, IM (QUADRIVALENT) [19247]- >3 YRS  - Vaccine Administration, Initial [99205]      Patient Instructions   Labs today     Continue all current medications    Flu shot         Risks, benefits, side effects and rationale for treatment plan fully discussed with the patient and understanding expressed.   Erika Espino MD  [facility]  "

## 2019-01-22 DIAGNOSIS — G47.33 OBSTRUCTIVE SLEEP APNEA-HYPOPNEA SYNDROME: ICD-10-CM

## 2019-01-22 RX ORDER — MODAFINIL 200 MG/1
TABLET ORAL
Qty: 60 TABLET | Refills: 5 | Status: SHIPPED | OUTPATIENT
Start: 2019-01-22 | End: 2019-07-22

## 2019-01-22 NOTE — TELEPHONE ENCOUNTER
Chief Complaint   Patient presents with     Refill Request     modafinil (PROVIGIL) 200 MG tablet      Refill request received via fax by pharmacy   [facility], [place] - [address] [place] [address] tel:[phone]    Pending Prescriptions:                       Disp   Refills    modafinil (PROVIGIL) 200 MG tablet        60 tab*5            Sig: Take 1/2 tablet by mouth 3-4 times daily as           needed for sleepiness.         Last Written Prescription Date:  07/02/2018  Last Fill Quantity: 60 per 30 days,   # refills: 5  Last Office Visit with prescribing provider: 12/21/2017  Future Office visit: 03/14/2019      Refugio informed of message via telephone call.  Yearly physician follow ups are required for refills on all sleep aid, scheduled and federally regulated medications.   Your last appt with Dr. Bhaskar Johnson MD was on 12/21/2017 . Please call central scheduling at [phone] to set up an appt.  We will give you one month worth of medication to enable you to get an appt.  Please feel free to call [phone] if you have any questions.           Routing refill request to provider for review/approval because:  Drug not on the [facility], [facility] or [facility] refill protocol or controlled substance

## 2019-02-04 ENCOUNTER — OFFICE VISIT (OUTPATIENT)
Dept: FAMILY MEDICINE | Facility: CLINIC | Age: 44
End: 2019-02-04
Payer: COMMERCIAL

## 2019-02-04 ENCOUNTER — ALLIED HEALTH/NURSE VISIT (OUTPATIENT)
Dept: EDUCATION SERVICES | Facility: CLINIC | Age: 44
End: 2019-02-04
Payer: COMMERCIAL

## 2019-02-04 VITALS
TEMPERATURE: 98.3 F | SYSTOLIC BLOOD PRESSURE: 108 MMHG | HEART RATE: 82 BPM | DIASTOLIC BLOOD PRESSURE: 64 MMHG | RESPIRATION RATE: 24 BRPM | WEIGHT: 315 LBS | OXYGEN SATURATION: 95 % | BODY MASS INDEX: 62.19 KG/M2

## 2019-02-04 VITALS — WEIGHT: 315 LBS | BODY MASS INDEX: 62.25 KG/M2

## 2019-02-04 DIAGNOSIS — E11.9 TYPE 2 DIABETES MELLITUS WITHOUT COMPLICATION, WITHOUT LONG-TERM CURRENT USE OF INSULIN (H): Primary | ICD-10-CM

## 2019-02-04 DIAGNOSIS — J06.9 VIRAL URI WITH COUGH: Primary | ICD-10-CM

## 2019-02-04 PROCEDURE — G0108 DIAB MANAGE TRN  PER INDIV: HCPCS | Performed by: DIETITIAN, REGISTERED

## 2019-02-04 PROCEDURE — 99214 OFFICE O/P EST MOD 30 MIN: CPT | Performed by: NURSE PRACTITIONER

## 2019-02-04 ASSESSMENT — PAIN SCALES - GENERAL: PAINLEVEL: NO PAIN (0)

## 2019-02-04 NOTE — PATIENT INSTRUCTIONS
Suspect viral URI   Continue OVER THE COUNTER medications   Restart the flonase       If not better by the end of the week call clinic and let me know       Patient Education     Viral Upper Respiratory Illness (Adult)  You have a viral upper respiratory illness (URI), which is another term for the common cold. This illness is contagious during the first few days. It is spread through the air by coughing and sneezing. It may also be spread by direct contact (touching the sick person and then touching your own eyes, nose, or mouth). Frequent handwashing will decrease risk of spread. Most viral illnesses go away within 7 to 10 days with rest and simple home remedies. Sometimes the illness may last for several weeks. Antibiotics will not kill a virus, and they are generally not prescribed for this condition.    Home care    If symptoms are severe, rest at home for the first 2 to 3 days. When you resume activity, don't let yourself get too tired.    Don't smoke. If you need help stopping, talk with your healthcare provider.    Avoid being exposed to cigarette smoke (yours or others ).    You may use acetaminophen or ibuprofen to control pain and fever, unless another medicine was prescribed. If you have chronic liver or kidney disease, have ever had a stomach ulcer or gastrointestinal bleeding, or are taking blood-thinning medicines, talk with your healthcare provider before using these medicines. Aspirin should never be given to anyone under 18 years of age who is ill with a viral infection or fever. It may cause severe liver or brain damage.    Your appetite may be poor, so a light diet is fine. Stay well hydrated by drinking 6 to 8 glasses of fluids per day (water, soft drinks, juices, tea, or soup). Extra fluids will help loosen secretions in the nose and lungs.    Over-the-counter cold medicines will not shorten the length of time you re sick, but they may be helpful for the following symptoms: cough, sore throat,  and nasal and sinus congestion. If you take prescription medicines, ask your healthcare provider or pharmacist which over-the-counter medicines are safe to use. (Note: Don't use decongestants if you have high blood pressure.)  Follow-up care  Follow up with your healthcare provider, or as advised.  When to seek medical advice  Call your healthcare provider right away if any of these occur:    Cough with lots of colored sputum (mucus)    Severe headache; face, neck, or ear pain    Difficulty swallowing due to throat pain    Fever of 100.4 F (38 C) or higher, or as directed by your healthcare provider  Call 911  Call 911 if any of these occur:    Chest pain, shortness of breath, wheezing, or difficulty breathing    Coughing up blood    Very severe pain with swallowing, especially if it goes along with a muffled voice   Date Last Reviewed: 6/1/2018 2000-2018 [employer]. [address] [place] [address], [place] [address]. All rights reserved. This information is not intended as a substitute for professional medical care. Always follow your healthcare professional's instructions.

## 2019-02-04 NOTE — PATIENT INSTRUCTIONS
1.  Get back to the gym as soon as you can      2.  Work on always doing the vegetables with lunch and dinner.  Add them to your meat making like a stirfry.

## 2019-02-04 NOTE — PROGRESS NOTES
SUBJECTIVE:   Refugio Kennedy is a 43 year old male who presents to clinic today for the following health issues:      ENT Symptoms             Symptoms: cc Present Absent Comment   Fever/Chills   x    Fatigue  x  Little bit   Muscle Aches  x     Eye Irritation  x  Scratchy, watery   Sneezing  x     Nasal Jesse/Drg x      Sinus Pressure/Pain x      Loss of smell  x     Dental pain   x    Sore Throat  x  Little bit   Swollen Glands   x    Ear Pain/Fullness   x Usually has earaches every morning - cpap   Cough  x  Productive cough    Wheeze  x     Chest Pain   x discomfort   Shortness of breath  x     Rash   x    Other         Symptom duration:  5 days   Symptom severity:  moderate   Treatments tried:  nyquil, dayquil, ricola   Contacts:  girlfriend was sick     Uses albuterol occasional     No formal diagnosis of COPD/Asthma     Reports had pneumonia 4 years ago while in tx for meth, has remained sober        Problem list and histories reviewed & adjusted, as indicated.  Additional history: as documented    Patient Active Problem List   Diagnosis     FARZANA (obstructive sleep apnea)/Hypoventilation Syndrome- Severe     Morbid obesity (H)     Chronic respiratory failure (H)     Hyperlipidemia LDL goal <100     Venous stasis     Pulmonary hypertension (H)     Mild intermittent asthma without complication     Type 2 diabetes mellitus without complication, without long-term current use of insulin (H)     Methamphetamine abuse in remission (H)     Past Surgical History:   Procedure Laterality Date     LAPAROSCOPIC HERNIORRHAPHY VENTRAL N/A 5/17/2016    Procedure: LAPAROSCOPIC HERNIORRHAPHY VENTRAL;  Surgeon: Yves Jimenes MD;  Location: [facility]     LAPAROSCOPIC HERNIORRHAPHY VENTRAL N/A 12/20/2016    Procedure: LAPAROSCOPIC HERNIORRHAPHY VENTRAL;  Surgeon: Yves Jimenes MD;  Location: [facility] OR     SURGICAL HISTORY OF -   1998    [facility]       Social History     Tobacco Use     Smoking status: Former Smoker     Packs/day: 0.50      Years: 1.00     Pack years: 0.50     Types: Cigarettes, Cigars     Last attempt to quit: 2012     Years since quittin.7     Smokeless tobacco: Never Used   Substance Use Topics     Alcohol use: No     Alcohol/week: 0.0 oz     Family History   Problem Relation Age of Onset     Heart Disease Maternal Grandfather      Diabetes Maternal Grandfather      Hypertension Maternal Grandfather      Other Cancer Maternal Grandfather      Cancer Paternal Grandfather         unknown     Other Cancer Paternal Grandfather      Anxiety Disorder Mother      Asthma Mother      Depression Mother      Thyroid Disease Mother      Anxiety Disorder Father      Substance Abuse Father      Depression Father      Hypertension Father      Anxiety Disorder Brother      Substance Abuse Brother      Mental Illness Brother      Depression Brother      Other Cancer Brother      Obesity No family hx of            Reviewed and updated as needed this visit by clinical staff  Tobacco  Allergies  Meds       Reviewed and updated as needed this visit by Provider         ROS:  Constitutional, HEENT, cardiovascular, pulmonary, gi and gu systems are negative, except as otherwise noted.    OBJECTIVE:                                                    /64   Pulse 82   Temp 98.3  F (36.8  C) (Tympanic)   Resp 24   Wt (!) 182.8 kg (403 lb)   SpO2 95%   BMI 62.19 kg/m    Body mass index is 62.19 kg/m .  GENERAL APPEARANCE: healthy, alert and no distress  HENT: ear canals and TM's normal and nose and mouth without ulcers or lesions, nasal congestion noted   RESP: lungs clear to auscultation - no rales, rhonchi or wheezes  CV: regular rates and rhythm, normal S1 S2, no S3 or S4 and no murmur, click or rub  ABDOMEN: soft, nontender, without hepatosplenomegaly or masses and bowel sounds normal  MS: extremities normal- no gross deformities noted  SKIN: no suspicious lesions or rashes  PSYCH: mentation appears normal and affect  normal/bright    Diagnostic test results:  Diagnostic Test Results:  none      ASSESSMENT/PLAN:                                                    1. Viral URI with cough  He is afebrile, unremarkable exam   Not hypoxic   Suspect viral URI   Symptomatic care as below       Patient Instructions     Suspect viral URI   Continue OVER THE COUNTER medications   Restart the flonase       If not better by the end of the week call clinic and let me know       Patient Education     Viral Upper Respiratory Illness (Adult)  You have a viral upper respiratory illness (URI), which is another term for the common cold. This illness is contagious during the first few days. It is spread through the air by coughing and sneezing. It may also be spread by direct contact (touching the sick person and then touching your own eyes, nose, or mouth). Frequent handwashing will decrease risk of spread. Most viral illnesses go away within 7 to 10 days with rest and simple home remedies. Sometimes the illness may last for several weeks. Antibiotics will not kill a virus, and they are generally not prescribed for this condition.    Home care    If symptoms are severe, rest at home for the first 2 to 3 days. When you resume activity, don't let yourself get too tired.    Don't smoke. If you need help stopping, talk with your healthcare provider.    Avoid being exposed to cigarette smoke (yours or others ).    You may use acetaminophen or ibuprofen to control pain and fever, unless another medicine was prescribed. If you have chronic liver or kidney disease, have ever had a stomach ulcer or gastrointestinal bleeding, or are taking blood-thinning medicines, talk with your healthcare provider before using these medicines. Aspirin should never be given to anyone under 18 years of age who is ill with a viral infection or fever. It may cause severe liver or brain damage.    Your appetite may be poor, so a light diet is fine. Stay well hydrated by  drinking 6 to 8 glasses of fluids per day (water, soft drinks, juices, tea, or soup). Extra fluids will help loosen secretions in the nose and lungs.    Over-the-counter cold medicines will not shorten the length of time you re sick, but they may be helpful for the following symptoms: cough, sore throat, and nasal and sinus congestion. If you take prescription medicines, ask your healthcare provider or pharmacist which over-the-counter medicines are safe to use. (Note: Don't use decongestants if you have high blood pressure.)  Follow-up care  Follow up with your healthcare provider, or as advised.  When to seek medical advice  Call your healthcare provider right away if any of these occur:    Cough with lots of colored sputum (mucus)    Severe headache; face, neck, or ear pain    Difficulty swallowing due to throat pain    Fever of 100.4 F (38 C) or higher, or as directed by your healthcare provider  Call 911  Call 911 if any of these occur:    Chest pain, shortness of breath, wheezing, or difficulty breathing    Coughing up blood    Very severe pain with swallowing, especially if it goes along with a muffled voice   Date Last Reviewed: 6/1/2018 2000-2018 [employer]. [address] [place] [address], [place] [address]. All rights reserved. This information is not intended as a substitute for professional medical care. Always follow your healthcare professional's instructions.               Brittney Soto NP  [facility]

## 2019-02-04 NOTE — NURSING NOTE
"Chief Complaint   Patient presents with     Sinus Problem     4-5 DAYS       Initial /64   Pulse 82   Temp 98.3  F (36.8  C) (Tympanic)   Resp 24   Wt (!) 182.8 kg (403 lb)   SpO2 95%   BMI 62.19 kg/m   Estimated body mass index is 62.19 kg/m  as calculated from the following:    Height as of 1/14/19: 1.715 m (5' 7.5\").    Weight as of this encounter: 182.8 kg (403 lb).    Patient presents to the clinic using No DME    Health Maintenance that is potentially due pending provider review:  NONE    n/a    Is there anyone who you would like to be able to receive your results? No  If yes have patient fill out SVEN      "

## 2019-02-04 NOTE — PROGRESS NOTES
"Diabetes Self-Management Education & Support    Diabetes Education Self Management & Training    SUBJECTIVE/OBJECTIVE:  Presents for: Individual review  Accompanied by: Self  Diabetes education in the past 24mo: Yes  Focus of Visit: Healthy Coping, Healthy Eating, Being Active  Diabetes type: Type 2  Disease course: Stable  How confident are you filling out medical forms by yourself:: Extremely  Transportation concerns: No  Other concerns:: None  Cultural Influences/Ethnic Background:  American      Diabetes Symptoms & Complications  Weight trend: Decreasing steadily  Peripheral neuropathy: Yes    Patient Problem List and Family Medical History reviewed for relevant medical history, current medical status, and diabetes risk factors.    Vitals:  Wt (!) 183 kg (403 lb 6.4 oz)   BMI 62.25 kg/m    Estimated body mass index is 62.25 kg/m  as calculated from the following:    Height as of 1/14/19: 1.715 m (5' 7.5\").    Weight as of this encounter: 183 kg (403 lb 6.4 oz).   Last 3 BP:   BP Readings from Last 3 Encounters:   01/14/19 106/60   12/03/18 106/53   11/08/18 122/72       History   Smoking Status     Former Smoker     Packs/day: 0.50     Years: 1.00     Types: Cigarettes, Cigars     Quit date: 5/21/2012   Smokeless Tobacco     Never Used       Labs:  Lab Results   Component Value Date    A1C 5.7 01/14/2019     Lab Results   Component Value Date    GLC 92 01/14/2019     Lab Results   Component Value Date    LDL 55 01/14/2019     HDL Cholesterol   Date Value Ref Range Status   01/14/2019 37 (L) >39 mg/dL Final   ]  GFR Estimate   Date Value Ref Range Status   01/14/2019 >90 >60 mL/min/[1.73_m2] Final     Comment:     Non  GFR Calc  Starting 12/18/2018, serum creatinine based estimated GFR (eGFR) will be   calculated using the Chronic Kidney Disease Epidemiology Collaboration   (CKD-EPI) equation.       GFR Estimate If Black   Date Value Ref Range Status   01/14/2019 >90 >60 mL/min/[1.73_m2] Final    "  Comment:      GFR Calc  Starting 12/18/2018, serum creatinine based estimated GFR (eGFR) will be   calculated using the Chronic Kidney Disease Epidemiology Collaboration   (CKD-EPI) equation.       Lab Results   Component Value Date    CR 0.96 01/14/2019     No results found for: MICROALBUMIN    Healthy Eating  Healthy Eating Assessed Today: Yes  Cultural/Congregational diet restrictions?: No  Meal planning: Avoiding sweets, Smaller portions  Meals include: Breakfast, Lunch, Dinner, Snacks  Breakfast: toast and peanut butter early and then has eggs and meat later  Lunch: turkey brats and vegetables  Dinner: has been trying to add the vegetables.  Meat, potato and vegetables.  Snacks: has struggled over the holidays, eating more cheese as well.  got rid of deep fried foods.  Beverages: Water, Diet soda  Has patient met with a dietitian in the past?: Yes    Being Active  Exercise:: Currently not exercising(has been sick for 5 days)  How intense was your typical exercise? : Moderate (like brisk walking)  Barrier to exercise: Physical limitation    Monitoring  Monitoring Assessed Today: Yes  Did patient bring glucose meter to appointment? : Yes  Blood Glucose Meter: Accu-check                          Taking Medications  Diabetes Medication(s)     Biguanides Sig    metFORMIN (GLUCOPHAGE-XR) 500 MG 24 hr tablet TAKE ONE TABLET BY MOUTH EVERY DAY WITH BREAKFAST (NEED TO KEEP APPOINTMENT WITH DR. SANCHES END OF NOVEMBER)    Incretin Mimetic Agents (GLP-1 Receptor Agonists) Sig    liraglutide (VICTOZA PEN) 18 MG/3ML solution INJECT 1.8MG UNDER THE SKIN ONCE DAILY          Problems taking diabetes medications regularly?: No  Diabetes medication side effects?: No  Treatment Compliance: All of the time    Problem Solving     Not assessed            Reducing Risks  Reducing Risks Assessed Today: Yes    Healthy Coping     Patient Activation Measure Survey Score:  MARY Score (Last Two) 1/25/2011   MARY Raw Score 36    Activation Score 47.4   MARY Level 2       ASSESSMENT:  Doing well with meal plan, wt is down.  Is taking Alexandru now to assist with wt loss.  Has not been able to be as active lately due to sick.  He is trying to keep an eye on his fat choices now, he is learning that if a lot of fat it may be low in carbs.  If you eat too much fat the Alexandru makes it bad for you.  Continue to make vegetables and watch fats.        Patient's most recent   Lab Results   Component Value Date    A1C 5.7 01/14/2019    is meeting goal of <7.0    INTERVENTION:   Diabetes knowledge and skills assessment:     Patient is knowledgeable in diabetes management concepts related to: Healthy Eating, Being Active, Monitoring and Taking Medication    Patient needs further education on the following diabetes management concepts: Healthy Eating, Being Active, Monitoring, Taking Medication, Problem Solving, Reducing Risks and Healthy Coping    Based on learning assessment above, most appropriate setting for further diabetes education would be: Individual setting.    Education provided today on:  AADE Self-Care Behaviors:  Healthy Eating: consistency in amount, composition, and timing of food intake, portion control and vegetables  Being Active: relationship to blood glucose and describe appropriate activity program    Opportunities for ongoing education and support in diabetes-self management were discussed.    Pt verbalized understanding of concepts discussed and recommendations provided today.       Education Materials Provided:  No new materials provided today    PLAN:  See Patient Instructions for co-developed, patient-stated behavior change goals.  AVS printed and provided to patient today. See Follow-Up section for recommended follow-up.      Time Spent: 30 minutes  Encounter Type: Individual    Any diabetes medication dose changes were made via the CDE Protocol and Collaborative Practice Agreement with the patient's primary care provider. A copy of  this encounter was shared with the provider.

## 2019-02-08 ENCOUNTER — TELEPHONE (OUTPATIENT)
Dept: VASCULAR SURGERY | Facility: CLINIC | Age: 44
End: 2019-02-08

## 2019-02-15 ENCOUNTER — APPOINTMENT (OUTPATIENT)
Dept: VASCULAR SURGERY | Facility: CLINIC | Age: 44
End: 2019-02-15
Payer: COMMERCIAL

## 2019-02-15 ENCOUNTER — OFFICE VISIT (OUTPATIENT)
Dept: VASCULAR SURGERY | Facility: CLINIC | Age: 44
End: 2019-02-15
Payer: COMMERCIAL

## 2019-02-15 DIAGNOSIS — Z53.9 ERRONEOUS ENCOUNTER--DISREGARD: Primary | ICD-10-CM

## 2019-02-15 PROCEDURE — 93971 EXTREMITY STUDY: CPT | Performed by: SURGERY

## 2019-02-15 PROCEDURE — 99214 OFFICE O/P EST MOD 30 MIN: CPT | Performed by: SURGERY

## 2019-03-04 ENCOUNTER — ALLIED HEALTH/NURSE VISIT (OUTPATIENT)
Dept: EDUCATION SERVICES | Facility: CLINIC | Age: 44
End: 2019-03-04
Payer: COMMERCIAL

## 2019-03-04 VITALS — WEIGHT: 315 LBS | BODY MASS INDEX: 62.03 KG/M2

## 2019-03-04 DIAGNOSIS — E11.9 TYPE 2 DIABETES MELLITUS WITHOUT COMPLICATION, WITHOUT LONG-TERM CURRENT USE OF INSULIN (H): Primary | ICD-10-CM

## 2019-03-04 PROCEDURE — G0108 DIAB MANAGE TRN  PER INDIV: HCPCS | Performed by: DIETITIAN, REGISTERED

## 2019-03-04 NOTE — PROGRESS NOTES
"Diabetes Self-Management Education & Support    Diabetes Education Self Management & Training    SUBJECTIVE/OBJECTIVE:  Presents for: Individual review  Accompanied by: Self  Diabetes education in the past 24mo: Yes  Focus of Visit: Healthy Coping, Healthy Eating, Being Active  Diabetes type: Type 2  Disease course: Stable  How confident are you filling out medical forms by yourself:: Extremely  Transportation concerns: No  Other concerns:: None  Cultural Influences/Ethnic Background:  American      Diabetes Symptoms & Complications  Weight trend: Decreasing steadily  Peripheral neuropathy: Yes    Patient Problem List and Family Medical History reviewed for relevant medical history, current medical status, and diabetes risk factors.    Vitals:  Wt (!) 182.3 kg (402 lb)   BMI 62.03 kg/m    Estimated body mass index is 62.03 kg/m  as calculated from the following:    Height as of 1/14/19: 1.715 m (5' 7.5\").    Weight as of this encounter: 182.3 kg (402 lb).   Last 3 BP:   BP Readings from Last 3 Encounters:   02/04/19 108/64   01/14/19 106/60   12/03/18 106/53       History   Smoking Status     Former Smoker     Packs/day: 0.50     Years: 1.00     Types: Cigarettes, Cigars     Quit date: 5/21/2012   Smokeless Tobacco     Never Used       Labs:  Lab Results   Component Value Date    A1C 5.7 01/14/2019     Lab Results   Component Value Date    GLC 92 01/14/2019     Lab Results   Component Value Date    LDL 55 01/14/2019     HDL Cholesterol   Date Value Ref Range Status   01/14/2019 37 (L) >39 mg/dL Final   ]  GFR Estimate   Date Value Ref Range Status   01/14/2019 >90 >60 mL/min/[1.73_m2] Final     Comment:     Non  GFR Calc  Starting 12/18/2018, serum creatinine based estimated GFR (eGFR) will be   calculated using the Chronic Kidney Disease Epidemiology Collaboration   (CKD-EPI) equation.       GFR Estimate If Black   Date Value Ref Range Status   01/14/2019 >90 >60 mL/min/[1.73_m2] Final     Comment: "      GFR Calc  Starting 12/18/2018, serum creatinine based estimated GFR (eGFR) will be   calculated using the Chronic Kidney Disease Epidemiology Collaboration   (CKD-EPI) equation.       Lab Results   Component Value Date    CR 0.96 01/14/2019     No results found for: MICROALBUMIN    Healthy Eating  Healthy Eating Assessed Today: Yes  Cultural/Amish diet restrictions?: No  Meal planning: Avoiding sweets, Smaller portions  Meals include: Breakfast, Lunch, Dinner, Snacks  Breakfast: toast and peanut butter early and then has eggs and meat later  Lunch: sandwiches, occasional going out to eat, hadnt gone to the grocery store  Dinner: sandwiches, pork chops, rice dish.  chicken brats.  Recently bought vegetables again.    Snacks: has struggled over the holidays, eating more cheese as well.  got rid of deep fried foods.  Beverages: Water, Diet soda  Has patient met with a dietitian in the past?: Yes    Being Active  Exercise:: Currently not exercising(dealing with leg veins, needs surgery)  Barrier to exercise: Physical limitation    Monitoring  Monitoring Assessed Today: Yes  Did patient bring glucose meter to appointment? : Yes  Blood Glucose Meter: Accu-check                              Taking Medications  Diabetes Medication(s)     Biguanides       metFORMIN (GLUCOPHAGE-XR) 500 MG 24 hr tablet    TAKE ONE TABLET BY MOUTH EVERY DAY WITH BREAKFAST (NEED TO KEEP APPOINTMENT WITH DR. SANCHES END OF NOVEMBER)    Incretin Mimetic Agents (GLP-1 Receptor Agonists)       liraglutide (VICTOZA PEN) 18 MG/3ML solution    INJECT 1.8MG UNDER THE SKIN ONCE DAILY          Problems taking diabetes medications regularly?: No  Diabetes medication side effects?: No  Treatment Compliance: All of the time    Problem Solving   he is learning more about labels              Reducing Risks  Reducing Risks Assessed Today: Yes    Healthy Coping     Patient Activation Measure Survey Score:  MARY Score (Last Two) 1/25/2011    MARY Raw Score 36   Activation Score 47.4   MARY Level 2       ASSESSMENT:  Wt down 1 lb.  Unable to go to the gym due to vein issues in the leg.  Doing better on meal plan avoiding cheese and snack foods.  Plan for gym once leg healed, hoping to be back to gym by end of month        Patient's most recent   Lab Results   Component Value Date    A1C 5.7 01/14/2019    is meeting goal of <7.0    INTERVENTION:   Diabetes knowledge and skills assessment:     Patient is knowledgeable in diabetes management concepts related to: Healthy Eating, Being Active and Monitoring    Patient needs further education on the following diabetes management concepts: Healthy Eating, Being Active, Monitoring, Taking Medication, Problem Solving, Reducing Risks and Healthy Coping    Based on learning assessment above, most appropriate setting for further diabetes education would be: Individual setting.    Education provided today on:  AADE Self-Care Behaviors:  Healthy Eating: consistency in amount, composition, and timing of food intake and snack foods, fruits/veggies  Being Active: relationship to blood glucose and describe appropriate activity program    Opportunities for ongoing education and support in diabetes-self management were discussed.    Pt verbalized understanding of concepts discussed and recommendations provided today.       Education Materials Provided:  No new materials provided today    PLAN:  See Patient Instructions for co-developed, patient-stated behavior change goals.  AVS printed and provided to patient today. See Follow-Up section for recommended follow-up.      Time Spent: 30 minutes  Encounter Type: Individual    Any diabetes medication dose changes were made via the CDE Protocol and Collaborative Practice Agreement with the patient's primary care provider. A copy of this encounter was shared with the provider.

## 2019-03-04 NOTE — PATIENT INSTRUCTIONS
1.  Really make a point of focusing on your food choices since you are not able to be a active this month.  You are doing much better on cheese and snack foods.    2.  Work on doing arm and leg movements while sitting and watching TV during commercial.  Try for 1 minute of each commercial

## 2019-03-11 ENCOUNTER — APPOINTMENT (OUTPATIENT)
Dept: VASCULAR SURGERY | Facility: CLINIC | Age: 44
End: 2019-03-11
Payer: COMMERCIAL

## 2019-03-11 ENCOUNTER — OFFICE VISIT (OUTPATIENT)
Dept: VASCULAR SURGERY | Facility: CLINIC | Age: 44
End: 2019-03-11
Payer: COMMERCIAL

## 2019-03-11 DIAGNOSIS — Z53.9 ERRONEOUS ENCOUNTER--DISREGARD: Primary | ICD-10-CM

## 2019-03-11 PROCEDURE — 37765 STAB PHLEB VEINS XTR 10-20: CPT | Mod: LT | Performed by: SURGERY

## 2019-03-11 PROCEDURE — 36475 ENDOVENOUS RF 1ST VEIN: CPT | Mod: LT | Performed by: SURGERY

## 2019-03-11 NOTE — PROGRESS NOTES
[facility] Operative Report    Preoperative diagnosis:    1.  CEAP 4 chronic venous insufficiency left lower extremity with complications of superficial thrombophlebitis  2.  Symptomatic left great saphenous vein incompetence  3.  Symptomatic left great saphenous vein tributaries    Post operative diagnosis:  Same    Procedure:  1.  Radiofrequency ablation left great saphenous vein  2.  Multiple stab phlebectomies left leg (medically necessary-phlebitis, 8 stabs)    Preoperative medications: 3 mg ativan, 0.1 mg clonidine    Surgeon  Ashish Leroy MD    First assistant  Alexa Houselog CST/CSFA    Operative description  Indications: Left leg pain with complication of superficial thrombophlebitis, recalcitrant to conservative management    Procedure: Details of the procedure including risks of bleeding, infection, nerve injury, scarring, hyperpigmentation, deep vein thrombosis, recanalization of the ablated veins and recurrent varicose veins were discussed.  He understood that the cluster of varicosities on his left lateral thigh would likely need to be treated with ultrasound-guided sclerotherapy.  Some of the tributaries in this cluster have had superficial thrombophlebitis.  Informed consent was obtained.    I had the patient stand and marked varicosities coursing along the left medial calf, anteriorly, down the anterolateral left leg to just cephalad of the ankle and posteriorly along the left medial calf to the posterior calf with an indelible marker.    VNUS: We proceeded to the operating room, had the patient lie supine the operative table, then prepped and draped the left groin and entire left lower extremity sterilely.  We took a timeout to confirm the appropriate operative site and procedure: Radiofrequency ablation in the left great saphenous vein with medically necessary phlebectomies.    I interrogated the left leg with ultrasound and clearly identified the left great  saphenous vein.  We traced the vein distally to the proximal calf and distal to the point of origin of the last major tributary.  I accessed the vein approximately 10 cm below the knee after infiltrating the skin overlying the vein with 1% lidocaine with bicarbonate.  We placed a micropuncture needle vein followed by guidewire and a 7 [place] sheath.  We then passed our closure fast device positioning the tip of the device 2.19 cm from the saphenofemoral junction under ultrasound guidance.  Visualization of the saphenofemoral junction was difficult given the patient's body habitus.    After confirming catheter position with the operative table in Trendelenburg position, injected tumescent anesthetic along the course the great saphenous vein under ultrasound guidance.  Prior to infiltrating the tissues around the saphenofemoral junction, I confirmed catheter tip position.  Tumescent anesthetic was then injected along the course of each of the marked varicosities and the block allowed to take effect.    I once again confirmed that the catheter tip was 2.19 cm from the saphenofemoral junction, applied compression with the ultrasound probe with addition with the 2 fingerbreadths, then treated the first 3 segments of the great saphenous vein with 2 RF sessions each.  Remaining vein was treated with 1 RF session to each 7 cm increment down to below the knee.  The patient was comfortable throughout.  I reimaged the vein found to be closed, noting that the saphenofemoral junction and common femoral vein were fully compressible and free of thrombus.  The sheath and the catheter were removed and hemostasis secured with pressure.    I made stab wounds beside each of the marked varicosities with an 11 scalpel, retrieve the veins with either vein hooks or jama hooks and avulsed them with mosquito clamps.  Hemostasis was secured with pressure.    After completing the phlebectomies, we cleaned the leg with saline solution and  applied petroleum jelly to the skin.  We then dressed the leg with ABD pads, cast padding and an Ace bandage from the toes to groin.  We moved the patient to recovery and observed him for 30 minutes to ensure excellent hemostasis.  Postoperative instructions were given in verbal and written form to the patient and his father.  We then took him to his car in a wheelchair.    He tolerated the procedure well without evidence of complications or allergic reaction.  He will return in 72 hours for a postoperative venous duplex ultrasound.        Bonita Ballard RN monitored blood pressure, pulse and pulse oximetry continuously throughout the procedure.    VNUS Procedure:    Pharmacologic agents:   Local anesthesia:   10 mL Lidocaine 1% with Epinephrine + 1 mL Sodium Bicarbonate 8.4%    Total injected volume: 2 mL    Tumescent Anesthesia: 500 ml bag 0.9% Sodium Chloride +60 ml 1% lidocaine with epi 1:100,000 +12 ml 8.4% Sodium Bicarbonate (total volume: 572ml per bag)  Total injected volume: 572 mL    Use of ultrasound guidance:  yes  Start time: 1318  End time: 1410  Catheter insertion site: Approximately 8 cm below the knee  Length of vein treated: 55 cm  Treatment with 11 RF cycles for 3 minutes and 40 seconds      Ashish Leroy MD    Please send a copy of this dictation to Erika Espino

## 2019-03-14 ENCOUNTER — DOCUMENTATION ONLY (OUTPATIENT)
Dept: SLEEP MEDICINE | Facility: CLINIC | Age: 44
End: 2019-03-14
Payer: COMMERCIAL

## 2019-03-14 ENCOUNTER — APPOINTMENT (OUTPATIENT)
Dept: VASCULAR SURGERY | Facility: CLINIC | Age: 44
End: 2019-03-14
Payer: COMMERCIAL

## 2019-03-14 ENCOUNTER — OFFICE VISIT (OUTPATIENT)
Dept: SLEEP MEDICINE | Facility: CLINIC | Age: 44
End: 2019-03-14
Payer: COMMERCIAL

## 2019-03-14 VITALS
OXYGEN SATURATION: 97 % | HEART RATE: 88 BPM | SYSTOLIC BLOOD PRESSURE: 114 MMHG | WEIGHT: 315 LBS | BODY MASS INDEX: 47.74 KG/M2 | HEIGHT: 68 IN | DIASTOLIC BLOOD PRESSURE: 68 MMHG

## 2019-03-14 DIAGNOSIS — G47.33 OSA (OBSTRUCTIVE SLEEP APNEA): Primary | ICD-10-CM

## 2019-03-14 PROCEDURE — 93971 EXTREMITY STUDY: CPT | Performed by: SURGERY

## 2019-03-14 PROCEDURE — 99207 ZZC VEINSOLUTIONS 48 HOUR: CPT | Performed by: SURGERY

## 2019-03-14 PROCEDURE — 99214 OFFICE O/P EST MOD 30 MIN: CPT | Performed by: FAMILY MEDICINE

## 2019-03-14 ASSESSMENT — MIFFLIN-ST. JEOR: SCORE: 2679.56

## 2019-03-14 NOTE — PROGRESS NOTES
"Pt seen at [facility], states he is waking up very dry and his heated tubing does not appear to warm up as it used to. Pt current settings on machine is humidifier at 6 and tube temp at 80F degrees. Reviewed patients account in [employer], does appear patients ambient temperature of room is really dry. Educate patient that tube temperature and the humidity level are not related on how much humidity the machine will produce. Called [employer] with patient in office. Rep at [facility] states that machine is functioning as expected the heat plate is working for the humidifier and will turn on to keep the humidity at 85%, no matter what setting it is at, it does not go higher than that to prevent \"rain out\"/condensation in tube. Pt appears to understand that the tube temperature does not relate to the humidity. [employer] rep recommended pt to turn down tube temperature to room temp and turn up humidity if needed. I also discussed with patient that his dry mouth may be related to medications and could use an over the counter oral moisturizer such as Biotene. Provided patient with new filters this date and also went over reorder supply schedule of items for CPAP  Machine.   "

## 2019-03-14 NOTE — PATIENT INSTRUCTIONS
Your BMI is Body mass index is 62.19 kg/m .  Weight management is a personal decision.  If you are interested in exploring weight loss strategies, the following discussion covers the approaches that may be successful. Body mass index (BMI) is one way to tell whether you are at a healthy weight, overweight, or obese. It measures your weight in relation to your height.  A BMI of 18.5 to 24.9 is in the healthy range. A person with a BMI of 25 to 29.9 is considered overweight, and someone with a BMI of 30 or greater is considered obese. More than two-thirds of American adults are considered overweight or obese.  Being overweight or obese increases the risk for further weight gain. Excess weight may lead to heart disease and diabetes.  Creating and following plans for healthy eating and physical activity may help you improve your health.  Weight control is part of healthy lifestyle and includes exercise, emotional health, and healthy eating habits. Careful eating habits lifelong are the mainstay of weight control. Though there are significant health benefits from weight loss, long-term weight loss with diet alone may be very difficult to achieve- studies show long-term success with dietary management in less than 10% of people. Attaining a healthy weight may be especially difficult to achieve in those with severe obesity. In some cases, medications, devices and surgical management might be considered.  What can you do?  If you are overweight or obese and are interested in methods for weight loss, you should discuss this with your provider.     Consider reducing daily calorie intake by 500 calories.     Keep a food journal.     Avoiding skipping meals, consider cutting portions instead.    Diet combined with exercise helps maintain muscle while optimizing fat loss. Strength training is particularly important for building and maintaining muscle mass. Exercise helps reduce stress, increase energy, and improves fitness.  Increasing exercise without diet control, however, may not burn enough calories to loose weight.       Start walking three days a week 10-20 minutes at a time    Work towards walking thirty minutes five days a week     Eventually, increase the speed of your walking for 1-2 minutes at time    In addition, we recommend that you review healthy lifestyles and methods for weight loss available through the National Institutes of Health patient information sites:  http://win.niddk.nih.gov/publications/index.htm    And look into health and wellness programs that may be available through your health insurance provider, employer, local community center, or greg club.            Your Body mass index is 62.19 kg/m .  Weight management is a personal decision.  If you are interested in exploring weight loss strategies, the following discussion covers the approaches that may be successful. Body mass index (BMI) is one way to tell whether you are at a healthy weight, overweight, or obese. It measures your weight in relation to your height.  A BMI of 18.5 to 24.9 is in the healthy range. A person with a BMI of 25 to 29.9 is considered overweight, and someone with a BMI of 30 or greater is considered obese. More than two-thirds of American adults are considered overweight or obese.  Being overweight or obese increases the risk for further weight gain. Excess weight may lead to heart disease and diabetes.  Creating and following plans for healthy eating and physical activity may help you improve your health.  Weight control is part of healthy lifestyle and includes exercise, emotional health, and healthy eating habits. Careful eating habits lifelong are the mainstay of weight control. Though there are significant health benefits from weight loss, long-term weight loss with diet alone may be very difficult to achieve- studies show long-term success with dietary management in less than 10% of people. Attaining a healthy weight may be  especially difficult to achieve in those with severe obesity. In some cases, medications, devices and surgical management might be considered.  What can you do?  If you are overweight or obese and are interested in methods for weight loss, you should discuss this with your provider.     Consider reducing daily calorie intake by 500 calories.     Keep a food journal.     Avoiding skipping meals, consider cutting portions instead.    Diet combined with exercise helps maintain muscle while optimizing fat loss. Strength training is particularly important for building and maintaining muscle mass. Exercise helps reduce stress, increase energy, and improves fitness. Increasing exercise without diet control, however, may not burn enough calories to loose weight.       Start walking three days a week 10-20 minutes at a time    Work towards walking thirty minutes five days a week     Eventually, increase the speed of your walking for 1-2 minutes at time    In addition, we recommend that you review healthy lifestyles and methods for weight loss available through the National Institutes of Health patient information sites:  http://win.niddk.nih.gov/publications/index.htm    And look into health and wellness programs that may be available through your health insurance provider, employer, local community center, or greg club.    Weight management plan: Patient was referred to their PCP to discuss a diet and exercise plan.

## 2019-03-15 ENCOUNTER — DOCUMENTATION ONLY (OUTPATIENT)
Dept: SLEEP MEDICINE | Facility: CLINIC | Age: 44
End: 2019-03-15

## 2019-03-15 NOTE — PROGRESS NOTES
"  Obstructive Sleep Apnea - PAP Follow-Up Visit:    Chief Complaint   Patient presents with     CPAP Follow Up     Pt states that his CPAP keeps \"burning out\" the heated hoses and he has been having problems with having a dry mouth.        43 yo M with history of severe obesity (BMI > 60), severe FARZANA with profound desaturations, obesity hypoventilation, recurrent history of methamphetamine abuse, DM II, CHF and pulmonary HTN who presents for follow-up of treatment with bilevel PAP.  He has a history of very severe FARZANA with profound oxygen desaturations and hypoventilation. Earliest PSG seen was ~1998 with weight 379 lbs, AHI 34.9, guille desat 58%, elevated TCM but with CPAP 14 felt to be effective. Appears to have been treated with UPPP. Had repeat split-night PSG on 2/7/2011 with weight 420 lbs, AHI 54.9, guille desat 51%, hypoventilation (pCO2 by TCM up to 68 mmHg) with CPAP 18 nor bilevel 20/16 fully effective. Had bilevel titration PSG on 7/6/2011 with weight 420 lbs, bilevel 23/18 and 21/17 appearing effective, included supine REM, and TCM improved to mid-50's from zenith of ~60. Was started on treated with bilevel and not seen in our clinic since that time. Bilevel titration PSG on 6/25/2014 with weight 450 lbs, bilevel titrated to 23/14 cm H2O appeared optimal, included supine REM with control of obstructive events (with minimization of mask leak) and normalization of SpO2.     3/20/2015 - He feels the bilevel continues to work well. Some residual sleepiness and he reports has been previously treated with Modafinil. Last use of amphetamines of 3+ months. Review of bilevel download shows AHI < 5. Continues to work toward hopeful bariatric surgery.  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     3/7/2016 - Returns for routine follow-up.  Overall, doing well.  Some concerns that machine is starting to malfunction, multiple " "error messages.  Weight is down to 420 lbs.  His machine does not appear to be recording data currently at this time. Now sober from amphetamines for ~14 months.  He feels the modafinil has been very beneficial, no concerns.  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     4/25/2016 - Returns for compliance visit for new bilevel machine.  Continues to do well, weight is currently at ~420 lbs (zenith of ~470 lbs as of 3/2015) and feels the inspiratory pressure can be too high.  Feels the modafinil continues to work very well.  Verified on prescription monitoring program that no extra rx's.  He continues to use daytime oxygen \"as needed\" at 2 LPM, he is wearing it today.  Plans to pursue an umbilical hernia repair, having significant issues with pain with defecation, able to disimpact manually.  Bilevel download from 3/25/2016 - 4/23/2016 on 23/14 cm H2O spontaneous on room air.  Average daily usage of 5:04, used >= 4 hours on 84% of nights.  Leak 95th%ile of 46.9 L/min.  AHI 9.3, with AI of 9.1 of which centrals are 7.1.  A/P plan to adjust bilevel to 21/14 cm H2O, continue Modafinil 200mg 1/2 tablet TID to QID.     10/13/2016 - Returns for 6 month follow-up.  Reports doing very well with no concerns today, modafinil continues to be effective.  Nearly 2 years sober from methamphetamines, has norman on his phone that helps keep track of this.  Bilevel download from 9/12/2016 - 10/11/2016 on 21/14 cm H2O spontaneous.  Average daily usage of 4:28, used >= 4 hours on 63% of nights.  AHI 1.  A/P to continue bilevel PAP 21/14 spontaneous, increase overall usage, continue modafinil 200mg 1/2 tablet TID to QID.     12/21/2017 - Returns for annual follow-up.  Reports doing well until ~2 weeks ago with difficulty with heated hose no longer heating.  Modafinil continues to be effective.  Bilevel PAP download from 11/19/2017 - " 12/18/2017 on 21/14 cm H2O spontaneous.  Used on 30/30 days, average daily usage of 3:57, used >= 4 hours on 53% of nights.  AHI 1.  Weight is down ~15 lbs compared to last visit.  A/P to continue bilevel PAP 21/14 spontaneous on RA, modafinil 200mg 1/2 TID to QID.    Today - Returns for annual follow-up.  Overall, he feels he is sleeping well.  His primary concern today is that he is having an issue where his tube heating will stop working consistently after 1-2 months and when this occurs, will have significant oral dryness.    Bilevel PAP download from 2/12/2019 - 3/13/2019 on 21/14 cm H2O spontaneous on room air.  Used 30/30 days, average usage of 5 hours 38 minutes, AHI 0.7.        Prior Sleep Testing:  ~1998 - Split-night PSG.  Weight 379 lbs, AHI 34.9, guille desat 58%, elevated TCM, CPAP 14 effective  2/7/2011 - Split-night PSG with weight 420 lbs, AHI 54.9, guille desat 51%, hypoventilation (pCO2 by TCM up to 68 mmHg) with CPAP 18 nor bilevel 20/16 fully effective  7/6/2011 - Bilevel titration PSG with weight 420 lbs, bilevel 23/18 and 21/17 appearing effective, included supine REM, and TCM improved to mid-50's from zenith of ~60  6/25/2014 - Bilevel titration PSG with weight 450 lbs, bilevel titrated to 23/14 cm H2O appeared optimal, included supine REM with control of obstructive events (with minimization of mask leak) and normalization of SpO2.      Problem List:  Patient Active Problem List    Diagnosis Date Noted     Methamphetamine abuse in remission (H) 07/17/2017     Priority: Medium     Type 2 diabetes mellitus without complication, without long-term current use of insulin (H) 10/17/2016     Priority: Medium     Mild intermittent asthma without complication 03/24/2015     Priority: Medium     Pulmonary hypertension (H) 06/22/2014     Priority: Medium     Hyperlipidemia LDL goal <100 06/08/2012     Priority: Medium     Venous stasis 06/08/2012     Priority: Medium     Chronic respiratory failure (H)  "02/11/2011     Priority: Medium     ABG 2/11- 7.38/51/75 RA       FARZANA (obstructive sleep apnea)/Hypoventilation Syndrome- Severe      Priority: Medium     Sleep Study 1998- AHI 51. Rx 16 cm. S/p UVPP 1998, weight loss. Sleep study 9/10/03 (weight 379)- AHI 34.9. Lo2 58%, with reported hypoventilation.  Sleep study 9/29/03- CPAP 14 cm appeared effective. MSLT- 1.8 minutes, 2 sleep onset REMs, diagnosed with 'narcolepsy', placed on Provigil (though he did not tolerate CPAP on 1st study 9/03).   Sleep study 2/11- AHI 54.9, with desaturations to 51%. Transcutaneous CO2 monitoring showed some elevations consistent with hypoventilation. CPAP titration: No fully effective pressure was found. CPAP was titrated to 18 cm with improvement in severe desaturations, but some persistent hypopneas and arousals. Supine REM was noted at this pressure. Brief trial of 20/16 showed persistent events. Efficacy was limited by mask leak throughout most of the study.        Morbid obesity (H)      Priority: Medium     Morbid obesity            /68   Pulse 88   Ht 1.715 m (5' 7.5\")   Wt (!) 182.8 kg (403 lb)   SpO2 97%   BMI 62.19 kg/m      Impression/Plan:    1.) Very severe FARZANA with profound oxygen desaturations, nocturnal hypoventilation and significant obesity hypoventilation   - Appears adequately controlled on bilevel 21/14 cm H2O spontaneous, though borderline compliance.  Discussed and recommended using on a nightly basis and increasing usage time.  - Will ask DME to see if potential machine malfunction.  - Plan for LENNY on current bilevel settings once felt that no potential machine concerns.     2.) Excessive daytime sleepiness  - Assumed multi-factorial with severe obesity, potential obstructive sleep apnea hypopnea syndrome  - Complicated by long-standing history of recurrent methamphetamine abuse. Patient reports prior use of Modafinil and was well tolerated.  - Sober from methamphetamines x 22 months, reviewed " prescription monitoring program and no concerns.  - Continue Modafinil 200mg 1/2 tablet TID to QID (how dosed previously per patient and worked well)         Refugio P Kennedy will follow up in about 1 year(s).     Twenty-five minutes spent with patient, all of which were spent face-to-face counseling, consulting, coordinating plan of care.      Bhaskar Johnson MD, MD    CC:  Erika Espino

## 2019-03-28 ENCOUNTER — APPOINTMENT (OUTPATIENT)
Dept: VASCULAR SURGERY | Facility: CLINIC | Age: 44
End: 2019-03-28
Payer: COMMERCIAL

## 2019-03-28 PROCEDURE — 99207 ZZC VEINSOLUTIONS NO CHARGE VISIT: CPT | Performed by: SURGERY

## 2019-04-01 ENCOUNTER — OFFICE VISIT (OUTPATIENT)
Dept: ENDOCRINOLOGY | Facility: CLINIC | Age: 44
End: 2019-04-01
Payer: COMMERCIAL

## 2019-04-01 VITALS
RESPIRATION RATE: 18 BRPM | BODY MASS INDEX: 47.74 KG/M2 | TEMPERATURE: 99.2 F | HEIGHT: 68 IN | SYSTOLIC BLOOD PRESSURE: 96 MMHG | OXYGEN SATURATION: 96 % | WEIGHT: 315 LBS | DIASTOLIC BLOOD PRESSURE: 53 MMHG | HEART RATE: 63 BPM

## 2019-04-01 DIAGNOSIS — E66.01 MORBID OBESITY (H): Primary | ICD-10-CM

## 2019-04-01 ASSESSMENT — PAIN SCALES - GENERAL: PAINLEVEL: NO PAIN (0)

## 2019-04-01 ASSESSMENT — MIFFLIN-ST. JEOR: SCORE: 2668.67

## 2019-04-01 NOTE — LETTER
"2019       RE: Refugio Kennedy  [address]  [facility] [place] [id number]     Dear Colleague,    Thank you for referring your patient, Refugio Kennedy, to the [facility] MEDICAL WEIGHT MANAGEMENT at [facility]. Please see a copy of my visit note below.          Return Medical Weight Management Note     Refugio Kennedy  MRN:  [id number]  :  1975  CRISTI:  19    Dear Dr. Espino,    We had the pleasure of seeing your patient Refugio Kennedy.  He is a 41 year old male who we are continuing to see for treatment of obesity related to: DMII, HLD, sleep apnea on CPAP daily, and hypoventilation syndrome, oxygen dependent.     CURRENT WEIGHT:   400 lbs 9.6 oz    Wt Readings from Last 4 Encounters:   19 (!) 181.7 kg (400 lb 9.6 oz)   19 (!) 182.8 kg (403 lb)   19 (!) 182.3 kg (402 lb)   19 (!) 182.8 kg (403 lb)     Height:  5' 7.5\"  Body Mass Index:  Body mass index is 61.82 kg/m .  Vitals:  B/P: 138/83, P: 80    Initial consult weight was 454 on 5/15/2015.  Weight change since last seen on 12/3/2018 is down 4 pounds.   Total loss is 54 pounds.    INTERVAL HISTORY:  Tolerating topiramate 200 mg BID. Again considering exercise. He has been struggling with snacking but is now feeling more in control.    Diet and Activity Changes Since Last Visit Reviewed With Patient 2019   I have made the following changes to my diet since my last visit: cut cheese down 99%,cut down on condiments especially ketchup and keene working on increasing veggies with lunch and dinner have cut down on snacks in the house almost [id number]   With regards to my diet, I am still struggling with: not sticking to continous exercise routine if i miss  a day i find hard to get back to my schedule also snacking is still weak point    For breakfast, I typically eat: -   For lunch, I typically eat: -   For supper, I typically eat: -   For snack(s), I typically eat: -   I have made the following " changes to my activity/exercise since my last visit: working on finding an accountabilty partner to help me maintain my routine   With regards to my activity/exercise, I am still struggling with: getting back into going after having leg sugery for veins       MEDICATIONS:   Current Outpatient Medications   Medication     albuterol (2.5 MG/3ML) 0.083% neb solution     albuterol (PROAIR HFA) 108 (90 BASE) MCG/ACT Inhaler     aspirin 81 MG tablet     blood glucose monitoring (ACCU-CHEK CHRIS PLUS) test strip     blood glucose monitoring (SOFTCLIX) lancets     cetirizine (ZYRTEC) 10 MG tablet     DIABETIC STERILE LANCETS device     fluticasone (FLONASE) 50 MCG/ACT spray     furosemide (LASIX) 20 MG tablet     insulin pen needle (BD SEVERO U/F) 32G X 4 MM     liraglutide (VICTOZA PEN) 18 MG/3ML solution     lisinopril (PRINIVIL/ZESTRIL) 5 MG tablet     metFORMIN (GLUCOPHAGE-XR) 500 MG 24 hr tablet     modafinil (PROVIGIL) 200 MG tablet     Multiple Vitamin (MULTIVITAMIN OR)     NEW MED     order for DME     order for DME     ORDER FOR DME     orlistat (LAWRENCE) 60 MG capsule     simvastatin (ZOCOR) 10 MG tablet     topiramate (TOPAMAX) 50 MG tablet     triamcinolone (KENALOG) 0.1 % cream     No current facility-administered medications for this visit.        Weight Loss Medication History Reviewed With Patient 4/1/2019   Which weight loss medications are you currently taking on a regular basis?  Topamax (topiramate), Victoza (liraglutide)   Are you having any side effects from the weight loss medication that we have prescribed you? No   If you are having side effects please describe: -     ASSESSMENT:   Maintain topiramate 200 mg BID. Reinforce food plan - focus on no between meal snacking, aggressive lowering of starches and cheese.     FOLLOW-UP:    3 months   10/15 minutes spent on counseling and education    Again, thank you for allowing me to participate in the care of your patient.      Sincerely,    Ashish Hurd  MD Jeannine

## 2019-04-01 NOTE — NURSING NOTE
"Chief Complaint   Patient presents with     Weight Problem     PT here for 4 month weight management follow up       Vitals:    04/01/19 1011   BP: 96/53   BP Location: Left arm   Patient Position: Sitting   Cuff Size: Adult Large   Pulse: 63   Resp: 18   Temp: 99.2  F (37.3  C)   TempSrc: Oral   SpO2: 96%   Weight: (!) 181.7 kg (400 lb 9.6 oz)   Height: 1.715 m (5' 7.5\")       Body mass index is 61.82 kg/m .      JUAN CARLOS Appiah NREMT                      "

## 2019-04-01 NOTE — PROGRESS NOTES
"      Return Medical Weight Management Note     Refugio Kennedy  MRN:  [id number]  :  1975  CRISTI:  19    Dear Dr. Espino,    We had the pleasure of seeing your patient Refugio Kennedy.  He is a 41 year old male who we are continuing to see for treatment of obesity related to: DMII, HLD, sleep apnea on CPAP daily, and hypoventilation syndrome, oxygen dependent.     CURRENT WEIGHT:   400 lbs 9.6 oz    Wt Readings from Last 4 Encounters:   19 (!) 181.7 kg (400 lb 9.6 oz)   19 (!) 182.8 kg (403 lb)   19 (!) 182.3 kg (402 lb)   19 (!) 182.8 kg (403 lb)     Height:  5' 7.5\"  Body Mass Index:  Body mass index is 61.82 kg/m .  Vitals:  B/P: 138/83, P: 80    Initial consult weight was 454 on 5/15/2015.  Weight change since last seen on 12/3/2018 is down 4 pounds.   Total loss is 54 pounds.    INTERVAL HISTORY:  Tolerating topiramate 200 mg BID. Again considering exercise. He has been struggling with snacking but is now feeling more in control.    Diet and Activity Changes Since Last Visit Reviewed With Patient 2019   I have made the following changes to my diet since my last visit: cut cheese down 99%,cut down on condiments especially ketchup and keene working on increasing veggies with lunch and dinner have cut down on snacks in the house almost [id number]   With regards to my diet, I am still struggling with: not sticking to continous exercise routine if i miss  a day i find hard to get back to my schedule also snacking is still weak point    For breakfast, I typically eat: -   For lunch, I typically eat: -   For supper, I typically eat: -   For snack(s), I typically eat: -   I have made the following changes to my activity/exercise since my last visit: working on finding an accountabilty partner to help me maintain my routine   With regards to my activity/exercise, I am still struggling with: getting back into going after having leg sugery for veins       MEDICATIONS:   Current Outpatient " Medications   Medication     albuterol (2.5 MG/3ML) 0.083% neb solution     albuterol (PROAIR HFA) 108 (90 BASE) MCG/ACT Inhaler     aspirin 81 MG tablet     blood glucose monitoring (ACCU-CHEK CHRIS PLUS) test strip     blood glucose monitoring (SOFTCLIX) lancets     cetirizine (ZYRTEC) 10 MG tablet     DIABETIC STERILE LANCETS device     fluticasone (FLONASE) 50 MCG/ACT spray     furosemide (LASIX) 20 MG tablet     insulin pen needle (BD SEVERO U/F) 32G X 4 MM     liraglutide (VICTOZA PEN) 18 MG/3ML solution     lisinopril (PRINIVIL/ZESTRIL) 5 MG tablet     metFORMIN (GLUCOPHAGE-XR) 500 MG 24 hr tablet     modafinil (PROVIGIL) 200 MG tablet     Multiple Vitamin (MULTIVITAMIN OR)     NEW MED     order for DME     order for DME     ORDER FOR DME     orlistat (LAWRENCE) 60 MG capsule     simvastatin (ZOCOR) 10 MG tablet     topiramate (TOPAMAX) 50 MG tablet     triamcinolone (KENALOG) 0.1 % cream     No current facility-administered medications for this visit.        Weight Loss Medication History Reviewed With Patient 4/1/2019   Which weight loss medications are you currently taking on a regular basis?  Topamax (topiramate), Victoza (liraglutide)   Are you having any side effects from the weight loss medication that we have prescribed you? No   If you are having side effects please describe: -     ASSESSMENT:   Maintain topiramate 200 mg BID. Reinforce food plan - focus on no between meal snacking, aggressive lowering of starches and cheese.     FOLLOW-UP:    3 months   10/15 minutes spent on counseling and education    Sincerely,

## 2019-04-05 DIAGNOSIS — E78.5 HYPERLIPIDEMIA LDL GOAL <100: ICD-10-CM

## 2019-04-05 DIAGNOSIS — E66.01 MORBID OBESITY DUE TO EXCESS CALORIES (H): ICD-10-CM

## 2019-04-05 RX ORDER — SIMVASTATIN 10 MG
TABLET ORAL
Qty: 90 TABLET | Refills: 1 | Status: SHIPPED | OUTPATIENT
Start: 2019-04-05 | End: 2019-10-08

## 2019-04-09 RX ORDER — TOPIRAMATE 100 MG/1
200 TABLET, FILM COATED ORAL 2 TIMES DAILY
Qty: 120 TABLET | Refills: 3 | Status: SHIPPED | OUTPATIENT
Start: 2019-04-09 | End: 2019-08-10

## 2019-04-15 ENCOUNTER — ALLIED HEALTH/NURSE VISIT (OUTPATIENT)
Dept: EDUCATION SERVICES | Facility: CLINIC | Age: 44
End: 2019-04-15
Payer: COMMERCIAL

## 2019-04-15 VITALS — BODY MASS INDEX: 61.26 KG/M2 | WEIGHT: 315 LBS

## 2019-04-15 DIAGNOSIS — E11.9 TYPE 2 DIABETES MELLITUS WITHOUT COMPLICATION, WITHOUT LONG-TERM CURRENT USE OF INSULIN (H): Primary | ICD-10-CM

## 2019-04-15 PROCEDURE — G0108 DIAB MANAGE TRN  PER INDIV: HCPCS | Performed by: DIETITIAN, REGISTERED

## 2019-04-15 NOTE — PROGRESS NOTES
"Diabetes Self-Management Education & Support    Diabetes Education Self Management & Training    SUBJECTIVE/OBJECTIVE:  Presents for: Individual review  Accompanied by: Self  Diabetes education in the past 24mo: (P) Yes  Focus of Visit: Healthy Coping, Healthy Eating, Problem Solving, Being Active  Diabetes type: (P) Type 2  Disease course: (P) Improving  Cultural Influences/Ethnic Background:  American    Diabetes Symptoms & Complications  Blurred vision: (P) No  Fatigue: (P) Yes  Foot ulcerations: (P) No  Polydipsia: (P) No  Polyphagia: (P) No  Polyuria: (P) No  Visual change: (P) No  Weakness: (P) No  Weight loss: (P) Yes  Slow healing wounds: (P) No  Weight trend: (P) Fluctuating minimally  Autonomic neuropathy: (P) No  CVA: (P) No  Heart disease: (P) No  Nephropathy: (P) No  Peripheral neuropathy: (P) No  Peripheral Vascular Disease: (P) Yes  Retinopathy: (P) No  Sexual dysfunction: (P) No    Patient Problem List and Family Medical History reviewed for relevant medical history, current medical status, and diabetes risk factors.    Vitals:  Wt (!) 180.1 kg (397 lb)   BMI 61.26 kg/m    Estimated body mass index is 61.26 kg/m  as calculated from the following:    Height as of 4/1/19: 1.715 m (5' 7.5\").    Weight as of this encounter: 180.1 kg (397 lb).   Last 3 BP:   BP Readings from Last 3 Encounters:   04/01/19 96/53   03/14/19 114/68   02/04/19 108/64       History   Smoking Status     Former Smoker     Packs/day: 0.50     Years: 1.00     Types: Cigarettes, Cigars     Quit date: 5/21/2012   Smokeless Tobacco     Never Used       Labs:  Lab Results   Component Value Date    A1C 5.7 01/14/2019     Lab Results   Component Value Date    GLC 92 01/14/2019     Lab Results   Component Value Date    LDL 55 01/14/2019     HDL Cholesterol   Date Value Ref Range Status   01/14/2019 37 (L) >39 mg/dL Final   ]  GFR Estimate   Date Value Ref Range Status   01/14/2019 >90 >60 mL/min/[1.73_m2] Final     Comment:     Non "  GFR Calc  Starting 12/18/2018, serum creatinine based estimated GFR (eGFR) will be   calculated using the Chronic Kidney Disease Epidemiology Collaboration   (CKD-EPI) equation.       GFR Estimate If Black   Date Value Ref Range Status   01/14/2019 >90 >60 mL/min/[1.73_m2] Final     Comment:      GFR Calc  Starting 12/18/2018, serum creatinine based estimated GFR (eGFR) will be   calculated using the Chronic Kidney Disease Epidemiology Collaboration   (CKD-EPI) equation.       Lab Results   Component Value Date    CR 0.96 01/14/2019     No results found for: MICROALBUMIN    Healthy Eating  Cultural/Taoist diet restrictions?: (P) No  Meal planning: (P) Avoiding sweets, Carbohydrate counting, Smaller portions  Meals include: (P) Breakfast, Lunch, Dinner, Other  Breakfast: peanut butter toast then later has: eggs  Lunch: sandwich and vegetables  Dinner: fruity cheerios or spaghetti, pizza or brats, not as good with veggies at supper   Snacks: doing better with snacks  Other: has tried cauliflower now and is doing black olives on pizza, no sliced cheese in the house, doing mustard more vs keene  Beverages: (P) Water, Coffee, Milk, Juice, Diet soda  Has patient met with a dietitian in the past?: (P) Yes    Being Active  Being Active Assessed Today: Yes  Exercise:: Yes  Days per week of moderate to strenuous exercise (like a brisk walk): 2  On average, minutes per day of exercise at this level: 40  How intense was your typical exercise? : (P) Moderate (like brisk walking)  Exercise Minutes per Week: 80  Barrier to exercise: (P) Physical limitation, Other    Monitoring  Blood Glucose Meter: (P) Accu-check  Home Glucose (Sugar) Monitoring: (P) 1-2 times per day  Blood glucose trend: (P) No change  Low Glucose Range (mg/dL): (P)   High Glucose Range (mg/dL): (P) 130-140  Overall Range (mg/dL): (P) 110-130                                  Taking Medications  Diabetes Medication(s)      Biguanides       metFORMIN (GLUCOPHAGE-XR) 500 MG 24 hr tablet    TAKE ONE TABLET BY MOUTH EVERY DAY WITH BREAKFAST (NEED TO KEEP APPOINTMENT WITH DR. SANCHES END OF NOVEMBER)    Incretin Mimetic Agents (GLP-1 Receptor Agonists)       liraglutide (VICTOZA PEN) 18 MG/3ML solution    INJECT 1.8MG UNDER THE SKIN ONCE DAILY          Current Treatments: (P) Diet    Problem Solving  Hypoglycemia Frequency: (P) Rarely  Hypoglycemia Treatment: (P) Candy  Patient carries a carbohydrate source: (P) No  Medical alert: (P) No  Severe weather/disaster plan for diabetes management?: (P) No  DKA prevention plan?: (P) No  Sick day plan for diabetes management?: (P) No    Hypoglycemia symptoms  Confusion: (P) No  Dizziness or Light-Headedness: (P) Yes  Headaches: (P) No  Hunger: (P) Yes  Mood changes: (P) Yes  Nervousness/Anxiety: (P) No  Sleepiness: (P) No  Speech difficulty: (P) No  Sweats: (P) No  Tremors: (P) No    Hypoglycemia Complications  Blackouts: (P) No  Hospitalization: (P) No  Nocturnal hypoglycemia: (P) Yes  Required assistance: (P) No  Required glucagon injection: (P) No  Seizures: (P) No    Reducing Risks  CAD Risks: (P) Hypertension, Obesity, Tobacco exposure  Has dilated eye exam at least once a year?: (P) Yes  Sees dentist every 6 months?: (P) No  Sees podiatrist (foot doctor)?: (P) No    Healthy Coping  Informal Support system:: (P) Liyah based, Friends  Difficulty affording diabetes management supplies?: (P) No  Patient Activation Measure Survey Score:  MARY Score (Last Two) 1/25/2011   MARY Raw Score 36   Activation Score 47.4   MARY Level 2       ASSESSMENT:  Doing better with veggies, not snacking, has not been eating breakfast meats (dont have any),wt is down another 3 lbs.    Is going to gym three days a week.        Patient's most recent   Lab Results   Component Value Date    A1C 5.7 01/14/2019    is meeting goal of <7.0    INTERVENTION:   Diabetes knowledge and skills assessment:     Patient is knowledgeable  in diabetes management concepts related to: Healthy Eating, Being Active, Monitoring and Taking Medication    Patient needs further education on the following diabetes management concepts: Healthy Eating, Being Active, Monitoring, Taking Medication, Problem Solving, Reducing Risks and Healthy Coping    Based on learning assessment above, most appropriate setting for further diabetes education would be: Individual setting.    Education provided today on:  AADE Self-Care Behaviors:  Healthy Eating: consistency in amount, composition, and timing of food intake  Being Active: relationship to blood glucose and describe appropriate activity program  Monitoring: individual blood glucose targets    Opportunities for ongoing education and support in diabetes-self management were discussed.    Pt verbalized understanding of concepts discussed and recommendations provided today.       Education Materials Provided:  No new materials provided today    PLAN:  See Patient Instructions for co-developed, patient-stated behavior change goals.  AVS printed and provided to patient today. See Follow-Up section for recommended follow-up.      Time Spent: 30 minutes  Encounter Type: Individual    Any diabetes medication dose changes were made via the CDE Protocol and Collaborative Practice Agreement with the patient's primary care provider. A copy of this encounter was shared with the provider.

## 2019-04-17 NOTE — PROGRESS NOTES
Subjective:    Patient ID:  Barbara Rizo is a 76 y.o. female who presents for follow-up of Hypertension      HPI     PAF - TALHA/CV 9/21/15, 1/30/19, 2/2/19 -  on amiodarone and eliquis, HTN, DM, HLD, MGUS, CRI - Cr 2.5     Admitted 1/28/19  Ms. Barbara Rizo is a 75 y.o. female with essential hypertension, hyperlipidemia (LDL 62.4 Jul 2018), type 2 diabetes mellitus (HbA1c 5.8% Jul 2018), CKD Stage 3, paroxysmal atrial fibrillation ([id number] score 5) not on chronic anticoagulation, obesity (BMI 36.0), MGUS, and chronic gout who presents to [facility] ED with complaints of coughing for three days.  She started to have a non-productive cough, wheezing, and mild dyspnea three days ago which has steadily been worsening since onset.  She also complains of a subjective fever but otherwise denies any nausea, vomiting, chest pain, palpitations, pleurisy, nor any diaphoresis.  She denies any recent travel, hemoptysis, nor any lower extremity pain or swelling.  She does say that she's under a lot of stress recently with her ex- dying yesterday at [facility] due to complications from a heart valve replacement two weeks ago.  She does say that she may have had sick contacts while visiting her ex-.  Her appetite has been poor but she denies any weight loss.     While at her ENT's appointment today she decided to schedule an appointment with her PCP to evaluate her upper airway complaints.  She was unable to see her regular PCP, Dr. Paras Oro, but was able to be seen by another physician who became concerned when she was noted to be in AFib with RVR on EKG.  He recommended EMS transportation to the ED but she refused and decided to drive herself.  Her cardiologist is Dr. Amauri Lomas.     Pt admitted to ICU with afib rvr on amiodarone infusion. Pt with elevated HR  and after TALHA done patient became very agitated and anxious. Ativan 1mg IV given and symptoms got worse. HR up into the  [facility] Consultation    Refugio Kennedy is a 43-year-old gentleman who presents with complaints of left leg pain and varicose veins.  He admits to having varicose veins of both lower extremities for many years but says that he developed pain in his left lateral thigh after being struck by a slow ball about 4 years ago.  He states that over the last 4 years, the pain has flared periodically, the last flare occurring 3 weeks ago.  Area is tender to touch mainly but is also painful when sitting quietly.  He has no history of deep vein thrombosis but has had superficial thrombophlebitis of varicosities in the left lateral thigh in the past.  She was found on an ultrasound of 10/29/2018.    Over the last few weeks, the pain has been more significant.  He has had no pleuritic chest pain or shortness of breath.  He does not admit to any significantly increased swelling of his left leg.    The pain is described as an aching, tiredness and heaviness and even a numbness.  Elevating the leg helps somewhat.  The pain has limited him in his walking on the treadmill for his exercise program but therefore is interfering with his actives of daily living in this way.    Past medical history  Medical: Morbid obesity, obstructive sleep apnea, very hypertension, asthma, hyperlipidemia, methamphetamine abuse in remission  Surgical: Laparoscopic ventral hernia repair May 2016 and December 2016    Family history: Varicose veins in his mother but no evidence of deep vein thrombosis or superficial thrombophlebitis, depression, anxiety and thyroid disease in his mother, depression, anxiety, hypertension and substance abuse in his father, depression, anxiety, substance abuse mental illness in his brother, cancer and another brother, heart disease, diabetes, hypertension and cancer in his maternal grandfather, cancer in his paternal grandfather    Medicines: Albuterol, aspirin, Zyrtec, Flonase, Lasix, insulin, lisinopril, metformin, Provigil,  170s and O2 sats dropped to 77%. Placed on NRB. Improved O2 sats. 5mg IV haldol given as patient was trying to get out of bed and pulling oxygen mask off. Cardizem 10mg Iv given with improved HR to 120s-130s. xopenex scheduled Q 8 hours. Changed to zosyn with temp 102F.   1/30- successful cardioversion, post procedure confused and pulling oxygen off, desaturation, constant re-direction, monitored in ICU overnight. Changed to oral amiodarone. eliquis for anticoagulation. Holding parameters on BP meds. IV steroids, nebs and antibiotic for probable lower resp infection. IV lasix as Bp tolerates.   1/31- HR remain under control NSR 60-70s.On amiodarone, metoprolol and eliquis.  Resp status improved and weaned oxygen. Steroids changed to oral route. DC zosyn and switch to augmentin. Add acapella to nebs. Cr increased from baseline (1.5-1.7). Gentle IVF and monitor with repeat BMP later, was stable,. PT,OT consulted for dc planning. Weaning oxygen. PT/OT consulted and rec H/H without equipment. The patient was transferred to the floor on 1/31. Nephrology was consulted for worsening renal function.      2/2- pt transferred with afib rvr. Successful cardioversion again . Continued oral amiodarone and BB.  still has aggressive cough and URI symptoms. On mucolytic and antibiotic.   2/3- HR 50-60s sinus. Cr sightly higher, sodium 128. BNP 1500. Lasix x one dose. Bringing up more mucus. Steroids weaned 20mg. Still faint wheezes on exam.  Patient had worsening ARF on CKD 3, keeped on george and monitor,nephrology was following.reconsulted PT,OT.fley was removed latera nd she had improvement in ARF.  Changed diet for low slat diet duo to hyponatremia with improvement.  She did well with PT,OT.  Her wheezing and respiratory status is improved,  Patient has been discharged home with HH and PO Abx and OAC and amiodarone and follow up with PCP,nephrology and cardiology in next few days.      1-29:  Admitted with AFib with RVR  1-30:  Zocor    Allergies: NKA    Social history: He is a non-smoker and does not use alcohol    12 point review of systems was completed and was reviewed.  It is significant for a 50 pound weight loss, wheezing, dry cough, sore throat, hoarseness, back pain, pruritus of his feet but is otherwise as noted in the history present illness and past medical history.    Physical exam  General: Pleasant gentleman in no acute distress.  He is 5 feet 8 inches tall weighs 403 pounds blood pressure 130/80 pulse 77 and regular  HEENT: Normocephalic, atraumatic.  EOMI.  External ears and nose are normal.  Respiratory: Normal respiratory effort  Cardiovascular: Pulse is regular  Psychiatric: Judgment, insight, mood and affect are normal  Musculoskeletal: Gait and station are somewhat awkward due to his large size.  The joints of his fingers and toes without significant deformity.  There is no cyanosis of his nailbeds.  Neurologic: Grossly normal  Extremities: There is right lower extremity has 8 mm varicosities over the distal third of his medial right leg with lipodermatosclerosis of the distal third of his right leg medially.  There is 2+ edema with hyperpigmentation of the distal right leg extending into the dorsum of his foot.  On his left lower extremity, he has varicosity extending from the mid medial left thigh, anteriorly across his thigh to the lateral thigh and extending down the lateral to the left knee there is early lipodermatosclerosis of the distal third of his left leg with induration but only mild discoloration.  There is 1-2+ edema.  He has 3+ dorsalis pedis and posterior tibial pulses bilaterally    Duplex ultrasound of his left lower extremity deep veins reveals no evidence of deep vein thrombosis.  His left common femoral vein is incompetent but the remaining deep vein valves are competent.  His left great saphenous vein is incompetent from the saphenofemoral junction to the knee and is a source of a 5.1 mm diameter  incompetent vein branch in the distal thigh with reflux time of 9.7 seconds.  The left small saphenous vein is competent at the saphenopopliteal junction but is incompetent in the proximal calf.  The Vein of Giacomini is incompetent in the distal thigh.  The left anterior accessory saphenous vein is competent but is the source of a 3.6 mm inner diameter incompetent vein branch coursing laterally to the cluster on his lateral thigh.  The varicosity on the distal medial thigh also communicates with the cluster on his lateral thigh.  There is subacute thrombus noted in the tributaries on the lateral left thigh.    Impression  CEAP 4 bilateral extremity chronic venous insufficiency with complications of superficial thrombophlebitis of the left lower extremity.  He has worn compression but has great difficulty finding any that will fit him.  His morbid obesity is certainly treating to his problem.    The pain is expressing his left lateral thigh is related to the superficial thrombophlebitis.  He has sizable varicosities in the area.    Given his advanced venous stasis changes, I feel he would benefit from treatment of the superficial venous insufficiency as well as weight loss with an ongoing exercise program.  We measured him for compression hose.    We discussed details of radiofrequency ablation of the left great saphenous vein using thermal and nonthermal techniques.  Given his large size and the size of his varicosities on his left thigh, I would prefer phlebectomies of the varicosities at the same setting.  He would likely need sclerotherapy of the tributaries on the lateral thigh cluster in an effort to get them to close as they are deep to the skin but quite noticeable by ultrasound.    Details of procedure including risks of bleeding, infection, nerve injury, scarring, hyperpigmentation, deep vein thrombosis, recanalization of the ablated veins, recurrent varicose veins from the ablation and allergic reaction,   Underwent TALHA/DC cardioversion successfully  2-1 Back in A-fib 120s. SOB  2-3 Still coughing and SOB. NSR 60      2/2/19 CV - 360J converted A-fib to sinus bradycardia 55. Change amiodarone to po. Ok for telemetry     Echo 1/29/19  · TDs  · Normal left ventricular systolic function. The estimated ejection fraction is 55% * specificity decreased secondary to tachyarrhythmia  · Indeterminate left ventricular diastolic function.  · Severe left atrial enlargement.  · Mild-to-moderate mitral regurgitation.  · Intermediate central venous pressure (8 mm Hg).  · The estimated PA systolic pressure is 52 mm Hg            Pulmonary hypertension present.     2/13/19 Less SOB since discharge  EKG NSR with PACs 65   Decrease amiodarone 200 qd  OV 2 months - will discuss changing amiodarone to flecainide at that time    Denies CP or SOB  EKG sinus bradycardia 44  HR mostly runs 40-50 at home        Review of Systems   Constitution: Negative for decreased appetite.   HENT: Negative for ear discharge.    Eyes: Negative for blurred vision.   Respiratory: Negative for hemoptysis.    Endocrine: Negative for polyphagia.   Hematologic/Lymphatic: Negative for adenopathy.   Skin: Negative for color change.   Musculoskeletal: Negative for joint swelling.   Genitourinary: Negative for bladder incontinence.   Neurological: Negative for brief paralysis.   Psychiatric/Behavioral: Negative for hallucinations.   Allergic/Immunologic: Negative for hives.        Objective:    Physical Exam   Constitutional: She is oriented to person, place, and time. She appears well-developed and well-nourished.   HENT:   Head: Normocephalic and atraumatic.   Eyes: Pupils are equal, round, and reactive to light. Conjunctivae are normal.   Neck: Normal range of motion. Neck supple.   Cardiovascular: Normal rate, normal heart sounds and intact distal pulses.   Pulmonary/Chest: Effort normal and breath sounds normal.   Abdominal: Soft. Bowel sounds are normal.    Musculoskeletal: Normal range of motion.   Neurological: She is alert and oriented to person, place, and time.   Skin: Skin is warm and dry.         Assessment:       1. Essential hypertension    2. Paroxysmal atrial fibrillation    3. Chronic diastolic CHF (congestive heart failure)         Plan:       Change metoprolol 100 qd to toprol XL 50 qd - may decrease further if bradycardia persists  Discussed alternative AAD - given her repeated episodes of PAF we are both in agreement to stay on amiodarone for now  OV 1 month with TSH, CMP, EKG             deep vein thrombosis, hyperpigmentation and superficial phlebitis from the sclerotherapy were discussed.  He appears to understand.    He has advanced changes of his right leg though he is relatively asymptomatic at this time.  I will encourage him to wear compression on a daily basis in an effort to halt progression of the disease process.    ANANDA Leroy MD    Please send a copy of this dictation to Erika Espino

## 2019-04-19 DIAGNOSIS — E11.9 TYPE 2 DIABETES MELLITUS WITHOUT COMPLICATION, WITHOUT LONG-TERM CURRENT USE OF INSULIN (H): ICD-10-CM

## 2019-04-19 RX ORDER — LIRAGLUTIDE 6 MG/ML
INJECTION SUBCUTANEOUS
Qty: 9 ML | Refills: 3 | Status: SHIPPED | OUTPATIENT
Start: 2019-04-19 | End: 2019-08-20

## 2019-04-19 NOTE — TELEPHONE ENCOUNTER
"Requested Prescriptions   Pending Prescriptions Disp Refills     liraglutide (VICTOZA PEN) 18 MG/3ML solution 9 mL 3     Sig: INJECT 1.8MG UNDER THE SKIN ONCE DAILY       GLP-1 Agonists Protocol Passed - 4/19/2019 11:00 AM        Passed - Blood pressure less than 140/90 in past 6 months     BP Readings from Last 3 Encounters:   04/01/19 96/53   03/14/19 114/68   02/04/19 108/64                 Passed - LDL on file in past 12 months     Recent Labs   Lab Test 01/14/19  1612   LDL 55             Passed - Microalbumin on file in past 12 months     Recent Labs   Lab Test 01/14/19  1613   MICROL 7   UMALCR 4.23             Passed - HgbA1C in past 3 or 6 months     If HgbA1C is 8 or greater, it needs to be on file within the past 3 months.  If less than 8, must be on file within the past 6 months.     Recent Labs   Lab Test 01/14/19  1612   A1C 5.7*             Passed - Medication is active on med list        Passed - Patient is age 18 or older        Passed - Normal serum creatinine on file in past 12 months     Recent Labs   Lab Test 01/14/19  1612   CR 0.96             Passed - Recent (6 mo) or future (30 days) visit within the authorizing provider's specialty     Patient had office visit in the last 6 months or has a visit in the next 30 days with authorizing provider.  See \"Patient Info\" tab in inbasket, or \"Choose Columns\" in Meds & Orders section of the refill encounter.          liraglutide (VICTOZA PEN) 18 MG/3ML solution  Last Written Prescription Date:  12/20/2018  Last Fill Quantity: 9mL,  # refills: 3   Last office visit: 2/4/2019 with prescribing provider:  MICHELLE Soto   Future Office Visit:      Romi Galo RT (R) (M)      "

## 2019-05-02 ENCOUNTER — OFFICE VISIT (OUTPATIENT)
Dept: VASCULAR SURGERY | Facility: CLINIC | Age: 44
End: 2019-05-02
Payer: COMMERCIAL

## 2019-05-02 DIAGNOSIS — Z53.9 ERRONEOUS ENCOUNTER--DISREGARD: Primary | ICD-10-CM

## 2019-05-02 PROCEDURE — 76942 ECHO GUIDE FOR BIOPSY: CPT | Performed by: SURGERY

## 2019-05-02 PROCEDURE — 36471 NJX SCLRSNT MLT INCMPTNT VN: CPT | Performed by: SURGERY

## 2019-05-02 NOTE — PROGRESS NOTES
[facility] Procedure Note    Preprocedure Diagnosis  1.  CEAP 4 chronic venous insufficiency left lower extremity; status post radiofrequency ablation left great saphenous vein  2.  Symptomatic, incompetent left thigh great saphenous and non-saphenous tributary  3.  Recurrent superficial thrombophlebitis left thigh saphenous and non-saphenous tributaries    Post Procedure Diagnosis  Same    Procedure  Ultrasound-guided sclerotherapy residual, symptomatic left thigh saphenous and non-saphenous tributaries (3 veins injected, 6 mL of 1% polidocanol foam)    Surgeon  Ashish Leroy MD    Procedure Description  Details the procedure including risks of allergic reaction, deep vein thrombosis, ulceration, hyperpigmentation, superficial thrombophlebitis were all discussed with the patient.  Patient voiced understanding, questions were answered and informed consent obtained.    Under ultrasound guidance, the left anterior and lateral thigh, symptomatic, incompetent saphenous and non-saphenous tributaries were localized and a 27-gauge needle directed into them.  1% polidocanol foam in a one part polidocanol to 2.5 parts air mixture was injected into the veins under ultrasound guidance.  I injected initially in the proximal, lateral thigh, then moved down to the mid anterolateral thigh and then finally to the distal third of the anterior left thigh injecting a total of 6 mL of polidocanol.  The veins were noted to be in tight spasm and filled with solution. I had the patient perform ankle pumps on the table.      After completing the sclerotherapy, the leg was cleaned with saline solution and compression hose applied.  We had the patient walk for 10 minutes prior to leaving the clinic. He tolerated the procedure well without evidence of allergic reaction or other complications.  Post procedure instructions and restrictions were given.    The patient will return in 4 weeks for follow-up.    Ashish Shane  MD Rad

## 2019-05-03 DIAGNOSIS — J30.1 ALLERGIC RHINITIS DUE TO POLLEN: ICD-10-CM

## 2019-05-03 RX ORDER — CETIRIZINE HYDROCHLORIDE 10 MG/1
TABLET ORAL
Qty: 90 TABLET | Refills: 3 | Status: SHIPPED | OUTPATIENT
Start: 2019-05-03 | End: 2020-05-11

## 2019-05-03 NOTE — TELEPHONE ENCOUNTER
"Requested Prescriptions   Pending Prescriptions Disp Refills     cetirizine (ZYRTEC) 10 MG tablet 90 tablet 3       Antihistamines Protocol Passed - 5/3/2019 11:07 AM        Passed - Patient is 3-64 years of age     Apply weight-based dosing for peds patients age 3 - 12 years of age.    Forward request to provider for patients under the age of 3 or over the age of 64.          Passed - Recent (12 mo) or future (30 days) visit within the authorizing provider's specialty     Patient had office visit in the last 12 months or has a visit in the next 30 days with authorizing provider or within the authorizing provider's specialty.  See \"Patient Info\" tab in inbasket, or \"Choose Columns\" in Meds & Orders section of the refill encounter.              Passed - Medication is active on med list        cetirizine (ZYRTEC) 10 MG tablet  Last Written Prescription Date:  04/30/2018  Last Fill Quantity: 90 tablet,  # refills: 3   Last office visit: 2/4/2019 with prescribing provider:  MICHELLE Soto   Future Office Visit:      Romi PATEL (R) (M)    "

## 2019-05-13 ENCOUNTER — ALLIED HEALTH/NURSE VISIT (OUTPATIENT)
Dept: EDUCATION SERVICES | Facility: CLINIC | Age: 44
End: 2019-05-13
Payer: COMMERCIAL

## 2019-05-13 VITALS — WEIGHT: 315 LBS | BODY MASS INDEX: 61.79 KG/M2

## 2019-05-13 DIAGNOSIS — E11.9 TYPE 2 DIABETES MELLITUS WITHOUT COMPLICATION, WITHOUT LONG-TERM CURRENT USE OF INSULIN (H): Primary | ICD-10-CM

## 2019-05-13 PROCEDURE — G0108 DIAB MANAGE TRN  PER INDIV: HCPCS | Performed by: DIETITIAN, REGISTERED

## 2019-05-13 NOTE — PROGRESS NOTES
"Diabetes Self-Management Education & Support    Diabetes Education Self Management & Training    SUBJECTIVE/OBJECTIVE:  Presents for: Individual review  Accompanied by: Self  Diabetes education in the past 24mo: Yes  Focus of Visit: Healthy Coping, Healthy Eating, Problem Solving, Being Active  Diabetes type: Type 2  Disease course: Improving  Cultural Influences/Ethnic Background:  American      Diabetes Symptoms & Complications  Blurred vision: No  Fatigue: Yes  Foot ulcerations: No  Polydipsia: No  Polyphagia: No  Polyuria: No  Visual change: No  Weakness: No  Weight loss: Yes  Slow healing wounds: No  Weight trend: Fluctuating minimally  Autonomic neuropathy: No  CVA: No  Heart disease: No  Nephropathy: No  Peripheral neuropathy: No  Peripheral Vascular Disease: Yes  Retinopathy: No  Sexual dysfunction: No    Patient Problem List and Family Medical History reviewed for relevant medical history, current medical status, and diabetes risk factors.    Vitals:  Wt (!) 181.6 kg (400 lb 6.4 oz)   BMI 61.79 kg/m    Estimated body mass index is 61.79 kg/m  as calculated from the following:    Height as of 4/1/19: 1.715 m (5' 7.5\").    Weight as of this encounter: 181.6 kg (400 lb 6.4 oz).   Last 3 BP:   BP Readings from Last 3 Encounters:   04/01/19 96/53   03/14/19 114/68   02/04/19 108/64       History   Smoking Status     Former Smoker     Packs/day: 0.50     Years: 1.00     Types: Cigarettes, Cigars     Quit date: 5/21/2012   Smokeless Tobacco     Never Used       Labs:  Lab Results   Component Value Date    A1C 5.7 01/14/2019     Lab Results   Component Value Date    GLC 92 01/14/2019     Lab Results   Component Value Date    LDL 55 01/14/2019     HDL Cholesterol   Date Value Ref Range Status   01/14/2019 37 (L) >39 mg/dL Final   ]  GFR Estimate   Date Value Ref Range Status   01/14/2019 >90 >60 mL/min/[1.73_m2] Final     Comment:     Non  GFR Calc  Starting 12/18/2018, serum creatinine based " estimated GFR (eGFR) will be   calculated using the Chronic Kidney Disease Epidemiology Collaboration   (CKD-EPI) equation.       GFR Estimate If Black   Date Value Ref Range Status   2019 >90 >60 mL/min/[1.73_m2] Final     Comment:      GFR Calc  Starting 2018, serum creatinine based estimated GFR (eGFR) will be   calculated using the Chronic Kidney Disease Epidemiology Collaboration   (CKD-EPI) equation.       Lab Results   Component Value Date    CR 0.96 2019     No results found for: MICROALBUMIN    Healthy Eating  Cultural/Gnosticist diet restrictions?: No  Meal planning: Avoiding sweets, Carbohydrate counting, Smaller portions  Meals include: Breakfast, Lunch, Dinner, Other  Breakfast: peanut butter toast then later has: eggs  Lunch: bowl of cereal or sandwich   Dinner: tacos,   Snacks: veggies on the decline, need to get more veggies  Beverages: Water, Coffee, Milk, Juice, Diet soda  Has patient met with a dietitian in the past?: Yes    Being Active  Being Active Assessed Today: Yes  Exercise:: Yes  Days per week of moderate to strenuous exercise (like a brisk walk): (had surgery on legs so ready to get back to the gym)  Barrier to exercise: Physical limitation, Other    Monitoring  Blood Glucose Meter: Accu-check  Home Glucose (Sugar) Monitorin-2 times per day  Blood glucose trend: No change  Low Glucose Range (mg/dL):   High Glucose Range (mg/dL): 130-140  Overall Range (mg/dL): 110-130    105,105,102,111,121,123,105,115,93,115,100,119,117    Taking Medications  Diabetes Medication(s)     Biguanides       metFORMIN (GLUCOPHAGE-XR) 500 MG 24 hr tablet    TAKE ONE TABLET BY MOUTH EVERY DAY WITH BREAKFAST (NEED TO KEEP APPOINTMENT WITH DR. SANCHES END OF NOVEMBER)    Incretin Mimetic Agents (GLP-1 Receptor Agonists)       liraglutide (VICTOZA PEN) 18 MG/3ML solution    INJECT 1.8MG UNDER THE SKIN ONCE DAILY          Current Treatments: Diet    Problem  Solving  Hypoglycemia Frequency: Rarely  Hypoglycemia Treatment: Candy  Patient carries a carbohydrate source: No  Medical alert: No  Severe weather/disaster plan for diabetes management?: No  DKA prevention plan?: No    Hypoglycemia symptoms  Confusion: No  Dizziness or Light-Headedness: Yes  Headaches: No  Hunger: Yes  Mood changes: Yes  Nervousness/Anxiety: No  Sleepiness: No  Speech difficulty: No  Sweats: No  Tremors: No    Hypoglycemia Complications  Blackouts: No  Hospitalization: No  Nocturnal hypoglycemia: Yes  Required assistance: No  Required glucagon injection: No  Seizures: No    Reducing Risks  CAD Risks: Hypertension, Obesity, Tobacco exposure  Has dilated eye exam at least once a year?: Yes  Sees dentist every 6 months?: No  Sees podiatrist (foot doctor)?: No    Healthy Coping  Informal Support system:: Liyah based, Friends  Difficulty affording diabetes management supplies?: No  Patient Activation Measure Survey Score:  MARY Score (Last Two) 1/25/2011   MARY Raw Score 36   Activation Score 47.4   MARY Level 2       ASSESSMENT:  Wt up 3 lbs.  He is trying to get back to the gym and do more activity. Discussed walking around the flea markets as well. He has been snacking more, so he will work on that as well.         Patient's most recent   Lab Results   Component Value Date    A1C 5.7 01/14/2019    is meeting goal of <7.0    INTERVENTION:   Diabetes knowledge and skills assessment:     Patient is knowledgeable in diabetes management concepts related to: Healthy Eating, Being Active, Monitoring and Taking Medication    Patient needs further education on the following diabetes management concepts: Healthy Eating, Being Active, Monitoring, Taking Medication, Problem Solving, Reducing Risks and Healthy Coping    Based on learning assessment above, most appropriate setting for further diabetes education would be: Individual setting.    Education provided today on:  AADE Self-Care Behaviors:  Healthy Eating:  carbohydrate counting, consistency in amount, composition, and timing of food intake and weight reduction  Being Active: relationship to blood glucose and describe appropriate activity program  Monitoring: individual blood glucose targets and frequency of monitoring    Opportunities for ongoing education and support in diabetes-self management were discussed.    Pt verbalized understanding of concepts discussed and recommendations provided today.       Education Materials Provided:  No new materials provided today    PLAN:  See Patient Instructions for co-developed, patient-stated behavior change goals.  AVS printed and provided to patient today. See Follow-Up section for recommended follow-up.      Time Spent: 30 minutes  Encounter Type: Individual    Any diabetes medication dose changes were made via the CDE Protocol and Collaborative Practice Agreement with the patient's primary care provider. A copy of this encounter was shared with the provider.

## 2019-05-13 NOTE — PATIENT INSTRUCTIONS
1.  Work on getting back to the gym, walk around the flea markets    2.  More veggies, better food choices.    3. Watch the snacking, try to avoid if possible.

## 2019-05-23 DIAGNOSIS — I27.20 PULMONARY HYPERTENSION (H): ICD-10-CM

## 2019-05-23 RX ORDER — LISINOPRIL 5 MG/1
5 TABLET ORAL DAILY
Qty: 90 TABLET | Refills: 0 | Status: SHIPPED | OUTPATIENT
Start: 2019-05-23 | End: 2019-08-24

## 2019-05-23 NOTE — TELEPHONE ENCOUNTER
"Requested Prescriptions   Pending Prescriptions Disp Refills     lisinopril (PRINIVIL/ZESTRIL) 5 MG tablet 90 tablet 1     Sig: Take 1 tablet (5 mg) by mouth daily       ACE Inhibitors (Including Combos) Protocol Passed - 5/23/2019  8:13 AM        Passed - Blood pressure under 140/90 in past 12 months     BP Readings from Last 3 Encounters:   04/01/19 96/53   03/14/19 114/68   02/04/19 108/64                 Passed - Recent (12 mo) or future (30 days) visit within the authorizing provider's specialty     Patient had office visit in the last 12 months or has a visit in the next 30 days with authorizing provider or within the authorizing provider's specialty.  See \"Patient Info\" tab in inbasket, or \"Choose Columns\" in Meds & Orders section of the refill encounter.              Passed - Medication is active on med list        Passed - Patient is age 18 or older        Passed - Normal serum creatinine on file in past 12 months     Recent Labs   Lab Test 01/14/19  1612   CR 0.96             Passed - Normal serum potassium on file in past 12 months     Recent Labs   Lab Test 01/14/19  1612   POTASSIUM 3.8             Last Written Prescription Date:  11/26/18  Last Fill Quantity: 90,  # refills: 1   Last office visit: 2/4/2019 with prescribing provider:  MICHELLE Soto   Future Office Visit:      "

## 2019-05-30 ENCOUNTER — OFFICE VISIT (OUTPATIENT)
Dept: VASCULAR SURGERY | Facility: CLINIC | Age: 44
End: 2019-05-30
Payer: COMMERCIAL

## 2019-05-30 DIAGNOSIS — Z53.9 ERRONEOUS ENCOUNTER--DISREGARD: Primary | ICD-10-CM

## 2019-05-30 PROCEDURE — 76942 ECHO GUIDE FOR BIOPSY: CPT | Performed by: SURGERY

## 2019-05-30 PROCEDURE — 36471 NJX SCLRSNT MLT INCMPTNT VN: CPT | Performed by: SURGERY

## 2019-05-30 NOTE — PROGRESS NOTES
[facility] Procedure Note    Preprocedure Diagnosis  1.  Residual, symptomatic, incompetent left saphenous and non-saphenous tributaries; status post radiofrequency ablation of the left great saphenous vein with multiple stab phlebectomies  2.  Status post 1 session ultrasound-guided sclerotherapy left saphenous and non-saphenous tributaries  3.  Superficial thrombophlebitis left saphenous and non-saphenous tributary    Post Procedure Diagnosis  Same    Procedure  Ultrasound-guided sclerotherapy multiple, symptomatic, incompetent left saphenous and non-saphenous tributaries (5 veins injected)    Surgeon  Ashish Leroy MD    Procedure Description  Refugio Kennedy underwent sclerotherapy of the saphenous and non-saphenous tributaries of his left lower extremity on 5/2/2019.  He returns today in follow-up.  Sclerotherapy is being performed to relieve leg pain, swelling and to prevent recurrent superficial thrombophlebitis.    Details the procedure including risks of allergic reaction, deep vein thrombosis, ulceration, hyperpigmentation, superficial thrombophlebitis were all discussed with the patient.  Patient voiced understanding, questions were answered and informed consent obtained.    Ultrasound interrogation revealed noncompressible veins in the proximal anterior left thigh but more distally, the tributary coursing down the anterolateral thigh, lateral to the left knee was compressible and patent.  I felt this needed to be treated in effort to relieve residual pain in the left thigh and to prevent future superficial thrombophlebitis.      Under ultrasound guidance, the left anterior and lateral thigh symptomatic, incompetent saphenous and non-saphenous tributaries were localized and a 27-gauge needle directed into them from the proximal third of the anterolateral thigh down to just below the left knee laterally.  We injected 5 veins, filling them nicely with solution.  1% polidocanol foam in a one part  polidocanol to 2.5 parts air mixture was injected into the veins under ultrasound guidance.  The veins were noted to be in tight spasm.    I had the patient perform ankle pumps on the table after elevating his left leg.  After completing the sclerotherapy, the leg was cleaned with saline solution and compression hose applied.  We had the patient walk for 10 minutes prior to leaving the clinic. He tolerated the procedure well without evidence of allergic reaction or other complications.  Post procedure instructions and restrictions were given.    The patient will return in 4 weeks for follow-up.    Ashish Leroy MD    Please send a copy of this dictation to Erika Espino

## 2019-06-07 ENCOUNTER — TELEPHONE (OUTPATIENT)
Dept: FAMILY MEDICINE | Facility: CLINIC | Age: 44
End: 2019-06-07

## 2019-06-07 DIAGNOSIS — I87.8 VENOUS STASIS: ICD-10-CM

## 2019-06-07 RX ORDER — FUROSEMIDE 20 MG
TABLET ORAL
Qty: 360 TABLET | Refills: 1 | Status: SHIPPED | OUTPATIENT
Start: 2019-06-07 | End: 2020-01-24

## 2019-06-07 NOTE — TELEPHONE ENCOUNTER
"Requested Prescriptions   Pending Prescriptions Disp Refills     furosemide (LASIX) 20 MG tablet 360 tablet 1     Sig: TAKE TWO TABLETS BY MOUTH TWICE A DAY       Diuretics (Including Combos) Protocol Passed - 6/7/2019  8:48 AM        Passed - Blood pressure under 140/90 in past 12 months     BP Readings from Last 3 Encounters:   04/01/19 96/53   03/14/19 114/68   02/04/19 108/64                 Passed - Recent (12 mo) or future (30 days) visit within the authorizing provider's specialty     Patient had office visit in the last 12 months or has a visit in the next 30 days with authorizing provider or within the authorizing provider's specialty.  See \"Patient Info\" tab in inbasket, or \"Choose Columns\" in Meds & Orders section of the refill encounter.              Passed - Medication is active on med list        Passed - Patient is age 18 or older        Passed - Normal serum creatinine on file in past 12 months     Recent Labs   Lab Test 01/14/19  1612   CR 0.96              Passed - Normal serum potassium on file in past 12 months     Recent Labs   Lab Test 01/14/19  1612   POTASSIUM 3.8                    Passed - Normal serum sodium on file in past 12 months     Recent Labs   Lab Test 01/14/19  1612                 furosemide (LASIX) 20 MG tablet  Last Written Prescription Date:  11/05/2018  Last Fill Quantity: 360 tablet,  # refills: 1   Last office visit: 2/4/2019 with prescribing provider:  MICHELLE Soto   Future Office Visit:      Romi PATEL (R) (M)    "

## 2019-06-07 NOTE — TELEPHONE ENCOUNTER
"Requested Prescriptions   Pending Prescriptions Disp Refills     furosemide (LASIX) 20 MG tablet 360 tablet 1     Sig: TAKE TWO TABLETS BY MOUTH TWICE A DAY       Diuretics (Including Combos) Protocol Passed - 6/7/2019  8:48 AM        Passed - Blood pressure under 140/90 in past 12 months     BP Readings from Last 3 Encounters:   04/01/19 96/53   03/14/19 114/68   02/04/19 108/64                 Passed - Recent (12 mo) or future (30 days) visit within the authorizing provider's specialty     Patient had office visit in the last 12 months or has a visit in the next 30 days with authorizing provider or within the authorizing provider's specialty.  See \"Patient Info\" tab in inbasket, or \"Choose Columns\" in Meds & Orders section of the refill encounter.              Passed - Medication is active on med list        Passed - Patient is age 18 or older        Passed - Normal serum creatinine on file in past 12 months     Recent Labs   Lab Test 01/14/19  1612   CR 0.96              Passed - Normal serum potassium on file in past 12 months     Recent Labs   Lab Test 01/14/19  1612   POTASSIUM 3.8                    Passed - Normal serum sodium on file in past 12 months     Recent Labs   Lab Test 01/14/19  1612               Last Written Prescription Date:  11/5/18  Last Fill Quantity: 360,  # refills: 1   Last office visit: 2/4/2019 with prescribing provider:  Srinivas   Future Office Visit:      "

## 2019-06-10 ENCOUNTER — MEDICAL CORRESPONDENCE (OUTPATIENT)
Dept: HEALTH INFORMATION MANAGEMENT | Facility: CLINIC | Age: 44
End: 2019-06-10

## 2019-06-24 ENCOUNTER — ALLIED HEALTH/NURSE VISIT (OUTPATIENT)
Dept: EDUCATION SERVICES | Facility: CLINIC | Age: 44
End: 2019-06-24
Payer: COMMERCIAL

## 2019-06-24 DIAGNOSIS — E11.9 TYPE 2 DIABETES MELLITUS WITHOUT COMPLICATION, WITHOUT LONG-TERM CURRENT USE OF INSULIN (H): Primary | ICD-10-CM

## 2019-06-24 PROCEDURE — G0108 DIAB MANAGE TRN  PER INDIV: HCPCS | Performed by: DIETITIAN, REGISTERED

## 2019-06-24 NOTE — PROGRESS NOTES
"Diabetes Self-Management Education & Support    Diabetes Education Self Management & Training    SUBJECTIVE/OBJECTIVE:  Presents for: Individual review  Accompanied by: Self  Diabetes education in the past 24mo: Yes  Focus of Visit: Healthy Coping, Healthy Eating, Problem Solving, Being Active  Diabetes type: Type 2  Disease course: Improving  Cultural Influences/Ethnic Background:  American      Diabetes Symptoms & Complications  Blurred vision: No  Fatigue: Yes  Foot ulcerations: No  Polydipsia: No  Polyphagia: No  Polyuria: No  Visual change: No  Weakness: No  Weight loss: Yes  Slow healing wounds: No  Weight trend: Fluctuating minimally  Autonomic neuropathy: No  CVA: No  Heart disease: No  Nephropathy: No  Peripheral neuropathy: No  Peripheral Vascular Disease: Yes  Retinopathy: No  Sexual dysfunction: No    Patient Problem List and Family Medical History reviewed for relevant medical history, current medical status, and diabetes risk factors.    Vitals:  There were no vitals taken for this visit.  Estimated body mass index is 61.79 kg/m  as calculated from the following:    Height as of 4/1/19: 1.715 m (5' 7.5\").    Weight as of 5/13/19: 181.6 kg (400 lb 6.4 oz).   Last 3 BP:   BP Readings from Last 3 Encounters:   04/01/19 96/53   03/14/19 114/68   02/04/19 108/64       History   Smoking Status     Former Smoker     Packs/day: 0.50     Years: 1.00     Types: Cigarettes, Cigars     Quit date: 5/21/2012   Smokeless Tobacco     Never Used       Labs:  Lab Results   Component Value Date    A1C 5.7 01/14/2019     Lab Results   Component Value Date    GLC 92 01/14/2019     Lab Results   Component Value Date    LDL 55 01/14/2019     HDL Cholesterol   Date Value Ref Range Status   01/14/2019 37 (L) >39 mg/dL Final   ]  GFR Estimate   Date Value Ref Range Status   01/14/2019 >90 >60 mL/min/[1.73_m2] Final     Comment:     Non  GFR Calc  Starting 12/18/2018, serum creatinine based estimated GFR (eGFR) " will be   calculated using the Chronic Kidney Disease Epidemiology Collaboration   (CKD-EPI) equation.       GFR Estimate If Black   Date Value Ref Range Status   2019 >90 >60 mL/min/[1.73_m2] Final     Comment:      GFR Calc  Starting 2018, serum creatinine based estimated GFR (eGFR) will be   calculated using the Chronic Kidney Disease Epidemiology Collaboration   (CKD-EPI) equation.       Lab Results   Component Value Date    CR 0.96 2019     No results found for: MICROALBUMIN    Healthy Eating  Cultural/Confucianist diet restrictions?: No  Meal planning: Avoiding sweets, Carbohydrate counting, Smaller portions  Meals include: Breakfast, Lunch, Dinner, Other  Breakfast: peanut butter toast then later has: eggs  Lunch: sandwich before work (takes sandwich and light snack at work)  Dinner: pizza, sweet corn or cereal and sandwich  Snacks: needs to avoid the junk at work  Beverages: Water, Coffee, Milk, Juice, Diet soda  Has patient met with a dietitian in the past?: Yes    Being Active  Being Active Assessed Today: Yes  Exercise:: Yes  Days per week of moderate to strenuous exercise (like a brisk walk): 5(walking at work and at flea market)  On average, minutes per day of exercise at this level: 50  How intense was your typical exercise? : Light (like stretching or slow walking)  Exercise Minutes per Week: 250  Barrier to exercise: Physical limitation, Other    Monitoring  Blood Glucose Meter: Accu-check  Home Glucose (Sugar) Monitorin-2 times per day  Blood glucose trend: No change  Low Glucose Range (mg/dL):   High Glucose Range (mg/dL): 130-140  Overall Range (mg/dL): 110-130     Breakfast pp Lunch pp Supper  pp HS   17-Teja 120         18-Teja 101         19-Teja 125         20-Teja 108         21-Teja 115         22-Teja 107         23-Teja 125         24-Teja 118          #DIV/0! #DIV/0! #DIV/0! #DIV/0! #DIV/0! #DIV/0!         Taking Medications  Diabetes Medication(s)      Biguanides       metFORMIN (GLUCOPHAGE-XR) 500 MG 24 hr tablet    TAKE ONE TABLET BY MOUTH EVERY DAY WITH BREAKFAST    Incretin Mimetic Agents (GLP-1 Receptor Agonists)       liraglutide (VICTOZA PEN) 18 MG/3ML solution    INJECT 1.8MG UNDER THE SKIN ONCE DAILY          Current Treatments: Diet    Problem Solving  Hypoglycemia Frequency: Rarely  Hypoglycemia Treatment: Candy  Patient carries a carbohydrate source: No  Medical alert: No  Severe weather/disaster plan for diabetes management?: No  DKA prevention plan?: No    Hypoglycemia symptoms  Confusion: No  Dizziness or Light-Headedness: Yes  Headaches: No  Hunger: Yes  Mood changes: Yes  Nervousness/Anxiety: No  Sleepiness: No  Speech difficulty: No  Sweats: No  Tremors: No    Hypoglycemia Complications  Blackouts: No  Hospitalization: No  Nocturnal hypoglycemia: Yes  Required assistance: No  Required glucagon injection: No  Seizures: No    Reducing Risks  CAD Risks: Hypertension, Obesity, Tobacco exposure  Has dilated eye exam at least once a year?: Yes  Sees dentist every 6 months?: No  Sees podiatrist (foot doctor)?: No    Healthy Coping  Informal Support system:: Liyah based, Friends  Difficulty affording diabetes management supplies?: No  Patient Activation Measure Survey Score:  MARY Score (Last Two) 1/25/2011   MARY Raw Score 36   Activation Score 47.4   MARY Level 2       ASSESSMENT:  Wt up some (4lbs) due to snacking at work and change in scheduling and not going the gym.  He started a new job and they have snacks people bring in there and he is struggling with that some.  Needs to get better organized to make good food choices and get activity in now that working 12-5:30pm.        Patient's most recent   Lab Results   Component Value Date    A1C 5.7 01/14/2019    is meeting goal of <7.0    INTERVENTION:   Diabetes knowledge and skills assessment:     Patient is knowledgeable in diabetes management concepts related to: Healthy Eating, Being Active and  Monitoring    Patient needs further education on the following diabetes management concepts: Healthy Eating, Being Active, Monitoring, Taking Medication, Problem Solving, Reducing Risks and Healthy Coping    Based on learning assessment above, most appropriate setting for further diabetes education would be: Individual setting.    Education provided today on:  AADE Self-Care Behaviors:  Healthy Eating: consistency in amount, composition, and timing of food intake and portion control  Being Active: relationship to blood glucose and describe appropriate activity program    Opportunities for ongoing education and support in diabetes-self management were discussed.    Pt verbalized understanding of concepts discussed and recommendations provided today.       Education Materials Provided:  No new materials provided today    PLAN:  See Patient Instructions for co-developed, patient-stated behavior change goals.  AVS printed and provided to patient today. See Follow-Up section for recommended follow-up.      Time Spent: 30 minutes  Encounter Type: Individual    Any diabetes medication dose changes were made via the CDE Protocol and Collaborative Practice Agreement with the patient's primary care provider. A copy of this encounter was shared with the provider.

## 2019-06-24 NOTE — PATIENT INSTRUCTIONS
1.  Figure out a gym schedule now that you are working     2.  Work on planning out your suppers so you are ready to make after work    3.  Avoid the 'junk' people bring into work

## 2019-06-27 ENCOUNTER — OFFICE VISIT (OUTPATIENT)
Dept: VASCULAR SURGERY | Facility: CLINIC | Age: 44
End: 2019-06-27
Payer: COMMERCIAL

## 2019-06-27 DIAGNOSIS — Z53.9 ERRONEOUS ENCOUNTER--DISREGARD: Primary | ICD-10-CM

## 2019-06-27 PROCEDURE — 99213 OFFICE O/P EST LOW 20 MIN: CPT | Performed by: SURGERY

## 2019-06-27 NOTE — PROGRESS NOTES
[facility] office note    Refugio Kennedy returns in follow-up of ultrasound-guided sclerotherapy of left lower extremity symptomatic, incompetent saphenous and non-saphenous tributaries on 5/30/2019.  Overall he is doing well and says the pain in his left thigh has abated, there is no longer having discomfort when sleeping on his left side and is no longer having pain when his left leg bumps the car door when he is driving.  He is very happy.    Physical exam  General: Pleasant gentleman in no acute distress.  Extremities: There are no significant bulging varicose veins on his left thigh or leg.  There is some induration on the lateral aspect of the left knee in the area of and injected varicosity.  There is no overlying erythema, however.    Impression  Excellent result with symptomatic relief and resolution of varicosities status post radiofrequency ablation of the left great saphenous vein, phlebectomies and ultrasound-guided sclerotherapy.  I suggest that he continue to wear a knee-high compression stocking on a daily basis to help control edema of his ankles.  He will return on an as-needed basis.    EL Leroy MD

## 2019-07-16 ENCOUNTER — DOCUMENTATION ONLY (OUTPATIENT)
Dept: CARE COORDINATION | Facility: CLINIC | Age: 44
End: 2019-07-16

## 2019-07-19 ENCOUNTER — OFFICE VISIT (OUTPATIENT)
Dept: FAMILY MEDICINE | Facility: CLINIC | Age: 44
End: 2019-07-19
Payer: COMMERCIAL

## 2019-07-19 VITALS
DIASTOLIC BLOOD PRESSURE: 60 MMHG | SYSTOLIC BLOOD PRESSURE: 122 MMHG | HEART RATE: 88 BPM | RESPIRATION RATE: 16 BRPM | TEMPERATURE: 97.9 F | BODY MASS INDEX: 47.74 KG/M2 | OXYGEN SATURATION: 96 % | HEIGHT: 68 IN | WEIGHT: 315 LBS

## 2019-07-19 DIAGNOSIS — M21.42 ACQUIRED PES PLANUS OF BOTH FEET: ICD-10-CM

## 2019-07-19 DIAGNOSIS — J96.11 CHRONIC RESPIRATORY FAILURE WITH HYPOXIA (H): ICD-10-CM

## 2019-07-19 DIAGNOSIS — M21.41 ACQUIRED PES PLANUS OF BOTH FEET: ICD-10-CM

## 2019-07-19 DIAGNOSIS — E11.9 TYPE 2 DIABETES MELLITUS WITHOUT COMPLICATION, WITHOUT LONG-TERM CURRENT USE OF INSULIN (H): ICD-10-CM

## 2019-07-19 DIAGNOSIS — I83.811 VARICOSE VEINS OF LEG WITH PAIN, RIGHT: Primary | ICD-10-CM

## 2019-07-19 DIAGNOSIS — F15.11 METHAMPHETAMINE ABUSE IN REMISSION (H): ICD-10-CM

## 2019-07-19 LAB
ANION GAP SERPL CALCULATED.3IONS-SCNC: 7 MMOL/L (ref 3–14)
BUN SERPL-MCNC: 29 MG/DL (ref 7–30)
CALCIUM SERPL-MCNC: 8.9 MG/DL (ref 8.5–10.1)
CHLORIDE SERPL-SCNC: 106 MMOL/L (ref 94–109)
CO2 SERPL-SCNC: 25 MMOL/L (ref 20–32)
CREAT SERPL-MCNC: 0.92 MG/DL (ref 0.66–1.25)
GFR SERPL CREATININE-BSD FRML MDRD: >90 ML/MIN/{1.73_M2}
GLUCOSE SERPL-MCNC: 100 MG/DL (ref 70–99)
HBA1C MFR BLD: 5.6 % (ref 0–5.6)
POTASSIUM SERPL-SCNC: 3.5 MMOL/L (ref 3.4–5.3)
SODIUM SERPL-SCNC: 138 MMOL/L (ref 133–144)

## 2019-07-19 PROCEDURE — 99214 OFFICE O/P EST MOD 30 MIN: CPT | Performed by: FAMILY MEDICINE

## 2019-07-19 PROCEDURE — 36415 COLL VENOUS BLD VENIPUNCTURE: CPT | Performed by: FAMILY MEDICINE

## 2019-07-19 PROCEDURE — 80048 BASIC METABOLIC PNL TOTAL CA: CPT | Performed by: FAMILY MEDICINE

## 2019-07-19 PROCEDURE — 83036 HEMOGLOBIN GLYCOSYLATED A1C: CPT | Performed by: FAMILY MEDICINE

## 2019-07-19 ASSESSMENT — MIFFLIN-ST. JEOR: SCORE: 2654.6

## 2019-07-19 NOTE — PROGRESS NOTES
Subjective     Refugio Kennedy is a 44 year old male who presents to clinic today for the following health issues:    HPI   Hypertension Follow-up      Do you check your blood pressure regularly outside of the clinic? No     Are you following a low salt diet? Yes    Are your blood pressures ever more than 140 on the top number (systolic) OR more   than 90 on the bottom number (diastolic), for example 140/90? No    Amount of exercise or physical activity: None    Problems taking medications regularly: No    Medication side effects: none    Diet: low salt and diabetic    Chronic Resp failure  On Oxygen and doing well   No new issues      Musculoskeletal problem/pain      Duration: worsening over the last several months     He is on his feet all day at work     Notes swelling and pain in the left leg varicose veins are much more tender     The swelling is down in the am and after all day on his feet it is worse     He does wear compression socks all day     He has flat feet and has had more pain in his feet with this job as well   He is not used to being on  His feet all day   Has gotten some OTC inserts that help     Description  Location: right leg and bilateral feet     Intensity:  moderate    Accompanying signs and symptoms: none    History  Previous similar problem: YES- on left leg did have veins treated and it is much better   Previous evaluation:  none    Precipitating or alleviating factors:  Trauma or overuse: no   Aggravating factors include: standing    Therapies tried and outcome: rest/inactivity    Diabetes Follow-up      How often are you checking your blood sugar? One time daily    What time of day are you checking your blood sugars (select all that apply)?  Before meals    Have you had any blood sugars above 200?  No    Have you had any blood sugars below 70?  No    What symptoms do you notice when your blood sugar is low?  Shlomo    What concerns do you have today about your diabetes? None     Do you have  "any of these symptoms? (Select all that apply)  No numbness or tingling in feet.  No redness, sores or blisters on feet.  No complaints of excessive thirst.  No reports of blurry vision.  No significant changes to weight.     Have you had a diabetic eye exam in the last 12 months? No  Has appt next month     BP Readings from Last 2 Encounters:   07/19/19 122/60   04/01/19 96/53     Hemoglobin A1C (%)   Date Value   01/14/2019 5.7 (H)   07/24/2018 5.5     LDL Cholesterol Calculated (mg/dL)   Date Value   01/14/2019 55   12/19/2017 58       Diabetes Management Resources        Methamphetamine abuse history   Sober for many years      Reviewed and updated as needed this visit by Provider  Tobacco  Allergies  Meds  Problems  Med Hx  Surg Hx  Fam Hx         Review of Systems   ROS COMP: Constitutional, HEENT, cardiovascular, pulmonary, gi and gu systems are negative, except as otherwise noted.      Objective    /60 (BP Location: Right arm, Patient Position: Left side, Cuff Size: Adult Large)   Pulse 88   Temp 97.9  F (36.6  C) (Tympanic)   Resp 16   Ht 1.715 m (5' 7.5\")   Wt (!) 180.8 kg (398 lb 9.6 oz)   SpO2 96%   BMI 61.51 kg/m    Body mass index is 61.51 kg/m .  Physical Exam   GENERAL APPEARANCE: healthy, alert and no distress  NECK: no adenopathy, no asymmetry, masses, or scars and thyroid normal to palpation  RESP: lungs clear to auscultation - no rales, rhonchi or wheezes  CV: regular rates and rhythm, normal S1 S2, no S3 or S4 and no murmur, click or rub  MS: pitting tr+ lower extremity edema bilaterally, varicose veins right ankle tender and peripheral pulses normal  PSYCH: mentation appears normal and affect normal/bright    Diagnostic Test Results:  Labs reviewed in Epic        Assessment & Plan     1. Methamphetamine abuse in remission (H)  Stable no change in treatment plan.     2. Chronic respiratory failure with hypoxia (H)  Stable on oxygen     3. Varicose veins of leg with pain, " "right  Symptomatic on the right leg now   - VASCULAR SURGERY REFERRAL; Future    4. Acquired pes planus of both feet    - ORTHOTICS REFERRAL    5. Type 2 diabetes mellitus without complication, without long-term current use of insulin (H)  Stable no change in treatment plan.   - Hemoglobin A1c  - Basic metabolic panel     BMI:   Estimated body mass index is 61.51 kg/m  as calculated from the following:    Height as of this encounter: 1.715 m (5' 7.5\").    Weight as of this encounter: 180.8 kg (398 lb 9.6 oz).   Weight management plan: Discussed healthy diet and exercise guidelines        Patient Instructions   Schedule an eye exam at your earliest convenience.    Labs today     Set up vein appt     Set up orthotic appt     Continue to wear your compression socks both at home and at work       Return in about 6 months (around 1/19/2020) for Diabetic Visit.    Erika Espino MD  [facility]      "

## 2019-07-19 NOTE — PATIENT INSTRUCTIONS
Schedule an eye exam at your earliest convenience.    Labs today     Set up vein appt     Set up orthotic appt     Continue to wear your compression socks both at home and at work

## 2019-07-19 NOTE — NURSING NOTE
"Chief Complaint   Patient presents with     Edema       Initial /60 (BP Location: Right arm, Patient Position: Left side, Cuff Size: Adult Large)   Pulse 88   Temp 97.9  F (36.6  C) (Tympanic)   Resp 16   Ht 1.715 m (5' 7.5\")   Wt (!) 180.8 kg (398 lb 9.6 oz)   SpO2 96%   BMI 61.51 kg/m   Estimated body mass index is 61.51 kg/m  as calculated from the following:    Height as of this encounter: 1.715 m (5' 7.5\").    Weight as of this encounter: 180.8 kg (398 lb 9.6 oz).    Patient presents to the clinic using CelePost Maintenance that is potentially due pending provider review:  ACT    n/a    Is there anyone who you would like to be able to receive your results? No  If yes have patient fill out SVEN    "

## 2019-07-20 ASSESSMENT — ASTHMA QUESTIONNAIRES: ACT_TOTALSCORE: 22

## 2019-07-22 ENCOUNTER — ALLIED HEALTH/NURSE VISIT (OUTPATIENT)
Dept: EDUCATION SERVICES | Facility: CLINIC | Age: 44
End: 2019-07-22
Payer: COMMERCIAL

## 2019-07-22 VITALS — WEIGHT: 315 LBS | BODY MASS INDEX: 61.63 KG/M2

## 2019-07-22 DIAGNOSIS — G47.33 OBSTRUCTIVE SLEEP APNEA-HYPOPNEA SYNDROME: ICD-10-CM

## 2019-07-22 DIAGNOSIS — E11.9 TYPE 2 DIABETES MELLITUS WITHOUT COMPLICATION, WITHOUT LONG-TERM CURRENT USE OF INSULIN (H): Primary | ICD-10-CM

## 2019-07-22 PROCEDURE — G0108 DIAB MANAGE TRN  PER INDIV: HCPCS | Performed by: DIETITIAN, REGISTERED

## 2019-07-22 RX ORDER — MODAFINIL 200 MG/1
TABLET ORAL
Qty: 60 TABLET | Refills: 5 | Status: SHIPPED | OUTPATIENT
Start: 2019-07-22 | End: 2020-03-17

## 2019-07-22 NOTE — TELEPHONE ENCOUNTER
Chief Complaint   Patient presents with     Refill Request     modafinil (PROVIGIL) 200 MG tablet      Refill request received via fax by pharmacy   [facility], [place], [address]     Refugio requests prescriptions be faxed to [facility] [place], [address]        Pending Prescriptions:                       Disp   Refills    modafinil (PROVIGIL) 200 MG tablet        60 tab*5            Sig: Take 1/2 tablet by mouth 3-4 times daily as           needed for sleepiness.         Last Written Prescription Date:  1/22/19  Last Fill Quantity: 60,   # refills: 5  Last Office Visit with prescribing provider: 3/14/19  Future Office visit: none      Routing refill request to provider for review/approval because:  Drug not on the G, P or [facility] refill protocol or controlled substance

## 2019-07-22 NOTE — PROGRESS NOTES
"Diabetes Self-Management Education & Support    Diabetes Education Self Management & Training    SUBJECTIVE/OBJECTIVE:  Presents for: Individual review  Accompanied by: Self  Diabetes education in the past 24mo: Yes  Focus of Visit: Healthy Coping, Healthy Eating, Problem Solving, Being Active  Diabetes type: Type 2  Disease course: Improving  Cultural Influences/Ethnic Background:  American      Diabetes Symptoms & Complications  Blurred vision: No  Fatigue: Yes  Foot ulcerations: No  Polydipsia: No  Polyphagia: No  Polyuria: No  Visual change: No  Weakness: No  Weight loss: Yes  Slow healing wounds: No  Weight trend: Fluctuating minimally  Autonomic neuropathy: No  CVA: No  Heart disease: No  Nephropathy: No  Peripheral neuropathy: No  Peripheral Vascular Disease: Yes  Retinopathy: No  Sexual dysfunction: No    Patient Problem List and Family Medical History reviewed for relevant medical history, current medical status, and diabetes risk factors.    Vitals:  Wt (!) 181.2 kg (399 lb 6.4 oz)   BMI 61.63 kg/m    Estimated body mass index is 61.63 kg/m  as calculated from the following:    Height as of 7/19/19: 1.715 m (5' 7.5\").    Weight as of this encounter: 181.2 kg (399 lb 6.4 oz).   Last 3 BP:   BP Readings from Last 3 Encounters:   07/19/19 122/60   04/01/19 96/53   03/14/19 114/68       History   Smoking Status     Former Smoker     Packs/day: 0.50     Years: 1.00     Types: Cigarettes, Cigars     Quit date: 5/21/2012   Smokeless Tobacco     Never Used       Labs:  Lab Results   Component Value Date    A1C 5.6 07/19/2019     Lab Results   Component Value Date     07/19/2019     Lab Results   Component Value Date    LDL 55 01/14/2019     HDL Cholesterol   Date Value Ref Range Status   01/14/2019 37 (L) >39 mg/dL Final   ]  GFR Estimate   Date Value Ref Range Status   07/19/2019 >90 >60 mL/min/[1.73_m2] Final     Comment:     Non  GFR Calc  Starting 12/18/2018, serum creatinine based " estimated GFR (eGFR) will be   calculated using the Chronic Kidney Disease Epidemiology Collaboration   (CKD-EPI) equation.       GFR Estimate If Black   Date Value Ref Range Status   2019 >90 >60 mL/min/[1.73_m2] Final     Comment:      GFR Calc  Starting 2018, serum creatinine based estimated GFR (eGFR) will be   calculated using the Chronic Kidney Disease Epidemiology Collaboration   (CKD-EPI) equation.       Lab Results   Component Value Date    CR 0.92 2019     No results found for: MICROALBUMIN    Healthy Eating  Cultural/Gnosticist diet restrictions?: No  Meal planning: Avoiding sweets, Carbohydrate counting, Smaller portions  Meals include: Breakfast, Lunch, Dinner, Other  Breakfast: peanut butter toast then later has: eggs  Lunch: sandwich before work (takes sandwich and light snack at work)  Dinner: tacos, sandwiches,   Snacks: needs to avoid the junk at work  Other: has not been eating vegetables lately  Beverages: Water, Coffee, Milk, Juice, Diet soda  Has patient met with a dietitian in the past?: Yes    Being Active  Being Active Assessed Today: Yes  Exercise:: Yes(walking)  Days per week of moderate to strenuous exercise (like a brisk walk): 2  On average, minutes per day of exercise at this level: 10  How intense was your typical exercise? : Moderate (like brisk walking)  Exercise Minutes per Week: 20  Barrier to exercise: Physical limitation, Other    Monitoring  Blood Glucose Meter: Accu-check  Home Glucose (Sugar) Monitorin-2 times per day  Blood glucose trend: No change  Low Glucose Range (mg/dL):   High Glucose Range (mg/dL): 130-140  Overall Range (mg/dL): 110-130   Breakfast pp Lunch pp Supper  pp HS   15-Jul 112          101          91          118          102          141          92          102          #DIV/0! #DIV/0! #DIV/0! #DIV/0! #DIV/0! #DIV/0!         Taking Medications  Diabetes  Medication(s)     Biguanides       metFORMIN (GLUCOPHAGE-XR) 500 MG 24 hr tablet    TAKE ONE TABLET BY MOUTH EVERY DAY WITH BREAKFAST    Incretin Mimetic Agents (GLP-1 Receptor Agonists)       liraglutide (VICTOZA PEN) 18 MG/3ML solution    INJECT 1.8MG UNDER THE SKIN ONCE DAILY          Current Treatments: Diet    Problem Solving  Hypoglycemia Frequency: Rarely  Hypoglycemia Treatment: Candy  Patient carries a carbohydrate source: No  Medical alert: No  Severe weather/disaster plan for diabetes management?: No  DKA prevention plan?: No    Hypoglycemia symptoms  Confusion: No  Dizziness or Light-Headedness: Yes  Headaches: No  Hunger: Yes  Mood changes: Yes  Nervousness/Anxiety: No  Sleepiness: No  Speech difficulty: No  Sweats: No  Tremors: No    Hypoglycemia Complications  Blackouts: No  Hospitalization: No  Nocturnal hypoglycemia: Yes  Required assistance: No  Required glucagon injection: No  Seizures: No    Reducing Risks  CAD Risks: Hypertension, Obesity, Tobacco exposure  Has dilated eye exam at least once a year?: Yes  Sees dentist every 6 months?: No  Sees podiatrist (foot doctor)?: No    Healthy Coping  Informal Support system:: Liyah based, Friends  Difficulty affording diabetes management supplies?: No  Patient Activation Measure Survey Score:  MARY Score (Last Two) 1/25/2011   MARY Raw Score 36   Activation Score 47.4   MARY Level 2       ASSESSMENT:  Doing well wt is down more.  Needs to work on veggies and activity has not been doing as well with that.  Goal is twice a week at gym we discussed barriers and discovered he really has not real valid excuse.        Patient's most recent   Lab Results   Component Value Date    A1C 5.6 07/19/2019    is meeting goal of <7.0    INTERVENTION:   Diabetes knowledge and skills assessment:     Patient is knowledgeable in diabetes management concepts related to: Healthy Eating, Being Active and Monitoring    Patient needs further education on the following diabetes  management concepts: Healthy Eating, Being Active, Monitoring, Taking Medication, Problem Solving, Reducing Risks and Healthy Coping    Based on learning assessment above, most appropriate setting for further diabetes education would be: Individual setting.    Education provided today on:  AADE Self-Care Behaviors:  Healthy Eating: consistency in amount, composition, and timing of food intake and portion control  Being Active: relationship to blood glucose and describe appropriate activity program    Opportunities for ongoing education and support in diabetes-self management were discussed.    Pt verbalized understanding of concepts discussed and recommendations provided today.       Education Materials Provided:  No new materials provided today    PLAN:  See Patient Instructions for co-developed, patient-stated behavior change goals.  AVS printed and provided to patient today. See Follow-Up section for recommended follow-up.      Time Spent: 30 minutes  Encounter Type: Individual    Any diabetes medication dose changes were made via the CDE Protocol and Collaborative Practice Agreement with the patient's primary care provider. A copy of this encounter was shared with the provider.

## 2019-08-05 ENCOUNTER — OFFICE VISIT (OUTPATIENT)
Dept: ENDOCRINOLOGY | Facility: CLINIC | Age: 44
End: 2019-08-05
Payer: COMMERCIAL

## 2019-08-05 VITALS
SYSTOLIC BLOOD PRESSURE: 123 MMHG | WEIGHT: 315 LBS | BODY MASS INDEX: 47.74 KG/M2 | HEART RATE: 94 BPM | HEIGHT: 68 IN | DIASTOLIC BLOOD PRESSURE: 67 MMHG | OXYGEN SATURATION: 93 %

## 2019-08-05 DIAGNOSIS — E66.01 MORBID OBESITY (H): Primary | ICD-10-CM

## 2019-08-05 RX ORDER — NALTREXONE HYDROCHLORIDE 50 MG/1
TABLET, FILM COATED ORAL
Qty: 30 TABLET | Refills: 5 | Status: SHIPPED | OUTPATIENT
Start: 2019-08-05 | End: 2020-01-24

## 2019-08-05 ASSESSMENT — PAIN SCALES - GENERAL: PAINLEVEL: NO PAIN (0)

## 2019-08-05 ASSESSMENT — MIFFLIN-ST. JEOR: SCORE: 2650.52

## 2019-08-05 NOTE — NURSING NOTE
"Chief Complaint   Patient presents with     Weight Problem     MWM return 4 mo       Vitals:    08/05/19 0929   BP: 123/67   BP Location: Left arm   Patient Position: Chair   Cuff Size: Adult Large   Pulse: 94   SpO2: 93%   Weight: (!) 180.4 kg (397 lb 11.2 oz)   Height: 1.715 m (5' 7.5\")       Body mass index is 61.37 kg/m .    Yuval Braden, EMT    "

## 2019-08-05 NOTE — PROGRESS NOTES
"      Return Medical Weight Management Note     Refugio Kennedy  MRN:  [id number]  :  1975  CRISTI:  19    Dear Dr. Espino,    We had the pleasure of seeing your patient Refugio Kennedy.  He is a 41 year old male who we are continuing to see for treatment of obesity related to: DMII, HLD, sleep apnea on CPAP daily, and hypoventilation syndrome, oxygen dependent.     CURRENT WEIGHT:   397 lbs 11.2 oz    Wt Readings from Last 4 Encounters:   19 (!) 180.4 kg (397 lb 11.2 oz)   19 (!) 181.2 kg (399 lb 6.4 oz)   19 (!) 180.8 kg (398 lb 9.6 oz)   19 (!) 181.6 kg (400 lb 6.4 oz)     Height:  5' 7.5\"  Body Mass Index:  Body mass index is 61.37 kg/m .  Vitals:  B/P: 138/83, P: 80    Initial consult weight was 454 on 5/15/2015.  Weight change since last seen on 19 is down 3 pounds.   Total loss is 57 pounds.    INTERVAL HISTORY:  Tolerating topiramate 200 mg BID. Again considering exercise. He has been struggling with snacking but is now feeling more in control.    Diet and Activity Changes Since Last Visit Reviewed With Patient 2019   I have made the following changes to my diet since my last visit: been not going to gym-procastination   With regards to my diet, I am still struggling with: eating more veggies and struggling with random snacking with my new job   For breakfast, I typically eat: -   For lunch, I typically eat: -   For supper, I typically eat: -   For snack(s), I typically eat: -   I have made the following changes to my activity/exercise since my last visit: less to no gym-since started new job   With regards to my activity/exercise, I am still struggling with: exercising more i do try to walk at work     MEDICATIONS:   Current Outpatient Medications   Medication     albuterol (2.5 MG/3ML) 0.083% neb solution     albuterol (PROAIR HFA) 108 (90 BASE) MCG/ACT Inhaler     aspirin 81 MG tablet     blood glucose monitoring (ACCU-CHEK CHRIS PLUS) test strip     blood glucose " monitoring (SOFTCLIX) lancets     cetirizine (ZYRTEC) 10 MG tablet     DIABETIC STERILE LANCETS device     fluticasone (FLONASE) 50 MCG/ACT spray     furosemide (LASIX) 20 MG tablet     insulin pen needle (BD SEVERO U/F) 32G X 4 MM     liraglutide (VICTOZA PEN) 18 MG/3ML solution     lisinopril (PRINIVIL/ZESTRIL) 5 MG tablet     metFORMIN (GLUCOPHAGE-XR) 500 MG 24 hr tablet     modafinil (PROVIGIL) 200 MG tablet     Multiple Vitamin (MULTIVITAMIN OR)     NEW MED     order for DME     order for DME     ORDER FOR DME     orlistat (LAWRENCE) 60 MG capsule     simvastatin (ZOCOR) 10 MG tablet     topiramate (TOPAMAX) 100 MG tablet     triamcinolone (KENALOG) 0.1 % cream     No current facility-administered medications for this visit.      Weight Loss Medication History Reviewed With Patient 8/5/2019   Which weight loss medications are you currently taking on a regular basis?  Topamax (topiramate)   Are you having any side effects from the weight loss medication that we have prescribed you? No   If you are having side effects please describe: -     ASSESSMENT:   Trial addition of naltrexone 50 mg/d. Maintain topiramate 200 mg BID. Also on liraglutide 1/8/d. Reinforce food plan - focus on no between meal snacking, aggressive lowering of starches and cheese.     FOLLOW-UP:    3 months   10/15 minutes spent on counseling and education    Sincerely,

## 2019-08-05 NOTE — LETTER
"2019       RE: Refugio Kennedy  [address]  [place] [place] [address]     Dear Colleague,    Thank you for referring your patient, Refugio Kennedy, to the [facility] MEDICAL WEIGHT MANAGEMENT at [facility]. Please see a copy of my visit note below.    Return Medical Weight Management Note     Refugio Kennedy  MRN:  [id number]  :  1975  CRISTI:  19    Dear Dr. Espino,    We had the pleasure of seeing your patient Refugio Kennedy.  He is a 41 year old male who we are continuing to see for treatment of obesity related to: DMII, HLD, sleep apnea on CPAP daily, and hypoventilation syndrome, oxygen dependent.     CURRENT WEIGHT:   397 lbs 11.2 oz    Wt Readings from Last 4 Encounters:   19 (!) 180.4 kg (397 lb 11.2 oz)   19 (!) 181.2 kg (399 lb 6.4 oz)   19 (!) 180.8 kg (398 lb 9.6 oz)   19 (!) 181.6 kg (400 lb 6.4 oz)     Height:  5' 7.5\"  Body Mass Index:  Body mass index is 61.37 kg/m .  Vitals:  B/P: 138/83, P: 80    Initial consult weight was 454 on 5/15/2015.  Weight change since last seen on 19 is down 3 pounds.   Total loss is 57 pounds.    INTERVAL HISTORY:  Tolerating topiramate 200 mg BID. Again considering exercise. He has been struggling with snacking but is now feeling more in control.    Diet and Activity Changes Since Last Visit Reviewed With Patient 2019   I have made the following changes to my diet since my last visit: been not going to gym-procastination   With regards to my diet, I am still struggling with: eating more veggies and struggling with random snacking with my new job   For breakfast, I typically eat: -   For lunch, I typically eat: -   For supper, I typically eat: -   For snack(s), I typically eat: -   I have made the following changes to my activity/exercise since my last visit: less to no gym-since started new job   With regards to my activity/exercise, I am still struggling with: exercising more i do try to walk at " work     MEDICATIONS:   Current Outpatient Medications   Medication     albuterol (2.5 MG/3ML) 0.083% neb solution     albuterol (PROAIR HFA) 108 (90 BASE) MCG/ACT Inhaler     aspirin 81 MG tablet     blood glucose monitoring (ACCU-CHEK CHRIS PLUS) test strip     blood glucose monitoring (SOFTCLIX) lancets     cetirizine (ZYRTEC) 10 MG tablet     DIABETIC STERILE LANCETS device     fluticasone (FLONASE) 50 MCG/ACT spray     furosemide (LASIX) 20 MG tablet     insulin pen needle (BD SEVERO U/F) 32G X 4 MM     liraglutide (VICTOZA PEN) 18 MG/3ML solution     lisinopril (PRINIVIL/ZESTRIL) 5 MG tablet     metFORMIN (GLUCOPHAGE-XR) 500 MG 24 hr tablet     modafinil (PROVIGIL) 200 MG tablet     Multiple Vitamin (MULTIVITAMIN OR)     NEW MED     order for DME     order for DME     ORDER FOR DME     orlistat (LAWRENCE) 60 MG capsule     simvastatin (ZOCOR) 10 MG tablet     topiramate (TOPAMAX) 100 MG tablet     triamcinolone (KENALOG) 0.1 % cream     No current facility-administered medications for this visit.      Weight Loss Medication History Reviewed With Patient 8/5/2019   Which weight loss medications are you currently taking on a regular basis?  Topamax (topiramate)   Are you having any side effects from the weight loss medication that we have prescribed you? No   If you are having side effects please describe: -     ASSESSMENT:   Trial addition of naltrexone 50 mg/d. Maintain topiramate 200 mg BID. Also on liraglutide 1/8/d. Reinforce food plan - focus on no between meal snacking, aggressive lowering of starches and cheese.     FOLLOW-UP:    3 months   10/15 minutes spent on counseling and education    Sincerely,  Ashish Paez MD

## 2019-08-06 ENCOUNTER — TRANSFERRED RECORDS (OUTPATIENT)
Dept: MULTI SPECIALTY CLINIC | Facility: CLINIC | Age: 44
End: 2019-08-06

## 2019-08-10 DIAGNOSIS — E66.01 MORBID OBESITY DUE TO EXCESS CALORIES (H): ICD-10-CM

## 2019-08-13 ENCOUNTER — OFFICE VISIT (OUTPATIENT)
Dept: FAMILY MEDICINE | Facility: CLINIC | Age: 44
End: 2019-08-13
Payer: COMMERCIAL

## 2019-08-13 VITALS
WEIGHT: 315 LBS | HEART RATE: 64 BPM | BODY MASS INDEX: 47.74 KG/M2 | HEIGHT: 68 IN | TEMPERATURE: 97.8 F | DIASTOLIC BLOOD PRESSURE: 64 MMHG | RESPIRATION RATE: 18 BRPM | SYSTOLIC BLOOD PRESSURE: 110 MMHG

## 2019-08-13 DIAGNOSIS — M79.672 LEFT FOOT PAIN: Primary | ICD-10-CM

## 2019-08-13 PROCEDURE — 99213 OFFICE O/P EST LOW 20 MIN: CPT | Performed by: FAMILY MEDICINE

## 2019-08-13 ASSESSMENT — MIFFLIN-ST. JEOR: SCORE: 2611.05

## 2019-08-13 NOTE — PROGRESS NOTES
SUBJECTIVE   Refugio Kennedy is a 44 year old male who presents with     Musculoskeletal problem/pain      Duration: Thursday     Description  Location: left foot- outer    Intensity:  moderate    Accompanying signs and symptoms: had orthotics made- got them Thursday- tried wearing them causing pain. Can't wear them.  Didn't wear them thru the weekend and the foot is still sore. Went back to his original inserts and the pain is still there- so wondering if something is wrong with the foot.  Is going to call the orthotics place- but wanted to make sure foot was ok first     History  Previous similar problem: no   Previous evaluation:  none    Precipitating or alleviating factors:  Trauma or overuse: no   Aggravating factors include: standing, walking, exercise and overuse    Therapies tried and outcome: rest/inactivity, switched back to his original orthotics,         PCP   Erika Espino -[phone]    Health Maintenance        Health Maintenance Due   Topic Date Due     PREVENTIVE CARE VISIT  12/09/2015     EYE EXAM  07/24/2019     ASTHMA ACTION PLAN  07/24/2019       HPI        Patient Active Problem List   Diagnosis     FARZANA (obstructive sleep apnea)/Hypoventilation Syndrome- Severe     Morbid obesity (H)     Chronic respiratory failure (H)     Hyperlipidemia LDL goal <100     Venous stasis     Pulmonary hypertension (H)     Mild intermittent asthma without complication     Type 2 diabetes mellitus without complication, without long-term current use of insulin (H)     Methamphetamine abuse in remission (H)     Current Outpatient Medications   Medication     albuterol (2.5 MG/3ML) 0.083% neb solution     albuterol (PROAIR HFA) 108 (90 BASE) MCG/ACT Inhaler     aspirin 81 MG tablet     blood glucose monitoring (ACCU-CHEK CHRIS PLUS) test strip     blood glucose monitoring (SOFTCLIX) lancets     cetirizine (ZYRTEC) 10 MG tablet     DIABETIC STERILE LANCETS device     fluticasone (FLONASE) 50 MCG/ACT spray      furosemide (LASIX) 20 MG tablet     insulin pen needle (BD SEVERO U/F) 32G X 4 MM     liraglutide (VICTOZA PEN) 18 MG/3ML solution     lisinopril (PRINIVIL/ZESTRIL) 5 MG tablet     metFORMIN (GLUCOPHAGE-XR) 500 MG 24 hr tablet     modafinil (PROVIGIL) 200 MG tablet     Multiple Vitamin (MULTIVITAMIN OR)     naltrexone (DEPADE/REVIA) 50 MG tablet     NEW MED     order for DME     order for DME     ORDER FOR DME     orlistat (LAWRENCE) 60 MG capsule     simvastatin (ZOCOR) 10 MG tablet     topiramate (TOPAMAX) 100 MG tablet     triamcinolone (KENALOG) 0.1 % cream     No current facility-administered medications for this visit.        Patient Active Problem List   Diagnosis     FARZANA (obstructive sleep apnea)/Hypoventilation Syndrome- Severe     Morbid obesity (H)     Chronic respiratory failure (H)     Hyperlipidemia LDL goal <100     Venous stasis     Pulmonary hypertension (H)     Mild intermittent asthma without complication     Type 2 diabetes mellitus without complication, without long-term current use of insulin (H)     Methamphetamine abuse in remission (H)     Past Surgical History:   Procedure Laterality Date     LAPAROSCOPIC HERNIORRHAPHY VENTRAL N/A 2016    Procedure: LAPAROSCOPIC HERNIORRHAPHY VENTRAL;  Surgeon: Yves Jimenes MD;  Location: [facility]     LAPAROSCOPIC HERNIORRHAPHY VENTRAL N/A 2016    Procedure: LAPAROSCOPIC HERNIORRHAPHY VENTRAL;  Surgeon: Yevs Jimenes MD;  Location: [facility]     SURGICAL HISTORY OF -       [facility]       Social History     Tobacco Use     Smoking status: Former Smoker     Packs/day: 0.50     Years: 1.00     Pack years: 0.50     Types: Cigarettes, Cigars     Last attempt to quit: 2012     Years since quittin.2     Smokeless tobacco: Never Used   Substance Use Topics     Alcohol use: No     Alcohol/week: 0.0 oz     Family History   Problem Relation Age of Onset     Heart Disease Maternal Grandfather      Diabetes Maternal Grandfather      Hypertension Maternal  Grandfather      Other Cancer Maternal Grandfather      Cancer Paternal Grandfather         unknown     Other Cancer Paternal Grandfather      Anxiety Disorder Mother      Asthma Mother      Depression Mother      Thyroid Disease Mother      Anxiety Disorder Father      Substance Abuse Father      Depression Father      Hypertension Father      Anxiety Disorder Brother      Substance Abuse Brother      Mental Illness Brother      Depression Brother      Other Cancer Brother      Obesity No family hx of          Current Outpatient Medications   Medication Sig Dispense Refill     albuterol (2.5 MG/3ML) 0.083% neb solution Take 1 vial (2.5 mg) by nebulization every 4 hours as needed for shortness of breath / dyspnea or wheezing 25 vial 0     albuterol (PROAIR HFA) 108 (90 BASE) MCG/ACT Inhaler Inhale 2 puffs into the lungs every 4 hours as needed for shortness of breath / dyspnea 1 Inhaler 5     aspirin 81 MG tablet Take 1 tablet by mouth daily. 90 tablet 3     blood glucose monitoring (ACCU-CHEK CHRIS PLUS) test strip USE TO TEST BLOOD SUGARS THREE TIMES A DAY AS DIRECTED 200 each 3     blood glucose monitoring (SOFTCLIX) lancets TEST THREE TIMES A DAY OR AS DIRECTED 300 each 2     cetirizine (ZYRTEC) 10 MG tablet TAKE ONE TABLET (10 MG) BY MOUTH EVERY EVENING 90 tablet 3     DIABETIC STERILE LANCETS device 1 Device 3 times daily. 1 Box 12     fluticasone (FLONASE) 50 MCG/ACT spray Spray 1-2 sprays into both nostrils daily 16 g 0     furosemide (LASIX) 20 MG tablet TAKE TWO TABLETS BY MOUTH TWICE A  tablet 1     insulin pen needle (BD SEVERO U/F) 32G X 4 MM Use 1 pen needles daily or as directed. 100 each 3     liraglutide (VICTOZA PEN) 18 MG/3ML solution INJECT 1.8MG UNDER THE SKIN ONCE DAILY 9 mL 3     lisinopril (PRINIVIL/ZESTRIL) 5 MG tablet Take 1 tablet (5 mg) by mouth daily 90 tablet 0     metFORMIN (GLUCOPHAGE-XR) 500 MG 24 hr tablet TAKE ONE TABLET BY MOUTH EVERY DAY WITH BREAKFAST 90 tablet 1      modafinil (PROVIGIL) 200 MG tablet Take 1/2 tablet by mouth 3-4 times daily as needed for sleepiness. 60 tablet 5     Multiple Vitamin (MULTIVITAMIN OR) Take 1 tablet by mouth daily.       naltrexone (DEPADE/REVIA) 50 MG tablet Take 1/2 tablet.  Time it one to two hours prior to worst cravings.  Then increase to one full tablet as instructed. 30 tablet 5     NEW MED        order for DME Nebulizer 1 Units 0     order for DME Equipment being ordered: BIPAP Refugio P Kennedy received a Process Relations AirCurve 10 Bilevel. Pressures were set at 23/14 cm H2O.       ORDER FOR DME Auto-BiPAP:  IPAP max 21 cm H2O  EPAP min 15 cm H2O  Pressure support 5 cm  Changed in clinic  Lifetime need and heated humidity.           orlistat (LAWRENCE) 60 MG capsule Take 60 mg by mouth 3 times daily as needed       simvastatin (ZOCOR) 10 MG tablet T 90 tablet 1     topiramate (TOPAMAX) 100 MG tablet Take 2 tablets (200 mg) by mouth 2 times daily 120 tablet 3     triamcinolone (KENALOG) 0.1 % cream Apply sparingly to affected area two times daily as needed 30 g 2     No Known Allergies  Recent Labs   Lab Test 07/19/19  0857 01/14/19  1612  07/24/18  1546 12/19/17  1429  11/29/16  1438  05/23/16  1329  03/24/15  1040   A1C 5.6 5.7*  --  5.5 5.7   < > 5.9  --  5.8   < > 6.4*   LDL  --  55  --   --  58  --  59  --   --    < >  --    HDL  --  37*  --   --  45  --  35*  --   --    < >  --    TRIG  --  207*  --   --  115  --  179*  --   --    < >  --    ALT  --  29  --   --   --   --   --   --  30  --  30   CR 0.92 0.96   < > 0.94 0.94   < > 1.06   < > 0.86   < > 0.79   GFRESTIMATED >90 >90   < > 87 87   < > 76   < > >90  Non  GFR Calc     < > >90  Non  GFR Calc     GFRESTBLACK >90 >90   < > >90 >90   < > >90  African American GFR Calc     < > >90   GFR Calc     < > >90   GFR Calc     POTASSIUM 3.5 3.8   < > 4.0 3.5   < > 3.7   < > 4.0   < > 4.4   TSH  --  1.66  --  1.48  --   --   --   --  2.90  --   " --     < > = values in this interval not displayed.      BP Readings from Last 3 Encounters:   08/13/19 110/64   08/05/19 123/67   07/19/19 122/60    Wt Readings from Last 3 Encounters:   08/13/19 (!) 176.4 kg (389 lb)   08/05/19 (!) 180.4 kg (397 lb 11.2 oz)   07/22/19 (!) 181.2 kg (399 lb 6.4 oz)                    Reviewed and updated:  Tobacco  Allergies  Meds  Med Hx  Surg Hx  Fam Hx  Soc Hx     ROS:  Constitutional, HEENT, cardiovascular, pulmonary, gi and gu systems are negative, except as otherwise noted.    PHYSICAL EXAM   /64 (Cuff Size: Adult Large)   Pulse 64   Temp 97.8  F (36.6  C) (Tympanic)   Resp 18   Ht 1.715 m (5' 7.5\")   Wt (!) 176.4 kg (389 lb)   BMI 60.03 kg/m    Body mass index is 60.03 kg/m .  GENERAL: alert, no distress and obese  NECK: no adenopathy, no asymmetry, masses, or scars and thyroid normal to palpation  RESP: lungs clear to auscultation - no rales, rhonchi or wheezes  CV: regular rates and rhythm, normal S1 S2, no S3 or S4 and no murmur, click or rub  ABDOMEN: soft, nontender  MS: mildly tender left lateral forefoot edge, some erythema noted, no significant swelling or warmth noted, flatfoot, pedal pulses 3+, sensation to touch and pressure intact,      Assessment & Plan     (M79.582) Left foot pain  (primary encounter diagnosis)  Comment: Suspect symptoms are due to localized soft tissue injury/inflammation.  Suggested to avoid using tight shoes, would be better if he can use slippers for few days.  Recommended topical analgesia and continuing ibuprofen.  Patient will be getting in touch with orthotics as well.  Imaging not indicated currently.  Return criteria discussed in detail.  All questions answered.         Quique Barber MD  [facility]          "

## 2019-08-13 NOTE — NURSING NOTE
"Chief Complaint   Patient presents with     Musculoskeletal Problem     /64 (Cuff Size: Adult Large)   Pulse 64   Temp 97.8  F (36.6  C) (Tympanic)   Resp 18   Ht 1.715 m (5' 7.5\")   Wt (!) 176.4 kg (389 lb)   BMI 60.03 kg/m   Estimated body mass index is 60.03 kg/m  as calculated from the following:    Height as of this encounter: 1.715 m (5' 7.5\").    Weight as of this encounter: 176.4 kg (389 lb).  Patient presents to the clinic using No DME      Health Maintenance that is potentially due pending provider review:    Health Maintenance Due   Topic Date Due     PREVENTIVE CARE VISIT  12/09/2015     EYE EXAM  07/24/2019     ASTHMA ACTION PLAN  07/24/2019        [facility] in [place].        "

## 2019-08-14 RX ORDER — TOPIRAMATE 100 MG/1
200 TABLET, FILM COATED ORAL 2 TIMES DAILY
Qty: 120 TABLET | Refills: 5 | Status: SHIPPED | OUTPATIENT
Start: 2019-08-14 | End: 2020-02-14

## 2019-08-14 NOTE — TELEPHONE ENCOUNTER
TOPIRAMATE 100MG TABS  Take 2 tablets (200 mg) by mouth 2 times daily  Last Written Prescription Date:  4/9/2019  Last Fill Quantity: 120,   # refills: 3  Last Office Visit : 8/5/2019  Future Office visit: 12/9/2019    Routing refill request to provider for review/approval because:  Drug not on the [facility], [facility] or [facility] refill protocol or controlled substance

## 2019-08-19 ENCOUNTER — ALLIED HEALTH/NURSE VISIT (OUTPATIENT)
Dept: EDUCATION SERVICES | Facility: CLINIC | Age: 44
End: 2019-08-19
Payer: COMMERCIAL

## 2019-08-19 VITALS — BODY MASS INDEX: 59.97 KG/M2 | WEIGHT: 315 LBS

## 2019-08-19 DIAGNOSIS — E11.9 TYPE 2 DIABETES MELLITUS WITHOUT COMPLICATION, WITHOUT LONG-TERM CURRENT USE OF INSULIN (H): ICD-10-CM

## 2019-08-19 DIAGNOSIS — J45.20 MILD INTERMITTENT ASTHMA WITHOUT COMPLICATION: Primary | ICD-10-CM

## 2019-08-19 PROCEDURE — G0108 DIAB MANAGE TRN  PER INDIV: HCPCS | Performed by: DIETITIAN, REGISTERED

## 2019-08-19 NOTE — PATIENT INSTRUCTIONS
Continue to avoid snacking in the evening      Continue to work on adding the veggies to meals       Get back to the gym as able

## 2019-08-19 NOTE — PROGRESS NOTES
"Diabetes Self-Management Education & Support    Diabetes Education Self Management & Training    SUBJECTIVE/OBJECTIVE:  Presents for: Individual review  Accompanied by: Self  Diabetes education in the past 24mo: (P) Yes  Focus of Visit: Healthy Eating, Being Active, Problem Solving  Diabetes type: (P) Type 2  Disease course: (P) Stable  Transportation concerns: No  Other concerns:: None  Cultural Influences/Ethnic Background:  American      Diabetes Symptoms & Complications  Blurred vision: (P) No  Fatigue: (P) Yes  Foot ulcerations: (P) No  Polydipsia: (P) Yes  Polyphagia: (P) No  Polyuria: (P) Yes  Visual change: (P) No  Weakness: (P) No  Slow healing wounds: (P) No  Weight trend: (P) Stable  Autonomic neuropathy: (P) No  CVA: (P) No  Heart disease: (P) No  Nephropathy: (P) No  Peripheral neuropathy: (P) No  Peripheral Vascular Disease: (P) No  Retinopathy: (P) No  Sexual dysfunction: (P) No    Patient Problem List and Family Medical History reviewed for relevant medical history, current medical status, and diabetes risk factors.    Vitals:  Wt (!) 176.3 kg (388 lb 9.6 oz)   BMI 59.97 kg/m    Estimated body mass index is 59.97 kg/m  as calculated from the following:    Height as of 8/13/19: 1.715 m (5' 7.5\").    Weight as of this encounter: 176.3 kg (388 lb 9.6 oz).   Last 3 BP:   BP Readings from Last 3 Encounters:   08/13/19 110/64   08/05/19 123/67   07/19/19 122/60       History   Smoking Status     Former Smoker     Packs/day: 0.50     Years: 1.00     Types: Cigarettes, Cigars     Quit date: 5/21/2012   Smokeless Tobacco     Never Used       Labs:  Lab Results   Component Value Date    A1C 5.6 07/19/2019     Lab Results   Component Value Date     07/19/2019     Lab Results   Component Value Date    LDL 55 01/14/2019     HDL Cholesterol   Date Value Ref Range Status   01/14/2019 37 (L) >39 mg/dL Final   ]  GFR Estimate   Date Value Ref Range Status   07/19/2019 >90 >60 mL/min/[1.73_m2] Final     " Comment:     Non  GFR Calc  Starting 12/18/2018, serum creatinine based estimated GFR (eGFR) will be   calculated using the Chronic Kidney Disease Epidemiology Collaboration   (CKD-EPI) equation.       GFR Estimate If Black   Date Value Ref Range Status   07/19/2019 >90 >60 mL/min/[1.73_m2] Final     Comment:      GFR Calc  Starting 12/18/2018, serum creatinine based estimated GFR (eGFR) will be   calculated using the Chronic Kidney Disease Epidemiology Collaboration   (CKD-EPI) equation.       Lab Results   Component Value Date    CR 0.92 07/19/2019     No results found for: MICROALBUMIN    Healthy Eating  Cultural/Rastafarian diet restrictions?: (P) No  Meal planning: (P) Avoiding sweets, Low salt, Smaller portions  Meals include: (P) Breakfast, Lunch, Dinner, Other  Breakfast: toast, PB and coffee then scrambled eggs, sausage chunks  Lunch: sandwiches, cereal  Dinner: cottage cheese and tator tots (doing smaller portions) or burgers, no cheese  Other: got an air fryer, trying to have veggies 2-3 times a week  Beverages: (P) Water, Coffee, Milk, Diet soda  Has patient met with a dietitian in the past?: (P) Yes    Being Active  Being Active Assessed Today: No  Exercise:: (not been going to the gym, still getting used to his inserts in his shoes, they are new)  Barrier to exercise: (P) Time, Physical limitation, Other    Monitoring  Blood Glucose Meter: (P) Accu-check  Home Glucose (Sugar) Monitoring: (P) 1-2 times per day  Blood glucose trend: (P) No change  Low Glucose Range (mg/dL): (P) 70-90  High Glucose Range (mg/dL): (P) 110-130  Overall Range (mg/dL): (P) 110-130    115,101,104,116,97,95,104,106,112,104,106    Taking Medications  Diabetes Medication(s)     Biguanides       metFORMIN (GLUCOPHAGE-XR) 500 MG 24 hr tablet    TAKE ONE TABLET BY MOUTH EVERY DAY WITH BREAKFAST    Incretin Mimetic Agents (GLP-1 Receptor Agonists)       liraglutide (VICTOZA PEN) 18 MG/3ML solution     INJECT 1.8MG UNDER THE SKIN ONCE DAILY          Current Treatments: (P) Diet, Non-insulin Injectables    Problem Solving  Hypoglycemia Frequency: (P) Rarely  Hypoglycemia Treatment: (P) Juice, Candy, Other food  Patient carries a carbohydrate source: (P) No  Medical alert: (P) No  Severe weather/disaster plan for diabetes management?: (P) No  DKA prevention plan?: (P) No  Sick day plan for diabetes management?: (P) No    Hypoglycemia symptoms  Confusion: (P) Yes  Dizziness or Light-Headedness: (P) Yes  Headaches: (P) No  Hunger: (P) No  Mood changes: (P) No  Nervousness/Anxiety: (P) No  Sleepiness: (P) No  Speech difficulty: (P) Yes  Sweats: (P) No  Tremors: (P) No    Hypoglycemia Complications  Blackouts: (P) No  Hospitalization: (P) No  Nocturnal hypoglycemia: (P) No  Required assistance: (P) No  Required glucagon injection: (P) No  Seizures: (P) No    Reducing Risks  CAD Risks: (P) Hypertension, Obesity, Tobacco exposure  Has dilated eye exam at least once a year?: (P) Yes  Sees dentist every 6 months?: (P) No  Sees podiatrist (foot doctor)?: (P) No    Healthy Coping  Informal Support system:: (P) [place] based, Family, Friends, Parent  Difficulty affording diabetes management supplies?: (P) No  Patient Activation Measure Survey Score:  MARY Score (Last Two) 1/25/2011 8/5/2019   MARY Raw Score 36 29   Activation Score 47.4 52.9   MARY Level 2 2       ASSESSMENT:  Doing well his Weight loss MD added a pill to help with cravings, this is helping him out.  He is working on doing more vegetables.        Getting used to new inserts so once this happens will try to get back to the gym again    Patient's most recent   Lab Results   Component Value Date    A1C 5.6 07/19/2019    is meeting goal of <7.0    INTERVENTION:   Diabetes knowledge and skills assessment:     Patient is knowledgeable in diabetes management concepts related to: Healthy Eating, Being Active, Monitoring and Taking Medication    Patient needs further  education on the following diabetes management concepts: Healthy Eating, Being Active, Monitoring, Taking Medication, Problem Solving, Reducing Risks and Healthy Coping    Based on learning assessment above, most appropriate setting for further diabetes education would be: Individual setting.    Education provided today on:  AADE Self-Care Behaviors:  Healthy Eating: consistency in amount, composition, and timing of food intake and portion control  Being Active: relationship to blood glucose and describe appropriate activity program    Opportunities for ongoing education and support in diabetes-self management were discussed.    Pt verbalized understanding of concepts discussed and recommendations provided today.       Education Materials Provided:  No new materials provided today    PLAN:  See Patient Instructions for co-developed, patient-stated behavior change goals.  AVS printed and provided to patient today. See Follow-Up section for recommended follow-up.      Time Spent: 30 minutes  Encounter Type: Individual    Any diabetes medication dose changes were made via the [facility]DE Protocol and Collaborative Practice Agreement with the patient's primary care provider. A copy of this encounter was shared with the provider.

## 2019-08-20 DIAGNOSIS — E11.9 TYPE 2 DIABETES MELLITUS WITHOUT COMPLICATION, WITHOUT LONG-TERM CURRENT USE OF INSULIN (H): ICD-10-CM

## 2019-08-20 RX ORDER — LIRAGLUTIDE 6 MG/ML
INJECTION SUBCUTANEOUS
Qty: 9 ML | Refills: 5 | Status: SHIPPED | OUTPATIENT
Start: 2019-08-20 | End: 2020-02-18

## 2019-08-20 NOTE — TELEPHONE ENCOUNTER
"Requested Prescriptions   Pending Prescriptions Disp Refills     liraglutide (VICTOZA PEN) 18 MG/3ML solution 9 mL 3     Sig: INJECT 1.8MG UNDER THE SKIN ONCE DAILY       GLP-1 Agonists Protocol Passed - 8/20/2019  8:16 AM        Passed - Blood pressure less than 140/90 in past 6 months     BP Readings from Last 3 Encounters:   08/13/19 110/64   08/05/19 123/67   07/19/19 122/60                 Passed - LDL on file in past 12 months     Recent Labs   Lab Test 01/14/19  1612   LDL 55             Passed - Microalbumin on file in past 12 months     Recent Labs   Lab Test 01/14/19  1613   MICROL 7   UMALCR 4.23             Passed - HgbA1C in past 3 or 6 months     If HgbA1C is 8 or greater, it needs to be on file within the past 3 months.  If less than 8, must be on file within the past 6 months.     Recent Labs   Lab Test 07/19/19  0857   A1C 5.6             Passed - Medication is active on med list        Passed - Patient is age 18 or older        Passed - Normal serum creatinine on file in past 12 months     Recent Labs   Lab Test 07/19/19  0857   CR 0.92             Passed - Recent (6 mo) or future (30 days) visit within the authorizing provider's specialty     Patient had office visit in the last 6 months or has a visit in the next 30 days with authorizing provider.  See \"Patient Info\" tab in inbasket, or \"Choose Columns\" in Meds & Orders section of the refill encounter.            Last Written Prescription Date:  4/19/19  Last Fill Quantity: 9,  # refills: 3   Last office visit: 8/13/2019 with prescribing provider:  Sunil   Future Office Visit:      "

## 2019-08-26 DIAGNOSIS — E11.9 TYPE 2 DIABETES, HBA1C GOAL < 8% (H): ICD-10-CM

## 2019-08-26 RX ORDER — LANCETS
EACH MISCELLANEOUS
Qty: 300 EACH | Refills: 2 | Status: SHIPPED | OUTPATIENT
Start: 2019-08-26 | End: 2021-01-18

## 2019-08-26 NOTE — TELEPHONE ENCOUNTER
"Requested Prescriptions   Pending Prescriptions Disp Refills     blood glucose monitoring (SOFTCLIX) lancets 300 each 2     Sig: Use to test blood sugar   times daily or as directed.       Diabetic Supplies Protocol Passed - 8/26/2019  9:53 AM        Passed - Medication is active on med list        Passed - Patient is 18 years of age or older        Passed - Recent (6 mo) or future (30 days) visit within the authorizing provider's specialty     Patient had office visit in the last 6 months or has a visit in the next 30 days with authorizing provider.  See \"Patient Info\" tab in inbasket, or \"Choose Columns\" in Meds & Orders section of the refill encounter.            Last Written Prescription Date:  4/5/18  Last Fill Quantity: 300,  # refills: 2   Last office visit: 8/13/2019 with prescribing provider:  Sunil   Future Office Visit:      "

## 2019-09-30 ENCOUNTER — ALLIED HEALTH/NURSE VISIT (OUTPATIENT)
Dept: EDUCATION SERVICES | Facility: CLINIC | Age: 44
End: 2019-09-30
Payer: COMMERCIAL

## 2019-09-30 VITALS — BODY MASS INDEX: 58.27 KG/M2 | WEIGHT: 315 LBS

## 2019-09-30 DIAGNOSIS — E11.9 TYPE 2 DIABETES MELLITUS WITHOUT COMPLICATION, WITHOUT LONG-TERM CURRENT USE OF INSULIN (H): Primary | ICD-10-CM

## 2019-09-30 PROCEDURE — G0108 DIAB MANAGE TRN  PER INDIV: HCPCS | Performed by: DIETITIAN, REGISTERED

## 2019-09-30 NOTE — PROGRESS NOTES
"Diabetes Self-Management Education & Support    Diabetes Education Self Management & Training    SUBJECTIVE/OBJECTIVE:  Presents for: Individual review  Accompanied by: Self  Diabetes education in the past 24mo: Yes  Focus of Visit: Healthy Eating, Being Active, Problem Solving  Diabetes type: Type 2  Disease course: Stable  Transportation concerns: No  Other concerns:: None  Cultural Influences/Ethnic Background:  American      Diabetes Symptoms & Complications  Blurred vision: No  Fatigue: Yes  Foot ulcerations: No  Polydipsia: Yes  Polyphagia: No  Polyuria: Yes  Visual change: No  Weakness: No  Slow healing wounds: No  Weight trend: Stable  Autonomic neuropathy: No  CVA: No  Heart disease: No  Nephropathy: No  Peripheral neuropathy: No  Peripheral Vascular Disease: No  Retinopathy: No  Sexual dysfunction: No    Patient Problem List and Family Medical History reviewed for relevant medical history, current medical status, and diabetes risk factors.    Vitals:  Wt (!) 171.3 kg (377 lb 9.6 oz)   BMI 58.27 kg/m    Estimated body mass index is 58.27 kg/m  as calculated from the following:    Height as of 8/13/19: 1.715 m (5' 7.5\").    Weight as of this encounter: 171.3 kg (377 lb 9.6 oz).   Last 3 BP:   BP Readings from Last 3 Encounters:   08/13/19 110/64   08/05/19 123/67   07/19/19 122/60       History   Smoking Status     Former Smoker     Packs/day: 0.50     Years: 1.00     Types: Cigarettes, Cigars     Quit date: 5/21/2012   Smokeless Tobacco     Never Used       Labs:  Lab Results   Component Value Date    A1C 5.6 07/19/2019     Lab Results   Component Value Date     07/19/2019     Lab Results   Component Value Date    LDL 55 01/14/2019     HDL Cholesterol   Date Value Ref Range Status   01/14/2019 37 (L) >39 mg/dL Final   ]  GFR Estimate   Date Value Ref Range Status   07/19/2019 >90 >60 mL/min/[1.73_m2] Final     Comment:     Non  GFR Calc  Starting 12/18/2018, serum creatinine based " estimated GFR (eGFR) will be   calculated using the Chronic Kidney Disease Epidemiology Collaboration   (CKD-EPI) equation.       GFR Estimate If Black   Date Value Ref Range Status   2019 >90 >60 mL/min/[1.73_m2] Final     Comment:      GFR Calc  Starting 2018, serum creatinine based estimated GFR (eGFR) will be   calculated using the Chronic Kidney Disease Epidemiology Collaboration   (CKD-EPI) equation.       Lab Results   Component Value Date    CR 0.92 2019     No results found for: MICROALBUMIN    Healthy Eating  Cultural/Adventism diet restrictions?: No  Meal planning: Avoiding sweets, Low salt, Smaller portions  Meals include: Breakfast, Lunch, Dinner, Other  Breakfast: toast, PB and coffee then scrambled eggs, sausage chunks  Lunch: sandwiches, cereal  Dinner: cottage cheese and tator tots (doing smaller portions) or burgers, no cheese  Other: got an air fryer, trying to have veggies 2-3 times a week, new med he is on is helping a lot  Beverages: Water, Coffee, Milk, Diet soda  Has patient met with a dietitian in the past?: Yes    Being Active  Being Active Assessed Today: No  Exercise:: (not been going to the gym, still getting used to his inserts in his shoes, they are new)  Barrier to exercise: Time, Physical limitation, Other    Monitoring  Blood Glucose Meter: Accu-check  Home Glucose (Sugar) Monitorin-2 times per day  Blood glucose trend: No change  Low Glucose Range (mg/dL): 70-90  High Glucose Range (mg/dL): 110-130  Overall Range (mg/dL): 110-130   Breakfast pp Lunch pp Supper  pp HS   23-Sep 112          103          98          102          122          108          98         30-Sep 99          #DIV/0! #DIV/0! #DIV/0! #DIV/0! #DIV/0! #DIV/0!           Taking Medications  Diabetes Medication(s)     Biguanides       metFORMIN (GLUCOPHAGE-XR) 500 MG 24 hr tablet    TAKE ONE TABLET BY MOUTH EVERY DAY WITH BREAKFAST    Incretin Mimetic Agents (GLP-1  Receptor Agonists)       liraglutide (VICTOZA PEN) 18 MG/3ML solution    INJECT 1.8MG UNDER THE SKIN ONCE DAILY          Current Treatments: Diet, Non-insulin Injectables    Problem Solving  Hypoglycemia Frequency: Rarely  Hypoglycemia Treatment: Juice, Candy, Other food  Patient carries a carbohydrate source: No  Medical alert: No  Severe weather/disaster plan for diabetes management?: No  DKA prevention plan?: No    Hypoglycemia symptoms  Confusion: Yes  Dizziness or Light-Headedness: Yes  Headaches: No  Hunger: No  Mood changes: No  Nervousness/Anxiety: No  Sleepiness: No  Speech difficulty: Yes  Sweats: No  Tremors: No    Hypoglycemia Complications  Blackouts: No  Hospitalization: No  Nocturnal hypoglycemia: No  Required assistance: No  Required glucagon injection: No  Seizures: No    Reducing Risks  CAD Risks: Hypertension, Obesity, Tobacco exposure  Has dilated eye exam at least once a year?: Yes  Sees dentist every 6 months?: No  Sees podiatrist (foot doctor)?: No    Healthy Coping  Informal Support system:: Liyah based, Family, Friends, Parent  Difficulty affording diabetes management supplies?: No  Patient Activation Measure Survey Score:  MARY Score (Last Two) 1/25/2011 8/5/2019   MARY Raw Score 36 29   Activation Score 47.4 52.9   MARY Level 2 2       ASSESSMENT:  Doing well, wt is down another 11 lbs.  He is really watching his snacking, way down.  Trying to get more vegetables back in.  Trying to drink more water.  Trying to stay active.  Wants to get back to the gym since he is paying for it.  Set a goal of twice a week on his days off before he goes to play cards for 15-20 minutes.        Patient's most recent   Lab Results   Component Value Date    A1C 5.6 07/19/2019    is meeting goal of <7.0    INTERVENTION:   Diabetes knowledge and skills assessment:     Patient is knowledgeable in diabetes management concepts related to: Healthy Eating, Being Active and Monitoring    Patient needs further education  on the following diabetes management concepts: Healthy Eating, Being Active, Monitoring, Taking Medication, Problem Solving, Reducing Risks and Healthy Coping    Based on learning assessment above, most appropriate setting for further diabetes education would be: Individual setting.    Education provided today on:  AADE Self-Care Behaviors:  Healthy Eating: carbohydrate counting, consistency in amount, composition, and timing of food intake and label reading  Being Active: relationship to blood glucose and describe appropriate activity program    Opportunities for ongoing education and support in diabetes-self management were discussed.    Pt verbalized understanding of concepts discussed and recommendations provided today.       Education Materials Provided:  No new materials provided today    PLAN:  See Patient Instructions for co-developed, patient-stated behavior change goals.  AVS printed and provided to patient today. See Follow-Up section for recommended follow-up.      Time Spent: 30 minutes  Encounter Type: Individual    Any diabetes medication dose changes were made via the CDE Protocol and Collaborative Practice Agreement with the patient's primary care provider. A copy of this encounter was shared with the provider.

## 2019-10-02 ENCOUNTER — HEALTH MAINTENANCE LETTER (OUTPATIENT)
Age: 44
End: 2019-10-02

## 2019-10-08 DIAGNOSIS — E78.5 HYPERLIPIDEMIA LDL GOAL <100: ICD-10-CM

## 2019-10-08 RX ORDER — SIMVASTATIN 10 MG
10 TABLET ORAL AT BEDTIME
Qty: 90 TABLET | Refills: 1 | Status: SHIPPED | OUTPATIENT
Start: 2019-10-08 | End: 2020-04-13

## 2019-10-08 RX ORDER — SIMVASTATIN 10 MG
TABLET ORAL
Qty: 90 TABLET | Refills: 1 | Status: SHIPPED | OUTPATIENT
Start: 2019-10-08 | End: 2019-10-08 | Stop reason: DRUGHIGH

## 2019-10-08 NOTE — TELEPHONE ENCOUNTER
"Requested Prescriptions   Pending Prescriptions Disp Refills     simvastatin (ZOCOR) 10 MG tablet 90 tablet 1     Sig: T       Statins Protocol Passed - 10/8/2019  8:27 AM        Passed - LDL on file in past 12 months     Recent Labs   Lab Test 01/14/19  1612   LDL 55             Passed - No abnormal creatine kinase in past 12 months     No lab results found.             Passed - Recent (12 mo) or future (30 days) visit within the authorizing provider's specialty     Patient has had an office visit with the authorizing provider or a provider within the authorizing providers department within the previous 12 mos or has a future within next 30 days. See \"Patient Info\" tab in inbasket, or \"Choose Columns\" in Meds & Orders section of the refill encounter.              Passed - Medication is active on med list        Passed - Patient is age 18 or older        Last Written Prescription Date:  4/5/19  Last Fill Quantity: 90,  # refills: 1   Last office visit: 8/13/2019 with prescribing provider:  Sunil   Future Office Visit:      "

## 2019-11-06 ENCOUNTER — ALLIED HEALTH/NURSE VISIT (OUTPATIENT)
Dept: FAMILY MEDICINE | Facility: CLINIC | Age: 44
End: 2019-11-06
Payer: COMMERCIAL

## 2019-11-06 VITALS — BODY MASS INDEX: 58.55 KG/M2 | WEIGHT: 315 LBS

## 2019-11-06 DIAGNOSIS — E11.9 TYPE 2 DIABETES MELLITUS WITHOUT COMPLICATIONS (H): ICD-10-CM

## 2019-11-06 DIAGNOSIS — E11.9 TYPE 2 DIABETES MELLITUS WITHOUT COMPLICATIONS (H): Primary | ICD-10-CM

## 2019-11-06 PROCEDURE — 99207 ZZC NO CHARGE NURSE ONLY: CPT

## 2019-11-06 NOTE — PROGRESS NOTES
Pt walked into clinic. He is wanting to know when he needs to see Dr Espino again. Advised from his last visit with her he should be seen in dec or January. Appointment made for him. Pt also states he continues to have Left foot pain and wants to discuss this with her as well. Kadi Plummer RN

## 2019-11-06 NOTE — TELEPHONE ENCOUNTER
"Requested Prescriptions   Pending Prescriptions Disp Refills     insulin pen needle (BD SEVERO U/F) 32G X 4 MM miscellaneous 100 each 3     Sig: Use 1 pen needles daily or as directed.       Diabetic Supplies Protocol Passed - 11/6/2019  8:35 AM        Passed - Medication is active on med list        Passed - Patient is 18 years of age or older        Passed - Recent (6 mo) or future (30 days) visit within the authorizing provider's specialty     Patient had office visit in the last 6 months or has a visit in the next 30 days with authorizing provider.  See \"Patient Info\" tab in inbasket, or \"Choose Columns\" in Meds & Orders section of the refill encounter.            Last Written Prescription Date:  9/24/18  Last Fill Quantity: 100,  # refills: 3   Last office visit: 8/13/2019 with prescribing provider:  Sunil   Future Office Visit:      "

## 2019-11-11 ENCOUNTER — ALLIED HEALTH/NURSE VISIT (OUTPATIENT)
Dept: EDUCATION SERVICES | Facility: CLINIC | Age: 44
End: 2019-11-11
Payer: COMMERCIAL

## 2019-11-11 VITALS — BODY MASS INDEX: 57.74 KG/M2 | WEIGHT: 315 LBS

## 2019-11-11 DIAGNOSIS — E11.9 TYPE 2 DIABETES MELLITUS WITHOUT COMPLICATION, WITHOUT LONG-TERM CURRENT USE OF INSULIN (H): Primary | ICD-10-CM

## 2019-11-11 PROCEDURE — G0108 DIAB MANAGE TRN  PER INDIV: HCPCS | Performed by: DIETITIAN, REGISTERED

## 2019-11-11 NOTE — PATIENT INSTRUCTIONS
1.   Work on going to the gym next week on your days off: Wed, Thurs, Sat.  Go on the way to cards.    2.  Keep making sure you are doing vegetables.

## 2019-11-11 NOTE — PROGRESS NOTES
"Diabetes Self-Management Education & Support    Diabetes Education Self Management & Training    SUBJECTIVE/OBJECTIVE:  Presents for: Individual review  Accompanied by: Self  Diabetes education in the past 24mo: Yes  Focus of Visit: Healthy Eating, Being Active, Problem Solving  Diabetes type: Type 2  Disease course: Stable  Transportation concerns: No  Other concerns:: None  Cultural Influences/Ethnic Background:  American      Diabetes Symptoms & Complications  Blurred vision: No  Fatigue: Yes  Foot ulcerations: No  Polydipsia: Yes  Polyphagia: No  Polyuria: Yes  Visual change: No  Weakness: No  Slow healing wounds: No  Weight trend: Stable  Autonomic neuropathy: No  CVA: No  Heart disease: No  Nephropathy: No  Peripheral neuropathy: No  Peripheral Vascular Disease: No  Retinopathy: No  Sexual dysfunction: No    Patient Problem List and Family Medical History reviewed for relevant medical history, current medical status, and diabetes risk factors.    Vitals:  Wt (!) 169.7 kg (374 lb 3.2 oz)   BMI 57.74 kg/m    Estimated body mass index is 57.74 kg/m  as calculated from the following:    Height as of 8/13/19: 1.715 m (5' 7.5\").    Weight as of this encounter: 169.7 kg (374 lb 3.2 oz).   Last 3 BP:   BP Readings from Last 3 Encounters:   08/13/19 110/64   08/05/19 123/67   07/19/19 122/60       History   Smoking Status     Former Smoker     Packs/day: 0.50     Years: 1.00     Types: Cigarettes, Cigars     Quit date: 5/21/2012   Smokeless Tobacco     Never Used       Labs:  Lab Results   Component Value Date    A1C 5.6 07/19/2019     Lab Results   Component Value Date     07/19/2019     Lab Results   Component Value Date    LDL 55 01/14/2019     HDL Cholesterol   Date Value Ref Range Status   01/14/2019 37 (L) >39 mg/dL Final   ]  GFR Estimate   Date Value Ref Range Status   07/19/2019 >90 >60 mL/min/[1.73_m2] Final     Comment:     Non  GFR Calc  Starting 12/18/2018, serum creatinine based " estimated GFR (eGFR) will be   calculated using the Chronic Kidney Disease Epidemiology Collaboration   (CKD-EPI) equation.       GFR Estimate If Black   Date Value Ref Range Status   2019 >90 >60 mL/min/[1.73_m2] Final     Comment:      GFR Calc  Starting 2018, serum creatinine based estimated GFR (eGFR) will be   calculated using the Chronic Kidney Disease Epidemiology Collaboration   (CKD-EPI) equation.       Lab Results   Component Value Date    CR 0.92 2019     No results found for: MICROALBUMIN    Healthy Eating  Cultural/Yarsanism diet restrictions?: No  Meal planning: Avoiding sweets, Low salt, Smaller portions  Meals include: Breakfast, Lunch, Dinner, Other  Breakfast: toast peanut butter   Lunch: sandwiches  Dinner: chicken strips, air fryer food or scrambled eggs/sausage, pizza  Other: has had a stomach bug  Beverages: Water, Coffee, Milk, Diet soda  Has patient met with a dietitian in the past?: Yes    Being Active  Being Active Assessed Today: No  Exercise:: (not been going to the gym, still getting used to his inserts in his shoes, they are new)  Barrier to exercise: Time, Physical limitation, Other    Monitoring  Blood Glucose Meter: Accu-check  Home Glucose (Sugar) Monitorin-2 times per day  Blood glucose trend: No change  Low Glucose Range (mg/dL): 70-90  High Glucose Range (mg/dL): 110-130  Overall Range (mg/dL): 110-130        Taking Medications  Diabetes Medication(s)     Biguanides       metFORMIN (GLUCOPHAGE-XR) 500 MG 24 hr tablet    TAKE ONE TABLET BY MOUTH EVERY DAY WITH BREAKFAST    Incretin Mimetic Agents (GLP-1 Receptor Agonists)       liraglutide (VICTOZA PEN) 18 MG/3ML solution    INJECT 1.8MG UNDER THE SKIN ONCE DAILY          Current Treatments: Diet, Non-insulin Injectables    Problem Solving  Hypoglycemia Frequency: Rarely  Hypoglycemia Treatment: Juice, Candy, Other food  Patient carries a carbohydrate source: No  Medical alert:  No  Severe weather/disaster plan for diabetes management?: No  DKA prevention plan?: No    Hypoglycemia symptoms  Confusion: Yes  Dizziness or Light-Headedness: Yes  Headaches: No  Hunger: No  Mood changes: No  Nervousness/Anxiety: No  Sleepiness: No  Speech difficulty: Yes  Sweats: No  Tremors: No    Hypoglycemia Complications  Blackouts: No  Hospitalization: No  Nocturnal hypoglycemia: No  Required assistance: No  Required glucagon injection: No  Seizures: No    Reducing Risks  CAD Risks: Hypertension, Obesity, Tobacco exposure  Has dilated eye exam at least once a year?: Yes  Sees dentist every 6 months?: No  Sees podiatrist (foot doctor)?: No    Healthy Coping  Informal Support system:: Liyah based, Family, Friends, Parent  Difficulty affording diabetes management supplies?: No  Patient Activation Measure Survey Score:  MARY Score (Last Two) 1/25/2011 8/5/2019   MARY Raw Score 36 29   Activation Score 47.4 52.9   MARY Level 2 2       ASSESSMENT:  Doing well down 25 lbs since he was at [facility], he was started on a new medication at that time.  He has been busy working and I think that helps with his food choices/eating.  He is not back to the gym so we discussed that and went over goal for that.        Patient's most recent   Lab Results   Component Value Date    A1C 5.6 07/19/2019    is meeting goal of <7.0    INTERVENTION:   Diabetes knowledge and skills assessment:     Patient is knowledgeable in diabetes management concepts related to: Healthy Eating, Being Active and Monitoring    Patient needs further education on the following diabetes management concepts: Healthy Eating, Being Active, Monitoring, Taking Medication, Problem Solving, Reducing Risks and Healthy Coping    Based on learning assessment above, most appropriate setting for further diabetes education would be: Individual setting.    Education provided today on:  AADE Self-Care Behaviors:  Healthy Eating: carbohydrate counting, consistency in  amount, composition, and timing of food intake and fruit/veggies  Being Active: relationship to blood glucose and describe appropriate activity program    Opportunities for ongoing education and support in diabetes-self management were discussed.    Pt verbalized understanding of concepts discussed and recommendations provided today.       Education Materials Provided:  No new materials provided today    PLAN:  See Patient Instructions for co-developed, patient-stated behavior change goals.  AVS printed and provided to patient today. See Follow-Up section for recommended follow-up.      Time Spent: 30 minutes  Encounter Type: Individual    Any diabetes medication dose changes were made via the CDE Protocol and Collaborative Practice Agreement with the patient's primary care provider. A copy of this encounter was shared with the provider.

## 2019-11-18 DIAGNOSIS — E11.9 DIABETES MELLITUS WITHOUT COMPLICATION (H): ICD-10-CM

## 2019-11-18 NOTE — TELEPHONE ENCOUNTER
"Requested Prescriptions   Pending Prescriptions Disp Refills     blood glucose (ACCU-CHEK CHRIS PLUS) test strip 200 each 3     Sig: USE TO TEST BLOOD SUGARS THREE TIMES A DAY AS DIRECTED       Diabetic Supplies Protocol Passed - 11/18/2019  9:55 AM        Passed - Medication is active on med list        Passed - Patient is 18 years of age or older        Passed - Recent (6 mo) or future (30 days) visit within the authorizing provider's specialty     Patient had office visit in the last 6 months or has a visit in the next 30 days with authorizing provider.  See \"Patient Info\" tab in inbasket, or \"Choose Columns\" in Meds & Orders section of the refill encounter.            Last Written Prescription Date:  10/17/18  Last Fill Quantity: 200,  # refills: 3   Last office visit: 8/13/19 with prescribing provider:  Sunil   Future Office Visit:   Next 5 appointments (look out 90 days)    Dec 13, 2019  4:00 PM CST  [facility] Visit with [facility]  [facility] [address] [place] [id number] [phone]           "

## 2019-11-26 DIAGNOSIS — E11.9 TYPE 2 DIABETES MELLITUS WITHOUT COMPLICATION, WITHOUT LONG-TERM CURRENT USE OF INSULIN (H): ICD-10-CM

## 2019-11-26 RX ORDER — METFORMIN HCL 500 MG
TABLET, EXTENDED RELEASE 24 HR ORAL
Qty: 90 TABLET | Refills: 1 | Status: SHIPPED | OUTPATIENT
Start: 2019-11-26 | End: 2020-05-26

## 2019-11-26 NOTE — TELEPHONE ENCOUNTER
Prescription approved per [facility] Refill Protocol.

## 2019-12-09 ENCOUNTER — OFFICE VISIT (OUTPATIENT)
Dept: ENDOCRINOLOGY | Facility: CLINIC | Age: 44
End: 2019-12-09
Payer: COMMERCIAL

## 2019-12-09 VITALS
HEIGHT: 67 IN | DIASTOLIC BLOOD PRESSURE: 70 MMHG | TEMPERATURE: 98.8 F | WEIGHT: 315 LBS | BODY MASS INDEX: 49.44 KG/M2 | HEART RATE: 84 BPM | SYSTOLIC BLOOD PRESSURE: 140 MMHG | OXYGEN SATURATION: 94 %

## 2019-12-09 DIAGNOSIS — E66.01 MORBID OBESITY (H): Primary | ICD-10-CM

## 2019-12-09 ASSESSMENT — MIFFLIN-ST. JEOR: SCORE: 2544.15

## 2019-12-09 ASSESSMENT — PAIN SCALES - GENERAL: PAINLEVEL: MODERATE PAIN (4)

## 2019-12-09 NOTE — NURSING NOTE
"Chief Complaint   Patient presents with     RECHECK     Follow up weight management.       Vitals:    12/09/19 0924   BP: (!) 140/70   BP Location: Left arm   Patient Position: Sitting   Cuff Size: Adult Large   Pulse: 84   Temp: 98.8  F (37.1  C)   TempSrc: Oral   SpO2: 94%   Weight: (!) 170.6 kg (376 lb)   Height: 1.702 m (5' 7\")       Body mass index is 58.89 kg/m .                            MARIZOL DIMAS, EMT    "

## 2019-12-09 NOTE — LETTER
"2019       RE: Refugio Kennedy  [address]  [facility] [address]     Dear Colleague,    Thank you for referring your patient, Refugio Kennedy, to the [facility] MEDICAL WEIGHT MANAGEMENT at [facility]. Please see a copy of my visit note below.        Return Medical Weight Management Note     Refugio Kennedy  MRN:  [id number]  :  1975  CRSITI:  19    Dear Dr. Espino,    We had the pleasure of seeing your patient Refugio Kennedy.  He is a 41 year old male who we are continuing to see for treatment of obesity related to: DMII, HLD, sleep apnea on CPAP daily, and hypoventilation syndrome, oxygen dependent.     CURRENT WEIGHT:   376 lbs 0 oz    Wt Readings from Last 4 Encounters:   19 (!) 170.6 kg (376 lb)   19 (!) 169.7 kg (374 lb 3.2 oz)   19 (!) 172.1 kg (379 lb 6.4 oz)   19 (!) 171.3 kg (377 lb 9.6 oz)     Height:  5' 7\"  Body Mass Index:  Body mass index is 58.89 kg/m .  Vitals:  B/P: 138/83, P: 80    Initial consult weight was 454 on 5/15/2015.  Weight change since last seen on 19 is down 21 pounds.   Total loss is 78 pounds.    INTERVAL HISTORY:  Tolerating topiramate 200 mg BID. He has been struggling with snacking but is now feeling more in control.    Diet and Activity Changes Since Last Visit Reviewed With Patient 2019   I have made the following changes to my diet since my last visit: worked on cutting out pop completly,working on adding more veggies,been attempting to watch carbs when buying grocieries   With regards to my diet, I am still struggling with: eating more veggies   For breakfast, I typically eat: -   For lunch, I typically eat: -   For supper, I typically eat: -   For snack(s), I typically eat: -   I have made the following changes to my activity/exercise since my last visit: struggling with more exercise due to pain in feet,try to walk/sweep floors at work daily to get some exercise   With regards to my " activity/exercise, I am still struggling with: working around foot pain     MEDICATIONS:   Current Outpatient Medications   Medication     albuterol (2.5 MG/3ML) 0.083% neb solution     albuterol (PROAIR HFA) 108 (90 BASE) MCG/ACT Inhaler     aspirin 81 MG tablet     blood glucose (ACCU-CHEK CHRIS PLUS) test strip     blood glucose monitoring (SOFTCLIX) lancets     cetirizine (ZYRTEC) 10 MG tablet     DIABETIC STERILE LANCETS device     fluticasone (FLONASE) 50 MCG/ACT spray     furosemide (LASIX) 20 MG tablet     insulin pen needle (BD SEVERO U/F) 32G X 4 MM miscellaneous     liraglutide (VICTOZA PEN) 18 MG/3ML solution     lisinopril (PRINIVIL/ZESTRIL) 5 MG tablet     metFORMIN (GLUCOPHAGE-XR) 500 MG 24 hr tablet     modafinil (PROVIGIL) 200 MG tablet     Multiple Vitamin (MULTIVITAMIN OR)     naltrexone (DEPADE/REVIA) 50 MG tablet     NEW MED     order for DME     order for DME     ORDER FOR DME     orlistat (LAWRENCE) 60 MG capsule     simvastatin (ZOCOR) 10 MG tablet     topiramate (TOPAMAX) 100 MG tablet     triamcinolone (KENALOG) 0.1 % cream     No current facility-administered medications for this visit.      Weight Loss Medication History Reviewed With Patient 12/9/2019   Which weight loss medications are you currently taking on a regular basis?  Topamax (topiramate), Victoza (liraglutide)   Are you having any side effects from the weight loss medication that we have prescribed you? No   If you are having side effects please describe: -     ASSESSMENT:   Naltrexone 50 mg/d has been helpful. Maintain topiramate 200 mg BID. Also on liraglutide 1/8/d. Reinforce food plan - focus on no between meal snacking, aggressive lowering of starches and cheese.     FOLLOW-UP:    3 months   10/15 minutes spent on counseling and education    Sincerely,    Ashish Paez MD

## 2019-12-09 NOTE — PROGRESS NOTES
"      Return Medical Weight Management Note     Refugio Kennedy  MRN:  [id number]  :  1975  CRISTI:  19    Dear Dr. Espino,    We had the pleasure of seeing your patient Refugio Kennedy.  He is a 41 year old male who we are continuing to see for treatment of obesity related to: DMII, HLD, sleep apnea on CPAP daily, and hypoventilation syndrome, oxygen dependent.     CURRENT WEIGHT:   376 lbs 0 oz    Wt Readings from Last 4 Encounters:   19 (!) 170.6 kg (376 lb)   19 (!) 169.7 kg (374 lb 3.2 oz)   19 (!) 172.1 kg (379 lb 6.4 oz)   19 (!) 171.3 kg (377 lb 9.6 oz)     Height:  5' 7\"  Body Mass Index:  Body mass index is 58.89 kg/m .  Vitals:  B/P: 138/83, P: 80    Initial consult weight was 454 on 5/15/2015.  Weight change since last seen on 19 is down 21 pounds.   Total loss is 78 pounds.    INTERVAL HISTORY:  Tolerating topiramate 200 mg BID. He has been struggling with snacking but is now feeling more in control.    Diet and Activity Changes Since Last Visit Reviewed With Patient 2019   I have made the following changes to my diet since my last visit: worked on cutting out pop completly,working on adding more veggies,been attempting to watch carbs when buying grocieries   With regards to my diet, I am still struggling with: eating more veggies   For breakfast, I typically eat: -   For lunch, I typically eat: -   For supper, I typically eat: -   For snack(s), I typically eat: -   I have made the following changes to my activity/exercise since my last visit: struggling with more exercise due to pain in feet,try to walk/sweep floors at work daily to get some exercise   With regards to my activity/exercise, I am still struggling with: working around foot pain     MEDICATIONS:   Current Outpatient Medications   Medication     albuterol (2.5 MG/3ML) 0.083% neb solution     albuterol (PROAIR HFA) 108 (90 BASE) MCG/ACT Inhaler     aspirin 81 MG tablet     blood glucose (ACCU-CHEK CHRIS " PLUS) test strip     blood glucose monitoring (SOFTCLIX) lancets     cetirizine (ZYRTEC) 10 MG tablet     DIABETIC STERILE LANCETS device     fluticasone (FLONASE) 50 MCG/ACT spray     furosemide (LASIX) 20 MG tablet     insulin pen needle (BD SEVERO U/F) 32G X 4 MM miscellaneous     liraglutide (VICTOZA PEN) 18 MG/3ML solution     lisinopril (PRINIVIL/ZESTRIL) 5 MG tablet     metFORMIN (GLUCOPHAGE-XR) 500 MG 24 hr tablet     modafinil (PROVIGIL) 200 MG tablet     Multiple Vitamin (MULTIVITAMIN OR)     naltrexone (DEPADE/REVIA) 50 MG tablet     NEW MED     order for DME     order for DME     ORDER FOR DME     orlistat (LAWRENCE) 60 MG capsule     simvastatin (ZOCOR) 10 MG tablet     topiramate (TOPAMAX) 100 MG tablet     triamcinolone (KENALOG) 0.1 % cream     No current facility-administered medications for this visit.      Weight Loss Medication History Reviewed With Patient 12/9/2019   Which weight loss medications are you currently taking on a regular basis?  Topamax (topiramate), Victoza (liraglutide)   Are you having any side effects from the weight loss medication that we have prescribed you? No   If you are having side effects please describe: -     ASSESSMENT:   Naltrexone 50 mg/d has been helpful. Maintain topiramate 200 mg BID. Also on liraglutide 1/8/d. Reinforce food plan - focus on no between meal snacking, aggressive lowering of starches and cheese.     FOLLOW-UP:    3 months   10/15 minutes spent on counseling and education    Sincerely,

## 2019-12-13 ENCOUNTER — OFFICE VISIT (OUTPATIENT)
Dept: FAMILY MEDICINE | Facility: CLINIC | Age: 44
End: 2019-12-13
Payer: COMMERCIAL

## 2019-12-13 DIAGNOSIS — M79.672 LEFT FOOT PAIN: ICD-10-CM

## 2019-12-13 DIAGNOSIS — E78.5 HYPERLIPIDEMIA LDL GOAL <100: ICD-10-CM

## 2019-12-13 DIAGNOSIS — E66.01 MORBID OBESITY (H): ICD-10-CM

## 2019-12-13 DIAGNOSIS — Z23 NEED FOR PROPHYLACTIC VACCINATION AND INOCULATION AGAINST INFLUENZA: ICD-10-CM

## 2019-12-13 DIAGNOSIS — E11.9 TYPE 2 DIABETES MELLITUS WITHOUT COMPLICATION, WITHOUT LONG-TERM CURRENT USE OF INSULIN (H): Primary | ICD-10-CM

## 2019-12-13 PROCEDURE — 90471 IMMUNIZATION ADMIN: CPT | Performed by: FAMILY MEDICINE

## 2019-12-13 PROCEDURE — 99214 OFFICE O/P EST MOD 30 MIN: CPT | Mod: 25 | Performed by: FAMILY MEDICINE

## 2019-12-13 PROCEDURE — 90686 IIV4 VACC NO PRSV 0.5 ML IM: CPT | Performed by: FAMILY MEDICINE

## 2019-12-13 ASSESSMENT — MIFFLIN-ST. JEOR: SCORE: 2544.15

## 2019-12-13 NOTE — PROGRESS NOTES
Subjective     Refugio Kennedy is a 44 year old male who presents to clinic today for the following health issues:    HPI   Diabetes Follow-up    How often are you checking your blood sugar? One time daily, has been trying to do twice  What time of day are you checking your blood sugars (select all that apply)?  Before meals  Have you had any blood sugars above 200?  No  Have you had any blood sugars below 70?  No    What symptoms do you notice when your blood sugar is low?  None    What concerns do you have today about your diabetes? Other: Foot pain, left foot has constant pain.  Has had orthotic inserts custom made.  Was seen in August for foot pain, hasn't gone away since     Do you have any of these symptoms? (Select all that apply)  Numbness in feet, is more frequent than in the past     Have you had a diabetic eye exam in the last 12 months? Yes- Date of last eye exam: 8/6/2019    BP Readings from Last 2 Encounters:   12/19/19 (!) 140/67   12/19/19 139/67     Hemoglobin A1C (%)   Date Value   12/19/2019 5.6   07/19/2019 5.6     LDL Cholesterol Calculated (mg/dL)   Date Value   12/19/2019 49   01/14/2019 55       Diabetes Management Resources      How many servings of fruits and vegetables do you eat daily?  0-1    On average, how many sweetened beverages do you drink each day (Examples: soda, juice, sweet tea, etc.  Do NOT count diet or artificially sweetened beverages)?   0  How many days per week do you miss taking your medication? 1    What makes it hard for you to take your medications?  remembering to take afternoon medication    Musculoskeletal problem/pain      Duration: months off and on     Description  Location: left lat foot     Intensity:  moderate    Accompanying signs and symptoms: none    History  Previous similar problem: no   Previous evaluation:  none    Precipitating or alleviating factors:  Trauma or overuse: YES- on his feet all day much worse at the end of the day   Aggravating factors  "include: standing    Therapies tried and outcome: orthotics have not helped ice and rest have made some improvement     Hyperlipidemia Follow-Up      Are you regularly taking any medication or supplement to lower your cholesterol?   Yes- statin    Are you having muscle aches or other side effects that you think could be caused by your cholesterol lowering medication?  No            Reviewed and updated as needed this visit by Provider  Tobacco  Allergies  Meds  Problems  Med Hx  Surg Hx  Fam Hx         Review of Systems   ROS COMP: Constitutional, HEENT, cardiovascular, pulmonary, gi and gu systems are negative, except as otherwise noted.      Objective    /67   Pulse 81   Temp 97.7  F (36.5  C) (Tympanic)   Resp 14   Ht 1.702 m (5' 7\")   Wt (!) 170.6 kg (376 lb)   BMI 58.89 kg/m    Body mass index is 58.89 kg/m .  Physical Exam   GENERAL APPEARANCE: healthy, alert and no distress  RESP: lungs clear to auscultation - no rales, rhonchi or wheezes  CV: regular rates and rhythm, normal S1 S2, no S3 or S4 and no murmur, click or rub  MS: extremities normal- no gross deformities noted and tender to palpation left lateral foot distal  PSYCH: mentation appears normal and affect normal/bright    Diagnostic Test Results:  Labs reviewed in Epic        Assessment & Plan     1. Type 2 diabetes mellitus without complication, without long-term current use of insulin (H)  Adjust meds as indicated by above labs.   - Hepatic panel; Future  - Basic metabolic panel; Future  - CBC with platelets differential; Future  - TSH with free T4 reflex; Future  - Lipid panel reflex to direct LDL Fasting; Future  - Hemoglobin A1c; Future  - Albumin Random Urine Quantitative with Creat Ratio; Future    2. Morbid obesity (H)  working with bariatrics     3. Hyperlipidemia LDL goal <100  ,adj     4. Left foot pain    - ORTHO  REFERRAL; Future    5. Need for prophylactic vaccination and inoculation against influenza    - " INFLUENZA VACCINE IM > 6 MONTHS VALENT IIV4 [42619]  - Vaccine Administration, Initial [24749]     Risks, benefits, side effects and rationale for treatment plan fully discussed with the patient and understanding expressed.   Patient Instructions   Fasting lab appt     Set up podiatry appt, referral given     Flu shot today           Return in about 6 months (around 6/13/2020).    Erika Espino MD  [facility]

## 2019-12-13 NOTE — NURSING NOTE
"Chief Complaint   Patient presents with     Diabetes     Musculoskeletal Problem     Imm/Inj     Flu Shot       Initial /66 (BP Location: Right arm, Patient Position: Chair, Cuff Size: Adult Large)   Pulse 81   Temp 97.7  F (36.5  C) (Tympanic)   Resp 14   Ht 1.702 m (5' 7\")   Wt (!) 170.6 kg (376 lb)   BMI 58.89 kg/m   Estimated body mass index is 58.89 kg/m  as calculated from the following:    Height as of this encounter: 1.702 m (5' 7\").    Weight as of this encounter: 170.6 kg (376 lb).    Patient presents to the clinic using No DME    Health Maintenance that is potentially due pending provider review:  NONE        Is there anyone who you would like to be able to receive your results? No  If yes have patient fill out SVEN      "

## 2019-12-19 ENCOUNTER — ANCILLARY PROCEDURE (OUTPATIENT)
Dept: GENERAL RADIOLOGY | Facility: CLINIC | Age: 44
End: 2019-12-19
Attending: PODIATRIST
Payer: COMMERCIAL

## 2019-12-19 ENCOUNTER — OFFICE VISIT (OUTPATIENT)
Dept: PODIATRY | Facility: CLINIC | Age: 44
End: 2019-12-19
Payer: COMMERCIAL

## 2019-12-19 VITALS
HEIGHT: 67 IN | WEIGHT: 315 LBS | HEART RATE: 81 BPM | DIASTOLIC BLOOD PRESSURE: 67 MMHG | SYSTOLIC BLOOD PRESSURE: 139 MMHG | TEMPERATURE: 97.7 F | BODY MASS INDEX: 49.44 KG/M2 | RESPIRATION RATE: 14 BRPM

## 2019-12-19 VITALS
HEIGHT: 67 IN | HEART RATE: 87 BPM | SYSTOLIC BLOOD PRESSURE: 140 MMHG | WEIGHT: 315 LBS | DIASTOLIC BLOOD PRESSURE: 67 MMHG | BODY MASS INDEX: 49.44 KG/M2

## 2019-12-19 DIAGNOSIS — M72.2 PLANTAR FASCIITIS OF LEFT FOOT: ICD-10-CM

## 2019-12-19 DIAGNOSIS — M79.672 ACUTE FOOT PAIN, LEFT: Primary | ICD-10-CM

## 2019-12-19 DIAGNOSIS — E11.9 TYPE 2 DIABETES MELLITUS WITHOUT COMPLICATION, WITHOUT LONG-TERM CURRENT USE OF INSULIN (H): ICD-10-CM

## 2019-12-19 LAB
ALBUMIN SERPL-MCNC: 3.7 G/DL (ref 3.4–5)
ALP SERPL-CCNC: 124 U/L (ref 40–150)
ALT SERPL W P-5'-P-CCNC: 33 U/L (ref 0–70)
ANION GAP SERPL CALCULATED.3IONS-SCNC: 3 MMOL/L (ref 3–14)
AST SERPL W P-5'-P-CCNC: 20 U/L (ref 0–45)
BASOPHILS # BLD AUTO: 0 10E9/L (ref 0–0.2)
BASOPHILS NFR BLD AUTO: 0.4 %
BILIRUB DIRECT SERPL-MCNC: 0.1 MG/DL (ref 0–0.2)
BILIRUB SERPL-MCNC: 0.6 MG/DL (ref 0.2–1.3)
BUN SERPL-MCNC: 24 MG/DL (ref 7–30)
CALCIUM SERPL-MCNC: 8.6 MG/DL (ref 8.5–10.1)
CHLORIDE SERPL-SCNC: 107 MMOL/L (ref 94–109)
CHOLEST SERPL-MCNC: 110 MG/DL
CO2 SERPL-SCNC: 28 MMOL/L (ref 20–32)
CREAT SERPL-MCNC: 0.85 MG/DL (ref 0.66–1.25)
CREAT UR-MCNC: 30 MG/DL
DIFFERENTIAL METHOD BLD: NORMAL
EOSINOPHIL # BLD AUTO: 0.4 10E9/L (ref 0–0.7)
EOSINOPHIL NFR BLD AUTO: 5 %
ERYTHROCYTE [DISTWIDTH] IN BLOOD BY AUTOMATED COUNT: 13.1 % (ref 10–15)
GFR SERPL CREATININE-BSD FRML MDRD: >90 ML/MIN/{1.73_M2}
GLUCOSE SERPL-MCNC: 99 MG/DL (ref 70–99)
HBA1C MFR BLD: 5.6 % (ref 0–5.6)
HCT VFR BLD AUTO: 43 % (ref 40–53)
HDLC SERPL-MCNC: 45 MG/DL
HGB BLD-MCNC: 14 G/DL (ref 13.3–17.7)
LDLC SERPL CALC-MCNC: 49 MG/DL
LYMPHOCYTES # BLD AUTO: 2.5 10E9/L (ref 0.8–5.3)
LYMPHOCYTES NFR BLD AUTO: 31.3 %
MCH RBC QN AUTO: 30.6 PG (ref 26.5–33)
MCHC RBC AUTO-ENTMCNC: 32.6 G/DL (ref 31.5–36.5)
MCV RBC AUTO: 94 FL (ref 78–100)
MICROALBUMIN UR-MCNC: <5 MG/L
MICROALBUMIN/CREAT UR: NORMAL MG/G CR (ref 0–17)
MONOCYTES # BLD AUTO: 1 10E9/L (ref 0–1.3)
MONOCYTES NFR BLD AUTO: 12.6 %
NEUTROPHILS # BLD AUTO: 4 10E9/L (ref 1.6–8.3)
NEUTROPHILS NFR BLD AUTO: 50.7 %
NONHDLC SERPL-MCNC: 65 MG/DL
PLATELET # BLD AUTO: 244 10E9/L (ref 150–450)
POTASSIUM SERPL-SCNC: 3.6 MMOL/L (ref 3.4–5.3)
PROT SERPL-MCNC: 7.8 G/DL (ref 6.8–8.8)
RBC # BLD AUTO: 4.58 10E12/L (ref 4.4–5.9)
SODIUM SERPL-SCNC: 138 MMOL/L (ref 133–144)
TRIGL SERPL-MCNC: 82 MG/DL
TSH SERPL DL<=0.005 MIU/L-ACNC: 1.93 MU/L (ref 0.4–4)
WBC # BLD AUTO: 7.9 10E9/L (ref 4–11)

## 2019-12-19 PROCEDURE — 73630 X-RAY EXAM OF FOOT: CPT | Mod: LT

## 2019-12-19 PROCEDURE — 83036 HEMOGLOBIN GLYCOSYLATED A1C: CPT | Performed by: FAMILY MEDICINE

## 2019-12-19 PROCEDURE — 80076 HEPATIC FUNCTION PANEL: CPT | Performed by: FAMILY MEDICINE

## 2019-12-19 PROCEDURE — 85025 COMPLETE CBC W/AUTO DIFF WBC: CPT | Performed by: FAMILY MEDICINE

## 2019-12-19 PROCEDURE — 80048 BASIC METABOLIC PNL TOTAL CA: CPT | Performed by: FAMILY MEDICINE

## 2019-12-19 PROCEDURE — 82043 UR ALBUMIN QUANTITATIVE: CPT | Performed by: FAMILY MEDICINE

## 2019-12-19 PROCEDURE — 84443 ASSAY THYROID STIM HORMONE: CPT | Performed by: FAMILY MEDICINE

## 2019-12-19 PROCEDURE — 80061 LIPID PANEL: CPT | Performed by: FAMILY MEDICINE

## 2019-12-19 PROCEDURE — 99203 OFFICE O/P NEW LOW 30 MIN: CPT | Performed by: PODIATRIST

## 2019-12-19 PROCEDURE — 36415 COLL VENOUS BLD VENIPUNCTURE: CPT | Performed by: FAMILY MEDICINE

## 2019-12-19 ASSESSMENT — MIFFLIN-ST. JEOR: SCORE: 2544.15

## 2019-12-19 ASSESSMENT — PAIN SCALES - GENERAL: PAINLEVEL: MILD PAIN (3)

## 2019-12-19 NOTE — LETTER
12/19/2019         RE: Refugio Kennedy  [address]  [facility] [address]        Dear Colleague,    Thank you for referring your patient, Refugio Kennedy, to the [facility]. Please see a copy of my visit note below.    Refugio returns to the office for reevaluation of the left foot.  The patient relates following the instructions given at the last visit with noted more pain.  The patient relates overall less  improvement in pain and function of the left foot.  The patient relates no other problems.    PAST MEDICAL HISTORY:   Past Medical History:   Diagnosis Date     Cataplexy and narcolepsy     Narcolepsy     Cellulitis      Diabetes      Hypoventilation      Obesity      FARZANA (obstructive sleep apnea)     uses CPAP       BMI= Body mass index is 58.89 kg/m .    Weight management plan: Patient was referred to their PCP to discuss a diet and exercise plan.    Physical Exam:    General: The patient appears to have a pleasant mental affect.    Lower extremity physical exam:  Neurovascular status is intact with palpable pedal pulses and intact epicritic sensations.  Muscular exam is within normal limits to major muscle groups.  Integument is intact.      One notes decreased edema.  One notes pain on palpation on the plantar aspect of the lateral aspect of the plantar fascia on the left foot.  No surrounding erythema noted.  No ecchymosis noted.    Radiograph evaluation including weightbearing AP, lateral and medial oblique views of the left foot reveals calcification bony spurs.  No evidence of stress reaction to the fifth or fourth metatarsal on the left.  Assessment:      ICD-10-CM    1. Acute foot pain, left M79.672 XR Foot Left G/E 3 Views       Plan:  I have explained to Refugio about the conditions.  At this time, the patient was instructed on icing, stretching, tissue massage and support.  The patient was fitted with a Dynaflex insert that will aid in offloading the tension forces  to the soft tissues and prevent further inflammation.   The patient will return in four weeks for reevaluation if the symptoms do not resolve.      Disclaimer: This note consists of symbols derived from keyboarding, dictation and/or voice recognition software. As a result, there may be errors in the script that have gone undetected. Please consider this when interpreting information found in this chart.       GILBERTO Cary D.P.M., F.A.C.F.A.S.      Again, thank you for allowing me to participate in the care of your patient.        Sincerely,        Vickey Cary DPM

## 2019-12-19 NOTE — PROGRESS NOTES
Refugio returns to the office for reevaluation of the left foot.  The patient relates following the instructions given at the last visit with noted more pain.  The patient relates overall less  improvement in pain and function of the left foot.  The patient relates no other problems.    PAST MEDICAL HISTORY:   Past Medical History:   Diagnosis Date     Cataplexy and narcolepsy     Narcolepsy     Cellulitis      Diabetes      Hypoventilation      Obesity      FARZANA (obstructive sleep apnea)     uses CPAP       BMI= Body mass index is 58.89 kg/m .    Weight management plan: Patient was referred to their PCP to discuss a diet and exercise plan.    Physical Exam:    General: The patient appears to have a pleasant mental affect.    Lower extremity physical exam:  Neurovascular status is intact with palpable pedal pulses and intact epicritic sensations.  Muscular exam is within normal limits to major muscle groups.  Integument is intact.      One notes decreased edema.  One notes pain on palpation on the plantar aspect of the lateral aspect of the plantar fascia on the left foot.  No surrounding erythema noted.  No ecchymosis noted.    Radiograph evaluation including weightbearing AP, lateral and medial oblique views of the left foot reveals calcification bony spurs.  No evidence of stress reaction to the fifth or fourth metatarsal on the left.  Assessment:      ICD-10-CM    1. Acute foot pain, left M79.672 XR Foot Left G/E 3 Views       Plan:  I have explained to Refugio about the conditions.  At this time, the patient was instructed on icing, stretching, tissue massage and support.  The patient was fitted with a Dynaflex insert that will aid in offloading the tension forces to the soft tissues and prevent further inflammation.   The patient will return in four weeks for reevaluation if the symptoms do not resolve.      Disclaimer: This note consists of symbols derived from keyboarding, dictation and/or voice recognition  software. As a result, there may be errors in the script that have gone undetected. Please consider this when interpreting information found in this chart.       GILBERTO Cary D.P.M., [email or web address].

## 2019-12-19 NOTE — NURSING NOTE
"Chief Complaint   Patient presents with     Consult     Left foot pain, \"sharp, shooting pain\" X4 months       Initial BP (!) 140/67   Pulse 87   Ht 1.702 m (5' 7\")   Wt (!) 170.6 kg (376 lb)   BMI 58.89 kg/m   Estimated body mass index is 58.89 kg/m  as calculated from the following:    Height as of this encounter: 1.702 m (5' 7\").    Weight as of this encounter: 170.6 kg (376 lb).  Medications and allergies reviewed.      Loli TURCIOS MA    "

## 2019-12-19 NOTE — PATIENT INSTRUCTIONS
Refugio to follow up with Primary Care provider regarding elevated blood pressure.     Piqora shoe spray

## 2020-01-06 ENCOUNTER — ALLIED HEALTH/NURSE VISIT (OUTPATIENT)
Dept: EDUCATION SERVICES | Facility: CLINIC | Age: 45
End: 2020-01-06
Payer: COMMERCIAL

## 2020-01-06 VITALS — BODY MASS INDEX: 58.42 KG/M2 | WEIGHT: 315 LBS

## 2020-01-06 DIAGNOSIS — E11.9 TYPE 2 DIABETES MELLITUS WITHOUT COMPLICATION, WITHOUT LONG-TERM CURRENT USE OF INSULIN (H): Primary | ICD-10-CM

## 2020-01-06 PROCEDURE — G0108 DIAB MANAGE TRN  PER INDIV: HCPCS | Performed by: DIETITIAN, REGISTERED

## 2020-01-06 NOTE — PROGRESS NOTES
"Diabetes Self-Management Education & Support    Diabetes Education Self Management & Training    SUBJECTIVE/OBJECTIVE:  Presents for: Individual review  Accompanied by: Self  Diabetes education in the past 24mo: Yes  Focus of Visit: Healthy Eating, Being Active, Problem Solving  Diabetes type: Type 2  Disease course: Stable  Transportation concerns: No  Other concerns:: None  Cultural Influences/Ethnic Background:  American      Diabetes Symptoms & Complications  Blurred vision: No  Fatigue: Yes  Foot ulcerations: No  Polydipsia: Yes  Polyphagia: No  Polyuria: Yes  Visual change: No  Weakness: No  Slow healing wounds: No  Weight trend: Stable  Autonomic neuropathy: No  CVA: No  Heart disease: No  Nephropathy: No  Peripheral neuropathy: No  Peripheral Vascular Disease: No  Retinopathy: No  Sexual dysfunction: No    Patient Problem List and Family Medical History reviewed for relevant medical history, current medical status, and diabetes risk factors.    Vitals:  Wt (!) 169.2 kg (373 lb)   BMI 58.42 kg/m    Estimated body mass index is 58.42 kg/m  as calculated from the following:    Height as of 12/19/19: 1.702 m (5' 7\").    Weight as of this encounter: 169.2 kg (373 lb).   Last 3 BP:   BP Readings from Last 3 Encounters:   12/19/19 (!) 140/67   12/19/19 139/67   12/09/19 (!) 140/70       History   Smoking Status     Former Smoker     Packs/day: 0.50     Years: 1.00     Types: Cigarettes, Cigars     Quit date: 5/21/2012   Smokeless Tobacco     Never Used       Labs:  Lab Results   Component Value Date    A1C 5.6 12/19/2019     Lab Results   Component Value Date    GLC 99 12/19/2019     Lab Results   Component Value Date    LDL 49 12/19/2019     HDL Cholesterol   Date Value Ref Range Status   12/19/2019 45 >39 mg/dL Final   ]  GFR Estimate   Date Value Ref Range Status   12/19/2019 >90 >60 mL/min/[1.73_m2] Final     Comment:     Non  GFR Calc  Starting 12/18/2018, serum creatinine based estimated GFR " (eGFR) will be   calculated using the Chronic Kidney Disease Epidemiology Collaboration   (CKD-EPI) equation.       GFR Estimate If Black   Date Value Ref Range Status   2019 >90 >60 mL/min/[1.73_m2] Final     Comment:      GFR Calc  Starting 2018, serum creatinine based estimated GFR (eGFR) will be   calculated using the Chronic Kidney Disease Epidemiology Collaboration   (CKD-EPI) equation.       Lab Results   Component Value Date    CR 0.85 2019     No results found for: MICROALBUMIN    Healthy Eating  Cultural/Gnosticism diet restrictions?: No  Meal planning: Avoiding sweets, Low salt, Smaller portions  Meals include: Breakfast, Lunch, Dinner, Other  Breakfast: peanut butter toast early then later eggs, hashbrowns or if working he just does the peanut butter toast  Lunch: sandwiches or chicken strips or fish and schrimp, broccoli  Dinner: ham and cheese quesidilla and fruit loops  Snacks: was hard over the holidays, lots of junk at work.    Other: has been trying to buy apples regularly and take two to work with him  Beverages: Water, Coffee, Milk, Diet soda  Has patient met with a dietitian in the past?: Yes    Being Active  Being Active Assessed Today: No  Exercise:: (not been going to the gym has been working more for another employee that has family medical issues, will get to the gym when back to normal hoiurs, plans for twice a week)  Barrier to exercise: Time, Physical limitation, Other    Monitoring  Blood Glucose Meter: Accu-check  Home Glucose (Sugar) Monitorin-2 times per day  Blood glucose trend: No change  Low Glucose Range (mg/dL): 70-90  High Glucose Range (mg/dL): 110-130  Overall Range (mg/dL): 110-130        Taking Medications  Diabetes Medication(s)     Biguanides       metFORMIN (GLUCOPHAGE-XR) 500 MG 24 hr tablet    TAKE ONE TABLET BY MOUTH EVERY DAY WITH BREAKFAST    Incretin Mimetic Agents (GLP-1 Receptor Agonists)       liraglutide (VICTOZA PEN)  18 MG/3ML solution    INJECT 1.8MG UNDER THE SKIN ONCE DAILY          Current Treatments: Diet, Non-insulin Injectables    Problem Solving  Hypoglycemia Frequency: Rarely  Hypoglycemia Treatment: Juice, Candy, Other food  Patient carries a carbohydrate source: No  Medical alert: No  Severe weather/disaster plan for diabetes management?: No  DKA prevention plan?: No    Hypoglycemia symptoms  Confusion: Yes  Dizziness or Light-Headedness: Yes  Headaches: No  Hunger: No  Mood changes: No  Nervousness/Anxiety: No  Sleepiness: No  Speech difficulty: Yes  Sweats: No  Tremors: No    Hypoglycemia Complications  Blackouts: No  Hospitalization: No  Nocturnal hypoglycemia: No  Required assistance: No  Required glucagon injection: No  Seizures: No    Reducing Risks  CAD Risks: Hypertension, Obesity, Tobacco exposure  Has dilated eye exam at least once a year?: Yes  Sees dentist every 6 months?: No  Sees podiatrist (foot doctor)?: No    Healthy Coping  Informal Support system:: [place]yah based, Family, Friends, Parent  Difficulty affording diabetes management supplies?: No  Patient Activation Measure Survey Score:  MARY Score (Last Two) 1/25/2011 8/5/2019   MARY Raw Score 36 29   Activation Score 47.4 52.9   MARY Level 2 2       ASSESSMENT:  Wt down another 3 lbs, has been working more hours since a co-worker is out with family medical issues.  So more active with that, not going to gym but plans to when he is back to his normal work hours.  Feet doing better with 2nd pair of inserts.  He is still striving to do more fruits and veggies, he is bringing two apples to work with him daily.        Patient's most recent   Lab Results   Component Value Date    A1C 5.6 12/19/2019    is meeting goal of <7.0    INTERVENTION:   Diabetes knowledge and skills assessment:     Patient is knowledgeable in diabetes management concepts related to: Healthy Eating, Being Active and Monitoring    Patient needs further education on the following diabetes  management concepts: Healthy Eating, Being Active, Monitoring, Taking Medication, Problem Solving, Reducing Risks and Healthy Coping    Based on learning assessment above, most appropriate setting for further diabetes education would be: Individual setting.    Education provided today on:  AADE Self-Care Behaviors:  Healthy Eating: consistency in amount, composition, and timing of food intake and fruits and veggies  Being Active: relationship to blood glucose and describe appropriate activity program    Opportunities for ongoing education and support in diabetes-self management were discussed.    Pt verbalized understanding of concepts discussed and recommendations provided today.       Education Materials Provided:  No new materials provided today    PLAN:  See Patient Instructions for co-developed, patient-stated behavior change goals.  AVS printed and provided to patient today. See Follow-Up section for recommended follow-up.      Time Spent: 30 minutes  Encounter Type: Individual    Any diabetes medication dose changes were made via the CDE Protocol and Collaborative Practice Agreement with the patient's primary care provider. A copy of this encounter was shared with the provider.

## 2020-01-06 NOTE — PATIENT INSTRUCTIONS
1.  When back to normal work schedule: Get to the GYM 2 times a week, before cards.    FRUITS AND VEGETABLES

## 2020-01-22 DIAGNOSIS — E66.01 MORBID OBESITY (H): ICD-10-CM

## 2020-01-24 DIAGNOSIS — I87.8 VENOUS STASIS: ICD-10-CM

## 2020-01-24 RX ORDER — FUROSEMIDE 20 MG
TABLET ORAL
Qty: 360 TABLET | Refills: 1 | Status: SHIPPED | OUTPATIENT
Start: 2020-01-24 | End: 2020-08-13

## 2020-01-24 RX ORDER — NALTREXONE HYDROCHLORIDE 50 MG/1
TABLET, FILM COATED ORAL
Qty: 30 TABLET | Refills: 1 | Status: SHIPPED | OUTPATIENT
Start: 2020-01-24 | End: 2020-05-06

## 2020-01-24 NOTE — TELEPHONE ENCOUNTER
"Requested Prescriptions   Pending Prescriptions Disp Refills     furosemide (LASIX) 20 MG tablet 360 tablet 1     Sig: TAKE TWO TABLETS BY MOUTH TWICE A DAY       Diuretics (Including Combos) Protocol Passed - 1/24/2020  8:45 AM        Passed - Blood pressure under 140/90 in past 12 months     BP Readings from Last 3 Encounters:   12/19/19 (!) 140/67   12/19/19 139/67   12/09/19 (!) 140/70                 Passed - Recent (12 mo) or future (30 days) visit within the authorizing provider's specialty     Patient has had an office visit with the authorizing provider or a provider within the authorizing providers department within the previous 12 mos or has a future within next 30 days. See \"Patient Info\" tab in inbasket, or \"Choose Columns\" in Meds & Orders section of the refill encounter.              Passed - Medication is active on med list        Passed - Patient is age 18 or older        Passed - Normal serum creatinine on file in past 12 months     Recent Labs   Lab Test 12/19/19  0905   CR 0.85              Passed - Normal serum potassium on file in past 12 months     Recent Labs   Lab Test 12/19/19  0905   POTASSIUM 3.6                    Passed - Normal serum sodium on file in past 12 months     Recent Labs   Lab Test 12/19/19  0905                 Last Written Prescription Date:  6/7/19  Last Fill Quantity: 360,  # refills: 1   Last office visit: 12/13/2019 with prescribing provider:  Srinivas   Future Office Visit:      "

## 2020-02-03 ENCOUNTER — ALLIED HEALTH/NURSE VISIT (OUTPATIENT)
Dept: EDUCATION SERVICES | Facility: CLINIC | Age: 45
End: 2020-02-03
Payer: COMMERCIAL

## 2020-02-03 VITALS — WEIGHT: 315 LBS | BODY MASS INDEX: 58.67 KG/M2

## 2020-02-03 DIAGNOSIS — E11.9 TYPE 2 DIABETES MELLITUS WITHOUT COMPLICATION, WITHOUT LONG-TERM CURRENT USE OF INSULIN (H): Primary | ICD-10-CM

## 2020-02-03 PROCEDURE — G0108 DIAB MANAGE TRN  PER INDIV: HCPCS | Performed by: DIETITIAN, REGISTERED

## 2020-02-03 NOTE — PROGRESS NOTES
"Diabetes Self-Management Education & Support    Diabetes Education Self Management & Training    SUBJECTIVE/OBJECTIVE:  Presents for: Individual review  Accompanied by: Self  Diabetes education in the past 24mo: Yes  Focus of Visit: Healthy Eating, Being Active, Problem Solving  Diabetes type: Type 2  Disease course: Stable  Transportation concerns: No  Other concerns:: None  Cultural Influences/Ethnic Background:  American      Diabetes Symptoms & Complications  Blurred vision: No  Fatigue: Yes  Foot ulcerations: No  Polydipsia: Yes  Polyphagia: No  Polyuria: Yes  Visual change: No  Weakness: No  Slow healing wounds: No  Weight trend: Stable  Autonomic neuropathy: No  CVA: No  Heart disease: No  Nephropathy: No  Peripheral neuropathy: No  Peripheral Vascular Disease: No  Retinopathy: No  Sexual dysfunction: No    Patient Problem List and Family Medical History reviewed for relevant medical history, current medical status, and diabetes risk factors.    Vitals:  Wt (!) 169.9 kg (374 lb 9.6 oz)   BMI 58.67 kg/m    Estimated body mass index is 58.67 kg/m  as calculated from the following:    Height as of 12/19/19: 1.702 m (5' 7\").    Weight as of this encounter: 169.9 kg (374 lb 9.6 oz).   Last 3 BP:   BP Readings from Last 3 Encounters:   12/19/19 (!) 140/67   12/19/19 139/67   12/09/19 (!) 140/70       History   Smoking Status     Former Smoker     Packs/day: 0.50     Years: 1.00     Types: Cigarettes, Cigars     Quit date: 5/21/2012   Smokeless Tobacco     Never Used       Labs:  Lab Results   Component Value Date    A1C 5.6 12/19/2019     Lab Results   Component Value Date    GLC 99 12/19/2019     Lab Results   Component Value Date    LDL 49 12/19/2019     HDL Cholesterol   Date Value Ref Range Status   12/19/2019 45 >39 mg/dL Final   ]  GFR Estimate   Date Value Ref Range Status   12/19/2019 >90 >60 mL/min/[1.73_m2] Final     Comment:     Non  GFR Calc  Starting 12/18/2018, serum creatinine based " estimated GFR (eGFR) will be   calculated using the Chronic Kidney Disease Epidemiology Collaboration   (CKD-EPI) equation.       GFR Estimate If Black   Date Value Ref Range Status   2019 >90 >60 mL/min/[1.73_m2] Final     Comment:      GFR Calc  Starting 2018, serum creatinine based estimated GFR (eGFR) will be   calculated using the Chronic Kidney Disease Epidemiology Collaboration   (CKD-EPI) equation.       Lab Results   Component Value Date    CR 0.85 2019     No results found for: MICROALBUMIN    Healthy Eating  Cultural/Anabaptist diet restrictions?: No  Meal planning: Avoiding sweets, Low salt, Smaller portions  Meals include: Breakfast, Lunch, Dinner, Other  Breakfast: peanut butter toast early then later eggs, hashbrowns or if working he just does the peanut butter toast, coffee  Lunch: sandwiches or chicken strips or pizza  Dinner: tacos, macaroni and cheese with hot dogs or cheeseburgers  Snacks: trying for fruit vs other things but not always making the right choice  Other: has been trying to buy apples regularly and take two to work with him  Beverages: Water, Coffee, Milk, Diet soda  Has patient met with a dietitian in the past?: Yes    Being Active  Being Active Assessed Today: No  Exercise:: (not going to gym yet, still working extra hours and shoveling snow)  Barrier to exercise: Time, Physical limitation, Other    Monitoring  Blood Glucose Meter: Accu-check  Home Glucose (Sugar) Monitorin-2 times per day  Blood glucose trend: No change  Low Glucose Range (mg/dL): 70-90  High Glucose Range (mg/dL): 110-130  Overall Range (mg/dL): 110-130    , more numbers in the double digits    Taking Medications  Diabetes Medication(s)     Biguanides       metFORMIN (GLUCOPHAGE-XR) 500 MG 24 hr tablet    TAKE ONE TABLET BY MOUTH EVERY DAY WITH BREAKFAST    Incretin Mimetic Agents (GLP-1 Receptor Agonists)       liraglutide (VICTOZA PEN) 18 MG/3ML solution    INJECT  1.8MG UNDER THE SKIN ONCE DAILY          Current Treatments: Diet, Non-insulin Injectables    Problem Solving  Hypoglycemia Frequency: Rarely  Hypoglycemia Treatment: Juice, Candy, Other food  Patient carries a carbohydrate source: No  Medical alert: No  Severe weather/disaster plan for diabetes management?: No  DKA prevention plan?: No    Hypoglycemia symptoms  Confusion: Yes  Dizziness or Light-Headedness: Yes  Headaches: No  Hunger: No  Mood changes: No  Nervousness/Anxiety: No  Sleepiness: No  Speech difficulty: Yes  Sweats: No  Tremors: No    Hypoglycemia Complications  Blackouts: No  Hospitalization: No  Nocturnal hypoglycemia: No  Required assistance: No  Required glucagon injection: No  Seizures: No    Reducing Risks  CAD Risks: Hypertension, Obesity, Tobacco exposure  Has dilated eye exam at least once a year?: Yes  Sees dentist every 6 months?: No  Sees podiatrist (foot doctor)?: No    Healthy Coping  Informal Support system:: Liyah based, Family, Friends, Parent  Difficulty affording diabetes management supplies?: No  Patient Activation Measure Survey Score:  MARY Score (Last Two) 1/25/2011 8/5/2019   MARY Raw Score 36 29   Activation Score 47.4 52.9   MARY Level 2 2       ASSESSMENT:  PT would like to work on more veggies with meals and better snacking.  We set up some plans to remind pt of these items.  Wt is up one lb from last review and he feels that is due to not always making good choices at work for lunch and the snacking.  He is still working extra hours, his coworker is still out on FMLA so he has not been to the gym. He is doing more snow shoveling, cleaning slush off the driveway.        Patient's most recent   Lab Results   Component Value Date    A1C 5.6 12/19/2019    is meeting goal of <7.0    INTERVENTION:   Diabetes knowledge and skills assessment:     Patient is knowledgeable in diabetes management concepts related to: Healthy Eating, Being Active and Monitoring    Patient needs further  education on the following diabetes management concepts: Healthy Eating, Being Active, Monitoring, Taking Medication, Problem Solving, Reducing Risks and Healthy Coping    Based on learning assessment above, most appropriate setting for further diabetes education would be: Individual setting.    Education provided today on:  AADE Self-Care Behaviors:  Healthy Eating: working on more veggies with meals and   Being Active: relationship to blood glucose and describe appropriate activity program    Opportunities for ongoing education and support in diabetes-self management were discussed.    Pt verbalized understanding of concepts discussed and recommendations provided today.       Education Materials Provided:  No new materials provided today    PLAN:  See Patient Instructions for co-developed, patient-stated behavior change goals.  AVS printed and provided to patient today. See Follow-Up section for recommended follow-up.      Time Spent: 30 minutes  Encounter Type: Individual    Any diabetes medication dose changes were made via the CDE Protocol and Collaborative Practice Agreement with the patient's primary care provider. A copy of this encounter was shared with the provider.

## 2020-02-03 NOTE — PATIENT INSTRUCTIONS
1.  Buy some apples or bananas and bring with to work    2.  Put the veggies on the counter in the morning so you remember to make them when you get home    Have marciano remind you to make vegetables with supper remind you to bring fruit with you to work.

## 2020-02-13 DIAGNOSIS — E66.01 MORBID OBESITY DUE TO EXCESS CALORIES (H): ICD-10-CM

## 2020-02-14 RX ORDER — TOPIRAMATE 100 MG/1
200 TABLET, FILM COATED ORAL 2 TIMES DAILY
Qty: 120 TABLET | Refills: 5 | Status: SHIPPED | OUTPATIENT
Start: 2020-02-14 | End: 2020-08-25

## 2020-02-18 DIAGNOSIS — E11.9 TYPE 2 DIABETES MELLITUS WITHOUT COMPLICATION, WITHOUT LONG-TERM CURRENT USE OF INSULIN (H): ICD-10-CM

## 2020-02-18 RX ORDER — LIRAGLUTIDE 6 MG/ML
INJECTION SUBCUTANEOUS
Qty: 9 ML | Refills: 0 | Status: SHIPPED | OUTPATIENT
Start: 2020-02-18 | End: 2020-03-16

## 2020-02-18 NOTE — TELEPHONE ENCOUNTER
"Requested Prescriptions   Pending Prescriptions Disp Refills     liraglutide (VICTOZA PEN) 18 MG/3ML solution 9 mL 5     Sig: INJECT 1.8MG UNDER THE SKIN ONCE DAILY       GLP-1 Agonists Protocol Passed - 2/18/2020  9:00 AM        Passed - Blood pressure less than 140/90 in past 6 months     BP Readings from Last 3 Encounters:   12/19/19 (!) 140/67   12/19/19 139/67   12/09/19 (!) 140/70                 Passed - LDL on file in past 12 months     Recent Labs   Lab Test 12/19/19  0905   LDL 49             Passed - Microalbumin on file in past 12 months     Recent Labs   Lab Test 12/19/19  0905   MICROL <5   UMALCR Unable to calculate due to low value             Passed - HgbA1C in past 3 or 6 months     If HgbA1C is 8 or greater, it needs to be on file within the past 3 months.  If less than 8, must be on file within the past 6 months.     Recent Labs   Lab Test 12/19/19  0905   A1C 5.6             Passed - Medication is active on med list        Passed - Patient is age 18 or older        Passed - Normal serum creatinine on file in past 12 months     Recent Labs   Lab Test 12/19/19  0905   CR 0.85             Passed - Recent (6 mo) or future (30 days) visit within the authorizing provider's specialty     Patient had office visit in the last 6 months or has a visit in the next 30 days with authorizing provider.  See \"Patient Info\" tab in inbasket, or \"Choose Columns\" in Meds & Orders section of the refill encounter.            Last Written Prescription Date:  8/20/19  Last Fill Quantity: 9 ml,  # refills: 5   Last office visit: 12/13/2019 with prescribing provider:  Srinivas   Future Office Visit:      "

## 2020-02-21 DIAGNOSIS — I27.20 PULMONARY HYPERTENSION (H): ICD-10-CM

## 2020-02-21 RX ORDER — LISINOPRIL 5 MG/1
TABLET ORAL
Qty: 90 TABLET | Refills: 1 | Status: SHIPPED | OUTPATIENT
Start: 2020-02-21 | End: 2020-08-20

## 2020-02-21 NOTE — TELEPHONE ENCOUNTER
"Requested Prescriptions   Pending Prescriptions Disp Refills     LISINOPRIL 5 MG PO tablet [Pharmacy Med Name: LISINOPRIL 5MG TABS] 90 tablet 1     Sig: TAKE ONE TABLET BY MOUTH ONCE DAILY       ACE Inhibitors (Including Combos) Protocol Passed - 2/21/2020  5:56 AM        Passed - Blood pressure under 140/90 in past 12 months     BP Readings from Last 3 Encounters:   12/19/19 (!) 140/67   12/19/19 139/67   12/09/19 (!) 140/70                 Passed - Recent (12 mo) or future (30 days) visit within the authorizing provider's specialty     Patient has had an office visit with the authorizing provider or a provider within the authorizing providers department within the previous 12 mos or has a future within next 30 days. See \"Patient Info\" tab in inbasket, or \"Choose Columns\" in Meds & Orders section of the refill encounter.              Passed - Medication is active on med list        Passed - Patient is age 18 or older        Passed - Normal serum creatinine on file in past 12 months     Recent Labs   Lab Test 12/19/19  0905   CR 0.85             Passed - Normal serum potassium on file in past 12 months     Recent Labs   Lab Test 12/19/19  0905   POTASSIUM 3.6           lisinopril (PRINIVIL/ZESTRIL) 5 MG tablet  Last Written Prescription Date:  08/26/2019  Last Fill Quantity: 90 tablet,  # refills: 1   Last office visit: 12/13/2019 with prescribing provider:  RENETTA Espino   Future Office Visit:      Romi PATEL (ALICIA) (M)      "

## 2020-03-16 ENCOUNTER — VIRTUAL VISIT (OUTPATIENT)
Dept: ENDOCRINOLOGY | Facility: CLINIC | Age: 45
End: 2020-03-16
Payer: COMMERCIAL

## 2020-03-16 DIAGNOSIS — E11.9 TYPE 2 DIABETES MELLITUS WITHOUT COMPLICATION, WITHOUT LONG-TERM CURRENT USE OF INSULIN (H): ICD-10-CM

## 2020-03-16 RX ORDER — LIRAGLUTIDE 6 MG/ML
INJECTION SUBCUTANEOUS
Qty: 9 ML | Refills: 0 | Status: SHIPPED | OUTPATIENT
Start: 2020-03-16 | End: 2020-04-20

## 2020-03-16 NOTE — PROGRESS NOTES
Return Medical Weight Management Note     Refugio Kennedy  MRN:  [id number]  :  1975  CRISTI:  19    Dear Dr. Espino,    We had the pleasure of seeing your patient Refugio Kennedy.  He is a 41 year old male who we are continuing to see for treatment of obesity related to: DMII, HLD, sleep apnea on CPAP daily, and hypoventilation syndrome, oxygen dependent.     CURRENT WEIGHT:   0 lbs 0 oz    Wt Readings from Last 4 Encounters:   20 (!) 169.9 kg (374 lb 9.6 oz)   20 (!) 169.2 kg (373 lb)   19 (!) 170.6 kg (376 lb)   19 (!) 170.6 kg (376 lb)     Height:  Data Unavailable  Body Mass Index:  There is no height or weight on file to calculate BMI.  Vitals:  B/P: 138/83, P: 80    Initial consult weight was 454 on 5/15/2015.  Weight change since last seen on 19 is down 21 pounds.   Total loss is 78 pounds.    INTERVAL HISTORY:  Says his weight has not changed.Tolerating topiramate 200 mg BID. Continues struggling with snacking, but has now moved naltrexoen to pre work as many snacks are brought into work.    No flowsheet data found.  MEDICATIONS:   Current Outpatient Medications   Medication     albuterol (2.5 MG/3ML) 0.083% neb solution     albuterol (PROAIR HFA) 108 (90 BASE) MCG/ACT Inhaler     aspirin 81 MG tablet     blood glucose (ACCU-CHEK CHRIS PLUS) test strip     blood glucose monitoring (SOFTCLIX) lancets     cetirizine (ZYRTEC) 10 MG tablet     DIABETIC STERILE LANCETS device     fluticasone (FLONASE) 50 MCG/ACT spray     furosemide (LASIX) 20 MG tablet     insulin pen needle (BD SEVERO U/F) 32G X 4 MM miscellaneous     liraglutide (VICTOZA PEN) 18 MG/3ML solution     LISINOPRIL 5 MG PO tablet     metFORMIN (GLUCOPHAGE-XR) 500 MG 24 hr tablet     modafinil (PROVIGIL) 200 MG tablet     Multiple Vitamin (MULTIVITAMIN OR)     naltrexone (DEPADE/REVIA) 50 MG tablet     NEW MED     order for DME     order for DME     order for DME     order for DME     ORDER FOR DME     orlistat  "(LAWRENCE) 60 MG capsule     simvastatin (ZOCOR) 10 MG tablet     topiramate (TOPAMAX) 100 MG tablet     triamcinolone (KENALOG) 0.1 % cream     No current facility-administered medications for this visit.      Weight Loss Medication History Reviewed With Patient 12/9/2019   Which weight loss medications are you currently taking on a regular basis?  Topamax (topiramate), Victoza (liraglutide)   Are you having any side effects from the weight loss medication that we have prescribed you? No   If you are having side effects please describe: -     ASSESSMENT:   Naltrexone 50 mg/d has been helpful. Maintain topiramate 200 mg BID. Also on liraglutide 1.8/d. Reinforce food plan - focus on no between meal snacking, aggressive lowering of starches and cheese.     FOLLOW-UP:    3 months      Refugio Kennedy is a 44 year old male who is being evaluated via a billable telephone visit.      The patient has been notified of following:     \"This telephone visit will be conducted via a call between you and your physician/provider. We have found that certain health care needs can be provided without the need for a physical exam.  This service lets us provide the care you need with a short phone conversation.  If a prescription is necessary we can send it directly to your pharmacy.  If lab work is needed we can place an order for that and you can then stop by our lab to have the test done at a later time.    Telephone visits are billed at different rates depending on your insurance coverage. During this emergency period, for some insurers they may be billed the same as an in-person visit.  Please reach out to your insurance provider with any questions.    If during the course of the call the physician/provider feels a telephone visit is not appropriate, you will not be charged for this service.\"    Patient has given verbal consent for Telephone visit?  Yes    How would you like to obtain your AVS? Bryantt    Phone call duration: 15 " minutes    Ashish Paez MD      Sincerely,

## 2020-03-17 DIAGNOSIS — G47.33 OBSTRUCTIVE SLEEP APNEA-HYPOPNEA SYNDROME: ICD-10-CM

## 2020-03-17 RX ORDER — MODAFINIL 200 MG/1
TABLET ORAL
Qty: 60 TABLET | Refills: 5 | Status: SHIPPED | OUTPATIENT
Start: 2020-03-17 | End: 2020-09-23

## 2020-03-17 NOTE — TELEPHONE ENCOUNTER
Pending Prescriptions:                       Disp   Refills    modafinil (PROVIGIL) 200 MG tablet        60 tab*5            Sig: Take 1/2 tablet by mouth 3-4 times daily as           needed for sleepiness.    Last Written Prescription Date:  7/22/19  Last Fill Quantity: 60,   # refills: 5  Last Office Visit with [facility], [facility] or [facility] prescribing provider: 3/14/2019  Future Office visit:   No follow up scheduled at this time.      LIOR Melendez

## 2020-04-13 ENCOUNTER — ALLIED HEALTH/NURSE VISIT (OUTPATIENT)
Dept: EDUCATION SERVICES | Facility: CLINIC | Age: 45
End: 2020-04-13
Payer: COMMERCIAL

## 2020-04-13 DIAGNOSIS — E78.5 HYPERLIPIDEMIA LDL GOAL <100: ICD-10-CM

## 2020-04-13 DIAGNOSIS — E11.9 TYPE 2 DIABETES MELLITUS WITHOUT COMPLICATION, WITHOUT LONG-TERM CURRENT USE OF INSULIN (H): Primary | ICD-10-CM

## 2020-04-13 RX ORDER — SIMVASTATIN 10 MG
10 TABLET ORAL AT BEDTIME
Qty: 90 TABLET | Refills: 2 | Status: SHIPPED | OUTPATIENT
Start: 2020-04-13 | End: 2020-12-08

## 2020-04-13 NOTE — TELEPHONE ENCOUNTER
"Requested Prescriptions   Pending Prescriptions Disp Refills     simvastatin (ZOCOR) 10 MG tablet 90 tablet 1     Sig: Take 1 tablet (10 mg) by mouth At Bedtime       Statins Protocol Passed - 4/13/2020  3:20 PM        Passed - LDL on file in past 12 months     Recent Labs   Lab Test 12/19/19  0905   LDL 49             Passed - No abnormal creatine kinase in past 12 months     No lab results found.             Passed - Recent (12 mo) or future (30 days) visit within the authorizing provider's specialty     Patient has had an office visit with the authorizing provider or a provider within the authorizing providers department within the previous 12 mos or has a future within next 30 days. See \"Patient Info\" tab in inbasket, or \"Choose Columns\" in Meds & Orders section of the refill encounter.              Passed - Medication is active on med list        Passed - Patient is age 18 or older           Last Written Prescription Date:  10/8/19  Last Fill Quantity: 90,  # refills: 1   Last office visit: 12/13/2019 with prescribing provider:  Srinivas   Future Office Visit:      "

## 2020-04-13 NOTE — PATIENT INSTRUCTIONS
1. More veggies and continue the fruit every day.      2.  Work on walking around the store as often as you can.

## 2020-04-13 NOTE — PROGRESS NOTES
"Diabetes Self-Management Education & Support    Presents for: Individual review    SUBJECTIVE/OBJECTIVE:  Presents for: Individual review  Accompanied by: Self  Diabetes type: Type 2  Cultural Influences/Ethnic Background:  American      Diabetes Symptoms & Complications:          Patient Problem List and Family Medical History reviewed for relevant medical history, current medical status, and diabetes risk factors.    Vitals:  There were no vitals taken for this visit.  Estimated body mass index is 58.67 kg/m  as calculated from the following:    Height as of 12/19/19: 1.702 m (5' 7\").    Weight as of 2/3/20: 169.9 kg (374 lb 9.6 oz).   Last 3 BP:   BP Readings from Last 3 Encounters:   12/19/19 (!) 140/67   12/19/19 139/67   12/09/19 (!) 140/70       History   Smoking Status     Former Smoker     Packs/day: 0.50     Years: 1.00     Types: Cigarettes, Cigars     Quit date: 5/21/2012   Smokeless Tobacco     Never Used       Labs:  Lab Results   Component Value Date    A1C 5.6 12/19/2019     Lab Results   Component Value Date    GLC 99 12/19/2019     Lab Results   Component Value Date    LDL 49 12/19/2019     HDL Cholesterol   Date Value Ref Range Status   12/19/2019 45 >39 mg/dL Final   ]  GFR Estimate   Date Value Ref Range Status   12/19/2019 >90 >60 mL/min/[1.73_m2] Final     Comment:     Non  GFR Calc  Starting 12/18/2018, serum creatinine based estimated GFR (eGFR) will be   calculated using the Chronic Kidney Disease Epidemiology Collaboration   (CKD-EPI) equation.       GFR Estimate If Black   Date Value Ref Range Status   12/19/2019 >90 >60 mL/min/[1.73_m2] Final     Comment:      GFR Calc  Starting 12/18/2018, serum creatinine based estimated GFR (eGFR) will be   calculated using the Chronic Kidney Disease Epidemiology Collaboration   (CKD-EPI) equation.       Lab Results   Component Value Date    CR 0.85 12/19/2019     No results found for: MICROALBUMIN    Healthy " Eating:  Healthy Eating Assessed Today: Yes  Breakfast: coffee, peanut butter and toast  Lunch: sandwich, and whatever junk is around at work  Dinner: chicken, potatoes, rice and corn, spaghetti, pizza, tacos  Snacks: saltines, m and m's but gone now, fruit (eating more apples and bananas)  Beverages: Water    Being Active:  Being Active Assessed Today: Yes  Exercise:: Yes(walking at the store)    Monitorin-123        Taking Medications:  Diabetes Medication(s)     Biguanides       metFORMIN (GLUCOPHAGE-XR) 500 MG 24 hr tablet    TAKE ONE TABLET BY MOUTH EVERY DAY WITH BREAKFAST    Incretin Mimetic Agents (GLP-1 Receptor Agonists)       liraglutide (VICTOZA PEN) 18 MG/3ML solution    INJECT 1.8MG UNDER THE SKIN ONCE DAILY               Problem Solving:   not assessed              Reducing Risks:   not assessed    Healthy Coping:     Patient Activation Measure Survey Score:  MARY Score (Last Two) 2011   MARY Raw Score 36 29   Activation Score 47.4 52.9   MARY Level 2 2       Diabetes knowledge and skills assessment:   Patient is knowledgeable in diabetes management concepts related to: Healthy Eating, Being Active and Monitoring    Patient needs further education on the following diabetes management concepts: Healthy Eating, Being Active, Monitoring, Taking Medication, Problem Solving, Reducing Risks and Healthy Coping    Based on learning assessment above, most appropriate setting for further diabetes education would be: Individual setting.      INTERVENTIONS:    Education provided today on:  AADE Self-Care Behaviors:  Healthy Eating: plate planning method and fruits and veggies, watching snacks especially crackers  Being Active: relationship to blood glucose and describe appropriate activity program    Opportunities for ongoing education and support in diabetes-self management were discussed.    Pt verbalized understanding of concepts discussed and recommendations provided today.       Education  Materials Provided:  No new materials provided today      ASSESSMENT:  Wt has been hanging in same area [phone] for last few months.  He has been trying to walk around more at work.  He is still working on eating more fruits and vegetables.  Has been doing more eating of crackers for snacks, he will watch this.  Patient's most recent   Lab Results   Component Value Date    A1C 5.6 12/19/2019    is meeting goal of <7.0    PLAN  See Patient Instructions for co-developed, patient-stated behavior change goals.  AVS printed and provided to patient today. See Follow-Up section for recommended follow-up.      Time Spent: 20 minutes  Encounter Type: Individual    Any diabetes medication dose changes were made via the CDE Protocol and Collaborative Practice Agreement with the patient's primary care provider. A copy of this encounter was shared with the provider.

## 2020-04-18 DIAGNOSIS — E11.9 TYPE 2 DIABETES MELLITUS WITHOUT COMPLICATION, WITHOUT LONG-TERM CURRENT USE OF INSULIN (H): ICD-10-CM

## 2020-04-20 RX ORDER — LIRAGLUTIDE 6 MG/ML
INJECTION SUBCUTANEOUS
Qty: 9 ML | Refills: 4 | Status: SHIPPED | OUTPATIENT
Start: 2020-04-20 | End: 2020-08-25

## 2020-04-23 ENCOUNTER — TELEPHONE (OUTPATIENT)
Dept: FAMILY MEDICINE | Facility: CLINIC | Age: 45
End: 2020-04-23

## 2020-04-23 NOTE — TELEPHONE ENCOUNTER
[facility] was doing check s with pt regarding his Diabetes.  Going to close the case as pt stopped responding.  Niharika [place] Sec

## 2020-04-30 ENCOUNTER — MEDICAL CORRESPONDENCE (OUTPATIENT)
Dept: HEALTH INFORMATION MANAGEMENT | Facility: CLINIC | Age: 45
End: 2020-04-30

## 2020-04-30 ENCOUNTER — TELEPHONE (OUTPATIENT)
Dept: FAMILY MEDICINE | Facility: CLINIC | Age: 45
End: 2020-04-30

## 2020-05-04 DIAGNOSIS — E66.01 MORBID OBESITY (H): ICD-10-CM

## 2020-05-05 ENCOUNTER — PATIENT OUTREACH (OUTPATIENT)
Dept: CARE COORDINATION | Facility: CLINIC | Age: 45
End: 2020-05-05

## 2020-05-05 NOTE — PROGRESS NOTES
Clinic Care Coordination Contact  [facility]/Voicemail    Referral Source: Pro-Active Outreach  Clinical Data: Care Coordinator Outreach  Outreach attempted x 1.  Left message on patient's voicemail with call back information and requested return call.  Plan: Care Coordinator will send care coordination introduction letter with care coordinator contact information and explanation of care coordination services via [employer]. Care Coordinator will try to reach patient again in 1-2 business days.  Solitario Herrera RN  Primary Care Clinic RN Care Coordinator  [facility]   [phone]

## 2020-05-05 NOTE — LETTER
[place] CARE COORDINATION  [facility]  [address] [place] [address]  [phone]    May 5, 2020    Refugio Kennedy  [address]  [facility] [address]      Dear Refugio,    I am a clinic care coordinator who works with Erika Espino MD at [facility]. I recently tried to call and was unable to reach you. Below is a description of clinic care coordination and how I can further assist you.      The clinic care coordination team is made up of a registered nurse,  and community health worker who understand the health care system. The goal of clinic care coordination is to help you manage your health and improve access to the health care system in the most efficient manner. The team can assist you in meeting your health care goals by providing education, coordinating services, strengthening the communication among your providers and supporting you with any resource needs.    Please feel free to contact me at [phone] with any questions or concerns. We are focused on providing you with the highest-quality healthcare experience possible and that all starts with you.     Sincerely,     Solitario Herrera RN  Clinic Care Coordinator

## 2020-05-06 ENCOUNTER — PATIENT OUTREACH (OUTPATIENT)
Dept: CARE COORDINATION | Facility: CLINIC | Age: 45
End: 2020-05-06

## 2020-05-06 ENCOUNTER — TELEPHONE (OUTPATIENT)
Dept: ENDOCRINOLOGY | Facility: CLINIC | Age: 45
End: 2020-05-06

## 2020-05-06 RX ORDER — NALTREXONE HYDROCHLORIDE 50 MG/1
TABLET, FILM COATED ORAL
Qty: 30 TABLET | Refills: 1 | OUTPATIENT
Start: 2020-05-06

## 2020-05-06 RX ORDER — NALTREXONE HYDROCHLORIDE 50 MG/1
50 TABLET, FILM COATED ORAL DAILY
Qty: 30 TABLET | Refills: 3 | Status: SHIPPED | OUTPATIENT
Start: 2020-05-06 | End: 2020-06-22

## 2020-05-06 NOTE — PROGRESS NOTES
Clinic Care Coordination Contact  [facility]/Voicemail       Clinical Data: Care Coordinator Outreach  Outreach attempted x 2.  Left message on patient's voicemail with call back information and requested return call.  Plan: Care Coordinator sent care coordination introduction letter on 5-5 via [employer]. Care Coordinator will do no further outreaches at this time.  Solitario Herrera RN  Primary Care Clinic RN Care Coordinator  [facility]   [phone]

## 2020-05-06 NOTE — TELEPHONE ENCOUNTER
Pt needs appt scheduled  LCV: 3-16-20 ( RTC 3 m)  NCV: none  Scheduling has been notified to contact the pt for appointment.  [facility] RN

## 2020-05-06 NOTE — TELEPHONE ENCOUNTER
Patient had clinic visit in March with Dr. Paez. Plan to continue Naltrexone 50mg daily-no new rx sent at that time. Sending refill to pharmacy and [employer] message to patient to schedule virtual visit with Dr. Paez in the next 1-2 months.

## 2020-05-11 ENCOUNTER — ALLIED HEALTH/NURSE VISIT (OUTPATIENT)
Dept: EDUCATION SERVICES | Facility: CLINIC | Age: 45
End: 2020-05-11
Payer: COMMERCIAL

## 2020-05-11 DIAGNOSIS — E11.9 TYPE 2 DIABETES MELLITUS WITHOUT COMPLICATION, WITHOUT LONG-TERM CURRENT USE OF INSULIN (H): Primary | ICD-10-CM

## 2020-05-11 PROCEDURE — 98967 PH1 ASSMT&MGMT NQHP 11-20: CPT | Mod: TEL | Performed by: DIETITIAN, REGISTERED

## 2020-05-11 NOTE — PROGRESS NOTES
"Diabetes Self-Management Education & Support    Patient verbally consented to the telephone visit service today: yes      SUBJECTIVE/OBJECTIVE:  Presents for: Individual review  Accompanied by: Self  Diabetes type: Type 2  Cultural Influences/Ethnic Background:  American      Diabetes Symptoms & Complications:          Patient Problem List and Family Medical History reviewed for relevant medical history, current medical status, and diabetes risk factors.    Vitals:  There were no vitals taken for this visit.  Estimated body mass index is 58.67 kg/m  as calculated from the following:    Height as of 12/19/19: 1.702 m (5' 7\").    Weight as of 2/3/20: 169.9 kg (374 lb 9.6 oz).   Last 3 BP:   BP Readings from Last 3 Encounters:   12/19/19 (!) 140/67   12/19/19 139/67   12/09/19 (!) 140/70       History   Smoking Status     Former Smoker     Packs/day: 0.50     Years: 1.00     Types: Cigarettes, Cigars     Quit date: 5/21/2012   Smokeless Tobacco     Never Used       Labs:  Lab Results   Component Value Date    A1C 5.6 12/19/2019     Lab Results   Component Value Date    GLC 99 12/19/2019     Lab Results   Component Value Date    LDL 49 12/19/2019     HDL Cholesterol   Date Value Ref Range Status   12/19/2019 45 >39 mg/dL Final   ]  GFR Estimate   Date Value Ref Range Status   12/19/2019 >90 >60 mL/min/[1.73_m2] Final     Comment:     Non  GFR Calc  Starting 12/18/2018, serum creatinine based estimated GFR (eGFR) will be   calculated using the Chronic Kidney Disease Epidemiology Collaboration   (CKD-EPI) equation.       GFR Estimate If Black   Date Value Ref Range Status   12/19/2019 >90 >60 mL/min/[1.73_m2] Final     Comment:      GFR Calc  Starting 12/18/2018, serum creatinine based estimated GFR (eGFR) will be   calculated using the Chronic Kidney Disease Epidemiology Collaboration   (CKD-EPI) equation.       Lab Results   Component Value Date    CR 0.85 12/19/2019     No results found " for: MICROALBUMIN    Healthy Eating:  Healthy Eating Assessed Today: Yes  Breakfast: toast peanut butter, eggs later  Lunch: sandwiches  Dinner: chicken breast or spaghetti or cheeseburgers  Snacks: has been snacking more with with being home more with the virus  Other:   Beverages: Water    Being Active:  Being Active Assessed Today: Yes  Exercise:: Yes(walking at the store, sweeping the store)    Monitorin-123    Taking Medications:  Diabetes Medication(s)     Biguanides       metFORMIN (GLUCOPHAGE-XR) 500 MG 24 hr tablet    TAKE ONE TABLET BY MOUTH EVERY DAY WITH BREAKFAST    Incretin Mimetic Agents (GLP-1 Receptor Agonists)       liraglutide (VICTOZA PEN) 18 MG/3ML solution    INJECT 1.8 MG UNDER THE SKIN ONCE DAILY               Problem Solving:   not assessed            Reducing Risks:       Healthy Coping:     Patient Activation Measure Survey Score:  MARY Score (Last Two) 2011   MARY Raw Score 36 29   Activation Score 47.4 52.9   MARY Level 2 2       Diabetes knowledge and skills assessment:   Patient is knowledgeable in diabetes management concepts related to: Healthy Eating, Being Active and Monitoring    Patient needs further education on the following diabetes management concepts: Healthy Eating, Being Active, Monitoring, Taking Medication, Problem Solving, Reducing Risks and Healthy Coping    Based on learning assessment above, most appropriate setting for further diabetes education would be: Individual setting.      INTERVENTIONS:    Education provided today on:  AADE Self-Care Behaviors:  Healthy Eating: consistency in amount, composition, and timing of food intake and watch the snack options.  Try   Being Active:   Monitoring: individual blood glucose targets  Activity:   Opportunities for ongoing education and support in diabetes-self management were discussed.    Pt verbalized understanding of concepts discussed and recommendations provided today.       Education Materials  Provided:  No new materials provided today      ASSESSMENT:  Struggling with snacking since the COVID-19 and more time at home.  He is buying some fruit which is a good snack option over candy bars/junk.  HE will come into the [facility] on Monday/tuesday or Friday am for a wt check.  WE discussed doing more walking since it is nice out and he is not as active due to not having all his jobs he usually does this time of year due to COVID-19.  Try to go out for a walk when you catch yourself wanting a snack when you dont need one.    Patient's most recent   Lab Results   Component Value Date    A1C 5.6 12/19/2019    is meeting goal of <7.0    PLAN  See Patient Instructions for co-developed, patient-stated behavior change goals.  AVS printed and provided to patient today. See Follow-Up section for recommended follow-up.  -make sure you buy fruits and veggies, try to not go to store hungry and work really hard on impulse buying.    Time Spent: 13 minutes  Encounter Type: Individual    Any diabetes medication dose changes were made via the CDE Protocol and Collaborative Practice Agreement with the patient's primary care provider. A copy of this encounter was shared with the provider.

## 2020-05-23 DIAGNOSIS — E11.9 TYPE 2 DIABETES MELLITUS WITHOUT COMPLICATION, WITHOUT LONG-TERM CURRENT USE OF INSULIN (H): ICD-10-CM

## 2020-05-26 RX ORDER — METFORMIN HCL 500 MG
TABLET, EXTENDED RELEASE 24 HR ORAL
Qty: 90 TABLET | Refills: 1 | Status: SHIPPED | OUTPATIENT
Start: 2020-05-26 | End: 2020-11-19

## 2020-06-22 ENCOUNTER — VIRTUAL VISIT (OUTPATIENT)
Dept: ENDOCRINOLOGY | Facility: CLINIC | Age: 45
End: 2020-06-22
Payer: COMMERCIAL

## 2020-06-22 VITALS — BODY MASS INDEX: 56.96 KG/M2 | HEIGHT: 68 IN

## 2020-06-22 DIAGNOSIS — E66.01 MORBID OBESITY (H): ICD-10-CM

## 2020-06-22 RX ORDER — NALTREXONE HYDROCHLORIDE 50 MG/1
50 TABLET, FILM COATED ORAL 2 TIMES DAILY
Qty: 60 TABLET | Refills: 4 | Status: SHIPPED | OUTPATIENT
Start: 2020-06-22 | End: 2020-12-02

## 2020-06-22 ASSESSMENT — PAIN SCALES - GENERAL: PAINLEVEL: NO PAIN (0)

## 2020-06-22 NOTE — NURSING NOTE
"Chief Complaint   Patient presents with     RECHECK     Follow up appointment.       Vitals:    06/22/20 0737   Height: 1.727 m (5' 8\")       Body mass index is 56.96 kg/m .                            MARIZOL DIMAS, EMT    "

## 2020-06-22 NOTE — PROGRESS NOTES
"Refugio Kennedy is a 44 year old male who is being evaluated via a billable telephone visit.      The patient has been notified of following:     \"This telephone visit will be conducted via a call between you and your physician/provider. We have found that certain health care needs can be provided without the need for a physical exam.  This service lets us provide the care you need with a short phone conversation.  If a prescription is necessary we can send it directly to your pharmacy.  If lab work is needed we can place an order for that and you can then stop by our lab to have the test done at a later time.    Telephone visits are billed at different rates depending on your insurance coverage. During this emergency period, for some insurers they may be billed the same as an in-person visit.  Please reach out to your insurance provider with any questions.    If during the course of the call the physician/provider feels a telephone visit is not appropriate, you will not be charged for this service.\"    Patient has given verbal consent for Telephone visit?  Yes    What phone number would you like to be contacted at? [phone]    How would you like to obtain your AVS? [facility]    Phone call duration: 15 minutes.  I explained the conditions and plans (more than 50% of time was counseling/coordination of weight management).    Ashish Paez MD    "

## 2020-06-22 NOTE — PROGRESS NOTES
"      Return Medical Weight Management Note     Refugio Kennedy  MRN:  [id number]  :  1975  CRISTI:  19    Dear Dr. Espino,  We had the pleasure of seeing your patient Refugio Kennedy.  He is a 41 year old male who we are continuing to see for treatment of obesity related to: DMII, HLD, sleep apnea on CPAP daily, and hypoventilation syndrome, oxygen dependent.     CURRENT WEIGHT:   0 lbs 0 oz No scale at home, thinks has gained ~5 lbs.    Wt Readings from Last 4 Encounters:   20 (!) 169.9 kg (374 lb 9.6 oz)   20 (!) 169.2 kg (373 lb)   19 (!) 170.6 kg (376 lb)   19 (!) 170.6 kg (376 lb)     Height:  5' 8\"[unknown[  Body Mass Index:  Body mass index is 56.96 kg/m .  Vitals:  B/P: 138/83, P: 80    Initial consult weight was 454 on 5/15/2015.  Weight change since last seen on 19 is down 21 pounds.   Total loss is 78 pounds.    INTERVAL HISTORY:  Has been doing much more snacking which he attributes to pandemic, but has now moved naltrexone to pre work as many snacks are brought into work.Tolerating topiramate 200 mg BID. Says his exercise has decreased since pandemic, used to walk some.    No flowsheet data found.     MEDICATIONS:   Current Outpatient Medications   Medication     albuterol (2.5 MG/3ML) 0.083% neb solution     albuterol (PROAIR HFA) 108 (90 BASE) MCG/ACT Inhaler     aspirin 81 MG tablet     blood glucose (ACCU-CHEK CHRIS PLUS) test strip     blood glucose monitoring (SOFTCLIX) lancets     cetirizine (ZYRTEC) 10 MG tablet     DIABETIC STERILE LANCETS device     fluticasone (FLONASE) 50 MCG/ACT spray     furosemide (LASIX) 20 MG tablet     insulin pen needle (BD SEVERO U/F) 32G X 4 MM miscellaneous     liraglutide (VICTOZA PEN) 18 MG/3ML solution     LISINOPRIL 5 MG PO tablet     metFORMIN (GLUCOPHAGE-XR) 500 MG 24 hr tablet     modafinil (PROVIGIL) 200 MG tablet     Multiple Vitamin (MULTIVITAMIN OR)     naltrexone (DEPADE/REVIA) 50 MG tablet     order for DME     order for " "DME     order for DME     order for DME     ORDER FOR DME     orlistat (LAWRENCE) 60 MG capsule     simvastatin (ZOCOR) 10 MG tablet     topiramate (TOPAMAX) 100 MG tablet     triamcinolone (KENALOG) 0.1 % cream     NEW MED     No current facility-administered medications for this visit.      Weight Loss Medication History Reviewed With Patient 6/22/2020   Which weight loss medications are you currently taking on a regular basis?  Naltrexone, Topamax (topiramate), Victoza (liraglutide)   Are you having any side effects from the weight loss medication that we have prescribed you? Yes   If you are having side effects please describe: snacking more     ASSESSMENT:   Naltrexone 50 mg/d has been helpful. Maintain topiramate 200 mg BID. Also on liraglutide 1.8/d. Reinforce food plan - focus on no between meal snacking, aggressive lowering of starches and cheese.     FOLLOW-UP:    3 months    Phone call duration: 15 minutes.  I explained the conditions and plans (more than 50% of time was counseling/coordination of weight management).    Sincerely,  Ashish Paez MD    The patient was evaluated via a billable telephone visit and notified:  \"This visit will be via telephone call between you and your physician. If lab work is needed we can place an order and you can later stop by the lab. Telephone visits are billed at different rates depending on your insurance coverage. During this emergency period, some insurers may be billed the same as an in-person visit.  Please check with your insurance provider with any questions. If the physician feels a telephone visit is not appropriate, you will not be charged for this service.\" Patient has given verbal consent for Telephone visit?  Yes. How would you like to obtain your AVS? MyChart.  "

## 2020-06-22 NOTE — LETTER
"2020       RE: Refugio Kennedy  [address]  [facility] [address]     Dear Colleague,    Thank you for referring your patient, Refugio Kennedy, to the [facility] MEDICAL WEIGHT MANAGEMENT at [facility]. Please see a copy of my visit note below.    Refugio Kennedy is a 44 year old male who is being evaluated via a billable telephone visit.      The patient has been notified of following:     \"This telephone visit will be conducted via a call between you and your physician/provider. We have found that certain health care needs can be provided without the need for a physical exam.  This service lets us provide the care you need with a short phone conversation.  If a prescription is necessary we can send it directly to your pharmacy.  If lab work is needed we can place an order for that and you can then stop by our lab to have the test done at a later time.    Telephone visits are billed at different rates depending on your insurance coverage. During this emergency period, for some insurers they may be billed the same as an in-person visit.  Please reach out to your insurance provider with any questions.    If during the course of the call the physician/provider feels a telephone visit is not appropriate, you will not be charged for this service.\"    Patient has given verbal consent for Telephone visit?  Yes    What phone number would you like to be contacted at? [phone]    How would you [facility]    Phone call duration: 15 minutes.  I explained the conditions and plans (more than 50% of time was counseling/coordination of weight management).    Ashish Paez MD            [facility] Medical Weight Management Note     Refugio Kennedy  MRN:  [id number]  :  1975  CRISTI:  19    Dear Dr. Espino,  We had the pleasure of seeing your patient Refugio Kennedy.  He is a 41 year old male who we are continuing to see for treatment of obesity related to: " "DMII, HLD, sleep apnea on CPAP daily, and hypoventilation syndrome, oxygen dependent.     CURRENT WEIGHT:   0 lbs 0 oz No scale at home, thinks has gained ~5 lbs.    Wt Readings from Last 4 Encounters:   02/03/20 (!) 169.9 kg (374 lb 9.6 oz)   01/06/20 (!) 169.2 kg (373 lb)   12/19/19 (!) 170.6 kg (376 lb)   12/13/19 (!) 170.6 kg (376 lb)     Height:  5' 8\"[unknown[  Body Mass Index:  Body mass index is 56.96 kg/m .  Vitals:  B/P: 138/83, P: 80    Initial consult weight was 454 on 5/15/2015.  Weight change since last seen on 08/05/19 is down 21 pounds.   Total loss is 78 pounds.    INTERVAL HISTORY:  Has been doing much more snacking which he attributes to pandemic, but has now moved naltrexone to pre work as many snacks are brought into work.Tolerating topiramate 200 mg BID. Says his exercise has decreased since pandemic, used to walk some.    No flowsheet data found.     MEDICATIONS:   Current Outpatient Medications   Medication     albuterol (2.5 MG/3ML) 0.083% neb solution     albuterol (PROAIR HFA) 108 (90 BASE) MCG/ACT Inhaler     aspirin 81 MG tablet     blood glucose (ACCU-CHEK CHRIS PLUS) test strip     blood glucose monitoring (SOFTCLIX) lancets     cetirizine (ZYRTEC) 10 MG tablet     DIABETIC STERILE LANCETS device     fluticasone (FLONASE) 50 MCG/ACT spray     furosemide (LASIX) 20 MG tablet     insulin pen needle (BD SEVERO U/F) 32G X 4 MM miscellaneous     liraglutide (VICTOZA PEN) 18 MG/3ML solution     LISINOPRIL 5 MG PO tablet     metFORMIN (GLUCOPHAGE-XR) 500 MG 24 hr tablet     modafinil (PROVIGIL) 200 MG tablet     Multiple Vitamin (MULTIVITAMIN OR)     naltrexone (DEPADE/REVIA) 50 MG tablet     order for DME     order for DME     order for DME     order for DME     ORDER FOR DME     orlistat (LAWRENCE) 60 MG capsule     simvastatin (ZOCOR) 10 MG tablet     topiramate (TOPAMAX) 100 MG tablet     triamcinolone (KENALOG) 0.1 % cream     NEW MED     No current facility-administered medications for this " "visit.      Weight Loss Medication History Reviewed With Patient 6/22/2020   Which weight loss medications are you currently taking on a regular basis?  Naltrexone, Topamax (topiramate), Victoza (liraglutide)   Are you having any side effects from the weight loss medication that we have prescribed you? Yes   If you are having side effects please describe: snacking more     ASSESSMENT:   Naltrexone 50 mg/d has been helpful. Maintain topiramate 200 mg BID. Also on liraglutide 1.8/d. Reinforce food plan - focus on no between meal snacking, aggressive lowering of starches and cheese.     FOLLOW-UP:    3 months    Phone call duration: 15 minutes.  I explained the conditions and plans (more than 50% of time was counseling/coordination of weight management).    Sincerely,  Ashish Paez MD    The patient was evaluated via a billable telephone visit and notified:  \"This visit will be via telephone call between you and your physician. If lab work is needed we can place an order and you can later stop by the lab. Telephone visits are billed at different rates depending on your insurance coverage. During this emergency period, some insurers may be billed the same as an in-person visit.  Please check with your insurance provider with any questions. If the physician feels a telephone visit is not appropriate, you will not be charged for this service.\" Patient has given verbal consent for Telephone visit?  Yes. How would you like to obtain your AVS? MyChart.    Again, thank you for allowing me to participate in the care of your patient.      Sincerely,    Ashish Paez MD      "

## 2020-07-20 ENCOUNTER — ALLIED HEALTH/NURSE VISIT (OUTPATIENT)
Dept: EDUCATION SERVICES | Facility: CLINIC | Age: 45
End: 2020-07-20
Payer: COMMERCIAL

## 2020-07-20 DIAGNOSIS — E11.9 TYPE 2 DIABETES MELLITUS WITHOUT COMPLICATION, WITHOUT LONG-TERM CURRENT USE OF INSULIN (H): Primary | ICD-10-CM

## 2020-07-20 PROCEDURE — 98966 PH1 ASSMT&MGMT NQHP 5-10: CPT | Mod: TEL | Performed by: DIETITIAN, REGISTERED

## 2020-07-20 NOTE — PROGRESS NOTES
"Diabetes Self-Management Education & Support    Patient verbally consented to the telephone visit service today: yes      SUBJECTIVE/OBJECTIVE:  Presents for: Individual review  Accompanied by: Self  Diabetes type: Type 2  Cultural Influences/Ethnic Background:  American      Diabetes Symptoms & Complications:          Patient Problem List and Family Medical History reviewed for relevant medical history, current medical status, and diabetes risk factors.    Vitals:  There were no vitals taken for this visit.  Estimated body mass index is 56.96 kg/m  as calculated from the following:    Height as of 6/22/20: 1.727 m (5' 8\").    Weight as of 2/3/20: 169.9 kg (374 lb 9.6 oz).   Last 3 BP:   BP Readings from Last 3 Encounters:   12/19/19 (!) 140/67   12/19/19 139/67   12/09/19 (!) 140/70       History   Smoking Status     Former Smoker     Packs/day: 0.50     Years: 1.00     Types: Cigarettes, Cigars     Quit date: 5/21/2012   Smokeless Tobacco     Never Used       Labs:  Lab Results   Component Value Date    A1C 5.6 12/19/2019     Lab Results   Component Value Date    GLC 99 12/19/2019     Lab Results   Component Value Date    LDL 49 12/19/2019     HDL Cholesterol   Date Value Ref Range Status   12/19/2019 45 >39 mg/dL Final   ]  GFR Estimate   Date Value Ref Range Status   12/19/2019 >90 >60 mL/min/[1.73_m2] Final     Comment:     Non  GFR Calc  Starting 12/18/2018, serum creatinine based estimated GFR (eGFR) will be   calculated using the Chronic Kidney Disease Epidemiology Collaboration   (CKD-EPI) equation.       GFR Estimate If Black   Date Value Ref Range Status   12/19/2019 >90 >60 mL/min/[1.73_m2] Final     Comment:      GFR Calc  Starting 12/18/2018, serum creatinine based estimated GFR (eGFR) will be   calculated using the Chronic Kidney Disease Epidemiology Collaboration   (CKD-EPI) equation.       Lab Results   Component Value Date    CR 0.85 12/19/2019     No results found " for: MICROALBUMIN    Healthy Eating:  Healthy Eating Assessed Today: Yes  Snacks: struggling with snacking    Being Active:       Monitoring:       BG look good per pt    Taking Medications:  Diabetes Medication(s)     Biguanides       metFORMIN (GLUCOPHAGE-XR) 500 MG 24 hr tablet    TAKE ONE TABLET BY MOUTH EVERY DAY WITH BREAKFAST    Incretin Mimetic Agents (GLP-1 Receptor Agonists)       liraglutide (VICTOZA PEN) 18 MG/3ML solution    INJECT 1.8 MG UNDER THE SKIN ONCE DAILY               Problem Solving:   struggling with overeating, cant stop until pkg is empty ie: box of crackers.              Reducing Risks:   not assessed    Healthy Coping:     Patient Activation Measure Survey Score:  MARY Score (Last Two) 1/25/2011 8/5/2019   MARY Raw Score 36 29   Activation Score 47.4 52.9   MARY Level 2 2       Diabetes knowledge and skills assessment:   Patient is knowledgeable in diabetes management concepts related to: Healthy Eating, Being Active and Monitoring    Patient needs further education on the following diabetes management concepts: Healthy Eating, Being Active, Monitoring, Taking Medication, Problem Solving, Reducing Risks and Healthy Coping    Based on learning assessment above, most appropriate setting for further diabetes education would be: Individual setting.      INTERVENTIONS:    Education provided today on:  AADE Self-Care Behaviors:  Healthy Eating: portion control and making good choices    Opportunities for ongoing education and support in diabetes-self management were discussed.    Pt verbalized understanding of concepts discussed and recommendations provided today.       Education Materials Provided:  No new materials provided today      ASSESSMENT:  Pt struggling with his snack eating more lately.  He discussed an OA group, interested in.  His dad and girlfriend do not understand when he tells them he cant have snacks in house or feeling the need to snack when they are eating.  He is coming to  get veggies every two weeks at drop off at clinic.          Patient's most recent   Lab Results   Component Value Date    A1C 5.6 12/19/2019    is meeting goal of <7.0    PLAN  See Patient Instructions for co-developed, patient-stated behavior change goals.  AVS printed and provided to patient today. See Follow-Up section for recommended follow-up.      Time Spent: 6 minutes  Encounter Type: Individual    Any diabetes medication dose changes were made via the CDE Protocol and Collaborative Practice Agreement with the patient's primary care provider. A copy of this encounter was shared with the provider.

## 2020-07-27 DIAGNOSIS — G47.33 OBSTRUCTIVE SLEEP APNEA-HYPOPNEA SYNDROME: Primary | ICD-10-CM

## 2020-08-13 DIAGNOSIS — I87.8 VENOUS STASIS: ICD-10-CM

## 2020-08-13 RX ORDER — FUROSEMIDE 20 MG
TABLET ORAL
Qty: 360 TABLET | Refills: 0 | Status: SHIPPED | OUTPATIENT
Start: 2020-08-13 | End: 2020-11-13

## 2020-08-20 DIAGNOSIS — I27.20 PULMONARY HYPERTENSION (H): ICD-10-CM

## 2020-08-20 DIAGNOSIS — E66.01 MORBID OBESITY DUE TO EXCESS CALORIES (H): ICD-10-CM

## 2020-08-20 RX ORDER — LISINOPRIL 5 MG/1
TABLET ORAL
Qty: 90 TABLET | Refills: 1 | Status: SHIPPED | OUTPATIENT
Start: 2020-08-20 | End: 2020-12-08

## 2020-08-25 DIAGNOSIS — E11.9 TYPE 2 DIABETES MELLITUS WITHOUT COMPLICATIONS (H): ICD-10-CM

## 2020-08-25 DIAGNOSIS — E11.9 TYPE 2 DIABETES MELLITUS WITHOUT COMPLICATION, WITHOUT LONG-TERM CURRENT USE OF INSULIN (H): ICD-10-CM

## 2020-08-25 DIAGNOSIS — E66.01 MORBID OBESITY DUE TO EXCESS CALORIES (H): ICD-10-CM

## 2020-08-25 RX ORDER — TOPIRAMATE 100 MG/1
TABLET, FILM COATED ORAL
Qty: 120 TABLET | Refills: 5 | OUTPATIENT
Start: 2020-08-25

## 2020-08-25 RX ORDER — TOPIRAMATE 100 MG/1
200 TABLET, FILM COATED ORAL 2 TIMES DAILY
Qty: 120 TABLET | Refills: 5 | Status: SHIPPED | OUTPATIENT
Start: 2020-08-25 | End: 2021-01-11

## 2020-08-25 RX ORDER — PEN NEEDLE, DIABETIC 32GX 5/32"
NEEDLE, DISPOSABLE MISCELLANEOUS
Qty: 100 EACH | Refills: 1 | Status: SHIPPED | OUTPATIENT
Start: 2020-08-25 | End: 2021-03-10

## 2020-08-25 RX ORDER — LIRAGLUTIDE 6 MG/ML
INJECTION SUBCUTANEOUS
Qty: 9 ML | Refills: 5 | Status: SHIPPED | OUTPATIENT
Start: 2020-08-25 | End: 2020-12-08

## 2020-09-23 DIAGNOSIS — G47.33 OBSTRUCTIVE SLEEP APNEA-HYPOPNEA SYNDROME: ICD-10-CM

## 2020-09-23 NOTE — TELEPHONE ENCOUNTER
modafinil (PROVIGIL) 200 MG tablet       Last Written Prescription Date:  3/17/2020  Last Fill Quantity: 60,   # refills: 5  Last Office Visit: 3/14/19  Future Office visit:   1 YEAR DUE NOW SENT MYCHART.     Routing refill request to provider for review/approval because:  Drug not on the [facility], P or [facility] refill protocol or controlled substance

## 2020-09-24 RX ORDER — MODAFINIL 200 MG/1
TABLET ORAL
Qty: 60 TABLET | Refills: 0 | Status: SHIPPED | OUTPATIENT
Start: 2020-09-24 | End: 2020-10-30

## 2020-10-20 ENCOUNTER — APPOINTMENT (OUTPATIENT)
Dept: GENERAL RADIOLOGY | Facility: CLINIC | Age: 45
End: 2020-10-20
Attending: FAMILY MEDICINE
Payer: COMMERCIAL

## 2020-10-20 ENCOUNTER — HOSPITAL ENCOUNTER (EMERGENCY)
Facility: CLINIC | Age: 45
Discharge: HOME | End: 2020-10-20
Attending: FAMILY MEDICINE | Admitting: FAMILY MEDICINE
Payer: COMMERCIAL

## 2020-10-20 VITALS
BODY MASS INDEX: 46.65 KG/M2 | WEIGHT: 315 LBS | HEART RATE: 93 BPM | DIASTOLIC BLOOD PRESSURE: 87 MMHG | HEIGHT: 69 IN | SYSTOLIC BLOOD PRESSURE: 144 MMHG | TEMPERATURE: 98.3 F | RESPIRATION RATE: 18 BRPM | OXYGEN SATURATION: 97 %

## 2020-10-20 DIAGNOSIS — S89.92XA KNEE INJURY, LEFT, INITIAL ENCOUNTER: ICD-10-CM

## 2020-10-20 PROCEDURE — 99284 EMERGENCY DEPT VISIT MOD MDM: CPT | Performed by: FAMILY MEDICINE

## 2020-10-20 PROCEDURE — 99283 EMERGENCY DEPT VISIT LOW MDM: CPT | Performed by: FAMILY MEDICINE

## 2020-10-20 PROCEDURE — 73562 X-RAY EXAM OF KNEE 3: CPT | Mod: LT

## 2020-10-20 RX ORDER — IBUPROFEN 200 MG
TABLET ORAL
COMMUNITY
End: 2023-02-22

## 2020-10-20 ASSESSMENT — MIFFLIN-ST. JEOR: SCORE: 2589.05

## 2020-10-20 NOTE — ED AVS SNAPSHOT
[facility]  [address] [facility] [address]  Phone: [phone]  Fax: [phone]                                    Refugio Kennedy   MRN: [id number]    Department: [facility]   Date of Visit: 10/20/2020           After Visit Summary Signature Page    I have received my discharge instructions, and my questions have been answered. I have discussed any challenges I see with this plan with the nurse or doctor.    ..........................................................................................................................................  Patient/Patient Representative Signature      ..........................................................................................................................................  Patient Representative Print Name and Relationship to Patient    ..................................................               ................................................  Date                                   Time    ..........................................................................................................................................  Reviewed by Signature/Title    ...................................................              ..............................................  Date                                               Time          22EPIC Rev 08/18

## 2020-10-20 NOTE — ED PROVIDER NOTES
HPI   The patient is a 45-year-old male presenting with an injury to his left knee.  This occurred about an hour and a half prior to arrival.  Patient was walking outside when he stepped down and planted his foot and then had sudden onset of pain.  He felt a sharp, tearing sensation in the left anterior/lateral aspect of his knee.  He describes severe pain with certain types of movements like bending the knee or planting the foot while trying to walk.  He describes swelling of the left knee.  He denies other injury.  Pain is mild when he is not moving it and sitting still.  The pain is severe when he tries to ambulate.        Allergies:  No Known Allergies  Problem List:    Patient Active Problem List    Diagnosis Date Noted     Methamphetamine abuse in remission (H) 07/17/2017     Priority: Medium     Type 2 diabetes mellitus without complication, without long-term current use of insulin (H) 10/17/2016     Priority: Medium     Mild intermittent asthma without complication 03/24/2015     Priority: Medium     Pulmonary hypertension (H) 06/22/2014     Priority: Medium     Hyperlipidemia LDL goal <100 06/08/2012     Priority: Medium     Venous stasis 06/08/2012     Priority: Medium     Chronic respiratory failure (H) 02/11/2011     Priority: Medium     ABG 2/11- 7.38/51/75 RA       FARZANA (obstructive sleep apnea)/Hypoventilation Syndrome- Severe      Priority: Medium     Sleep Study 1998- AHI 51. Rx 16 cm. S/p UVPP 1998, weight loss. Sleep study 9/10/03 (weight 379)- AHI 34.9. Lo2 58%, with reported hypoventilation.  Sleep study 9/29/03- CPAP 14 cm appeared effective. MSLT- 1.8 minutes, 2 sleep onset REMs, diagnosed with 'narcolepsy', placed on Provigil (though he did not tolerate CPAP on 1st study 9/03).   Sleep study 2/11- AHI 54.9, with desaturations to 51%. Transcutaneous CO2 monitoring showed some elevations consistent with hypoventilation. CPAP titration: No fully effective pressure was found. CPAP was titrated  to 18 cm with improvement in severe desaturations, but some persistent hypopneas and arousals. Supine REM was noted at this pressure. Brief trial of  showed persistent events. Efficacy was limited by mask leak throughout most of the study.        Morbid obesity (H)      Priority: Medium     Morbid obesity        Past Medical History:    Past Medical History:   Diagnosis Date     Cataplexy and narcolepsy      Cellulitis      Diabetes      Hypoventilation      Obesity      FARZANA (obstructive sleep apnea)      Past Surgical History:    Past Surgical History:   Procedure Laterality Date     LAPAROSCOPIC HERNIORRHAPHY VENTRAL N/A 2016    Procedure: LAPAROSCOPIC HERNIORRHAPHY VENTRAL;  Surgeon: Yves Jimenes MD;  Location: [facility]     LAPAROSCOPIC HERNIORRHAPHY VENTRAL N/A 2016    Procedure: LAPAROSCOPIC HERNIORRHAPHY VENTRAL;  Surgeon: Yves Jimenes MD;  Location: [facility]     SURGICAL HISTORY OF -       UVPP     Family History:    Family History   Problem Relation Age of Onset     Heart Disease Maternal Grandfather      Diabetes Maternal Grandfather      Hypertension Maternal Grandfather      Other Cancer Maternal Grandfather      Cancer Paternal Grandfather         unknown     Other Cancer Paternal Grandfather      Anxiety Disorder Mother      Asthma Mother      Depression Mother      Thyroid Disease Mother      Anxiety Disorder Father      Substance Abuse Father      Depression Father      Hypertension Father      Anxiety Disorder Brother      Substance Abuse Brother      Mental Illness Brother      Depression Brother      Other Cancer Brother      Obesity No family hx of      Social History:  Marital Status:  Single [1]  Social History     Tobacco Use     Smoking status: Former Smoker     Packs/day: 0.50     Years: 1.00     Pack years: 0.50     Types: Cigarettes, Cigars     Quit date: 2012     Years since quittin.4     Smokeless tobacco: Never Used   Substance Use Topics     Alcohol use: No     " Alcohol/week: 0.0 standard drinks     Drug use: No     Comment: 1 month ago -       Medications:         albuterol (2.5 MG/3ML) 0.083% neb solution       albuterol (PROAIR HFA) 108 (90 BASE) MCG/ACT Inhaler       aspirin 81 MG tablet       blood glucose (ACCU-CHEK CHRIS PLUS) test strip       blood glucose monitoring (SOFTCLIX) lancets       cetirizine (ZYRTEC) 10 MG tablet       DIABETIC STERILE LANCETS device       fluticasone (FLONASE) 50 MCG/ACT spray       furosemide (LASIX) 20 MG tablet       insulin pen needle (BD SEVERO U/F) 32G X 4 MM miscellaneous       liraglutide (VICTOZA PEN) 18 MG/3ML solution       lisinopril (ZESTRIL) 5 MG tablet       metFORMIN (GLUCOPHAGE-XR) 500 MG 24 hr tablet       modafinil (PROVIGIL) 200 MG tablet       Multiple Vitamin (MULTIVITAMIN OR)       naltrexone (DEPADE/REVIA) 50 MG tablet       NEW MED       order for DME       order for DME       order for DME       order for DME       ORDER FOR DME       orlistat (LAWRENCE) 60 MG capsule       simvastatin (ZOCOR) 10 MG tablet       topiramate (TOPAMAX) 100 MG tablet       triamcinolone (KENALOG) 0.1 % cream      Review of Systems   All other systems reviewed and are negative.      PE   BP: (!) 144/87  Pulse: 93  Temp: 98.3  F (36.8  C)  Resp: 18  Height: 175.3 cm (5' 9\")  Weight: (!) 172.4 kg (380 lb)  SpO2: 97 %  Physical Exam  Vitals signs and nursing note reviewed.   Constitutional:       General: He is not in acute distress.     Appearance: He is obese.   HENT:      Head: Atraumatic.      Right Ear: External ear normal.      Left Ear: External ear normal.      Nose: Nose normal.      Mouth/Throat:      Mouth: Mucous membranes are moist.      Pharynx: Oropharynx is clear.   Eyes:      General: No scleral icterus.     Extraocular Movements: Extraocular movements intact.      Conjunctiva/sclera: Conjunctivae normal.      Pupils: Pupils are equal, round, and reactive to light.   Neck:      Musculoskeletal: Normal range of motion. "   Cardiovascular:      Rate and Rhythm: Normal rate.   Pulmonary:      Effort: Pulmonary effort is normal. No respiratory distress.   Musculoskeletal:      Comments: He has tenderness locally over the left anterior/lateral knee.  He has limited range of motion because of pain in this area.  Difficult examination because of his body habitus.  Difficult to appreciate obvious effusion.  The knee does look slightly swollen though.   Skin:     General: Skin is warm and dry.   Neurological:      Mental Status: He is alert and oriented to person, place, and time.   Psychiatric:         Behavior: Behavior normal.         ED COURSE and MDM   1039.  The patient has an knee injury on the left side.  I suspect this is a knee sprain.  His x-ray is unremarkable.  However, we did talk about meniscus ligamentous injuries.  Follow-up if not improving as discussed.  Crutches provided.  Ibuprofen and Tylenol for pain control.    LABS  Labs Ordered and Resulted from Time of ED Arrival Up to the Time of Departure from the ED - No data to display    IMAGING  Images reviewed by me.  Radiology report also reviewed.  XR Knee Left 3 Views    (Results Pending)       Procedures    Medications - No data to display      IMPRESSION       ICD-10-CM    1. Knee injury, left, initial encounter  S89.92XA             Medication List      There are no discharge medications for this visit.                       Jasbir Holt MD  10/20/20 2712

## 2020-10-20 NOTE — DISCHARGE INSTRUCTIONS
Return to the Emergency Room if the following occurs:     Severely worsened pain, fever >101, or for any concern at anytime.    Or, follow-up with the following provider as we discussed:     Return to your primary doctor as needed, or if not improved over the next 5-7 days.    Medications discussed:    Ibuprofen 600 mg every six hours for pain (7 days duration).  Tylenol 1000 mg every six hours for pain (7 days duration).  Therefore, you can alternate these every three hours and do it safely.  Crutches as needed for comfort.    If you received pain-relieving or sedating medication during your time in the ER, avoid alcohol, driving automobiles, or working with machinery.  Also, a responsible adult must stay with you.        Call the Nurse Advice Line at [phone] or [phone] for any concern at anytime.

## 2020-10-26 ENCOUNTER — VIRTUAL VISIT (OUTPATIENT)
Dept: ENDOCRINOLOGY | Facility: CLINIC | Age: 45
End: 2020-10-26
Payer: COMMERCIAL

## 2020-10-26 VITALS — BODY MASS INDEX: 46.65 KG/M2 | HEIGHT: 69 IN | WEIGHT: 315 LBS

## 2020-10-26 DIAGNOSIS — E66.01 MORBID OBESITY (H): Primary | ICD-10-CM

## 2020-10-26 PROCEDURE — 99213 OFFICE O/P EST LOW 20 MIN: CPT | Mod: TEL | Performed by: INTERNAL MEDICINE

## 2020-10-26 ASSESSMENT — MIFFLIN-ST. JEOR: SCORE: 2589.05

## 2020-10-26 ASSESSMENT — PAIN SCALES - GENERAL: PAINLEVEL: NO PAIN (0)

## 2020-10-26 NOTE — LETTER
"10/26/2020       RE: Refugio Kennedy  [address]  [facility] [address]     Dear Colleague,    Thank you for referring your patient, Refugio Kennedy, to the [facility] [place] [facility]. Please see a copy of my visit note below.    Refugio Kennedy is a 45 year old male who is being evaluated via a billable telephone visit.      The patient has been notified of following:     \"This telephone visit will be conducted via a call between you and your physician/provider. We have found that certain health care needs can be provided without the need for a physical exam.  This service lets us provide the care you need with a short phone conversation.  If a prescription is necessary we can send it directly to your pharmacy.  If lab work is needed we can place an order for that and you can then stop by our lab to have the test done at a later time.    Telephone visits are billed at different rates depending on your insurance coverage. During this emergency period, for some insurers they may be billed the same as an in-person visit.  Please reach out to your insurance provider with any questions.    If during the course of the call the physician/provider feels a telephone visit is not appropriate, you will not be charged for this service.\"    Patient has given verbal consent for Telephone visit?  Yes    What phone number would you like to be contacted at? [phone]    How would you like to [facility] your [facility]? [facility]    Phone call duration: 15 minutes.  I explained the conditions and plans (more than 50% of time was counseling/coordination of weight management).    Ashish Paez MD              Return Medical Weight Management Note     Refugio Kennedy  MRN:  [id number]  :  1975  CRISTI:  10/26/20    Dear Dr. Espino,  We had the pleasure of seeing your patient Refugio Kennedy.  He is a 41 year old male who we are continuing to see for treatment of " "obesity related to: DMII, HLD, sleep apnea on CPAP daily, and hypoventilation syndrome, oxygen dependent.     CURRENT WEIGHT:   380 lbs 0 oz     Wt Readings from Last 4 Encounters:   10/26/20 (!) 172.4 kg (380 lb)   10/20/20 (!) 172.4 kg (380 lb)   02/03/20 (!) 169.9 kg (374 lb 9.6 oz)   01/06/20 (!) 169.2 kg (373 lb)     Height:  5' 9\"[per pt[  Body Mass Index:  Body mass index is 56.12 kg/m .  Vitals:  B/P: 138/83, P: 80    Initial consult weight was 454 on 5/15/2015.  Weight change since last seen on 6/22/20 is up 1 pounds.   Total loss is 74 pounds.    INTERVAL HISTORY:  Still struggling with snacking, but has now moved naltrexone to pre work as many snacks are brought into work.Tolerating topiramate 200 mg BID. Says his exercise has decreased since pandemic, used to walk some.    No flowsheet data found.     MEDICATIONS:   Current Outpatient Medications   Medication     albuterol (2.5 MG/3ML) 0.083% neb solution     albuterol (PROAIR HFA) 108 (90 BASE) MCG/ACT Inhaler     aspirin 81 MG tablet     blood glucose (ACCU-CHEK CHRIS PLUS) test strip     blood glucose monitoring (SOFTCLIX) lancets     cetirizine (ZYRTEC) 10 MG tablet     DIABETIC STERILE LANCETS device     fluticasone (FLONASE) 50 MCG/ACT spray     furosemide (LASIX) 20 MG tablet     ibuprofen (ADVIL/MOTRIN) 200 MG tablet     insulin pen needle (BD SEVERO U/F) 32G X 4 MM miscellaneous     liraglutide (VICTOZA PEN) 18 MG/3ML solution     lisinopril (ZESTRIL) 5 MG tablet     metFORMIN (GLUCOPHAGE-XR) 500 MG 24 hr tablet     modafinil (PROVIGIL) 200 MG tablet     Multiple Vitamin (MULTIVITAMIN OR)     naltrexone (DEPADE/REVIA) 50 MG tablet     order for DME     order for DME     order for DME     order for DME     ORDER FOR DME     orlistat (LAWRENCE) 60 MG capsule     simvastatin (ZOCOR) 10 MG tablet     topiramate (TOPAMAX) 100 MG tablet     triamcinolone (KENALOG) 0.1 % cream     No current facility-administered medications for this visit.      Weight Loss " "Medication History Reviewed With Patient 10/25/2020   Which weight loss medications are you currently taking on a regular basis?  Naltrexone, Topamax (topiramate)   Are you having any side effects from the weight loss medication that we have prescribed you? No   If you are having side effects please describe: -     ASSESSMENT:   Maintain same plan. Naltrexone 50 mg/d has been helpful. Maintain topiramate 200 mg BID. Also on liraglutide 1.8/d. Reinforce food plan - focus on no between meal snacking, aggressive lowering of starches and cheese.     FOLLOW-UP:    3 months    Phone call duration: 15 minutes.  I explained the conditions and plans (more than 50% of time was counseling/coordination of weight management).    Sincerely,  Ashish Paez MD    The patient was evaluated via a billable telephone visit and notified:  \"This visit will be via telephone call between you and your physician. If lab work is needed we can place an order and you can later stop by the lab. Telephone visits are billed at different rates depending on your insurance coverage. During this emergency period, some insurers may be billed the same as an in-person visit.  Please check with your insurance provider with any questions. If the physician feels a telephone visit is not appropriate, you will not be charged for this service.\" Patient has given verbal consent for Telephone visit?  Yes. How would you like to obtain your AVS? MyChart.      "

## 2020-10-26 NOTE — NURSING NOTE
"(   Chief Complaint   Patient presents with     Weight Problem    )    ( Weight: (!) 172.4 kg (380 lb)(per pt) )  ( Height: 175.3 cm (5' 9\")(per pt) )  ( BMI (Calculated): 56.12 )  (   )  ( Cumulative weight loss (lbs): 84 )  ( Last Visits Weight: 170.6 kg (376 lb) )  ( Wt change since last visit (lbs): 4 )  (   )  (   )    (   )  (   )  (   )  (   )  (   )  (   )  (   )    (   Patient Active Problem List   Diagnosis     FARZANA (obstructive sleep apnea)/Hypoventilation Syndrome- Severe     Morbid obesity (H)     Chronic respiratory failure (H)     Hyperlipidemia LDL goal <100     Venous stasis     Pulmonary hypertension (H)     Mild intermittent asthma without complication     Type 2 diabetes mellitus without complication, without long-term current use of insulin (H)     Methamphetamine abuse in remission (H)    )  (   Current Outpatient Medications   Medication Sig Dispense Refill     albuterol (2.5 MG/3ML) 0.083% neb solution Take 1 vial (2.5 mg) by nebulization every 4 hours as needed for shortness of breath / dyspnea or wheezing 25 vial 0     albuterol (PROAIR HFA) 108 (90 BASE) MCG/ACT Inhaler Inhale 2 puffs into the lungs every 4 hours as needed for shortness of breath / dyspnea 1 Inhaler 5     aspirin 81 MG tablet Take 1 tablet by mouth daily. 90 tablet 3     blood glucose (ACCU-CHEK CHRIS PLUS) test strip USE TO TEST BLOOD SUGARS THREE TIMES A DAY AS DIRECTED 200 each 3     blood glucose monitoring (SOFTCLIX) lancets TEST THREE TIMES A  each 2     cetirizine (ZYRTEC) 10 MG tablet TAKE ONE TABLET (10 MG) BY MOUTH EVERY EVENING (Patient taking differently: Take 10 mg by mouth every evening TAKE ONE TABLET (10 MG) BY MOUTH EVERY EVENING) 90 tablet 3     DIABETIC STERILE LANCETS device 1 Device 3 times daily. 1 Box 12     fluticasone (FLONASE) 50 MCG/ACT spray Spray 1-2 sprays into both nostrils daily 16 g 0     furosemide (LASIX) 20 MG tablet TAKE TWO TABLETS BY MOUTH TWICE A DAY (Patient taking " differently: Take 40 mg by mouth 2 times daily TAKE TWO TABLETS BY MOUTH TWICE A DAY) 360 tablet 0     ibuprofen (ADVIL/MOTRIN) 200 MG tablet 1 tab in AM and 2 in afternoon on work days       insulin pen needle (BD SEVERO U/F) 32G X 4 MM miscellaneous Use 1 pen needles daily or as directed. 100 each 1     liraglutide (VICTOZA PEN) 18 MG/3ML solution INJECT 1.8 MG UNDER THE SKIN ONCE DAILY (Patient taking differently: Inject Subcutaneous every morning INJECT 1.8 MG UNDER THE SKIN ONCE DAILY) 9 mL 5     lisinopril (ZESTRIL) 5 MG tablet TAKE ONE TABLET BY MOUTH ONCE DAILY (Patient taking differently: Take 5 mg by mouth daily ) 90 tablet 1     metFORMIN (GLUCOPHAGE-XR) 500 MG 24 hr tablet TAKE ONE TABLET BY MOUTH EVERY DAY WITH BREAKFAST (Patient taking differently: Take 500 mg by mouth daily (with breakfast) TAKE ONE TABLET BY MOUTH EVERY DAY WITH BREAKFAST) 90 tablet 1     modafinil (PROVIGIL) 200 MG tablet Take 1/2 tablet by mouth 3-4 times daily as needed for sleepiness. 60 tablet 0     Multiple Vitamin (MULTIVITAMIN OR) Take 1 tablet by mouth daily.       naltrexone (DEPADE/REVIA) 50 MG tablet Take 1 tablet (50 mg) by mouth 2 times daily 60 tablet 4     order for DME Equipment being ordered: Dynaflex insert 1 Units 0     order for DME Equipment being ordered: Dynaflex insert 1 Units 0     order for DME Nebulizer 1 Units 0     order for DME Equipment being ordered: BIPAP Refugio P Kennedy received a Resmed AirCurve 10 Bilevel. Pressures were set at 23/14 cm H2O.       ORDER FOR DME Auto-BiPAP:  IPAP max 21 cm H2O  EPAP min 15 cm H2O  Pressure support 5 cm  Changed in clinic  Lifetime need and heated humidity.           orlistat (LAWRENCE) 60 MG capsule Take 60 mg by mouth 3 times daily as needed       simvastatin (ZOCOR) 10 MG tablet Take 1 tablet (10 mg) by mouth At Bedtime 90 tablet 2     topiramate (TOPAMAX) 100 MG tablet Take 2 tablets (200 mg) by mouth 2 times daily 120 tablet 5     triamcinolone (KENALOG) 0.1 % cream  Apply sparingly to affected area two times daily as needed 30 g 2    )  ( Diabetes Eval:    )    ( Pain Eval:  No Pain (0) )    ( Wound Eval:       )    (   History   Smoking Status     Former Smoker     Packs/day: 0.50     Years: 1.00     Types: Cigarettes, Cigars     Quit date: 5/21/2012   Smokeless Tobacco     Never Used    )    ( Signed By:  Yuval Braden, EMT; October 26, 2020; 7:44 AM )

## 2020-10-26 NOTE — PROGRESS NOTES
"      Return Medical Weight Management Note     Refugio Kennedy  MRN:  [id number]  :  1975  CRISTI:  10/26/20    Dear Dr. Espino,  We had the pleasure of seeing your patient Refugio Kennedy.  He is a 41 year old male who we are continuing to see for treatment of obesity related to: DMII, HLD, sleep apnea on CPAP daily, and hypoventilation syndrome, oxygen dependent.     CURRENT WEIGHT:   380 lbs 0 oz     Wt Readings from Last 4 Encounters:   10/26/20 (!) 172.4 kg (380 lb)   10/20/20 (!) 172.4 kg (380 lb)   20 (!) 169.9 kg (374 lb 9.6 oz)   20 (!) 169.2 kg (373 lb)     Height:  5' 9\"[per pt[  Body Mass Index:  Body mass index is 56.12 kg/m .  Vitals:  B/P: 138/83, P: 80    Initial consult weight was 454 on 5/15/2015.  Weight change since last seen on 20 is up 1 pounds.   Total loss is 74 pounds.    INTERVAL HISTORY:  Still struggling with snacking, but has now moved naltrexone to pre work as many snacks are brought into work.Tolerating topiramate 200 mg BID. Says his exercise has decreased since pandemic, used to walk some.    No flowsheet data found.     MEDICATIONS:   Current Outpatient Medications   Medication     albuterol (2.5 MG/3ML) 0.083% neb solution     albuterol (PROAIR HFA) 108 (90 BASE) MCG/ACT Inhaler     aspirin 81 MG tablet     blood glucose (ACCU-CHEK CHRIS PLUS) test strip     blood glucose monitoring (SOFTCLIX) lancets     cetirizine (ZYRTEC) 10 MG tablet     DIABETIC STERILE LANCETS device     fluticasone (FLONASE) 50 MCG/ACT spray     furosemide (LASIX) 20 MG tablet     ibuprofen (ADVIL/MOTRIN) 200 MG tablet     insulin pen needle (BD SEVERO U/F) 32G X 4 MM miscellaneous     liraglutide (VICTOZA PEN) 18 MG/3ML solution     lisinopril (ZESTRIL) 5 MG tablet     metFORMIN (GLUCOPHAGE-XR) 500 MG 24 hr tablet     modafinil (PROVIGIL) 200 MG tablet     Multiple Vitamin (MULTIVITAMIN OR)     naltrexone (DEPADE/REVIA) 50 MG tablet     order for DME     order for DME     order for DME     order " "for DME     ORDER FOR DME     orlistat (LAWRENCE) 60 MG capsule     simvastatin (ZOCOR) 10 MG tablet     topiramate (TOPAMAX) 100 MG tablet     triamcinolone (KENALOG) 0.1 % cream     No current facility-administered medications for this visit.      Weight Loss Medication History Reviewed With Patient 10/25/2020   Which weight loss medications are you currently taking on a regular basis?  Naltrexone, Topamax (topiramate)   Are you having any side effects from the weight loss medication that we have prescribed you? No   If you are having side effects please describe: -     ASSESSMENT:   Maintain same plan. Naltrexone 50 mg/d has been helpful. Maintain topiramate 200 mg BID. Also on liraglutide 1.8/d. Reinforce food plan - focus on no between meal snacking, aggressive lowering of starches and cheese.     FOLLOW-UP:    3 months    Phone call duration: 15 minutes.  I explained the conditions and plans (more than 50% of time was counseling/coordination of weight management).    Sincerely,  Ashish Paez MD    The patient was evaluated via a billable telephone visit and notified:  \"This visit will be via telephone call between you and your physician. If lab work is needed we can place an order and you can later stop by the lab. Telephone visits are billed at different rates depending on your insurance coverage. During this emergency period, some insurers may be billed the same as an in-person visit.  Please check with your insurance provider with any questions. If the physician feels a telephone visit is not appropriate, you will not be charged for this service.\" Patient has given verbal consent for Telephone visit?  Yes. How would you like to obtain your AVS? MyChart.  "

## 2020-10-26 NOTE — PROGRESS NOTES
"Refugio Kennedy is a 45 year old male who is being evaluated via a billable telephone visit.      The patient has been notified of following:     \"This telephone visit will be conducted via a call between you and your physician/provider. We have found that certain health care needs can be provided without the need for a physical exam.  This service lets us provide the care you need with a short phone conversation.  If a prescription is necessary we can send it directly to your pharmacy.  If lab work is needed we can place an order for that and you can then stop by our lab to have the test done at a later time.    Telephone visits are billed at different rates depending on your insurance coverage. During this emergency period, for some insurers they may be billed the same as an in-person visit.  Please reach out to your insurance provider with any questions.    If during the course of the call the physician/provider feels a telephone visit is not appropriate, you will not be charged for this service.\"    Patient has given verbal consent for Telephone visit?  Yes    What phone number would you like to be contacted at? [phone]    How would you like to obtain your AVS? [facility]    Phone call duration: 15 minutes.  I explained the conditions and plans (more than 50% of time was counseling/coordination of weight management).    Ashish Paez MD      "

## 2020-10-29 VITALS — WEIGHT: 315 LBS | BODY MASS INDEX: 46.65 KG/M2 | HEIGHT: 69 IN

## 2020-10-29 ASSESSMENT — MIFFLIN-ST. JEOR: SCORE: 2589.3

## 2020-10-29 NOTE — PROGRESS NOTES
"Refugio Kennedy is a 45 year old male who is being evaluated via a billable telephone visit.      The patient has been notified of following:     \"This telephone visit will be conducted via a call between you and your physician/provider. We have found that certain health care needs can be provided without the need for a physical exam.  This service lets us provide the care you need with a short phone conversation.  If a prescription is necessary we can send it directly to your pharmacy.  If lab work is needed we can place an order for that and you can then stop by our lab to have the test done at a later time.    Telephone visits are billed at different rates depending on your insurance coverage. During this emergency period, for some insurers they may be billed the same as an in-person visit.  Please reach out to your insurance provider with any questions.    If during the course of the call the physician/provider feels a telephone visit is not appropriate, you will not be charged for this service.\"    Patient has given verbal consent for Telephone visit?  Yes    What phone number would you like to be contacted at? [phone]    How would you like to obtain your AVS? Mail a copy     Virtual visit for annual follow-up of severe obesity, severe obstructive sleep apnea with profound oxygen desaturations and hypoventilation, obesity hypoventilation, history of methamphetamine abuse, type 2 diabetes, congestive heart failure, pulmonary hypertension with presenting concern of management of sleep disordered breathing with bilevel Pap in spontaneous mode on room air.    Impression/Plan:     1.) Very severe FARZANA with profound oxygen desaturations, nocturnal hypoventilation and significant obesity hypoventilation   - Appears adequately controlled on bilevel 21/14 cm H2O spontaneous and compliance is doing well.  - Plan to continue on current settings.  Did place replacement bilevel order for 4/2021 when eligible.     2.) " Excessive daytime sleepiness  - Assumed multi-factorial with severe obesity, potential obstructive sleep apnea hypopnea syndrome  - Complicated by long-standing history of recurrent methamphetamine abuse. Patient reports prior use of Modafinil and was well tolerated.  - Sober from methamphetamines for 3+ years.  - Continue Modafinil 200mg 1/2 tablet TID to QID (how dosed previously per patient and has worked well)      Please refer to my note on March 14, 2019 for summary of all of his previous visits.  At this time, he was doing well with largely adequate usage of his bilevel 21/14 centimeters H2O and spontaneous mode on room air.  Due to residual daytime sleepiness, though improved with use of bilevel, we agreed for careful trial of modafinil 100 mg taken 3 times daily to 4 times daily as needed (this has been an effective regiment and schedule previously for this patient).    Today, overall he feels he is sleeping and doing well.  He has had ongoing issues with his heated hose not consistently working and excessive mask leak.  The mask leak is not bothersome to him, but is noted frequently by his girlfriend.  He has not changed his mask interface.    Bilevel download reviewed over the past 30 days on 21/14 centimeters H2O and spontaneous mode on room air.  Average daily usage of 408 minutes, AHI 0.35, 95th percentile mask leak of 80 L/min.    His weight currently is 380 pounds, this is down from approximately 400 pounds at our last visit.    Prior Sleep Testing:  ~1998 - Split-night PSG.  Weight 379 lbs, AHI 34.9, guille desat 58%, elevated TCM, CPAP 14 effective  2/7/2011 - Split-night PSG with weight 420 lbs, AHI 54.9, guille desat 51%, hypoventilation (pCO2 by TCM up to 68 mmHg) with CPAP 18 nor bilevel 20/16 fully effective  7/6/2011 - Bilevel titration PSG with weight 420 lbs, bilevel 23/18 and 21/17 appearing effective, included supine REM, and TCM improved to mid-50's from zenith of ~60  6/25/2014 - Bilevel  titration PSG with weight 450 lbs, bilevel titrated to 23/14 cm H2O appeared optimal, included supine REM with control of obstructive events (with minimization of mask leak) and normalization of SpO2.    Phone call duration: 25 minutes    Lucio Kumari MA

## 2020-10-29 NOTE — PATIENT INSTRUCTIONS
Your BMI is Body mass index is 56.09 kg/m .  Weight management is a personal decision.  If you are interested in exploring weight loss strategies, the following discussion covers the approaches that may be successful. Body mass index (BMI) is one way to tell whether you are at a healthy weight, overweight, or obese. It measures your weight in relation to your height.  A BMI of 18.5 to 24.9 is in the healthy range. A person with a BMI of 25 to 29.9 is considered overweight, and someone with a BMI of 30 or greater is considered obese. More than two-thirds of American adults are considered overweight or obese.  Being overweight or obese increases the risk for further weight gain. Excess weight may lead to heart disease and diabetes.  Creating and following plans for healthy eating and physical activity may help you improve your health.  Weight control is part of healthy lifestyle and includes exercise, emotional health, and healthy eating habits. Careful eating habits lifelong are the mainstay of weight control. Though there are significant health benefits from weight loss, long-term weight loss with diet alone may be very difficult to achieve- studies show long-term success with dietary management in less than 10% of people. Attaining a healthy weight may be especially difficult to achieve in those with severe obesity. In some cases, medications, devices and surgical management might be considered.  What can you do?  If you are overweight or obese and are interested in methods for weight loss, you should discuss this with your provider.     Consider reducing daily calorie intake by 500 calories.     Keep a food journal.     Avoiding skipping meals, consider cutting portions instead.    Diet combined with exercise helps maintain muscle while optimizing fat loss. Strength training is particularly important for building and maintaining muscle mass. Exercise helps reduce stress, increase energy, and improves fitness.  Increasing exercise without diet control, however, may not burn enough calories to loose weight.       Start walking three days a week 10-20 minutes at a time    Work towards walking thirty minutes five days a week     Eventually, increase the speed of your walking for 1-2 minutes at time    In addition, we recommend that you review healthy lifestyles and methods for weight loss available through the National Institutes of Health patient information sites:  http://win.niddk.nih.gov/publications/index.htm    And look into health and wellness programs that may be available through your health insurance provider, employer, local community center, or greg club.    Weight management plan: Patient was referred to their PCP to discuss a diet and exercise plan.

## 2020-10-30 ENCOUNTER — VIRTUAL VISIT (OUTPATIENT)
Dept: SLEEP MEDICINE | Facility: CLINIC | Age: 45
End: 2020-10-30
Payer: COMMERCIAL

## 2020-10-30 DIAGNOSIS — I27.20 PULMONARY HYPERTENSION (H): ICD-10-CM

## 2020-10-30 DIAGNOSIS — E66.01 MORBID OBESITY (H): ICD-10-CM

## 2020-10-30 DIAGNOSIS — G47.33 OSA (OBSTRUCTIVE SLEEP APNEA): ICD-10-CM

## 2020-10-30 DIAGNOSIS — E11.9 TYPE 2 DIABETES MELLITUS WITHOUT COMPLICATION, WITHOUT LONG-TERM CURRENT USE OF INSULIN (H): ICD-10-CM

## 2020-10-30 DIAGNOSIS — F15.11 METHAMPHETAMINE ABUSE IN REMISSION (H): ICD-10-CM

## 2020-10-30 DIAGNOSIS — J96.11 CHRONIC RESPIRATORY FAILURE WITH HYPOXIA (H): Primary | ICD-10-CM

## 2020-10-30 DIAGNOSIS — G47.33 OBSTRUCTIVE SLEEP APNEA-HYPOPNEA SYNDROME: ICD-10-CM

## 2020-10-30 PROCEDURE — 99214 OFFICE O/P EST MOD 30 MIN: CPT | Mod: TEL | Performed by: FAMILY MEDICINE

## 2020-10-30 RX ORDER — MODAFINIL 200 MG/1
TABLET ORAL
Qty: 60 TABLET | Refills: 5 | Status: SHIPPED | OUTPATIENT
Start: 2020-10-30 | End: 2021-05-04

## 2020-11-02 ENCOUNTER — OFFICE VISIT (OUTPATIENT)
Dept: URGENT CARE | Facility: URGENT CARE | Age: 45
End: 2020-11-02
Payer: COMMERCIAL

## 2020-11-02 VITALS
SYSTOLIC BLOOD PRESSURE: 138 MMHG | DIASTOLIC BLOOD PRESSURE: 68 MMHG | OXYGEN SATURATION: 97 % | WEIGHT: 315 LBS | HEIGHT: 69 IN | TEMPERATURE: 98.3 F | RESPIRATION RATE: 18 BRPM | BODY MASS INDEX: 46.65 KG/M2 | HEART RATE: 89 BPM

## 2020-11-02 DIAGNOSIS — M25.562 ACUTE PAIN OF LEFT KNEE: Primary | ICD-10-CM

## 2020-11-02 PROCEDURE — 99213 OFFICE O/P EST LOW 20 MIN: CPT | Performed by: PHYSICIAN ASSISTANT

## 2020-11-02 RX ORDER — NAPROXEN 500 MG/1
500 TABLET ORAL 2 TIMES DAILY WITH MEALS
Qty: 60 TABLET | Refills: 1 | Status: SHIPPED | OUTPATIENT
Start: 2020-11-02 | End: 2023-02-19

## 2020-11-02 RX ORDER — RIBOFLAVIN (VITAMIN B2) 100 MG
100 TABLET ORAL EVERY EVENING
COMMUNITY
End: 2023-04-07

## 2020-11-02 ASSESSMENT — ENCOUNTER SYMPTOMS
EYE ITCHING: 0
SINUS PRESSURE: 0
NAUSEA: 0
MYALGIAS: 0
FATIGUE: 0
DIARRHEA: 0
PALPITATIONS: 0
WHEEZING: 0
FEVER: 0
RHINORRHEA: 0
ABDOMINAL PAIN: 0
VOMITING: 0
SINUS PAIN: 0
UNEXPECTED WEIGHT CHANGE: 0
ARTHRALGIAS: 0
CONSTIPATION: 0
SORE THROAT: 0
COUGH: 0
EYE DISCHARGE: 0
EYE PAIN: 0
CHILLS: 0
SHORTNESS OF BREATH: 0
EYE REDNESS: 0

## 2020-11-02 ASSESSMENT — PAIN SCALES - GENERAL: PAINLEVEL: MODERATE PAIN (4)

## 2020-11-02 ASSESSMENT — MIFFLIN-ST. JEOR: SCORE: 2589.05

## 2020-11-02 NOTE — LETTER
[facility] [place]  [address] [address]  Phone: [phone]  Fax: [phone]    November 2, 2020        Refugio Kennedy  [address] [address]          To whom it may concern:    RE: Refugio Kennedy    Patient was seen and treated today at our clinic and missed work 11/02/20 through 11/06/20.    Please contact me for questions or concerns.      Sincerely,        Evelin Nunn PA-C

## 2020-11-02 NOTE — PROGRESS NOTES
SUBJECTIVE:   Refugio Kennedy is a 45 year old male presenting with a chief complaint of   Chief Complaint   Patient presents with     Musculoskeletal Problem     10/20/2020 was seen in the ED for the left knee injury, took a wrong step in the parking lot and hurt knee. 10/30/2020 after work was taking off jeans and heard a loud popping sound come from the knee, swollen since, also left foot, icing and taking ibuprofen.        He is an established patient of [facility].    MS Injury/Pain    Onset of symptoms was 2 week(s) ago.  Location: left knee  Context:       The injury happened while in a parking lot       Mechanism: twisted       Patient experienced immediate pain both times   Course of symptoms is same.    Severity moderate  Current and Associated symptoms: Pain and Swelling  Denies  Bruising, Warmth and Redness  Aggravating Factors: walking, weight-bearing, movement, twisting and flexion/extension  Therapies to improve symptoms include: ice and ibuprofen 400mg twice daily   This is the first time this type of problem has occurred for this patient.       Review of Systems   Constitutional: Negative for chills, fatigue, fever and unexpected weight change.   HENT: Negative for congestion, ear pain, rhinorrhea, sinus pressure, sinus pain and sore throat.    Eyes: Negative for pain, discharge, redness and itching.   Respiratory: Negative for cough, shortness of breath and wheezing.    Cardiovascular: Negative for chest pain, palpitations and leg swelling.   Gastrointestinal: Negative for abdominal pain, constipation, diarrhea, nausea and vomiting.   Musculoskeletal: Negative for arthralgias and myalgias.        Left knee pain   Skin: Negative for rash.       Past Medical History:   Diagnosis Date     Cataplexy and narcolepsy     Narcolepsy     Cellulitis      Diabetes      Hypoventilation      Obesity      FARZANA (obstructive sleep apnea)     uses CPAP     Family History   Problem Relation Age of Onset     Heart Disease  Maternal Grandfather      Diabetes Maternal Grandfather      Hypertension Maternal Grandfather      Other Cancer Maternal Grandfather      Cancer Paternal Grandfather         unknown     Other Cancer Paternal Grandfather      Anxiety Disorder Mother      Asthma Mother      Depression Mother      Thyroid Disease Mother      Anxiety Disorder Father      Substance Abuse Father      Depression Father      Hypertension Father      Anxiety Disorder Brother      Substance Abuse Brother      Mental Illness Brother      Depression Brother      Other Cancer Brother      Obesity No family hx of      Current Outpatient Medications   Medication Sig Dispense Refill     albuterol (PROAIR HFA) 108 (90 BASE) MCG/ACT Inhaler Inhale 2 puffs into the lungs every 4 hours as needed for shortness of breath / dyspnea 1 Inhaler 5     aspirin 81 MG tablet Take 1 tablet by mouth daily. 90 tablet 3     blood glucose (ACCU-CHEK CHIRS PLUS) test strip USE TO TEST BLOOD SUGARS THREE TIMES A DAY AS DIRECTED 200 each 3     blood glucose monitoring (SOFTCLIX) lancets TEST THREE TIMES A  each 2     cetirizine (ZYRTEC) 10 MG tablet TAKE ONE TABLET (10 MG) BY MOUTH EVERY EVENING (Patient taking differently: Take 10 mg by mouth every evening TAKE ONE TABLET (10 MG) BY MOUTH EVERY EVENING) 90 tablet 3     DIABETIC STERILE LANCETS device 1 Device 3 times daily. 1 Box 12     fluticasone (FLONASE) 50 MCG/ACT spray Spray 1-2 sprays into both nostrils daily 16 g 0     furosemide (LASIX) 20 MG tablet TAKE TWO TABLETS BY MOUTH TWICE A DAY (Patient taking differently: Take 40 mg by mouth 2 times daily TAKE TWO TABLETS BY MOUTH TWICE A DAY) 360 tablet 0     ibuprofen (ADVIL/MOTRIN) 200 MG tablet 1 tab in AM and 2 in afternoon on work days       insulin pen needle (BD SEVERO U/F) 32G X 4 MM miscellaneous Use 1 pen needles daily or as directed. 100 each 1     liraglutide (VICTOZA PEN) 18 MG/3ML solution INJECT 1.8 MG UNDER THE SKIN ONCE DAILY (Patient taking  differently: Inject Subcutaneous every morning INJECT 1.8 MG UNDER THE SKIN ONCE DAILY) 9 mL 5     lisinopril (ZESTRIL) 5 MG tablet TAKE ONE TABLET BY MOUTH ONCE DAILY (Patient taking differently: Take 5 mg by mouth daily ) 90 tablet 1     metFORMIN (GLUCOPHAGE-XR) 500 MG 24 hr tablet TAKE ONE TABLET BY MOUTH EVERY DAY WITH BREAKFAST (Patient taking differently: Take 500 mg by mouth daily (with breakfast) TAKE ONE TABLET BY MOUTH EVERY DAY WITH BREAKFAST) 90 tablet 1     modafinil (PROVIGIL) 200 MG tablet Take 1/2 tablet by mouth 3-4 times daily as needed for sleepiness. 60 tablet 5     Multiple Vitamin (MULTIVITAMIN OR) Take 1 tablet by mouth daily.       naltrexone (DEPADE/REVIA) 50 MG tablet Take 1 tablet (50 mg) by mouth 2 times daily 60 tablet 4     naproxen (NAPROSYN) 500 MG tablet Take 1 tablet (500 mg) by mouth 2 times daily (with meals) 60 tablet 1     order for DME Equipment being ordered: Dynaflex insert 1 Units 0     order for DME Equipment being ordered: Dynaflex insert 1 Units 0     order for DME Equipment being ordered: BIPAP Refugio P Kennedy received a [employer] AirCurve 10 Bilevel. Pressures were set at 23/14 cm H2O.       ORDER FOR DME Auto-BiPAP:  IPAP max 21 cm H2O  EPAP min 15 cm H2O  Pressure support 5 cm  Changed in clinic  Lifetime need and heated humidity.           orlistat (LAWRENCE) 60 MG capsule Take 60 mg by mouth 3 times daily as needed       simvastatin (ZOCOR) 10 MG tablet Take 1 tablet (10 mg) by mouth At Bedtime 90 tablet 2     topiramate (TOPAMAX) 100 MG tablet Take 2 tablets (200 mg) by mouth 2 times daily 120 tablet 5     triamcinolone (KENALOG) 0.1 % cream Apply sparingly to affected area two times daily as needed 30 g 2     vitamin C (ASCORBIC ACID) 100 MG tablet Take 100 mg by mouth 3 times daily       albuterol (2.5 MG/3ML) 0.083% neb solution Take 1 vial (2.5 mg) by nebulization every 4 hours as needed for shortness of breath / dyspnea or wheezing (Patient not taking: Reported on  "2020) 25 vial 0     order for DME Nebulizer (Patient not taking: Reported on 2020) 1 Units 0     Social History     Tobacco Use     Smoking status: Former Smoker     Packs/day: 0.50     Years: 1.00     Pack years: 0.50     Types: Cigarettes, Cigars     Quit date: 2012     Years since quittin.4     Smokeless tobacco: Never Used   Substance Use Topics     Alcohol use: No     Alcohol/week: 0.0 standard drinks       OBJECTIVE  /68 (BP Location: Right arm, Patient Position: Sitting, Cuff Size: Adult Large)   Pulse 89   Temp 98.3  F (36.8  C) (Tympanic)   Resp 18   Ht 1.753 m (5' 9\")   Wt (!) 172.4 kg (380 lb)   SpO2 97%   BMI 56.12 kg/m      Physical Exam  Constitutional:       General: He is not in acute distress.  Musculoskeletal:      Comments: Left knee has mild swelling and diffuse tenderness with palpation. No bruising, no redness or warmth.    Skin:     General: Skin is warm and dry.   Neurological:      Mental Status: He is alert.   Psychiatric:         Mood and Affect: Mood normal.         Speech: Speech normal.         Behavior: Behavior normal.         Labs:  No results found for this or any previous visit (from the past 24 hour(s)).    X-Ray was not done.    ASSESSMENT:      ICD-10-CM    1. Acute pain of left knee  M25.562 naproxen (NAPROSYN) 500 MG tablet     Orthopedic & Spine  Referral        Medical Decision Making:    Differential Diagnosis:  MS Injury Pain: sprain, tendonitis, muscle strain and contusion    Serious Comorbid Conditions:  Adult:  Diabetes    PLAN:    MS Injury/Pain  Recent x-ray from the ED was within normal limits. Prescribed naproxen two times daily with food as needed for pain and inflammation. He has ace bandage at home to wrap for support. Follow up with orthopedics     Followup:    If not improving or if condition worsens, follow up with your Primary Care Provider    There are no Patient Instructions on file for this visit.      "

## 2020-11-05 ENCOUNTER — HOSPITAL ENCOUNTER (OUTPATIENT)
Dept: PHYSICAL THERAPY | Facility: CLINIC | Age: 45
Setting detail: THERAPIES SERIES
End: 2020-11-05
Attending: ORTHOPAEDIC SURGERY
Payer: COMMERCIAL

## 2020-11-05 PROCEDURE — 97161 PT EVAL LOW COMPLEX 20 MIN: CPT | Mod: GP | Performed by: PHYSICAL THERAPIST

## 2020-11-05 PROCEDURE — 97110 THERAPEUTIC EXERCISES: CPT | Mod: GP | Performed by: PHYSICAL THERAPIST

## 2020-11-05 NOTE — PROGRESS NOTES
11/05/20 1400   General Information   Type of Visit Initial OP Ortho PT Evaluation   Start of Care Date 11/05/20   Referring Physician Dr Mcrae   Patient/Family Goals Statement put off TKA   Orders Evaluate and Treat   Date of Order 11/05/20   Medical Diagnosis L knee patellafemoreal arthtitis    Surgical/Medical history reviewed Yes   Precautions/Limitations no known precautions/limitations   General Information Comments PMH: asthma, chemical dependcy, diabetes, OA, pulmonary hypertension    Body Part(s)   Body Part(s) Knee   Presentation and Etiology   Pertinent history of current problem (include personal factors and/or comorbidities that impact the POC) Pt relates he took a step in the parking lot and twisted knee. Pt relates he had cortisone injection approx 10 am. Pt relates its OA w possible mensisc tear. L knee is giving out. Pt iss also having R hip pain. Pt may see Dr Andrea in a few month for additional cortisone shot   Impairments A. Pain;B. Decreased WB tolerance;C. Swelling;E. Decreased flexibility;H. Impaired gait;M. Locking or catching   Functional Limitations perform activities of daily living;perform required work activities;perform desired leisure / sports activities   Symptom Location L knee   How/Where did it occur Other   Onset date of current episode/exacerbation 10/20/20   Chronicity New   Pain rating (0-10 point scale) Best (/10);Worst (/10)   Best (/10) 3   Worst (/10) 9   Pain quality A. Sharp;C. Aching;E. Shooting   Frequency of pain/symptoms A. Constant   Pain/symptoms exacerbated by B. Walking;C. Lifting;D. Carrying;G. Certain positions;J. ADL;K. Home tasks;L. Work tasks   Pain/symptoms eased by A. Sitting;C. Rest;B. Walking;E. Changing positions;I. OTC medication(s)   Progression of symptoms since onset: Improved   Current Level of Function   Current Community Support Family/friend caregiver   Patient role/employment history A. Employed   Employment Comments works in [employer] - on feet - off until Wed, no work restictions   Living environment [facility]   Fall Risk Screen   Fall screen completed by PT   Have you fallen 2 or more times in the past year? Yes   Have you fallen and had an injury in the past year? Yes   Timed Up and Go score (seconds) 14.4   Is patient a fall risk? Yes;Department fall risk interventions implemented   Abuse Screen (yes response referral indicated)   Feels Unsafe at Home or Work/School no   Feels Threatened by Someone no   Does Anyone Try to Keep You From Having Contact with Others or Doing Things Outside Your Home? no   Physical Signs of Abuse Present no   Functional Scales   Functional Scales Other   Other Scales  LEFs 36/80   Knee Objective Findings   Side (if bilateral, select both right and left) Right;Left   Gait/Locomotion step to pattern w SEC   Balance/Proprioception (Single Leg Stance) R: 2 secs, L : 0 secs due to pain    Lachmans Test +   Anterior Drawer Test -   Posterior Drawer Test -   Varus Stress Test -   Valgus Stress Test -   Palpation medail and lateral jt line    Left Knee Extension AROM lackign 4 deg   Left Knee Flexion AROM 110   Left Knee Flexion Strength 3/5 w pain   Left Knee Extension Strength 4/5 w pain    Planned Therapy Interventions   Planned Therapy Interventions balance training;gait training;joint mobilization;manual therapy;ROM;strengthening;stretching;neuromuscular re-education   Planned Modality Interventions   Planned Modality Interventions Cryotherapy;Electrical stimulation;TENS   Clinical Impression   Criteria for Skilled Therapeutic Interventions Met yes, treatment indicated   PT Diagnosis L knee OA   Influenced by the following impairments limited ROM, weaknes, pain   Functional limitations due to impairments walking, standing   Clinical Presentation Stable/Uncomplicated   Clinical Presentation Rationale Pt is pleasant 45 yr old male who presents w flare up OA . Pt had cortisone injection today and is motivated  to get better however pt has morbid obesity.    Clinical Decision Making (Complexity) Low complexity   Therapy Frequency 2 times/Week  (until RTW, may reduce after)   Predicted Duration of Therapy Intervention (days/wks) 6 weeks   Risk & Benefits of therapy have been explained Yes   Patient, Family & other staff in agreement with plan of care Yes   Education Assessment   Preferred Learning Style Listening;Reading;Pictures/video;Demonstration   Barriers to Learning No barriers   ORTHO GOALS   PT Ortho Eval Goals 1;2;3;4   Ortho Goal 1   Goal Identifier Walking   Goal Description Pt will be able to ambulate w/o AD w/o pain to be able to retun to PLOF   Target Date 11/26/20   Ortho Goal 2   Goal Identifier ROM   Goal Description Pt will be able to demonstrate full  L knee ROM to get in/out of car w/o pain to be able to compelte work tasks   Target Date 11/26/20   Ortho Goal 3   Goal Identifier stand   Goal Description Pt will be able to stand 8 hours w less than 2/10 knee pain to complete work tasks.    Target Date 12/17/20   Ortho Goal 4   Goal Identifier LEFS   Goal Description Pt will score 50/80 on LEFS outcome tool to demonstrate clinically significant improvement and be able to complete more tasks at home/work.    Total Evaluation Time   PT Juan Low Complexity Minutes (29685) 20

## 2020-11-09 ENCOUNTER — HOSPITAL ENCOUNTER (OUTPATIENT)
Dept: PHYSICAL THERAPY | Facility: CLINIC | Age: 45
Setting detail: THERAPIES SERIES
End: 2020-11-09
Attending: ORTHOPAEDIC SURGERY
Payer: COMMERCIAL

## 2020-11-09 PROCEDURE — 97110 THERAPEUTIC EXERCISES: CPT | Mod: GP | Performed by: PHYSICAL THERAPIST

## 2020-11-10 ENCOUNTER — DOCUMENTATION ONLY (OUTPATIENT)
Dept: SLEEP MEDICINE | Facility: CLINIC | Age: 45
End: 2020-11-10

## 2020-11-10 NOTE — PROGRESS NOTES
Patient came to [place]  for mask fitting appointment on November 10, 2020. Patient requested to switch masks because poor seal/leak. Discussed the following masks: F20, Vitera Patient selected a [employer] Mask name: Vitera Full Face mask size Medium

## 2020-11-12 ENCOUNTER — HOSPITAL ENCOUNTER (OUTPATIENT)
Dept: PHYSICAL THERAPY | Facility: CLINIC | Age: 45
Setting detail: THERAPIES SERIES
End: 2020-11-12
Attending: ORTHOPAEDIC SURGERY
Payer: COMMERCIAL

## 2020-11-12 PROCEDURE — 97110 THERAPEUTIC EXERCISES: CPT | Mod: GP | Performed by: PHYSICAL THERAPIST

## 2020-11-13 DIAGNOSIS — I87.8 VENOUS STASIS: ICD-10-CM

## 2020-11-13 RX ORDER — FUROSEMIDE 20 MG
TABLET ORAL
Qty: 360 TABLET | Refills: 0 | Status: SHIPPED | OUTPATIENT
Start: 2020-11-13 | End: 2020-12-08

## 2020-11-19 ENCOUNTER — HOSPITAL ENCOUNTER (OUTPATIENT)
Dept: PHYSICAL THERAPY | Facility: CLINIC | Age: 45
Setting detail: THERAPIES SERIES
End: 2020-11-19
Attending: ORTHOPAEDIC SURGERY
Payer: COMMERCIAL

## 2020-11-19 ENCOUNTER — TRANSFERRED RECORDS (OUTPATIENT)
Dept: HEALTH INFORMATION MANAGEMENT | Facility: CLINIC | Age: 45
End: 2020-11-19

## 2020-11-19 DIAGNOSIS — E11.9 TYPE 2 DIABETES MELLITUS WITHOUT COMPLICATION, WITHOUT LONG-TERM CURRENT USE OF INSULIN (H): ICD-10-CM

## 2020-11-19 LAB
RETINOPATHY: NEGATIVE
RETINOPATHY: NEGATIVE

## 2020-11-19 PROCEDURE — 97110 THERAPEUTIC EXERCISES: CPT | Mod: GP | Performed by: PHYSICAL THERAPIST

## 2020-11-19 RX ORDER — METFORMIN HCL 500 MG
TABLET, EXTENDED RELEASE 24 HR ORAL
Qty: 30 TABLET | Refills: 0 | Status: SHIPPED | OUTPATIENT
Start: 2020-11-19 | End: 2020-12-08

## 2020-11-29 ENCOUNTER — TELEPHONE (OUTPATIENT)
Dept: ENDOCRINOLOGY | Facility: CLINIC | Age: 45
End: 2020-11-29

## 2020-11-29 DIAGNOSIS — E66.01 MORBID OBESITY (H): ICD-10-CM

## 2020-11-30 ENCOUNTER — TRANSFERRED RECORDS (OUTPATIENT)
Dept: HEALTH INFORMATION MANAGEMENT | Facility: CLINIC | Age: 45
End: 2020-11-30

## 2020-11-30 LAB — RETINOPATHY: NORMAL

## 2020-12-02 RX ORDER — NALTREXONE HYDROCHLORIDE 50 MG/1
50 TABLET, FILM COATED ORAL 2 TIMES DAILY
Qty: 60 TABLET | Refills: 4 | Status: SHIPPED | OUTPATIENT
Start: 2020-12-02 | End: 2021-01-11

## 2020-12-02 RX ORDER — NALTREXONE HYDROCHLORIDE 50 MG/1
TABLET, FILM COATED ORAL
Qty: 60 TABLET | Refills: 4 | OUTPATIENT
Start: 2020-12-02

## 2020-12-02 NOTE — TELEPHONE ENCOUNTER
NALTREXONE HCL 50MG TABS Take 1 tablet (50 mg) by mouth 2 times daily   Last Written Prescription Date:  6/22/20  Last Fill Quantity: 60,   # refills: 4  Last Office Visit : 10/26/20  Future Office visit:  3 months       Received refill request for NALTREXONE HCL 50MG TABS  . Patient needs appointment scheduled prior to any refills. Clinic Coordinator notified and will follow up with the patient as appropriate. The pharmacy has been notified that the medication will not be refilled prior to an appointment being scheduled.

## 2020-12-03 ENCOUNTER — HOSPITAL ENCOUNTER (OUTPATIENT)
Dept: PHYSICAL THERAPY | Facility: CLINIC | Age: 45
Setting detail: THERAPIES SERIES
End: 2020-12-03
Attending: ORTHOPAEDIC SURGERY
Payer: COMMERCIAL

## 2020-12-03 PROCEDURE — 97110 THERAPEUTIC EXERCISES: CPT | Mod: GP | Performed by: PHYSICAL THERAPIST

## 2020-12-03 NOTE — PROGRESS NOTES
Outpatient Physical Therapy Discharge Note     Patient: Refugio JACKSON Kennedy  : 1975    Beginning/End Dates of Reporting Period:   20to 12/3/2020    Referring Provider: Thor Ruvalcaba Diagnosis: knee pain      Client Self Report: Pt relates he is doing better. 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 Less swelling.     Objective Measurements:  Objective Measure: L knee ROM  Details: 0-130    Objective Measure: L knee MMT  Details: flex: 5/5 ext: 5/5 - w creptius     Objective Measure: Ambulation  Details: no AD, decreased stride, min limp due to tremnedulenburg     Objective Measure: balance  Details: fair                   Outcome Measures (most recent score):  LEFS: 50/80    Goals:  Goal Identifier Walking   Goal Description Pt will be able to ambulate w/o AD w/o pain to be able to retun to PLOF   Target Date 20   Date Met  20   Progress:goal met     Goal Identifier ROM   Goal Description Pt will be able to demonstrate full  L knee ROM to get in/out of car w/o pain to be able to compelte work tasks   Target Date 20   Date Met  20   Progress:goal met     Goal Identifier stand   Goal Description Pt will be able to stand 8 hours w less than 2/10 knee pain to complete work tasks. (t)   Target Date 20   Date Met  20   Progress:goal met     Goal Identifier LEFS   Goal Description Pt will score 50/80 on LEFS outcome tool to demonstrate clinically significant improvement and be able to complete more tasks at home/work.    Target Date 20   Date Met  20   Progress:goal met       Progress Toward Goals:   Progress this reporting period: Pt has been seen for 5 visits over this POC. Has met all goals and is ind in [facility]. Pt gets occasional knee pain w certain moments however that is to be expected due to commodities ie age/weight/degenerative changes. Due to improvements plan to discharge to [facility] at this time.           Plan:  Discharge from therapy.    Discharge:    Reason for  Discharge: Patient has met all goals.  No further expectation of progress.    Equipment Issued: NA    Discharge Plan: Patient to continue home program.

## 2020-12-08 ENCOUNTER — OFFICE VISIT (OUTPATIENT)
Dept: FAMILY MEDICINE | Facility: CLINIC | Age: 45
End: 2020-12-08
Payer: COMMERCIAL

## 2020-12-08 VITALS
BODY MASS INDEX: 55.38 KG/M2 | TEMPERATURE: 97 F | RESPIRATION RATE: 16 BRPM | SYSTOLIC BLOOD PRESSURE: 104 MMHG | HEART RATE: 80 BPM | WEIGHT: 315 LBS | DIASTOLIC BLOOD PRESSURE: 70 MMHG

## 2020-12-08 DIAGNOSIS — J45.20 MILD INTERMITTENT ASTHMA WITHOUT COMPLICATION: ICD-10-CM

## 2020-12-08 DIAGNOSIS — E78.5 HYPERLIPIDEMIA LDL GOAL <100: ICD-10-CM

## 2020-12-08 DIAGNOSIS — Z23 NEED FOR PROPHYLACTIC VACCINATION AND INOCULATION AGAINST INFLUENZA: ICD-10-CM

## 2020-12-08 DIAGNOSIS — I27.20 PULMONARY HYPERTENSION (H): ICD-10-CM

## 2020-12-08 DIAGNOSIS — I87.8 VENOUS STASIS: ICD-10-CM

## 2020-12-08 DIAGNOSIS — E66.01 MORBID OBESITY (H): ICD-10-CM

## 2020-12-08 DIAGNOSIS — G47.33 OSA (OBSTRUCTIVE SLEEP APNEA): ICD-10-CM

## 2020-12-08 DIAGNOSIS — E11.9 TYPE 2 DIABETES MELLITUS WITHOUT COMPLICATION, WITHOUT LONG-TERM CURRENT USE OF INSULIN (H): Primary | ICD-10-CM

## 2020-12-08 LAB
ALBUMIN SERPL-MCNC: 3.6 G/DL (ref 3.4–5)
ALP SERPL-CCNC: 109 U/L (ref 40–150)
ALT SERPL W P-5'-P-CCNC: 26 U/L (ref 0–70)
ANION GAP SERPL CALCULATED.3IONS-SCNC: 3 MMOL/L (ref 3–14)
AST SERPL W P-5'-P-CCNC: 18 U/L (ref 0–45)
BILIRUB DIRECT SERPL-MCNC: 0.1 MG/DL (ref 0–0.2)
BILIRUB SERPL-MCNC: 0.3 MG/DL (ref 0.2–1.3)
BUN SERPL-MCNC: 27 MG/DL (ref 7–30)
CALCIUM SERPL-MCNC: 8.7 MG/DL (ref 8.5–10.1)
CHLORIDE SERPL-SCNC: 108 MMOL/L (ref 94–109)
CHOLEST SERPL-MCNC: 120 MG/DL
CO2 SERPL-SCNC: 29 MMOL/L (ref 20–32)
CREAT SERPL-MCNC: 0.98 MG/DL (ref 0.66–1.25)
GFR SERPL CREATININE-BSD FRML MDRD: >90 ML/MIN/{1.73_M2}
GLUCOSE SERPL-MCNC: 101 MG/DL (ref 70–99)
HBA1C MFR BLD: 5.5 % (ref 0–5.6)
HDLC SERPL-MCNC: 51 MG/DL
LDLC SERPL CALC-MCNC: 44 MG/DL
NONHDLC SERPL-MCNC: 69 MG/DL
POTASSIUM SERPL-SCNC: 4.1 MMOL/L (ref 3.4–5.3)
PROT SERPL-MCNC: 7.4 G/DL (ref 6.8–8.8)
SODIUM SERPL-SCNC: 140 MMOL/L (ref 133–144)
TRIGL SERPL-MCNC: 126 MG/DL

## 2020-12-08 PROCEDURE — 80048 BASIC METABOLIC PNL TOTAL CA: CPT | Performed by: FAMILY MEDICINE

## 2020-12-08 PROCEDURE — 80076 HEPATIC FUNCTION PANEL: CPT | Performed by: FAMILY MEDICINE

## 2020-12-08 PROCEDURE — 90471 IMMUNIZATION ADMIN: CPT | Performed by: FAMILY MEDICINE

## 2020-12-08 PROCEDURE — 82043 UR ALBUMIN QUANTITATIVE: CPT | Performed by: FAMILY MEDICINE

## 2020-12-08 PROCEDURE — 99214 OFFICE O/P EST MOD 30 MIN: CPT | Mod: 25 | Performed by: FAMILY MEDICINE

## 2020-12-08 PROCEDURE — 36415 COLL VENOUS BLD VENIPUNCTURE: CPT | Performed by: FAMILY MEDICINE

## 2020-12-08 PROCEDURE — 90686 IIV4 VACC NO PRSV 0.5 ML IM: CPT | Performed by: FAMILY MEDICINE

## 2020-12-08 PROCEDURE — 80061 LIPID PANEL: CPT | Performed by: FAMILY MEDICINE

## 2020-12-08 PROCEDURE — 83036 HEMOGLOBIN GLYCOSYLATED A1C: CPT | Performed by: FAMILY MEDICINE

## 2020-12-08 RX ORDER — LISINOPRIL 5 MG/1
5 TABLET ORAL DAILY
Qty: 90 TABLET | Refills: 3 | Status: SHIPPED | OUTPATIENT
Start: 2020-12-08 | End: 2021-08-20

## 2020-12-08 RX ORDER — SIMVASTATIN 10 MG
10 TABLET ORAL AT BEDTIME
Qty: 90 TABLET | Refills: 3 | Status: SHIPPED | OUTPATIENT
Start: 2020-12-08 | End: 2021-08-20

## 2020-12-08 RX ORDER — FUROSEMIDE 20 MG
TABLET ORAL
Qty: 360 TABLET | Refills: 3 | Status: SHIPPED | OUTPATIENT
Start: 2020-12-08 | End: 2021-12-13

## 2020-12-08 RX ORDER — LIRAGLUTIDE 6 MG/ML
INJECTION SUBCUTANEOUS
Qty: 9 ML | Refills: 5 | Status: SHIPPED | OUTPATIENT
Start: 2020-12-08 | End: 2021-06-30

## 2020-12-08 RX ORDER — METFORMIN HCL 500 MG
TABLET, EXTENDED RELEASE 24 HR ORAL
Qty: 90 TABLET | Refills: 3 | Status: SHIPPED | OUTPATIENT
Start: 2020-12-08 | End: 2021-08-20

## 2020-12-08 RX ORDER — ALBUTEROL SULFATE 90 UG/1
2 AEROSOL, METERED RESPIRATORY (INHALATION) EVERY 4 HOURS PRN
Qty: 1 INHALER | Refills: 5 | Status: SHIPPED | OUTPATIENT
Start: 2020-12-08 | End: 2022-03-09

## 2020-12-08 ASSESSMENT — PAIN SCALES - GENERAL: PAINLEVEL: NO PAIN (0)

## 2020-12-08 NOTE — PROGRESS NOTES
Subjective     Refugio Kennedy is a 45 year old male who presents to clinic today for the following health issues:    HPI         Diabetes Follow-up    How often are you checking your blood sugar? Two times daily 1-2 times per day  Blood sugar testing frequency justification:  Patient modifying lifestyle changes (diet, exercise) with blood sugars  What time of day are you checking your blood sugars (select all that apply)?  Before meals  Have you had any blood sugars above 200?  No  Have you had any blood sugars below 70?  No    What symptoms do you notice when your blood sugar is low?  None    What concerns do you have today about your diabetes? Other: tingling in feet more often     Do you have any of these symptoms? (Select all that apply)  Numbness in feet    Have you had a diabetic eye exam in the last 12 months? Yes- Date of last eye exam: 11/30/20,  Location: [facility]        BP Readings from Last 2 Encounters:   12/08/20 104/70   11/02/20 138/68     Hemoglobin A1C (%)   Date Value   12/19/2019 5.6   07/19/2019 5.6     LDL Cholesterol Calculated (mg/dL)   Date Value   12/19/2019 49   01/14/2019 55         Hyperlipidemia Follow-Up      Are you regularly taking any medication or supplement to lower your cholesterol?   Yes- statin    Are you having muscle aches or other side effects that you think could be caused by your cholesterol lowering medication?  No    Asthma Follow-Up    Was ACT completed today?    Yes    ACT Total Scores 12/8/2020   ACT TOTAL SCORE -   ASTHMA ER VISITS -   ASTHMA HOSPITALIZATIONS -   ACT TOTAL SCORE (Goal Greater than or Equal to 20) 25   In the past 12 months, how many times did you visit the emergency room for your asthma without being admitted to the hospital? 0   In the past 12 months, how many times were you hospitalized overnight because of your asthma? 0          How many days per week do you miss taking your asthma controller medication?  I do not have an asthma controller  medication    Please describe any recent triggers for your asthma: upper respiratory infections    Have you had any Emergency Room Visits, Urgent Care Visits, or Hospital Admissions since your last office visit?  No        Review of Systems   Constitutional, HEENT, cardiovascular, pulmonary, gi and gu systems are negative, except as otherwise noted.      Objective    /70   Pulse 80   Temp 97  F (36.1  C) (Tympanic)   Resp 16   Wt (!) 170.1 kg (375 lb)   BMI 55.38 kg/m    Body mass index is 55.38 kg/m .  Physical Exam   GENERAL APPEARANCE: healthy, alert and no distress  NECK: no adenopathy, no asymmetry, masses, or scars and thyroid normal to palpation  RESP: lungs clear to auscultation - no rales, rhonchi or wheezes  CV: regular rates and rhythm, normal S1 S2, no S3 or S4 and no murmur, click or rub  MS: pitting 2+ lower extremity edema bilaterally  SKIN: no suspicious lesions or rashes  PSYCH: mentation appears normal and affect normal/bright            Assessment & Plan     Type 2 diabetes mellitus without complication, without long-term current use of insulin (H)  Adjust meds as indicated by above labs.   - HEMOGLOBIN A1C  - BASIC METABOLIC PANEL  - Lipid panel reflex to direct LDL Fasting  - Albumin Random Urine Quantitative with Creat Ratio  - Hepatic panel  - liraglutide (VICTOZA PEN) 18 MG/3ML solution; INJECT 1.8 MG UNDER THE SKIN ONCE DAILY  - metFORMIN (GLUCOPHAGE-XR) 500 MG 24 hr tablet; TAKE ONE TABLET BY MOUTH ONCE DAILY WITH BREAKFAST. One month only, needs labs and appointment    FARZANA (obstructive sleep apnea)/Hypoventilation Syndrome- Severe  Continue with cpap and oxygen prn     Morbid obesity (H)  Working on this with bariatric clinic     Hyperlipidemia LDL goal <100  Adjust meds as indicated by above labs.   - simvastatin (ZOCOR) 10 MG tablet; Take 1 tablet (10 mg) by mouth At Bedtime    Venous stasis  Stable no change in treatment plan.   - furosemide (LASIX) 20 MG tablet; TAKE TWO  TABLETS BY MOUTH TWICE A DAY    Pulmonary hypertension (H)  Stable no change in treatment plan.   - lisinopril (ZESTRIL) 5 MG tablet; Take 1 tablet (5 mg) by mouth daily    Mild intermittent asthma without complication  Stable no change in treatment plan.   - albuterol (PROAIR HFA) 108 (90 Base) MCG/ACT inhaler; Inhale 2 puffs into the lungs every 4 hours as needed for shortness of breath / dyspnea        Patient Instructions   Refill meds no changes for now    Flu shot today     Labs today       Return in about 6 months (around 6/8/2021) for Follow up, Diabetic Visit, with me.    Erika Espino MD  [facility]

## 2020-12-08 NOTE — NURSING NOTE
"Chief Complaint   Patient presents with     Diabetes     /70   Pulse 80   Temp 97  F (36.1  C) (Tympanic)   Resp 16   Wt (!) 170.1 kg (375 lb)   BMI 55.38 kg/m   Estimated body mass index is 55.38 kg/m  as calculated from the following:    Height as of 11/2/20: 1.753 m (5' 9\").    Weight as of this encounter: 170.1 kg (375 lb).  Patient presents to the clinic using No DME      Health Maintenance that is potentially due pending provider review:    Health Maintenance Due   Topic Date Due     HEPATITIS B IMMUNIZATION (1 of 3 - Risk 3-dose series) 07/02/1992     PREVENTIVE CARE VISIT  12/09/2015     ASTHMA ACTION PLAN  07/24/2019     DIABETIC FOOT EXAM  01/14/2020     ASTHMA CONTROL TEST  01/19/2020     A1C  06/19/2020     EYE EXAM  08/06/2020     INFLUENZA VACCINE (1) 09/01/2020     BMP  12/19/2020     LIPID  12/19/2020     MICROALBUMIN  12/19/2020        Pended.        "

## 2020-12-09 LAB
CREAT UR-MCNC: 111 MG/DL
MICROALBUMIN UR-MCNC: 7 MG/L
MICROALBUMIN/CREAT UR: 6.26 MG/G CR (ref 0–17)

## 2020-12-09 ASSESSMENT — ASTHMA QUESTIONNAIRES: ACT_TOTALSCORE: 25

## 2021-01-11 ENCOUNTER — VIRTUAL VISIT (OUTPATIENT)
Dept: ENDOCRINOLOGY | Facility: CLINIC | Age: 46
End: 2021-01-11
Payer: COMMERCIAL

## 2021-01-11 VITALS — HEIGHT: 69 IN | WEIGHT: 315 LBS | BODY MASS INDEX: 46.65 KG/M2

## 2021-01-11 DIAGNOSIS — E66.01 MORBID OBESITY (H): ICD-10-CM

## 2021-01-11 DIAGNOSIS — E66.01 MORBID OBESITY DUE TO EXCESS CALORIES (H): ICD-10-CM

## 2021-01-11 PROCEDURE — 99213 OFFICE O/P EST LOW 20 MIN: CPT | Mod: TEL | Performed by: INTERNAL MEDICINE

## 2021-01-11 RX ORDER — TOPIRAMATE 100 MG/1
200 TABLET, FILM COATED ORAL 2 TIMES DAILY
Qty: 120 TABLET | Refills: 5 | Status: SHIPPED | OUTPATIENT
Start: 2021-01-11 | End: 2021-08-27

## 2021-01-11 RX ORDER — NALTREXONE HYDROCHLORIDE 50 MG/1
50 TABLET, FILM COATED ORAL 2 TIMES DAILY
Qty: 60 TABLET | Refills: 4 | Status: SHIPPED | OUTPATIENT
Start: 2021-01-11 | End: 2021-08-23

## 2021-01-11 ASSESSMENT — PAIN SCALES - GENERAL: PAINLEVEL: MILD PAIN (2)

## 2021-01-11 ASSESSMENT — MIFFLIN-ST. JEOR: SCORE: 2566.37

## 2021-01-11 NOTE — LETTER
"2021       RE: Refugio Kennedy  [address]  [facility] [address]     Dear Colleague,    Thank you for referring your patient, Refugio Kennedy, to the [facility] [place] at [facility]. Please see a copy of my visit note below.    Refugio is a 45 year old who is being evaluated via a billable telephone visit.      What phone number would you like to be contacted at? [phone]  How would you like to obtain your AVS? Milka    Phone call duration: 15 minutes.  I explained the conditions and plans (more than 50% of time was counseling/coordination of weight management).            Return Medical Weight Management Note     Refugio Kennedy  MRN:  [id number]  :  1975  CRISTI:  10/26/20    Dear Dr. Espino,  We had the pleasure of seeing your patient Refugio Kennedy.  He is a 41 year old male who we are continuing to see for treatment of obesity related to: DMII, HLD, sleep apnea on CPAP daily, and hypoventilation syndrome, oxygen dependent.     CURRENT WEIGHT:   375 lbs 0 oz     Wt Readings from Last 4 Encounters:   21 (!) 170.1 kg (375 lb)   20 (!) 170.1 kg (375 lb)   20 (!) 172.4 kg (380 lb)   10/29/20 (!) 172.4 kg (380 lb)     Height:  5' 9\"  Body Mass Index:  Body mass index is 55.38 kg/m .  Vitals:  B/P: 138/83, P: 80    Initial consult weight was 454 on 5/15/2015.  Weight change since last seen on 10/26/20 is down 5 pounds.   Total loss is 79 pounds.    INTERVAL HISTORY:  Has been doing better with avoiding snacking, moved naltrexone to pre work as many snacks are brought into work.Tolerating topiramate 200 mg BID. Says his exercise has decreased since pandemic, used to walk some.    No flowsheet data found.     MEDICATIONS:   Current Outpatient Medications   Medication     albuterol (2.5 MG/3ML) 0.083% neb solution     albuterol (PROAIR HFA) 108 (90 Base) MCG/ACT inhaler     aspirin 81 MG tablet     blood glucose (ACCU-CHEK " "CHRIS PLUS) test strip     blood glucose monitoring (SOFTCLIX) lancets     cetirizine (ZYRTEC) 10 MG tablet     DIABETIC STERILE LANCETS device     fluticasone (FLONASE) 50 MCG/ACT spray     furosemide (LASIX) 20 MG tablet     ibuprofen (ADVIL/MOTRIN) 200 MG tablet     insulin pen needle (BD SEVERO U/F) 32G X 4 MM miscellaneous     liraglutide (VICTOZA PEN) 18 MG/3ML solution     lisinopril (ZESTRIL) 5 MG tablet     metFORMIN (GLUCOPHAGE-XR) 500 MG 24 hr tablet     modafinil (PROVIGIL) 200 MG tablet     Multiple Vitamin (MULTIVITAMIN OR)     naltrexone (DEPADE/REVIA) 50 MG tablet     naproxen (NAPROSYN) 500 MG tablet     order for DME     order for DME     order for DME     order for DME     ORDER FOR DME     orlistat (LAWRENCE) 60 MG capsule     simvastatin (ZOCOR) 10 MG tablet     topiramate (TOPAMAX) 100 MG tablet     triamcinolone (KENALOG) 0.1 % cream     vitamin C (ASCORBIC ACID) 100 MG tablet     No current facility-administered medications for this visit.      Weight Loss Medication History Reviewed With Patient 1/11/2021   Which weight loss medications are you currently taking on a regular basis?  Naltrexone, Topamax (topiramate)   Are you having any side effects from the weight loss medication that we have prescribed you? No   If you are having side effects please describe: -     ASSESSMENT:   Maintain same plan. Naltrexone 50 mg/d has been helpful. Maintain topiramate 200 mg BID. Also on liraglutide 1.8/d. Reinforce food plan - focus on no between meal snacking, aggressive lowering of starches and cheese.     FOLLOW-UP:    3 months    Phone call duration: 15 minutes.  I explained the conditions and plans (more than 50% of time was counseling/coordination of weight management).    Sincerely,  Ashish Paez MD    The patient was evaluated via a billable telephone visit and notified:  \"This visit will be via telephone call between you and your physician. If lab work is needed we can place an order and " "you can later stop by the lab. Telephone visits are billed at different rates depending on your insurance coverage. During this emergency period, some insurers may be billed the same as an in-person visit.  Please check with your insurance provider with any questions. If the physician feels a telephone visit is not appropriate, you will not be charged for this service.\" Patient has given verbal consent for Telephone visit?  Yes. How would you like to obtain your AVS? MyChart.    "

## 2021-01-11 NOTE — PROGRESS NOTES
"      Return Medical Weight Management Note     Refugio Kennedy  MRN:  [id number]  :  1975  CRISTI:  10/26/20    Dear Dr. Espino,  We had the pleasure of seeing your patient Refugio Kennedy.  He is a 41 year old male who we are continuing to see for treatment of obesity related to: DMII, HLD, sleep apnea on CPAP daily, and hypoventilation syndrome, oxygen dependent.     CURRENT WEIGHT:   375 lbs 0 oz     Wt Readings from Last 4 Encounters:   21 (!) 170.1 kg (375 lb)   20 (!) 170.1 kg (375 lb)   20 (!) 172.4 kg (380 lb)   10/29/20 (!) 172.4 kg (380 lb)     Height:  5' 9\"  Body Mass Index:  Body mass index is 55.38 kg/m .  Vitals:  B/P: 138/83, P: 80    Initial consult weight was 454 on 5/15/2015.  Weight change since last seen on 10/26/20 is down 5 pounds.   Total loss is 79 pounds.    INTERVAL HISTORY:  Has been doing better with avoiding snacking, moved naltrexone to pre work as many snacks are brought into work.Tolerating topiramate 200 mg BID. Says his exercise has decreased since pandemic, used to walk some.    No flowsheet data found.     MEDICATIONS:   Current Outpatient Medications   Medication     albuterol (2.5 MG/3ML) 0.083% neb solution     albuterol (PROAIR HFA) 108 (90 Base) MCG/ACT inhaler     aspirin 81 MG tablet     blood glucose (ACCU-CHEK CHRIS PLUS) test strip     blood glucose monitoring (SOFTCLIX) lancets     cetirizine (ZYRTEC) 10 MG tablet     DIABETIC STERILE LANCETS device     fluticasone (FLONASE) 50 MCG/ACT spray     furosemide (LASIX) 20 MG tablet     ibuprofen (ADVIL/MOTRIN) 200 MG tablet     insulin pen needle (BD SEVERO U/F) 32G X 4 MM miscellaneous     liraglutide (VICTOZA PEN) 18 MG/3ML solution     lisinopril (ZESTRIL) 5 MG tablet     metFORMIN (GLUCOPHAGE-XR) 500 MG 24 hr tablet     modafinil (PROVIGIL) 200 MG tablet     Multiple Vitamin (MULTIVITAMIN OR)     naltrexone (DEPADE/REVIA) 50 MG tablet     naproxen (NAPROSYN) 500 MG tablet     order for DME     order for DME " "    order for DME     order for DME     ORDER FOR DME     orlistat (LAWRENCE) 60 MG capsule     simvastatin (ZOCOR) 10 MG tablet     topiramate (TOPAMAX) 100 MG tablet     triamcinolone (KENALOG) 0.1 % cream     vitamin C (ASCORBIC ACID) 100 MG tablet     No current facility-administered medications for this visit.      Weight Loss Medication History Reviewed With Patient 1/11/2021   Which weight loss medications are you currently taking on a regular basis?  Naltrexone, Topamax (topiramate)   Are you having any side effects from the weight loss medication that we have prescribed you? No   If you are having side effects please describe: -     ASSESSMENT:   Maintain same plan. Naltrexone 50 mg/d has been helpful. Maintain topiramate 200 mg BID. Also on liraglutide 1.8/d. Reinforce food plan - focus on no between meal snacking, aggressive lowering of starches and cheese.     FOLLOW-UP:    3 months    Phone call duration: 15 minutes.  I explained the conditions and plans (more than 50% of time was counseling/coordination of weight management).    Sincerely,  Ashish Paez MD    The patient was evaluated via a billable telephone visit and notified:  \"This visit will be via telephone call between you and your physician. If lab work is needed we can place an order and you can later stop by the lab. Telephone visits are billed at different rates depending on your insurance coverage. During this emergency period, some insurers may be billed the same as an in-person visit.  Please check with your insurance provider with any questions. If the physician feels a telephone visit is not appropriate, you will not be charged for this service.\" Patient has given verbal consent for Telephone visit?  Yes. How would you like to obtain your AVS? MyChart.  "

## 2021-01-11 NOTE — PROGRESS NOTES
Refugio is a 45 year old who is being evaluated via a billable telephone visit.      What phone number would you like to be contacted at? [phone]  How would you like to obtain your AVS? Milka    Phone call duration: 15 minutes.  I explained the conditions and plans (more than 50% of time was counseling/coordination of weight management).

## 2021-01-11 NOTE — NURSING NOTE
"Chief Complaint   Patient presents with     RECHECK     Telephone visit.        Vitals:    01/11/21 0801   Weight: (!) 170.1 kg (375 lb)   Height: 1.753 m (5' 9\")       Body mass index is 55.38 kg/m .    Ariella Mahajan LPN      "

## 2021-01-15 ENCOUNTER — HEALTH MAINTENANCE LETTER (OUTPATIENT)
Age: 46
End: 2021-01-15

## 2021-01-18 DIAGNOSIS — E11.9 TYPE 2 DIABETES, HBA1C GOAL < 8% (H): ICD-10-CM

## 2021-01-18 RX ORDER — LANCETS
EACH MISCELLANEOUS
Qty: 300 EACH | Refills: 2 | Status: SHIPPED | OUTPATIENT
Start: 2021-01-18 | End: 2022-12-13

## 2021-02-17 DIAGNOSIS — E11.9 DIABETES MELLITUS WITHOUT COMPLICATION (H): ICD-10-CM

## 2021-02-17 RX ORDER — BLOOD SUGAR DIAGNOSTIC
STRIP MISCELLANEOUS
Qty: 300 STRIP | Refills: 1 | Status: SHIPPED | OUTPATIENT
Start: 2021-02-17 | End: 2022-11-25

## 2021-03-09 DIAGNOSIS — E11.9 TYPE 2 DIABETES MELLITUS WITHOUT COMPLICATIONS (H): ICD-10-CM

## 2021-03-22 ENCOUNTER — VIRTUAL VISIT (OUTPATIENT)
Dept: FAMILY MEDICINE | Facility: CLINIC | Age: 46
End: 2021-03-22
Payer: COMMERCIAL

## 2021-03-22 ENCOUNTER — ALLIED HEALTH/NURSE VISIT (OUTPATIENT)
Dept: FAMILY MEDICINE | Facility: CLINIC | Age: 46
End: 2021-03-22
Payer: COMMERCIAL

## 2021-03-22 DIAGNOSIS — R05.9 COUGH: ICD-10-CM

## 2021-03-22 DIAGNOSIS — Z20.822 SUSPECTED 2019 NOVEL CORONAVIRUS INFECTION: ICD-10-CM

## 2021-03-22 DIAGNOSIS — J02.9 SORE THROAT: ICD-10-CM

## 2021-03-22 DIAGNOSIS — R06.02 SOB (SHORTNESS OF BREATH): ICD-10-CM

## 2021-03-22 DIAGNOSIS — Z20.822 SUSPECTED 2019 NOVEL CORONAVIRUS INFECTION: Primary | ICD-10-CM

## 2021-03-22 LAB
DEPRECATED S PYO AG THROAT QL EIA: NEGATIVE
SPECIMEN SOURCE: NORMAL
SPECIMEN SOURCE: NORMAL
STREP GROUP A PCR: NOT DETECTED

## 2021-03-22 PROCEDURE — 99N1174 PR STATISTIC STREP A RAPID: Performed by: NURSE PRACTITIONER

## 2021-03-22 PROCEDURE — 87651 STREP A DNA AMP PROBE: CPT | Performed by: NURSE PRACTITIONER

## 2021-03-22 PROCEDURE — U0005 INFEC AGEN DETEC AMPLI PROBE: HCPCS | Performed by: NURSE PRACTITIONER

## 2021-03-22 PROCEDURE — 99213 OFFICE O/P EST LOW 20 MIN: CPT | Mod: TEL | Performed by: NURSE PRACTITIONER

## 2021-03-22 PROCEDURE — U0003 INFECTIOUS AGENT DETECTION BY NUCLEIC ACID (DNA OR RNA); SEVERE ACUTE RESPIRATORY SYNDROME CORONAVIRUS 2 (SARS-COV-2) (CORONAVIRUS DISEASE [COVID-19]), AMPLIFIED PROBE TECHNIQUE, MAKING USE OF HIGH THROUGHPUT TECHNOLOGIES AS DESCRIBED BY CMS-2020-01-R: HCPCS | Performed by: NURSE PRACTITIONER

## 2021-03-22 NOTE — PATIENT INSTRUCTIONS
"1.  Use albuterol inhaler as needed for shortness of breath.  If worsening breathing issues, go to ER.  2.  I will call you with results of COVID and strep testing and these also will go out to [employer] for your viewing.  3.  See information attached about After your COVID 19 testing.  Call if you have any questions.  4.  Please let me know if you need a work note and this can be done and you can print it off on [employer] also.        Our Clinic hours are:  Mondays    7:20 am - 7 pm  Tues -  Fri  7:20 am - 5 pm    Clinic Phone: [phone]    The clinic lab opens at 7:30 am Mon - Fri and appointments are required.    [facility]. [phone]  Monday  8 am - 7pm  Tues - Fri 8 am - 5:30 pm         Patient Education   After Your COVID-19 (Coronavirus) Test  You have been tested for COVID-19 (coronavirus).   If you'll have surgery in the next few days, we'll let you know ahead of time if you have the virus. Please call your surgeon's office with any questions.  For all other patients: Results are usually available in [employer] within 2 to 3 days.   If you do not have a [employer] account, you'll get a letter in the mail in about 7 to 10 days.   [employer] is often the fastest way to get test results. Please sign up if you do not already have a [employer] account. See the handout Getting COVID-19 Test Results in [employer] for help.  What if my test result is positive?  If your test is positive and you have not viewed your result in [employer], you'll get a phone call with your result. (A positive test means that you have the virus.)     Follow the tips under \"How do I self-isolate?\" below for 10 days (20 days if you have a weak immune system).    You don't need to be retested for COVID-19 before going back to school or work. As long as you're fever-free and feeling better, you can go back to school, work and other activities after waiting the 10 or 20 days.  What if I have questions after I get my results?  If you " "have questions about your results, please visit our testing website at [email or web address].   After 7 to 10 days, if you have not gotten your results:     Call [phone] ([phone]) and ask to speak with our COVID-19 results team.    If you're being treated at an infusion center: Call your infusion center directly.  What are the symptoms of COVID-19?  Cough, fever and trouble breathing are the most common signs of COVID-19.  Other symptoms can include new headaches, new muscle or body aches, new and unexplained fatigue (feeling very tired), chills, sore throat, congestion (stuffy or runny nose), diarrhea (loose poop), loss of taste or smell, belly pain, and nausea or vomiting (feeling sick to your stomach or throwing up).  You may already have symptoms of COVID-19, or they may show up later.  What should I do if I have symptoms?  If you're having surgery: Call your surgeon's office.  For all other patients: Stay home and away from others (self-isolate) until ...    You've had no fever--and no medicine that reduces fever--for 1 full day (24 hours), AND    Other symptoms have gotten better. For example, your cough or breathing has improved, AND    At least 10 days have passed since your symptoms first started.  How do I self-isolate?    Stay in your own room, even for meals. Use your own bathroom if you can.    Stay away from others in your home. No hugging, kissing or shaking hands. No visitors.    Don't go to work, school or anywhere else.    Clean \"high touch\" surfaces often (doorknobs, counters, handles). Use household cleaning spray or wipes. You'll find a full list of  on the EPA website: www.epa.gov/pesticide-registration/list-n-disinfectants-use-against-sars-cov-2.    Cover your mouth and nose with a mask or other face covering to avoid spreading germs.    Wash your hands and face often. Use soap and water.    Caregivers in these groups are at risk for severe " illness due to COVID-19:  ? People 65 years and older  ? People who live in a nursing home or long-term care facility  ? People with chronic disease (lung, heart, cancer, diabetes, kidney, liver, immunologic)  ? People who have a weakened immune system, including those who:    Are in cancer treatment    Take medicine that weakens the immune system, such as corticosteroids    Had a bone marrow or organ transplant    Have an immune deficiency    Have poorly controlled HIV or AIDS    Are obese (body mass index of 40 or higher)    Smoke regularly    Caregivers should wear gloves while washing dishes, handling laundry and cleaning bedrooms and bathrooms.    Use caution when washing and drying laundry: Don't shake dirty laundry and use the warmest water setting that you can.    For more tips on managing your health at home, go to www.cdc.gov/coronavirus/2019-ncov/downloads/10Things.pdf.  How can I take care of myself at home?  1. Get lots of rest. Drink extra fluids (unless a doctor has told you not to).  2. Take Tylenol (acetaminophen) for fever or pain. If you have liver or kidney problems, ask your family doctor if it's OK to take Tylenol.   Adults can take either:  ? 650 mg (two 325 mg pills) every 4 to 6 hours, or   ? 1,000 mg (two 500 mg pills) every 8 hours as needed.  ? Note: Don't take more than 3,000 mg in one day. Acetaminophen is found in many medicines (both prescribed and over-the-counter medicines). Read all labels to be sure you don't take too much.   For children, check the Tylenol bottle for the right dose. The dose is based on the child's age or weight.  3. If you have other health problems (like cancer, heart failure, an organ transplant or severe kidney disease): Call your specialty clinic if you don't feel better in the next 2 days.  4. Know when to call 911. Emergency warning signs include:  ? Trouble breathing or shortness of breath  ? Chest pain or pressure that doesn't go away  ? Feeling confused  like you haven't felt before, or not being able to wake up  ? Bluish-colored lips or face  5. If your doctor prescribed a blood thinner medicine: Follow their instructions.  Where can I get more information?    [facility] [place] - About COVID-19:   [email or web address]    CDC - If You're Sick: cdc.gov/coronavirus/2019-ncov/about/steps-when-sick.html    CDC - Ending Home Isolation: www.cdc.gov/coronavirus/2019-ncov/hcp/disposition-in-home-patients.html    CDC - Caring for Someone: www.cdc.gov/coronavirus/2019-ncov/if-you-are-sick/care-for-someone.html    [facility] - Interim Guidance for Hospital Discharge to Home: www.health.[facility].[place]/diseases/coronavirus/hcp/hospdischarge.pdf    [facility] clinical trials (COVID-19 research studies): [email or web address].[email or web address]-clinical-trials    Below are the COVID-19 hotlines at the [place] Department of Health ([facility]). Interpreters are available.  ? For health questions: Call 675-480-5288 or 1-418.175.6698 (7 a.m. to 7 p.m.)  ? For questions about schools and childcare: Call 282-979-3066 or [phone] (7 a.m. to 7 p.m.)    For informational purposes only. Not to replace the advice of your health care provider. Clinically reviewed by Infection Prevention and the [employer] COVID-19 Clinical Team. Copyright   2020 [place] [facility] [employer] [facility]. All rights reserved. [employer] [id number] - Rev 11/11/20.       Patient Education     Self-Care for Sore Throats     Sore throats happen for many reasons, such as colds, allergies, cigarette smoke, air pollution, and infections caused by viruses or bacteria. In any case, your throat becomes red and sore. Your goal for self-care is to reduce your discomfort while giving your throat a chance to heal.  Moisten and soothe your throat  Tips include the following:    Try a sip of water first thing after waking up.    Keep your throat moist by drinking 6 or more glasses of clear liquids every day.    Run a cool-air humidifier  in your room overnight.    Stay away from cigarette smoke.     Check the air quality index,if air pollution gives you a sore throat. On high pollution days, try to limit outdoor time.    Suck on throat lozenges, cough drops, hard candy, ice chips, or frozen fruit-juice bars. Use the sugar-free versions if your diet or medical condition requires them.  Gargle to ease irritation  Gargling every hour or 2 can ease irritation. Try gargling with 1 of these solutions:    1/4 teaspoon of salt in 1/2 cup of warm water    An over-the-counter anesthetic gargle  Use medicine for more relief  Over-the-counter medicine can reduce sore throat symptoms. Ask your pharmacist if you have questions about which medicine to use. To prevent possible medicine interactions, let the pharmacist know what medicines you take. To decrease symptoms:    Ease pain with anesthetic sprays. Aspirin or an aspirin substitute also helps. Remember, never give aspirin to anyone 18 or younger. Don't take aspirin if you are already taking blood thinners.     For sore throats caused by allergies, try antihistamines to block the allergic reaction.  Unless a sore throat is caused by a bacterial infection, antibiotics won t help you.  Prevent future sore throats  Prevention tips include:    Stop smoking or reduce contact with secondhand smoke. Smoke irritates the tender throat lining.    Limit contact with pets and with allergy-causing substances, such as pollen and mold.    Wash your hands often when you re around someone with a sore throat or cold. This will keep viruses or bacteria from spreading.    Limit outdoor time when air pollution is bad.    Don t strain your vocal cords.  When to call your healthcare provider  Contact your healthcare provider if you have:    Fever of 100.4 F (38.0 C) or higher, or as directed by your healthcare provider    White spots on the throat    Great Trouble swallowing    A skin rash    Recent exposure to someone else with  strep bacteria    Severe hoarseness and swollen glands in the neck or jaw  Call 911  Call 911 if any of the following occur:    Trouble breathing or catching your breath    Drooling and problems swallowing    Wheezing    Unable to talk    Feeling dizzy or faint    Feeling of doom  Ginny last reviewed this educational content on 9/1/2019 2000-2020 [employer]. All rights reserved. This information is not intended as a substitute for professional medical care. Always follow your healthcare professional's instructions.

## 2021-03-22 NOTE — PROGRESS NOTES
Refugio is a 45 year old who is being evaluated via a billable telephone visit.      What phone number would you like to be contacted at? [phone]  How would you like to obtain your AVS? [place]    Assessment & Plan     Suspected 2019 novel coronavirus infection  Ordered COVID testing today and information given on After COVID 19 Testing that discusses self isolation and what to do if testing is positive.  Recommend that he isolate away from his father at this time to reduce transmission.   Follow-up in clinic in 1 week if any persistent symptoms or in ER if any worsening breathing symptoms.    Cough  - Symptomatic COVID-19 Virus (Coronavirus) by PCR; Future  - Streptococcus A Rapid Scr w Reflx to PCR; Future    SOB (shortness of breath)  - Symptomatic COVID-19 Virus (Coronavirus) by PCR; Future  - Streptococcus A Rapid Scr w Reflx to PCR; Future    Sore throat  I also ordered Strep testing for sore throat symptoms also to rule this out and it was negative.  Culture is pending and patient will be notified if positive for treatment. Follow-up in 1 week if any persistent or worsening symptoms.  Return in about 1 week (around 3/29/2021), or if symptoms worsen or fail to improve, or go to ER if breathing symptoms worsen.    Kaylin Glass NP  [facility]   Refugio is a 45 year old who presents for the following health issues     HPI       Concern for COVID-19  About how many days ago did these symptoms start? Saturday afternoon and worsened on Sunday  Is this your first visit for this illness? Yes  In the 14 days before your symptoms started, have you had close contact with someone with COVID-19 (Coronavirus)? I do not know, boss had a cough after coming back from a trip, co-worker lost sense of taste  Do you have a fever or chills? Yes, the highest temperature was unknown  Are you having new or worsening difficulty breathing? Yes   Please describe what kind of difficulty you  are having breathing:Mild dyspnea (able to do ADLs without difficulty, mild shortness of breath with activities such as climbing one or two flights of stairs or walking briskly)  Varies with talking   Do you have new or worsening cough? Yes, I am coughing up mucus. yellowish/blood  Have you had any new or unexplained body aches? YES    Have you experienced any of the following NEW symptoms?    Headache: YES - on and off behind the eyes, nyquil and dayquil help that    Sore throat: YES - constant    Loss of taste or smell: No    Chest pain: YES- feels heavy and hurts to breathe, using albuterol occasionally and this is helpful    Diarrhea: No     Rash: No  What treatments have you tried? Night and dayquil  Who do you live with? Father has a cough and girlfriend left and was told to stay away  Are you, or a household member, a healthcare worker or a ? No  Do you live in a nursing home, group home, or shelter? No  Do you have a way to get food/medications if quarantined? Yes, I have a friend or family member who can help me.    No nausea or vomiting.  Eating and drinking fine, but slightly decrease in appetite.  Has chills but no sweats, feels feverish.    Review of Systems   CONSTITUTIONAL:POSITIVE  for chills, fatigue and fever (not high per patient)  ENT/MOUTH: POSITIVE for sore throat and headaches behind the eyes  RESP:POSITIVE for cough-productive at times and SOB/dyspnea mild; only using albuterol a couple times which is helpful  CV: POSITIVE for chest heavyness  GI: POSITIVE for decreased appetite  PSYCHIATRIC: NEGATIVE for changes in mood or affect  ROS otherwise negative      Objective       Vitals:  No vitals were obtained today due to virtual visit.    Physical Exam   healthy, alert and no distress  PSYCH: Alert and oriented times 3; coherent speech, normal   rate and volume, able to articulate logical thoughts, able   to abstract reason, no tangential thoughts, no hallucinations   or  delusions  His affect is normal  RESP: No audible wheezing, able to talk in full sentences, cough occurring occasionally that sounded raspy dry cough  Remainder of exam unable to be completed due to telephone visits    Phone call duration: 11 minutes

## 2021-03-23 LAB
SARS-COV-2 RNA RESP QL NAA+PROBE: ABNORMAL
SPECIMEN SOURCE: ABNORMAL

## 2021-03-29 ENCOUNTER — APPOINTMENT (OUTPATIENT)
Dept: GENERAL RADIOLOGY | Facility: CLINIC | Age: 46
End: 2021-03-29
Attending: EMERGENCY MEDICINE
Payer: COMMERCIAL

## 2021-03-29 ENCOUNTER — TELEPHONE (OUTPATIENT)
Dept: FAMILY MEDICINE | Facility: CLINIC | Age: 46
End: 2021-03-29

## 2021-03-29 ENCOUNTER — HOSPITAL ENCOUNTER (EMERGENCY)
Facility: CLINIC | Age: 46
Discharge: ANOTHER HOSPITAL | End: 2021-03-29
Attending: EMERGENCY MEDICINE | Admitting: EMERGENCY MEDICINE
Payer: COMMERCIAL

## 2021-03-29 VITALS
SYSTOLIC BLOOD PRESSURE: 156 MMHG | HEIGHT: 68 IN | WEIGHT: 315 LBS | HEART RATE: 83 BPM | RESPIRATION RATE: 22 BRPM | BODY MASS INDEX: 47.74 KG/M2 | TEMPERATURE: 99.2 F | DIASTOLIC BLOOD PRESSURE: 88 MMHG | OXYGEN SATURATION: 94 %

## 2021-03-29 DIAGNOSIS — J45.20 MILD INTERMITTENT ASTHMA WITHOUT COMPLICATION: ICD-10-CM

## 2021-03-29 DIAGNOSIS — E66.01 MORBID OBESITY (H): ICD-10-CM

## 2021-03-29 DIAGNOSIS — J96.01 ACUTE RESPIRATORY FAILURE WITH HYPOXIA (H): ICD-10-CM

## 2021-03-29 DIAGNOSIS — J96.11 CHRONIC RESPIRATORY FAILURE WITH HYPOXIA (H): ICD-10-CM

## 2021-03-29 DIAGNOSIS — E11.9 TYPE 2 DIABETES MELLITUS WITHOUT COMPLICATION, WITHOUT LONG-TERM CURRENT USE OF INSULIN (H): ICD-10-CM

## 2021-03-29 DIAGNOSIS — U07.1 COVID-19 VIRUS INFECTION: ICD-10-CM

## 2021-03-29 DIAGNOSIS — I27.20 PULMONARY HYPERTENSION (H): ICD-10-CM

## 2021-03-29 LAB
ALBUMIN SERPL-MCNC: 3 G/DL (ref 3.4–5)
ALP SERPL-CCNC: 75 U/L (ref 40–150)
ALT SERPL W P-5'-P-CCNC: 54 U/L (ref 0–70)
ANION GAP SERPL CALCULATED.3IONS-SCNC: 7 MMOL/L (ref 3–14)
AST SERPL W P-5'-P-CCNC: 69 U/L (ref 0–45)
BASE EXCESS BLDV CALC-SCNC: 1.5 MMOL/L
BASOPHILS # BLD AUTO: 0 10E9/L (ref 0–0.2)
BASOPHILS NFR BLD AUTO: 0.3 %
BILIRUB SERPL-MCNC: 0.4 MG/DL (ref 0.2–1.3)
BUN SERPL-MCNC: 32 MG/DL (ref 7–30)
CALCIUM SERPL-MCNC: 8 MG/DL (ref 8.5–10.1)
CHLORIDE SERPL-SCNC: 105 MMOL/L (ref 94–109)
CO2 SERPL-SCNC: 26 MMOL/L (ref 20–32)
CREAT SERPL-MCNC: 1.06 MG/DL (ref 0.66–1.25)
CRP SERPL-MCNC: 136 MG/L (ref 0–8)
D DIMER PPP FEU-MCNC: 1 UG/ML FEU (ref 0–0.5)
DIFFERENTIAL METHOD BLD: ABNORMAL
EOSINOPHIL # BLD AUTO: 0 10E9/L (ref 0–0.7)
EOSINOPHIL NFR BLD AUTO: 0.6 %
ERYTHROCYTE [DISTWIDTH] IN BLOOD BY AUTOMATED COUNT: 12.8 % (ref 10–15)
FERRITIN SERPL-MCNC: 1416 NG/ML (ref 26–388)
FIBRINOGEN PPP-MCNC: 650 MG/DL (ref 200–420)
GFR SERPL CREATININE-BSD FRML MDRD: 83 ML/MIN/{1.73_M2}
GLUCOSE SERPL-MCNC: 117 MG/DL (ref 70–99)
HCO3 BLDV-SCNC: 26 MMOL/L (ref 21–28)
HCT VFR BLD AUTO: 40.1 % (ref 40–53)
HGB BLD-MCNC: 13.4 G/DL (ref 13.3–17.7)
IMM GRANULOCYTES # BLD: 0 10E9/L (ref 0–0.4)
IMM GRANULOCYTES NFR BLD: 0.4 %
LDH SERPL L TO P-CCNC: 505 U/L (ref 85–227)
LYMPHOCYTES # BLD AUTO: 1.2 10E9/L (ref 0.8–5.3)
LYMPHOCYTES NFR BLD AUTO: 16.4 %
MCH RBC QN AUTO: 31.4 PG (ref 26.5–33)
MCHC RBC AUTO-ENTMCNC: 33.4 G/DL (ref 31.5–36.5)
MCV RBC AUTO: 94 FL (ref 78–100)
MONOCYTES # BLD AUTO: 1 10E9/L (ref 0–1.3)
MONOCYTES NFR BLD AUTO: 13.5 %
NEUTROPHILS # BLD AUTO: 4.9 10E9/L (ref 1.6–8.3)
NEUTROPHILS NFR BLD AUTO: 68.8 %
NRBC # BLD AUTO: 0 10*3/UL
NRBC BLD AUTO-RTO: 0 /100
O2/TOTAL GAS SETTING VFR VENT: NORMAL %
PCO2 BLDV: 42 MM HG (ref 40–50)
PH BLDV: 7.41 PH (ref 7.32–7.43)
PLATELET # BLD AUTO: 172 10E9/L (ref 150–450)
PO2 BLDV: 27 MM HG (ref 25–47)
POTASSIUM SERPL-SCNC: 4.2 MMOL/L (ref 3.4–5.3)
PROT SERPL-MCNC: 7.3 G/DL (ref 6.8–8.8)
RBC # BLD AUTO: 4.27 10E12/L (ref 4.4–5.9)
SODIUM SERPL-SCNC: 138 MMOL/L (ref 133–144)
WBC # BLD AUTO: 7.2 10E9/L (ref 4–11)

## 2021-03-29 PROCEDURE — 85379 FIBRIN DEGRADATION QUANT: CPT | Performed by: EMERGENCY MEDICINE

## 2021-03-29 PROCEDURE — 82728 ASSAY OF FERRITIN: CPT | Performed by: EMERGENCY MEDICINE

## 2021-03-29 PROCEDURE — 99285 EMERGENCY DEPT VISIT HI MDM: CPT | Mod: 25 | Performed by: EMERGENCY MEDICINE

## 2021-03-29 PROCEDURE — 85384 FIBRINOGEN ACTIVITY: CPT | Performed by: EMERGENCY MEDICINE

## 2021-03-29 PROCEDURE — 250N000011 HC RX IP 250 OP 636: Performed by: EMERGENCY MEDICINE

## 2021-03-29 PROCEDURE — 99285 EMERGENCY DEPT VISIT HI MDM: CPT | Performed by: EMERGENCY MEDICINE

## 2021-03-29 PROCEDURE — 86140 C-REACTIVE PROTEIN: CPT | Performed by: EMERGENCY MEDICINE

## 2021-03-29 PROCEDURE — 96374 THER/PROPH/DIAG INJ IV PUSH: CPT | Performed by: EMERGENCY MEDICINE

## 2021-03-29 PROCEDURE — 71045 X-RAY EXAM CHEST 1 VIEW: CPT

## 2021-03-29 PROCEDURE — 85025 COMPLETE CBC W/AUTO DIFF WBC: CPT | Performed by: EMERGENCY MEDICINE

## 2021-03-29 PROCEDURE — 82803 BLOOD GASES ANY COMBINATION: CPT | Performed by: EMERGENCY MEDICINE

## 2021-03-29 PROCEDURE — 80053 COMPREHEN METABOLIC PANEL: CPT | Performed by: EMERGENCY MEDICINE

## 2021-03-29 PROCEDURE — 83615 LACTATE (LD) (LDH) ENZYME: CPT | Performed by: EMERGENCY MEDICINE

## 2021-03-29 RX ORDER — DEXAMETHASONE SODIUM PHOSPHATE 10 MG/ML
10 INJECTION, SOLUTION INTRAMUSCULAR; INTRAVENOUS ONCE
Status: COMPLETED | OUTPATIENT
Start: 2021-03-29 | End: 2021-03-29

## 2021-03-29 RX ADMIN — DEXAMETHASONE SODIUM PHOSPHATE 10 MG: 10 INJECTION, SOLUTION INTRAMUSCULAR; INTRAVENOUS at 15:32

## 2021-03-29 ASSESSMENT — MIFFLIN-ST. JEOR: SCORE: 2595.85

## 2021-03-29 NOTE — TELEPHONE ENCOUNTER
"S-(situation): patient has COVID and \"not getting better\"; he has concerns about pneumonia    B-(background): patient had virtual appointment 3/22/21; tested positive for COVID 3/23/21.     A-(assessment): patient states that he continues to struggle with COVID symptoms. He reports that it is \"hard to take a deep breath in or exhale out fully\". Deep breathing, triggers a cough. He is concerned about developing pneumonia. He normally uses home oxygen and states that he has had to keep it on at all times at 2 L, this maintains sats aroudn 95-97%. If he takes oxygen off, sats decrease to 85%. Cough is very productive, reports sputum is sometimes tinged with dark blood flecks. Throat is also \"raw\" so he wonders if blood is from that. His fever has seemed to resolve, reports that last couple of days temp has been around 99. Taking fluids well and urinating normally. No vomiting, ocassional diarrhea.     R-(recommendations): Discussed with patient that appointment is likely needed; however, will route to care team to assist as I'm not sure if they would like patient to come in for face to face visit or virtual. Patient in agreement and will await call back from care team.     [facility] RN      "

## 2021-03-29 NOTE — TELEPHONE ENCOUNTER
Spoke with Refugio, his symptoms are much worse and he is advised to go back to the Ed for evaluation. He agrees and will go in now.

## 2021-03-29 NOTE — ED NOTES
Pt presents to ED with c/o cough, wanting to have CXR done to check for PNA. Dx with covid on 3/23/21. Has been using oxygen for 3 years intermittently, using consistently for past couple days since COVID dx.

## 2021-03-29 NOTE — ED PROVIDER NOTES
History     Chief Complaint   Patient presents with     Shortness of Breath     positive for covid 3/23/21 with increased sob.      HPI  Refugio Kennedy is a 45 year old male with morbid obesity, chronic respiratory failure, FARZANA, DM type II, mild intermittent asthma, pulmonary hypertension who presents for worsening shortness of breath related to COVID-19 illness diagnosed 6 days ago, 3/23/21.  Day 9 of illness, gradually worsening shortness of breath and dyspnea on exertion with concern about developing pneumonia.  He has been using 2 L/nasal cannula to maintain saturations of 95-97%.  His oxygen saturation decreases to as low as 85% on room air when he takes oxygen off.  No chest pain, hemoptysis, leg pain or leg swelling.  No fever or chills.  He has home O2 to use for chronic respiratory failure (? obesity hypoventilation syndrome), but states he very infrequently uses it, prior to this recent illness.    Allergies:  No Known Allergies    Problem List:    Patient Active Problem List    Diagnosis Date Noted     Methamphetamine abuse in remission (H) 07/17/2017     Priority: Medium     Type 2 diabetes mellitus without complication, without long-term current use of insulin (H) 10/17/2016     Priority: Medium     Mild intermittent asthma without complication 03/24/2015     Priority: Medium     Pulmonary hypertension (H) 06/22/2014     Priority: Medium     Hyperlipidemia LDL goal <100 06/08/2012     Priority: Medium     Venous stasis 06/08/2012     Priority: Medium     Chronic respiratory failure (H) 02/11/2011     Priority: Medium     ABG 2/11- 7.38/51/75 RA       FARZANA (obstructive sleep apnea)/Hypoventilation Syndrome- Severe      Priority: Medium     Sleep Study 1998- AHI 51. Rx 16 cm. S/p UVPP 1998, weight loss. Sleep study 9/10/03 (weight 379)- AHI 34.9. Lo2 58%, with reported hypoventilation.  Sleep study 9/29/03- CPAP 14 cm appeared effective. MSLT- 1.8 minutes, 2 sleep onset REMs, diagnosed with 'narcolepsy',  placed on Provigil (though he did not tolerate CPAP on 1st study 9/03).   Sleep study 2/11- AHI 54.9, with desaturations to 51%. Transcutaneous CO2 monitoring showed some elevations consistent with hypoventilation. CPAP titration: No fully effective pressure was found. CPAP was titrated to 18 cm with improvement in severe desaturations, but some persistent hypopneas and arousals. Supine REM was noted at this pressure. Brief trial of 20/16 showed persistent events. Efficacy was limited by mask leak throughout most of the study.        Morbid obesity (H)      Priority: Medium     Morbid obesity          Past Medical History:    Past Medical History:   Diagnosis Date     Cataplexy and narcolepsy      Cellulitis      Diabetes      Hypoventilation      Obesity      FARZANA (obstructive sleep apnea)        Past Surgical History:    Past Surgical History:   Procedure Laterality Date     LAPAROSCOPIC HERNIORRHAPHY VENTRAL N/A 5/17/2016    Procedure: LAPAROSCOPIC HERNIORRHAPHY VENTRAL;  Surgeon: Yves Jimenes MD;  Location: [facility] OR     LAPAROSCOPIC HERNIORRHAPHY VENTRAL N/A 12/20/2016    Procedure: LAPAROSCOPIC HERNIORRHAPHY VENTRAL;  Surgeon: Yves Jimenes MD;  Location: [facility] OR     SURGICAL HISTORY OF -   1998    UVPP       Family History:    Family History   Problem Relation Age of Onset     Heart Disease Maternal Grandfather      Diabetes Maternal Grandfather      Hypertension Maternal Grandfather      Other Cancer Maternal Grandfather      Cancer Paternal Grandfather         unknown     Other Cancer Paternal Grandfather      Anxiety Disorder Mother      Asthma Mother      Depression Mother      Thyroid Disease Mother      Anxiety Disorder Father      Substance Abuse Father      Depression Father      Hypertension Father      Anxiety Disorder Brother      Substance Abuse Brother      Mental Illness Brother      Depression Brother      Other Cancer Brother      Obesity No family hx of        Social History:  Marital Status:   "Single [1]  Social History     Tobacco Use     Smoking status: Former Smoker     Packs/day: 0.50     Years: 1.00     Pack years: 0.50     Types: Cigarettes, Cigars     Quit date: 2012     Years since quittin.8     Smokeless tobacco: Never Used   Substance Use Topics     Alcohol use: No     Alcohol/week: 0.0 standard drinks     Drug use: No     Comment: 1 month ago -         Medications:    albuterol (PROAIR HFA) 108 (90 Base) MCG/ACT inhaler  aspirin 81 MG tablet  blood glucose (ACCU-CHEK CHRIS PLUS) test strip  blood glucose monitoring (SOFTCLIX) lancets  cetirizine (ZYRTEC) 10 MG tablet  DIABETIC STERILE LANCETS device  furosemide (LASIX) 20 MG tablet  ibuprofen (ADVIL/MOTRIN) 200 MG tablet  insulin pen needle (PENTIPS) 32G X 4 MM miscellaneous  liraglutide (VICTOZA PEN) 18 MG/3ML solution  lisinopril (ZESTRIL) 5 MG tablet  metFORMIN (GLUCOPHAGE-XR) 500 MG 24 hr tablet  modafinil (PROVIGIL) 200 MG tablet  Multiple Vitamin (MULTIVITAMIN OR)  naltrexone (DEPADE/REVIA) 50 MG tablet  order for DME  ORDER FOR DME  orlistat (LAWRENCE) 60 MG capsule  simvastatin (ZOCOR) 10 MG tablet  topiramate (TOPAMAX) 100 MG tablet  triamcinolone (KENALOG) 0.1 % cream  vitamin C (ASCORBIC ACID) 100 MG tablet  albuterol (2.5 MG/3ML) 0.083% neb solution  fluticasone (FLONASE) 50 MCG/ACT spray  naproxen (NAPROSYN) 500 MG tablet  order for DME  order for DME  order for DME      Review of Systems  As mentioned above in the history present illness.  All other systems were reviewed and are negative.    Physical Exam   BP: 118/61  Pulse: 103  Temp: 99.2  F (37.3  C)  Resp: 22  Height: 172.7 cm (5' 8\")  Weight: (!) 174.6 kg (385 lb)  SpO2: 91 %      Physical Exam  Vitals signs and nursing note reviewed.   Constitutional:       General: He is not in acute distress.     Appearance: Normal appearance. He is well-developed. He is obese. He is not ill-appearing or diaphoretic.   HENT:      Head: Normocephalic and atraumatic.      Right Ear: " External ear normal.      Left Ear: External ear normal.      Nose: Nose normal.   Eyes:      General: No scleral icterus.     Extraocular Movements: Extraocular movements intact.      Conjunctiva/sclera: Conjunctivae normal.   Neck:      Musculoskeletal: Normal range of motion and neck supple.   Cardiovascular:      Rate and Rhythm: Normal rate and regular rhythm.   Pulmonary:      Effort: Pulmonary effort is normal. No respiratory distress.   Abdominal:      General: There is no distension.   Musculoskeletal: Normal range of motion.         General: No tenderness.      Comments: Bilateral LE lymphedema.   Skin:     General: Skin is warm and dry.      Coloration: Skin is not pale.      Findings: No erythema or rash.   Neurological:      General: No focal deficit present.      Mental Status: He is alert and oriented to person, place, and time.      Coordination: Coordination normal.   Psychiatric:         Mood and Affect: Mood normal.         Behavior: Behavior normal.         ED Course        Procedures               Results for orders placed or performed during the hospital encounter of 03/29/21 (from the past 24 hour(s))   CBC with platelets differential   Result Value Ref Range    WBC 7.2 4.0 - 11.0 10e9/L    RBC Count 4.27 (L) 4.4 - 5.9 10e12/L    Hemoglobin 13.4 13.3 - 17.7 g/dL    Hematocrit 40.1 40.0 - 53.0 %    MCV 94 78 - 100 fl    MCH 31.4 26.5 - 33.0 pg    MCHC 33.4 31.5 - 36.5 g/dL    RDW 12.8 10.0 - 15.0 %    Platelet Count 172 150 - 450 10e9/L    Diff Method Automated Method     % Neutrophils 68.8 %    % Lymphocytes 16.4 %    % Monocytes 13.5 %    % Eosinophils 0.6 %    % Basophils 0.3 %    % Immature Granulocytes 0.4 %    Nucleated RBCs 0 0 /100    Absolute Neutrophil 4.9 1.6 - 8.3 10e9/L    Absolute Lymphocytes 1.2 0.8 - 5.3 10e9/L    Absolute Monocytes 1.0 0.0 - 1.3 10e9/L    Absolute Eosinophils 0.0 0.0 - 0.7 10e9/L    Absolute Basophils 0.0 0.0 - 0.2 10e9/L    Abs Immature Granulocytes 0.0 0 -  0.4 10e9/L    Absolute Nucleated RBC 0.0    Comprehensive metabolic panel   Result Value Ref Range    Sodium 138 133 - 144 mmol/L    Potassium 4.2 3.4 - 5.3 mmol/L    Chloride 105 94 - 109 mmol/L    Carbon Dioxide 26 20 - 32 mmol/L    Anion Gap 7 3 - 14 mmol/L    Glucose 117 (H) 70 - 99 mg/dL    Urea Nitrogen 32 (H) 7 - 30 mg/dL    Creatinine 1.06 0.66 - 1.25 mg/dL    GFR Estimate 83 >60 mL/min/[1.73_m2]    GFR Estimate If Black >90 >60 mL/min/[1.73_m2]    Calcium 8.0 (L) 8.5 - 10.1 mg/dL    Bilirubin Total 0.4 0.2 - 1.3 mg/dL    Albumin 3.0 (L) 3.4 - 5.0 g/dL    Protein Total 7.3 6.8 - 8.8 g/dL    Alkaline Phosphatase 75 40 - 150 U/L    ALT 54 0 - 70 U/L    AST 69 (H) 0 - 45 U/L   CRP inflammation   Result Value Ref Range    CRP Inflammation 136.0 (H) 0.0 - 8.0 mg/L   Blood gas venous   Result Value Ref Range    Ph Venous 7.41 7.32 - 7.43 pH    PCO2 Venous 42 40 - 50 mm Hg    PO2 Venous 27 25 - 47 mm Hg    Bicarbonate Venous 26 21 - 28 mmol/L    Base Excess Venous 1.5 mmol/L    FIO2 2.5 LPM    Lactate Dehydrogenase   Result Value Ref Range    Lactate Dehydrogenase 505 (H) 85 - 227 U/L   D dimer quantitative   Result Value Ref Range    D Dimer 1.0 (H) 0.0 - 0.50 ug/ml FEU   Fibrinogen activity   Result Value Ref Range    Fibrinogen 650 (H) 200 - 420 mg/dL   Ferritin   Result Value Ref Range    Ferritin 1,416 (H) 26 - 388 ng/mL   XR Chest Port 1 View    Narrative    XR PORTABLE CHEST ONE VIEW   3/29/2021 4:35 PM     HISTORY: Shortness of breath, COVID-19 positive.    COMPARISON: Chest x-ray on 5/21/2018      Impression    IMPRESSION: AP views of the chest were obtained. Enlarged cardiac  silhouette and mild pulmonary vascular congestion. Bibasilar pulmonary  opacities, could represent infection. No significant pleural effusion  or pneumothorax.    DAPHNIE SPANN MD       Medications   dexamethasone PF (DECADRON) injection 10 mg (10 mg Intravenous Given 3/29/21 1532)     5:51 PM - I reviewed the case and consulted  with CHRIST Cheng who recommends admission due to his multiple risk factors and significantly elevated CRP.    6:33 PM - I reviewed the case consulted with Dr. Braswell, triage hospitalist for [facility] [address] [facility].  He accepted care of the patient to transfer there.      Assessments & Plan (with Medical Decision Making)   45 year old male with morbid obesity, chronic respiratory failure, FARZANA, DM type II, mild intermittent asthma, pulmonary hypertension who presents for worsening shortness of breath related to COVID-19 illness diagnosed 6 days ago, 3/23/21.  Day 9 of illness, gradually worsening shortness of breath and dyspnea on exertion with concern about developing pneumonia.  He has been using 2 L/nasal cannula to maintain saturations of 95-97%.  His oxygen saturation decreases to as low as 85% on room air when he takes oxygen off. He has home O2 to use for chronic respiratory failure (? obesity hypoventilation syndrome), but states he very infrequently uses it, prior to this recent illness. He was given Decadron IV and will be admitted to the [facility] [address] unit.    I have reviewed the nursing notes.    I have reviewed the findings, diagnosis, plan and need for follow up with the patient.    Discharge Medication List as of 3/29/2021  9:08 PM          Final diagnoses:   COVID-19 virus infection   Acute respiratory failure with hypoxia (H)   Morbid obesity (H)   Chronic respiratory failure with hypoxia (H)   Type 2 diabetes mellitus without complication, without long-term current use of insulin (H)   Mild intermittent asthma without complication   Pulmonary hypertension (H)       3/29/2021   [facility] EMERGENCY DEPT     Morales Johnson MD  03/30/21 [id number]

## 2021-03-30 ENCOUNTER — PATIENT OUTREACH (OUTPATIENT)
Dept: CARE COORDINATION | Facility: CLINIC | Age: 46
End: 2021-03-30

## 2021-03-30 DIAGNOSIS — U07.1 2019 NOVEL CORONAVIRUS DISEASE (COVID-19): Primary | ICD-10-CM

## 2021-03-30 NOTE — PROGRESS NOTES
Clinic Care Coordination Contact    Situation: Patient chart reviewed by care coordinator.    Background: 45 year old male with morbid obesity, chronic respiratory failure, FARZANA, DM type II, mild intermittent asthma, pulmonary hypertension who presents for worsening shortness of breath related to COVID-19 illness diagnosed 6 days ago, 3/23/21.  Day 9 of illness, gradually worsening shortness of breath and dyspnea on exertion with concern about developing pneumonia.  He has been using 2 L/nasal cannula to maintain saturations of 95-97%.  His oxygen saturation decreases to as low as 85% on room air when he takes oxygen off.  No chest pain, hemoptysis, leg pain or leg swelling.  No fever or chills.  He has home O2 to use for chronic respiratory failure (? obesity hypoventilation syndrome), but states he very infrequently uses it, prior to this recent illness.    Assessment: Patient was transferred to [facility] for ongoing covid-19 treatment.     Plan/Recommendations: RN care coordinator will follow up in one week.     Alysia Haines RN, [facility] - Primary [facility] Clinic RN Coordinator  [facility]   3/30/2021      [phone]

## 2021-04-06 ENCOUNTER — PATIENT OUTREACH (OUTPATIENT)
Dept: NURSING | Facility: CLINIC | Age: 46
End: 2021-04-06
Payer: COMMERCIAL

## 2021-04-06 ASSESSMENT — ACTIVITIES OF DAILY LIVING (ADL): DEPENDENT_IADLS:: INDEPENDENT

## 2021-04-06 NOTE — PROGRESS NOTES
Clinic Care Coordination Contact    Clinic Care Coordination Contact  OUTREACH    Referral Information:  Referral Source: ED Follow-Up    Primary Diagnosis: (U07.1 covid-19)    Chief Complaint   Patient presents with     Clinic Care Coordination - Follow-up     RN        Universal Utilization: Endo, sleep medicine  Clinic Utilization  Difficulty keeping appointments:: No  Compliance Concerns: No  No-Show Concerns: No  No PCP office visit in Past Year: No  Utilization    Last refreshed: 4/6/2021 11:52 AM: Hospital Admissions 0           Last refreshed: 4/6/2021 11:52 AM: ED Visits 2           Last refreshed: 4/6/2021 11:52 AM: No Show Count (past year) 0              Current as of: 4/6/2021 11:52 AM          Clinical Concerns:    Current Medical Concerns:  Patient was inpatient at [facility] from 3/29/21 to 4/2/21 due to covid-19.    Patient states he is doing better. He states he can talk again. He lost his voice totally.  He states he has a productive cough. Denies shortness of breath. Patient states if he exerts himself, he gets short of breath. He states he was sent home with an oximeter. He is checking his oxygen sats about three times a day. He states at rest he is 92 to 95%. He has oxygen at home at baseline but states he is not using it unless he is having asthma problems or now with exertion.     He states he is able to do some house keeping chores with rest in between.     Patient is not happy that he was told to isolate for 20 days, but understands. He states his Dad also was diagnosed with covid-19 and was admitted to the hospital yesterday.             Current Behavioral Concerns: Denies concerns    Education Provided to patient: Isolation precautions. Per hospital chart he is to isolate through 4/9/21.     Pain  Pain (GOAL):: No    Health Maintenance Reviewed:   Health Maintenance Due   Topic Date Due     ADVANCE CARE PLANNING  Never done     COVID-19 Vaccine (1) Never done     HEPATITIS B  IMMUNIZATION (1 of 3 - Risk 3-dose series) Never done     PREVENTIVE CARE VISIT  2015     ASTHMA ACTION PLAN  2019     DIABETIC FOOT EXAM  2020     DTAP/TDAP/TD IMMUNIZATION (2 - Tdap) 2021      Clinical Pathway: None    Medication Management:  Medication reconciliation status: Medications reviewed and reconciled.  Continue medications without change.     Functional Status:  Dependent ADLs:: Independent  Dependent IADLs:: Independent  Bed or wheelchair confined:: No  Mobility Status: Independent  Fallen 2 or more times in the past year?: No  Any fall with injury in the past year?: No    Living Situation:  Current living arrangement:: I live in a private home with family  Type of residence:: Private home - stairs    Lifestyle & Psychosocial Needs:        Diet:: Diabetic diet  Inadequate nutrition (GOAL):: No  Tube Feeding: No  Inadequate activity/exercise (GOAL):: No  Significant changes in sleep pattern (GOAL): No  Transportation means:: Accessible car     Latter-day or spiritual beliefs that impact treatment:: No  Mental health DX:: No  Mental health management concern (GOAL):: No  Chemical Dependency Status: No Current Concerns  Informal Support system:: Family   Socioeconomic History     Marital status: Single     Spouse name: Not on file     Number of children: Not on file     Years of education: Not on file     Highest education level: Not on file     Tobacco Use     Smoking status: Former Smoker     Packs/day: 0.50     Years: 1.00     Pack years: 0.50     Types: Cigarettes, Cigars     Quit date: 2012     Years since quittin.8     Smokeless tobacco: Never Used   Substance and Sexual Activity     Alcohol use: No     Alcohol/week: 0.0 standard drinks     Drug use: No     Comment: 1 month ago -      Sexual activity: Yes     Partners: Female     Birth control/protection: Pull-out method     Patient states his blood sugars are good, better, now that he is done with decadron.      Resources and Interventions:  Current Resources: None     Community Resources: None  Supplies used at home:: Oxygen Tubing/Supplies, Diabetic Supplies, Nebulizer tubing  Equipment Currently Used at Home: glucometer, none  Employment Status: self-employed    Advance Care Plan/Directive  Advanced Care Plans/Directives on file:: No    Referrals Placed: None     Goals:   Goals        General    Healthy Eating (pt-stated)     Notes - Note created  2/3/2020  8:40 AM by Violet Bowen RD    My Goal: I will eat vegetables with supper every night    What I need to meet my goal: put veggies on the counter before you leave for work, have marciano remind you to make veggies at supper    I plan to meet my goal by this date: next visit in april        Patient/Caregiver understanding: Expresses understanding    Plan: Patient will follow up with PCP as instructed.    Clinic care coordination is no longer indicated.     Alysia Haines RN, [facility] - Primary Care Clinic RN Coordinator  [facility]   4/6/2021    12:33 PM  [phone]

## 2021-04-14 ENCOUNTER — TELEPHONE (OUTPATIENT)
Dept: FAMILY MEDICINE | Facility: CLINIC | Age: 46
End: 2021-04-14

## 2021-04-14 NOTE — TELEPHONE ENCOUNTER
Left message to call back, should not take Ibuprofen if he is on a blood thinner but I do not see this on his med list?

## 2021-04-14 NOTE — TELEPHONE ENCOUNTER
Reason for Call:  Medication or medication refill: Pt want to know if it is okay to take ibuprofen  with hes eliqust when he is at work to help with the pain. He heard that they both thin the blood       Name of the medication requested: ibuprofen  with hes eliqust     Can we leave a detailed message on this number? YES    Phone number patient can be reached at: Cell number on file:    Telephone Information:   Mobile [phone]       Best Time: any time    Call taken on 4/14/2021 at 3:33 PM by Niharika To

## 2021-04-16 NOTE — TELEPHONE ENCOUNTER
Recent hosp for pneumonia due to COVID 19.   Discharged on Eliquis.   Advised no NSAIDs while on Eliquis.  States feeling better. Hosp F/U scheduled with Dr. Espino 04-21-21.  SOCRATES Royal RN

## 2021-04-21 ENCOUNTER — OFFICE VISIT (OUTPATIENT)
Dept: FAMILY MEDICINE | Facility: CLINIC | Age: 46
End: 2021-04-21
Payer: COMMERCIAL

## 2021-04-21 VITALS
HEART RATE: 96 BPM | DIASTOLIC BLOOD PRESSURE: 70 MMHG | OXYGEN SATURATION: 95 % | TEMPERATURE: 98.5 F | WEIGHT: 315 LBS | BODY MASS INDEX: 57.63 KG/M2 | SYSTOLIC BLOOD PRESSURE: 132 MMHG

## 2021-04-21 DIAGNOSIS — U07.1 PNEUMONIA DUE TO 2019 NOVEL CORONAVIRUS: Primary | ICD-10-CM

## 2021-04-21 DIAGNOSIS — J12.82 PNEUMONIA DUE TO 2019 NOVEL CORONAVIRUS: Primary | ICD-10-CM

## 2021-04-21 DIAGNOSIS — L30.9 HAND ECZEMA: ICD-10-CM

## 2021-04-21 PROCEDURE — 99213 OFFICE O/P EST LOW 20 MIN: CPT | Performed by: FAMILY MEDICINE

## 2021-04-21 RX ORDER — TRIAMCINOLONE ACETONIDE 1 MG/G
CREAM TOPICAL 2 TIMES DAILY
Qty: 45 G | Refills: 1 | Status: SHIPPED | OUTPATIENT
Start: 2021-04-21 | End: 2023-10-24

## 2021-04-21 RX ORDER — GUAIFENESIN 600 MG/1
600 TABLET, EXTENDED RELEASE ORAL
COMMUNITY
Start: 2021-04-02 | End: 2021-05-13

## 2021-04-21 ASSESSMENT — ANXIETY QUESTIONNAIRES
GAD7 TOTAL SCORE: 1
5. BEING SO RESTLESS THAT IT IS HARD TO SIT STILL: NOT AT ALL
7. FEELING AFRAID AS IF SOMETHING AWFUL MIGHT HAPPEN: NOT AT ALL
1. FEELING NERVOUS, ANXIOUS, OR ON EDGE: SEVERAL DAYS
2. NOT BEING ABLE TO STOP OR CONTROL WORRYING: NOT AT ALL
3. WORRYING TOO MUCH ABOUT DIFFERENT THINGS: NOT AT ALL
6. BECOMING EASILY ANNOYED OR IRRITABLE: NOT AT ALL

## 2021-04-21 ASSESSMENT — PATIENT HEALTH QUESTIONNAIRE - PHQ9
5. POOR APPETITE OR OVEREATING: NOT AT ALL
SUM OF ALL RESPONSES TO PHQ QUESTIONS 1-9: 3

## 2021-04-21 NOTE — PROGRESS NOTES
"    Assessment & Plan     Pneumonia due to 2019 novel coronavirus  Resolving   No sig sxs at this time other than mild fatigue   Back to work   No oxygen needed     Hand eczema    - triamcinolone (KENALOG) 0.1 % external cream; Apply topically 2 times daily    Review of external notes as documented elsewhere in note         BMI:   Estimated body mass index is 57.63 kg/m  as calculated from the following:    Height as of 3/29/21: 1.727 m (5' 8\").    Weight as of this encounter: 171.9 kg (379 lb).   Weight management plan: Patient referred to endocrine and/or weight management specialty        Return in about 6 months (around 10/21/2021).    Erika Espino MD  [facility]    Eliseo Huff is a 45 year old who presents for the following health issues     HPI       Hospital Follow-up Visit:    Hospital/Nursing Home/IP Rehab Facility: [facility]  Date of Admission: 3/29/21  Date of Discharge: 4/2/21  Reason(s) for Admission: positive COVID, SOB, pneumonia       Was your hospitalization related to COVID-19? YES   How are you feeling today? Better  In the past 24 hours have you had shortness of breath when speaking, walking, or climbing stairs? My breathing issues have stayed the same  Do you have a cough? Yes, I have a cough but it's not worse  When is the last time you had a fever greater than 100? Over 2 weeks   Are you having any other symptoms? Pain or pressure in chest intermittent   Do you have any other stressors you would like to discuss with your provider? No       PHQ Assesment Total Score(s) 4/21/2021   PHQ-9 Score 3   Some recent data might be hidden       ERMELINDA-7 Results 4/21/2021   ERMELINDA-7 Total Score 1   Some recent data might be hidden       Was the patient in the ICU or did the patient experience delirium during hospitalization?  No    Problems taking medications regularly:  None  Medication changes since discharge:   apixaban ANTICOAGULANT 2.5 " mg Tab tablet  Quantity: 60 tablet  Dose: 2.5 mg  Commonly known as: ELIQUIS  2.5 mg, Oral, 2 times daily    guaiFENesin  mg 12 hr tablet  Quantity:   Dose: 600 mg  Commonly known as: MUCINEX  600 mg, Oral, 2 times daily     Problems adhering to non-medication therapy:  None    Summary of hospitalization:  [facility] hospital discharge summary reviewed  Diagnostic Tests/Treatments reviewed.  Follow up needed: none  Other Healthcare Providers Involved in Patient s Care:         None  Update since discharge: improved. Post Discharge Medication Reconciliation: discharge medications reconciled, continue medications without change.  Plan of care communicated with patient          Rash  Onset/Duration: since discharge from hosp   Description  Location: hands bilaterally   Character: flakey, burning  Itching: mild  Intensity:  moderate  Progression of Symptoms:  same  Accompanying signs and symptoms:   Fever: no  Body aches or joint pain: no  Sore throat symptoms: no  Recent cold symptoms: no  History:           Previous episodes of similar rash: None  New exposures:  None  Recent travel: no  Exposure to similar rash: no  Precipitating or alleviating factors: none known  Therapies tried and outcome: none      Review of Systems   Constitutional, HEENT, cardiovascular, pulmonary, gi and gu systems are negative, except as otherwise noted.      Objective    /70   Pulse 96   Temp 98.5  F (36.9  C) (Tympanic)   Wt (!) 171.9 kg (379 lb)   SpO2 95%   BMI 57.63 kg/m    Body mass index is 57.63 kg/m .  Physical Exam   GENERAL APPEARANCE: healthy, alert and no distress  NECK: no adenopathy, no asymmetry, masses, or scars and thyroid normal to palpation  RESP: lungs clear to auscultation - no rales, rhonchi or wheezes  CV: regular rates and rhythm, normal S1 S2, no S3 or S4 and no murmur, click or rub  SKIN: flaky dry excoriated dorsum of hands over knuckles   PSYCH: mentation appears normal and affect  normal/bright

## 2021-04-22 ASSESSMENT — ANXIETY QUESTIONNAIRES: GAD7 TOTAL SCORE: 1

## 2021-05-03 DIAGNOSIS — G47.33 OBSTRUCTIVE SLEEP APNEA-HYPOPNEA SYNDROME: ICD-10-CM

## 2021-05-04 RX ORDER — MODAFINIL 200 MG/1
TABLET ORAL
Qty: 60 TABLET | Refills: 5 | Status: SHIPPED | OUTPATIENT
Start: 2021-05-04 | End: 2021-11-16

## 2021-05-06 ENCOUNTER — TELEPHONE (OUTPATIENT)
Dept: SLEEP MEDICINE | Facility: CLINIC | Age: 46
End: 2021-05-06

## 2021-05-06 NOTE — TELEPHONE ENCOUNTER
Pt left message w/ us ([facility] @ [phone]) asking how to obtain new sleep therapy machine. Advised pt would need to schedule an appointment with their sleep provider and have an Rx sent through for that order. Pt will follow up with sleep provider. Advised that once we received order and it was processed, we would call pt back to schedule a setup appointment.

## 2021-05-13 ENCOUNTER — VIRTUAL VISIT (OUTPATIENT)
Dept: SLEEP MEDICINE | Facility: CLINIC | Age: 46
End: 2021-05-13
Payer: COMMERCIAL

## 2021-05-13 VITALS — HEIGHT: 68 IN | WEIGHT: 315 LBS | BODY MASS INDEX: 47.74 KG/M2

## 2021-05-13 DIAGNOSIS — G47.33 OSA (OBSTRUCTIVE SLEEP APNEA): Primary | ICD-10-CM

## 2021-05-13 DIAGNOSIS — E66.2 OBESITY HYPOVENTILATION SYNDROME (H): ICD-10-CM

## 2021-05-13 PROCEDURE — 99213 OFFICE O/P EST LOW 20 MIN: CPT | Mod: GT | Performed by: NURSE PRACTITIONER

## 2021-05-13 ASSESSMENT — MIFFLIN-ST. JEOR: SCORE: 2568.63

## 2021-05-13 NOTE — PROGRESS NOTES
Do you have wifi? YES  Do you have a smart phone? YES  Can you download an norman on your phone comfortably with out assistance? YES  Can you watch a Youtube video? YES     Refugio is a 45 year old who is being evaluated via a billable video visit.       How would you like to obtain your AVS? MyChart  If the video visit is dropped, the invitation should be resent by: Text to cell phone: [phone]   Will anyone else be joining your video visit? No       Genna Robledo MA on 5/13/2021 at 9:34 AM       Video-Visit Details     Type of service:  Video Visit  Video Start Time: 10:05 AM  Video End Time:10:19 AM    Originating Location (pt. Location): Home     Distant Location (provider location):  [facility]      Platform used for Video Visit: 41st Parameter    CPAP Follow-Up Visit:    Date on this visit: 5/13/2021    Refugio Kennedy is a pleasant 45-year-old male with a past medical history pertinent for severe FARZANA/hypoventilation syndrome, asthma, hyperlipidemia, T2DM, pulmonary hypertension, methamphetamine abuse in remission, and morbid obesity who presents at this follow-up video visit today to review his CPAP use for FARZANA.  He is a longtime sleep patient of Dr. Solitario Johnson with our practice.  He would like to discuss getting a new PAP device. He reports that his heated hose is not working correctly and using 1-3 tanks of water per night, this has been a persistent issue. He is doing well on his device, overall, but is in need of a new device as he has had it for approximately 5 years now and continues to have problems with it.  He reports no new changes to his health since his last visit.    Previous Study Results:   Date: 2/07/2011.  Weight 420 pounds.  AHI: 54.9/hr. RDI 59.6/hr. O2 guille 51%.    PAP machine: ResMed Pressure settings: Bi-Level 21/14 cm H2O    The compliance data shows that the patient used the CPAP for 30/30 nights, 100% of nights for >4 hours.  The 95th% pressure is 21/14  cm.  The 95th% leak is 81.6 lpm.  The average nightly usage is 403 minutes.  The average AHI is 0.8/hr.       Interface:  Mask: FFM mask - newer one now  Chin strap: No  Leak: Yes, recently  Using Humidifier: Yes  Condensation in hose or mask: No     Difficulties with therapy:    [-] Snoring with CPAP: None  [-] Difficulty tolerating the pressure: none  [-] Epistaxis/dry nose: Yes  [-] Nasal congestion: Mild  [-] Dry mouth: yes  [-] Mouth breathing: yes  [-] Pain/skin breakdown: some irritation around the nose/cheeks     Improvements noted with CPAP:   [+] waking up more refreshed/rested  [+] sleeping better with less arousals  [+] nocturia improved   [+] improved energy level during the day    Weight change since sleep study: lost 41 lbs since initial PSG    Bedtime: 9-9:30 PM.  Wake time: 5:15 AM/ 7-8 AM. Falls asleep in < 5 minutes. Wakes 1-3 times per night for a few minutes.  Naps: 1-2 times per week for 60 minutes.       Past medical/surgical history, family history, social history, medications and allergies were reviewed.      Problem List:  Patient Active Problem List    Diagnosis Date Noted     Methamphetamine abuse in remission (H) 07/17/2017     Priority: Medium     Type 2 diabetes mellitus without complication, without long-term current use of insulin (H) 10/17/2016     Priority: Medium     Mild intermittent asthma without complication 03/24/2015     Priority: Medium     Pulmonary hypertension (H) 06/22/2014     Priority: Medium     Hyperlipidemia LDL goal <100 06/08/2012     Priority: Medium     Venous stasis 06/08/2012     Priority: Medium     Chronic respiratory failure (H) 02/11/2011     Priority: Medium     ABG 2/11- 7.38/51/75 RA       FARZANA (obstructive sleep apnea)/Hypoventilation Syndrome- Severe      Priority: Medium     Sleep Study 1998- AHI 51. Rx 16 cm. S/p UVPP 1998, weight loss. Sleep study 9/10/03 (weight 379)- AHI 34.9. Lo2 58%, with reported hypoventilation.  Sleep study 9/29/03- CPAP 14  cm appeared effective. MSLT- 1.8 minutes, 2 sleep onset REMs, diagnosed with 'narcolepsy', placed on Provigil (though he did not tolerate CPAP on 1st study 9/03).   Sleep study 2/11- AHI 54.9, with desaturations to 51%. Transcutaneous CO2 monitoring showed some elevations consistent with hypoventilation. CPAP titration: No fully effective pressure was found. CPAP was titrated to 18 cm with improvement in severe desaturations, but some persistent hypopneas and arousals. Supine REM was noted at this pressure. Brief trial of 20/16 showed persistent events. Efficacy was limited by mask leak throughout most of the study.        Morbid obesity (H)      Priority: Medium     Morbid obesity          Impression/Plan:    1. FARZANA (obstructive sleep apnea)  2. Obesity hypoventilation syndrome (H)  - Comprehensive DME    This  is a pleasant 45-year-old male with a past medical history pertinent for severe FARZANA/hypoventilation syndrome, asthma, hyperlipidemia, T2DM, pulmonary hypertension, methamphetamine abuse in remission, and morbid obesity who presents at this follow-up video visit today to review his BiLevel use for FARZANA/hypoventilation.  He is a longtime sleep patient of Dr. Solitario Johnson with our practice.  He is doing well overall on his current bilevel settings.  His download did show some excessive leaking which the patient is unsure of as it could be related to his mask, however, he recently replaced it.  An order has been placed for comprehensive DME including new bilevel device without pressure setting changes.    He will follow up with me in about 2 month(s) to review compliance and download data from his new device..     NANCY Bundy CNP  Sleep Medicine    25 minutes were spent on the date of the encounter doing chart review, history and exam, documentation and further activities as noted above.     CC: No ref. provider found

## 2021-05-13 NOTE — PATIENT INSTRUCTIONS
Your BMI is Body mass index is 57.63 kg/m .  Weight management is a personal decision.  If you are interested in exploring weight loss strategies, the following discussion covers the approaches that may be successful. Body mass index (BMI) is one way to tell whether you are at a healthy weight, overweight, or obese. It measures your weight in relation to your height.  A BMI of 18.5 to 24.9 is in the healthy range. A person with a BMI of 25 to 29.9 is considered overweight, and someone with a BMI of 30 or greater is considered obese. More than two-thirds of American adults are considered overweight or obese.  Being overweight or obese increases the risk for further weight gain. Excess weight may lead to heart disease and diabetes.  Creating and following plans for healthy eating and physical activity may help you improve your health.  Weight control is part of healthy lifestyle and includes exercise, emotional health, and healthy eating habits. Careful eating habits lifelong are the mainstay of weight control. Though there are significant health benefits from weight loss, long-term weight loss with diet alone may be very difficult to achieve- studies show long-term success with dietary management in less than 10% of people. Attaining a healthy weight may be especially difficult to achieve in those with severe obesity. In some cases, medications, devices and surgical management might be considered.  What can you do?  If you are overweight or obese and are interested in methods for weight loss, you should discuss this with your provider.     Consider reducing daily calorie intake by 500 calories.     Keep a food journal.     Avoiding skipping meals, consider cutting portions instead.    Diet combined with exercise helps maintain muscle while optimizing fat loss. Strength training is particularly important for building and maintaining muscle mass. Exercise helps reduce stress, increase energy, and improves fitness.  Increasing exercise without diet control, however, may not burn enough calories to loose weight.       Start walking three days a week 10-20 minutes at a time    Work towards walking thirty minutes five days a week     Eventually, increase the speed of your walking for 1-2 minutes at time    In addition, we recommend that you review healthy lifestyles and methods for weight loss available through the National Institutes of Health patient information sites:  http://win.niddk.nih.gov/publications/index.htm    And look into health and wellness programs that may be available through your health insurance provider, employer, local community center, or greg club.    Weight management plan: Patient was referred to their PCP to discuss a diet and exercise plan.

## 2021-05-13 NOTE — PROGRESS NOTES
Do you have wifi? YES  Do you have a smart phone? YES  Can you download an norman on your phone comfortably with out assistance? YES  Can you watch a Youtube video? YES    Refugio is a 45 year old who is being evaluated via a billable video visit.      How would you like to obtain your AVS? MyChart  If the video visit is dropped, the invitation should be resent by: Text to cell phone: [phone]   Will anyone else be joining your video visit? No      Genna Robledo MA on 5/13/2021 at 9:34 AM

## 2021-05-15 DIAGNOSIS — J30.1 ALLERGIC RHINITIS DUE TO POLLEN: ICD-10-CM

## 2021-05-17 RX ORDER — CETIRIZINE HYDROCHLORIDE 10 MG/1
TABLET ORAL
Qty: 90 TABLET | Refills: 3 | Status: SHIPPED | OUTPATIENT
Start: 2021-05-17 | End: 2022-06-22

## 2021-06-26 ENCOUNTER — MEDICAL CORRESPONDENCE (OUTPATIENT)
Dept: HEALTH INFORMATION MANAGEMENT | Facility: CLINIC | Age: 46
End: 2021-06-26

## 2021-06-27 DIAGNOSIS — E11.9 TYPE 2 DIABETES MELLITUS WITHOUT COMPLICATION, WITHOUT LONG-TERM CURRENT USE OF INSULIN (H): ICD-10-CM

## 2021-06-29 ENCOUNTER — TELEPHONE (OUTPATIENT)
Dept: SLEEP MEDICINE | Facility: CLINIC | Age: 46
End: 2021-06-29

## 2021-06-29 NOTE — TELEPHONE ENCOUNTER
CALLED PT. LEFT MESSAGE TO RETURN CALL TO ME -[phone] FOR BIPAP SETUP APPOINTMENT.    PHYLICIA Ahuja Coordinator

## 2021-06-30 ENCOUNTER — DOCUMENTATION ONLY (OUTPATIENT)
Dept: SLEEP MEDICINE | Facility: CLINIC | Age: 46
End: 2021-06-30

## 2021-06-30 RX ORDER — LIRAGLUTIDE 6 MG/ML
INJECTION SUBCUTANEOUS
Qty: 9 ML | Refills: 0 | Status: SHIPPED | OUTPATIENT
Start: 2021-06-30 | End: 2021-08-20

## 2021-06-30 NOTE — PROGRESS NOTES
Patient was offered choice of vendor and chose [facility].  Patient Refugio P Kennedy was set up at [place]  on June 30, 2021. Patient received a Resmed AirCurve 10 Auto. Pressures were set at 21/14 cm H2O.   Patient s ramp is off cm H2O for Off and FLEX/EPR is off.  Patient received a [employer] Mask name: Vitera  Full Face mask size Large, heated tubing and heated humidifier.  Patient does need to meet compliance with usage only.  Marilee Royal

## 2021-07-05 ENCOUNTER — DOCUMENTATION ONLY (OUTPATIENT)
Dept: SLEEP MEDICINE | Facility: CLINIC | Age: 46
End: 2021-07-05
Payer: COMMERCIAL

## 2021-07-05 NOTE — PROGRESS NOTES
3 day Sleep therapy management telephone visit    Diagnostic AHI: 51  PSG    Confirmed with patient at time of call- Yes Patient is still interested in STM service       Subjective measures:  Patient doing well with CPAP he has no questions or concerns.     Replacement device: Yes  STM ordered by provider: Yes    Objective data Bi-Level      Order Settings for PAP  21/14 cm H20                    Device settings from machine                           Assessment: Nighty usage most nights over four hours      Action plan: Patient to have 14 day STM visit. Patient has a follow up visit scheduled:   no     Total time spent on accessing and  interpreting remote patient PAP therapy data  10 minutes    Total time spent counseling, coaching  and reviewing PAP therapy data with patient  3 minutes    [id number] no

## 2021-07-09 ENCOUNTER — MEDICAL CORRESPONDENCE (OUTPATIENT)
Dept: HEALTH INFORMATION MANAGEMENT | Facility: CLINIC | Age: 46
End: 2021-07-09

## 2021-07-13 DIAGNOSIS — E66.01 MORBID OBESITY DUE TO EXCESS CALORIES (H): ICD-10-CM

## 2021-07-13 DIAGNOSIS — E66.01 MORBID OBESITY (H): ICD-10-CM

## 2021-07-15 ENCOUNTER — DOCUMENTATION ONLY (OUTPATIENT)
Dept: SLEEP MEDICINE | Facility: CLINIC | Age: 46
End: 2021-07-15
Payer: COMMERCIAL

## 2021-07-15 RX ORDER — NALTREXONE HYDROCHLORIDE 50 MG/1
TABLET, FILM COATED ORAL
Qty: 60 TABLET | Refills: 4 | OUTPATIENT
Start: 2021-07-15

## 2021-07-15 RX ORDER — TOPIRAMATE 100 MG/1
TABLET, FILM COATED ORAL
Qty: 120 TABLET | Refills: 5 | OUTPATIENT
Start: 2021-07-15

## 2021-07-15 NOTE — PROGRESS NOTES
.14  DAY STM VISIT    Diagnostic AHI: 51  PSG    Subjective measures:   Patient doing well with his Bi-level he is working with [facility] to get the correct cushion size but his mask is leaking less since he switch cushions Patient goes 1 and a half water tanks a night.     Assessment: Pt not meeting objective benchmarks for leak Patient meeting subjective benchmarks.     Action plan: pt to have 30 day STM visit.      Device type: Bilevel    PAP settings: 21/14 cm H20    Mask type:  Full Face Mask    Objective measures: 14 day rolling measures      Compliance  78 %      Leak  46.8  lpm  last  upload      AHI 0.79   last  upload      Average number of minutes 306      Objective measure goal  Compliance   Goal >70%  Leak   Goal < 24 lpm  AHI  Goal < 5  Usage  Goal >240        Total time spent on accessing and interpreting remote patient PAP therapy data  10 minutes    Total time spent counseling, coaching  and reviewing PAP therapy data with patient  3 minutes    [id number]  [id number]  no (3 day STM)

## 2021-07-15 NOTE — TELEPHONE ENCOUNTER
Recieved refill request for naltrexone 50 MG topiramate 100 MG . Patient needs appointment scheduled prior to any refills. Clinic Coordinator notified and will follow up with the patient as appropriate. The pharmacy has been notified that the medication will not be refilled prior to an appointment being scheduled.

## 2021-07-15 NOTE — TELEPHONE ENCOUNTER
Refill denied at this time. Patient needs appointment with Dr. Paez. Leon message sent to patient to schedule appointment.

## 2021-07-28 ENCOUNTER — OFFICE VISIT (OUTPATIENT)
Dept: FAMILY MEDICINE | Facility: CLINIC | Age: 46
End: 2021-07-28
Payer: COMMERCIAL

## 2021-07-28 VITALS
WEIGHT: 315 LBS | DIASTOLIC BLOOD PRESSURE: 72 MMHG | OXYGEN SATURATION: 97 % | HEART RATE: 80 BPM | SYSTOLIC BLOOD PRESSURE: 138 MMHG | BODY MASS INDEX: 58.39 KG/M2

## 2021-07-28 DIAGNOSIS — L02.214 ABSCESS OF LEFT GROIN: Primary | ICD-10-CM

## 2021-07-28 PROCEDURE — 99213 OFFICE O/P EST LOW 20 MIN: CPT | Performed by: NURSE PRACTITIONER

## 2021-07-28 RX ORDER — CEPHALEXIN 500 MG/1
500 CAPSULE ORAL 2 TIMES DAILY
Qty: 20 CAPSULE | Refills: 0 | Status: SHIPPED | OUTPATIENT
Start: 2021-07-28 | End: 2021-08-07

## 2021-07-28 NOTE — PROGRESS NOTES
Assessment & Plan     Abscess of left groin  Acute, solid abscess of left groin with mild erythema. Will treat with antibiotics twice daily for 10 days, advised patient to also apply compresses to area several times a day when able. If symptoms not improving or worsening, advised patient to schedule with general surgery for evaluation.  - cephALEXin (KEFLEX) 500 MG capsule; Take 1 capsule (500 mg) by mouth 2 times daily for 10 days  - Adult General Surg Referral; Future           See Patient Instructions    Return in about 2 weeks (around 8/11/2021), or if symptoms worsen or fail to improve.    Carmen Fong, NANCY CNP  [facility]    Eliseo Huff is a 46 year old who presents for the following health issues:    HPI     Concern - Lump   Onset: 2 week - noticed at this time  Description: size of a large grape, left upper groin, painful at times. Was closer to a large golf-ball now size of a grape.    Intensity: moderate  Progression of Symptoms:  worsening  Therapies tried and outcome: None    Tried popping it, some drainage came out, light white   No lifting problems     Having low back pain on both sides, sharp pains, taking ibuprofen  If stands up or turns a certain position   Has hurt back a couple of times in the past - self-limited      Review of Systems   Constitutional, HEENT, cardiovascular, pulmonary, gi and gu systems are negative, except as otherwise noted.      Objective    /72   Pulse 80   Wt (!) 174.2 kg (384 lb)   SpO2 97%   BMI 58.39 kg/m    Body mass index is 58.39 kg/m .  Physical Exam   GENERAL APPEARANCE: healthy, alert and no distress  SKIN: Grape sized, firm nodule in left upper groin with mild erythema and tenderness    Diagnostic Test Results:  none          Chart documentation with Dragon Voice recognition Software. Although reviewed after completion, some words and grammatical errors may remain.

## 2021-07-28 NOTE — PATIENT INSTRUCTIONS
Abscess of left groin  Acute, solid abscess of left groin with mild erythema. Will treat with antibiotics twice daily for 10 days, advised patient to also apply compresses to area several times a day when able. If symptoms not improving or worsening, advised patient to schedule with general surgery for evaluation.  - cephALEXin (KEFLEX) 500 MG capsule; Take 1 capsule (500 mg) by mouth 2 times daily for 10 days  - Adult General Surg Referral; Future

## 2021-08-02 ENCOUNTER — DOCUMENTATION ONLY (OUTPATIENT)
Dept: SLEEP MEDICINE | Facility: CLINIC | Age: 46
End: 2021-08-02

## 2021-08-02 NOTE — PROGRESS NOTES
30 DAY STM VISIT    Diagnostic AHI: 51  PSG    Subjective measures:   Pretty good, has a new bigger mask that he is trying.     Assessment: Pt not meeting objective benchmarks for leak and compliance  Patient meeting subjective benchmarks.   Action plan: pt to have 6 month STM visit  Patient has not scheduled a follow up visit.   Device type: Bilevel  PAP settings: EPAP min 14 cm  H20     IPAP max 21 cm  H20    Mask type: Full Face Mask  Objective measures: 14 day rolling measures      Compliance  64 %      Leak  55.08 lpm  last  upload      AHI 0.33   last  upload      Average number of minutes 291      Objective measure goal  Compliance   Goal >70%  Leak   Goal < 24 lpm  AHI  Goal < 5  Usage  Goal >240        Total time spent on accessing and interpreting remote patient PAP therapy data  2 minutes    Total time spent counseling, coaching  and reviewing PAP therapy data with patient  5 minutes     [id number] this call  [id number] no  at 3 or 14 day [facility]

## 2021-08-20 ENCOUNTER — OFFICE VISIT (OUTPATIENT)
Dept: FAMILY MEDICINE | Facility: CLINIC | Age: 46
End: 2021-08-20
Payer: COMMERCIAL

## 2021-08-20 VITALS
HEART RATE: 93 BPM | BODY MASS INDEX: 57.97 KG/M2 | OXYGEN SATURATION: 96 % | SYSTOLIC BLOOD PRESSURE: 110 MMHG | RESPIRATION RATE: 20 BRPM | WEIGHT: 315 LBS | TEMPERATURE: 97.4 F | DIASTOLIC BLOOD PRESSURE: 70 MMHG | HEIGHT: 62 IN

## 2021-08-20 DIAGNOSIS — E78.5 HYPERLIPIDEMIA LDL GOAL <100: ICD-10-CM

## 2021-08-20 DIAGNOSIS — H90.0 CONDUCTIVE HEARING LOSS, BILATERAL: ICD-10-CM

## 2021-08-20 DIAGNOSIS — E11.9 TYPE 2 DIABETES MELLITUS WITHOUT COMPLICATION, WITHOUT LONG-TERM CURRENT USE OF INSULIN (H): Primary | ICD-10-CM

## 2021-08-20 DIAGNOSIS — I27.20 PULMONARY HYPERTENSION (H): ICD-10-CM

## 2021-08-20 LAB — HBA1C MFR BLD: 5.7 % (ref 0–5.6)

## 2021-08-20 PROCEDURE — 36415 COLL VENOUS BLD VENIPUNCTURE: CPT | Performed by: FAMILY MEDICINE

## 2021-08-20 PROCEDURE — 99214 OFFICE O/P EST MOD 30 MIN: CPT | Performed by: FAMILY MEDICINE

## 2021-08-20 PROCEDURE — 83036 HEMOGLOBIN GLYCOSYLATED A1C: CPT | Performed by: FAMILY MEDICINE

## 2021-08-20 RX ORDER — LISINOPRIL 5 MG/1
5 TABLET ORAL DAILY
Qty: 90 TABLET | Refills: 3 | Status: SHIPPED | OUTPATIENT
Start: 2021-08-20 | End: 2022-08-30

## 2021-08-20 RX ORDER — METFORMIN HCL 500 MG
500 TABLET, EXTENDED RELEASE 24 HR ORAL
Qty: 90 TABLET | Refills: 3 | Status: SHIPPED | OUTPATIENT
Start: 2021-08-20 | End: 2022-08-22

## 2021-08-20 RX ORDER — LIRAGLUTIDE 6 MG/ML
1.8 INJECTION SUBCUTANEOUS DAILY
Qty: 27 ML | Refills: 3 | Status: SHIPPED | OUTPATIENT
Start: 2021-08-20 | End: 2022-01-03

## 2021-08-20 RX ORDER — SIMVASTATIN 10 MG
10 TABLET ORAL AT BEDTIME
Qty: 90 TABLET | Refills: 3 | Status: SHIPPED | OUTPATIENT
Start: 2021-08-20 | End: 2022-10-03

## 2021-08-20 ASSESSMENT — MIFFLIN-ST. JEOR: SCORE: 2481.99

## 2021-08-20 NOTE — PROGRESS NOTES
Assessment & Plan     Type 2 diabetes mellitus without complication, without long-term current use of insulin (H)  A1C is a bit improved. Medication faxed.  - Hemoglobin A1c  - blood glucose (NO BRAND SPECIFIED) test strip; Use to test blood sugar 2 times daily or as directed.  - liraglutide (VICTOZA PEN) 18 MG/3ML solution; Inject 1.8 mg Subcutaneous daily  - metFORMIN (GLUCOPHAGE-XR) 500 MG 24 hr tablet; Take 1 tablet (500 mg) by mouth daily (with breakfast) TAKE ONE TABLET BY MOUTH ONCE DAILY WITH BREAKFAST. One month only, needs labs and appointment    Pulmonary hypertension (H)  Medication faxed. BP is within normal limits.   - lisinopril (ZESTRIL) 5 MG tablet; Take 1 tablet (5 mg) by mouth daily    Hyperlipidemia LDL goal <100  Cholesterol labs reviewed. Medication faxed.   - simvastatin (ZOCOR) 10 MG tablet; Take 1 tablet (10 mg) by mouth At Bedtime    Conductive hearing loss, bilateral  He reports that he has noticed a decrease in his hearing. This has been ongoing for a number of years.   - Adult Audiology Referral; Future  - Otolaryngology Referral; Future    56}     FUTURE APPOINTMENTS:       - Follow-up visit in 6 months or sooner as needed.    Return in about 3 months (around 11/20/2021) for Follow up.    Brii Brooks MD  [facility]    Eliseo Huff is a 46 year old who presents for the following health issues  accompanied by himself:    HPI 46 yr old male with Type II diabetes here for diabetes recheck and medication refills. He takes Victoza and metformin for control of his diabetes. He reports that he checks his blood sugars daily and it reads between  daily. He has no side effects to his medications. He denies any acute symptoms today.    Diabetes Follow-up    How often are you checking your blood sugar? Two times daily  Blood sugar testing frequency justification:  Routine checking.  What time of day are you checking your blood sugars (select all  that apply)?  Before meals  Have you had any blood sugars above 200?  No  Have you had any blood sugars below 70?  Yes, once or twice in the am when getting up.  Average reading is .    What symptoms do you notice when your blood sugar is low?  None    What concerns do you have today about your diabetes? None     Do you have any of these symptoms? (Select all that apply)  Numbness in feet and Burning in feet              Hyperlipidemia Follow-Up      Are you regularly taking any medication or supplement to lower your cholesterol?   Yes- Simvastatin    Are you having muscle aches or other side effects that you think could be caused by your cholesterol lowering medication?  No    Hypertension Follow-up      Do you check your blood pressure regularly outside of the clinic? No     Are you following a low salt diet? Yes-tries to.    Are your blood pressures ever more than 140 on the top number (systolic) OR more   than 90 on the bottom number (diastolic), for example 140/90? Not checking.    BP Readings from Last 2 Encounters:   08/20/21 110/70   07/28/21 138/72     Hemoglobin A1C (%)   Date Value   08/20/2021 5.7 (H)   03/30/2021 5.8 (H)   12/08/2020 5.5   12/19/2019 5.6     LDL Cholesterol Calculated (mg/dL)   Date Value   12/08/2020 44   12/19/2019 49         How many servings of fruits and vegetables do you eat daily?  2-3    On average, how many sweetened beverages do you drink each day (Examples: soda, juice, sweet tea, etc.  Do NOT count diet or artificially sweetened beverages)?   0    How many days per week do you exercise enough to make your heart beat faster? 5 just started to do yoga.    How many minutes a day do you exercise enough to make your heart beat faster? 15 minutes.  How many days per week do you miss taking your medication? 1    What makes it hard for you to take your medications?  remembering to take      Review of Systems   Constitutional, HEENT, cardiovascular, pulmonary, gi and gu systems  "are negative, except as otherwise noted.      Objective    /70   Pulse 93   Temp 97.4  F (36.3  C) (Tympanic)   Resp 20   Ht 1.575 m (5' 2\")   Wt (!) 173.3 kg (382 lb)   SpO2 96%   BMI 69.87 kg/m    Body mass index is 69.87 kg/m .  Physical Exam   GENERAL: healthy, alert and no distress  NECK: no adenopathy, no asymmetry, masses, or scars and thyroid normal to palpation  RESP: lungs clear to auscultation - no rales, rhonchi or wheezes  CV: regular rate and rhythm, normal S1 S2, no S3 or S4, no murmur, click or rub, no peripheral edema and peripheral pulses strong  ABDOMEN: soft, nontender, no hepatosplenomegaly, no masses and bowel sounds normal  MS: no gross musculoskeletal defects noted, no edema  SKIN: no suspicious lesions or rashes    Results for orders placed or performed in visit on 08/20/21 (from the past 24 hour(s))   Hemoglobin A1c   Result Value Ref Range    Hemoglobin A1C 5.7 (H) 0.0 - 5.6 %           "

## 2021-08-20 NOTE — RESULT ENCOUNTER NOTE
Please inform patient that test result was within normal parameters.   Thank you.     Brii Brooks M.D.

## 2021-08-20 NOTE — PATIENT INSTRUCTIONS
Thank you for choosing [facility].  You may be receiving an email and/or telephone survey request from [facility] Customer Experience regarding your visit today.  Please take a few minutes to respond to the survey to let us know how we are doing.      If you have questions or concerns, please contact us via uStudio or you can contact your care team at [phone] option [phone].    Our Clinic hours are:  Monday - Thursday 7am-6pm  Friday 7am-5pm    The [place] outpatient lab hours are:  Monday - Friday 7am-4:30pm    Appointments are required, call [phone]    If you have clinical questions after hours or would like to schedule an appointment,  call the clinic at [phone].

## 2021-08-21 ASSESSMENT — ASTHMA QUESTIONNAIRES: ACT_TOTALSCORE: 21

## 2021-08-23 ENCOUNTER — OFFICE VISIT (OUTPATIENT)
Dept: SURGERY | Facility: CLINIC | Age: 46
End: 2021-08-23
Attending: NURSE PRACTITIONER
Payer: COMMERCIAL

## 2021-08-23 VITALS
HEIGHT: 62 IN | BODY MASS INDEX: 57.97 KG/M2 | SYSTOLIC BLOOD PRESSURE: 112 MMHG | TEMPERATURE: 98.3 F | DIASTOLIC BLOOD PRESSURE: 66 MMHG | HEART RATE: 87 BPM | WEIGHT: 315 LBS

## 2021-08-23 DIAGNOSIS — E66.01 MORBID OBESITY (H): ICD-10-CM

## 2021-08-23 DIAGNOSIS — E66.01 MORBID OBESITY DUE TO EXCESS CALORIES (H): ICD-10-CM

## 2021-08-23 DIAGNOSIS — L72.0 INCLUSION CYST: Primary | ICD-10-CM

## 2021-08-23 DIAGNOSIS — L02.214 ABSCESS OF LEFT GROIN: ICD-10-CM

## 2021-08-23 PROCEDURE — 99213 OFFICE O/P EST LOW 20 MIN: CPT | Performed by: SURGERY

## 2021-08-23 RX ORDER — NALTREXONE HYDROCHLORIDE 50 MG/1
50 TABLET, FILM COATED ORAL 2 TIMES DAILY
Qty: 60 TABLET | Refills: 11 | Status: SHIPPED | OUTPATIENT
Start: 2021-08-23 | End: 2022-10-03

## 2021-08-23 ASSESSMENT — MIFFLIN-ST. JEOR: SCORE: 2481.99

## 2021-08-23 NOTE — LETTER
8/23/2021         RE: Refugio Kennedy  [address]  [place] [address]        Dear Colleague,    Thank you for referring your patient, Refugio Kennedy, to the [facility]. Please see a copy of my visit note below.    Refugio is a 46-year-old man with diabetes who presents with a cyst in his left groin at the top of the fold between the thigh and the suprapubic area.  It became infected recently and he was placed on antibiotics.  The inflammation and tenderness have completely gone away leaving a 1.5 cm cystic structure just below the surface.    On exam: The cyst is palpable.  There is no sign of ongoing infection or abscess.    Impression: Epidermal inclusion cyst which was infected and now this has resolved.  It would be nice to remove this lesion while its not infected, but it is in a bad location in this obese diabetic man.  I told him we could remove it today but there would be a fairly high likelihood of a wound infection that would need to be opened and packed.  The other option is to wait to see if it becomes infected again and then remove it.  Either way, we end up with an open wound that needs to be packed.  Probably the best outcome would be just to leave it alone and hope that it does not become infected again.  I expect that it well and I have asked the patient to come back and see me as soon as it becomes infected and we can remove it in the office.  He knows that I am in the office every Monday, but he can call any other day and make an appointment to see me.  He is in agreement.  I will see him as needed.    New patient visit 15 minutes    Gokul Laguerre MD FACS      Again, thank you for allowing me to participate in the care of your patient.        Sincerely,        Gokul Laguerre MD

## 2021-08-23 NOTE — PROGRESS NOTES
Refugio is a 46-year-old man with diabetes who presents with a cyst in his left groin at the top of the fold between the thigh and the suprapubic area.  It became infected recently and he was placed on antibiotics.  The inflammation and tenderness have completely gone away leaving a 1.5 cm cystic structure just below the surface.    On exam: The cyst is palpable.  There is no sign of ongoing infection or abscess.    Impression: Epidermal inclusion cyst which was infected and now this has resolved.  It would be nice to remove this lesion while its not infected, but it is in a bad location in this obese diabetic man.  I told him we could remove it today but there would be a fairly high likelihood of a wound infection that would need to be opened and packed.  The other option is to wait to see if it becomes infected again and then remove it.  Either way, we end up with an open wound that needs to be packed.  Probably the best outcome would be just to leave it alone and hope that it does not become infected again.  I expect that it well and I have asked the patient to come back and see me as soon as it becomes infected and we can remove it in the office.  He knows that I am in the office every Monday, but he can call any other day and make an appointment to see me.  He is in agreement.  I will see him as needed.    New patient visit 15 minutes    Gokul Laguerre MD FACS

## 2021-08-23 NOTE — TELEPHONE ENCOUNTER
naltrexone (DEPADE/REVIA) 50 MG tablet  Last Written Prescription Date:  1/11/2021  Last Fill Quantity: 60,   # refills: 4  Last Office Visit :1/11/2021  Future Office visit:  9/27/2021    Routing refill request to provider for review/approval because:  Drug not on the [facility], [facility] or [facility] refill protocol or controlled substance      Kathie Melo RN  Central Triage Red Flags/Med Refills

## 2021-08-23 NOTE — PATIENT INSTRUCTIONS
Per physician instructions      If you have questions or concerns on any instructions given to you by your provider today or if you need to schedule an appointment, you can reach us at [phone].

## 2021-08-23 NOTE — NURSING NOTE
"Initial /66 (BP Location: Right arm, Patient Position: Sitting, Cuff Size: Adult Large)   Pulse 87   Temp 98.3  F (36.8  C) (Tympanic)   Ht 1.575 m (5' 2\")   Wt (!) 173.3 kg (382 lb)   BMI 69.87 kg/m   Estimated body mass index is 69.87 kg/m  as calculated from the following:    Height as of this encounter: 1.575 m (5' 2\").    Weight as of this encounter: 173.3 kg (382 lb). .    Alice Vera CMA    "

## 2021-08-23 NOTE — TELEPHONE ENCOUNTER
Reason for call:  Medication   If this is a refill request, has the caller requested the refill from the pharmacy already? Yes  Will the patient be using a [facility]? Yes  Name of the pharmacy and phone number for the current request: [facility] [phone]    Name of the medication requested: Naltrexone    Other request: pt has upcoming appt on 9/27    Phone number to reach patient:  Home number on file [phone] (home)    Best Time:      Can we leave a detailed message on this number?  YES    Travel screening: Not Applicable

## 2021-08-27 RX ORDER — TOPIRAMATE 100 MG/1
200 TABLET, FILM COATED ORAL 2 TIMES DAILY
Qty: 120 TABLET | Refills: 0 | Status: SHIPPED | OUTPATIENT
Start: 2021-08-27 | End: 2021-09-27

## 2021-08-27 RX ORDER — TOPIRAMATE 100 MG/1
TABLET, FILM COATED ORAL
Qty: 120 TABLET | Refills: 5 | OUTPATIENT
Start: 2021-08-27

## 2021-08-27 NOTE — TELEPHONE ENCOUNTER
Last Office Visit : 1/11/21 recommended 3 month follow up  Future Office visit:  None scheduled  Recieved refill request for TOPIRAMATE 100MG TABS . Patient needs appointment scheduled prior to any refills. Clinic Coordinator notified and will follow up with the patient as appropriate. The pharmacy has been notified that the medication will not be refilled prior to an appointment being scheduled.

## 2021-09-05 ENCOUNTER — HEALTH MAINTENANCE LETTER (OUTPATIENT)
Age: 46
End: 2021-09-05

## 2021-09-14 NOTE — PROGRESS NOTES
Chief Complaint   Patient presents with     Rule Out Hearing Loss     gradual loss of hearing the last 2  years in both ears. tinnitus at intervals/audio     History of Present Illness   Refugio Kennedy is a 46 year old male who presents to me today for ear evaluation.  I am seeing this patient in consultation for hearing loss at the request of the provider Dr. Brooks.  The patient reports decreased hearing in both ears for the last several years.  It was gradual in onset.  The patient has noticed increased difficulty hearing certain sounds and difficulty in understanding others, especially in noisy or crowded situations.  There is no history of recent head trauma, or chronic ear disease or ear surgery.  Patient does report he may have had ear tubes as a young child.  The patient denies significant recreational, , and work-related noise exposure.  No family history of hearing loss at a young age. The patient denies vertigo, otorrhea, otalgia.  He does have some intermittent tinnitus in both ears.    Past Medical History  Patient Active Problem List   Diagnosis     FARZANA (obstructive sleep apnea)/Hypoventilation Syndrome- Severe     Morbid obesity (H)     Chronic respiratory failure (H)     Hyperlipidemia LDL goal <100     Venous stasis     Pulmonary hypertension (H)     Mild intermittent asthma without complication     Type 2 diabetes mellitus without complication, without long-term current use of insulin (H)     Methamphetamine abuse in remission (H)     Current Medications     Current Outpatient Medications:      albuterol (2.5 MG/3ML) 0.083% neb solution, Take 1 vial (2.5 mg) by nebulization every 4 hours as needed for shortness of breath / dyspnea or wheezing, Disp: 25 vial, Rfl: 0     albuterol (PROAIR HFA) 108 (90 Base) MCG/ACT inhaler, Inhale 2 puffs into the lungs every 4 hours as needed for shortness of breath / dyspnea, Disp: 1 Inhaler, Rfl: 5     aspirin 81 MG tablet, Take 1 tablet by mouth daily.,  Disp: 90 tablet, Rfl: 3     cetirizine (ZYRTEC) 10 MG tablet, TAKE ONE TABLET BY MOUTH EVERY EVENING, Disp: 90 tablet, Rfl: 3     fluticasone (FLONASE) 50 MCG/ACT spray, Spray 1-2 sprays into both nostrils daily, Disp: 16 g, Rfl: 0     furosemide (LASIX) 20 MG tablet, TAKE TWO TABLETS BY MOUTH TWICE A DAY (Patient taking differently: Take 40 mg by mouth 2 times daily TAKE TWO TABLETS BY MOUTH TWICE A DAY), Disp: 360 tablet, Rfl: 3     ibuprofen (ADVIL/MOTRIN) 200 MG tablet, 1 tab in AM and 2 in afternoon on work days, Disp: , Rfl:      liraglutide (VICTOZA PEN) 18 MG/3ML solution, Inject 1.8 mg Subcutaneous daily, Disp: 27 mL, Rfl: 3     lisinopril (ZESTRIL) 5 MG tablet, Take 1 tablet (5 mg) by mouth daily, Disp: 90 tablet, Rfl: 3     metFORMIN (GLUCOPHAGE-XR) 500 MG 24 hr tablet, Take 1 tablet (500 mg) by mouth daily (with breakfast) TAKE ONE TABLET BY MOUTH ONCE DAILY WITH BREAKFAST. One month only, needs labs and appointment, Disp: 90 tablet, Rfl: 3     modafinil (PROVIGIL) 200 MG tablet, Take 1/2 tablet by mouth 3-4 times daily as needed for sleepiness., Disp: 60 tablet, Rfl: 5     Multiple Vitamin (MULTIVITAMIN OR), Take 1 tablet by mouth daily., Disp: , Rfl:      naltrexone (DEPADE/REVIA) 50 MG tablet, Take 1 tablet (50 mg) by mouth 2 times daily, Disp: 60 tablet, Rfl: 11     naproxen (NAPROSYN) 500 MG tablet, Take 1 tablet (500 mg) by mouth 2 times daily (with meals), Disp: 60 tablet, Rfl: 1     orlistat (LAWRENCE) 60 MG capsule, Take 60 mg by mouth 3 times daily as needed, Disp: , Rfl:      simvastatin (ZOCOR) 10 MG tablet, Take 1 tablet (10 mg) by mouth At Bedtime, Disp: 90 tablet, Rfl: 3     topiramate (TOPAMAX) 100 MG tablet, Take 2 tablets (200 mg) by mouth 2 times daily, Disp: 120 tablet, Rfl: 0     triamcinolone (KENALOG) 0.1 % external cream, Apply topically 2 times daily, Disp: 45 g, Rfl: 1     vitamin C (ASCORBIC ACID) 100 MG tablet, Take 100 mg by mouth every evening , Disp: , Rfl:      blood glucose  (ACCU-CHEK CHRIS PLUS) test strip, USE TO TEST BLOOD SUGARS THREE TIMES A DAY AS DIRECTED (Patient taking differently: by In Vitro route 3 times daily USE TO TEST BLOOD SUGARS THREE TIMES A DAY AS DIRECTED), Disp: 300 strip, Rfl: 1     blood glucose (NO BRAND SPECIFIED) test strip, Use to test blood sugar 2 times daily or as directed., Disp: 180 strip, Rfl: 3     blood glucose monitoring (SOFTCLIX) lancets, TEST THREE TIMES A DAY (Patient taking differently: 3 times daily TEST THREE TIMES A DAY), Disp: 300 each, Rfl: 2     DIABETIC STERILE LANCETS device, 1 Device 3 times daily., Disp: 1 Box, Rfl: 12     insulin pen needle (PENTIPS) 32G X 4 MM miscellaneous, Use 1 pen needles daily or as directed. Please schedule a follow-up appointment for 2021 at [phone], Disp: 100 each, Rfl: 2     order for DME, Equipment being ordered: Dynaflex insert, Disp: 1 Units, Rfl: 0     order for DME, Equipment being ordered: Dynaflex insert, Disp: 1 Units, Rfl: 0     order for DME, Nebulizer, Disp: 1 Units, Rfl: 0     order for DME, Equipment being ordered: BIPAP Refugio P Kennedy received a Resmed AirCurve 10 Bilevel. Pressures were set at 23/14 cm H2O., Disp: , Rfl:      ORDER FOR DME, Auto-BiPAP: IPAP max 21 cm H2O EPAP min 15 cm H2O Pressure support 5 cm Changed in clinic Lifetime need and heated humidity.   , Disp: , Rfl:     Allergies  No Known Allergies    Social History   Social History     Socioeconomic History     Marital status: Single     Spouse name: Not on file     Number of children: Not on file     Years of education: Not on file     Highest education level: Not on file   Occupational History     Not on file   Tobacco Use     Smoking status: Former Smoker     Packs/day: 0.50     Years: 1.00     Pack years: 0.50     Types: Cigarettes, Cigars     Quit date: 2012     Years since quittin.3     Smokeless tobacco: Never Used   Vaping Use     Vaping Use: Never used   Substance and Sexual Activity     Alcohol use:  No     Alcohol/week: 0.0 standard drinks     Drug use: No     Comment: 1 month ago -      Sexual activity: Yes     Partners: Female     Birth control/protection: Pull-out method   Other Topics Concern     Parent/sibling w/ CABG, MI or angioplasty before 65F 55M? No   Social History Narrative     Not on file     Social Determinants of Health     Financial Resource Strain:      Difficulty of Paying Living Expenses:    Food Insecurity:      Worried About Running Out of Food in the Last Year:      Ran Out of Food in the Last Year:    Transportation Needs:      Lack of Transportation (Medical):      Lack of Transportation (Non-Medical):    Physical Activity:      Days of Exercise per Week:      Minutes of Exercise per Session:    Stress:      Feeling of Stress :    Social Connections:      Frequency of Communication with Friends and Family:      Frequency of Social Gatherings with Friends and Family:      Attends [employer] Services:      Active Member of Clubs or Organizations:      Attends Club or Organization Meetings:      Marital Status:    Intimate Partner Violence:      Fear of Current or Ex-Partner:      Emotionally Abused:      Physically Abused:      Sexually Abused:        Family History  Family History   Problem Relation Age of Onset     Anxiety Disorder Mother      Asthma Mother      Depression Mother      Thyroid Disease Mother      Anxiety Disorder Father      Substance Abuse Father      Depression Father      Hypertension Father      Anxiety Disorder Brother      Substance Abuse Brother      Mental Illness Brother      Depression Brother      Other Cancer Brother      Heart Disease Maternal Grandfather      Diabetes Maternal Grandfather      Hypertension Maternal Grandfather      Other Cancer Maternal Grandfather      Cancer Paternal Grandfather         unknown     Other Cancer Paternal Grandfather      Obesity No family hx of        Review of Systems  As per HPI and PMHx, otherwise 10+ comprehensive  system review is negative.    Physical Exam  /72 (BP Location: Right arm, Patient Position: Chair, Cuff Size: Adult Regular)   Pulse 84   Temp 97.8  F (36.6  C) (Tympanic)   Wt (!) 173.3 kg (382 lb)   BMI 69.87 kg/m    GENERAL: Patient is a pleasant, cooperative 46 year old male in no acute distress.  HEAD: Normocephalic, atraumatic.  Hair and scalp are normal.  EYES: Pupils are equal, round, reactive to light and accommodation.  Extraocular movements are intact.  The sclera nonicteric without injection.  The extraocular structures are normal.  EARS: Normal shape and symmetry.  No tenderness when palpating the mastoid or tragal areas bilaterally.  Otoscopic exam on the right reveals a mild amount of cerumen.  The right tympanic membrane is round, intact without evidence of effusion, good landmarks.  Otoscopic exam the left reveals a moderate amount of cerumen.  Left tympanic membrane is intact with moderate retraction.  There is no significant retraction pockets, granulation, drainage, or evidence of cholesteatoma.    NEUROLOGIC: Cranial nerves II through XII are grossly intact.  Voice is strong.  Facial nerve examination incomplete due to the patient wearing a mask.  CARDIOVASCULAR: Extremities are warm and well-perfused.  No significant peripheral edema.  RESPIRATORY: Patient has nonlabored breathing without cough, wheeze, stridor.  PSYCHIATRIC: Patient is alert and oriented.  Mood and affect appear normal.  SKIN: Warm and dry.  No scalp, face, or neck lesions noted.    Audiogram  The patient underwent an audiogram performed today.  My review of the audiogram shows normal hearing to 1500 Hz sloping to mild sloping to moderately severe sensorineural hearing loss in the right ear and normal hearing to 1500 Hz sloping to moderate mixed hearing loss in the left ear.  Pure-tone average is 22 dB on the right and 27 dB on the left.  Speech reception threshold is 15 dB on the right and 30 dB on the left.  The  patient had 96% word recognition on the right and 100% word recognition on the left.  The patient had a type A deep tympanogram on the right and a negative pressure type C tympanogram on the left.      Assessment and Plan     ICD-10-CM    1. Sensorineural hearing loss (SNHL) of right ear with restricted hearing of left ear  H90.A21    2. Mixed conductive and sensorineural hearing loss of left ear with restricted hearing of right ear  H90.A32    3. Eustachian tube dysfunction, left  H69.82    4. Tympanic membrane retraction, left  H73.892      It was my pleasure seeing Refugio Kennedy today in clinic.  The patient presents today with primarily sensorineural hearing loss in the right ear and some mixed hearing loss in the left ear.  Hearing loss is most consistent with lifetime noise exposure, presbycusis, and some eustachian tube dysfunction worse on the left. I can find no evidence of serious CNS disorders or other complicating factors that could be causing this.  We spent the remainder of today's visit on education. We discussed hearing protection in noisy environments.    The patient is very borderline for hearing aids but does have decreased at 2000 Hz in both ears slightly worse than the left.  He is very motivated to consider hearing aids.  The patient is medically cleared for consideration of a hearing aid evaluation.    The patient will follow up as necessary for worsening symptoms or changes in symptoms. I have also recommended repeat audiogram in 1-3 years, or sooner if symptoms warrant.      Alex Novak MD  Department of Otolaryngology-Head and Neck Surgery  [facility]

## 2021-09-20 ENCOUNTER — OFFICE VISIT (OUTPATIENT)
Dept: OTOLARYNGOLOGY | Facility: CLINIC | Age: 46
End: 2021-09-20
Attending: FAMILY MEDICINE
Payer: COMMERCIAL

## 2021-09-20 ENCOUNTER — OFFICE VISIT (OUTPATIENT)
Dept: AUDIOLOGY | Facility: CLINIC | Age: 46
End: 2021-09-20
Attending: FAMILY MEDICINE
Payer: COMMERCIAL

## 2021-09-20 VITALS
TEMPERATURE: 97.8 F | DIASTOLIC BLOOD PRESSURE: 72 MMHG | WEIGHT: 315 LBS | SYSTOLIC BLOOD PRESSURE: 136 MMHG | BODY MASS INDEX: 69.87 KG/M2 | HEART RATE: 84 BPM

## 2021-09-20 DIAGNOSIS — H90.0 CONDUCTIVE HEARING LOSS, BILATERAL: ICD-10-CM

## 2021-09-20 DIAGNOSIS — H69.92 EUSTACHIAN TUBE DYSFUNCTION, LEFT: ICD-10-CM

## 2021-09-20 DIAGNOSIS — H90.A21 SENSORINEURAL HEARING LOSS (SNHL) OF RIGHT EAR WITH RESTRICTED HEARING OF LEFT EAR: Primary | ICD-10-CM

## 2021-09-20 DIAGNOSIS — H90.A32 MIXED CONDUCTIVE AND SENSORINEURAL HEARING LOSS OF LEFT EAR WITH RESTRICTED HEARING OF RIGHT EAR: ICD-10-CM

## 2021-09-20 DIAGNOSIS — H73.892 TYMPANIC MEMBRANE RETRACTION, LEFT: ICD-10-CM

## 2021-09-20 DIAGNOSIS — H90.A21 SENSORINEURAL HEARING LOSS (SNHL) OF RIGHT EAR WITH RESTRICTED HEARING OF LEFT EAR: ICD-10-CM

## 2021-09-20 PROCEDURE — 99203 OFFICE O/P NEW LOW 30 MIN: CPT | Performed by: OTOLARYNGOLOGY

## 2021-09-20 PROCEDURE — 92557 COMPREHENSIVE HEARING TEST: CPT | Performed by: AUDIOLOGIST

## 2021-09-20 PROCEDURE — 92550 TYMPANOMETRY & REFLEX THRESH: CPT | Mod: 52 | Performed by: AUDIOLOGIST

## 2021-09-20 PROCEDURE — 99207 PR NO CHARGE LOS: CPT | Performed by: AUDIOLOGIST

## 2021-09-20 ASSESSMENT — PAIN SCALES - GENERAL: PAINLEVEL: NO PAIN (0)

## 2021-09-20 NOTE — PROGRESS NOTES
AUDIOLOGY REPORT:    Patient was referred from ENT by Dr. Novak for audiology evaluation. The patient reports that his hearing has been getting worse for the last couple of years. He notes that his girlfriend has expressed concern about his hearing, he has had to turn the TV volume up, and he has trouble hearing in background noise or from a distance. He also reports occasional tinnitus in both ears. The patient reports occasional pain in the left ear when using a Q-Tip. He reports that he had PE tubes as a baby, but has not had any ear problems or ear surgery since then. The patient reports a history of occasional loud noise exposure. He reports a family history of hearing loss attributed to noise exposure.    Testing:    Otoscopy:   Otoscopic exam indicates ears are clear of cerumen bilaterally     Tympanograms:    RIGHT: hypercompliant eardrum mobility     LEFT:   hypercompliant eardrum mobility and negative pressure    Reflexes (reported by stimulus ear):  RIGHT: Ipsilateral could not seal  RIGHT: Contralateral is present at elevated levels   LEFT:   Ipsilateral is absent at frequencies tested  LEFT:   Contralateral could not seal    Thresholds:   Pure Tone Thresholds assessed using conventional audiometry with good reliability from 250-8000 Hz bilaterally using insert earphones and circumaural headphones     RIGHT:  normal hearing sensitivity through 1500 Hz sloping to mild to severe sensorineural hearing loss    LEFT:    normal hearing sensitivity through 1500 Hz sloping to mild to moderately severe mixed hearing loss    Speech Reception Threshold:    RIGHT: 15 dB HL    LEFT:   30 dB HL  Results are in agreement with pure tone average.     Word Recognition Score:     RIGHT: 96% at 60 dB HL using NU-6 recorded word list.    LEFT:   100% at 70 dB HL using NU-6 recorded word list.    Discussed results with the patient.     Patient was returned to ENT for follow up.     Álvaro Pardo, VIPIN-A  Licensed  Audiologist #[id number]  9/20/2021

## 2021-09-20 NOTE — NURSING NOTE
"Initial There were no vitals taken for this visit. Estimated body mass index is 69.87 kg/m  as calculated from the following:    Height as of 8/23/21: 1.575 m (5' 2\").    Weight as of 8/23/21: 173.3 kg (382 lb). .    Rupinder James LPN    "

## 2021-09-20 NOTE — LETTER
9/20/2021         RE: Refugio Kennedy  [address]  [facility] [address]        Dear Colleague,    Thank you for referring your patient, Refugio Kennedy, to the [facility]. Please see a copy of my visit note below.    Chief Complaint   Patient presents with     Rule Out Hearing Loss     gradual loss of hearing the last 2  years in both ears. tinnitus at intervals/audio     History of Present Illness   Refugio Kennedy is a 46 year old male who presents to me today for ear evaluation.  I am seeing this patient in consultation for hearing loss at the request of the provider Dr. Brooks.  The patient reports decreased hearing in both ears for the last several years.  It was gradual in onset.  The patient has noticed increased difficulty hearing certain sounds and difficulty in understanding others, especially in noisy or crowded situations.  There is no history of recent head trauma, or chronic ear disease or ear surgery.  Patient does report he may have had ear tubes as a young child.  The patient denies significant recreational, , and work-related noise exposure.  No family history of hearing loss at a young age. The patient denies vertigo, otorrhea, otalgia.  He does have some intermittent tinnitus in both ears.    Past Medical History  Patient Active Problem List   Diagnosis     FARZANA (obstructive sleep apnea)/Hypoventilation Syndrome- Severe     Morbid obesity (H)     Chronic respiratory failure (H)     Hyperlipidemia LDL goal <100     Venous stasis     Pulmonary hypertension (H)     Mild intermittent asthma without complication     Type 2 diabetes mellitus without complication, without long-term current use of insulin (H)     Methamphetamine abuse in remission (H)     Current Medications     Current Outpatient Medications:      albuterol (2.5 MG/3ML) 0.083% neb solution, Take 1 vial (2.5 mg) by nebulization every 4 hours as needed for shortness of breath / dyspnea or wheezing, Disp:  25 vial, Rfl: 0     albuterol (PROAIR HFA) 108 (90 Base) MCG/ACT inhaler, Inhale 2 puffs into the lungs every 4 hours as needed for shortness of breath / dyspnea, Disp: 1 Inhaler, Rfl: 5     aspirin 81 MG tablet, Take 1 tablet by mouth daily., Disp: 90 tablet, Rfl: 3     cetirizine (ZYRTEC) 10 MG tablet, TAKE ONE TABLET BY MOUTH EVERY EVENING, Disp: 90 tablet, Rfl: 3     fluticasone (FLONASE) 50 MCG/ACT spray, Spray 1-2 sprays into both nostrils daily, Disp: 16 g, Rfl: 0     furosemide (LASIX) 20 MG tablet, TAKE TWO TABLETS BY MOUTH TWICE A DAY (Patient taking differently: Take 40 mg by mouth 2 times daily TAKE TWO TABLETS BY MOUTH TWICE A DAY), Disp: 360 tablet, Rfl: 3     ibuprofen (ADVIL/MOTRIN) 200 MG tablet, 1 tab in AM and 2 in afternoon on work days, Disp: , Rfl:      liraglutide (VICTOZA PEN) 18 MG/3ML solution, Inject 1.8 mg Subcutaneous daily, Disp: 27 mL, Rfl: 3     lisinopril (ZESTRIL) 5 MG tablet, Take 1 tablet (5 mg) by mouth daily, Disp: 90 tablet, Rfl: 3     metFORMIN (GLUCOPHAGE-XR) 500 MG 24 hr tablet, Take 1 tablet (500 mg) by mouth daily (with breakfast) TAKE ONE TABLET BY MOUTH ONCE DAILY WITH BREAKFAST. One month only, needs labs and appointment, Disp: 90 tablet, Rfl: 3     modafinil (PROVIGIL) 200 MG tablet, Take 1/2 tablet by mouth 3-4 times daily as needed for sleepiness., Disp: 60 tablet, Rfl: 5     Multiple Vitamin (MULTIVITAMIN OR), Take 1 tablet by mouth daily., Disp: , Rfl:      naltrexone (DEPADE/REVIA) 50 MG tablet, Take 1 tablet (50 mg) by mouth 2 times daily, Disp: 60 tablet, Rfl: 11     naproxen (NAPROSYN) 500 MG tablet, Take 1 tablet (500 mg) by mouth 2 times daily (with meals), Disp: 60 tablet, Rfl: 1     orlistat (LAWRENCE) 60 MG capsule, Take 60 mg by mouth 3 times daily as needed, Disp: , Rfl:      simvastatin (ZOCOR) 10 MG tablet, Take 1 tablet (10 mg) by mouth At Bedtime, Disp: 90 tablet, Rfl: 3     topiramate (TOPAMAX) 100 MG tablet, Take 2 tablets (200 mg) by mouth 2 times  daily, Disp: 120 tablet, Rfl: 0     triamcinolone (KENALOG) 0.1 % external cream, Apply topically 2 times daily, Disp: 45 g, Rfl: 1     vitamin C (ASCORBIC ACID) 100 MG tablet, Take 100 mg by mouth every evening , Disp: , Rfl:      blood glucose (ACCU-CHEK CHRIS PLUS) test strip, USE TO TEST BLOOD SUGARS THREE TIMES A DAY AS DIRECTED (Patient taking differently: by In Vitro route 3 times daily USE TO TEST BLOOD SUGARS THREE TIMES A DAY AS DIRECTED), Disp: 300 strip, Rfl: 1     blood glucose (NO BRAND SPECIFIED) test strip, Use to test blood sugar 2 times daily or as directed., Disp: 180 strip, Rfl: 3     blood glucose monitoring (SOFTCLIX) lancets, TEST THREE TIMES A DAY (Patient taking differently: 3 times daily TEST THREE TIMES A DAY), Disp: 300 each, Rfl: 2     DIABETIC STERILE LANCETS device, 1 Device 3 times daily., Disp: 1 Box, Rfl: 12     insulin pen needle (PENTIPS) 32G X 4 MM miscellaneous, Use 1 pen needles daily or as directed. Please schedule a follow-up appointment for April 2021 at [phone], Disp: 100 each, Rfl: 2     order for DME, Equipment being ordered: Dynaflex insert, Disp: 1 Units, Rfl: 0     order for DME, Equipment being ordered: Dynaflex insert, Disp: 1 Units, Rfl: 0     order for DME, Nebulizer, Disp: 1 Units, Rfl: 0     order for DME, Equipment being ordered: BIPAP Refugio P Kennedy received a Resmed AirCurve 10 Bilevel. Pressures were set at 23/14 cm H2O., Disp: , Rfl:      ORDER FOR DME, Auto-BiPAP: IPAP max 21 cm H2O EPAP min 15 cm H2O Pressure support 5 cm Changed in clinic Lifetime need and heated humidity.   , Disp: , Rfl:     Allergies  No Known Allergies    Social History   Social History     Socioeconomic History     Marital status: Single     Spouse name: Not on file     Number of children: Not on file     Years of education: Not on file     Highest education level: Not on file   Occupational History     Not on file   Tobacco Use     Smoking status: Former Smoker     Packs/day: 0.50      Years: 1.00     Pack years: 0.50     Types: Cigarettes, Cigars     Quit date: 2012     Years since quittin.3     Smokeless tobacco: Never Used   Vaping Use     Vaping Use: Never used   Substance and Sexual Activity     Alcohol use: No     Alcohol/week: 0.0 standard drinks     Drug use: No     Comment: 1 month ago -      Sexual activity: Yes     Partners: Female     Birth control/protection: Pull-out method   Other Topics Concern     Parent/sibling w/ CABG, MI or angioplasty before 65F 55M? No   Social History Narrative     Not on file     Social Determinants of Health     Financial Resource Strain:      Difficulty of Paying Living Expenses:    Food Insecurity:      Worried About Running Out of Food in the Last Year:      Ran Out of Food in the Last Year:    Transportation Needs:      Lack of Transportation (Medical):      Lack of Transportation (Non-Medical):    Physical Activity:      Days of Exercise per Week:      Minutes of Exercise per Session:    Stress:      Feeling of Stress :    Social Connections:      Frequency of Communication with Friends and Family:      Frequency of Social Gatherings with Friends and Family:      Attends Islam Services:      Active Member of Clubs or Organizations:      Attends Club or Organization Meetings:      Marital Status:    Intimate Partner Violence:      Fear of Current or Ex-Partner:      Emotionally Abused:      Physically Abused:      Sexually Abused:        Family History  Family History   Problem Relation Age of Onset     Anxiety Disorder Mother      Asthma Mother      Depression Mother      Thyroid Disease Mother      Anxiety Disorder Father      Substance Abuse Father      Depression Father      Hypertension Father      Anxiety Disorder Brother      Substance Abuse Brother      Mental Illness Brother      Depression Brother      Other Cancer Brother      Heart Disease Maternal Grandfather      Diabetes Maternal Grandfather      Hypertension Maternal  Grandfather      Other Cancer Maternal Grandfather      Cancer Paternal Grandfather         unknown     Other Cancer Paternal Grandfather      Obesity No family hx of        Review of Systems  As per HPI and PMHx, otherwise 10+ comprehensive system review is negative.    Physical Exam  /72 (BP Location: Right arm, Patient Position: Chair, Cuff Size: Adult Regular)   Pulse 84   Temp 97.8  F (36.6  C) (Tympanic)   Wt (!) 173.3 kg (382 lb)   BMI 69.87 kg/m    GENERAL: Patient is a pleasant, cooperative 46 year old male in no acute distress.  HEAD: Normocephalic, atraumatic.  Hair and scalp are normal.  EYES: Pupils are equal, round, reactive to light and accommodation.  Extraocular movements are intact.  The sclera nonicteric without injection.  The extraocular structures are normal.  EARS: Normal shape and symmetry.  No tenderness when palpating the mastoid or tragal areas bilaterally.  Otoscopic exam on the right reveals a mild amount of cerumen.  The right tympanic membrane is round, intact without evidence of effusion, good landmarks.  Otoscopic exam the left reveals a moderate amount of cerumen.  Left tympanic membrane is intact with moderate retraction.  There is no significant retraction pockets, granulation, drainage, or evidence of cholesteatoma.    NEUROLOGIC: Cranial nerves II through XII are grossly intact.  Voice is strong.  Facial nerve examination incomplete due to the patient wearing a mask.  CARDIOVASCULAR: Extremities are warm and well-perfused.  No significant peripheral edema.  RESPIRATORY: Patient has nonlabored breathing without cough, wheeze, stridor.  PSYCHIATRIC: Patient is alert and oriented.  Mood and affect appear normal.  SKIN: Warm and dry.  No scalp, face, or neck lesions noted.    Audiogram  The patient underwent an audiogram performed today.  My review of the audiogram shows normal hearing to 1500 Hz sloping to mild sloping to moderately severe sensorineural hearing loss in  the right ear and normal hearing to 1500 Hz sloping to moderate mixed hearing loss in the left ear.  Pure-tone average is 22 dB on the right and 27 dB on the left.  Speech reception threshold is 15 dB on the right and 30 dB on the left.  The patient had 96% word recognition on the right and 100% word recognition on the left.  The patient had a type A deep tympanogram on the right and a negative pressure type C tympanogram on the left.      Assessment and Plan     ICD-10-CM    1. Sensorineural hearing loss (SNHL) of right ear with restricted hearing of left ear  H90.A21    2. Mixed conductive and sensorineural hearing loss of left ear with restricted hearing of right ear  H90.A32    3. Eustachian tube dysfunction, left  H69.82    4. Tympanic membrane retraction, left  H73.892      It was my pleasure seeing Refugio Kennedy today in clinic.  The patient presents today with primarily sensorineural hearing loss in the right ear and some mixed hearing loss in the left ear.  Hearing loss is most consistent with lifetime noise exposure, presbycusis, and some eustachian tube dysfunction worse on the left. I can find no evidence of serious CNS disorders or other complicating factors that could be causing this.  We spent the remainder of today's visit on education. We discussed hearing protection in noisy environments.    The patient is very borderline for hearing aids but does have decreased at 2000 Hz in both ears slightly worse than the left.  He is very motivated to consider hearing aids.  The patient is medically cleared for consideration of a hearing aid evaluation.    The patient will follow up as necessary for worsening symptoms or changes in symptoms. I have also recommended repeat audiogram in 1-3 years, or sooner if symptoms warrant.      Alex Nvoak MD  Department of Otolaryngology-Head and Neck Surgery  [facility]         Again, thank you for allowing me to participate in the care of your patient.         Sincerely,        Alex Novak MD

## 2021-09-27 ENCOUNTER — VIRTUAL VISIT (OUTPATIENT)
Dept: ENDOCRINOLOGY | Facility: CLINIC | Age: 46
End: 2021-09-27
Payer: COMMERCIAL

## 2021-09-27 VITALS — BODY MASS INDEX: 46.65 KG/M2 | WEIGHT: 315 LBS | HEIGHT: 69 IN

## 2021-09-27 DIAGNOSIS — E66.01 MORBID OBESITY DUE TO EXCESS CALORIES (H): ICD-10-CM

## 2021-09-27 PROCEDURE — 99442 PR PHYSICIAN TELEPHONE EVALUATION 11-20 MIN: CPT | Mod: TEL | Performed by: INTERNAL MEDICINE

## 2021-09-27 RX ORDER — TOPIRAMATE 100 MG/1
200 TABLET, FILM COATED ORAL 2 TIMES DAILY
Qty: 120 TABLET | Refills: 11 | Status: SHIPPED | OUTPATIENT
Start: 2021-09-27 | End: 2022-10-28

## 2021-09-27 ASSESSMENT — PAIN SCALES - GENERAL: PAINLEVEL: NO PAIN (0)

## 2021-09-27 ASSESSMENT — MIFFLIN-ST. JEOR: SCORE: 2593.12

## 2021-09-27 NOTE — PROGRESS NOTES
"      Return Medical Weight Management Note     Refugio Kennedy  MRN:  [id number]  :  1975  CRISTI:  21    Dear Dr. Espino,  We had the pleasure of seeing your patient Refugio Kennedy.  He is a 41 year old male who we are continuing to see for treatment of obesity related to: DMII, HLD, sleep apnea on CPAP daily, and hypoventilation syndrome, oxygen dependent.     CURRENT WEIGHT:   382 lbs 0 oz     Wt Readings from Last 4 Encounters:   21 (!) 173.3 kg (382 lb)   21 (!) 173.3 kg (382 lb)   21 (!) 173.3 kg (382 lb)   21 (!) 173.3 kg (382 lb)     Height:  5' 9\"  Body Mass Index:  Body mass index is 56.41 kg/m .  Vitals:  B/P: 138/83, P: 80    Initial consult weight was 454 on 5/15/2015.  Weight change since last seen on  is up 7 pounds.   Total loss is 72 pounds.    INTERVAL HISTORY:  Has been doing better with not snacking and pop, moved naltrexone to pre work as many snacks are brought into work.Tolerating topiramate 200 mg BID. Says his exercise has decreased since pandemic, used to walk some.    No flowsheet data found.     MEDICATIONS:   Current Outpatient Medications   Medication     albuterol (2.5 MG/3ML) 0.083% neb solution     albuterol (PROAIR HFA) 108 (90 Base) MCG/ACT inhaler     aspirin 81 MG tablet     blood glucose (ACCU-CHEK CHRIS PLUS) test strip     blood glucose (NO BRAND SPECIFIED) test strip     blood glucose monitoring (SOFTCLIX) lancets     cetirizine (ZYRTEC) 10 MG tablet     DIABETIC STERILE LANCETS device     fluticasone (FLONASE) 50 MCG/ACT spray     furosemide (LASIX) 20 MG tablet     ibuprofen (ADVIL/MOTRIN) 200 MG tablet     insulin pen needle (PENTIPS) 32G X 4 MM miscellaneous     liraglutide (VICTOZA PEN) 18 MG/3ML solution     lisinopril (ZESTRIL) 5 MG tablet     metFORMIN (GLUCOPHAGE-XR) 500 MG 24 hr tablet     modafinil (PROVIGIL) 200 MG tablet     Multiple Vitamin (MULTIVITAMIN OR)     naltrexone (DEPADE/REVIA) 50 MG tablet     naproxen (NAPROSYN) " "500 MG tablet     order for DME     order for DME     order for DME     order for DME     ORDER FOR DME     orlistat (LAWRENCE) 60 MG capsule     simvastatin (ZOCOR) 10 MG tablet     topiramate (TOPAMAX) 100 MG tablet     triamcinolone (KENALOG) 0.1 % external cream     vitamin C (ASCORBIC ACID) 100 MG tablet     No current facility-administered medications for this visit.     Weight Loss Medication History Reviewed With Patient 9/27/2021   Which weight loss medications are you currently taking on a regular basis?  Naltrexone, Topamax (topiramate)   Are you having any side effects from the weight loss medication that we have prescribed you? No   If you are having side effects please describe: -     ASSESSMENT:   Maintain same plan. Naltrexone 50 mg/d has been helpful. Maintain topiramate 200 mg BID. Also on liraglutide 1.8/d. Reinforce food plan - focus on no between meal snacking, aggressive lowering of starches and cheese.     FOLLOW-UP:    3 months    Phone call duration: 15 minutes.  I explained the conditions and plans (more than 50% of time was counseling/coordination of weight management).    Sincerely,  Ashish Paez MD    The patient was evaluated via a billable telephone visit and notified:  \"This visit will be via telephone call between you and your physician. If lab work is needed we can place an order and you can later stop by the lab. Telephone visits are billed at different rates depending on your insurance coverage. During this emergency period, some insurers may be billed the same as an in-person visit.  Please check with your insurance provider with any questions. If the physician feels a telephone visit is not appropriate, you will not be charged for this service.\" Patient has given verbal consent for Telephone visit?  Yes. How would you like to obtain your AVS? MyChart.  "

## 2021-09-27 NOTE — PROGRESS NOTES
Refugio is a 46 year old who is being evaluated via a billable telephone visit.      What phone number would you like to be contacted at? [phone]    How would you like to obtain your AVS? [place]     During this virtual visit the patient is located in [place], patient verifies this as the location during the entirety of this visit.     Phone call duration: 15 minutes.  I explained the conditions and plans (more than 50% of time was counseling/coordination of weight management).

## 2021-09-27 NOTE — LETTER
"2021       RE: Refugio Kennedy  [address]  [facility] [address]     Dear Colleague,    Thank you for referring your patient, Refugio Kennedy, to the [facility] [place] [facility]. Please see a copy of my visit note below.    Refugio is a 46 year old who is being evaluated via a billable telephone visit.      What phone number would you like to be contacted at? [phone]    How would you like to obtain your AVS? MyChart     During this virtual visit the patient is located in [place], patient verifies this as the location during the entirety of this visit.     Phone call duration: 15 minutes.  I explained the conditions and plans (more than 50% of time was counseling/coordination of weight management).            Return Medical Weight Management Note     Refugio Kennedy  MRN:  [id number]  :  1975  CRISTI:  21    Dear Dr. Espino,  We had the pleasure of seeing your patient Refugio Kennedy.  He is a 41 year old male who we are continuing to see for treatment of obesity related to: DMII, HLD, sleep apnea on CPAP daily, and hypoventilation syndrome, oxygen dependent.     CURRENT WEIGHT:   382 lbs 0 oz     Wt Readings from Last 4 Encounters:   21 (!) 173.3 kg (382 lb)   21 (!) 173.3 kg (382 lb)   21 (!) 173.3 kg (382 lb)   21 (!) 173.3 kg (382 lb)     Height:  5' 9\"  Body Mass Index:  Body mass index is 56.41 kg/m .  Vitals:  B/P: 138/83, P: 80    Initial consult weight was 454 on 5/15/2015.  Weight change since last seen on  is up 7 pounds.   Total loss is 72 pounds.    INTERVAL HISTORY:  Has been doing better with not snacking and pop, moved naltrexone to pre work as many snacks are brought into work.Tolerating topiramate 200 mg BID. Says his exercise has decreased since pandemic, used to walk some.    No flowsheet data found.     MEDICATIONS:   Current Outpatient Medications   Medication     " albuterol (2.5 MG/3ML) 0.083% neb solution     albuterol (PROAIR HFA) 108 (90 Base) MCG/ACT inhaler     aspirin 81 MG tablet     blood glucose (ACCU-CHEK CHRIS PLUS) test strip     blood glucose (NO BRAND SPECIFIED) test strip     blood glucose monitoring (SOFTCLIX) lancets     cetirizine (ZYRTEC) 10 MG tablet     DIABETIC STERILE LANCETS device     fluticasone (FLONASE) 50 MCG/ACT spray     furosemide (LASIX) 20 MG tablet     ibuprofen (ADVIL/MOTRIN) 200 MG tablet     insulin pen needle (PENTIPS) 32G X 4 MM miscellaneous     liraglutide (VICTOZA PEN) 18 MG/3ML solution     lisinopril (ZESTRIL) 5 MG tablet     metFORMIN (GLUCOPHAGE-XR) 500 MG 24 hr tablet     modafinil (PROVIGIL) 200 MG tablet     Multiple Vitamin (MULTIVITAMIN OR)     naltrexone (DEPADE/REVIA) 50 MG tablet     naproxen (NAPROSYN) 500 MG tablet     order for DME     order for DME     order for DME     order for DME     ORDER FOR DME     orlistat (LAWRENCE) 60 MG capsule     simvastatin (ZOCOR) 10 MG tablet     topiramate (TOPAMAX) 100 MG tablet     triamcinolone (KENALOG) 0.1 % external cream     vitamin C (ASCORBIC ACID) 100 MG tablet     No current facility-administered medications for this visit.     Weight Loss Medication History Reviewed With Patient 9/27/2021   Which weight loss medications are you currently taking on a regular basis?  Naltrexone, Topamax (topiramate)   Are you having any side effects from the weight loss medication that we have prescribed you? No   If you are having side effects please describe: -     ASSESSMENT:   Maintain same plan. Naltrexone 50 mg/d has been helpful. Maintain topiramate 200 mg BID. Also on liraglutide 1.8/d. Reinforce food plan - focus on no between meal snacking, aggressive lowering of starches and cheese.     FOLLOW-UP:    3 months    Phone call duration: 15 minutes.  I explained the conditions and plans (more than 50% of time was counseling/coordination of weight management).    Sincerely,  Ashish Hurd  "MD Jeannine    The patient was evaluated via a billable telephone visit and notified:  \"This visit will be via telephone call between you and your physician. If lab work is needed we can place an order and you can later stop by the lab. Telephone visits are billed at different rates depending on your insurance coverage. During this emergency period, some insurers may be billed the same as an in-person visit.  Please check with your insurance provider with any questions. If the physician feels a telephone visit is not appropriate, you will not be charged for this service.\" Patient has given verbal consent for Telephone visit?  Yes. How would you like to obtain your AVS? MyChart.      "

## 2021-09-27 NOTE — NURSING NOTE
"Chief Complaint   Patient presents with     Follow Up     MW follow up       Vitals:    09/27/21 0859   Weight: (!) 173.3 kg (382 lb)   Height: 1.753 m (5' 9\")       Body mass index is 56.41 kg/m .                              "

## 2021-09-28 ENCOUNTER — TELEPHONE (OUTPATIENT)
Dept: ENDOCRINOLOGY | Facility: CLINIC | Age: 46
End: 2021-09-28

## 2021-09-28 NOTE — TELEPHONE ENCOUNTER
chastitym to schedule 3 month folow up with Jeannine left call center phone number and [facility]

## 2021-10-18 ENCOUNTER — OFFICE VISIT (OUTPATIENT)
Dept: AUDIOLOGY | Facility: CLINIC | Age: 46
End: 2021-10-18
Payer: COMMERCIAL

## 2021-10-18 DIAGNOSIS — H90.A21 SENSORINEURAL HEARING LOSS (SNHL) OF RIGHT EAR WITH RESTRICTED HEARING OF LEFT EAR: Primary | ICD-10-CM

## 2021-10-18 DIAGNOSIS — H90.A32 MIXED CONDUCTIVE AND SENSORINEURAL HEARING LOSS OF LEFT EAR WITH RESTRICTED HEARING OF RIGHT EAR: ICD-10-CM

## 2021-10-18 PROCEDURE — 99207 PR NO CHARGE LOS: CPT | Performed by: AUDIOLOGIST

## 2021-10-18 PROCEDURE — 92591 PR HEARING AID EXAM BINAURAL: CPT | Performed by: AUDIOLOGIST

## 2021-10-18 NOTE — PROGRESS NOTES
AUDIOLOGY REPORT    SUBJECTIVE: Refugio Kennedy is a 46 year old male was seen in the Audiology Clinic at  [facility] on 10/18/21 to discuss concerns with hearing and functional communication difficulties. The patient was accompanied by their self. Refugio has been seen previously on 9/20/2021, and results revealed a normal sloping to moderate-severe to severe asymmetrical sensorineural hearing loss bilaterally.  The patient was medically evaluated and determined to be cleared for binaural hearing aids by Dr. Novak. Refugio notes difficulty with communication in a variety of listening situations including at work and with his family.     OBJECTIVE:    Patient is a hearing aid candidate. Patient would like to move forward with a hearing aid evaluation today. Therefore, the patient was presented with different options for amplification to help aid in communication. Discussed styles, levels of technology and monaural vs. binaural fitting.     The hearing aid(s) mutually chosen were:  Binaural: Phonak Audeo P70-R  COLOR: P7 Graphite gray  BATTERY SIZE: rechargeable  CANAL/ LENGTH: #1 M 4.0      ASSESSMENT:   No diagnosis found.    Reviewed purchase information and warranty information with patient. The 45 day trial period was explained to patient. The patient was given a copy of the [place] Department of [employer] consumer brochure on purchasing hearing instruments. Patient risk factors have been provided to the patient in writing prior to the sale of the hearing aid per FDA regulation. The risk factors are also available in the User Instructional Booklet to be presented on the day of the hearing aid fitting. Hearing aid(s) ordered. Hearing aid evaluation completed.    PLAN: Refugio is scheduled to return in 2-3 weeks for a hearing aid fitting and programming. Purchase agreement will be completed on that date. Please contact this clinic with any questions or concerns.      Katherin RUBIN,  #[id number]

## 2021-11-15 DIAGNOSIS — G47.33 OBSTRUCTIVE SLEEP APNEA-HYPOPNEA SYNDROME: ICD-10-CM

## 2021-11-15 NOTE — TELEPHONE ENCOUNTER
Pending Prescriptions:                       Disp   Refills    modafinil (PROVIGIL) 200 MG tablet        60 tab*5            Sig: Take 1/2 tablet by mouth 3-4 times daily as           needed for sleepiness.    Last Written Prescription Date:  5/4/2021  Last Fill Quantity: 60,   # refills: 5  Last Office Visit with [facility], P or [facility] prescribing provider: 5/13/2021  Future Office visit:   No follow up scheduled at this time.    LIOR Melendez

## 2021-11-16 RX ORDER — MODAFINIL 200 MG/1
TABLET ORAL
Qty: 60 TABLET | Refills: 5 | Status: SHIPPED | OUTPATIENT
Start: 2021-11-16 | End: 2022-06-08

## 2021-12-06 ENCOUNTER — OFFICE VISIT (OUTPATIENT)
Dept: AUDIOLOGY | Facility: CLINIC | Age: 46
End: 2021-12-06
Payer: COMMERCIAL

## 2021-12-06 DIAGNOSIS — H90.A21 SENSORINEURAL HEARING LOSS (SNHL) OF RIGHT EAR WITH RESTRICTED HEARING OF LEFT EAR: Primary | ICD-10-CM

## 2021-12-06 DIAGNOSIS — H90.A32 MIXED CONDUCTIVE AND SENSORINEURAL HEARING LOSS OF LEFT EAR WITH RESTRICTED HEARING OF RIGHT EAR: ICD-10-CM

## 2021-12-06 PROCEDURE — V5020 CONFORMITY EVALUATION: HCPCS | Mod: LT | Performed by: AUDIOLOGIST

## 2021-12-06 PROCEDURE — V5011 HEARING AID FITTING/CHECKING: HCPCS | Mod: RT | Performed by: AUDIOLOGIST

## 2021-12-06 PROCEDURE — V5020 CONFORMITY EVALUATION: HCPCS | Mod: RT | Performed by: AUDIOLOGIST

## 2021-12-06 PROCEDURE — V5261 HEARING AID, DIGIT, BIN, BTE: HCPCS | Mod: NU | Performed by: AUDIOLOGIST

## 2021-12-06 PROCEDURE — V5011 HEARING AID FITTING/CHECKING: HCPCS | Mod: LT | Performed by: AUDIOLOGIST

## 2021-12-06 PROCEDURE — 92593 PR HEARING AID CHECK, BINAURAL: CPT | Performed by: AUDIOLOGIST

## 2021-12-06 PROCEDURE — V5160 DISPENSING FEE BINAURAL: HCPCS | Performed by: AUDIOLOGIST

## 2021-12-06 PROCEDURE — 99207 PR NO CHARGE LOS: CPT | Performed by: AUDIOLOGIST

## 2021-12-06 NOTE — PROGRESS NOTES
AUDIOLOGY REPORT    SUBJECTIVE: Refugio Kennedy, a 46 year old male, was seen in the Audiology Clinic at [facility] today for a Binaural hearing aid fitting. Previous results have revealed a bilateral normal sloping to severe sensorineural hearing loss. The patient was given medical clearance to pursue amplification by  Alex Novak MD..      OBJECTIVE:  Prior to fitting, a hearing aid check was performed to ensure device functionality. The hearing aid conformity evaluation was completed.The hearing aids were placed and they provided a good fit. Real-ear-probe-microphone measurements were completed on the [employer] system and were a good match to NAL-NL2 target with soft sounds audible, moderate sounds comfortable, and loud sounds below discomfort. UCLs are verified through maximum power output measures and demonstrate appropriate limiting of loud inputs. Mr. Kennedy was oriented to proper hearing aid use, care, cleaning (no water, dry brush), batteries (rechargable, low-battery signal), aid insertion/removal, user booklet, warranty information, storage cases, and other hearing aid details. The patient confirmed understanding of hearing aid use and care, and showed proper insertion of hearing aid and batteries while in the office today. Mr. Kennedy reported good volume and sound quality today.    EAR(S) FIT: Binaural  MA HEARING AID MAKE: Right: Phonak Audeo P70-R; Left: Phonak Audeo P70-R  MA HEARING AID MODEL #: Right: [id number]; Left: [id number]  HEARING AID STYLE: Right: ALFREDO; Left: ALFREDO  DOME SIZE: Right:  medium open; Left::  medium open   LENGTH: Right:  #1 M 4.0; Left:  #1 M 4.0  SERIAL NUMBERS: Right: [id number]; Left: [id number]  WARRANTY END DATE: Right: 1/16/2025; Left:: 1/16/2025      CHARGES:   Hearing Aid Check: Binaural, 87362, $81.00  Dispensing Fee: Binaural, , $500.00, , RT,LT  Fit/Orientation: Binaural, , $416.00  Hearing Aid Conformity Evaluation: 2, , $174.00RT,  LT  Hearing Aid Digital: Binaural, BTE, .,NU (Binaural), $4429.00  Total: $5600.00         ASSESSMENT: Binaural  hearing aid fitting completed today. Verification measures were performed. The 45 day trial period was explained to patient, and they expressed understanding. Mr. Kennedy signed the Hearing Aid Purchase Agreement and was given a copy, as well as details on his hearing aids. Patient was counseled that exact out of pocket amounts cannot be determined for hearing aid claims being sent to insurance. Any insurance coverage information presented to the patient is an estimate only, and is not a guarantee of payment. Patient has been advised to check with their own insurance.    PLAN: Mr. Kennedy will return for follow-up in 2-3 weeks for a hearing aid review appointment. Please call this clinic with questions regarding today s appointment.    Katherin RUBIN, #[id number]

## 2021-12-11 DIAGNOSIS — E11.9 TYPE 2 DIABETES MELLITUS WITHOUT COMPLICATIONS (H): ICD-10-CM

## 2021-12-11 NOTE — TELEPHONE ENCOUNTER
Received refill request for  Pen needle. Patient needs appointment scheduled prior to any refills. Clinic Coordinator notified and will follow up with the patient as appropriate. The pharmacy has been notified that the medication will not be refilled prior to an appointment being scheduled.    Lv: 9/27/21  Nv: none

## 2022-01-03 ENCOUNTER — VIRTUAL VISIT (OUTPATIENT)
Dept: ENDOCRINOLOGY | Facility: CLINIC | Age: 47
End: 2022-01-03
Payer: COMMERCIAL

## 2022-01-03 VITALS — HEIGHT: 69 IN | BODY MASS INDEX: 56.41 KG/M2

## 2022-01-03 DIAGNOSIS — E11.9 TYPE 2 DIABETES MELLITUS WITHOUT COMPLICATION, WITHOUT LONG-TERM CURRENT USE OF INSULIN (H): ICD-10-CM

## 2022-01-03 DIAGNOSIS — E66.01 MORBID OBESITY DUE TO EXCESS CALORIES (H): Primary | ICD-10-CM

## 2022-01-03 PROCEDURE — 99442 PR PHYSICIAN TELEPHONE EVALUATION 11-20 MIN: CPT | Mod: TEL | Performed by: INTERNAL MEDICINE

## 2022-01-03 RX ORDER — LIRAGLUTIDE 6 MG/ML
1.8 INJECTION SUBCUTANEOUS DAILY
Qty: 27 ML | Refills: 3 | Status: SHIPPED | OUTPATIENT
Start: 2022-01-03 | End: 2023-04-07 | Stop reason: ALTCHOICE

## 2022-01-03 NOTE — LETTER
"1/3/2022       RE: Refugio Kennedy  [address]  [facility] [address]     Dear Colleague,    Thank you for referring your patient, Refugio Kennedy, to the [facility] [place] [facility]. Please see a copy of my visit note below.          Return Medical Weight Management Note     Refugio Kennedy  MRN:  [id number]  :  1975  CRISTI:  1/3/22    Dear Dr. Espino,  We had the pleasure of seeing your patient Refugio Kennedy.  He is a 41 year old male who we are continuing to see for treatment of obesity related to: DMII, HLD, sleep apnea on CPAP daily, and hypoventilation syndrome, oxygen dependent.     CURRENT WEIGHT:   0 lbs 0 oz No current weight, last recalled is 380 at uncertain    Wt Readings from Last 4 Encounters:   21 (!) 173.3 kg (382 lb)   21 (!) 173.3 kg (382 lb)   21 (!) 173.3 kg (382 lb)   21 (!) 173.3 kg (382 lb)     Height:  5' 9\"  Body Mass Index:  Body mass index is 56.41 kg/m .  Vitals:  B/P: 138/83, P: 80    Initial consult weight was 454 on 5/15/2015.  Weight change since last seen on 21 is up 7 pounds.   Total loss is 72 pounds.    INTERVAL HISTORY:  Says has now cut out snacking and pop completely, moved naltrexone to pre work as many snacks are brought into work.Tolerating topiramate 200 mg BID. Says his exercise has decreased since pandemic, used to walk some.    No flowsheet data found.     MEDICATIONS:   Current Outpatient Medications   Medication     albuterol (2.5 MG/3ML) 0.083% neb solution     albuterol (PROAIR HFA) 108 (90 Base) MCG/ACT inhaler     aspirin 81 MG tablet     blood glucose (ACCU-CHEK CHRIS PLUS) test strip     blood glucose (NO BRAND SPECIFIED) test strip     blood glucose monitoring (SOFTCLIX) lancets     cetirizine (ZYRTEC) 10 MG tablet     DIABETIC STERILE LANCETS device     fluticasone (FLONASE) 50 MCG/ACT spray     furosemide (LASIX) 20 MG tablet     ibuprofen " "(ADVIL/MOTRIN) 200 MG tablet     insulin pen needle (PENTIPS) 32G X 4 MM miscellaneous     lisinopril (ZESTRIL) 5 MG tablet     metFORMIN (GLUCOPHAGE-XR) 500 MG 24 hr tablet     modafinil (PROVIGIL) 200 MG tablet     Multiple Vitamin (MULTIVITAMIN OR)     naltrexone (DEPADE/REVIA) 50 MG tablet     naproxen (NAPROSYN) 500 MG tablet     order for DME     order for DME     order for DME     order for DME     ORDER FOR DME     orlistat (LAWRENCE) 60 MG capsule     simvastatin (ZOCOR) 10 MG tablet     topiramate (TOPAMAX) 100 MG tablet     triamcinolone (KENALOG) 0.1 % external cream     vitamin C (ASCORBIC ACID) 100 MG tablet     liraglutide (VICTOZA PEN) 18 MG/3ML solution     No current facility-administered medications for this visit.     Weight Loss Medication History Reviewed With Patient 1/3/2022   Which weight loss medications are you currently taking on a regular basis?  -   Are you having any side effects from the weight loss medication that we have prescribed you? No   If you are having side effects please describe: -     ASSESSMENT:   Maintain same plan. Naltrexone 50 mg/d has been helpful. Maintain topiramate 200 mg BID. Also on liraglutide 1.8/d. Reinforce food plan - focus on no between meal snacking, aggressive lowering of starches and cheese.     FOLLOW-UP:    3 months    Phone call duration: 15 minutes.  I explained the conditions and plans (more than 50% of time was counseling/coordination of weight management).    Sincerely,  Ashish Paez MD    The patient was evaluated via a billable telephone visit and notified:  \"This visit will be via telephone call between you and your physician. If lab work is needed we can place an order and you can later stop by the lab. Telephone visits are billed at different rates depending on your insurance coverage. During this emergency period, some insurers may be billed the same as an in-person visit.  Please check with your insurance provider with any " "questions. If the physician feels a telephone visit is not appropriate, you will not be charged for this service.\" Patient has given verbal consent for Telephone visit?  Yes. How would you like to obtain your AVS? MyChart.      "

## 2022-01-03 NOTE — NURSING NOTE
"Chief Complaint   Patient presents with     Follow Up       Vitals:    01/03/22 0800   Height: 5' 9\"       Body mass index is 56.41 kg/m .    Sallie Soto CMA    "

## 2022-01-03 NOTE — PROGRESS NOTES
"      Return Medical Weight Management Note     Refugio Kennedy  MRN:  [id number]  :  1975  CRISTI:  1/3/22    Dear Dr. Espino,  We had the pleasure of seeing your patient Refugio Kennedy.  He is a 41 year old male who we are continuing to see for treatment of obesity related to: DMII, HLD, sleep apnea on CPAP daily, and hypoventilation syndrome, oxygen dependent.     CURRENT WEIGHT:   0 lbs 0 oz No current weight, last recalled is 380 at uncertain    Wt Readings from Last 4 Encounters:   21 (!) 173.3 kg (382 lb)   21 (!) 173.3 kg (382 lb)   21 (!) 173.3 kg (382 lb)   21 (!) 173.3 kg (382 lb)     Height:  5' 9\"  Body Mass Index:  Body mass index is 56.41 kg/m .  Vitals:  B/P: 138/83, P: 80    Initial consult weight was 454 on 5/15/2015.  Weight change since last seen on 21 is up 7 pounds.   Total loss is 72 pounds.    INTERVAL HISTORY:  Says has now cut out snacking and pop completely, moved naltrexone to pre work as many snacks are brought into work.Tolerating topiramate 200 mg BID. Says his exercise has decreased since pandemic, used to walk some.    No flowsheet data found.     MEDICATIONS:   Current Outpatient Medications   Medication     albuterol (2.5 MG/3ML) 0.083% neb solution     albuterol (PROAIR HFA) 108 (90 Base) MCG/ACT inhaler     aspirin 81 MG tablet     blood glucose (ACCU-CHEK CHRIS PLUS) test strip     blood glucose (NO BRAND SPECIFIED) test strip     blood glucose monitoring (SOFTCLIX) lancets     cetirizine (ZYRTEC) 10 MG tablet     DIABETIC STERILE LANCETS device     fluticasone (FLONASE) 50 MCG/ACT spray     furosemide (LASIX) 20 MG tablet     ibuprofen (ADVIL/MOTRIN) 200 MG tablet     insulin pen needle (PENTIPS) 32G X 4 MM miscellaneous     lisinopril (ZESTRIL) 5 MG tablet     metFORMIN (GLUCOPHAGE-XR) 500 MG 24 hr tablet     modafinil (PROVIGIL) 200 MG tablet     Multiple Vitamin (MULTIVITAMIN OR)     naltrexone (DEPADE/REVIA) 50 MG tablet     naproxen (NAPROSYN) " "500 MG tablet     order for DME     order for DME     order for DME     order for DME     ORDER FOR DME     orlistat (LAWRENCE) 60 MG capsule     simvastatin (ZOCOR) 10 MG tablet     topiramate (TOPAMAX) 100 MG tablet     triamcinolone (KENALOG) 0.1 % external cream     vitamin C (ASCORBIC ACID) 100 MG tablet     liraglutide (VICTOZA PEN) 18 MG/3ML solution     No current facility-administered medications for this visit.     Weight Loss Medication History Reviewed With Patient 1/3/2022   Which weight loss medications are you currently taking on a regular basis?  -   Are you having any side effects from the weight loss medication that we have prescribed you? No   If you are having side effects please describe: -     ASSESSMENT:   Maintain same plan. Naltrexone 50 mg/d has been helpful. Maintain topiramate 200 mg BID. Also on liraglutide 1.8/d. Reinforce food plan - focus on no between meal snacking, aggressive lowering of starches and cheese.     FOLLOW-UP:    3 months    Phone call duration: 15 minutes.  I explained the conditions and plans (more than 50% of time was counseling/coordination of weight management).    Sincerely,  Ashish Paez MD    The patient was evaluated via a billable telephone visit and notified:  \"This visit will be via telephone call between you and your physician. If lab work is needed we can place an order and you can later stop by the lab. Telephone visits are billed at different rates depending on your insurance coverage. During this emergency period, some insurers may be billed the same as an in-person visit.  Please check with your insurance provider with any questions. If the physician feels a telephone visit is not appropriate, you will not be charged for this service.\" Patient has given verbal consent for Telephone visit?  Yes. How would you like to obtain your AVS? MyChart.  "

## 2022-01-31 ENCOUNTER — OFFICE VISIT (OUTPATIENT)
Dept: AUDIOLOGY | Facility: CLINIC | Age: 47
End: 2022-01-31
Payer: COMMERCIAL

## 2022-01-31 DIAGNOSIS — H90.A32 MIXED CONDUCTIVE AND SENSORINEURAL HEARING LOSS OF LEFT EAR WITH RESTRICTED HEARING OF RIGHT EAR: ICD-10-CM

## 2022-01-31 DIAGNOSIS — H90.A21 SENSORINEURAL HEARING LOSS (SNHL) OF RIGHT EAR WITH RESTRICTED HEARING OF LEFT EAR: Primary | ICD-10-CM

## 2022-01-31 PROCEDURE — V5299 HEARING SERVICE: HCPCS | Performed by: AUDIOLOGIST

## 2022-01-31 PROCEDURE — 99207 PR NO CHARGE LOS: CPT | Performed by: AUDIOLOGIST

## 2022-01-31 NOTE — PROGRESS NOTES
AUDIOLOGY REPORT    SUBJECTIVE:Refugio Kennedy is a 46 year old male who was seen in the Audiology Clinic at the [facility] on 1/31/2022  for a follow-up check regarding the fitting of new hearing aids. Previous results have revealed a normal sloping to severe sensorineural hearing loss bilaterally.  The patient has been seen previously in this clinic and was fit with Phonak Audeo P70-R hearing aids  on 12/6/2021.  Refugio reports good sound quality with the hearing aid(s). Reviewed use of norman and phone.     OBJECTIVE:   The International Outcome Inventory-Hearing Aids (IOI-HA) was administered today.The patient s responses to the 7 questions can be compared to normative data relative to how others are performing with their hearing aids, as well as focusing audiologic care and counseling.This patient s Quality of Life score (Question 7) was 3, which is at normative average.     Based on patient report, the following changes were made;removed acoustic phone program.    Reviewed 45 day trial period, care, cleaning (no water, dry brush), batteries (rechargable) insertion/removal, toxicity, low-battery signal), aid insertion/removal, volume adjustment (if applicable), user booklet, warranty information, storage cases, and other hearing aid details.       No charge visit today (in warranty hearing aid check).     ASSESSMENT: A follow-up appointment for hearing aid fitting was completed today. IOI-HA administered today.Changes to hearing aid was completed as outlined above.     PLAN:Refugio will return for follow-up as needed, or at least every 9-12 months for cleaning and assessment of hearing aid.  . Please call this clinic with any questions regarding today s appointment.    Katherin HUTSON-AMBAR, #[id number]

## 2022-02-20 ENCOUNTER — HEALTH MAINTENANCE LETTER (OUTPATIENT)
Age: 47
End: 2022-02-20

## 2022-03-10 ENCOUNTER — TELEPHONE (OUTPATIENT)
Dept: SLEEP MEDICINE | Facility: CLINIC | Age: 47
End: 2022-03-10
Payer: COMMERCIAL

## 2022-03-11 NOTE — TELEPHONE ENCOUNTER
Central Prior Authorization Team   Phone: [phone]      PA Initiation    Medication: modafinil 200mg   Insurance Company: [employer] - Phone [phone] Fax [phone]  Pharmacy Filling the Rx: [place], [place] - [address] [place] [address]  Filling Pharmacy Phone: [phone]  Filling Pharmacy Fax:    Start Date: 3/11/2022

## 2022-03-11 NOTE — TELEPHONE ENCOUNTER
Prior Authorization Approval    Authorization Effective Date: 2/9/2022  Authorization Expiration Date: 3/11/2023  Medication: modafinil 200mg -APPROVED  Approved Dose/Quantity:   Reference #:     Insurance Company: [employer] - Phone [phone] Fax [phone]  Expected CoPay:       CoPay Card Available:      Foundation Assistance Needed:    Which Pharmacy is filling the prescription (Not needed for infusion/clinic administered): [facility] [place], [place] - [address] [place] [address]  Pharmacy Notified: Yes  Patient Notified: No     Pharmacy will notify patient when medication is ready.

## 2022-03-11 NOTE — TELEPHONE ENCOUNTER
Prior Authorization Retail Medication Request    Medication/Dose: modafinil 200mg   ICD code (if different than what is on RX):    Previously Tried and Failed:    Rationale:  Drug requires prior auth     Insurance Name:  [employer] [phone]  Insurance ID:  [id number]      Pharmacy Information (if different than what is on RX)  Name:    Phone:

## 2022-04-16 DIAGNOSIS — E11.9 TYPE 2 DIABETES MELLITUS WITHOUT COMPLICATION, WITHOUT LONG-TERM CURRENT USE OF INSULIN (H): Primary | ICD-10-CM

## 2022-04-16 DIAGNOSIS — I87.8 VENOUS STASIS: ICD-10-CM

## 2022-04-17 ENCOUNTER — HEALTH MAINTENANCE LETTER (OUTPATIENT)
Age: 47
End: 2022-04-17

## 2022-04-18 RX ORDER — FUROSEMIDE 20 MG
TABLET ORAL
Qty: 360 TABLET | Refills: 0 | Status: SHIPPED | OUTPATIENT
Start: 2022-04-18 | End: 2022-07-22

## 2022-04-25 ENCOUNTER — TELEPHONE (OUTPATIENT)
Dept: FAMILY MEDICINE | Facility: CLINIC | Age: 47
End: 2022-04-25
Payer: COMMERCIAL

## 2022-04-25 DIAGNOSIS — I87.8 VENOUS STASIS: Primary | ICD-10-CM

## 2022-04-25 DIAGNOSIS — I27.20 PULMONARY HYPERTENSION (H): ICD-10-CM

## 2022-04-25 NOTE — TELEPHONE ENCOUNTER
Reason for Call:  Other call back    Detailed comments: Pt called and his compression socks ripped and he has new insurance does he need to be seen to get them reordered or can he get a new pair please advise thank you    Phone Number Patient can be reached at: Home number on file [phone] (home)    Best Time: anytiem    Can we leave a detailed message on this number? YES    Call taken on 4/25/2022 at 9:39 AM by Lori Oro

## 2022-05-02 ENCOUNTER — LAB (OUTPATIENT)
Dept: LAB | Facility: CLINIC | Age: 47
End: 2022-05-02

## 2022-05-02 DIAGNOSIS — E11.9 TYPE 2 DIABETES MELLITUS WITHOUT COMPLICATION, WITHOUT LONG-TERM CURRENT USE OF INSULIN (H): ICD-10-CM

## 2022-05-02 LAB
ANION GAP SERPL CALCULATED.3IONS-SCNC: 4 MMOL/L (ref 3–14)
BUN SERPL-MCNC: 25 MG/DL (ref 7–30)
CALCIUM SERPL-MCNC: 8.8 MG/DL (ref 8.5–10.1)
CHLORIDE BLD-SCNC: 107 MMOL/L (ref 94–109)
CO2 SERPL-SCNC: 29 MMOL/L (ref 20–32)
CREAT SERPL-MCNC: 0.94 MG/DL (ref 0.66–1.25)
GFR SERPL CREATININE-BSD FRML MDRD: >90 ML/MIN/1.73M2
GLUCOSE BLD-MCNC: 107 MG/DL (ref 70–99)
POTASSIUM BLD-SCNC: 3.6 MMOL/L (ref 3.4–5.3)
SODIUM SERPL-SCNC: 140 MMOL/L (ref 133–144)

## 2022-05-02 PROCEDURE — 80048 BASIC METABOLIC PNL TOTAL CA: CPT

## 2022-05-02 PROCEDURE — 36415 COLL VENOUS BLD VENIPUNCTURE: CPT

## 2022-05-16 ENCOUNTER — TELEPHONE (OUTPATIENT)
Dept: ENDOCRINOLOGY | Facility: CLINIC | Age: 47
End: 2022-05-16
Payer: COMMERCIAL

## 2022-05-16 NOTE — TELEPHONE ENCOUNTER
Prior Authorization Retail Medication Request    Medication/Dose: Naltrexone 50mg   ICD code (if different than what is on RX):    Previously Tried and Failed:    Rationale:  MX DOSE/DAY= 1.00 OVR/DR GALE    Insurance Name:  [employer] [phone]  Insurance ID:   [id number]      Pharmacy Information (if different than what is on RX)  Name:    Phone:

## 2022-05-17 NOTE — TELEPHONE ENCOUNTER
Prior Authorization Not Needed per Insurance        Medication: Naltrexone 50mg-PA NOT NEEDED   Insurance Company: [employer] - Phone [phone] Fax [phone]  Expected CoPay:      Pharmacy Filling the Rx: [facility] [place], [place] - [address] [place] [address]  Pharmacy Notified: Yes  Patient Notified: No    Called insurance and Rep [place] stated that PA is Not Needed and medication is covered. [facility] did a test claim quantity 60 for 30 day supply with a paid claim. [place] stated that pharmacy is getting a DUR Reject and would have to call their help desk at [phone] to help with processing medication. Called pharmacy and pharmacy stated that they were able to process medication with a paid claim and will notify the patient.

## 2022-06-06 ENCOUNTER — TRANSFERRED RECORDS (OUTPATIENT)
Dept: HEALTH INFORMATION MANAGEMENT | Facility: CLINIC | Age: 47
End: 2022-06-06

## 2022-06-06 DIAGNOSIS — G47.33 OBSTRUCTIVE SLEEP APNEA-HYPOPNEA SYNDROME: ICD-10-CM

## 2022-06-06 LAB
RETINOPATHY: NEGATIVE
RETINOPATHY: NEGATIVE

## 2022-06-06 NOTE — TELEPHONE ENCOUNTER
Pending Prescriptions:                       Disp   Refills    modafinil (PROVIGIL) 200 MG tablet        60 tab*5            Sig: Take 1/2 tablet by mouth 3-4 times daily as           needed for sleepiness.    Last Written Prescription Date:  5/13/2021  Last Fill Quantity: 60,   # refills: 5  Last Office Visit with [facility], P or [facility] prescribing provider: 11/16/2021  Future Office visit:   No follow up scheduled at this time. Message left for pt to call back to schedule follow up.    LIOR Melendez

## 2022-06-08 RX ORDER — MODAFINIL 200 MG/1
TABLET ORAL
Qty: 60 TABLET | Refills: 5 | Status: SHIPPED | OUTPATIENT
Start: 2022-06-08 | End: 2022-12-30

## 2022-06-14 ENCOUNTER — TELEPHONE (OUTPATIENT)
Dept: FAMILY MEDICINE | Facility: CLINIC | Age: 47
End: 2022-06-14
Payer: COMMERCIAL

## 2022-06-14 NOTE — TELEPHONE ENCOUNTER
Patient Quality Outreach    Patient is due for the following:   Diabetes -  Eye Exam    NEXT STEPS:   No follow up needed at this time.    Type of outreach:    Chart review performed, no outreach needed.      Questions for provider review:    None     Carlene Harrell [facility]  Chart routed to Care Team.

## 2022-06-21 DIAGNOSIS — J30.1 ALLERGIC RHINITIS DUE TO POLLEN: ICD-10-CM

## 2022-06-22 RX ORDER — CETIRIZINE HYDROCHLORIDE 10 MG/1
TABLET ORAL
Qty: 90 TABLET | Refills: 1 | Status: SHIPPED | OUTPATIENT
Start: 2022-06-22 | End: 2023-01-23

## 2022-07-22 DIAGNOSIS — I87.8 VENOUS STASIS: ICD-10-CM

## 2022-07-22 RX ORDER — FUROSEMIDE 20 MG
TABLET ORAL
Qty: 360 TABLET | Refills: 0 | Status: SHIPPED | OUTPATIENT
Start: 2022-07-22 | End: 2022-10-24

## 2022-08-20 DIAGNOSIS — E11.9 TYPE 2 DIABETES MELLITUS WITHOUT COMPLICATION, WITHOUT LONG-TERM CURRENT USE OF INSULIN (H): ICD-10-CM

## 2022-08-22 RX ORDER — METFORMIN HCL 500 MG
TABLET, EXTENDED RELEASE 24 HR ORAL
Qty: 90 TABLET | Refills: 0 | Status: SHIPPED | OUTPATIENT
Start: 2022-08-22 | End: 2022-10-03

## 2022-08-27 DIAGNOSIS — I27.20 PULMONARY HYPERTENSION (H): ICD-10-CM

## 2022-08-30 RX ORDER — LISINOPRIL 5 MG/1
5 TABLET ORAL DAILY
Qty: 90 TABLET | Refills: 0 | Status: SHIPPED | OUTPATIENT
Start: 2022-08-30 | End: 2022-10-03

## 2022-10-03 ENCOUNTER — OFFICE VISIT (OUTPATIENT)
Dept: FAMILY MEDICINE | Facility: CLINIC | Age: 47
End: 2022-10-03
Payer: COMMERCIAL

## 2022-10-03 VITALS
DIASTOLIC BLOOD PRESSURE: 72 MMHG | OXYGEN SATURATION: 97 % | BODY MASS INDEX: 49.44 KG/M2 | TEMPERATURE: 98.6 F | HEART RATE: 90 BPM | HEIGHT: 67 IN | RESPIRATION RATE: 22 BRPM | SYSTOLIC BLOOD PRESSURE: 116 MMHG | WEIGHT: 315 LBS

## 2022-10-03 DIAGNOSIS — G47.33 OSA (OBSTRUCTIVE SLEEP APNEA): ICD-10-CM

## 2022-10-03 DIAGNOSIS — E66.01 MORBID OBESITY (H): ICD-10-CM

## 2022-10-03 DIAGNOSIS — I87.8 VENOUS STASIS: ICD-10-CM

## 2022-10-03 DIAGNOSIS — E11.9 TYPE 2 DIABETES MELLITUS WITHOUT COMPLICATION, WITHOUT LONG-TERM CURRENT USE OF INSULIN (H): Primary | ICD-10-CM

## 2022-10-03 DIAGNOSIS — I27.20 PULMONARY HYPERTENSION (H): ICD-10-CM

## 2022-10-03 DIAGNOSIS — Z23 HIGH PRIORITY FOR 2019-NCOV VACCINE: ICD-10-CM

## 2022-10-03 DIAGNOSIS — E78.5 HYPERLIPIDEMIA LDL GOAL <100: ICD-10-CM

## 2022-10-03 DIAGNOSIS — Z12.11 SCREEN FOR COLON CANCER: ICD-10-CM

## 2022-10-03 DIAGNOSIS — S29.011A STRAIN OF RIGHT PECTORALIS MUSCLE, INITIAL ENCOUNTER: ICD-10-CM

## 2022-10-03 LAB
ANION GAP SERPL CALCULATED.3IONS-SCNC: 10 MMOL/L (ref 7–15)
BUN SERPL-MCNC: 23.2 MG/DL (ref 6–20)
CALCIUM SERPL-MCNC: 9.2 MG/DL (ref 8.6–10)
CHLORIDE SERPL-SCNC: 106 MMOL/L (ref 98–107)
CHOLEST SERPL-MCNC: 159 MG/DL
CREAT SERPL-MCNC: 0.95 MG/DL (ref 0.67–1.17)
CREAT UR-MCNC: 53.2 MG/DL
DEPRECATED HCO3 PLAS-SCNC: 26 MMOL/L (ref 22–29)
GFR SERPL CREATININE-BSD FRML MDRD: >90 ML/MIN/1.73M2
GLUCOSE SERPL-MCNC: 99 MG/DL (ref 70–99)
HBA1C MFR BLD: 5.7 % (ref 0–5.6)
HDLC SERPL-MCNC: 42 MG/DL
LDLC SERPL CALC-MCNC: 91 MG/DL
MICROALBUMIN UR-MCNC: <12 MG/L
MICROALBUMIN/CREAT UR: NORMAL MG/G{CREAT}
NONHDLC SERPL-MCNC: 117 MG/DL
POTASSIUM SERPL-SCNC: 4.2 MMOL/L (ref 3.4–5.3)
SODIUM SERPL-SCNC: 142 MMOL/L (ref 136–145)
TRIGL SERPL-MCNC: 131 MG/DL

## 2022-10-03 PROCEDURE — 80061 LIPID PANEL: CPT | Performed by: NURSE PRACTITIONER

## 2022-10-03 PROCEDURE — 82043 UR ALBUMIN QUANTITATIVE: CPT | Performed by: NURSE PRACTITIONER

## 2022-10-03 PROCEDURE — 99214 OFFICE O/P EST MOD 30 MIN: CPT | Mod: 25 | Performed by: NURSE PRACTITIONER

## 2022-10-03 PROCEDURE — 83036 HEMOGLOBIN GLYCOSYLATED A1C: CPT | Performed by: NURSE PRACTITIONER

## 2022-10-03 PROCEDURE — 90472 IMMUNIZATION ADMIN EACH ADD: CPT | Performed by: NURSE PRACTITIONER

## 2022-10-03 PROCEDURE — 90715 TDAP VACCINE 7 YRS/> IM: CPT | Performed by: NURSE PRACTITIONER

## 2022-10-03 PROCEDURE — 90686 IIV4 VACC NO PRSV 0.5 ML IM: CPT | Performed by: NURSE PRACTITIONER

## 2022-10-03 PROCEDURE — 90677 PCV20 VACCINE IM: CPT | Performed by: NURSE PRACTITIONER

## 2022-10-03 PROCEDURE — 80048 BASIC METABOLIC PNL TOTAL CA: CPT | Performed by: NURSE PRACTITIONER

## 2022-10-03 PROCEDURE — 91312 COVID-19,PF,PFIZER BOOSTER BIVALENT: CPT | Performed by: NURSE PRACTITIONER

## 2022-10-03 PROCEDURE — 90471 IMMUNIZATION ADMIN: CPT | Performed by: NURSE PRACTITIONER

## 2022-10-03 PROCEDURE — 0124A COVID-19,PF,PFIZER BOOSTER BIVALENT: CPT | Performed by: NURSE PRACTITIONER

## 2022-10-03 PROCEDURE — 36415 COLL VENOUS BLD VENIPUNCTURE: CPT | Performed by: NURSE PRACTITIONER

## 2022-10-03 RX ORDER — NALTREXONE HYDROCHLORIDE 50 MG/1
50 TABLET, FILM COATED ORAL DAILY
Qty: 60 TABLET | Refills: 11 | Status: SHIPPED | OUTPATIENT
Start: 2022-10-03 | End: 2022-10-28

## 2022-10-03 RX ORDER — SIMVASTATIN 10 MG
10 TABLET ORAL AT BEDTIME
Qty: 90 TABLET | Refills: 3 | Status: SHIPPED | OUTPATIENT
Start: 2022-10-03 | End: 2023-10-24

## 2022-10-03 RX ORDER — LISINOPRIL 5 MG/1
5 TABLET ORAL DAILY
Qty: 90 TABLET | Refills: 0 | Status: SHIPPED | OUTPATIENT
Start: 2022-10-03 | End: 2023-03-20

## 2022-10-03 RX ORDER — METFORMIN HCL 500 MG
TABLET, EXTENDED RELEASE 24 HR ORAL
Qty: 90 TABLET | Refills: 0 | Status: SHIPPED | OUTPATIENT
Start: 2022-10-03 | End: 2023-02-21

## 2022-10-03 ASSESSMENT — ENCOUNTER SYMPTOMS
COUGH: 0
PARESTHESIAS: 1
ABDOMINAL PAIN: 0
NAUSEA: 0
PALPITATIONS: 0
JOINT SWELLING: 1
NERVOUS/ANXIOUS: 0
HEARTBURN: 0
EYE PAIN: 0
DIZZINESS: 0
SORE THROAT: 0
DIARRHEA: 0
CONSTIPATION: 0
HEMATOCHEZIA: 0
DYSURIA: 0
FEVER: 0
CHILLS: 0
HEADACHES: 0
HEMATURIA: 0
SHORTNESS OF BREATH: 0
ARTHRALGIAS: 1
FREQUENCY: 0
MYALGIAS: 0
WEAKNESS: 0

## 2022-10-03 ASSESSMENT — PAIN SCALES - GENERAL: PAINLEVEL: NO PAIN (0)

## 2022-10-03 NOTE — PATIENT INSTRUCTIONS
Try Voltaren gel to the sore area. If not improving, let me know.  I'll let you know what your labs show.  Follow up with Dr. Espino in a couple months for your diabetes check.  They will call to schedule colonoscopy.

## 2022-10-03 NOTE — PROGRESS NOTES
"  Assessment & Plan     Type 2 diabetes mellitus without complication, without long-term current use of insulin (H)  - Albumin Random Urine Quantitative with Creat Ratio; Future  - HEMOGLOBIN A1C - Hgb A1C 5.7 today (10/3/22). Last checked 8/20/21 and was 5.7 at that time as well.   - Basic metabolic panel  (Ca, Cl, CO2, Creat, Gluc, K, Na, BUN) - pending  - metFORMIN (GLUCOPHAGE XR) 500 MG 24 hr tablet; TAKE 1 TABLET (500 MG) BY MOUTH DAILY  - Albumin Random Urine Quantitative with Creat Ratio - pending    Hyperlipidemia LDL goal <100  - Lipid panel reflex to direct LDL Non-fasting - pending  - simvastatin (ZOCOR) 10 MG tablet; Take 1 tablet (10 mg) by mouth At Bedtime  - well controlled    Morbid obesity (H)  - naltrexone (DEPADE/REVIA) 50 MG tablet; Take 1 tablet (50 mg) by mouth daily  - BMI 60.77    Pulmonary hypertension (H)  - lisinopril (ZESTRIL) 5 MG tablet; Take 1 tablet (5 mg) by mouth daily  - well controlled    Venous stasis  - Compression Sleeve/Stocking Order for DME - ONLY FOR DME    FARZANA (obstructive sleep apnea)/Hypoventilation Syndrome- Severe  - CPAP Order for DME - ONLY FOR DME    Strain of right pectoralis muscle, initial encounter  - Suspect right sided chest pain Refugio is experiencing is of muscular etiology as pain is reproducible when he raises his arm and is slightly tender over right pec muscle upon palpation. Reassuringly, right chest pain not associated with SOB, palpitations, diaphoresis, or lightheadedness/dizziness.     Screen for colon cancer  - Colonoscopy Screening  Referral  - Paternal history of polyps    High priority for 2019-nCoV vaccine  - COVID-19,PF,PFIZER BOOSTER BIVALENT 12+Yrs       BMI:   Estimated body mass index is 60.77 kg/m  as calculated from the following:    Height as of this encounter: 1.702 m (5' 7\").    Weight as of this encounter: 176 kg (388 lb).   Weight management plan: Discussed healthy diet and exercise guidelines Patient was referred to their " PCP to discuss a diet and exercise plan.      Return in about 3 months (around 1/3/2023) for Routine Visit.        Eliseo Huff is a 47 year old, presenting for the following health issues:  Annual Visit, Medication Request (Is due for refills on medications), and Imm/Inj (COVID-19 VACCINE)  Overall feeling well. For the past couple of weeks right sided intermittent chest pain. Not associated with activity or SOB. 3/10 right sided chest pain, non radiating. Typically feels during the day. Does not wake him up at night.  Has been using compression stockings and would like new prescription for these as he wasn't able to fill the last one. Compression stockings at home losing elasticity so stopped wearing these. No dizziness, light headedness, or palpitations. No trauma. Occasional dry cough. Wearing CPAP nightly. With weather change noticing joint pain in feet, knees, hips. Able to get through work day. Takes 1200 mg ibuprofen per day, helps joint pain. Paresthesia in feet bilaterally. Checks blood sugar once daily high side 130s, low side 70s.     Healthy Habits:     Getting at least 3 servings of Calcium per day:  Yes    Bi-annual eye exam:  Yes    Dental care twice a year:  NO    Sleep apnea or symptoms of sleep apnea:  Daytime drowsiness, Excessive snoring and Sleep apnea    Diet:  Diabetic    Frequency of exercise:  2-3 days/week    Duration of exercise:  15-30 minutes    Taking medications regularly:  No    Barriers to taking medications:  None    Medication side effects:  None    PHQ-2 Total Score: 1    Additional concerns today:  Yes       Review of Systems   Constitutional: Negative for chills and fever.   HENT: Negative for congestion, ear pain, hearing loss and sore throat.    Eyes: Negative for pain and visual disturbance.   Respiratory: Negative for cough and shortness of breath.    Cardiovascular: Positive for chest pain and peripheral edema. Negative for palpitations.   Gastrointestinal: Negative  "for abdominal pain, constipation, diarrhea, heartburn, hematochezia and nausea.   Genitourinary: Negative for dysuria, frequency, genital sores, hematuria, impotence, penile discharge and urgency.   Musculoskeletal: Positive for arthralgias and joint swelling. Negative for myalgias.   Skin: Negative for rash.   Neurological: Positive for paresthesias. Negative for dizziness, weakness and headaches.   Psychiatric/Behavioral: Negative for mood changes. The patient is not nervous/anxious.       Constitutional, HEENT, cardiovascular, pulmonary, GI, , musculoskeletal, neuro, skin, endocrine and psych systems are negative, except as otherwise noted.      Objective    /72   Pulse 90   Temp 98.6  F (37  C) (Tympanic)   Resp 22   Ht 1.702 m (5' 7\")   Wt (!) 176 kg (388 lb)   SpO2 97%   BMI 60.77 kg/m    Body mass index is 60.77 kg/m .  Physical Exam   GENERAL: healthy, alert and no distress  EYES: Eyes grossly normal to inspection, PERRL and conjunctivae and sclerae normal  NECK: no adenopathy, no asymmetry, masses, or scars and thyroid normal to palpation  RESP: lungs clear to auscultation - no rales, rhonchi or wheezes  CV: regular rate and rhythm, normal S1 S2, no S3 or S4, no murmur, click or rub, no peripheral edema and peripheral pulses strong  MS: 1+ pitting edema to bilateral lower extremities and tenderness to palpation of right pectoralis muscle  SKIN: no suspicious lesions or rashes  NEURO: Normal strength and tone, mentation intact and speech normal  PSYCH: mentation appears normal, affect normal/bright    Results for orders placed or performed in visit on 10/03/22 (from the past 24 hour(s))   HEMOGLOBIN A1C   Result Value Ref Range    Hemoglobin A1C 5.7 (H) 0.0 - 5.6 %             Ericka Ley [facility]            "

## 2022-10-22 DIAGNOSIS — I87.8 VENOUS STASIS: ICD-10-CM

## 2022-10-24 RX ORDER — FUROSEMIDE 20 MG
TABLET ORAL
Qty: 360 TABLET | Refills: 3 | Status: SHIPPED | OUTPATIENT
Start: 2022-10-24 | End: 2023-11-28

## 2022-10-26 DIAGNOSIS — E66.01 MORBID OBESITY DUE TO EXCESS CALORIES (H): ICD-10-CM

## 2022-10-26 RX ORDER — TOPIRAMATE 100 MG/1
200 TABLET, FILM COATED ORAL 2 TIMES DAILY
Qty: 120 TABLET | Refills: 11 | OUTPATIENT
Start: 2022-10-26

## 2022-10-26 NOTE — TELEPHONE ENCOUNTER
Recieved refill request for topiramate (TOPAMAX) 100 MG tablet . Patient needs appointment scheduled prior to any refills. Clinic Coordinator notified and will follow up with the patient as appropriate. The pharmacy has been notified that the medication will not be refilled prior to an appointment being scheduled.

## 2022-10-28 ENCOUNTER — VIRTUAL VISIT (OUTPATIENT)
Dept: ENDOCRINOLOGY | Facility: CLINIC | Age: 47
End: 2022-10-28
Payer: COMMERCIAL

## 2022-10-28 VITALS — WEIGHT: 315 LBS | HEIGHT: 68 IN | BODY MASS INDEX: 47.74 KG/M2

## 2022-10-28 DIAGNOSIS — E66.01 MORBID OBESITY DUE TO EXCESS CALORIES (H): Primary | ICD-10-CM

## 2022-10-28 PROCEDURE — 99214 OFFICE O/P EST MOD 30 MIN: CPT | Mod: 95 | Performed by: INTERNAL MEDICINE

## 2022-10-28 RX ORDER — TOPIRAMATE 100 MG/1
200 TABLET, FILM COATED ORAL 2 TIMES DAILY
Qty: 360 TABLET | Refills: 3 | Status: SHIPPED | OUTPATIENT
Start: 2022-10-28 | End: 2023-04-07

## 2022-10-28 RX ORDER — NALTREXONE HYDROCHLORIDE 50 MG/1
TABLET, FILM COATED ORAL
Qty: 180 TABLET | Refills: 1 | Status: SHIPPED | OUTPATIENT
Start: 2022-10-28 | End: 2023-04-07

## 2022-10-28 ASSESSMENT — PAIN SCALES - GENERAL: PAINLEVEL: NO PAIN (0)

## 2022-10-28 NOTE — PROGRESS NOTES
"Refugio Kennedy is a 47 year old who is being evaluated via a billable video visit.      How would you like to obtain your AVS? MyChart  If the video visit is dropped, the invitation should be resent by: Text to cell phone: [phone]  Will anyone else be joining your video visit? No      During this virtual visit the patient is located in [place], patient verifies this as the location during the entirety of this visit.     Video-Visit Details  See comments below    Jose Uribe EMT-P 10/28/2022      10:40 AM        Return Medical Weight Management Note     Refugio Kennedy  MRN:  [id number]  :  1975  CRISTI:  10/28/2022    Dear Erika Espino MD,    I had the pleasure of seeing your patient Refugio Kennedy. He is a 47 year old male who I am continuing to see for treatment of obesity; PMH includes the following:    Past Medical History:   Diagnosis Date      novel coronavirus disease (COVID-19) 3/29/2021     Cataplexy and narcolepsy     Narcolepsy     Cellulitis      Chronic respiratory failure with hypercapnia (H) 3/29/2021     Diabetes      Hypoventilation      Morbid obesity (H) 3/29/2021     Obesity      FARZANA (obstructive sleep apnea)     uses CPAP     FARZANA (obstructive sleep apnea) 3/29/2021           INTERVAL HISTORY:      Seen last by Jeannine early ; reviewed and relevant his the following as extracted--  ---------------------------------  \"[we are] continuing to see [him] for treatment of obesity related to: DMII, HLD, sleep apnea on CPAP daily, and hypoventilation syndrome, oxygen dependent.\"  ---------------------------------    Since last seen of note is the following--  --pop still gone completely, For sweetening, using Stevia at home, still having some snacking (at work) but less snacking at home (not bringing them into the home)    --stress (girlfriend taking care of her 100 yo father) is making it more difficult to focus on wt loss    Current meds include--  --naltrexone 100 mg early " "afternoon  --topiramate 200 BID (6-9p taking it and supper time varies but can be around 7p, in bed typically by 10p); he will shift PM topiramate dose up a little (to help with late afternoon/early evening urges to eat)          CURRENT WEIGHT:   385 lbs 0 oz   Body mass index is 58.54 kg/m .    Initial Weight (lbs): 454 lbs  Last Visits Weight: 176 kg (388 lb)  Cumulative weight loss (lbs): 69  Weight Loss Percentage: 15.2%    Changes and Difficulties 10/28/2022   I have made the following changes to my diet since my last visit: no sugar   With regards to my diet, I am still struggling with: snacking treats at work   I have made the following changes to my activity/exercise since my last visit: yoga   With regards to my activity/exercise, I am still struggling with: doing more of it       VITALS:  Ht 1.727 m (5' 8\")   Wt (!) 174.6 kg (385 lb)   BMI 58.54 kg/m      MEDICATIONS:   Current Outpatient Medications   Medication Sig Dispense Refill     albuterol (2.5 MG/3ML) 0.083% neb solution Take 1 vial (2.5 mg) by nebulization every 4 hours as needed for shortness of breath / dyspnea or wheezing 25 vial 0     albuterol (PROAIR HFA/PROVENTIL HFA/VENTOLIN HFA) 108 (90 Base) MCG/ACT inhaler INHALE 2 PUFFS INTO THE LUNGS EVERY 4 HOURS AS NEEDED FOR SHORTNESS OF BREATH / DYSPNEA 18 g 0     aspirin 81 MG tablet Take 1 tablet by mouth daily. 90 tablet 3     blood glucose (ACCU-CHEK CHRIS PLUS) test strip USE TO TEST BLOOD SUGARS THREE TIMES A DAY AS DIRECTED (Patient taking differently: by In Vitro route 3 times daily USE TO TEST BLOOD SUGARS THREE TIMES A DAY AS DIRECTED) 300 strip 1     blood glucose (NO BRAND SPECIFIED) test strip Use to test blood sugar 2 times daily or as directed. 180 strip 3     blood glucose monitoring (SOFTCLIX) lancets TEST THREE TIMES A DAY (Patient taking differently: 3 times daily TEST THREE TIMES A DAY) 300 each 2     cetirizine (ZYRTEC) 10 MG tablet TAKE ONE TABLET BY MOUTH EVERY EVENING 90 " tablet 1     DIABETIC STERILE LANCETS device 1 Device 3 times daily. 1 Box 12     fluticasone (FLONASE) 50 MCG/ACT spray Spray 1-2 sprays into both nostrils daily 16 g 0     furosemide (LASIX) 20 MG tablet TAKE TWO TABLETS BY MOUTH TWICE A  tablet 3     ibuprofen (ADVIL/MOTRIN) 200 MG tablet 1 tab in AM and 2 in afternoon on work days       insulin pen needle (PENTIPS) 32G X 4 MM miscellaneous USE 1 PEN NEEDLE DAILY OR AS DIRECTED 200 each 1     liraglutide (VICTOZA PEN) 18 MG/3ML solution Inject 1.8 mg Subcutaneous daily 27 mL 3     lisinopril (ZESTRIL) 5 MG tablet Take 1 tablet (5 mg) by mouth daily 90 tablet 0     metFORMIN (GLUCOPHAGE XR) 500 MG 24 hr tablet TAKE 1 TABLET (500 MG) BY MOUTH DAILY 90 tablet 0     modafinil (PROVIGIL) 200 MG tablet Take 1/2 tablet by mouth 3-4 times daily as needed for sleepiness. 60 tablet 5     Multiple Vitamin (MULTIVITAMIN OR) Take 1 tablet by mouth daily.       naltrexone (DEPADE/REVIA) 50 MG tablet Take 1 tablet (50 mg) by mouth daily 60 tablet 11     naproxen (NAPROSYN) 500 MG tablet Take 1 tablet (500 mg) by mouth 2 times daily (with meals) 60 tablet 1     order for DME Equipment being ordered: Dynaflex insert 1 Units 0     order for DME Equipment being ordered: Dynaflex insert 1 Units 0     order for DME Nebulizer 1 Units 0     order for DME Equipment being ordered: BIPAP Refugio P Kennedy received a [employer] AirCurve 10 Bilevel. Pressures were set at 23/14 cm H2O.       ORDER FOR DME Auto-BiPAP:  IPAP max 21 cm H2O  EPAP min 15 cm H2O  Pressure support 5 cm  Changed in clinic  Lifetime need and heated humidity.           orlistat (LAWRENCE) 60 MG capsule Take 60 mg by mouth 3 times daily as needed       simvastatin (ZOCOR) 10 MG tablet Take 1 tablet (10 mg) by mouth At Bedtime 90 tablet 3     topiramate (TOPAMAX) 100 MG tablet Take 2 tablets (200 mg) by mouth 2 times daily 120 tablet 11     triamcinolone (KENALOG) 0.1 % external cream Apply topically 2 times daily 45 g 1      vitamin C (ASCORBIC ACID) 100 MG tablet Take 100 mg by mouth every evening          Weight Loss Medication History Reviewed With Patient 10/28/2022   Which weight loss medications are you currently taking on a regular basis?  Topamax (topiramate)   Are you having any side effects from the weight loss medication that we have prescribed you? No   If you are having side effects please describe: -       ASSESSMENT:   Maintain same plan. Naltrexone 50 mg/d has been helpful. Maintain topiramate 200 mg BID. Also on liraglutide 1.8/d. Reinforce food plan - focus on no between meal snacking, aggressive lowering of starches and cheese.      FOLLOW-UP:    3 months      --RTC 3 mo; naltrexone prescription updated to reflect the 2 tablets he is taking daily      telehealth visit; pt agrees to telehealth visit in [place]  Provider location--[facility] [place] [facility], [facility] clinic  Video attempted but unable to connect with pt on the video visit (camera/sound not working; tried to use both "[employer]" and [employer] video and needed to convert to phone visit--11:36-11:52 visit time with pt approx)    Total time spent: 30 min (15 min in visit and 15 in pre-visit prep and post visit follow up/charting same day)    Sincerely,    Mohsen Plascencia MD

## 2022-10-28 NOTE — PATIENT INSTRUCTIONS
"Welcome to our weight management program!   We are excited to join you on your weight loss journey    Thank you for allowing us the privilege of caring for you. We hope we provided you with the excellent service you deserve.   Please let us know if there is anything else we can do for you so that we can be sure you are leaving completely satisfied with your care experience.    To ensure the quality of our services you may be receiving a patient satisfaction survey from an independent patient satisfaction monitoring company.    The greatest compliment you can give is a \"Likely to Recommend\"    You saw Dr. Mittal today.    Instructions per today's visit:   --Medications today: continuing the daily naltrexone (100 mg; two 50-mg tablets); for topiramate we discussed moving the evening dose up to when you get home from work, a little earlier than you have been taking it if possible, to help with late afternoon/earlier evening eating reduction      Follow up appointments:  --we can plan return visit again in about 3 mo with Dr. Paez or Dr. Mittal  Interested in working with a health ?  Health coaches work with you to improve your overall health and wellbeing.  They look at the whole person, and may involve discussion of different areas of life, including, but not limited to the four pillars of health (sleep, exercise, nutrition, and stress management). Discuss with your care team if you would like to start working a health .  Health Coaching-3 Pack:    $99 for three health coaching visits    Visits may be done in person or via phone    Coaching is a partnership between the  and the client; Coaches do not prescribe or diagnose    Coaching helps inspire the client to reach his/her personal goals     For any questions/concerns contact Ankita Stoll LPN at [phone]     To schedule appointments with our team, please call [phone]     Please call during clinic hours Monday through Friday 8:00a - " 4:00p if you have questions or you can contact us via [employer] at anytime. ?    Lab results will be communicated through My Chart or letter (if My Chart not used). Please call the clinic if you have not received communication after 1 week or if you have any questions.?      Fax: [phone]    Thank you,  Medical Weight Management Team

## 2022-10-28 NOTE — LETTER
"10/28/2022       RE: Refugio Kennedy  [address]  [facility] [address]     Dear Colleague,    Thank you for referring your patient, Refugio Kennedy, to the [facility] [place] at [facility]. Please see a copy of my visit note below.    Return Medical Weight Management Note     Refugio Kennedy  MRN:  [id number]  :  1975  CRISTI:  10/28/2022    Dear Erika Espino MD,    I had the pleasure of seeing your patient Refugio Kennedy. He is a 47 year old male who I am continuing to see for treatment of obesity; PMH includes the following:    Past Medical History:   Diagnosis Date     2019 novel coronavirus disease (COVID-19) 3/29/2021     Cataplexy and narcolepsy     Narcolepsy     Cellulitis      Chronic respiratory failure with hypercapnia (H) 3/29/2021     Diabetes      Hypoventilation      Morbid obesity (H) 3/29/2021     Obesity      FARZANA (obstructive sleep apnea)     uses CPAP     FARZANA (obstructive sleep apnea) 3/29/2021           INTERVAL HISTORY:      Seen last by Jeannine early ; reviewed and relevant his the following as extracted--  ---------------------------------  \"[we are] continuing to see [him] for treatment of obesity related to: DMII, HLD, sleep apnea on CPAP daily, and hypoventilation syndrome, oxygen dependent.\"  ---------------------------------    Since last seen of note is the following--  --pop still gone completely, For sweetening, using Stevia at home, still having some snacking (at work) but less snacking at home (not bringing them into the home)    --stress (girlfriend taking care of her 100 yo father) is making it more difficult to focus on wt loss    Current meds include--  --naltrexone 100 mg early afternoon  --topiramate 200 BID (6-9p taking it and supper time varies but can be around 7p, in bed typically by 10p); he will shift PM topiramate dose up a little (to help with late afternoon/early evening " "urges to eat)          CURRENT WEIGHT:   385 lbs 0 oz   Body mass index is 58.54 kg/m .    Initial Weight (lbs): 454 lbs  Last Visits Weight: 176 kg (388 lb)  Cumulative weight loss (lbs): 69  Weight Loss Percentage: 15.2%    Changes and Difficulties 10/28/2022   I have made the following changes to my diet since my last visit: no sugar   With regards to my diet, I am still struggling with: snacking treats at work   I have made the following changes to my activity/exercise since my last visit: yoga   With regards to my activity/exercise, I am still struggling with: doing more of it       VITALS:  Ht 1.727 m (5' 8\")   Wt (!) 174.6 kg (385 lb)   BMI 58.54 kg/m      MEDICATIONS:   Current Outpatient Medications   Medication Sig Dispense Refill     albuterol (2.5 MG/3ML) 0.083% neb solution Take 1 vial (2.5 mg) by nebulization every 4 hours as needed for shortness of breath / dyspnea or wheezing 25 vial 0     albuterol (PROAIR HFA/PROVENTIL HFA/VENTOLIN HFA) 108 (90 Base) MCG/ACT inhaler INHALE 2 PUFFS INTO THE LUNGS EVERY 4 HOURS AS NEEDED FOR SHORTNESS OF BREATH / DYSPNEA 18 g 0     aspirin 81 MG tablet Take 1 tablet by mouth daily. 90 tablet 3     blood glucose (ACCU-CHEK CHRIS PLUS) test strip USE TO TEST BLOOD SUGARS THREE TIMES A DAY AS DIRECTED (Patient taking differently: by In Vitro route 3 times daily USE TO TEST BLOOD SUGARS THREE TIMES A DAY AS DIRECTED) 300 strip 1     blood glucose (NO BRAND SPECIFIED) test strip Use to test blood sugar 2 times daily or as directed. 180 strip 3     blood glucose monitoring (SOFTCLIX) lancets TEST THREE TIMES A DAY (Patient taking differently: 3 times daily TEST THREE TIMES A DAY) 300 each 2     cetirizine (ZYRTEC) 10 MG tablet TAKE ONE TABLET BY MOUTH EVERY EVENING 90 tablet 1     DIABETIC STERILE LANCETS device 1 Device 3 times daily. 1 Box 12     fluticasone (FLONASE) 50 MCG/ACT spray Spray 1-2 sprays into both nostrils daily 16 g 0     furosemide (LASIX) 20 MG tablet " TAKE TWO TABLETS BY MOUTH TWICE A  tablet 3     ibuprofen (ADVIL/MOTRIN) 200 MG tablet 1 tab in AM and 2 in afternoon on work days       insulin pen needle (PENTIPS) 32G X 4 MM miscellaneous USE 1 PEN NEEDLE DAILY OR AS DIRECTED 200 each 1     liraglutide (VICTOZA PEN) 18 MG/3ML solution Inject 1.8 mg Subcutaneous daily 27 mL 3     lisinopril (ZESTRIL) 5 MG tablet Take 1 tablet (5 mg) by mouth daily 90 tablet 0     metFORMIN (GLUCOPHAGE XR) 500 MG 24 hr tablet TAKE 1 TABLET (500 MG) BY MOUTH DAILY 90 tablet 0     modafinil (PROVIGIL) 200 MG tablet Take 1/2 tablet by mouth 3-4 times daily as needed for sleepiness. 60 tablet 5     Multiple Vitamin (MULTIVITAMIN OR) Take 1 tablet by mouth daily.       naltrexone (DEPADE/REVIA) 50 MG tablet Take 1 tablet (50 mg) by mouth daily 60 tablet 11     naproxen (NAPROSYN) 500 MG tablet Take 1 tablet (500 mg) by mouth 2 times daily (with meals) 60 tablet 1     order for DME Equipment being ordered: Dynaflex insert 1 Units 0     order for DME Equipment being ordered: Dynaflex insert 1 Units 0     order for DME Nebulizer 1 Units 0     order for DME Equipment being ordered: BIPAP Refugio P Kennedy received a [employer] AirCurve 10 Bilevel. Pressures were set at 23/14 cm H2O.       ORDER FOR DME Auto-BiPAP:  IPAP max 21 cm H2O  EPAP min 15 cm H2O  Pressure support 5 cm  Changed in clinic  Lifetime need and heated humidity.           orlistat (LAWRENCE) 60 MG capsule Take 60 mg by mouth 3 times daily as needed       simvastatin (ZOCOR) 10 MG tablet Take 1 tablet (10 mg) by mouth At Bedtime 90 tablet 3     topiramate (TOPAMAX) 100 MG tablet Take 2 tablets (200 mg) by mouth 2 times daily 120 tablet 11     triamcinolone (KENALOG) 0.1 % external cream Apply topically 2 times daily 45 g 1     vitamin C (ASCORBIC ACID) 100 MG tablet Take 100 mg by mouth every evening          Weight Loss Medication History Reviewed With Patient 10/28/2022   Which weight loss medications are you currently taking on  a regular basis?  Topamax (topiramate)   Are you having any side effects from the weight loss medication that we have prescribed you? No   If you are having side effects please describe: -       ASSESSMENT:   Maintain same plan. Naltrexone 50 mg/d has been helpful. Maintain topiramate 200 mg BID. Also on liraglutide 1.8/d. Reinforce food plan - focus on no between meal snacking, aggressive lowering of starches and cheese.      FOLLOW-UP:    3 months      --RTC 3 mo; naltrexone prescription updated to reflect the 2 tablets he is taking daily      telehealth visit; pt agrees to telehealth visit in [place]  [place] [place] [facility]  Video attempted but unable to connect with pt on the video visit (camera/sound not working; tried to use both [employer] and [employer] video and needed to convert to phone visit--11:36-11:52 visit time with pt approx)    Total time spent: 30 min (15 min in visit and 15 in pre-visit prep and post visit follow up/charting same day)    Sincerely,    Mohsen Plascencia MD

## 2022-10-28 NOTE — NURSING NOTE
"(   Chief Complaint   Patient presents with     Follow Up    )    ( Weight: (!) 385 lb (pt reported) )  ( Height: 5' 8\" )  ( BMI (Calculated): 58.54 )  (   )  (   )  (   )  (   )  (   )  (   )    (   )  (   )  (   )  (   )  (   )  (   )  (   )    (   Patient Active Problem List   Diagnosis     FARZANA (obstructive sleep apnea)/Hypoventilation Syndrome- Severe     Morbid obesity (H)     Chronic respiratory failure (H)     Hyperlipidemia LDL goal <100     Venous stasis     Pulmonary hypertension (H)     Mild intermittent asthma without complication     Type 2 diabetes mellitus without complication, without long-term current use of insulin (H)     Methamphetamine abuse in remission (H)    )  (   Current Outpatient Medications   Medication Sig Dispense Refill     albuterol (2.5 MG/3ML) 0.083% neb solution Take 1 vial (2.5 mg) by nebulization every 4 hours as needed for shortness of breath / dyspnea or wheezing 25 vial 0     albuterol (PROAIR HFA/PROVENTIL HFA/VENTOLIN HFA) 108 (90 Base) MCG/ACT inhaler INHALE 2 PUFFS INTO THE LUNGS EVERY 4 HOURS AS NEEDED FOR SHORTNESS OF BREATH / DYSPNEA 18 g 0     aspirin 81 MG tablet Take 1 tablet by mouth daily. 90 tablet 3     blood glucose (ACCU-CHEK CHRIS PLUS) test strip USE TO TEST BLOOD SUGARS THREE TIMES A DAY AS DIRECTED (Patient taking differently: by In Vitro route 3 times daily USE TO TEST BLOOD SUGARS THREE TIMES A DAY AS DIRECTED) 300 strip 1     blood glucose (NO BRAND SPECIFIED) test strip Use to test blood sugar 2 times daily or as directed. 180 strip 3     blood glucose monitoring (SOFTCLIX) lancets TEST THREE TIMES A DAY (Patient taking differently: 3 times daily TEST THREE TIMES A DAY) 300 each 2     cetirizine (ZYRTEC) 10 MG tablet TAKE ONE TABLET BY MOUTH EVERY EVENING 90 tablet 1     DIABETIC STERILE LANCETS device 1 Device 3 times daily. 1 Box 12     fluticasone (FLONASE) 50 MCG/ACT spray Spray 1-2 sprays into both nostrils daily 16 g 0     furosemide (LASIX) 20 " MG tablet TAKE TWO TABLETS BY MOUTH TWICE A  tablet 3     ibuprofen (ADVIL/MOTRIN) 200 MG tablet 1 tab in AM and 2 in afternoon on work days       insulin pen needle (PENTIPS) 32G X 4 MM miscellaneous USE 1 PEN NEEDLE DAILY OR AS DIRECTED 200 each 1     liraglutide (VICTOZA PEN) 18 MG/3ML solution Inject 1.8 mg Subcutaneous daily 27 mL 3     lisinopril (ZESTRIL) 5 MG tablet Take 1 tablet (5 mg) by mouth daily 90 tablet 0     metFORMIN (GLUCOPHAGE XR) 500 MG 24 hr tablet TAKE 1 TABLET (500 MG) BY MOUTH DAILY 90 tablet 0     modafinil (PROVIGIL) 200 MG tablet Take 1/2 tablet by mouth 3-4 times daily as needed for sleepiness. 60 tablet 5     Multiple Vitamin (MULTIVITAMIN OR) Take 1 tablet by mouth daily.       naltrexone (DEPADE/REVIA) 50 MG tablet Take 1 tablet (50 mg) by mouth daily 60 tablet 11     naproxen (NAPROSYN) 500 MG tablet Take 1 tablet (500 mg) by mouth 2 times daily (with meals) 60 tablet 1     order for DME Equipment being ordered: Dynaflex insert 1 Units 0     order for DME Equipment being ordered: Dynaflex insert 1 Units 0     order for DME Nebulizer 1 Units 0     order for DME Equipment being ordered: BIPAP Refugio P Kennedy received a [employer] AirCurve 10 Bilevel. Pressures were set at 23/14 cm H2O.       ORDER FOR DME Auto-BiPAP:  IPAP max 21 cm H2O  EPAP min 15 cm H2O  Pressure support 5 cm  Changed in clinic  Lifetime need and heated humidity.           orlistat (LAWRENCE) 60 MG capsule Take 60 mg by mouth 3 times daily as needed       simvastatin (ZOCOR) 10 MG tablet Take 1 tablet (10 mg) by mouth At Bedtime 90 tablet 3     topiramate (TOPAMAX) 100 MG tablet Take 2 tablets (200 mg) by mouth 2 times daily 120 tablet 11     triamcinolone (KENALOG) 0.1 % external cream Apply topically 2 times daily 45 g 1     vitamin C (ASCORBIC ACID) 100 MG tablet Take 100 mg by mouth every evening       )  ( Diabetes Eval:    )    ( Pain Eval:  No Pain (0) )    ( Wound Eval:       )    (   History   Smoking Status      Former     Packs/day: 0.50     Years: 1.00     Types: Cigarettes, Cigars     Quit date: 5/21/2012   Smokeless Tobacco     Never    )    ( Signed By:  Jose Uribe EMT; October 28, 2022; 10:39 AM )

## 2022-10-31 ENCOUNTER — TELEPHONE (OUTPATIENT)
Dept: ENDOCRINOLOGY | Facility: CLINIC | Age: 47
End: 2022-10-31

## 2022-11-15 ENCOUNTER — TELEPHONE (OUTPATIENT)
Dept: GASTROENTEROLOGY | Facility: CLINIC | Age: 47
End: 2022-11-15

## 2022-11-15 NOTE — TELEPHONE ENCOUNTER
Screening Questions  BLUE  KIND OF PREP RED  LOCATION [review exclusion criteria] GREEN  SEDATION TYPE        Yes Are you active on mychart?       Kamaljit Ordering/Referring Provider?        [facility] What type of coverage do you have?      No Have you had a positive covid test in the last 90 days?     Yes 1. BMI  [BMI 40+ - review exclusion criteria]    Yes  2. Are you able to give consent for your medical care? [IF NO,RN REVIEW]        No  3. Are you taking any prescription pain medications on a routine schedule?      NA  3a. EXTENDED PREP What kind of prescription?     No 4. Do you have any chemical dependencies such as alcohol, street drugs, or methadone?    No 5. Do you have any history of post-traumatic stress syndrome, severe anxiety or history of psychosis?      **If yes 3- 5 , please schedule with MAC sedation.**          IF YES TO ANY 6 - 10 - HOSPITAL SETTING ONLY.     No 6.   Do you need assistance transferring?     No 7.   Have you had a heart or lung transplant?    No 8.   Are you currently on dialysis?   No 9.   Do you use daily home oxygen?   No 10. Do you take nitroglycerin?   10a. NA If yes, how often?     11. [FEMALES]  NA Are you currently pregnant?    11a. NA If yes, how many weeks? [ Greater than 12 weeks, OR NEEDED]    ? 12. Do you have Pulmonary Hypertension? *NEED PAC APPT AT [facility]*     No 13. [review exclusion criteria]  Do you have any implantable devices in your body (pacemaker, defib, LVAD)?    No 14. In the past 6 months, have you had any heart related issues including cardiomyopathy or heart attack?     14a. NA If yes, did it require cardiac stenting if so when?     No 15. Have you had a stroke or Transient ischemic attack (TIA - aka  mini stroke ) within 6 months?      Yes 16. Do you have mod to severe Obstructive Sleep Apnea?  [Hospital only - Ok at [place]]    No 17. Do you have SEVERE AND UNCONTROLLED asthma? *NEED PAC APPT AT [facility]*     No 18. Are you currently taking any blood  "thinners?     18a. If yes, inform patient to \"follow up w/ ordering provider for bridging instructions.\"    No 19. Do you take the medication Phentermine?    19a. If yes, \"Hold for 7 days before procedure.  Please consult your prescribing provider if you have questions about holding this medication.\"     No  20. Do you have chronic kidney disease?      Yes  21. Do you have a diagnosis of diabetes?     No  22. On a regular basis do you go 3-5 days between bowel movements?     See below 23. Preferred LOCAL Pharmacy for Pre Prescription    [ LIST ONLY ONE PHARMACY]     [place], [place] - [address]        - CLOSING REMINDERS -    Informed patient they will need an adult    Cannot take any type of public or medical transportation alone    Conscious Sedation- Needs  for 6 hours after the procedure       MAC/General-Needs  for 24 hours after procedure    Pre-Procedure Covid test to be completed [[facility] PCR Testing Required]    Confirmed Nurse will call to complete assessment       - SCHEDULING DETAILS -     Grabiel  Surgeon    2-22-23  Date of Procedure  Lower Endoscopy [Colonoscopy]  Type of Procedure Scheduled   [facility]-If you answer yes to questions #8, #20, #21Which Colonoscopy Prep was Sent?     GEN Sedation Type     NA PAC / Pre-op Required         Additional comments:  NA          "

## 2022-12-12 DIAGNOSIS — E11.9 TYPE 2 DIABETES, HBA1C GOAL < 8% (H): ICD-10-CM

## 2022-12-13 RX ORDER — LANCETS
EACH MISCELLANEOUS
Qty: 300 EACH | Refills: 1 | Status: SHIPPED | OUTPATIENT
Start: 2022-12-13

## 2022-12-29 DIAGNOSIS — G47.33 OBSTRUCTIVE SLEEP APNEA-HYPOPNEA SYNDROME: ICD-10-CM

## 2022-12-30 RX ORDER — MODAFINIL 200 MG/1
TABLET ORAL
Qty: 60 TABLET | Refills: 2 | Status: SHIPPED | OUTPATIENT
Start: 2022-12-30 | End: 2023-01-06

## 2023-01-05 DIAGNOSIS — G47.33 OBSTRUCTIVE SLEEP APNEA-HYPOPNEA SYNDROME: ICD-10-CM

## 2023-01-06 RX ORDER — MODAFINIL 200 MG/1
TABLET ORAL
Qty: 60 TABLET | Refills: 2 | Status: SHIPPED | OUTPATIENT
Start: 2023-01-06 | End: 2023-04-04

## 2023-01-18 NOTE — PROGRESS NOTES
Refugio Kennedy is a 47 year old male who is being evaluated via a billable telephone visit.       What phone number would you like to be contacted at?@ [phone]  Home Phone [phone]   Work Phone Not on file.   Mobile [phone]       How would you like to obtain your AVS? Rupture       Telephone Virtual Visit Details     Type of service:  Telephone Virtual Visit     Originating Location (pt. Location): Home     Distant Location (provider location):  Off-site, [facility] [place]      Start Time: 1:30pm  End Time: 1:42 PM    Virtual visit for annual follow-up of severe obesity, severe obstructive sleep apnea with profound oxygen desaturations and hypoventilation, obesity hypoventilation, history of methamphetamine abuse, type 2 diabetes, congestive heart failure, pulmonary hypertension with presenting concern of management of sleep disordered breathing with bilevel Pap in spontaneous mode on room air.     Impression/Plan:     1.) Very severe FARZANA with profound oxygen desaturations, nocturnal hypoventilation and significant obesity hypoventilation   - Appears adequately controlled on bilevel 21/14 cm H2O spontaneous and compliance is doing well.  - Plan to continue on current settings.      2.) Excessive daytime sleepiness  - Assumed multi-factorial with severe obesity, potential obstructive sleep apnea hypopnea syndrome  - Complicated by long-standing history of recurrent methamphetamine abuse. Patient reports prior use of Modafinil and was well tolerated.  - Sober from methamphetamines for 3+ years.  - Continue Modafinil 200mg 1/2 tablet TID to QID (how dosed previously per patient and has worked well)      SUBJECTIVE:  Refugio Kennedy is a 47 year old male.    Prior Sleep Testing:    ~1998 - Split-night PSG.  Weight 379 lbs, AHI 34.9, guille desat 58%, elevated TCM, CPAP 14 effective    2/7/2011 - Split-night PSG with weight 420 lbs, AHI 54.9, guille desat 51%, hypoventilation (pCO2 by TCM up to  68 mmHg) with CPAP 18 nor bilevel 20/16 fully effective    7/6/2011 - Bilevel titration PSG with weight 420 lbs, bilevel 23/18 and 21/17 appearing effective, included supine REM, and TCM improved to mid-50's from zenith of ~60    6/25/2014 - Bilevel titration PSG with weight 450 lbs, bilevel titrated to 23/14 cm H2O appeared optimal, included supine REM with control of obstructive events (with minimization of mask leak) and normalization of SpO2.    Please refer to my note on March 14, 2019 for summary of all of his previous visits.  At this time, he was doing well with largely adequate usage of his bilevel 21/14 centimeters H2O and spontaneous mode on room air.  Due to residual daytime sleepiness, though improved with use of bilevel, we agreed for careful trial of modafinil 100 mg taken 3 times daily to 4 times daily as needed (this has been an effective regiment and schedule previously for this patient).     10/30/2020 - Today, overall he feels he is sleeping and doing well.  He has had ongoing issues with his heated hose not consistently working and excessive mask leak.  The mask leak is not bothersome to him, but is noted frequently by his girlfriend.  He has not changed his mask interface.     Bilevel download reviewed over the past 30 days on 21/14 centimeters H2O and spontaneous mode on room air.  Average daily usage of 408 minutes, AHI 0.35, 95th percentile mask leak of 80 L/min.     His weight currently is 380 pounds, this is down from approximately 400 pounds at our last visit.    A/P to continue bilevel PAP 21/14 cm H2O spontaneous on room air, modafinil 100mg TID-QID PRN.    5/13/2021 - Annual visit with Bhaskar Anna CNP.  No changes to bilevel settings.    Today -overall, he has no specific concerns today.  He uses his bilevel on a regular basis and also finds modafinil continues to be highly effective for preventing daytime sleepiness.  He reports an adequate sleep time, typically 7 or more  hours.    Bilevel download reviewed over past 30 days on 21/14 cm H2O spontaneous on room air: AHI 0.9.  Average usage 308 minutes.    Past medical history:    Patient Active Problem List    Diagnosis Date Noted     Methamphetamine abuse in remission (H) 07/17/2017     Priority: Medium     Type 2 diabetes mellitus without complication, without long-term current use of insulin (H) 10/17/2016     Priority: Medium     Mild intermittent asthma without complication 03/24/2015     Priority: Medium     Pulmonary hypertension (H) 06/22/2014     Priority: Medium     Hyperlipidemia LDL goal <100 06/08/2012     Priority: Medium     Venous stasis 06/08/2012     Priority: Medium     Chronic respiratory failure (H) 02/11/2011     Priority: Medium     ABG 2/11- 7.38/51/75 RA       FARZANA (obstructive sleep apnea)/Hypoventilation Syndrome- Severe      Priority: Medium     Sleep Study 1998- AHI 51. Rx 16 cm. S/p UVPP 1998, weight loss. Sleep study 9/10/03 (weight 379)- AHI 34.9. Lo2 58%, with reported hypoventilation.  Sleep study 9/29/03- CPAP 14 cm appeared effective. MSLT- 1.8 minutes, 2 sleep onset REMs, diagnosed with 'narcolepsy', placed on Provigil (though he did not tolerate CPAP on 1st study 9/03).   Sleep study 2/11- AHI 54.9, with desaturations to 51%. Transcutaneous CO2 monitoring showed some elevations consistent with hypoventilation. CPAP titration: No fully effective pressure was found. CPAP was titrated to 18 cm with improvement in severe desaturations, but some persistent hypopneas and arousals. Supine REM was noted at this pressure. Brief trial of 20/16 showed persistent events. Efficacy was limited by mask leak throughout most of the study.        Morbid obesity (H)      Priority: Medium     Morbid obesity         10 point ROS of systems including Constitutional, Eyes, Respiratory, Cardiovascular, Gastroenterology, Genitourinary, Integumentary, Muscularskeletal, Psychiatric were all negative except for pertinent  positives noted in my HPI.    Current Outpatient Medications   Medication Sig Dispense Refill     albuterol (2.5 MG/3ML) 0.083% neb solution Take 1 vial (2.5 mg) by nebulization every 4 hours as needed for shortness of breath / dyspnea or wheezing 25 vial 0     albuterol (PROAIR HFA/PROVENTIL HFA/VENTOLIN HFA) 108 (90 Base) MCG/ACT inhaler INHALE 2 PUFFS INTO THE LUNGS EVERY 4 HOURS AS NEEDED FOR SHORTNESS OF BREATH / DYSPNEA 18 g 0     aspirin 81 MG tablet Take 1 tablet by mouth daily. 90 tablet 3     blood glucose (ACCU-CHEK CHRIS PLUS) test strip USE TO TEST BLOOD SUGAR 2 TIMES DAILY OR AS DIRECTED. 180 strip 0     blood glucose (NO BRAND SPECIFIED) test strip Use to test blood sugar 2 times daily or as directed. 180 strip 3     blood glucose monitoring (SOFTCLIX) lancets USE TO TEST THREE TIMES DAILY 300 each 1     cetirizine (ZYRTEC) 10 MG tablet TAKE ONE TABLET BY MOUTH EVERY EVENING 90 tablet 1     DIABETIC STERILE LANCETS device 1 Device 3 times daily. 1 Box 12     fluticasone (FLONASE) 50 MCG/ACT spray Spray 1-2 sprays into both nostrils daily 16 g 0     furosemide (LASIX) 20 MG tablet TAKE TWO TABLETS BY MOUTH TWICE A  tablet 3     ibuprofen (ADVIL/MOTRIN) 200 MG tablet 1 tab in AM and 2 in afternoon on work days       insulin pen needle (PENTIPS) 32G X 4 MM miscellaneous USE 1 PEN NEEDLE DAILY OR AS DIRECTED 200 each 1     liraglutide (VICTOZA PEN) 18 MG/3ML solution Inject 1.8 mg Subcutaneous daily 27 mL 3     lisinopril (ZESTRIL) 5 MG tablet Take 1 tablet (5 mg) by mouth daily 90 tablet 0     metFORMIN (GLUCOPHAGE XR) 500 MG 24 hr tablet TAKE 1 TABLET (500 MG) BY MOUTH DAILY 90 tablet 0     modafinil (PROVIGIL) 200 MG tablet Take 1/2 tablet by mouth 3-4 times daily as needed for sleepiness. 60 tablet 2     Multiple Vitamin (MULTIVITAMIN OR) Take 1 tablet by mouth daily.       naltrexone (DEPADE/REVIA) 50 MG tablet Take 2 tablets daily 180 tablet 1     naproxen (NAPROSYN) 500 MG tablet Take 1  tablet (500 mg) by mouth 2 times daily (with meals) 60 tablet 1     order for DME Equipment being ordered: Dynaflex insert 1 Units 0     order for DME Equipment being ordered: Dynaflex insert 1 Units 0     order for DME Nebulizer 1 Units 0     order for DME Equipment being ordered: BIPAP Refugio P Kennedy received a Resmed AirCurve 10 Bilevel. Pressures were set at 23/14 cm H2O.       ORDER FOR DME Auto-BiPAP:  IPAP max 21 cm H2O  EPAP min 15 cm H2O  Pressure support 5 cm  Changed in clinic  Lifetime need and heated humidity.           orlistat (LAWRENCE) 60 MG capsule Take 60 mg by mouth 3 times daily as needed       simvastatin (ZOCOR) 10 MG tablet Take 1 tablet (10 mg) by mouth At Bedtime 90 tablet 3     topiramate (TOPAMAX) 100 MG tablet Take 2 tablets (200 mg) by mouth 2 times daily 360 tablet 3     triamcinolone (KENALOG) 0.1 % external cream Apply topically 2 times daily 45 g 1     vitamin C (ASCORBIC ACID) 100 MG tablet Take 100 mg by mouth every evening          OBJECTIVE:  There were no vitals taken for this visit.    Physical Exam:  healthy, alert and no distress  PSYCH: Alert and oriented times 3; coherent speech, normal   rate and volume, able to articulate logical thoughts, able   to abstract reason, no tangential thoughts, no hallucinations   or delusions  His affect is normal  RESP: No cough, no audible wheezing, able to talk in full sentences  Remainder of exam unable to be completed due to telephone visits    ---  This note was written with the assistance of the Dragon voice-dictation technology software. The final document, although reviewed, may contain errors. For corrections, please contact the office.    Total time spent preparing to see the patient, review of chart, obtaining history and physical examination, review of sleep testing, review of treatment options, education, discussion with patient and documenting in Epic / EMR was 15 minutes.  All time involved was spent on the day of service for the  patient (the same day as the patient's appointment).    Bhaskar Johnson MD    Sleep Medicine    [place], [place]  o Main Office: [phone]    [facility] [address], [address]  o Schedule visits: [phone]  o Main Office: [phone]  o Fax: [phone]

## 2023-01-19 ENCOUNTER — VIRTUAL VISIT (OUTPATIENT)
Dept: PULMONOLOGY | Facility: OTHER | Age: 48
End: 2023-01-19
Attending: FAMILY MEDICINE
Payer: COMMERCIAL

## 2023-01-19 DIAGNOSIS — G47.33 OSA (OBSTRUCTIVE SLEEP APNEA): ICD-10-CM

## 2023-01-19 DIAGNOSIS — G47.33 OBSTRUCTIVE SLEEP APNEA-HYPOPNEA SYNDROME: Primary | ICD-10-CM

## 2023-01-19 PROCEDURE — 99212 OFFICE O/P EST SF 10 MIN: CPT | Mod: 95 | Performed by: FAMILY MEDICINE

## 2023-01-19 ASSESSMENT — SLEEP AND FATIGUE QUESTIONNAIRES
HOW LIKELY ARE YOU TO NOD OFF OR FALL ASLEEP IN A CAR, WHILE STOPPED FOR A FEW MINUTES IN TRAFFIC: SLIGHT CHANCE OF DOZING
HOW LIKELY ARE YOU TO NOD OFF OR FALL ASLEEP WHILE SITTING AND TALKING TO SOMEONE: SLIGHT CHANCE OF DOZING
HOW LIKELY ARE YOU TO NOD OFF OR FALL ASLEEP WHILE WATCHING TV: SLIGHT CHANCE OF DOZING
HOW LIKELY ARE YOU TO NOD OFF OR FALL ASLEEP WHEN YOU ARE A PASSENGER IN A CAR FOR AN HOUR WITHOUT A BREAK: SLIGHT CHANCE OF DOZING
HOW LIKELY ARE YOU TO NOD OFF OR FALL ASLEEP WHILE SITTING AND READING: SLIGHT CHANCE OF DOZING
HOW LIKELY ARE YOU TO NOD OFF OR FALL ASLEEP WHILE LYING DOWN TO REST IN THE AFTERNOON WHEN CIRCUMSTANCES PERMIT: SLIGHT CHANCE OF DOZING
HOW LIKELY ARE YOU TO NOD OFF OR FALL ASLEEP WHILE SITTING QUIETLY AFTER LUNCH WITHOUT ALCOHOL: SLIGHT CHANCE OF DOZING
HOW LIKELY ARE YOU TO NOD OFF OR FALL ASLEEP WHILE SITTING INACTIVE IN A PUBLIC PLACE: SLIGHT CHANCE OF DOZING

## 2023-01-20 DIAGNOSIS — J30.1 ALLERGIC RHINITIS DUE TO POLLEN: ICD-10-CM

## 2023-01-23 RX ORDER — CETIRIZINE HYDROCHLORIDE 10 MG/1
TABLET ORAL
Qty: 90 TABLET | Refills: 1 | Status: SHIPPED | OUTPATIENT
Start: 2023-01-23 | End: 2023-07-25

## 2023-02-08 DIAGNOSIS — E11.9 DIABETES MELLITUS WITHOUT COMPLICATION (H): ICD-10-CM

## 2023-02-09 RX ORDER — BLOOD SUGAR DIAGNOSTIC
STRIP MISCELLANEOUS
Qty: 200 STRIP | Refills: 1 | Status: SHIPPED | OUTPATIENT
Start: 2023-02-09 | End: 2023-07-17

## 2023-02-17 DIAGNOSIS — E11.9 TYPE 2 DIABETES MELLITUS WITHOUT COMPLICATION, WITHOUT LONG-TERM CURRENT USE OF INSULIN (H): ICD-10-CM

## 2023-02-19 ENCOUNTER — HOSPITAL ENCOUNTER (EMERGENCY)
Facility: CLINIC | Age: 48
Discharge: HOME | End: 2023-02-19
Attending: NURSE PRACTITIONER | Admitting: NURSE PRACTITIONER
Payer: COMMERCIAL

## 2023-02-19 ENCOUNTER — APPOINTMENT (OUTPATIENT)
Dept: ULTRASOUND IMAGING | Facility: CLINIC | Age: 48
End: 2023-02-19
Attending: NURSE PRACTITIONER
Payer: COMMERCIAL

## 2023-02-19 ENCOUNTER — APPOINTMENT (OUTPATIENT)
Dept: CT IMAGING | Facility: CLINIC | Age: 48
End: 2023-02-19
Attending: NURSE PRACTITIONER
Payer: COMMERCIAL

## 2023-02-19 ENCOUNTER — APPOINTMENT (OUTPATIENT)
Dept: GENERAL RADIOLOGY | Facility: CLINIC | Age: 48
End: 2023-02-19
Attending: NURSE PRACTITIONER
Payer: COMMERCIAL

## 2023-02-19 VITALS
HEART RATE: 70 BPM | BODY MASS INDEX: 49.44 KG/M2 | RESPIRATION RATE: 16 BRPM | DIASTOLIC BLOOD PRESSURE: 80 MMHG | OXYGEN SATURATION: 95 % | WEIGHT: 315 LBS | TEMPERATURE: 97.5 F | HEIGHT: 67 IN | SYSTOLIC BLOOD PRESSURE: 128 MMHG

## 2023-02-19 DIAGNOSIS — I26.99 PULMONARY EMBOLI (H): ICD-10-CM

## 2023-02-19 LAB
ANION GAP SERPL CALCULATED.3IONS-SCNC: 9 MMOL/L (ref 7–15)
BASOPHILS # BLD AUTO: 0.1 10E3/UL (ref 0–0.2)
BASOPHILS NFR BLD AUTO: 1 %
BUN SERPL-MCNC: 23.8 MG/DL (ref 6–20)
CALCIUM SERPL-MCNC: 8.9 MG/DL (ref 8.6–10)
CHLORIDE SERPL-SCNC: 107 MMOL/L (ref 98–107)
CREAT SERPL-MCNC: 0.95 MG/DL (ref 0.67–1.17)
D DIMER PPP FEU-MCNC: 0.86 UG/ML FEU (ref 0–0.5)
DEPRECATED HCO3 PLAS-SCNC: 26 MMOL/L (ref 22–29)
EOSINOPHIL # BLD AUTO: 0.4 10E3/UL (ref 0–0.7)
EOSINOPHIL NFR BLD AUTO: 5 %
ERYTHROCYTE [DISTWIDTH] IN BLOOD BY AUTOMATED COUNT: 12.8 % (ref 10–15)
GFR SERPL CREATININE-BSD FRML MDRD: >90 ML/MIN/1.73M2
GLUCOSE SERPL-MCNC: 112 MG/DL (ref 70–99)
HCT VFR BLD AUTO: 41.9 % (ref 40–53)
HGB BLD-MCNC: 14 G/DL (ref 13.3–17.7)
HOLD SPECIMEN: NORMAL
IMM GRANULOCYTES # BLD: 0 10E3/UL
IMM GRANULOCYTES NFR BLD: 0 %
LYMPHOCYTES # BLD AUTO: 2.4 10E3/UL (ref 0.8–5.3)
LYMPHOCYTES NFR BLD AUTO: 28 %
MCH RBC QN AUTO: 31.4 PG (ref 26.5–33)
MCHC RBC AUTO-ENTMCNC: 33.4 G/DL (ref 31.5–36.5)
MCV RBC AUTO: 94 FL (ref 78–100)
MONOCYTES # BLD AUTO: 0.8 10E3/UL (ref 0–1.3)
MONOCYTES NFR BLD AUTO: 9 %
NEUTROPHILS # BLD AUTO: 4.9 10E3/UL (ref 1.6–8.3)
NEUTROPHILS NFR BLD AUTO: 57 %
NRBC # BLD AUTO: 0 10E3/UL
NRBC BLD AUTO-RTO: 0 /100
NT-PROBNP SERPL-MCNC: <36 PG/ML (ref 0–450)
PLATELET # BLD AUTO: 236 10E3/UL (ref 150–450)
POTASSIUM SERPL-SCNC: 3.9 MMOL/L (ref 3.4–5.3)
RADIOLOGIST FLAGS: ABNORMAL
RBC # BLD AUTO: 4.46 10E6/UL (ref 4.4–5.9)
SODIUM SERPL-SCNC: 142 MMOL/L (ref 136–145)
TROPONIN T SERPL HS-MCNC: <6 NG/L
WBC # BLD AUTO: 8.6 10E3/UL (ref 4–11)

## 2023-02-19 PROCEDURE — 250N000011 HC RX IP 250 OP 636: Performed by: NURSE PRACTITIONER

## 2023-02-19 PROCEDURE — 96360 HYDRATION IV INFUSION INIT: CPT | Performed by: NURSE PRACTITIONER

## 2023-02-19 PROCEDURE — 71046 X-RAY EXAM CHEST 2 VIEWS: CPT

## 2023-02-19 PROCEDURE — 85379 FIBRIN DEGRADATION QUANT: CPT | Performed by: NURSE PRACTITIONER

## 2023-02-19 PROCEDURE — 36415 COLL VENOUS BLD VENIPUNCTURE: CPT | Performed by: NURSE PRACTITIONER

## 2023-02-19 PROCEDURE — 250N000009 HC RX 250: Performed by: NURSE PRACTITIONER

## 2023-02-19 PROCEDURE — 93971 EXTREMITY STUDY: CPT | Mod: LT

## 2023-02-19 PROCEDURE — 71275 CT ANGIOGRAPHY CHEST: CPT

## 2023-02-19 PROCEDURE — 99285 EMERGENCY DEPT VISIT HI MDM: CPT | Mod: 25 | Performed by: NURSE PRACTITIONER

## 2023-02-19 PROCEDURE — 85025 COMPLETE CBC W/AUTO DIFF WBC: CPT | Performed by: NURSE PRACTITIONER

## 2023-02-19 PROCEDURE — 93010 ELECTROCARDIOGRAM REPORT: CPT | Performed by: NURSE PRACTITIONER

## 2023-02-19 PROCEDURE — 80048 BASIC METABOLIC PNL TOTAL CA: CPT | Performed by: NURSE PRACTITIONER

## 2023-02-19 PROCEDURE — 84484 ASSAY OF TROPONIN QUANT: CPT | Performed by: NURSE PRACTITIONER

## 2023-02-19 PROCEDURE — 99284 EMERGENCY DEPT VISIT MOD MDM: CPT | Mod: 25 | Performed by: NURSE PRACTITIONER

## 2023-02-19 PROCEDURE — 258N000003 HC RX IP 258 OP 636: Performed by: NURSE PRACTITIONER

## 2023-02-19 PROCEDURE — 83880 ASSAY OF NATRIURETIC PEPTIDE: CPT | Performed by: NURSE PRACTITIONER

## 2023-02-19 PROCEDURE — 93005 ELECTROCARDIOGRAM TRACING: CPT | Performed by: NURSE PRACTITIONER

## 2023-02-19 RX ORDER — IOPAMIDOL 755 MG/ML
96 INJECTION, SOLUTION INTRAVASCULAR ONCE
Status: COMPLETED | OUTPATIENT
Start: 2023-02-19 | End: 2023-02-19

## 2023-02-19 RX ORDER — APIXABAN 5 MG (74)
KIT ORAL
Qty: 74 EACH | Refills: 0 | Status: SHIPPED | OUTPATIENT
Start: 2023-02-19 | End: 2023-03-21

## 2023-02-19 RX ADMIN — IOPAMIDOL 96 ML: 755 INJECTION, SOLUTION INTRAVENOUS at 13:30

## 2023-02-19 RX ADMIN — SODIUM CHLORIDE, POTASSIUM CHLORIDE, SODIUM LACTATE AND CALCIUM CHLORIDE 1000 ML: 600; 310; 30; 20 INJECTION, SOLUTION INTRAVENOUS at 14:28

## 2023-02-19 RX ADMIN — SODIUM CHLORIDE 100 ML: 9 INJECTION, SOLUTION INTRAVENOUS at 13:29

## 2023-02-19 ASSESSMENT — ACTIVITIES OF DAILY LIVING (ADL)
ADLS_ACUITY_SCORE: 35
ADLS_ACUITY_SCORE: 35

## 2023-02-19 NOTE — ED PROVIDER NOTES
History     Chief Complaint   Patient presents with     Leg Pain     Left lower and upper leg pain and palpable lump, concerned about blood clot     HPI  Refugio Kennedy is a 47 year old male with past medical history of type 2 diabetes, mild intermittent asthma, hypertension, hyperlipidemia, venous stasis, sleep apnea, former methamphetamine abuse currently in remission who presents to the emergency department with concerns of left upper and left lower leg pain, shortness of breath.  Patient states that his left upper inner leg has been uncomfortable for the past week and then last evening he noted a little bump in his left lower inner leg that is painful to palpation.  Patient denies any trauma.  Patient states he has not tried any therapies or treatments.  Patient denies history of previous DVT or PE.  Patient reports he is currently on baby aspirin daily.  Patient also noticed over the past week he has increasing feeling of shortness of breath but denies any chest pain.  Patient states he is uncertain if he has had any weight gain or loss in the past week.  Patient states he feels like he has been trying to eat healthier and increase his water intake and yet feels that his output has declined.  Patient denies history of heart attack, stroke, heart failure.  Patient denies fevers, chills, sweats, ear pain, eye pain, throat pain, abdominal pain, dysuria, hematuria, bright red rectal bleeding.  Patient denies any recent immobilization, surgery, long car rides, flights.  Patient denies any change in medications in the past week.    Allergies:  No Known Allergies    Problem List:    Patient Active Problem List    Diagnosis Date Noted     Methamphetamine abuse in remission (H) 07/17/2017     Priority: Medium     Type 2 diabetes mellitus without complication, without long-term current use of insulin (H) 10/17/2016     Priority: Medium     Mild intermittent asthma without complication 03/24/2015     Priority: Medium      Pulmonary hypertension (H) 06/22/2014     Priority: Medium     Hyperlipidemia LDL goal <100 06/08/2012     Priority: Medium     Venous stasis 06/08/2012     Priority: Medium     Chronic respiratory failure (H) 02/11/2011     Priority: Medium     ABG 2/11- 7.38/51/75 RA       FARZANA (obstructive sleep apnea)/Hypoventilation Syndrome- Severe      Priority: Medium     Sleep Study 1998- AHI 51. Rx 16 cm. S/p UVPP 1998, weight loss. Sleep study 9/10/03 (weight 379)- AHI 34.9. Lo2 58%, with reported hypoventilation.  Sleep study 9/29/03- CPAP 14 cm appeared effective. MSLT- 1.8 minutes, 2 sleep onset REMs, diagnosed with 'narcolepsy', placed on Provigil (though he did not tolerate CPAP on 1st study 9/03).   Sleep study 2/11- AHI 54.9, with desaturations to 51%. Transcutaneous CO2 monitoring showed some elevations consistent with hypoventilation. CPAP titration: No fully effective pressure was found. CPAP was titrated to 18 cm with improvement in severe desaturations, but some persistent hypopneas and arousals. Supine REM was noted at this pressure. Brief trial of 20/16 showed persistent events. Efficacy was limited by mask leak throughout most of the study.        Morbid obesity (H)      Priority: Medium     Morbid obesity          Past Medical History:    Past Medical History:   Diagnosis Date     2019 novel coronavirus disease (COVID-19) 3/29/2021     Cataplexy and narcolepsy      Cellulitis      Chronic respiratory failure with hypercapnia (H) 3/29/2021     Diabetes      Hypoventilation      Morbid obesity (H) 3/29/2021     Obesity      FARZANA (obstructive sleep apnea)      FARZANA (obstructive sleep apnea) 3/29/2021       Past Surgical History:    Past Surgical History:   Procedure Laterality Date     LAPAROSCOPIC HERNIORRHAPHY VENTRAL N/A 5/17/2016    Procedure: LAPAROSCOPIC HERNIORRHAPHY VENTRAL;  Surgeon: Yves Jimenes MD;  Location: [facility]     LAPAROSCOPIC HERNIORRHAPHY VENTRAL N/A 12/20/2016    Procedure: LAPAROSCOPIC  HERNIORRHAPHY VENTRAL;  Surgeon: Yves Jimenes MD;  Location: [facility] OR     SURGICAL HISTORY OF -   1998    UVPP       Family History:    Family History   Problem Relation Age of Onset     Anxiety Disorder Mother      Asthma Mother      Depression Mother      Thyroid Disease Mother      Anxiety Disorder Father      Substance Abuse Father      Depression Father      Hypertension Father      Cancer Maternal Grandfather      Heart Disease Maternal Grandfather      Diabetes Maternal Grandfather      Hypertension Maternal Grandfather      Other Cancer Maternal Grandfather      Cancer Paternal Grandfather         unknown     Other Cancer Paternal Grandfather      Anxiety Disorder Brother      Substance Abuse Brother      Mental Illness Brother      Depression Brother      Cancer Brother      Other Cancer Brother      Obesity No family hx of        Social History:  Marital Status:  Single [1]  Social History     Tobacco Use     Smoking status: Former     Packs/day: 0.50     Years: 1.00     Pack years: 0.50     Types: Cigarettes, Cigars     Quit date: 5/21/2012     Years since quitting: 10.7     Smokeless tobacco: Never   Vaping Use     Vaping Use: Never used   Substance Use Topics     Alcohol use: Yes     Comment: rare     Drug use: No     Comment: 1 month ago -         Medications:    Apixaban Starter Pack (ELIQUIS DVT/PE STARTER PACK) 5 MG TBPK  ACCU-CHEK CHRIS PLUS test strip  albuterol (2.5 MG/3ML) 0.083% neb solution  albuterol (PROAIR HFA/PROVENTIL HFA/VENTOLIN HFA) 108 (90 Base) MCG/ACT inhaler  aspirin 81 MG tablet  blood glucose (NO BRAND SPECIFIED) test strip  blood glucose monitoring (SOFTCLIX) lancets  cetirizine (ZYRTEC) 10 MG tablet  DIABETIC STERILE LANCETS device  fluticasone (FLONASE) 50 MCG/ACT spray  furosemide (LASIX) 20 MG tablet  ibuprofen (ADVIL/MOTRIN) 200 MG tablet  insulin pen needle (PENTIPS) 32G X 4 MM miscellaneous  liraglutide (VICTOZA PEN) 18 MG/3ML solution  lisinopril (ZESTRIL) 5 MG  "tablet  metFORMIN (GLUCOPHAGE XR) 500 MG 24 hr tablet  modafinil (PROVIGIL) 200 MG tablet  Multiple Vitamin (MULTIVITAMIN OR)  naltrexone (DEPADE/REVIA) 50 MG tablet  order for DME  order for DME  order for DME  order for DME  ORDER FOR DME  orlistat (LAWRENCE) 60 MG capsule  simvastatin (ZOCOR) 10 MG tablet  topiramate (TOPAMAX) 100 MG tablet  triamcinolone (KENALOG) 0.1 % external cream  vitamin C (ASCORBIC ACID) 100 MG tablet      Review of Systems  As mentioned above in the history present illness. All other systems were reviewed and are negative.    Physical Exam   BP: (!) 150/78  Pulse: 87  Temp: 97.5  F (36.4  C)  Resp: 16  Height: 170.2 cm (5' 7\")  Weight: (!) 176.9 kg (390 lb)  SpO2: 97 %      Physical Exam  Vitals and nursing note reviewed.   Constitutional:       General: He is not in acute distress.     Appearance: He is well-developed. He is obese. He is not ill-appearing, toxic-appearing or diaphoretic.   HENT:      Head: Normocephalic and atraumatic.      Right Ear: External ear normal.      Left Ear: External ear normal.      Nose: Nose normal.      Mouth/Throat:      Mouth: Mucous membranes are moist.   Eyes:      General:         Right eye: No discharge.         Left eye: No discharge.      Conjunctiva/sclera: Conjunctivae normal.   Cardiovascular:      Rate and Rhythm: Normal rate and regular rhythm.      Pulses: Normal pulses.      Heart sounds: Normal heart sounds. No murmur heard.    No friction rub. No gallop.   Pulmonary:      Effort: Pulmonary effort is normal.      Breath sounds: Normal breath sounds. No stridor. No wheezing.   Abdominal:      General: Bowel sounds are normal. There is no distension.      Palpations: Abdomen is soft.      Tenderness: There is no abdominal tenderness. There is no guarding or rebound.   Musculoskeletal:      Right lower leg: Edema (2ankle edema) present.      Left lower leg: Edema (2 ankle edema) present.      Comments: Lower extremities bilateral is without hair " and scaling tan/brown dry skin noted on left lower leg- left inner leg - approximately 4 cm above medial malleolus there is a firm, knotty 1-2 mm area consistent with varicose veins versus phlebitis   Skin:     General: Skin is warm.      Findings: No rash.   Neurological:      Mental Status: He is alert and oriented to person, place, and time.   Psychiatric:         Mood and Affect: Mood normal.         ED Course              ED Course as of 02/19/23 1457   Sun Feb 19, 2023   1318 Discussed with patient labs of CBC unremarkable, basic metabolic panel with a mild elevated BUN and D-dimer is being elevated and thus recommendation for CT PE protocol and troponin is negative/normal and very low suspicion for any ACS, STEMI, NSTEMI.   1354 proBNP is negative, chest x-ray shows basilar congestion without acute consolidative process.   1406 Phone call received from radiologist, Dr. Hernandez regarding CT on patient.  He reports that patient has a single subsegmental PE in his right lower lobe.  Repeated this back to him and he confirmed.     Procedures         EKG Interpretation:      EKG Number: 1  Interpreted by Jasmyn Colón, NANCY CNP, Luis SALINAS  Symptoms at time of EKG: dyspnea   Rhythm: Normal sinus   Rate: Normal  Axis: Normal  Ectopy: None  Conduction: Normal  ST Segments/ T Waves: No ST-T wave changes and No acute ischemic changes  Q Waves: None  Comparison to prior: Unchanged from 11-    Clinical Impression: normal EKG        Results for orders placed or performed during the hospital encounter of 02/19/23 (from the past 24 hour(s))   CBC with platelets differential    Narrative    The following orders were created for panel order CBC with platelets differential.  Procedure                               Abnormality         Status                     ---------                               -----------         ------                     CBC with platelets and d...[[id number]]                      Final result                  Please view results for these tests on the individual orders.   D dimer quantitative   Result Value Ref Range    D-Dimer Quantitative 0.86 (H) 0.00 - 0.50 ug/mL FEU    Narrative    This D-dimer assay is intended for use in conjunction with a clinical pretest probability assessment model to exclude pulmonary embolism (PE) and deep venous thrombosis (DVT) in outpatients suspected of PE or DVT. The cut-off value is 0.50 ug/mL FEU.   Basic metabolic panel   Result Value Ref Range    Sodium 142 136 - 145 mmol/L    Potassium 3.9 3.4 - 5.3 mmol/L    Chloride 107 98 - 107 mmol/L    Carbon Dioxide (CO2) 26 22 - 29 mmol/L    Anion Gap 9 7 - 15 mmol/L    Urea Nitrogen 23.8 (H) 6.0 - 20.0 mg/dL    Creatinine 0.95 0.67 - 1.17 mg/dL    Calcium 8.9 8.6 - 10.0 mg/dL    Glucose 112 (H) 70 - 99 mg/dL    GFR Estimate >90 >60 mL/min/1.73m2   Nt probnp inpatient (BNP)   Result Value Ref Range    N terminal Pro BNP Inpatient <36 0 - 450 pg/mL   Troponin T, High Sensitivity   Result Value Ref Range    Troponin T, High Sensitivity <6 <=22 ng/L   [place] Draw    Narrative    The following orders were created for panel order [place] Draw.  Procedure                               Abnormality         Status                     ---------                               -----------         ------                     Extra Red Top Tube[[id number]]                               Final result                 Please view results for these tests on the individual orders.   CBC with platelets and differential   Result Value Ref Range    WBC Count 8.6 4.0 - 11.0 10e3/uL    RBC Count 4.46 4.40 - 5.90 10e6/uL    Hemoglobin 14.0 13.3 - 17.7 g/dL    Hematocrit 41.9 40.0 - 53.0 %    MCV 94 78 - 100 fL    MCH 31.4 26.5 - 33.0 pg    MCHC 33.4 31.5 - 36.5 g/dL    RDW 12.8 10.0 - 15.0 %    Platelet Count 236 150 - 450 10e3/uL    % Neutrophils 57 %    % Lymphocytes 28 %    % Monocytes 9 %    % Eosinophils 5 %    % Basophils 1 %    % Immature  Granulocytes 0 %    NRBCs per 100 WBC 0 <1 /100    Absolute Neutrophils 4.9 1.6 - 8.3 10e3/uL    Absolute Lymphocytes 2.4 0.8 - 5.3 10e3/uL    Absolute Monocytes 0.8 0.0 - 1.3 10e3/uL    Absolute Eosinophils 0.4 0.0 - 0.7 10e3/uL    Absolute Basophils 0.1 0.0 - 0.2 10e3/uL    Absolute Immature Granulocytes 0.0 <=0.4 10e3/uL    Absolute NRBCs 0.0 10e3/uL   Extra Red Top Tube   Result Value Ref Range    Hold Specimen JIC    XR Chest 2 Views    Narrative    EXAM: XR CHEST 2 VIEWS  LOCATION: [facility]  DATE/TIME: 2/19/2023 12:59 PM    INDICATION: shortness of breath  COMPARISON: 03/29/2021.      Impression    IMPRESSION: Mild bilateral vascular congestion. Mild left base atelectasis. No focal airspace disease. Borderline enlargement of the cardiac silhouette appears smaller since the comparison.   CT Chest Pulmonary Embolism w Contrast   Result Value Ref Range    Radiologist flags New diagnosis of pulmonary embolism (AA)     Narrative    EXAM: CT CHEST PULMONARY EMBOLISM W CONTRAST  LOCATION: [facility]  DATE/TIME: 2/19/2023 1:39 PM    INDICATION: Elevated d-dimer. Shortness of breath.  COMPARISON: Chest radiograph 03/29/2021.  TECHNIQUE: CT chest pulmonary angiogram during arterial phase injection of IV contrast. Multiplanar reformats and MIP reconstructions were performed. Dose reduction techniques were used.   CONTRAST: 96mL Isovue 370    FINDINGS:  ANGIOGRAM CHEST: Pulmonary arteries are mildly dilated, with the main pulmonary trunk measuring up to 3.5 cm in caliber. Acute subsegmental pulmonary embolism within the right lower lobe (4/#186). No other pulmonary emboli. Thoracic aorta is negative for   dissection. No CT evidence of right heart strain.    LUNGS AND PLEURA: No consolidations or pleural effusions. Central airways are patent.    MEDIASTINUM/AXILLAE: Normal cardiac size. No pericardial effusion. No enlarged thoracic lymph node.    CORONARY ARTERY  CALCIFICATION: None.    UPPER ABDOMEN: Partially visualized right renal cyst.    MUSCULOSKELETAL: Multilevel degenerative change of the spine. No acute bony abnormality.      Impression    IMPRESSION:    1.  Acute subsegmental pulmonary embolism within the right lower lobe. No other pulmonary emboli.    2.  No evidence of right heart strain.    [Critical Result: New diagnosis of pulmonary embolism]    Finding was identified on 2/19/2023 1:54 PM.     Dr. Colón was contacted by me on 2/19/2023 2:01 PM and verbalized understanding of the critical result.    US Lower Extremity Venous Duplex Left    Narrative    EXAM: US LOWER EXTREMITY VENOUS DUPLEX LEFT  LOCATION: [facility]  DATE/TIME: 2/19/2023 2:02 PM    INDICATION: Left medial leg pain, lower leg and upper inner thigh  COMPARISON: None.  TECHNIQUE: Venous Duplex ultrasound of the left lower extremity with and without compression, augmentation and duplex. Color flow and spectral Doppler with waveform analysis performed.    FINDINGS: Exam includes the common femoral, femoral, popliteal, and contralateral common femoral veins as well as segmentally visualized deep calf veins and greater saphenous vein.     LEFT: No deep vein thrombosis. No superficial thrombophlebitis. No popliteal cyst.    Scanning was also performed in the region of palpable abnormality that shows a focal 1.9 x 1.3 x 2.8 cm lesion in the subcutaneous tissues with echogenicity similar to the adjacent subcutaneous fat.      Impression    IMPRESSION:  1.  No deep venous thrombosis in the left lower extremity.  2.  Palpable region at the left mid medial calf corresponds with a 2.8 cm nodular structure with similar echogenicity to the adjacent subcutaneous tissues. As such this could be a lipoma. However, if further imaging assessment is desired, a nonemergent   CT or MRI could be performed.       Medications   lactated ringers BOLUS 1,000 mL (1,000 mLs Intravenous New Bag  2/19/23 1428)   iopamidol (ISOVUE-370) solution 96 mL (96 mLs Intravenous Given 2/19/23 1330)   sodium chloride 0.9 % bag 500mL for CT scan flush use (100 mLs Intravenous Given 2/19/23 1329)       Assessments & Plan (with Medical Decision Making)     I have reviewed the nursing notes.    I have reviewed the findings, diagnosis, plan and need for follow up with the patient.    Refugio Kennedy is a 47 year old male with past medical history of type 2 diabetes, mild intermittent asthma, hypertension, hyperlipidemia, venous stasis, sleep apnea, former methamphetamine abuse currently in remission who presents to the emergency department with concerns of left upper and left lower leg pain, shortness of breath.  Patient states that his left upper inner leg has been uncomfortable for the past week and then last evening he noted a little bump in his left lower inner leg that is painful to palpation.  Patient denies any trauma.  Patient states he has not tried any therapies or treatments.  Patient denies history of previous DVT or PE.  Patient reports he is currently on baby aspirin daily.  Work-up reveals normal EKG without acute ST segment elevation or depression or arrhythmia, no sign of ACS, STEMI, NSTEMI, ultrasound of left leg reveals no acute DVT, D-dimer is elevated and CT PE protocol reveals right lower lobe single subsegmental PE without cardiac strain.  Troponin is negative, BNP is negative, chest x-ray reveals pulmonary congestion and this is consistent with the subsequent PE finding.  Reviewed with patient all of these results.  Will initiate patient on Eliquis, believe this to be an unprovoked PE.  Oncology referral order placed, recommend follow-up with primary care within 7 to 14 days for reevaluation.  Bleeding precautions reviewed.  Patient discharged in stable condition.    New Prescriptions    APIXABAN STARTER PACK (ELIQUIS DVT/PE STARTER PACK) 5 MG TBPK    Take 10 mg by mouth 2 times daily for 7 days, THEN 5  mg 2 times daily for 23 days.       Final diagnoses:   Pulmonary emboli (H)       2/19/2023   [facility] EMERGENCY DEPT     Eldon, Jasmyn Lozoya, APRN CNP  02/19/23 [id number]

## 2023-02-19 NOTE — DISCHARGE INSTRUCTIONS
Take 10 mg by mouth twice daily for 7 days and then continue by taking 5 mg by mouth twice daily.  You should stop all ibuprofen, NSAIDs, Aleve, naproxen, aspirin type products.  Please follow-up with primary care for reevaluation within 7 to 14 days.  It is recommended to discuss ongoing anticoagulation therapy and possible prior authorization.  I have also placed an order for hematology oncology referral to evaluate for the reason that you develop this blood clot.  Please return to the emergency room if you should develop sudden chest pain, coughing up blood, difficulty breathing.  It appears at this point in time you have a procedure scheduled for March 1.  You should call Dr. Love or his office if you need to reschedule this.

## 2023-02-19 NOTE — ED TRIAGE NOTES
Left lower and upper leg pain and palpable lump, concerned about blood clot     Triage Assessment     Row Name 02/19/23 1112       Triage Assessment (Adult)    Airway WDL WDL       Cardiac WDL    Cardiac WDL WDL       Cognitive/Neuro/Behavioral WDL    Cognitive/Neuro/Behavioral WDL WDL

## 2023-02-19 NOTE — ED NOTES
Pt arrives from urgent care in [place] where he was told to come to the ER. Pt found small lump on lower left leg last night. Some pain associated with this when pressing on the lump. Also having left mid thigh pain that radiates down the leg. Pain stared earlier this week but got worse yesterday.

## 2023-02-20 NOTE — TELEPHONE ENCOUNTER
"Routing refill request to provider for review/approval because:  Patient needs to be seen because it has been more than 1 year since last office visit.      Pending Prescriptions:                       Disp   Refills    metFORMIN (GLUCOPHAGE XR) 500 MG 24 hr ta*90 tab*0            Sig: TAKE 1 TABLET (500 MG) BY MOUTH DAILY      Requested Prescriptions   Pending Prescriptions Disp Refills     metFORMIN (GLUCOPHAGE XR) 500 MG 24 hr tablet 90 tablet 0     Sig: TAKE 1 TABLET (500 MG) BY MOUTH DAILY       Biguanide Agents Failed - 2/17/2023  8:55 AM        Failed - Recent (6 mo) or future (30 days) visit within the authorizing provider's specialty     Patient had office visit in the last 6 months or has a visit in the next 30 days with authorizing provider or within the authorizing provider's specialty.  See \"Patient Info\" tab in inbasket, or \"Choose Columns\" in Meds & Orders section of the refill encounter.            Passed - Patient is age 10 or older        Passed - Patient has documented A1c within the specified period of time.     If HgbA1C is 8 or greater, it needs to be on file within the past 3 months.  If less than 8, must be on file within the past 6 months.     Recent Labs   Lab Test 10/03/22  1026   A1C 5.7*             Passed - Patient's CR is NOT>1.4 OR Patient's EGFR is NOT<45 within past 12 mos.     Recent Labs   Lab Test 02/19/23  1235 05/02/22  0918 04/01/21  0543   GFRESTIMATED >90   < > >60   GFRESTBLACK  --   --  >60    < > = values in this interval not displayed.       Recent Labs   Lab Test 02/19/23  1235   CR 0.95             Passed - Patient does NOT have a diagnosis of CHF.        Passed - Medication is active on med list             "

## 2023-02-21 RX ORDER — METFORMIN HCL 500 MG
TABLET, EXTENDED RELEASE 24 HR ORAL
Qty: 90 TABLET | Refills: 0 | Status: SHIPPED | OUTPATIENT
Start: 2023-02-21 | End: 2023-05-16

## 2023-02-21 NOTE — TELEPHONE ENCOUNTER
Pending Prescriptions:                       Disp   Refills    metFORMIN (GLUCOPHAGE XR) 500 MG 24 hr tab*90 tab*0        Sig: TAKE 1 TABLET (500 MG) BY MOUTH DAILY    Routing refill request to provider for review/approval because:  See protocol information below; pt was seen for annual exam on 10/3/22, and has an appt shceduled on 2/22/23. Ok for refill?     Biguanide Agents Failed 02/17/2023 08:55 AM   Protocol Details    Recent (6 mo) or future (30 days) visit within the authorizing provider's specialty    Patient is age 10 or older    Patient has documented A1c within the specified period of time.    Patient's CR is NOT>1.4 OR Patient's EGFR is NOT<45 within past 12 mos.    Patient does NOT have a diagnosis of CHF.    Medication is active on med list        Amy Gandara RN  [facility]

## 2023-02-22 ENCOUNTER — OFFICE VISIT (OUTPATIENT)
Dept: FAMILY MEDICINE | Facility: CLINIC | Age: 48
End: 2023-02-22
Payer: COMMERCIAL

## 2023-02-22 VITALS
WEIGHT: 315 LBS | SYSTOLIC BLOOD PRESSURE: 110 MMHG | DIASTOLIC BLOOD PRESSURE: 64 MMHG | TEMPERATURE: 97.9 F | BODY MASS INDEX: 49.44 KG/M2 | HEART RATE: 71 BPM | OXYGEN SATURATION: 98 % | HEIGHT: 67 IN | RESPIRATION RATE: 20 BRPM

## 2023-02-22 DIAGNOSIS — F15.11 METHAMPHETAMINE ABUSE IN REMISSION (H): ICD-10-CM

## 2023-02-22 DIAGNOSIS — E66.01 MORBID OBESITY DUE TO EXCESS CALORIES (H): ICD-10-CM

## 2023-02-22 DIAGNOSIS — E11.9 TYPE 2 DIABETES MELLITUS WITHOUT COMPLICATION, WITHOUT LONG-TERM CURRENT USE OF INSULIN (H): ICD-10-CM

## 2023-02-22 DIAGNOSIS — J96.11 CHRONIC RESPIRATORY FAILURE WITH HYPOXIA (H): ICD-10-CM

## 2023-02-22 DIAGNOSIS — R22.42 SUBCUTANEOUS NODULE OF LEFT LOWER LEG: ICD-10-CM

## 2023-02-22 DIAGNOSIS — I26.93 SINGLE SUBSEGMENTAL PULMONARY EMBOLISM WITHOUT ACUTE COR PULMONALE (H): Primary | ICD-10-CM

## 2023-02-22 DIAGNOSIS — I27.20 PULMONARY HYPERTENSION (H): ICD-10-CM

## 2023-02-22 PROCEDURE — 99214 OFFICE O/P EST MOD 30 MIN: CPT | Performed by: PHYSICIAN ASSISTANT

## 2023-02-22 ASSESSMENT — ASTHMA QUESTIONNAIRES
ACT_TOTALSCORE: 22
QUESTION_2 LAST FOUR WEEKS HOW OFTEN HAVE YOU HAD SHORTNESS OF BREATH: THREE TO SIX TIMES A WEEK
QUESTION_4 LAST FOUR WEEKS HOW OFTEN HAVE YOU USED YOUR RESCUE INHALER OR NEBULIZER MEDICATION (SUCH AS ALBUTEROL): NOT AT ALL
QUESTION_1 LAST FOUR WEEKS HOW MUCH OF THE TIME DID YOUR ASTHMA KEEP YOU FROM GETTING AS MUCH DONE AT WORK, SCHOOL OR AT HOME: NONE OF THE TIME
QUESTION_3 LAST FOUR WEEKS HOW OFTEN DID YOUR ASTHMA SYMPTOMS (WHEEZING, COUGHING, SHORTNESS OF BREATH, CHEST TIGHTNESS OR PAIN) WAKE YOU UP AT NIGHT OR EARLIER THAN USUAL IN THE MORNING: ONCE OR TWICE
ACT_TOTALSCORE: 22
QUESTION_5 LAST FOUR WEEKS HOW WOULD YOU RATE YOUR ASTHMA CONTROL: COMPLETELY CONTROLLED

## 2023-02-22 ASSESSMENT — PAIN SCALES - GENERAL: PAINLEVEL: MILD PAIN (3)

## 2023-02-22 NOTE — PROGRESS NOTES
Assessment & Plan   Single subsegmental pulmonary embolism without acute cor pulmonale (H)  Here for ER follow-up. Doing well on Eliquis. Shortness of breath has improved. Has follow-up with Heme/Onc already. Continue Eliquis for now.   - apixaban ANTICOAGULANT (ELIQUIS) 5 MG tablet; Take 1 tablet (5 mg) by mouth 2 times daily    Subcutaneous nodule of left lower leg  US demonstrated a subcutaneous nodule vs lipoma.  on exam's exam. Monitor symptoms and follow-up in 1-2 months if symptoms still present. Likely would need to move forward with CT/MRI at this time.     Pulmonary hypertension (H)  Severe. Associated with FARZANA and obesity hypoventilation syndrome. Follows with Pulmonary. Continue to monitor.     Chronic respiratory failure with hypoxia (H)  Due for pulm HTN and obesity hypoventilation syndrome. Continue to monitor with Pulmonary.    Morbid obesity due to excess calories (H)  Body mass index is 61.27 kg/m .. Associated with DM2, FARZANA, obesity hypoventilation which would improve with weight loss. Encouraged healthy lifestyle changes.     Type 2 diabetes mellitus without complication, without long-term current use of insulin (H)  Well controlled. On appropriate therapy. Follows with Endocrinology who is also assisting with weight loss.     Methamphetamine abuse in remission (H)  Stable without recurrence. Monitor.     Return if symptoms worsen or fail to improve, for In-Clinic Visit.    THERESE [facility]    Eliseo Huff is a 47 year old, presenting for the following health issues:  Emergency room follow up     History of Present Illness       Reason for visit:  Pulmonary embolism follow up    He eats 0-1 servings of fruits and vegetables daily.He consumes 0 sweetened beverage(s) daily.He exercises with enough effort to increase his heart rate 9 or less minutes per day.  He exercises with enough effort to increase his heart rate 4 days per week. He  "is missing 1 dose(s) of medications per week.  He is not taking prescribed medications regularly due to remembering to take.     Review of Systems   See HPI       Objective    /64 (BP Location: Right arm, Patient Position: Sitting, Cuff Size: Adult Large)   Pulse 71   Temp 97.9  F (36.6  C) (Tympanic)   Resp 20   Ht 1.702 m (5' 7\")   Wt (!) 177.4 kg (391 lb 3.2 oz)   SpO2 98%   BMI 61.27 kg/m    Body mass index is 61.27 kg/m .  Physical Exam   Constitutional: healthy, alert, and no distress  Head: Normocephalic. Atraumatic  Eyes: No conjunctival injection, sclera anicteric  Neck: supple, no thyromegaly, nodules or asymmetry of the thyroid. No cervical LAD.  Cardiovascular: RRR. No murmurs, clicks, gallops, or rubs. No peripheral edema.   Respiratory: No resp distress. Lungs CTAB bilaterally.   Musculoskeletal: extremities normal- no gross deformities noted, and normal muscle tone  Skin: no suspicious lesions or rashes. There is a tender nodule located on the L medial shin without overlying skin changes.   Neurologic: Gait normal. CN 2-12 grossly intact  Psychiatric: mentation appears normal and affect normal/bright               "

## 2023-02-22 NOTE — PATIENT INSTRUCTIONS
Continue Eliquis. I did sent in a prescription for a year supply.     Follow-up with the Hematologist.     No aspirin or Ibuprofen type medicines.     For the left leg nodule. Monitor symptoms for now and use Tylenol. If still present in 1-2 months, then we should get an MRI of the area.     Follow-up with us as needed.

## 2023-03-20 DIAGNOSIS — I27.20 PULMONARY HYPERTENSION (H): ICD-10-CM

## 2023-03-20 RX ORDER — LISINOPRIL 5 MG/1
5 TABLET ORAL DAILY
Qty: 90 TABLET | Refills: 3 | Status: SHIPPED | OUTPATIENT
Start: 2023-03-20 | End: 2024-03-19

## 2023-04-01 ENCOUNTER — HEALTH MAINTENANCE LETTER (OUTPATIENT)
Age: 48
End: 2023-04-01

## 2023-04-04 ENCOUNTER — TELEPHONE (OUTPATIENT)
Dept: PULMONOLOGY | Facility: OTHER | Age: 48
End: 2023-04-04

## 2023-04-04 NOTE — TELEPHONE ENCOUNTER
Prior Authorization Retail Medication Request    Medication/Dose: Modafinil 200 tablets  ICD code (if different than what is on RX):    Previously Tried and Failed:    Rationale:      Insurance Name:  [employer]    Insurance ID:  [id number]  PHONE [phone]      Pharmacy Information (if different than what is on RX)  Name:    Phone:      Thank you,  Violet Kenney,  Pharmacy [facility]

## 2023-04-07 ENCOUNTER — VIRTUAL VISIT (OUTPATIENT)
Dept: ENDOCRINOLOGY | Facility: CLINIC | Age: 48
End: 2023-04-07
Payer: COMMERCIAL

## 2023-04-07 VITALS — WEIGHT: 315 LBS | BODY MASS INDEX: 47.74 KG/M2 | HEIGHT: 68 IN

## 2023-04-07 DIAGNOSIS — E11.9 TYPE 2 DIABETES MELLITUS WITHOUT COMPLICATION, WITHOUT LONG-TERM CURRENT USE OF INSULIN (H): Primary | ICD-10-CM

## 2023-04-07 DIAGNOSIS — E66.01 MORBID OBESITY DUE TO EXCESS CALORIES (H): ICD-10-CM

## 2023-04-07 PROCEDURE — 99215 OFFICE O/P EST HI 40 MIN: CPT | Mod: VID | Performed by: INTERNAL MEDICINE

## 2023-04-07 RX ORDER — NALTREXONE HYDROCHLORIDE 50 MG/1
TABLET, FILM COATED ORAL
Qty: 180 TABLET | Refills: 1 | Status: SHIPPED | OUTPATIENT
Start: 2023-04-07 | End: 2023-10-24

## 2023-04-07 RX ORDER — TOPIRAMATE 100 MG/1
200 TABLET, FILM COATED ORAL 2 TIMES DAILY
Qty: 360 TABLET | Refills: 3 | Status: SHIPPED | OUTPATIENT
Start: 2023-04-07 | End: 2024-05-03

## 2023-04-07 NOTE — LETTER
"2023       RE: Refugio Kennedy  [address]  [place] [facility] [place] [address]     Dear Colleague,    Thank you for referring your patient, Refugio Kennedy, to the [facility] [place] [facility]. Please see a copy of my visit note below.        Return Medical Weight Management Note     Refugio Kennedy  MRN:  [id number]  :  1975  CRISTI:  2023    Dear Erika Espino MD,    I had the pleasure of seeing your patient Refugio Kennedy. He is a 47 year old male who I am continuing to see for treatment of obesity related to:         View : No data to display.                INTERVAL HISTORY:    Seen last by me Oct, 2022, and by Jeannine earlier; reviewed and relevant his the following as extracted--  ---------------------------------  \"He is a male who we are continuing to see for treatment of obesity related to: DMII, HLD, sleep apnea on CPAP daily, and hypoventilation syndrome, oxygen dependent.\"    \"[we are] continuing to see [him] for treatment of obesity related to: DMII, HLD, sleep apnea on CPAP daily, and hypoventilation syndrome, oxygen dependent.\"  ---------------------------------     Last visit he noted the following--  ---------------------------------  \"--pop still gone completely, For sweetening, using Stevia at home, still having some snacking (at work) but less snacking at home (not bringing them into the home)     --stress (girlfriend taking care of her 100 yo father) is making it more difficult to focus on wt loss     Current meds include--  --naltrexone 100 mg early afternoon  --topiramate 200 BID (6-9p taking it and supper time varies but can be around 7p, in bed typically by 10p); he will shift PM topiramate dose up a little (to help with late afternoon/early evening urges to eat)\"  ---------------------------------           LAST WEIGHT:   385 lbs 0 oz   Body mass index is 58.54 kg/m .     Initial Weight (lbs): 454 " "lbs      --of note since last visit, unfortunately he has been w/out of Victoza for several mo.  Noticed the difference with having more of a struggle to stay on track with his wt loss plans. He was tolerating that med without any issues    CURRENT WEIGHT:   380 lbs 0 oz   Body mass index is 57.78 kg/m .    Initial Weight (lbs): 454 lbs  Last Visits Weight: 177.4 kg (391 lb)  Cumulative weight loss (lbs): 74  Weight Loss Percentage: 16.3%        4/4/2023    10:12 AM   Changes and Difficulties   I have made the following changes to my diet since my last visit: Attempting to add more vegetables   With regards to my diet, I am still struggling with: Snacking,support at home.   I have made the following changes to my activity/exercise since my last visit: Still doing yoga,not enough   With regards to my activity/exercise, I am still struggling with: Daily breathing exercise, and rest 3-5 x week       VITALS:  Ht 1.727 m (5' 8\")   Wt (!) 172.4 kg (380 lb)   BMI 57.78 kg/m      MEDICATIONS:   Current Outpatient Medications   Medication Sig Dispense Refill    ACCU-CHEK CHRIS PLUS test strip USE TO TEST BLOOD SUGAR 2 TIMES DAILY OR AS DIRECTED. 200 strip 1    albuterol (2.5 MG/3ML) 0.083% neb solution Take 1 vial (2.5 mg) by nebulization every 4 hours as needed for shortness of breath / dyspnea or wheezing 25 vial 0    albuterol (PROAIR HFA/PROVENTIL HFA/VENTOLIN HFA) 108 (90 Base) MCG/ACT inhaler INHALE 2 PUFFS INTO THE LUNGS EVERY 4 HOURS AS NEEDED FOR SHORTNESS OF BREATH / DYSPNEA 18 g 0    apixaban ANTICOAGULANT (ELIQUIS) 5 MG tablet Take 1 tablet (5 mg) by mouth 2 times daily 180 tablet 3    blood glucose (NO BRAND SPECIFIED) test strip Use to test blood sugar 2 times daily or as directed. 180 strip 3    blood glucose monitoring (SOFTCLIX) lancets USE TO TEST THREE TIMES DAILY 300 each 1    cetirizine (ZYRTEC) 10 MG tablet TAKE ONE TABLET BY MOUTH EVERY EVENING 90 tablet 1    DIABETIC STERILE LANCETS device 1 Device " 3 times daily. 1 Box 12    fluticasone (FLONASE) 50 MCG/ACT spray Spray 1-2 sprays into both nostrils daily 16 g 0    furosemide (LASIX) 20 MG tablet TAKE TWO TABLETS BY MOUTH TWICE A  tablet 3    insulin pen needle (PENTIPS) 32G X 4 MM miscellaneous USE 1 PEN NEEDLE DAILY OR AS DIRECTED 200 each 1    liraglutide (VICTOZA PEN) 18 MG/3ML solution Inject 1.8 mg Subcutaneous daily 27 mL 3    lisinopril (ZESTRIL) 5 MG tablet Take 1 tablet (5 mg) by mouth daily 90 tablet 3    metFORMIN (GLUCOPHAGE XR) 500 MG 24 hr tablet TAKE 1 TABLET (500 MG) BY MOUTH DAILY 90 tablet 0    modafinil (PROVIGIL) 200 MG tablet Take 1/2 tablet by mouth 3-4 times daily as needed for sleepiness. 60 tablet 5    Multiple Vitamin (MULTIVITAMIN OR) Take 1 tablet by mouth daily.      naltrexone (DEPADE/REVIA) 50 MG tablet Take 2 tablets daily 180 tablet 1    order for DME Equipment being ordered: Dynaflex insert 1 Units 0    order for DME Equipment being ordered: Dynaflex insert 1 Units 0    order for DME Nebulizer 1 Units 0    order for DME Equipment being ordered: BIPAP Reufgio P Kennedy received a [employer]med AirCurve 10 Bilevel. Pressures were set at 23/14 cm H2O.      ORDER FOR DME Auto-BiPAP:  IPAP max 21 cm H2O  EPAP min 15 cm H2O  Pressure support 5 cm  Changed in clinic  Lifetime need and heated humidity.          orlistat (LAWRENCE) 60 MG capsule Take 60 mg by mouth 3 times daily as needed      simvastatin (ZOCOR) 10 MG tablet Take 1 tablet (10 mg) by mouth At Bedtime 90 tablet 3    topiramate (TOPAMAX) 100 MG tablet Take 2 tablets (200 mg) by mouth 2 times daily 360 tablet 3    triamcinolone (KENALOG) 0.1 % external cream Apply topically 2 times daily 45 g 1    vitamin C (ASCORBIC ACID) 100 MG tablet Take 100 mg by mouth every evening              4/4/2023    10:12 AM   Weight Loss Medication History Reviewed With Patient   Which weight loss medications are you currently taking on a regular basis? Naltrexone    Topamax (topiramate)     HbA1c  8/21--5.7    ASSESSMENT:   Class 3 obesity/DM2 not on insulin  Naltrexone 50 mg/d has been helpful. Maintain topiramate 200 mg BID. Also on liraglutide 1.8/d. Reinforce food plan - focus on no between meal snacking, aggressive lowering of starches and cheese.   --Start Wegovy or Ozempic if covered by insurance; continue naltrexone and topiramate     FOLLOW-UP:    Cynthia in 4 wks, me in 2-3 mo          Total time spent: 40 min (including pre-visit prep and post visit follow up/charting same day)  Visit start--11:16/11:28 end (phone call portion); had to convert to phone (his audio not working on video so we changed to 6connect to complete it)  Refugio Kennedy is a 47 year old who is being evaluated via a billable video visit.       How would you like to obtain your AVS? MyChart  If the video visit is dropped, the invitation should be resent by: Text to cell phone: [phone]  Will anyone else be joining your video visit? No6}  During this virtual visit the patient is located in [place], patient verifies this as the location during the entirety of this visit.       Video-Visit Details  Type of service:  Video Visit  Originating Location (pt. Location): Home        Distant Location (provider location):  Off-site     Platform used for Video Visit: Kushal      Sincerely,    Mohsen Plascencia MD

## 2023-04-07 NOTE — PROGRESS NOTES
"    Return Medical Weight Management Note     Refugio Kennedy  MRN:  [id number]  :  1975  CRISTI:  2023    Dear Erika Espino MD,    I had the pleasure of seeing your patient Refugio Kennedy. He is a 47 year old male who I am continuing to see for treatment of obesity related to:         View : No data to display.                INTERVAL HISTORY:    Seen last by me Oct, 2022, and by Jeannine earlier; reviewed and relevant his the following as extracted--  ---------------------------------  \"He is a male who we are continuing to see for treatment of obesity related to: DMII, HLD, sleep apnea on CPAP daily, and hypoventilation syndrome, oxygen dependent.\"    \"[we are] continuing to see [him] for treatment of obesity related to: DMII, HLD, sleep apnea on CPAP daily, and hypoventilation syndrome, oxygen dependent.\"  ---------------------------------     Last visit he noted the following--  ---------------------------------  \"--pop still gone completely, For sweetening, using Stevia at home, still having some snacking (at work) but less snacking at home (not bringing them into the home)     --stress (girlfriend taking care of her 100 yo father) is making it more difficult to focus on wt loss     Current meds include--  --naltrexone 100 mg early afternoon  --topiramate 200 BID (6-9p taking it and supper time varies but can be around 7p, in bed typically by 10p); he will shift PM topiramate dose up a little (to help with late afternoon/early evening urges to eat)\"  ---------------------------------           LAST WEIGHT:   385 lbs 0 oz   Body mass index is 58.54 kg/m .     Initial Weight (lbs): 454 lbs      --of note since last visit, unfortunately he has been w/out of Victoza for several mo.  Noticed the difference with having more of a struggle to stay on track with his wt loss plans. He was tolerating that med without any issues    CURRENT WEIGHT:   380 lbs 0 oz   Body mass index is 57.78 kg/m .    Initial " "Weight (lbs): 454 lbs  Last Visits Weight: 177.4 kg (391 lb)  Cumulative weight loss (lbs): 74  Weight Loss Percentage: 16.3%        4/4/2023    10:12 AM   Changes and Difficulties   I have made the following changes to my diet since my last visit: Attempting to add more vegetables   With regards to my diet, I am still struggling with: Snacking,support at home.   I have made the following changes to my activity/exercise since my last visit: Still doing yoga,not enough   With regards to my activity/exercise, I am still struggling with: Daily breathing exercise, and rest 3-5 x week       VITALS:  Ht 1.727 m (5' 8\")   Wt (!) 172.4 kg (380 lb)   BMI 57.78 kg/m      MEDICATIONS:   Current Outpatient Medications   Medication Sig Dispense Refill     ACCU-CHEK CHRIS PLUS test strip USE TO TEST BLOOD SUGAR 2 TIMES DAILY OR AS DIRECTED. 200 strip 1     albuterol (2.5 MG/3ML) 0.083% neb solution Take 1 vial (2.5 mg) by nebulization every 4 hours as needed for shortness of breath / dyspnea or wheezing 25 vial 0     albuterol (PROAIR HFA/PROVENTIL HFA/VENTOLIN HFA) 108 (90 Base) MCG/ACT inhaler INHALE 2 PUFFS INTO THE LUNGS EVERY 4 HOURS AS NEEDED FOR SHORTNESS OF BREATH / DYSPNEA 18 g 0     apixaban ANTICOAGULANT (ELIQUIS) 5 MG tablet Take 1 tablet (5 mg) by mouth 2 times daily 180 tablet 3     blood glucose (NO BRAND SPECIFIED) test strip Use to test blood sugar 2 times daily or as directed. 180 strip 3     blood glucose monitoring (SOFTCLIX) lancets USE TO TEST THREE TIMES DAILY 300 each 1     cetirizine (ZYRTEC) 10 MG tablet TAKE ONE TABLET BY MOUTH EVERY EVENING 90 tablet 1     DIABETIC STERILE LANCETS device 1 Device 3 times daily. 1 Box 12     fluticasone (FLONASE) 50 MCG/ACT spray Spray 1-2 sprays into both nostrils daily 16 g 0     furosemide (LASIX) 20 MG tablet TAKE TWO TABLETS BY MOUTH TWICE A  tablet 3     insulin pen needle (PENTIPS) 32G X 4 MM miscellaneous USE 1 PEN NEEDLE DAILY OR AS DIRECTED 200 each 1 "     liraglutide (VICTOZA PEN) 18 MG/3ML solution Inject 1.8 mg Subcutaneous daily 27 mL 3     lisinopril (ZESTRIL) 5 MG tablet Take 1 tablet (5 mg) by mouth daily 90 tablet 3     metFORMIN (GLUCOPHAGE XR) 500 MG 24 hr tablet TAKE 1 TABLET (500 MG) BY MOUTH DAILY 90 tablet 0     modafinil (PROVIGIL) 200 MG tablet Take 1/2 tablet by mouth 3-4 times daily as needed for sleepiness. 60 tablet 5     Multiple Vitamin (MULTIVITAMIN OR) Take 1 tablet by mouth daily.       naltrexone (DEPADE/REVIA) 50 MG tablet Take 2 tablets daily 180 tablet 1     order for DME Equipment being ordered: Dynaflex insert 1 Units 0     order for DME Equipment being ordered: Dynaflex insert 1 Units 0     order for DME Nebulizer 1 Units 0     order for DME Equipment being ordered: BIPAP Refugio P Kennedy received a Telestream AirCurve 10 Bilevel. Pressures were set at 23/14 cm H2O.       ORDER FOR DME Auto-BiPAP:  IPAP max 21 cm H2O  EPAP min 15 cm H2O  Pressure support 5 cm  Changed in clinic  Lifetime need and heated humidity.           orlistat (LAWRENCE) 60 MG capsule Take 60 mg by mouth 3 times daily as needed       simvastatin (ZOCOR) 10 MG tablet Take 1 tablet (10 mg) by mouth At Bedtime 90 tablet 3     topiramate (TOPAMAX) 100 MG tablet Take 2 tablets (200 mg) by mouth 2 times daily 360 tablet 3     triamcinolone (KENALOG) 0.1 % external cream Apply topically 2 times daily 45 g 1     vitamin C (ASCORBIC ACID) 100 MG tablet Take 100 mg by mouth every evening              4/4/2023    10:12 AM   Weight Loss Medication History Reviewed With Patient   Which weight loss medications are you currently taking on a regular basis? Naltrexone    Topamax (topiramate)     HbA1c 8/21--5.7    ASSESSMENT:   Class 3 obesity/DM2 not on insulin  Naltrexone 50 mg/d has been helpful. Maintain topiramate 200 mg BID. Also on liraglutide 1.8/d. Reinforce food plan - focus on no between meal snacking, aggressive lowering of starches and cheese.   --Start Wegovy or Ozempic if  covered by insurance; continue naltrexone and topiramate     FOLLOW-UP:    Cynthia in 4 wks, me in 2-3 mo          Total time spent: 40 min (including pre-visit prep and post visit follow up/charting same day)  Visit start--11:16/11:28 end (phone call portion); had to convert to phone (his audio not working on video so we changed to [employer] to complete it)  Refugio Kennedy is a 47 year old who is being evaluated via a billable video visit.       How would you like to obtain your AVS? MyChart  If the video visit is dropped, the invitation should be resent by: Text to cell phone: [phone]  Will anyone else be joining your video visit? No6}  During this virtual visit the patient is located in [place], patient verifies this as the location during the entirety of this visit.       Video-Visit Details  Type of service:  Video Visit  Originating Location (pt. Location): Home        Distant Location (provider location):  Off-site     Platform used for Video Visit: Kushal      Sincerely,    Mohsen Plascencia MD

## 2023-04-08 NOTE — PATIENT INSTRUCTIONS
Thank you for allowing us the privilege of caring for you. We hope we provided you with the excellent service you deserve.    Please let us know if there is anything else we can do for you so that we can be sure you are completely satisfied with your care experience.    Your visit was with  Dr Plascencia today.    Instructions per today's visit:  --we will see if Ozempic is covered (instead of resuming Victoza); see below  --we will continue the naltrexone and topiramate  --we can plan a visit with our medication mgmt pharmacist (Cynthia) and with Dr. Plascencia again in about 2-3 mo    Please call our contact center at [phone] to schedule your next appointments.    Meal Replacement Products:    Here is the link to our new e-store where you can purchase our meal replacement products    [employer]/store    The one week starter kit is a great way to sample a variety of products and see what works for you.    If you want more information about the product go to: [employer]    Free Shipping for orders over $75    Benefits of meal replacements products:    Portion and calorie control  Improved nutrition  Structured eating  Simplified food choices  Avoid contact with trigger foods    Interested in working with a health ?  Health coaches work with you to improve your overall health and wellbeing.  They look at the whole person, and may involve discussion of different areas of life, including, but not limited to the four pillars of health (sleep, exercise, nutrition, and stress management). Discuss with your care team if you would like to start working a health .    Health Coaching-3 Pack: Schedule by calling [phone]    $99 for three health coaching visits    Visits may be done in person or via phone    Coaching is a partnership between the  and the client; Coaches do not prescribe or diagnose    Coaching helps inspire the client to reach his/her  personal goals    Bluetooth Scale:    We hope to provide you with high quality telephone and virtual healthcare visits while social distancing for COVID-19 is necessary, as well as in the future when virtual visits may be more convenient for you.    Our technology team made it possible for Bluetooth scales to send weight measurements to our electronic medical record. This allows weights from you weighing at home to securely flow into the medical record, which will improve telephone and virtual visits.  Additionally, studies have shown that adults actually lose more weight when their weights are automatically sent to someone else, and also that this process is not stressful for those adults.    Below is a link for purchasing the scale, with a discount code for our patients. You may call your insurance company to see if they will reimburse you for the cost of the scale, as a piece of durable medical equipment. The scales only go up to a weight of 400 pounds. This is an issue and we are working with the developer on increasing this. We found no scales that go over 400lb that have blue-tooth for connecting to WeOrder LTD.    Scale to purchase: the Mingle360  Body  Scale: [email or web address]/body/[email or web address]=aw.ds    Discount Code: We have a discount code for our patients to bring the cost down to $50, the code is:  withmed     Steps to link the scale to WeOrder LTD via an Android Phone (you can always disconnect at any point in the future):  1. The order must be placed first before the patient can access Track My Health within We[employer].  2. Download Google Fit norman from the Google Play Store  a. Log in or register using your Google account  3. Download the WeOrder LTD norman from Google Play Store  a. Select add organization  b. Search for [employer] and select it  c. Log into WeOrder LTD  d. Select Track My Health  e. Select the green connect my account button  f. When  prompted log into your Google account  g. Select okay to confirm the account  4. Download the Withings Health Mate maribel from Google Play Store  a. [place] for [employer]  b. Go to profile  c. Tap google fit under the Apps section  d. Select the option to activate Google Fit integration  e. Select the same Google account  f. Select okay to confirm the account  g.  Steps to link the scale to [facility] via an iPhone (you can always disconnect at any point in the future):  **Note Rockstar Solos is not available for download on an iPad**  1. The order must be placed first before the patient can access Track My Health within [facility].  2. Locate the Health maribel on your iPhone.  a. Set up your Apple Health account as prompted  b. The Sources page will show Apps that communicate with your Health maribel. Once all steps are completed, you should see Prism Skylabs and Positron Dynamicshart listed under the Apps section and your iPhone under the devices section.  i. Select Health "Met[employer]  1. Under 'ALLOW  HEALTH Slated  TO WRITE DATA ensure the toggle is on for Weight.  2. This will allow the scale to add your weight to the Apple Health  ii. Select Positron Dynamicshart  1. Under 'ALLOW  [email or web address]  TO READ DATA ensure the toggle is on for Weight.  2. This allows [facility] to grab the weight from Rockstar Solos so your provider can see your weights.  3. Download the [employer]t maribel from the Maribel Store  a. Select add organization                                                  b. Search for [employer] Command and select it  c. Log into [facility]  d. Select Track My Health  e. Select the green connect my account button  f. Follow prompts to link your device to [facility].  4. Download the Withings health mate maribel in the Maribel Store  a. [place] for [employer]ostro  b. Go to profile  c. Tap Health under the Apps section  d. If prompted to allow access with the Health Maribel, toggle weight on for read and write access.      For any questions/concerns contact Ankita Miles LPN at [phone]    To schedule  "appointments with our team, please call [phone]    Please call during clinic hours Monday through Friday 8:00a - 4:00p if you have questions or you can contact us via RingCentral at anytime.      Lab results will be communicated through My Chart or letter (if My Chart not used). Please call the clinic if you have not received communication after 1 week or if you have any questions.    Clinic Fax: [phone]    Thank you,  Medical Weight Management Team        OZEMPIC (semaglutide)  (As we discussed this medication is in the same class as Victoza, quite similar but even more effective)    We are starting a GLP-1 (Glucagon-like Peptide-1) medication called semaglutide (aka Ozempic). One of the ways it works is by slowing down the rate that food leaves your stomach. You feel camp and will eat less. It also helps regulate hormones that can help improve your blood sugars and weight.     Ozempic has been studied for safety and effect on weight loss in adults without diabetes. It is currently FDA approved for diabetes and is considered \"off label\" use for weight loss.    Dosing for this medication:   Month 1- Inject 0.25 mg weekly  Month 2- Inject 0.5mg weekly    Side effects of GLP- Medications include: The most common side effects are all GI related and consist of: nausea, constipation, diarrhea, burping, or gassiness. Patients are advised to eat slowly and less, and nausea typically passes if people can stick it out.     The risk of pancreatitis (inflammation of the pancreas) has been associated with this type of medication, but is very rare.  If you have had pancreatitis in the past, this medication may not be for you. Please let us know about any past history of pancreas problems.    Symptoms of pancreatitis include: Pain in your upper stomach area which may travel to your back and be worse after eating. Your stomach area may be tender to the touch.  You may have vomiting or nausea and/or have a fever. If you " should develop any of these symptoms, stop the medication and contact your primary care doctor. They will do a blood test to check for pancreatitis.         There is a small chance you may have some low blood sugar after taking the medication.   The signs of low blood sugar are:  Weakness  Shaky   Hungry  Sweating  Confusion      See below for ways to treat low blood sugar without adding in lots of extra calories.      Treating Low Blood Sugar    If you have symptoms of low blood sugar (sweating, shaking, dizzy, confused) eat 15 grams of carbs and wait 15 minutes:    Glucose Tabs are best for sugars under 70 -  Dex4 or BD Glucose tablets are good, you will need to take 3-4 of these to equal 15 grams.     One small box of raisins  4 oz fruit juice box or   cup fruit juice  1 small apple  1 small banana    cup canned fruit in water    English muffin or a slice of bread with jelly   1 low fat frozen waffle with sugar-free syrup    cup cottage cheese with   cup frozen or fresh blueberries  1 cup skim or low-fat milk    cup whole grain cereal  4-6 crackers such as Triscuits      This medication is usually not covered by insurance and can be quite expensive. Sometimes a prior authorization is required, which may take up to 1-2 weeks for an insurance company to make a decision if they will cover the medication. Please be patient, you will be notified after a decision has been made.    For any questions or concerns please send a [id number] message to our team or call our weight management call center at [phone] during regular business hours. For questions during evenings or weekends your messages will be addressed during the next business day.  For emergencies please call 911 or seek immediate medical care.      (Do not stop taking it if you don't think it's working. For some people it works without them knowing it.)     In order to get refills of this or any medication we prescribe you must be seen in the medical weight  mgmt clinic every 2-4 months. Please have your pharmacy fax a refill request to [phone].

## 2023-04-09 NOTE — TELEPHONE ENCOUNTER
Prior Authorization Approval    Authorization Effective Date: 3/10/2023  Authorization Expiration Date: 4/8/2024  Medication: Modafinil 200 tablets  Approved Dose/Quantity:   Reference #:     Insurance Company: [employer] - Phone [phone] Fax [phone]  Expected CoPay:       CoPay Card Available:      Foundation Assistance Needed:    Which Pharmacy is filling the prescription (Not needed for infusion/clinic administered): [facility] [place], [place] - [address] [place] [address]  Pharmacy Notified: Yes  Patient Notified: No

## 2023-05-10 NOTE — PROGRESS NOTES
[facility]  [facility]  [facility], [address], [place] [address]  Main: [phone], Fax: [phone]    Outpatient Visit Note:    Patient: Refugio Kennedy  MRN: [id number]  : 1975  CRISTI: May 11, 2023    Reason for Consultation:  Refugio Kennedy is a 47 year old male with a history of obesity, hypoventilation syndrome/FARZANA, chronic respiratory failure, pulmonary hypertension, insulin dependent type 2 diabetes mellitus, dyslipidemia, past methamphetamine use (clean for 8 years!), venous insufficiency, and venous thromboembolism that was referred to the [facility] for evaluation.     Pertinent Clinical History:  Refugio was in his usual state of health prior to 2023 when he presented to the ED with left lower extremity pain + shortness of breath and was ultimately found to have acute subsegmental pulmonary embolism within the right lower lobe. There was no left lower extremity DVT seen on subsequent ultrasound. A 2.8cm nodule was noted about the left mid medial calf (subcutaneous nodule vs. lipoma).     Refugio denied any specific provoking factors around the onset of his symptoms. No recent travel. No recent surgeries, procedures, illnesses, injuries, hospitalizations, or other forms of immobilization. He has no known cancers. He is due for screening colonoscopy.    Refugio endorses a longstanding history of venous insufficiency, bilateral lower extremity edema, and varicose veins. He wears compression stockings regularly. He also notes one episode of superficial thrombophlebitis back when he was in treatment for drug addiction many years ago. Prior to the event on 2023, he had never been diagnosed with DVT/PE. He has no known family history of venous thromboembolism.    Interval History:  Remains on Eliquis 5mg twice daily and is tolerating it well. No bleeding concerns--major or nuisance.     He continues to experience  dyspnea on exertion and easy fatigability but no chest pain. He has also had some occasional mild light headedness upon standing.     He reports that the nodule about the left mid medial calf (subcutaneous nodule vs. lipoma) is still present but does not bother him unless he applies significant pressure to it. He otherwise has no lower extremity pain, edema beyond his baseline, erythema/other overlying skin changes.    ROS:  -Denies epistaxis, oral/mucosal bleeding, excessive bruising/ecchymosis, hematuria, hematochezia, and melena.  +Longstanding history of venous insufficiency and bilateral lower extremity edema.   -Denies acute lower extremity changes.  -Denies chest pain.   +Dyspnea on exertion, through this has improved since PE diagnosis.    Family History:  There is no known family history of venous thromboembolism.     Objectives:  /77 (BP Location: Right arm, Patient Position: Sitting, Cuff Size: Adult Large)   Pulse 91   Temp 98.7  F (37.1  C) (Oral)   Resp 16   Wt (!) 172.5 kg (380 lb 4.8 oz)   SpO2 100%   BMI 57.82 kg/m    Exam:   Constitutional: Well appearing. Not in distress  HEENT: Poor dentition.  Respiratory: Breathing comfortably on room air.  Skin: No ecchymosis.  Neuro: AOx3.  Lower extremities: Compression stockings in place and were not removed as the patient had no lower extremity concerns at the time of the visit.    Labs:  Component      Latest Ref Rn 5/11/2023  9:54 AM   Sodium      136 - 145 mmol/L 141    Potassium      3.4 - 5.3 mmol/L 4.0    Chloride      98 - 107 mmol/L 106    Carbon Dioxide (CO2)      22 - 29 mmol/L 24    Anion Gap      7 - 15 mmol/L 11    Urea Nitrogen      6.0 - 20.0 mg/dL 19.1    Creatinine      0.67 - 1.17 mg/dL 0.88    Calcium      8.6 - 10.0 mg/dL 9.2    Glucose      70 - 99 mg/dL 114 (H)    Alkaline Phosphatase      40 - 129 U/L 128    AST      10 - 50 U/L 21    ALT      10 - 50 U/L 22    Protein Total      6.4 - 8.3 g/dL 7.3    Albumin      3.5 -  5.2 g/dL 4.1    Bilirubin Total      <=1.2 mg/dL 0.3    GFR Estimate      >60 mL/min/1.73m2 >90     Estimated Creatinine Clearance: 158 mL/min (based on SCr of 0.88 mg/dL).    Component      Latest Ref Rng 2/19/2023  12:35 PM   WBC      4.0 - 11.0 10e3/uL 8.6    RBC Count      4.40 - 5.90 10e6/uL 4.46    Hemoglobin      13.3 - 17.7 g/dL 14.0    Hematocrit      40.0 - 53.0 % 41.9    MCV      78 - 100 fL 94    MCH      26.5 - 33.0 pg 31.4    MCHC      31.5 - 36.5 g/dL 33.4    RDW      10.0 - 15.0 % 12.8    Platelet Count      150 - 450 10e3/uL 236    % Neutrophils      % 57    % Lymphocytes      % 28    % Monocytes      % 9    % Eosinophils      % 5    % Basophils      % 1    % Immature Granulocytes      % 0    NRBCs per 100 WBC      <1 /100 0    Absolute Neutrophils      1.6 - 8.3 10e3/uL 4.9    Absolute Lymphocytes      0.8 - 5.3 10e3/uL 2.4    Absolute Monocytes      0.0 - 1.3 10e3/uL 0.8    Absolute Eosinophils      0.0 - 0.7 10e3/uL 0.4    Absolute Basophils      0.0 - 0.2 10e3/uL 0.1    Absolute Immature Granulocytes      <=0.4 10e3/uL 0.0    Absolute NRBCs      10e3/uL 0.0      Imaging:  EXAM: CT CHEST PULMONARY EMBOLISM W CONTRAST  LOCATION: [facility]  DATE/TIME: 2/19/2023 1:39 PM  INDICATION: Elevated d-dimer. Shortness of breath.  COMPARISON: Chest radiograph 03/29/2021.  TECHNIQUE: CT chest pulmonary angiogram during arterial phase injection of IV contrast. Multiplanar reformats and MIP reconstructions were performed. Dose reduction techniques were used.   CONTRAST: 96mL Isovue 370  FINDINGS:  ANGIOGRAM CHEST: Pulmonary arteries are mildly dilated, with the main pulmonary trunk measuring up to 3.5 cm in caliber. Acute subsegmental pulmonary embolism within the right lower lobe (4/#186). No other pulmonary emboli. Thoracic aorta is negative for   dissection. No CT evidence of right heart strain.  LUNGS AND PLEURA: No consolidations or pleural effusions. Central airways are  patent.  MEDIASTINUM/AXILLAE: Normal cardiac size. No pericardial effusion. No enlarged thoracic lymph node.  CORONARY ARTERY CALCIFICATION: None.  UPPER ABDOMEN: Partially visualized right renal cyst.  MUSCULOSKELETAL: Multilevel degenerative change of the spine. No acute bony abnormality.                          IMPRESSION:  1.  Acute subsegmental pulmonary embolism within the right lower lobe. No other pulmonary emboli.  2.  No evidence of right heart strain.    EXAM: US LOWER EXTREMITY VENOUS DUPLEX LEFT  LOCATION: [facility]  DATE/TIME: 2/19/2023 2:02 PM  INDICATION: Left medial leg pain, lower leg and upper inner thigh  COMPARISON: None.  TECHNIQUE: Venous Duplex ultrasound of the left lower extremity with and without compression, augmentation and duplex. Color flow and spectral Doppler with waveform analysis performed.  FINDINGS: Exam includes the common femoral, femoral, popliteal, and contralateral common femoral veins as well as segmentally visualized deep calf veins and greater saphenous vein.   LEFT: No deep vein thrombosis. No superficial thrombophlebitis. No popliteal cyst.  Scanning was also performed in the region of palpable abnormality that shows a focal 1.9 x 1.3 x 2.8 cm lesion in the subcutaneous tissues with echogenicity similar to the adjacent subcutaneous fat.                       IMPRESSION:  1.  No deep venous thrombosis in the left lower extremity.  2.  Palpable region at the left mid medial calf corresponds with a 2.8 cm nodular structure with similar echogenicity to the adjacent subcutaneous tissues. As such this could be a lipoma. However, if further imaging assessment is desired, a nonemergent CT or MRI could be performed.    Assessment:  In summary, Refugio is a 47 year old male with a history of obesity, hypoventilation syndrome/FARZANA, chronic respiratory failure, pulmonary hypertension, insulin dependent type 2 diabetes mellitus, dyslipidemia, past  methamphetamine use (clean for 8 years!), venous insufficiency, and unprovoked PE on 02/19/2023.    Given the unprovoked nature of his venous thromboembolic event on 02/19/2023, in accordance with the BENJAMIN guidelines, I recommend indefinite anticoagulation for as long as Refugio remains an appropriate candidate. Though limited, the data regarding the use of direct oral anticoagulants in individuals with BMI>40 are overall reassuring. At this point, Refugio is a fine candidate for both Xarelto and Eliquis, though he does think once daily dosing would be easier. He does have a history of aminotransferase elevations, though normal on recent testing. Renal function is normal.     We spent some time discussing the pathophysiology of/risk factors for venous thromboembolism. Safety profile of/indications for holding anticoagulation were reviewed. We discussed that it is not uncommon to have some lingering exercise intolerance <3 months out from acute PE. If this continues to persist over the next several months, we can repeat his chest CT.    Familial thrombophilia work-up would likely be low yield given lack of family history of venous thromboembolism. Furthermore, results would not change out plan for indefinite anticoagulation. Thus, it was not pursued.     Plan:    1) Continue anticoagulation. Can finish off current supply of Eliquis 5mg BID, then switch to Xarelto 20mg daily. Anticoagulation to be continued indefinitely.  2) Reach out regarding any bleeding concerns and prior to planned surgeries/procedures.  3) For upcoming colonoscopy: hold anticoagulation x48h prior and re-start 12-24h after barring any bleeding complications.  4) If still having fatigue/shortness of breath on exertion around July/August, we can repeat the chest CT.  5) Follow-up with me yearly.  6) Work with primary care provider to ensure up to date on age appropriate cancer screenings. Follow-up with primary care provider as needed re:  lipoma/subcutanoues nodule.  7) Continue use of compression stockings for venous insufficiency.    The patient understands and agrees with the above recommendations. The patient was given our center's contact information and was instructed to call if any further questions/concerns arise.    60 minutes spent on the date of the encounter doing chart review, history and exam, documentation and further activities per the note.           Apoorva Molina PA-C, [facility]  [facility]  [facility], [address], [place] [address]  Main: [phone], Fax: [phone]

## 2023-05-11 ENCOUNTER — OFFICE VISIT (OUTPATIENT)
Dept: HEMATOLOGY | Facility: CLINIC | Age: 48
End: 2023-05-11
Attending: NURSE PRACTITIONER
Payer: COMMERCIAL

## 2023-05-11 VITALS
SYSTOLIC BLOOD PRESSURE: 116 MMHG | HEART RATE: 91 BPM | BODY MASS INDEX: 57.82 KG/M2 | OXYGEN SATURATION: 100 % | TEMPERATURE: 98.7 F | DIASTOLIC BLOOD PRESSURE: 77 MMHG | WEIGHT: 315 LBS | RESPIRATION RATE: 16 BRPM

## 2023-05-11 DIAGNOSIS — I26.99 PULMONARY EMBOLISM, OTHER, UNSPECIFIED CHRONICITY, UNSPECIFIED WHETHER ACUTE COR PULMONALE PRESENT (H): ICD-10-CM

## 2023-05-11 LAB
ALBUMIN SERPL BCG-MCNC: 4.1 G/DL (ref 3.5–5.2)
ALP SERPL-CCNC: 128 U/L (ref 40–129)
ALT SERPL W P-5'-P-CCNC: 22 U/L (ref 10–50)
ANION GAP SERPL CALCULATED.3IONS-SCNC: 11 MMOL/L (ref 7–15)
AST SERPL W P-5'-P-CCNC: 21 U/L (ref 10–50)
BILIRUB SERPL-MCNC: 0.3 MG/DL
BUN SERPL-MCNC: 19.1 MG/DL (ref 6–20)
CALCIUM SERPL-MCNC: 9.2 MG/DL (ref 8.6–10)
CHLORIDE SERPL-SCNC: 106 MMOL/L (ref 98–107)
CREAT SERPL-MCNC: 0.88 MG/DL (ref 0.67–1.17)
DEPRECATED HCO3 PLAS-SCNC: 24 MMOL/L (ref 22–29)
ERYTHROCYTE [DISTWIDTH] IN BLOOD BY AUTOMATED COUNT: 12.8 % (ref 10–15)
FERRITIN SERPL-MCNC: 250 NG/ML (ref 31–409)
GFR SERPL CREATININE-BSD FRML MDRD: >90 ML/MIN/1.73M2
GLUCOSE SERPL-MCNC: 114 MG/DL (ref 70–99)
HCT VFR BLD AUTO: 44.6 % (ref 40–53)
HGB BLD-MCNC: 15 G/DL (ref 13.3–17.7)
HOLD SPECIMEN: NORMAL
IRON BINDING CAPACITY (ROCHE): 211 UG/DL (ref 240–430)
IRON SATN MFR SERPL: 40 % (ref 15–46)
IRON SERPL-MCNC: 85 UG/DL (ref 61–157)
MCH RBC QN AUTO: 31.1 PG (ref 26.5–33)
MCHC RBC AUTO-ENTMCNC: 33.6 G/DL (ref 31.5–36.5)
MCV RBC AUTO: 92 FL (ref 78–100)
PLATELET # BLD AUTO: 245 10E3/UL (ref 150–450)
POTASSIUM SERPL-SCNC: 4 MMOL/L (ref 3.4–5.3)
PROT SERPL-MCNC: 7.3 G/DL (ref 6.4–8.3)
RBC # BLD AUTO: 4.83 10E6/UL (ref 4.4–5.9)
SODIUM SERPL-SCNC: 141 MMOL/L (ref 136–145)
VIT B12 SERPL-MCNC: 557 PG/ML (ref 232–1245)
WBC # BLD AUTO: 9.1 10E3/UL (ref 4–11)

## 2023-05-11 PROCEDURE — 99215 OFFICE O/P EST HI 40 MIN: CPT | Performed by: PHYSICIAN ASSISTANT

## 2023-05-11 PROCEDURE — 82728 ASSAY OF FERRITIN: CPT | Performed by: PHYSICIAN ASSISTANT

## 2023-05-11 PROCEDURE — 85027 COMPLETE CBC AUTOMATED: CPT | Performed by: PHYSICIAN ASSISTANT

## 2023-05-11 PROCEDURE — 36415 COLL VENOUS BLD VENIPUNCTURE: CPT | Performed by: PHYSICIAN ASSISTANT

## 2023-05-11 PROCEDURE — 82607 VITAMIN B-12: CPT | Performed by: PHYSICIAN ASSISTANT

## 2023-05-11 PROCEDURE — 80053 COMPREHEN METABOLIC PANEL: CPT | Performed by: PHYSICIAN ASSISTANT

## 2023-05-11 PROCEDURE — 83550 IRON BINDING TEST: CPT | Performed by: PHYSICIAN ASSISTANT

## 2023-05-11 PROCEDURE — G0463 HOSPITAL OUTPT CLINIC VISIT: HCPCS | Performed by: PHYSICIAN ASSISTANT

## 2023-05-11 RX ORDER — ACETAMINOPHEN 500 MG
750 TABLET ORAL DAILY
COMMUNITY

## 2023-05-11 NOTE — PATIENT INSTRUCTIONS
[facility]  Center for Bleeding and Clotting Disorders  [facility], [address], [place] [address]  Main: [phone], Fax: [phone]      PLAN:    1) Finish out your Eliquis supply, then switch to Xarelto 20mg once daily with food and continue indefinitely.    2) I will have our pharmacy run the prescription through with the coupon if possible and they will call you to arrange filling it when you are due for the fill.    3) If you any experience bleeding concerns, let us know.     4) For colonoscopy--hold the blood thinner for 2 days prior, and re-start the evening after.    5) For other surgeries/procedures, let us know and we will give you instructions.    6) If still having fatigue/shortness of breath on exertion around July/August, we can repeat the chest CT.    7) I will reach out to you when the lab results are back.

## 2023-06-01 ENCOUNTER — VIRTUAL VISIT (OUTPATIENT)
Dept: PHARMACY | Facility: CLINIC | Age: 48
End: 2023-06-01
Attending: INTERNAL MEDICINE
Payer: COMMERCIAL

## 2023-06-01 ENCOUNTER — HEALTH MAINTENANCE LETTER (OUTPATIENT)
Age: 48
End: 2023-06-01

## 2023-06-01 DIAGNOSIS — G89.29 OTHER CHRONIC PAIN: ICD-10-CM

## 2023-06-01 DIAGNOSIS — E11.9 TYPE 2 DIABETES MELLITUS WITHOUT COMPLICATION, WITHOUT LONG-TERM CURRENT USE OF INSULIN (H): Primary | ICD-10-CM

## 2023-06-01 DIAGNOSIS — J45.20 MILD INTERMITTENT ASTHMA WITHOUT COMPLICATION: ICD-10-CM

## 2023-06-01 DIAGNOSIS — I87.8 VENOUS STASIS: ICD-10-CM

## 2023-06-01 DIAGNOSIS — J30.2 SEASONAL ALLERGIES: ICD-10-CM

## 2023-06-01 DIAGNOSIS — E66.01 MORBID OBESITY (H): ICD-10-CM

## 2023-06-01 DIAGNOSIS — E78.5 HYPERLIPIDEMIA LDL GOAL <100: ICD-10-CM

## 2023-06-01 DIAGNOSIS — R40.0 HAS DAYTIME DROWSINESS: ICD-10-CM

## 2023-06-01 DIAGNOSIS — I26.99 ACUTE PULMONARY EMBOLISM, UNSPECIFIED PULMONARY EMBOLISM TYPE, UNSPECIFIED WHETHER ACUTE COR PULMONALE PRESENT (H): ICD-10-CM

## 2023-06-01 PROCEDURE — 99207 PR NO CHARGE LOS: CPT | Performed by: PHARMACIST

## 2023-06-01 NOTE — PATIENT INSTRUCTIONS
"Recommendations from today's MTM visit:                                                    MTM (medication therapy management) is a service provided by a clinical pharmacist designed to help you get the most of out of your medicines.   Today we reviewed what your medicines are for, how to know if they are working, that your medicines are safe and how to make your medicine regimen as easy as possible.      1. Continue current regimen.     2. Remember when starting Xarelto 20 mg daily to take with largest meal of the day.     Follow-up: Dr. Thibodeaux in 2 months, Return in about 4 months (around 10/1/2023) for Medication Therapy Management Pharmacist Visit, Call [phone] to schedule.    It was great speaking with you today.  I value your experience and would be very thankful for your time in providing feedback in our clinic survey. In the next few days, you may receive an email or text message from [employer] with a link to a survey related to your  clinical pharmacist.\"     To schedule another appointment with your MTM pharmacist, please call [facility] Scheduling at [phone].     My Clinical Pharmacist's contact information:                                                      Please feel free to contact me with any questions or concerns you have.      Lauren Bloch, PharmD  Medication Therapy Management Pharmacist   [facility]             "
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
## 2023-07-15 DIAGNOSIS — E11.9 DIABETES MELLITUS WITHOUT COMPLICATION (H): ICD-10-CM

## 2023-07-17 RX ORDER — BLOOD SUGAR DIAGNOSTIC
STRIP MISCELLANEOUS
Qty: 200 STRIP | Refills: 1 | Status: SHIPPED | OUTPATIENT
Start: 2023-07-17 | End: 2023-12-11

## 2023-07-17 NOTE — TELEPHONE ENCOUNTER
Prescription approved per [facility] Refill Protocol     Leesa Granda     RN MSN

## 2023-07-23 DIAGNOSIS — J30.1 ALLERGIC RHINITIS DUE TO POLLEN: ICD-10-CM

## 2023-07-25 RX ORDER — CETIRIZINE HYDROCHLORIDE 10 MG/1
TABLET ORAL
Qty: 90 TABLET | Refills: 1 | Status: SHIPPED | OUTPATIENT
Start: 2023-07-25 | End: 2023-10-24

## 2023-08-19 DIAGNOSIS — E11.9 TYPE 2 DIABETES MELLITUS WITHOUT COMPLICATION, WITHOUT LONG-TERM CURRENT USE OF INSULIN (H): ICD-10-CM

## 2023-08-23 NOTE — TELEPHONE ENCOUNTER
semaglutide (OZEMPIC) 2 MG/3ML SOPN pen   3 mL 3 4/7/2023     Last Office Visit : 4-7-2023  Future Office visit:  none      GLP-1 Agonists Protocol [id number]/19/2023 06:40 AM   Protocol Details HgbA1C in past 3 or 6 months    Recent (6 mo) or future (30 days) visit within the authorizing provider's specialty     Lab Test 10/03/22  1026   A1C 5.7*

## 2023-08-24 RX ORDER — SEMAGLUTIDE 0.68 MG/ML
0.5 INJECTION, SOLUTION SUBCUTANEOUS WEEKLY
Qty: 3 ML | Refills: 1 | Status: SHIPPED | OUTPATIENT
Start: 2023-08-24 | End: 2023-10-02 | Stop reason: ALTCHOICE

## 2023-08-25 RX ORDER — BISACODYL 5 MG/1
TABLET, DELAYED RELEASE ORAL
Qty: 4 TABLET | Refills: 0 | Status: SHIPPED | OUTPATIENT
Start: 2023-08-25 | End: 2023-10-24

## 2023-08-25 RX ORDER — BISACODYL 5 MG/1
TABLET, DELAYED RELEASE ORAL
Qty: 4 TABLET | Refills: 0 | Status: SHIPPED | OUTPATIENT
Start: 2023-08-25 | End: 2023-10-02

## 2023-08-28 ENCOUNTER — TRANSFERRED RECORDS (OUTPATIENT)
Dept: HEALTH INFORMATION MANAGEMENT | Facility: CLINIC | Age: 48
End: 2023-08-28
Payer: COMMERCIAL

## 2023-08-28 LAB
RETINOPATHY: NEGATIVE
RETINOPATHY: NEGATIVE

## 2023-08-31 ENCOUNTER — ANESTHESIA EVENT (OUTPATIENT)
Dept: GASTROENTEROLOGY | Facility: CLINIC | Age: 48
End: 2023-08-31
Payer: COMMERCIAL

## 2023-08-31 ASSESSMENT — LIFESTYLE VARIABLES: TOBACCO_USE: 1

## 2023-08-31 NOTE — ANESTHESIA PREPROCEDURE EVALUATION
Anesthesia Pre-Procedure Evaluation    Patient: Refugio JACKSON Kennedy   MRN: [id number] : 1975        Procedure : Procedure(s):  COLONOSCOPY          Past Medical History:   Diagnosis Date    2019 novel coronavirus disease (COVID-19) 3/29/2021    Cataplexy and narcolepsy     Narcolepsy    Cellulitis     Chronic respiratory failure with hypercapnia (H) 3/29/2021    Diabetes     Hypoventilation     Morbid obesity (H) 3/29/2021    Obesity     FARZANA (obstructive sleep apnea)     uses CPAP    FARZANA (obstructive sleep apnea) 3/29/2021      Past Surgical History:   Procedure Laterality Date    LAPAROSCOPIC HERNIORRHAPHY VENTRAL N/A 2016    Procedure: LAPAROSCOPIC HERNIORRHAPHY VENTRAL;  Surgeon: Yves Jimenes MD;  Location: [facility]    LAPAROSCOPIC HERNIORRHAPHY VENTRAL N/A 2016    Procedure: LAPAROSCOPIC HERNIORRHAPHY VENTRAL;  Surgeon: Yves Jimenes MD;  Location: [facility] OR    SURGICAL HISTORY OF -       UVPP      No Known Allergies   Social History     Tobacco Use    Smoking status: Former     Packs/day: 0.50     Years: 1.00     Pack years: 0.50     Types: Cigarettes, Cigars     Quit date: 2012     Years since quittin.2    Smokeless tobacco: Never   Substance Use Topics    Alcohol use: Yes     Comment: rare      Wt Readings from Last 1 Encounters:   23 (!) 172.5 kg (380 lb 4.8 oz)        Anesthesia Evaluation   Pt has had prior anesthetic.     History of anesthetic complications       ROS/MED HX  ENT/Pulmonary:     (+) sleep apnea,    FARZANA risk factors,           tobacco use,     asthma                  Neurologic:       Cardiovascular:     (+) Dyslipidemia hypertension- -   -  - -                                pulmonary hypertension,      METS/Exercise Tolerance:     Hematologic:       Musculoskeletal:       GI/Hepatic:       Renal/Genitourinary:       Endo:     (+)               Obesity,       Psychiatric/Substance Use:     (+)     Recreational drug usage: Meth.    Infectious Disease:        Malignancy:       Other:            Physical Exam    Airway  airway exam normal           Respiratory Devices and Support         Dental       (+) Minor Abnormalities - some fillings, tiny chips      Cardiovascular   cardiovascular exam normal          Pulmonary   pulmonary exam normal                OUTSIDE LABS:  CBC:   Lab Results   Component Value Date    WBC 9.1 05/11/2023    WBC 8.6 02/19/2023    HGB 15.0 05/11/2023    HGB 14.0 02/19/2023    HCT 44.6 05/11/2023    HCT 41.9 02/19/2023     05/11/2023     02/19/2023     BMP:   Lab Results   Component Value Date     05/11/2023     02/19/2023    POTASSIUM 4.0 05/11/2023    POTASSIUM 3.9 02/19/2023    CHLORIDE 106 05/11/2023    CHLORIDE 107 02/19/2023    CO2 24 05/11/2023    CO2 26 02/19/2023    BUN 19.1 05/11/2023    BUN 23.8 (H) 02/19/2023    CR 0.88 05/11/2023    CR 0.95 02/19/2023     (H) 05/11/2023     (H) 02/19/2023     COAGS:   Lab Results   Component Value Date    FIBR 547 (H) 04/01/2021     POC:   Lab Results   Component Value Date     (H) 12/20/2016     HEPATIC:   Lab Results   Component Value Date    ALBUMIN 4.1 05/11/2023    PROTTOTAL 7.3 05/11/2023    ALT 22 05/11/2023    AST 21 05/11/2023    ALKPHOS 128 05/11/2023    BILITOTAL 0.3 05/11/2023     OTHER:   Lab Results   Component Value Date    PH 7.40 07/06/2011    LACT 1.5 04/09/2016    A1C 5.7 (H) 10/03/2022    KARINA 9.2 05/11/2023    MAG 2.7 (H) 03/30/2021    TSH 1.93 12/19/2019    CRP 4.3 (H) 04/01/2021    SED 29 (H) 11/26/2007       Anesthesia Plan    ASA Status:  3    NPO Status:  NPO Appropriate    Anesthesia Type: General.      Maintenance: Balanced.        Consents    Anesthesia Plan(s) and associated risks, benefits, and realistic alternatives discussed. Questions answered and patient/representative(s) expressed understanding.     - Discussed:     - Discussed with:  Patient            Postoperative Care            Comments:                Juan Jose  David Artis, CRNA, APRN CRNA

## 2023-09-05 ENCOUNTER — ANESTHESIA (OUTPATIENT)
Dept: GASTROENTEROLOGY | Facility: CLINIC | Age: 48
End: 2023-09-05
Payer: COMMERCIAL

## 2023-09-05 ENCOUNTER — HOSPITAL ENCOUNTER (OUTPATIENT)
Facility: CLINIC | Age: 48
Discharge: HOME | End: 2023-09-05
Attending: SURGERY | Admitting: SURGERY
Payer: COMMERCIAL

## 2023-09-05 VITALS
WEIGHT: 315 LBS | HEIGHT: 68 IN | OXYGEN SATURATION: 100 % | BODY MASS INDEX: 47.74 KG/M2 | RESPIRATION RATE: 16 BRPM | DIASTOLIC BLOOD PRESSURE: 42 MMHG | HEART RATE: 68 BPM | SYSTOLIC BLOOD PRESSURE: 117 MMHG | TEMPERATURE: 98 F

## 2023-09-05 DIAGNOSIS — Z12.11 SPECIAL SCREENING FOR MALIGNANT NEOPLASMS, COLON: Primary | ICD-10-CM

## 2023-09-05 LAB
COLONOSCOPY: NORMAL
GLUCOSE BLDC GLUCOMTR-MCNC: 94 MG/DL (ref 70–99)

## 2023-09-05 PROCEDURE — 45385 COLONOSCOPY W/LESION REMOVAL: CPT | Mod: PT | Performed by: SURGERY

## 2023-09-05 PROCEDURE — 250N000009 HC RX 250: Performed by: SURGERY

## 2023-09-05 PROCEDURE — 88305 TISSUE EXAM BY PATHOLOGIST: CPT | Mod: 26 | Performed by: PATHOLOGY

## 2023-09-05 PROCEDURE — 250N000011 HC RX IP 250 OP 636: Performed by: NURSE ANESTHETIST, CERTIFIED REGISTERED

## 2023-09-05 PROCEDURE — 45380 COLONOSCOPY AND BIOPSY: CPT | Performed by: SURGERY

## 2023-09-05 PROCEDURE — 45382 COLONOSCOPY W/CONTROL BLEED: CPT | Performed by: SURGERY

## 2023-09-05 PROCEDURE — 88305 TISSUE EXAM BY PATHOLOGIST: CPT | Mod: TC | Performed by: SURGERY

## 2023-09-05 PROCEDURE — 82962 GLUCOSE BLOOD TEST: CPT

## 2023-09-05 PROCEDURE — 370N000017 HC ANESTHESIA TECHNICAL FEE, PER MIN: Performed by: SURGERY

## 2023-09-05 PROCEDURE — 258N000003 HC RX IP 258 OP 636: Performed by: SURGERY

## 2023-09-05 RX ORDER — FENTANYL CITRATE 50 UG/ML
50 INJECTION, SOLUTION INTRAMUSCULAR; INTRAVENOUS EVERY 5 MIN PRN
Status: DISCONTINUED | OUTPATIENT
Start: 2023-09-05 | End: 2023-09-05 | Stop reason: HOSPADM

## 2023-09-05 RX ORDER — OXYCODONE HYDROCHLORIDE 5 MG/1
10 TABLET ORAL
Status: DISCONTINUED | OUTPATIENT
Start: 2023-09-05 | End: 2023-09-05 | Stop reason: HOSPADM

## 2023-09-05 RX ORDER — FENTANYL CITRATE 50 UG/ML
25 INJECTION, SOLUTION INTRAMUSCULAR; INTRAVENOUS EVERY 5 MIN PRN
Status: DISCONTINUED | OUTPATIENT
Start: 2023-09-05 | End: 2023-09-05 | Stop reason: HOSPADM

## 2023-09-05 RX ORDER — SODIUM CHLORIDE, SODIUM LACTATE, POTASSIUM CHLORIDE, CALCIUM CHLORIDE 600; 310; 30; 20 MG/100ML; MG/100ML; MG/100ML; MG/100ML
INJECTION, SOLUTION INTRAVENOUS CONTINUOUS
Status: DISCONTINUED | OUTPATIENT
Start: 2023-09-05 | End: 2023-09-05 | Stop reason: HOSPADM

## 2023-09-05 RX ORDER — HYDROMORPHONE HCL IN WATER/PF 6 MG/30 ML
0.4 PATIENT CONTROLLED ANALGESIA SYRINGE INTRAVENOUS EVERY 5 MIN PRN
Status: DISCONTINUED | OUTPATIENT
Start: 2023-09-05 | End: 2023-09-05 | Stop reason: HOSPADM

## 2023-09-05 RX ORDER — OXYCODONE HYDROCHLORIDE 5 MG/1
5 TABLET ORAL
Status: DISCONTINUED | OUTPATIENT
Start: 2023-09-05 | End: 2023-09-05 | Stop reason: HOSPADM

## 2023-09-05 RX ORDER — PROCHLORPERAZINE MALEATE 10 MG
10 TABLET ORAL EVERY 6 HOURS PRN
Status: DISCONTINUED | OUTPATIENT
Start: 2023-09-05 | End: 2023-09-05 | Stop reason: HOSPADM

## 2023-09-05 RX ORDER — FENTANYL CITRATE 50 UG/ML
25 INJECTION, SOLUTION INTRAMUSCULAR; INTRAVENOUS
Status: DISCONTINUED | OUTPATIENT
Start: 2023-09-05 | End: 2023-09-05 | Stop reason: HOSPADM

## 2023-09-05 RX ORDER — NALOXONE HYDROCHLORIDE 0.4 MG/ML
0.4 INJECTION, SOLUTION INTRAMUSCULAR; INTRAVENOUS; SUBCUTANEOUS
Status: DISCONTINUED | OUTPATIENT
Start: 2023-09-05 | End: 2023-09-05 | Stop reason: HOSPADM

## 2023-09-05 RX ORDER — ONDANSETRON 2 MG/ML
4 INJECTION INTRAMUSCULAR; INTRAVENOUS EVERY 6 HOURS PRN
Status: DISCONTINUED | OUTPATIENT
Start: 2023-09-05 | End: 2023-09-05 | Stop reason: HOSPADM

## 2023-09-05 RX ORDER — HYDROMORPHONE HCL IN WATER/PF 6 MG/30 ML
0.2 PATIENT CONTROLLED ANALGESIA SYRINGE INTRAVENOUS EVERY 5 MIN PRN
Status: DISCONTINUED | OUTPATIENT
Start: 2023-09-05 | End: 2023-09-05 | Stop reason: HOSPADM

## 2023-09-05 RX ORDER — ONDANSETRON 2 MG/ML
4 INJECTION INTRAMUSCULAR; INTRAVENOUS EVERY 30 MIN PRN
Status: DISCONTINUED | OUTPATIENT
Start: 2023-09-05 | End: 2023-09-05 | Stop reason: HOSPADM

## 2023-09-05 RX ORDER — ONDANSETRON 4 MG/1
4 TABLET, ORALLY DISINTEGRATING ORAL EVERY 6 HOURS PRN
Status: DISCONTINUED | OUTPATIENT
Start: 2023-09-05 | End: 2023-09-05 | Stop reason: HOSPADM

## 2023-09-05 RX ORDER — NALOXONE HYDROCHLORIDE 0.4 MG/ML
0.2 INJECTION, SOLUTION INTRAMUSCULAR; INTRAVENOUS; SUBCUTANEOUS
Status: DISCONTINUED | OUTPATIENT
Start: 2023-09-05 | End: 2023-09-05 | Stop reason: HOSPADM

## 2023-09-05 RX ORDER — ONDANSETRON 4 MG/1
4 TABLET, ORALLY DISINTEGRATING ORAL EVERY 30 MIN PRN
Status: DISCONTINUED | OUTPATIENT
Start: 2023-09-05 | End: 2023-09-05 | Stop reason: HOSPADM

## 2023-09-05 RX ORDER — LIDOCAINE 40 MG/G
CREAM TOPICAL
Status: DISCONTINUED | OUTPATIENT
Start: 2023-09-05 | End: 2023-09-05 | Stop reason: HOSPADM

## 2023-09-05 RX ORDER — PROPOFOL 10 MG/ML
INJECTION, EMULSION INTRAVENOUS CONTINUOUS PRN
Status: DISCONTINUED | OUTPATIENT
Start: 2023-09-05 | End: 2023-09-05

## 2023-09-05 RX ORDER — FLUMAZENIL 0.1 MG/ML
0.2 INJECTION, SOLUTION INTRAVENOUS
Status: DISCONTINUED | OUTPATIENT
Start: 2023-09-05 | End: 2023-09-05 | Stop reason: HOSPADM

## 2023-09-05 RX ADMIN — MIDAZOLAM 2 MG: 1 INJECTION INTRAMUSCULAR; INTRAVENOUS at 09:42

## 2023-09-05 RX ADMIN — PROPOFOL 200 MCG/KG/MIN: 10 INJECTION, EMULSION INTRAVENOUS at 09:32

## 2023-09-05 RX ADMIN — SODIUM CHLORIDE, POTASSIUM CHLORIDE, SODIUM LACTATE AND CALCIUM CHLORIDE: 600; 310; 30; 20 INJECTION, SOLUTION INTRAVENOUS at 09:14

## 2023-09-05 RX ADMIN — LIDOCAINE HYDROCHLORIDE 0.2 ML: 10 INJECTION, SOLUTION EPIDURAL; INFILTRATION; INTRACAUDAL; PERINEURAL at 09:14

## 2023-09-05 ASSESSMENT — ACTIVITIES OF DAILY LIVING (ADL): ADLS_ACUITY_SCORE: 35

## 2023-09-05 NOTE — H&P
ENDOSCOPY PRE-SEDATION H&P FOR OUTPATIENT PROCEDURES    Refugio Kennedy  [id number]  1975    Procedure:   Colonoscopy possible biopsy, possible polypectomy, with MAC sedation.     Pre-procedure diagnosis: grandfather with colon cancer and this is first screening and father has multiple polyps   On xeralto.     Past medical history:   Past Medical History:   Diagnosis Date    2019 novel coronavirus disease (COVID-19) 3/29/2021    Cataplexy and narcolepsy     Narcolepsy    Cellulitis     Chronic respiratory failure with hypercapnia (H) 3/29/2021    Diabetes     Hypoventilation     Morbid obesity (H) 3/29/2021    Obesity     FARZANA (obstructive sleep apnea)     uses CPAP    FARZANA (obstructive sleep apnea) 3/29/2021       Past surgical history:   Past Surgical History:   Procedure Laterality Date    LAPAROSCOPIC HERNIORRHAPHY VENTRAL N/A 5/17/2016    Procedure: LAPAROSCOPIC HERNIORRHAPHY VENTRAL;  Surgeon: Yves Jimenes MD;  Location: [facility] OR    LAPAROSCOPIC HERNIORRHAPHY VENTRAL N/A 12/20/2016    Procedure: LAPAROSCOPIC HERNIORRHAPHY VENTRAL;  Surgeon: Yves Jimenes MD;  Location: [facility] OR    SURGICAL HISTORY OF -   1998    UVPP       Current Facility-Administered Medications   Medication    lactated ringers infusion    lidocaine (LMX4) kit    lidocaine 1 % 0.1-1 mL    sodium chloride (PF) 0.9% PF flush 3 mL    sodium chloride (PF) 0.9% PF flush 3 mL       No Known Allergies    History of Anesthesia/Sedation Problems: no    Physical Exam:    Mental status: alert  Heart: Normal  Lung: Normal  Assessment of patient's airway: Normal  Other as pertinent for procedure: None     ASA Score: See Provation note    Mallampati score:  III - Only soft palate is visible. Intubation is predicted to be difficult    Assessment/Plan:     The patient is an appropriate candidate to receive sedation.    Informed consent was discussed with the patient/family, including the risks, benefits, potential complications and any alternative options  associated with sedation.    Patient assessment completed just prior to sedation and while under constant observation by the provider. Condition determined to be adequate for proceeding with sedation.    The specific risks for the procedure were discussed with the patient at the time of informed consent and include but are not limited to perforation which could require surgery, missing significant neoplasm or lesion, hemorrhage and adverse sedative complication.      Andrews Aguayo MD

## 2023-09-05 NOTE — LETTER
September 6, 2023      Refugioandrew Kennedy  [address]  [facility] [address]        Dear ,    We are writing to inform you of your test results.    Your pathology report was:  Normal, please follow up by having a repeat colonoscopy in 10 YEARS.      If you do have further questions please don t hesitate to call the below number.      To make an appointment Please call [phone], for  [facility] or  for [facility], to schedule a follow up appointment in 2 weeks.       Resulted Orders   Surgical Pathology Exam   Result Value Ref Range    Case Report       Surgical Pathology Report                         Case: [id number]                                  Authorizing Provider:  Andrews Aguayo MD Collected:           09/05/2023 10:07 AM          Ordering Location:     [facility]   Received:            09/05/2023 10:17 AM                                 [place]                                                                      Pathologist:           Steve Carter MD                                                                           Specimen:    Large Intestine, Colon, 5cm                                                                Final Diagnosis       Large intestine, 5 cm, polyp, biopsy/polypectomy:  - Hyperplastic polyp negative for dysplasia and malignancy.       Clinical Information       Clinical information pertinent to this case is available in the electronic medical record and/or specimen requisition and was reviewed by the signing pathologist.       Gross Description       A(1). Large Intestine, Colon, 5cm:  The specimen is received in formalin, labeled with the patient's name, medical record number and other identifying information and designated  colon, 5 cm . It consists of 2 tan soft tissue fragments ranging from 0.2-1.0 cm.  The  resection margin of the largest fragment is inked blue and the fragment is serially sectioned.  Entirely submitted in one cassette.   [JUAN Castro(ASCP)]      Microscopic Description       A microscopic examination is performed.       Performing Labs       The technical component of this testing was completed at [facility]      Case Images         If you have any questions or concerns, please call the clinic at the number listed above.       Sincerely,      Andrews Aguayo MD

## 2023-09-05 NOTE — ANESTHESIA POSTPROCEDURE EVALUATION
Patient: Refugio JACKSON Kennedy    Procedure: Procedure(s):  COLONOSCOPY, WITH POLYPECTOMY AND BIOPSY  COLONOSCOPY, WITH HEMORRHAGE CONTROL       Anesthesia Type:  General    Note:  Disposition: Outpatient   Postop Pain Control: Uneventful            Sign Out: Well controlled pain   PONV: No   Neuro/Psych: Uneventful            Sign Out: Acceptable/Baseline neuro status   Airway/Respiratory: Uneventful            Sign Out: Acceptable/Baseline resp. status   CV/Hemodynamics: Uneventful            Sign Out: Acceptable CV status; No obvious hypovolemia; No obvious fluid overload   Other NRE: NONE   DID A NON-ROUTINE EVENT OCCUR? No           Last vitals:  Vitals:    09/05/23 0806   BP: (P) 99/78   Pulse: (P) 67   Resp: (P) 16   Temp: (P) 37.3  C (99.1  F)   SpO2: (P) 98%       Electronically Signed By: Juan Jose Artis CRNA, APRN CRNA  September 5, 2023  10:14 AM

## 2023-09-05 NOTE — ANESTHESIA CARE TRANSFER NOTE
Patient: Refugio JACKSON Kennedy    Procedure: Procedure(s):  COLONOSCOPY, WITH POLYPECTOMY AND BIOPSY  COLONOSCOPY, WITH HEMORRHAGE CONTROL       Diagnosis: Screen for colon cancer [Z12.11]  Diagnosis Additional Information: No value filed.    Anesthesia Type:   General     Note:    Oropharynx: oropharynx clear of all foreign objects and spontaneously breathing  Level of Consciousness: awake  Oxygen Supplementation: room air    Independent Airway: airway patency satisfactory and stable  Dentition: dentition unchanged  Vital Signs Stable: post-procedure vital signs reviewed and stable  Report to RN Given: handoff report given  Patient transferred to: Phase II    Handoff Report: Identifed the Patient, Identified the Reponsible Provider, Reviewed the pertinent medical history, Discussed the surgical course, Reviewed Intra-OP anesthesia mangement and issues during anesthesia, Set expectations for post-procedure period and Allowed opportunity for questions and acknowledgement of understanding      Vitals:  Vitals Value Taken Time   BP     Temp     Pulse     Resp     SpO2         Electronically Signed By: Juan Jose Artis CRNA, APRN CRNA  September 5, 2023  10:14 AM

## 2023-09-06 DIAGNOSIS — E11.9 TYPE 2 DIABETES MELLITUS WITHOUT COMPLICATION, WITHOUT LONG-TERM CURRENT USE OF INSULIN (H): Primary | ICD-10-CM

## 2023-09-06 LAB
PATH REPORT.COMMENTS IMP SPEC: NORMAL
PATH REPORT.COMMENTS IMP SPEC: NORMAL
PATH REPORT.FINAL DX SPEC: NORMAL
PATH REPORT.GROSS SPEC: NORMAL
PATH REPORT.MICROSCOPIC SPEC OTHER STN: NORMAL
PATH REPORT.RELEVANT HX SPEC: NORMAL
PHOTO IMAGE: NORMAL

## 2023-10-02 ENCOUNTER — VIRTUAL VISIT (OUTPATIENT)
Dept: PHARMACY | Facility: CLINIC | Age: 48
End: 2023-10-02
Payer: COMMERCIAL

## 2023-10-02 VITALS — HEIGHT: 68 IN | WEIGHT: 315 LBS | BODY MASS INDEX: 47.74 KG/M2

## 2023-10-02 DIAGNOSIS — I26.99 ACUTE PULMONARY EMBOLISM, UNSPECIFIED PULMONARY EMBOLISM TYPE, UNSPECIFIED WHETHER ACUTE COR PULMONALE PRESENT (H): ICD-10-CM

## 2023-10-02 DIAGNOSIS — E11.9 TYPE 2 DIABETES MELLITUS WITHOUT COMPLICATION, WITHOUT LONG-TERM CURRENT USE OF INSULIN (H): Primary | ICD-10-CM

## 2023-10-02 DIAGNOSIS — E66.01 MORBID OBESITY (H): ICD-10-CM

## 2023-10-02 DIAGNOSIS — J30.2 SEASONAL ALLERGIES: ICD-10-CM

## 2023-10-02 PROCEDURE — 99207 PR NO CHARGE LOS: CPT | Performed by: PHARMACIST

## 2023-10-02 ASSESSMENT — PAIN SCALES - GENERAL: PAINLEVEL: SEVERE PAIN (6)

## 2023-10-02 NOTE — NURSING NOTE
Is the patient currently in the state of [place]? YES    Visit mode:TELEPHONE    If the visit is dropped, the patient can be reconnected by: TELEPHONE VISIT: Phone number:   Telephone Information:   Mobile [phone]       Will anyone else be joining the visit? NO  (If patient encounters technical issues they should call [phone]    How would you like to obtain your AVS? MyChart    Are changes needed to the allergy or medication list? No, Pt stated no changes to allergies, and Pt stated no med changes    Reason for visit: JOHAN MARTINEZ

## 2023-10-02 NOTE — PROGRESS NOTES
Medication Therapy Management (MTM) Encounter    ASSESSMENT:                            Medication Adherence/Access: No issues identified    Type 2 Diabetes/Obesity:   AM fasting not within goal  mg/dL. A1c due. Would benefit from increase Ozempic to 1 mg weekly to improve blood glucose and see if can further weight loss. Doesn't appear has lost weight thus far with Ozempic start. Due to ineffectiveness with Alexandru restart and question continual efficacy of naltrexone, trial stopping Alexandru and Naltrexone to lower pill burden.     HX PE:   Stable.     Allergic Rhinitis:   Reasonable to consider trial off cetirizine.     PLAN:                            Follow up with Dr. Plascencia - Call [phone] to schedule.   Increase Ozempic to 1 mg weekly. Stop Naltrexone and Orlistat (Alexandru)   Trial stopping cetirizine.     Follow-up: Return in about 3 months (around 1/2/2024) for Medication Therapy Management Pharmacist Visit.    SUBJECTIVE/OBJECTIVE:                          Refugio Kennedy is a 48 year old male called for a follow-up visit from 6/1/2023.       Reason for visit: medication follow up.    Allergies/ADRs: Reviewed in chart  Past Medical History: Reviewed in chart  Tobacco: He reports that he quit smoking about 11 years ago. His smoking use included cigarettes and cigars. He has a 0.50 pack-year smoking history. He has never used smokeless tobacco.  Alcohol: not currently using    Medication Adherence/Access: no issues reported.     Type 2 Diabetes/Obesity:   Ozempic 0.5 mg once weekly   Naltrexone 100 mg daily in afternoon before lunch   Topiramate 200 mg twice daily   Orlistat 60 mg three times daily with meals as needed      Followed by Dr. Mohsen Plascencia, seen 4/7/2023 for Return Medical Weight Management. Patient is experiencing the follow side effects: occasionally constipation. Environmental factors are difficult for him. Unsure if regimen is as effective as it was before. Restarted Alexandru about a month ago and  "no change in weight loss.   Diet: getting in 3 meals per day. Still hungry after dinner, snacking before bed. Will often choose ice cream. Tried to cut out \"bad food\". Trying to steer clear of junk food. Snacking on nuts at times between meals.    Exercise: yoga 3-4 times per week.   Blood sugar monitorin time(s) daily. Ranges (patient reported): Fasting- 120s-140s   Symptoms of low blood sugar? none  Symptoms of high blood sugar? none  Eye exam: up to date  Foot exam: up to date  Aspirin: Not taking due to being on Eliquis  Statin: Yes: simvastatin 10 mg daily   ACEi/ARB: Yes: lisinopril 5 mg daily  Urine Albumin:           Lab Results   Component Value Date     UMALCR   10/03/2022         Comment:         Unable to calculate, urine albumin and/or urine creatinine is outside detectable limits.  Microalbuminuria is defined as an albumin:creatinine ratio of 17 to 299 for males and 25 to 299 for females. A ratio of albumin:creatinine of 300 or higher is indicative of overt proteinuria.  Due to biologic variability, positive results should be confirmed by a second, first-morning random or 24-hour timed urine specimen. If there is discrepancy, a third specimen is recommended. When 2 out of 3 results are in the microalbuminuria range, this is evidence for incipient nephropathy and warrants increased efforts at glucose control, blood pressure control, and institution of therapy with an angiotensin-converting-enzyme (ACE) inhibitor (if the patient can tolerate it).       Lab Results   Component Value Date    A1C 5.7 10/03/2022    A1C 5.7 2021    A1C 5.8 2021    A1C 5.5 2020    A1C 5.6 2019    A1C 5.6 2019    A1C 5.7 2019    A1C 5.5 2018     Wt Readings from Last 4 Encounters:   10/02/23 (!) 380 lb (172.4 kg)   23 (!) 380 lb (172.4 kg)   23 (!) 380 lb 4.8 oz (172.5 kg)   23 (!) 380 lb (172.4 kg)     Initial Weight: 454 lb  Current Weight: 380 lb     HX PE: " "  Xarelto 20 mg once daily    No issues of bleeding or bruising. Hx of unprovoked PE 2/19/2023, he is to remain on indefinite anticoagulation. Followed with Hematology. Taking Xarelto in evening with dinner.     Allergic Rhinitis:   cetirizine 10 mg once daily  Flonase 1-2 sprays daily as needed     Patient reports no current medication side effects.    Primary symptoms are nasal congestion, post-nasal drip, runny nose, and hives.  Primary triggers are pollens.   Patient feels that current therapy is effective. Wonders if has to take during the winter as allergy symptoms resolved as of now.     Today's Vitals: Ht 5' 8\" (1.727 m)   Wt (!) 380 lb (172.4 kg)   BMI 57.78 kg/m    ----------------  I spent 25 minutes with this patient today. All changes were made via collaborative practice agreement with Dr. Plascencia. A copy of the visit note was provided to the patient's provider(s).    A summary of these recommendations are available via clinic portal.     Lauren Bloch, PharmD, BCACP   Medication Therapy Management Pharmacist   [facility]    Telemedicine Visit Details  Type of service:  Telephone visit  Start Time:  10:00 AM  End Time:  10:25 AM     Medication Therapy Recommendations  Morbid obesity (H)    Current Medication: naltrexone (DEPADE/REVIA) 50 MG tablet   Rationale: Condition refractory to medication - Ineffective medication - Effectiveness   Recommendation: Discontinue Medication   Status: Accepted per CPA          Rationale: Dose too low - Dosage too low - Effectiveness   Recommendation: Increase Dose - Ozempic (1 MG/DOSE) 4 MG/3ML Sopn   Status: Accepted per CPA         Seasonal allergies    Current Medication: cetirizine (ZYRTEC) 10 MG tablet   Rationale: No medical indication at this time - Unnecessary medication therapy - Indication   Recommendation: Discontinue Medication   Status: Accepted - no CPA Needed            "

## 2023-10-09 NOTE — PATIENT INSTRUCTIONS
"Recommendations from today's MT visit:                                                       Follow up with Dr. Plascencia - Call [phone] to schedule.   Increase Ozempic to 1 mg weekly. Stop Naltrexone and Orlistat (Alexandru)   Trial stopping cetirizine.     Follow-up: Return in about 3 months (around 1/2/2024) for Medication Therapy Management Pharmacist Visit.    It was great speaking with you today.  I value your experience and would be very thankful for your time in providing feedback in our clinic survey. In the next few days, you may receive an email or text message from [employer] with a link to a survey related to your  clinical pharmacist.\"     To schedule another appointment with your [facility] pharmacist, please call [facility] Scheduling at [phone].     My Clinical Pharmacist's contact information:                                                      Please feel free to contact me with any questions or concerns you have.      Lauren Bloch, PharmD  Medication Therapy Management Pharmacist   [facility]             "

## 2023-10-10 DIAGNOSIS — G47.33 OBSTRUCTIVE SLEEP APNEA-HYPOPNEA SYNDROME: ICD-10-CM

## 2023-10-10 RX ORDER — MODAFINIL 200 MG/1
TABLET ORAL
Qty: 60 TABLET | Refills: 5 | Status: SHIPPED | OUTPATIENT
Start: 2023-10-10 | End: 2024-04-09

## 2023-10-17 ASSESSMENT — ENCOUNTER SYMPTOMS
HEADACHES: 0
HEMATURIA: 0
MYALGIAS: 1
CONSTIPATION: 1
NERVOUS/ANXIOUS: 0
FREQUENCY: 0
ABDOMINAL PAIN: 1
DIARRHEA: 0
PALPITATIONS: 0
DIZZINESS: 0
DYSURIA: 0
SHORTNESS OF BREATH: 1
FEVER: 0
ARTHRALGIAS: 1
EYE PAIN: 0
HEMATOCHEZIA: 1
JOINT SWELLING: 1
SORE THROAT: 0
WEAKNESS: 0
CHILLS: 0
PARESTHESIAS: 1
HEARTBURN: 0
NAUSEA: 0

## 2023-10-17 ASSESSMENT — ASTHMA QUESTIONNAIRES: ACT_TOTALSCORE: 22

## 2023-10-24 ENCOUNTER — OFFICE VISIT (OUTPATIENT)
Dept: FAMILY MEDICINE | Facility: CLINIC | Age: 48
End: 2023-10-24
Payer: COMMERCIAL

## 2023-10-24 VITALS
RESPIRATION RATE: 16 BRPM | HEIGHT: 67 IN | TEMPERATURE: 98.1 F | WEIGHT: 315 LBS | HEART RATE: 77 BPM | OXYGEN SATURATION: 98 % | SYSTOLIC BLOOD PRESSURE: 100 MMHG | DIASTOLIC BLOOD PRESSURE: 68 MMHG | BODY MASS INDEX: 49.44 KG/M2

## 2023-10-24 DIAGNOSIS — E78.5 HYPERLIPIDEMIA LDL GOAL <100: ICD-10-CM

## 2023-10-24 DIAGNOSIS — L30.9 HAND ECZEMA: ICD-10-CM

## 2023-10-24 DIAGNOSIS — G47.33 OSA (OBSTRUCTIVE SLEEP APNEA): ICD-10-CM

## 2023-10-24 DIAGNOSIS — I87.8 VENOUS STASIS: ICD-10-CM

## 2023-10-24 DIAGNOSIS — E66.01 MORBID OBESITY (H): ICD-10-CM

## 2023-10-24 DIAGNOSIS — E11.9 TYPE 2 DIABETES MELLITUS WITHOUT COMPLICATION, WITHOUT LONG-TERM CURRENT USE OF INSULIN (H): ICD-10-CM

## 2023-10-24 DIAGNOSIS — Z00.00 ROUTINE GENERAL MEDICAL EXAMINATION AT A HEALTH CARE FACILITY: Primary | ICD-10-CM

## 2023-10-24 LAB
ALBUMIN SERPL BCG-MCNC: 4.4 G/DL (ref 3.5–5.2)
ALP SERPL-CCNC: 108 U/L (ref 40–129)
ALT SERPL W P-5'-P-CCNC: 23 U/L (ref 0–70)
ANION GAP SERPL CALCULATED.3IONS-SCNC: 8 MMOL/L (ref 7–15)
AST SERPL W P-5'-P-CCNC: 21 U/L (ref 0–45)
BILIRUB SERPL-MCNC: 0.3 MG/DL
BUN SERPL-MCNC: 21.5 MG/DL (ref 6–20)
CALCIUM SERPL-MCNC: 9.2 MG/DL (ref 8.6–10)
CHLORIDE SERPL-SCNC: 104 MMOL/L (ref 98–107)
CHOLEST SERPL-MCNC: 126 MG/DL
CREAT SERPL-MCNC: 0.95 MG/DL (ref 0.67–1.17)
CREAT UR-MCNC: 23.1 MG/DL
DEPRECATED HCO3 PLAS-SCNC: 29 MMOL/L (ref 22–29)
EGFRCR SERPLBLD CKD-EPI 2021: >90 ML/MIN/1.73M2
GLUCOSE SERPL-MCNC: 87 MG/DL (ref 70–99)
HBA1C MFR BLD: 5.7 % (ref 0–5.6)
HDLC SERPL-MCNC: 38 MG/DL
LDLC SERPL CALC-MCNC: 62 MG/DL
MICROALBUMIN UR-MCNC: <12 MG/L
MICROALBUMIN/CREAT UR: NORMAL MG/G{CREAT}
NONHDLC SERPL-MCNC: 88 MG/DL
POTASSIUM SERPL-SCNC: 3.8 MMOL/L (ref 3.4–5.3)
PROT SERPL-MCNC: 7.5 G/DL (ref 6.4–8.3)
SODIUM SERPL-SCNC: 141 MMOL/L (ref 135–145)
TRIGL SERPL-MCNC: 128 MG/DL

## 2023-10-24 PROCEDURE — 99214 OFFICE O/P EST MOD 30 MIN: CPT | Mod: 25 | Performed by: FAMILY MEDICINE

## 2023-10-24 PROCEDURE — 90480 ADMN SARSCOV2 VAC 1/ONLY CMP: CPT | Performed by: FAMILY MEDICINE

## 2023-10-24 PROCEDURE — 90471 IMMUNIZATION ADMIN: CPT | Performed by: FAMILY MEDICINE

## 2023-10-24 PROCEDURE — 36415 COLL VENOUS BLD VENIPUNCTURE: CPT | Performed by: FAMILY MEDICINE

## 2023-10-24 PROCEDURE — 90682 RIV4 VACC RECOMBINANT DNA IM: CPT | Performed by: FAMILY MEDICINE

## 2023-10-24 PROCEDURE — 99396 PREV VISIT EST AGE 40-64: CPT | Mod: 25 | Performed by: FAMILY MEDICINE

## 2023-10-24 PROCEDURE — 83036 HEMOGLOBIN GLYCOSYLATED A1C: CPT | Performed by: FAMILY MEDICINE

## 2023-10-24 PROCEDURE — 80053 COMPREHEN METABOLIC PANEL: CPT | Performed by: FAMILY MEDICINE

## 2023-10-24 PROCEDURE — 82570 ASSAY OF URINE CREATININE: CPT | Performed by: FAMILY MEDICINE

## 2023-10-24 PROCEDURE — 91320 SARSCV2 VAC 30MCG TRS-SUC IM: CPT | Performed by: FAMILY MEDICINE

## 2023-10-24 PROCEDURE — 80061 LIPID PANEL: CPT | Performed by: FAMILY MEDICINE

## 2023-10-24 PROCEDURE — 82043 UR ALBUMIN QUANTITATIVE: CPT | Performed by: FAMILY MEDICINE

## 2023-10-24 RX ORDER — SIMVASTATIN 10 MG
10 TABLET ORAL AT BEDTIME
Qty: 90 TABLET | Refills: 3 | Status: SHIPPED | OUTPATIENT
Start: 2023-10-24 | End: 2024-09-05

## 2023-10-24 RX ORDER — TRIAMCINOLONE ACETONIDE 1 MG/G
CREAM TOPICAL 2 TIMES DAILY
Qty: 45 G | Refills: 1 | Status: SHIPPED | OUTPATIENT
Start: 2023-10-24

## 2023-10-24 ASSESSMENT — ENCOUNTER SYMPTOMS
JOINT SWELLING: 1
HEMATURIA: 0
DYSURIA: 0
DIARRHEA: 0
MYALGIAS: 1
FEVER: 0
EYE PAIN: 0
PARESTHESIAS: 1
HEARTBURN: 0
HEMATOCHEZIA: 1
CHILLS: 0
DIZZINESS: 0
NERVOUS/ANXIOUS: 0
FREQUENCY: 0
WEAKNESS: 0
ARTHRALGIAS: 1
PALPITATIONS: 0
HEADACHES: 0
ABDOMINAL PAIN: 1
NAUSEA: 0
CONSTIPATION: 1
SHORTNESS OF BREATH: 1
SORE THROAT: 0

## 2023-10-24 ASSESSMENT — PAIN SCALES - GENERAL: PAINLEVEL: NO PAIN (0)

## 2023-10-24 NOTE — PROGRESS NOTES
DME (Durable Medical Equipment) Orders and Documentation  Orders Placed This Encounter   Procedures    Orthotics and Prosthetics DME Compression; Leg; Knee; Bilateral; 30/40 mmHg; 4 Pair        The patient was assessed and it was determined the patient is in need of the following listed DME Supplies/Equipment. Please complete supporting documentation below to demonstrate medical necessity.              Answers submitted by the patient for this visit:  Annual Preventive Visit (Submitted on 10/17/2023)  Chief Complaint: Annual Exam:  Frequency of exercise:: 2-3 days/week  Getting at least 3 servings of Calcium per day:: Yes  Diet:: Low salt, Diabetic  Taking medications regularly:: Yes  Medication side effects:: Other  Bi-annual eye exam:: Yes  Dental care twice a year:: NO  Sleep apnea or symptoms of sleep apnea:: Daytime drowsiness, Excessive snoring, Sleep apnea  abdominal pain: Yes  Blood in stool: Yes  Blood in urine: No  chest pain: No  chills: No  congestion: No  constipation: Yes  diarrhea: No  dizziness: No  ear pain: No  eye pain: No  nervous/anxious: No  fever: No  frequency: No  genital sores: No  headaches: No  hearing loss: Yes  heartburn: No  arthralgias: Yes  joint swelling: Yes  peripheral edema: Yes  mood changes: No  myalgias: Yes  nausea: No  dysuria: No  palpitations: No  Skin sensation changes: Yes  sore throat: No  urgency: No  rash: No  shortness of breath: Yes  visual disturbance: No  weakness: No  impotence: No  penile discharge: No  Additional concerns today:: Yes  Exercise outside of work (Submitted on 10/17/2023)  Chief Complaint: Annual Exam:  Duration of exercise:: 15-30 minutes

## 2023-10-24 NOTE — NURSING NOTE
"Chief Complaint   Patient presents with    Physical     /68   Pulse 77   Temp 98.1  F (36.7  C) (Tympanic)   Resp 16   Ht 1.702 m (5' 7\")   Wt (!) 167.8 kg (370 lb)   SpO2 98%   BMI 57.95 kg/m   Estimated body mass index is 57.95 kg/m  as calculated from the following:    Height as of this encounter: 1.702 m (5' 7\").    Weight as of this encounter: 167.8 kg (370 lb).  Patient presents to the clinic using No DME      Health Maintenance that is potentially due pending provider review:    Health Maintenance Due   Topic Date Due    ADVANCE CARE PLANNING  Never done    HEPATITIS B IMMUNIZATION (1 of 3 - 3-dose series) Never done    YEARLY PREVENTIVE VISIT  12/09/2015    DIABETIC FOOT EXAM  01/14/2020    A1C  04/03/2023    INFLUENZA VACCINE (1) 09/01/2023    COVID-19 Vaccine (4 - 2023-24 season) 09/01/2023    LIPID  10/03/2023    MICROALBUMIN  10/03/2023    ANNUAL REVIEW OF HM ORDERS  10/03/2023    ZOSTER IMMUNIZATION (1 of 2) 07/02/2023        Will update flu and covid.          "

## 2023-10-24 NOTE — PROGRESS NOTES
SUBJECTIVE:   CC: Refugio is an 48 year old who presents for preventative health visit.       10/24/2023     2:08 PM   Additional Questions   Roomed by Cyndie PIEDRA   Accompanied by Self         10/24/2023     2:08 PM   Patient Reported Additional Medications   Patient reports taking the following new medications Constipation with Ozempic - anything pt can do with this?       Healthy Habits:     Getting at least 3 servings of Calcium per day:  Yes    Bi-annual eye exam:  Yes    Dental care twice a year:  NO    Sleep apnea or symptoms of sleep apnea:  Daytime drowsiness, Excessive snoring and Sleep apnea    Diet:  Low salt and Diabetic    Frequency of exercise:  2-3 days/week    Duration of exercise:  15-30 minutes    Taking medications regularly:  Yes    Medication side effects:  Other    Additional concerns today:  Yes      Diabetes Follow-up    How often are you checking your blood sugar? Two times daily  Blood sugar testing frequency justification:  Adjustment of medication(s)  What time of day are you checking your blood sugars (select all that apply)?  Before meals  Have you had any blood sugars above 200?  No  Have you had any blood sugars below 70?  No  What symptoms do you notice when your blood sugar is low?  None  What concerns do you have today about your diabetes? None   Do you have any of these symptoms? (Select all that apply)  Numbness in feet and Excessive thirst    Wt Readings from Last 4 Encounters:   10/24/23 (!) 167.8 kg (370 lb)   10/02/23 (!) 172.4 kg (380 lb)   09/05/23 (!) 172.4 kg (380 lb)   05/11/23 (!) 172.5 kg (380 lb 4.8 oz)         BP Readings from Last 2 Encounters:   10/24/23 100/68   09/05/23 117/42     Hemoglobin A1C (%)   Date Value   10/03/2022 5.7 (H)   08/20/2021 5.7 (H)   12/08/2020 5.5   12/19/2019 5.6     LDL Cholesterol Calculated (mg/dL)   Date Value   10/03/2022 91   12/08/2020 44   12/19/2019 49             Hyperlipidemia Follow-Up    Are you regularly taking any medication or  supplement to lower your cholesterol?   Yes- Zocor  Are you having muscle aches or other side effects that you think could be caused by your cholesterol lowering medication?  No    Asthma Follow-Up    Was ACT completed today?  Yes        10/17/2023    11:36 AM   ACT Total Scores   ACT TOTAL SCORE (Goal Greater than or Equal to 20) 22   In the past 12 months, how many times did you visit the emergency room for your asthma without being admitted to the hospital? 0   In the past 12 months, how many times were you hospitalized overnight because of your asthma? 0        How many days per week do you miss taking your asthma controller medication?  I do not have an asthma controller medication  Please describe any recent triggers for your asthma: dust mites and pollens  Have you had any Emergency Room Visits, Urgent Care Visits, or Hospital Admissions since your last office visit?  No      Have you ever done Advance Care Planning? (For example, a Health Directive, POLST, or a discussion with a medical provider or your loved ones about your wishes): No, advance care planning information given to patient to review.  Patient plans to discuss their wishes with loved ones or provider.      Social History     Tobacco Use    Smoking status: Former     Packs/day: 0.50     Years: 1.00     Additional pack years: 0.00     Total pack years: 0.50     Types: Cigarettes, Cigars     Quit date: 2012     Years since quittin.4    Smokeless tobacco: Never   Substance Use Topics    Alcohol use: Yes     Comment: rare             10/17/2023    11:33 AM   Alcohol Use   Prescreen: >3 drinks/day or >7 drinks/week? No       Last PSA:   PSA   Date Value Ref Range Status   2012 0.55 0 - 4 ug/L Final       Reviewed orders with patient. Reviewed health maintenance and updated orders accordingly - Yes  Labs reviewed in EPIC    Reviewed and updated as needed this visit by clinical staff   Tobacco  Allergies  Meds  Problems  Med Hx   "Surg Hx  Fam Hx          Reviewed and updated as needed this visit by Provider   Tobacco  Allergies  Meds  Problems  Med Hx  Surg Hx  Fam Hx             Review of Systems   Constitutional:  Negative for chills and fever.   HENT:  Positive for hearing loss. Negative for congestion, ear pain and sore throat.    Eyes:  Negative for pain and visual disturbance.   Respiratory:  Positive for shortness of breath.    Cardiovascular:  Positive for peripheral edema. Negative for chest pain and palpitations.   Gastrointestinal:  Positive for abdominal pain, constipation and hematochezia. Negative for diarrhea, heartburn and nausea.   Genitourinary:  Negative for dysuria, frequency, genital sores, hematuria, impotence, penile discharge and urgency.   Musculoskeletal:  Positive for arthralgias, joint swelling and myalgias.   Skin:  Negative for rash.   Neurological:  Positive for paresthesias. Negative for dizziness, weakness and headaches.   Psychiatric/Behavioral:  Negative for mood changes. The patient is not nervous/anxious.      Some constipation related to ozempic and has had some bleeding with hard stools   Edema and [place] is chronic       OBJECTIVE:   /68   Pulse 77   Temp 98.1  F (36.7  C) (Tympanic)   Resp 16   Ht 1.702 m (5' 7\")   Wt (!) 167.8 kg (370 lb)   SpO2 98%   BMI 57.95 kg/m      Physical Exam  GENERAL: healthy, alert and no distress  NECK: no adenopathy, no asymmetry, masses, or scars and thyroid normal to palpation  RESP: lungs clear to auscultation - no rales, rhonchi or wheezes  CV: regular rate and rhythm, normal S1 S2, no S3 or S4, no murmur, click or rub, no peripheral edema and peripheral pulses strong  ABDOMEN: soft, nontender, no hepatosplenomegaly, no masses and bowel sounds normal  MS: no gross musculoskeletal defects noted, no edema  SKIN: no suspicious lesions or rashes  NEURO: Normal strength and tone, mentation intact and speech normal  PSYCH: mentation appears normal, " "affect normal/bright    Diagnostic Test Results:  Labs reviewed in Epic    ASSESSMENT/PLAN:   (Z00.00) Routine general medical examination at a health care facility  (primary encounter diagnosis)  Comment:   Plan:     (E11.9) Type 2 diabetes mellitus without complication, without long-term current use of insulin (H)  Comment: Adjust meds as indicated by above labs.   Plan: HEMOGLOBIN A1C, Lipid panel reflex to direct         LDL Non-fasting, Albumin Random Urine         Quantitative with Creat Ratio, Comprehensive         metabolic panel            (L30.9) Hand eczema  Comment: Stable no change in treatment plan.   Plan: triamcinolone (KENALOG) 0.1 % external cream            (I87.8) Venous stasis  Comment: need updated compression socks   Plan: Orthotics and Prosthetics DME Compression; Leg;        Knee; Bilateral; 30/40 mmHg; 4 Pair            (G47.33) FARZANA (obstructive sleep apnea)/Hypoventilation Syndrome- Severe  Comment: using cpap  Plan:     (E66.01) Morbid obesity (H)  Comment: has lost 20lbs   Plan:     (E78.5) Hyperlipidemia LDL goal <100  Comment: Adjust meds as indicated by above labs.   Plan: simvastatin (ZOCOR) 10 MG tablet            Patient has been advised of split billing requirements and indicates understanding: Yes      COUNSELING:   Reviewed preventive health counseling, as reflected in patient instructions      BMI:   Estimated body mass index is 57.95 kg/m  as calculated from the following:    Height as of this encounter: 1.702 m (5' 7\").    Weight as of this encounter: 167.8 kg (370 lb).   Weight management plan: Discussed healthy diet and exercise guidelines      He reports that he quit smoking about 11 years ago. His smoking use included cigarettes and cigars. He has a 0.50 pack-year smoking history. He has never used smokeless tobacco.            Erika Espino MD  [facility]  "

## 2023-10-24 NOTE — PATIENT INSTRUCTIONS
Miralax 1 tablespoon daily let us know if things are not better           Preventive Health Recommendations  Male Ages 50 - 64    Yearly exam:             See your health care provider every year in order to  o   Review health changes.   o   Discuss preventive care.    o   Review your medicines if your doctor has prescribed any.   Have a cholesterol test every 5 years, or more frequently if you are at risk for high cholesterol/heart disease.   Have a diabetes test (fasting glucose) every three years. If you are at risk for diabetes, you should have this test more often.   Have a colonoscopy at age 45, or have a yearly FIT test (stool test). These exams will check for colon cancer.    Talk with your health care provider about whether or not a prostate cancer screening test (PSA) is right for you.  You should be tested each year for STDs (sexually transmitted diseases), if you re at risk.     Shots: Get a flu shot each year. Get a tetanus shot every 10 years.     Nutrition:  Eat at least 5 servings of fruits and vegetables daily.   Eat whole-grain bread, whole-wheat pasta and brown rice instead of white grains and rice.   Get adequate Calcium and Vitamin D.     Lifestyle  Exercise for at least 150 minutes a week (30 minutes a day, 5 days a week). This will help you control your weight and prevent disease.   Limit alcohol to one drink per day.   No smoking.   Wear sunscreen to prevent skin cancer.   See your dentist every six months for an exam and cleaning.   See your eye doctor every 1 to 2 years.

## 2023-10-30 ENCOUNTER — MYC MEDICAL ADVICE (OUTPATIENT)
Dept: FAMILY MEDICINE | Facility: CLINIC | Age: 48
End: 2023-10-30
Payer: COMMERCIAL

## 2023-11-18 DIAGNOSIS — E11.9 TYPE 2 DIABETES MELLITUS WITHOUT COMPLICATION, WITHOUT LONG-TERM CURRENT USE OF INSULIN (H): ICD-10-CM

## 2023-11-20 RX ORDER — METFORMIN HCL 500 MG
TABLET, EXTENDED RELEASE 24 HR ORAL
Qty: 90 TABLET | Refills: 1 | Status: SHIPPED | OUTPATIENT
Start: 2023-11-20 | End: 2024-05-14

## 2023-11-27 ENCOUNTER — HOSPITAL ENCOUNTER (EMERGENCY)
Facility: CLINIC | Age: 48
Discharge: HOME | End: 2023-11-27
Payer: COMMERCIAL

## 2023-11-27 VITALS — RESPIRATION RATE: 16 BRPM | HEART RATE: 79 BPM | OXYGEN SATURATION: 99 % | TEMPERATURE: 97.9 F

## 2023-11-27 DIAGNOSIS — L50.9 URTICARIA: ICD-10-CM

## 2023-11-27 DIAGNOSIS — J06.9 VIRAL UPPER RESPIRATORY INFECTION: ICD-10-CM

## 2023-11-27 LAB
FLUAV RNA SPEC QL NAA+PROBE: NEGATIVE
FLUBV RNA RESP QL NAA+PROBE: NEGATIVE
RSV RNA SPEC NAA+PROBE: NEGATIVE
SARS-COV-2 RNA RESP QL NAA+PROBE: NEGATIVE

## 2023-11-27 PROCEDURE — 99213 OFFICE O/P EST LOW 20 MIN: CPT

## 2023-11-27 PROCEDURE — 87637 SARSCOV2&INF A&B&RSV AMP PRB: CPT

## 2023-11-27 PROCEDURE — G0463 HOSPITAL OUTPT CLINIC VISIT: HCPCS

## 2023-11-27 RX ORDER — PREDNISONE 20 MG/1
TABLET ORAL
Qty: 10 TABLET | Refills: 0 | Status: SHIPPED | OUTPATIENT
Start: 2023-11-27 | End: 2024-05-03

## 2023-11-27 NOTE — DISCHARGE INSTRUCTIONS
Prednisone for 5 days.  This should be helpful for your hives as well as the congestion in your chest.  As we discussed please return for new or worsening symptoms or if not improving.    Also as we discussed, the prednisone is best to be taken in the morning and will also increase your blood sugars while you are taking it.    You may also want to consider taking anteriorly and today allergy medication such as Zyrtec or Claritin

## 2023-11-27 NOTE — ED PROVIDER NOTES
History   No chief complaint on file.    JUJU Kennedy is a 48 year old male who presents to urgent care for concern of chest congestion and times.  Patient reports he has had some URI symptoms ongoing for the past week and a half.  States majority of his symptoms have largely improved but he reports feeling of chest congestion that he is not able to expel with coughing.  He reports cough is productive with a fair to green phlegm at times.  He is not having any nausea, vomiting, diarrhea.  Denies any significant nasal congestion.  Does endorse some occasional left ear discomfort.  He denies any significant shortness of breath.  He denies any fevers or other infectious symptoms.    Patient also reports that he has had hives on his bilateral hands, arms, legs, and torso.  This started about a day and a half ago.  He denies any oral swelling or lip swelling.  He denies noting any wheezing or shortness of breath.  He has been using Benadryl which has been helpful for the hives.  States they are very itchy and not painful.  Patient denies noting any significant changes in cosmetic products, medications or other environmental factors.  States that about 4 days ago his girlfriend did have him switch to a Dove bar soap.    No additional concerns today. He does have a smoking history several years ago.     Allergies:  No Known Allergies    Problem List:    Patient Active Problem List    Diagnosis Date Noted    Single subsegmental pulmonary embolism without acute cor pulmonale (H) 02/22/2023     Priority: Medium    Methamphetamine abuse in remission (H) 07/17/2017     Priority: Medium    Type 2 diabetes mellitus without complication, without long-term current use of insulin (H) 10/17/2016     Priority: Medium    Mild intermittent asthma without complication 03/24/2015     Priority: Medium    Pulmonary hypertension (H) 06/22/2014     Priority: Medium    Hyperlipidemia LDL goal <100 06/08/2012     Priority: Medium     Venous stasis 06/08/2012     Priority: Medium    Chronic respiratory failure (H) 02/11/2011     Priority: Medium     ABG 2/11- 7.38/51/75 RA      FARZANA (obstructive sleep apnea)/Hypoventilation Syndrome- Severe      Priority: Medium     Sleep Study 1998- AHI 51. Rx 16 cm. S/p UVPP 1998, weight loss. Sleep study 9/10/03 (weight 379)- AHI 34.9. Lo2 58%, with reported hypoventilation.  Sleep study 9/29/03- CPAP 14 cm appeared effective. MSLT- 1.8 minutes, 2 sleep onset REMs, diagnosed with 'narcolepsy', placed on Provigil (though he did not tolerate CPAP on 1st study 9/03).   Sleep study 2/11- AHI 54.9, with desaturations to 51%. Transcutaneous CO2 monitoring showed some elevations consistent with hypoventilation. CPAP titration: No fully effective pressure was found. CPAP was titrated to 18 cm with improvement in severe desaturations, but some persistent hypopneas and arousals. Supine REM was noted at this pressure. Brief trial of 20/16 showed persistent events. Efficacy was limited by mask leak throughout most of the study.       Morbid obesity (H)      Priority: Medium     Morbid obesity          Past Medical History:    Past Medical History:   Diagnosis Date    2019 novel coronavirus disease (COVID-19) 03/29/2021    Arthritis     Cataplexy and narcolepsy     Cellulitis     Chronic respiratory failure with hypercapnia (H) 03/29/2021    COPD (chronic obstructive pulmonary disease) (H)     Diabetes     Hypoventilation     Morbid obesity (H) 03/29/2021    Obesity     FARZANA (obstructive sleep apnea)     FARZANA (obstructive sleep apnea) 03/29/2021    Uncomplicated asthma        Past Surgical History:    Past Surgical History:   Procedure Laterality Date    ABDOMEN SURGERY      COLONOSCOPY N/A 09/05/2023    Procedure: COLONOSCOPY, WITH POLYPECTOMY AND BIOPSY;  Surgeon: Andrews Aguayo MD;  Location: [facility]    ENT SURGERY      LAPAROSCOPIC HERNIORRHAPHY VENTRAL N/A 05/17/2016    Procedure: LAPAROSCOPIC HERNIORRHAPHY  VENTRAL;  Surgeon: Yvse Jimenes MD;  Location: [facility]    LAPAROSCOPIC HERNIORRHAPHY VENTRAL N/A 2016    Procedure: LAPAROSCOPIC HERNIORRHAPHY VENTRAL;  Surgeon: Yves Jimenes MD;  Location: [facility]    SURGICAL HISTORY OF -   1998    UVPP       Family History:    Family History   Problem Relation Age of Onset    Anxiety Disorder Mother     Asthma Mother     Depression Mother     Thyroid Disease Mother     Anxiety Disorder Father     Substance Abuse Father     Depression Father     Hypertension Father     Cancer Maternal Grandfather     Heart Disease Maternal Grandfather     Diabetes Maternal Grandfather     Hypertension Maternal Grandfather     Other Cancer Maternal Grandfather     Cancer Paternal Grandfather         unknown    Other Cancer Paternal Grandfather     Anxiety Disorder Brother     Substance Abuse Brother     Mental Illness Brother     Depression Brother     Cancer Brother     Other Cancer Brother     Obesity No family hx of        Social History:  Marital Status:  Single [1]  Social History     Tobacco Use    Smoking status: Former     Packs/day: 0.50     Years: 1.00     Additional pack years: 0.00     Total pack years: 0.50     Types: Cigarettes, Cigars     Quit date: 2012     Years since quittin.5    Smokeless tobacco: Never   Vaping Use    Vaping Use: Never used   Substance Use Topics    Alcohol use: Yes     Comment: rare    Drug use: No     Comment: 1 month ago -         Medications:    predniSONE (DELTASONE) 20 MG tablet  acetaminophen (TYLENOL) 500 MG tablet  albuterol (2.5 MG/3ML) 0.083% neb solution  albuterol (PROAIR HFA/PROVENTIL HFA/VENTOLIN HFA) 108 (90 Base) MCG/ACT inhaler  blood glucose (ACCU-CHEK CHRIS PLUS) test strip  blood glucose (NO BRAND SPECIFIED) test strip  blood glucose monitoring (SOFTCLIX) lancets  DIABETIC STERILE LANCETS device  fluticasone (FLONASE) 50 MCG/ACT spray  furosemide (LASIX) 20 MG tablet  lisinopril (ZESTRIL) 5 MG tablet  metFORMIN  (GLUCOPHAGE XR) 500 MG 24 hr tablet  modafinil (PROVIGIL) 200 MG tablet  Multiple Vitamin (MULTIVITAMIN OR)  order for DME  order for DME  order for DME  order for DME  ORDER FOR DME  rivaroxaban ANTICOAGULANT (XARELTO) 20 MG TABS tablet  Semaglutide, 1 MG/DOSE, (OZEMPIC) 4 MG/3ML pen  simvastatin (ZOCOR) 10 MG tablet  topiramate (TOPAMAX) 100 MG tablet  triamcinolone (KENALOG) 0.1 % external cream          Review of Systems   All other systems reviewed and are negative.    See HPI  Physical Exam   Pulse: 79  Temp: 97.9  F (36.6  C)  Resp: 16  SpO2: 99 %      Physical Exam  Constitutional:       General: He is not in acute distress.     Appearance: Normal appearance. He is not toxic-appearing.   HENT:      Head: Atraumatic.      Right Ear: Tympanic membrane and ear canal normal.      Left Ear: Ear canal normal.      Nose: No congestion or rhinorrhea.      Mouth/Throat:      Mouth: Mucous membranes are moist.      Pharynx: Oropharynx is clear. No oropharyngeal exudate or posterior oropharyngeal erythema.   Eyes:      General: No scleral icterus.     Conjunctiva/sclera: Conjunctivae normal.      Pupils: Pupils are equal, round, and reactive to light.   Cardiovascular:      Rate and Rhythm: Normal rate.      Heart sounds: Normal heart sounds.   Pulmonary:      Effort: Pulmonary effort is normal. No respiratory distress.      Breath sounds: Normal breath sounds.   Abdominal:      Palpations: Abdomen is soft.      Tenderness: There is no abdominal tenderness.   Musculoskeletal:         General: No deformity.      Cervical back: Neck supple.   Skin:     General: Skin is warm.      Findings: Rash present. Rash is urticarial.      Comments: Faint urticaria noted on the bilateral volar forearms and hands.   Neurological:      Mental Status: He is alert.         ED Course                 Procedures         Results for orders placed or performed during the hospital encounter of 11/27/23 (from the past 24 hour(s))   Symptomatic  Influenza A/B, RSV, & SARS-CoV2 PCR (COVID-19) Nose    Specimen: Nose; Swab   Result Value Ref Range    Influenza A PCR Negative Negative    Influenza B PCR Negative Negative    RSV PCR Negative Negative    SARS CoV2 PCR Negative Negative    Narrative    Testing was performed using the Xpert Xpress CoV2/Flu/RSV Assay on the [employer] GeneXpert Instrument. This test should be ordered for the detection of SARS-CoV-2, influenza, and RSV viruses in individuals who meet clinical and/or epidemiological criteria. Test performance is unknown in asymptomatic patients. This test is for in vitro diagnostic use under the FDA EUA for laboratories certified under CLIA to perform high or moderate complexity testing. This test has not been FDA cleared or approved. A negative result does not rule out the presence of PCR inhibitors in the specimen or target RNA in concentration below the limit of detection for the assay. If only one viral target is positive but coinfection with multiple targets is suspected, the sample should be re-tested with another FDA cleared, approved, or authorized test, if coinfection would change clinical management. This test was validated by the [facility]. These laboratories are certified under the Clinical Laboratory Improvement Amendments of 1988 (CLIA-88) as qualified to perform high complexity laboratory testing.       Medications - No data to display    Assessments & Plan (with Medical Decision Making)   Patient presents to urgent care for concern of hives as well as chest congestion.  He is afebrile on arrival and his vital signs otherwise reassuring at this time.  He is not any acute respiratory distress.  He is not hypoxic and has normal lung sounds throughout.  There is no oral swelling or angioedema noted.  There is no indication for a chest x-ray at this time.  He is noted to have some faint hives on the hands and forearms.  Suspects that this may related to the change in soap  earlier this week and advised him to stop using this.  He should have close awareness to other changes that may be occurring contributing to this reaction.  Patient denies that he has been outside and I have lower concern that this is a plant related dermatitis.  Does not seem consistent with atopic dermatitis and I have low concern for a viral exanthem as cause of this rash.  IT is not consistent with shingles or scabies. Will treat with prednisone for urticaria, but this will also provide some coverage for bronchitis or possible COPD exacerbation given his history of smoking.  Return precautions for new or worsening symptoms reviewed with him.  Further symptomatic cares were reviewed as well.  He may want to also consider adding a daily Zyrtec or Claritin.  Follow-up with primary doctor for any ongoing concerns.  Return precautions reviewed with him to return here for new or worsening symptoms.  Patient was discharged in good condition and is agreeable to a plan.    I have reviewed the nursing notes.    I have reviewed the findings, diagnosis, plan and need for follow up with the patient.        Discharge Medication List as of 11/27/2023  2:32 PM        START taking these medications    Details   predniSONE (DELTASONE) 20 MG tablet Take two tablets (= 40mg) each day for 5 (five) days, Disp-10 tablet, R-0, E-Prescribe             Final diagnoses:   Viral upper respiratory infection   Urticaria       11/27/2023   [facility] EMERGENCY DEPT    Disclaimer:  This note consists of symbols derived from keyboarding, dictation and/or voice recognition software.  As a result, there may be errors in the script that have gone undetected.  Please consider this when interpreting information found in this chart.         Jose Parmar, NANCY CNP  11/27/23 [id number]

## 2023-11-27 NOTE — ED TRIAGE NOTES
Pt presents with itchy hives on hands, arms and not spread all over body.  Had a cold. Chest congestion.

## 2023-11-28 ENCOUNTER — MYC MEDICAL ADVICE (OUTPATIENT)
Dept: FAMILY MEDICINE | Facility: CLINIC | Age: 48
End: 2023-11-28
Payer: COMMERCIAL

## 2023-11-28 DIAGNOSIS — I87.8 VENOUS STASIS: ICD-10-CM

## 2023-11-28 RX ORDER — FUROSEMIDE 20 MG
TABLET ORAL
Qty: 360 TABLET | Refills: 3 | Status: SHIPPED | OUTPATIENT
Start: 2023-11-28 | End: 2024-09-05

## 2023-12-10 DIAGNOSIS — E11.9 DIABETES MELLITUS WITHOUT COMPLICATION (H): ICD-10-CM

## 2023-12-11 RX ORDER — BLOOD SUGAR DIAGNOSTIC
STRIP MISCELLANEOUS
Qty: 200 STRIP | Refills: 1 | Status: SHIPPED | OUTPATIENT
Start: 2023-12-11

## 2024-02-01 DIAGNOSIS — E11.9 TYPE 2 DIABETES MELLITUS WITHOUT COMPLICATION, WITHOUT LONG-TERM CURRENT USE OF INSULIN (H): ICD-10-CM

## 2024-02-05 RX ORDER — SEMAGLUTIDE 1.34 MG/ML
INJECTION, SOLUTION SUBCUTANEOUS
Qty: 3 ML | Refills: 3 | OUTPATIENT
Start: 2024-02-05

## 2024-02-05 NOTE — TELEPHONE ENCOUNTER
Refill denied at this time. Patient needs appointment with Dr. Plascencia or [facility] pharmacist. [employer] message sent to patient to schedule appointment.

## 2024-02-05 NOTE — TELEPHONE ENCOUNTER
OZEMPIC (1 MG/DOSE) 4MG/3ML SOPN   Last Written Prescription Date:  10/2/2023  Last Fill Quantity: 3,   # refills: 3  Last Office Visit : 4/7/2023  Future Office visit:  None    Routing refill request to provider for review/approval because:  Med refused  Office visit due  Scheduling notified    Kathie Melo RN  Central Triage Red Flags/Med Refills

## 2024-02-16 ENCOUNTER — MYC MEDICAL ADVICE (OUTPATIENT)
Dept: HEMATOLOGY | Facility: CLINIC | Age: 49
End: 2024-02-16
Payer: COMMERCIAL

## 2024-03-19 DIAGNOSIS — I27.20 PULMONARY HYPERTENSION (H): ICD-10-CM

## 2024-03-19 RX ORDER — LISINOPRIL 5 MG/1
5 TABLET ORAL DAILY
Qty: 90 TABLET | Refills: 3 | Status: SHIPPED | OUTPATIENT
Start: 2024-03-19 | End: 2024-09-05

## 2024-03-19 NOTE — TELEPHONE ENCOUNTER
Requested Prescriptions   Pending Prescriptions Disp Refills    lisinopril (ZESTRIL) 5 MG tablet 90 tablet 3     Sig: Take 1 tablet (5 mg) by mouth daily       ACE Inhibitors (Including Combos) Protocol Failed - 3/19/2024 10:45 AM        Failed - Medication indicated for associated diagnosis     Medication is associated with one or more of the following diagnoses:     Chronic Kidney Disease (CKD)   Coronary Artery Disease (CAD)   Diabetes   Heart Failure (HF)   Hypertension (HTN)   Nephropathy            Passed - Blood pressure under 140/90 in past 12 months     BP Readings from Last 3 Encounters:   10/24/23 100/68   09/05/23 117/42   05/11/23 116/77                 Passed - Medication is active on med list        Passed - Has GFR on file in past 12 months and most recent value is normal        Passed - Recent (12 mo) or future (90 days) visit within the authorizing provider's specialty     The patient must have completed an in-person or virtual visit within the past 12 months or has a future visit scheduled within the next 90 days with the authorizing provider s specialty.  Urgent care and e-visits do not quality as an office visit for this protocol.          Passed - Patient is age 18 or older        Passed - Normal serum creatinine on file in past 12 months     Recent Labs   Lab Test 10/24/23  1518   CR 0.95                 Passed - Normal serum potassium on file in past 12 months     Recent Labs   Lab Test 10/24/23  1518   POTASSIUM 3.8

## 2024-04-07 DIAGNOSIS — E11.9 TYPE 2 DIABETES MELLITUS WITHOUT COMPLICATION, WITHOUT LONG-TERM CURRENT USE OF INSULIN (H): ICD-10-CM

## 2024-04-09 DIAGNOSIS — G47.33 OBSTRUCTIVE SLEEP APNEA-HYPOPNEA SYNDROME: ICD-10-CM

## 2024-04-10 ENCOUNTER — TELEPHONE (OUTPATIENT)
Dept: PULMONOLOGY | Facility: OTHER | Age: 49
End: 2024-04-10

## 2024-04-10 RX ORDER — MODAFINIL 200 MG/1
TABLET ORAL
Qty: 60 TABLET | Refills: 5 | Status: SHIPPED | OUTPATIENT
Start: 2024-04-10 | End: 2024-09-06

## 2024-04-10 NOTE — TELEPHONE ENCOUNTER
Prior Authorization Retail Medication Request    Medication/Dose: Modafinil    Diagnosis and ICD code (if different than what is on RX):    New/renewal/insurance change PA/secondary ins. PA:  Previously Tried and Failed:   Rationale:      Insurance   Primary: MEDICA  Insurance ID:  [id number]  [phone]    PA exp 4/8/24, please renew      Thank You,  Kiera Dyson, [facility]

## 2024-04-16 ENCOUNTER — TELEPHONE (OUTPATIENT)
Dept: ENDOCRINOLOGY | Facility: CLINIC | Age: 49
End: 2024-04-16

## 2024-04-16 RX ORDER — SEMAGLUTIDE 1.34 MG/ML
1 INJECTION, SOLUTION SUBCUTANEOUS
Qty: 3 ML | Refills: 0 | Status: SHIPPED | OUTPATIENT
Start: 2024-04-16 | End: 2024-05-03 | Stop reason: DRUGHIGH

## 2024-04-16 NOTE — TELEPHONE ENCOUNTER
Last Clinic Visit: 4/7/2023 [facility] [place]  Future Clinic Visit: 5/3/2024    Appointment within the next 2 weeks: refill of Ozempic was sent for 28 day supply - further refills at 5/3/2024 visit.

## 2024-04-16 NOTE — TELEPHONE ENCOUNTER
PA Initiation    Medication: OZEMPIC (1 MG/DOSE) 4 MG/3ML SC SOPN  Insurance Company: [employer] - Phone [phone] Fax [phone]  Pharmacy Filling the Rx:    Filling Pharmacy Phone:    Filling Pharmacy Fax:    Start Date: 4/16/2024    [id number]

## 2024-04-17 ENCOUNTER — TELEPHONE (OUTPATIENT)
Dept: ENDOCRINOLOGY | Facility: CLINIC | Age: 49
End: 2024-04-17
Payer: COMMERCIAL

## 2024-04-17 NOTE — TELEPHONE ENCOUNTER
Hello!    Refugio has been off of Ozempic for about 3 months. Can we please get a prescription for the starting dose again, so he does not have severe nausea jumping back in at 1 mg?    Thank you.    Alyssa Randle, PharmD  Staff Pharmacist  [facility]  [phone]

## 2024-04-23 ENCOUNTER — MYC MEDICAL ADVICE (OUTPATIENT)
Dept: FAMILY MEDICINE | Facility: CLINIC | Age: 49
End: 2024-04-23
Payer: COMMERCIAL

## 2024-04-23 DIAGNOSIS — J30.1 SEASONAL ALLERGIC RHINITIS DUE TO POLLEN: ICD-10-CM

## 2024-04-23 RX ORDER — CETIRIZINE HYDROCHLORIDE 10 MG/1
TABLET ORAL
Qty: 90 TABLET | Refills: 1 | Status: SHIPPED | OUTPATIENT
Start: 2024-04-23 | End: 2024-09-05

## 2024-04-23 NOTE — TELEPHONE ENCOUNTER
Prior Authorization Approval    Medication: MODAFINIL 200 MG PO TABS  Authorization Effective Date: 3/24/2024  Authorization Expiration Date: 4/23/2025  Approved Dose/Quantity: 60/30  Reference #: [id number]   Insurance Company: [employer] Non-Specialty PA's - Phone [phone] Fax [phone]  Which Pharmacy is filling the prescription: [facility] [address] [place] [address]  Pharmacy Notified: y  Patient Notified: y - pharmacy to notify

## 2024-04-24 NOTE — TELEPHONE ENCOUNTER
Prior Authorization Approval    Didn't received a fax about approval    Medication: OZEMPIC (1 MG/DOSE) 4 MG/3ML SC SOPN  Authorization Effective Date: 4/16/2024  Authorization Expiration Date: 4/16/2025  Approved Dose/Quantity: 3ml per 28 days  Reference #: [id number]   Insurance Company: [employer] - Phone [phone] Fax [phone]  Expected CoPay: $    CoPay Card Available: No    Financial Assistance Needed:   Which Pharmacy is filling the prescription:    Pharmacy Notified:   Patient Notified:

## 2024-04-30 NOTE — TELEPHONE ENCOUNTER
Appointment with Dr. Plascencia this Friday 5/3/24. Can discuss restarting the medication at that time.

## 2024-05-03 ENCOUNTER — VIRTUAL VISIT (OUTPATIENT)
Dept: ENDOCRINOLOGY | Facility: CLINIC | Age: 49
End: 2024-05-03
Payer: COMMERCIAL

## 2024-05-03 VITALS — WEIGHT: 274.3 LBS | BODY MASS INDEX: 41.57 KG/M2 | HEIGHT: 68 IN

## 2024-05-03 DIAGNOSIS — E66.01 MORBID OBESITY DUE TO EXCESS CALORIES (H): ICD-10-CM

## 2024-05-03 DIAGNOSIS — E66.813 CLASS 3 DRUG-INDUCED OBESITY WITH SERIOUS COMORBIDITY IN ADULT, UNSPECIFIED BMI: ICD-10-CM

## 2024-05-03 DIAGNOSIS — E66.1 CLASS 3 DRUG-INDUCED OBESITY WITH SERIOUS COMORBIDITY IN ADULT, UNSPECIFIED BMI: ICD-10-CM

## 2024-05-03 DIAGNOSIS — E11.9 TYPE 2 DIABETES MELLITUS WITHOUT COMPLICATION, WITHOUT LONG-TERM CURRENT USE OF INSULIN (H): Primary | ICD-10-CM

## 2024-05-03 PROCEDURE — 99442 PR PHYSICIAN TELEPHONE EVALUATION 11-20 MIN: CPT | Mod: 93 | Performed by: INTERNAL MEDICINE

## 2024-05-03 RX ORDER — TOPIRAMATE 100 MG/1
200 TABLET, FILM COATED ORAL 2 TIMES DAILY
Qty: 360 TABLET | Refills: 3 | OUTPATIENT
Start: 2024-05-03

## 2024-05-03 RX ORDER — TOPIRAMATE 100 MG/1
200 TABLET, FILM COATED ORAL 2 TIMES DAILY
Qty: 360 TABLET | Refills: 3 | Status: SHIPPED | OUTPATIENT
Start: 2024-05-03 | End: 2024-09-05

## 2024-05-03 ASSESSMENT — PAIN SCALES - GENERAL: PAINLEVEL: NO PAIN (0)

## 2024-05-03 NOTE — LETTER
"5/3/2024       RE: Refugio Kennedy  [address]  [facility] [address]     Dear Colleague,    Thank you for referring your patient, Refugio Kennedy, to the [facility] [place] [facility]. Please see a copy of my visit note below.      Return Medical Weight Management Note     Refugio Kennedy  MRN:  [id number]  :  1975  CRISTI:  5/3/2024    Dear Erika Espino MD,    I had the pleasure of seeing your patient Refugio Kennedy. He is a 48 year old male who I am continuing to see for treatment of obesity related to:        INTERVAL HISTORY:    Seen last by me 2023, and by Jeannine earlier; reviewed and relevant his the following as extracted--  ---------------------------------  \"He is a male who we are continuing to see for treatment of obesity related to: DMII, HLD, sleep apnea on CPAP daily, and hypoventilation syndrome, oxygen dependent.\"     \"[we are] continuing to see [him] for treatment of obesity related to: DMII, HLD, sleep apnea on CPAP daily, and hypoventilation syndrome, oxygen dependent.\"  ---------------------------------     Last visit he noted the following--  ---------------------------------  \"--pop still gone completely, For sweetening, using Stevia at home, still having some snacking (at work) but less snacking at home (not bringing them into the home)     --stress (girlfriend taking care of her 100 yo father) is making it more difficult to focus on wt loss     Current meds include--  --naltrexone 100 mg early afternoon  --topiramate 200 BID (6-9p taking it and supper time varies but can be around 7p, in bed typically by 10p); he will shift PM topiramate dose up a little (to help with late afternoon/early evening urges to eat)\"  ---------------------------------        LAST WEIGHT:   380 lbs 0 oz   Body mass index is 57.78 kg/m .    Since last seen of note is the following--  --he has a scale at home--can use " "that to check his response every 2 wks    --afternoons and evenings struggling with cravings/hunger    --on topiramate still and tolerating and getting partial benefit at current dose    --ran out of Ozempic (3-5 mo ago); was getting hard stools on it and we discussed adequate hydration, fiber as directed, colace, and miralax as directed as ways to manage this. He would like to resume Ozempic as this was effective for curbing cravings and hunger    CURRENT WEIGHT:   374# today    Initial Weight (lbs): 454 lbs  Last Visits Weight: 172.4 kg (380 lb)  Cumulative weight loss (lbs): 179.7  Weight Loss Percentage: 39.58%            4/26/2024     9:25 AM   Changes and Difficulties   I have made the following changes to my diet since my last visit: Cut pop by 98%,trying to more vegetables to lunch and dinner.   With regards to my diet, I am still struggling with: Snacking and ice cream   I have made the following changes to my activity/exercise since my last visit: Struggling with doing it   With regards to my activity/exercise, I am still struggling with: I got lazy with the yoga i was doing great at,i can feel it. I am starting the program over. So procrastinating is a problem for me       VITALS:  Ht 1.727 m (5' 7.99\")   Wt 124.4 kg (274 lb 4.8 oz)   BMI 41.72 kg/m      MEDICATIONS:   Current Outpatient Medications   Medication Sig Dispense Refill    ACCU-CHEK CHRIS PLUS test strip USE TO TEST BLOOD SUGAR 2 TIMES DAILY OR AS DIRECTED. 200 strip 1    acetaminophen (TYLENOL) 500 MG tablet Take 750 mg by mouth daily      albuterol (2.5 MG/3ML) 0.083% neb solution Take 1 vial (2.5 mg) by nebulization every 4 hours as needed for shortness of breath / dyspnea or wheezing 25 vial 0    albuterol (PROAIR HFA/PROVENTIL HFA/VENTOLIN HFA) 108 (90 Base) MCG/ACT inhaler INHALE 2 PUFFS INTO THE LUNGS EVERY 4 HOURS AS NEEDED FOR SHORTNESS OF BREATH / DYSPNEA 18 g 0    blood glucose (NO BRAND SPECIFIED) test strip Use to test blood " sugar 2 times daily or as directed. 180 strip 3    blood glucose monitoring (SOFTCLIX) lancets USE TO TEST THREE TIMES DAILY 300 each 1    cetirizine (ZYRTEC) 10 MG tablet TAKE ONE TABLET BY MOUTH EVERY EVENING 90 tablet 1    DIABETIC STERILE LANCETS device 1 Device 3 times daily. 1 Box 12    fluticasone (FLONASE) 50 MCG/ACT spray Spray 1-2 sprays into both nostrils daily 16 g 0    furosemide (LASIX) 20 MG tablet TAKE TWO TABLETS BY MOUTH TWICE A  tablet 3    lisinopril (ZESTRIL) 5 MG tablet Take 1 tablet (5 mg) by mouth daily 90 tablet 3    metFORMIN (GLUCOPHAGE XR) 500 MG 24 hr tablet TAKE ONE TABLET BY MOUTH ONCE DAILY 90 tablet 1    modafinil (PROVIGIL) 200 MG tablet Take 1/2 tablet by mouth 3-4 times daily as needed for sleepiness. 60 tablet 5    Multiple Vitamin (MULTIVITAMIN OR) Take 1 tablet by mouth daily.      order for DME Equipment being ordered: Dynaflex insert 1 Units 0    order for DME Equipment being ordered: Dynaflex insert 1 Units 0    order for DME Nebulizer 1 Units 0    order for DME Equipment being ordered: BIPAP Refugio P Kennedy received a Resmed AirCurve 10 Bilevel. Pressures were set at 23/14 cm H2O.      ORDER FOR DME Auto-BiPAP:  IPAP max 21 cm H2O  EPAP min 15 cm H2O  Pressure support 5 cm  Changed in clinic  Lifetime need and heated humidity.          predniSONE (DELTASONE) 20 MG tablet Take two tablets (= 40mg) each day for 5 (five) days 10 tablet 0    rivaroxaban ANTICOAGULANT (XARELTO) 20 MG TABS tablet Take 1 tablet (20 mg) by mouth daily with food 30 tablet 11    Semaglutide, 1 MG/DOSE, (OZEMPIC, 1 MG/DOSE,) 4 MG/3ML pen Inject 1 mg Subcutaneous every 7 days *further refills at 5/3/2024 appointment 3 mL 0    simvastatin (ZOCOR) 10 MG tablet Take 1 tablet (10 mg) by mouth at bedtime 90 tablet 3    topiramate (TOPAMAX) 100 MG tablet Take 2 tablets (200 mg) by mouth 2 times daily 360 tablet 3    triamcinolone (KENALOG) 0.1 % external cream Apply topically 2 times daily 45 g 1            4/26/2024     9:25 AM   Weight Loss Medication History Reviewed With Patient   Which weight loss medications are you currently taking on a regular basis? Naltrexone    Topamax (topiramate)    Metformin   Are you having any side effects from the weight loss medication that we have prescribed you? No     Comprehensive metab panel with borderline elevated BUN (wotherwise wnl) (nl sCr, elevated BUN/Cr > 20--->get adequate hydration as reviewed) Oct 2023  HbA1c then, treated on GLP1 agonism, 5.7    ASSESSMENT:   Class 3 obesity/DM2 not on insulin  Naltrexone 50 mg/d prior with some effectiveness noted at the time. Maintain topiramate 200 mg BID. We added GLP1 agonism which did benefit from and he would like to resume.. Reinforce food plan - focus on no between meal snacking, aggressive lowering of starches and cheese.   additionally  --resume Ozempic with attention to managing constipation (also on metformin prescribed by pt's PCP)  --planning RTC in about 4 mo ; encouraged to reach out between visits with any questions     Total time spent: 30 min (including pre-visit prep and post visit follow up/charting same day)      Sincerely,    Mohsen Plascencia MD

## 2024-05-03 NOTE — NURSING NOTE
Is the patient currently in the state of [place]? YES    Visit mode:TELEPHONE    If the visit is dropped, the patient can be reconnected by: TELEPHONE VISIT: Phone number:   Telephone Information:   Mobile [phone]       Will anyone else be joining the visit? NO  (If patient encounters technical issues they should call [phone])    How would you like to obtain your AVS? MyChart    Are changes needed to the allergy or medication list? No    Are refills needed on medications prescribed by this physician? NO    Reason for visit: RECHECK    Sue MARTINEZ

## 2024-05-03 NOTE — PROGRESS NOTES
"Virtual Visit Details    Type of service:  Telephone Visit   Phone call duration: 15 minutes, 47 sec    Originating Location (pt. Location):remote location (private area at work)    Distant Location (provider location):  Off-site          Return Medical Weight Management Note     Refugio Kennedy  MRN:  [id number]  :  1975  CRISTI:  5/3/2024    Dear Erika Espino MD,    I had the pleasure of seeing your patient Refugio Kennedy. He is a 48 year old male who I am continuing to see for treatment of obesity related to:        INTERVAL HISTORY:    Seen last by me 2023, and by Jeannine earlier; reviewed and relevant his the following as extracted--  ---------------------------------  \"He is a male who we are continuing to see for treatment of obesity related to: DMII, HLD, sleep apnea on CPAP daily, and hypoventilation syndrome, oxygen dependent.\"     \"[we are] continuing to see [him] for treatment of obesity related to: DMII, HLD, sleep apnea on CPAP daily, and hypoventilation syndrome, oxygen dependent.\"  ---------------------------------     Last visit he noted the following--  ---------------------------------  \"--pop still gone completely, For sweetening, using Stevia at home, still having some snacking (at work) but less snacking at home (not bringing them into the home)     --stress (girlfriend taking care of her 100 yo father) is making it more difficult to focus on wt loss     Current meds include--  --naltrexone 100 mg early afternoon  --topiramate 200 BID (6-9p taking it and supper time varies but can be around 7p, in bed typically by 10p); he will shift PM topiramate dose up a little (to help with late afternoon/early evening urges to eat)\"  ---------------------------------        LAST WEIGHT:   380 lbs 0 oz   Body mass index is 57.78 kg/m .    Since last seen of note is the following--  --he has a scale at home--can use that to check his response every 2 wks    --afternoons and evenings struggling " "with cravings/hunger    --on topiramate still and tolerating and getting partial benefit at current dose    --ran out of Ozempic (3-5 mo ago); was getting hard stools on it and we discussed adequate hydration, fiber as directed, colace, and miralax as directed as ways to manage this. He would like to resume Ozempic as this was effective for curbing cravings and hunger    CURRENT WEIGHT:   374# today    Initial Weight (lbs): 454 lbs  Last Visits Weight: 172.4 kg (380 lb)  Cumulative weight loss (lbs): 179.7  Weight Loss Percentage: 39.58%            4/26/2024     9:25 AM   Changes and Difficulties   I have made the following changes to my diet since my last visit: Cut pop by 98%,trying to more vegetables to lunch and dinner.   With regards to my diet, I am still struggling with: Snacking and ice cream   I have made the following changes to my activity/exercise since my last visit: Struggling with doing it   With regards to my activity/exercise, I am still struggling with: I got lazy with the yoga i was doing great at,i can feel it. I am starting the program over. So procrastinating is a problem for me       VITALS:  Ht 1.727 m (5' 7.99\")   Wt 124.4 kg (274 lb 4.8 oz)   BMI 41.72 kg/m      MEDICATIONS:   Current Outpatient Medications   Medication Sig Dispense Refill    ACCU-CHEK CHRIS PLUS test strip USE TO TEST BLOOD SUGAR 2 TIMES DAILY OR AS DIRECTED. 200 strip 1    acetaminophen (TYLENOL) 500 MG tablet Take 750 mg by mouth daily      albuterol (2.5 MG/3ML) 0.083% neb solution Take 1 vial (2.5 mg) by nebulization every 4 hours as needed for shortness of breath / dyspnea or wheezing 25 vial 0    albuterol (PROAIR HFA/PROVENTIL HFA/VENTOLIN HFA) 108 (90 Base) MCG/ACT inhaler INHALE 2 PUFFS INTO THE LUNGS EVERY 4 HOURS AS NEEDED FOR SHORTNESS OF BREATH / DYSPNEA 18 g 0    blood glucose (NO BRAND SPECIFIED) test strip Use to test blood sugar 2 times daily or as directed. 180 strip 3    blood glucose monitoring " (SOFTCLIX) lancets USE TO TEST THREE TIMES DAILY 300 each 1    cetirizine (ZYRTEC) 10 MG tablet TAKE ONE TABLET BY MOUTH EVERY EVENING 90 tablet 1    DIABETIC STERILE LANCETS device 1 Device 3 times daily. 1 Box 12    fluticasone (FLONASE) 50 MCG/ACT spray Spray 1-2 sprays into both nostrils daily 16 g 0    furosemide (LASIX) 20 MG tablet TAKE TWO TABLETS BY MOUTH TWICE A  tablet 3    lisinopril (ZESTRIL) 5 MG tablet Take 1 tablet (5 mg) by mouth daily 90 tablet 3    metFORMIN (GLUCOPHAGE XR) 500 MG 24 hr tablet TAKE ONE TABLET BY MOUTH ONCE DAILY 90 tablet 1    modafinil (PROVIGIL) 200 MG tablet Take 1/2 tablet by mouth 3-4 times daily as needed for sleepiness. 60 tablet 5    Multiple Vitamin (MULTIVITAMIN OR) Take 1 tablet by mouth daily.      order for DME Equipment being ordered: Dynaflex insert 1 Units 0    order for DME Equipment being ordered: Dynaflex insert 1 Units 0    order for DME Nebulizer 1 Units 0    order for DME Equipment being ordered: BIPAP Refugio P Kennedy received a SCM-GL AirCurve 10 Bilevel. Pressures were set at 23/14 cm H2O.      ORDER FOR DME Auto-BiPAP:  IPAP max 21 cm H2O  EPAP min 15 cm H2O  Pressure support 5 cm  Changed in clinic  Lifetime need and heated humidity.          predniSONE (DELTASONE) 20 MG tablet Take two tablets (= 40mg) each day for 5 (five) days 10 tablet 0    rivaroxaban ANTICOAGULANT (XARELTO) 20 MG TABS tablet Take 1 tablet (20 mg) by mouth daily with food 30 tablet 11    Semaglutide, 1 MG/DOSE, (OZEMPIC, 1 MG/DOSE,) 4 MG/3ML pen Inject 1 mg Subcutaneous every 7 days *further refills at 5/3/2024 appointment 3 mL 0    simvastatin (ZOCOR) 10 MG tablet Take 1 tablet (10 mg) by mouth at bedtime 90 tablet 3    topiramate (TOPAMAX) 100 MG tablet Take 2 tablets (200 mg) by mouth 2 times daily 360 tablet 3    triamcinolone (KENALOG) 0.1 % external cream Apply topically 2 times daily 45 g 1           4/26/2024     9:25 AM   Weight Loss Medication History Reviewed With  Patient   Which weight loss medications are you currently taking on a regular basis? Naltrexone    Topamax (topiramate)    Metformin   Are you having any side effects from the weight loss medication that we have prescribed you? No     Comprehensive metab panel with borderline elevated BUN (wotherwise wnl) (nl sCr, elevated BUN/Cr > 20--->get adequate hydration as reviewed) Oct 2023  HbA1c then, treated on GLP1 agonism, 5.7    ASSESSMENT:   Class 3 obesity/DM2 not on insulin  Naltrexone 50 mg/d prior with some effectiveness noted at the time. Maintain topiramate 200 mg BID. We added GLP1 agonism which did benefit from and he would like to resume.. Reinforce food plan - focus on no between meal snacking, aggressive lowering of starches and cheese.   additionally  --resume Ozempic with attention to managing constipation (also on metformin prescribed by pt's PCP)  --planning RTC in about 4 mo ; encouraged to reach out between visits with any questions     Total time spent: 35 min (including pre-visit prep and post visit follow up/charting same day)      Sincerely,    Mohsen Plascencia MD

## 2024-05-03 NOTE — PATIENT INSTRUCTIONS
"Welcome to our weight management program!   We are excited to join you on your weight loss journey    Thank you for allowing us the privilege of caring for you. We hope we provided you with the excellent service you deserve.   Please let us know if there is anything else we can do for you so that we can be sure you are leaving completely satisfied with your care experience.    To ensure the quality of our services you may be receiving a patient satisfaction survey from an independent patient satisfaction monitoring company.    The greatest compliment you can give is a \"Likely to Recommend\"    You saw Dr. Plascencia today.    Instructions per today's visit:   --we are resuming Ozempic (see comments below) in support of healthy eating/activity for wt loss; continue topiramate    --with Ozempic: given 0.25 mg for 4 weeks, then go up to 0.5 mg weekly; if things are going well for you and you want to increase beyond that, please let us know (mychart or call us) and we can keep escalating as often as monthly between your visits to work our way up to the maximally effective dose that you are tolerating without side effects    --please reach out with any questions or concerns between visits    Follow up appointments:  Dr. Plascencia again in about 4 mo  Interested in working with a health ?  Health coaches work with you to improve your overall health and wellbeing.  They look at the whole person, and may involve discussion of different areas of life, including, but not limited to the four pillars of health (sleep, exercise, nutrition, and stress management). Discuss with your care team if you would like to start working a health .  Health Coaching-3 Pack:    $99 for three health coaching visits    Visits may be done in person or via phone    Coaching is a partnership between the  and the client; Coaches do not prescribe or diagnose    Coaching helps inspire the client to reach his/her personal goals     For any " "questions/concerns contact Ankita Stoll LPN at [phone]     To schedule appointments with our team, please call [phone]     Please call during clinic hours Monday through Friday 8:00a - 4:00p if you have questions or you can contact us via Fronto at anytime. ?    Lab results will be communicated through My Chart or letter (if My Chart not used). Please call the clinic if you have not received communication after 1 week or if you have any questions.?      Fax: [phone]    Thank you,  Medical Weight Management Team        OZEMPIC (semaglutide)    We are starting a GLP-1 (Glucagon-like Peptide-1) medication called semaglutide (aka Ozempic). One of the ways it works is by slowing down the rate that food leaves your stomach. You feel camp and will eat less. It also helps regulate hormones that can help improve your blood sugars and weight.     Ozempic has been studied for safety and effect on weight loss in adults without diabetes. It is currently FDA approved for diabetes and is considered \"off label\" use for weight loss.    Dosing for this medication:   Month 1- Inject 0.25 mg weekly  Month 2- Inject 0.5mg weekly    Side effects of GLP- Medications include: The most common side effects are all GI related and consist of: nausea, constipation, diarrhea, burping, or gassiness. Patients are advised to eat slowly and less, and nausea typically passes if people can stick it out.     The risk of pancreatitis (inflammation of the pancreas) has been associated with this type of medication, but is very rare.  If you have had pancreatitis in the past, this medication may not be for you. Please let us know about any past history of pancreas problems.    Symptoms of pancreatitis include: Pain in your upper stomach area which may travel to your back and be worse after eating. Your stomach area may be tender to the touch.  You may have vomiting or nausea and/or have a fever. If you should develop any of these " symptoms, stop the medication and contact your primary care doctor. They will do a blood test to check for pancreatitis.       Ozempic should be discontinued for ?2 months prior to becoming pregnant.       There is a small chance you may have some low blood sugar after taking the medication.   The signs of low blood sugar are:  Weakness  Shaky   Hungry  Sweating  Confusion      See below for ways to treat low blood sugar without adding in lots of extra calories.      Treating Low Blood Sugar    If you have symptoms of low blood sugar (sweating, shaking, dizzy, confused) eat 15 grams of carbs and wait 15 minutes:    Glucose Tabs are best for sugars under 70 -  Dex4 or BD Glucose tablets are good, you will need to take 3-4 of these to equal 15 grams.     One small box of raisins  4 oz fruit juice box or   cup fruit juice  1 small apple  1 small banana    cup canned fruit in water    English muffin or a slice of bread with jelly   1 low fat frozen waffle with sugar-free syrup    cup cottage cheese with   cup frozen or fresh blueberries  1 cup skim or low-fat milk    cup whole grain cereal  4-6 crackers such as Triscuits      This medication is usually not covered by insurance and can be quite expensive. Sometimes a prior authorization is required, which may take up to 1-2 weeks for an insurance company to make a decision if they will cover the medication. Please be patient, you will be notified after a decision has been made.    For any questions or concerns please send a [employer] message to our team or call our weight management call center at [phone] during regular business hours. For questions during evenings or weekends your messages will be addressed during the next business day.  For emergencies please call 911 or seek immediate medical care.      (Do not stop taking it if you don't think it's working. For some people it works without them knowing it.)     In order to get refills of this or any medication we  prescribe you must be seen in the medical weight mgmt clinic every 2-4 months. Please have your pharmacy fax a refill request.         Here is also information on a future medication we can move to depending on your tolerance/response to the Ozempic--  Mounjaro (Tirzepatide)    We are considering starting Moujaro (Tirazepatide), a once weekly injection. This medication as of now is only FDA approved for Type 2 Diabetes. It is to be used as an adjunct to healthy nutrition and exercise to help improve people's blood sugars and help people lose weight.  Mounjaro activates the body's receptors for GIP (glucose-dependent insulinotropic polypeptide) and GLP-1 (glucagon-like peptide-1) receptor, which are natural incretin hormones which helps with lowering blood sugars from the foods you eat and help with weight loss. It also works is by slowing down the rate that food leaves your stomach. You feel camp and will eat less.      Dosing: Mounjaro is available in six doses (2.5 mg, 5 mg, 7.5 mg, 10 mg, 12.5 mg, 15 mg) - your starting dose will be discussed with you and your provider, but usually starting Moujaro dosing includes: Inject 2.5 mg subcutaneously weekly for 4 weeks then 5 mg weekly thereafter.     How to Inject:   For Video: https://www.[employer]/how-to-use-mounjaro    For Instructional Sheet: https://uspl.[employer].com/mounjaro/mounjaro.html#ug0     Storage: Store Mounajro in its original packaging box in refrigerator. Mounjaro is good until the expiration date on the box under refrigeration. Mounjaro is good at room temperature for 21 days.     Coverage Process: You are being prescribed a medication that will likely need to undergo a prior authorization.  A prior authorization is when the clinic needs to fill out paperwork that is sent to your insurance company to try to obtain coverage for that medication. The prescription will NOT automatically go to your pharmacy today if it needs a Prior Authorization. If the  medication requires a prior authorization, that prescription will go to our team where the prior authorization will be worked on. If it is approved, the care team will contact you and the prescription will be released to your pharmacy.  If it is denied, we will work to try and appeal the prior authorization if possible. The initial prior authorization process takes up to 7-14 business days and appeals can take up to 30 days. If you do not hear from us at the end of that time, you can contact the clinic.    Side effects: The most common side effects are nausea, diarrhea, decreased appetite, vomiting, constipation, indigestion (dyspepsia), and stomach pain. Patients are advised to eat more slowly and purposefully give yourself less portions than your typical as you may find after starting this medication you have a new point of fullness. It is also important to stay adequately hydrated on the medication to reduce risks of some of these side effects. Many of these side effects (nausea, diarrhea, etc) occurred during dose escalation but did improve over time with continuing the medication. If side effects are unmanageable, reach out to your prescribing provider.     The risk of pancreatitis (inflammation of the pancreas) has been associated with this type of medication, but is very rare.  If you have had pancreatitis in the past, this medication may not be for you. Please let us know about any past history of pancreas problems. If you experience persistent severe abdominal pain (sometimes radiating to the back potentially accompanied by vomiting and have a fever), stop the medication and contact your provider immediately for assessment. They will do a blood test to check for pancreatitis.       Women of child-bearing age on oral contraceptive should use an additional form of birth control (condom, spermicidal lubrication, etc) when starting Mounjaro and during dose escalation due to delayed gastric emptying (may affect  "efficacy/blood levels of oral contraceptives).    If on short acting insulins or oral diabetes agents, sometimes these are stopped or decreased by the doctor at the appointment. Low blood sugars are rare but can happen. If you notice consistent low sugars or high sugars, your medication may need to be adjusted after your appointment. If this is the case, please call the number below to speak to a nurse and provide her your blood sugar record from the last 3-4 days. The nurse will get in touch with the doctor and call you back/M[employer] message with recommendations. We tolerate higher sugars for a bit, so if sugars are running 180-200, this is ok. As weight starts dropping the blood sugars should too. If readings are consistently over 200 for 1-2 weeks, then you should call the doctor/nurse.    There is a small chance you may have some low blood sugar after taking the medication.   The signs of low blood sugar are:  Weakness  Shaky   Hungry  Sweating  Confusion    Treating Low Blood Sugar    If you have symptoms of low blood sugar (sweating, shaking, dizzy, confused) eat 15 grams of quick carbohydrates and wait 15 minutes. You need less than you think to raise your blood sugar - it is important not to overcorrect which can cause a subsequent high blood sugar.     Examples of what equals 15 grams of carbohydrates:   Glucose tablets (see instructions, but you will typically need to take 3-4 of these to equal 15 grams)  4 ounces (1/2 cup) of juice or regular soda (not diet)   1 tablespoon of sugar or honey  Hard candies, etc (see food label for how many to consume)    If you have a way of checking your blood sugar - follow these instructions. Blood sugars < 70 mg/dL are considered to be low, called hypoglycemia.     If you every feel symptoms of low blood sugar or have blood sugar <70 mg/dL, test your blood sugar and treat using the \"15-15 rule\".     The \"15-15 rule\" is:   Correct your low blood sugar by eating or " "drinking 15 grams of \"quick sugar\" carbohydrates. Any of the below are considered the proper amount of simple carbohydrates:   1/2 cup (4 oz) juice  Glucose tablets (typically 3-4 will equal 15 grams)   1 tablespoon honey or sugar   A couple hard candies   Then test your blood sugar again in 15 minutes and check to see that the blood sugar has gotten >70 mg/dL.  If it has not, repeat the 15-15 rule.   Once you have achieved blood sugar >70 mg/dL, eat something small that contains a complex carbohydrate + fat/protein, such as 1 slice of bread + 1 tablespoon peanut butter or a couple crackers and 1 tablespoon peanut butter or slice of cheese.     For any questions or concerns please send a [employer] message to our team or call our weight management call center at [phone] during regular business hours. For questions during evenings or weekends your messages will be addressed during the next business day.  For emergencies please call 911 or seek immediate medical care.     In order to get refills of this or any medication we prescribe you must be seen in the medical weight mgmt clinic every 2-4 months. Please have your pharmacy fax a refill request.                  "

## 2024-05-07 ENCOUNTER — TELEPHONE (OUTPATIENT)
Dept: ENDOCRINOLOGY | Facility: CLINIC | Age: 49
End: 2024-05-07
Payer: COMMERCIAL

## 2024-05-07 NOTE — TELEPHONE ENCOUNTER
Left Voicemail (1st Attempt) and Sent Mychart (1st Attempt) for the patient to call back and schedule the following:    Appointment type: [facility]  Provider: Dr Plascencia  Return date: around 9/3/24  Specialty phone number: [phone]  Additional appointment(s) needed: n/a  Additonal Notes: n//a

## 2024-05-12 DIAGNOSIS — E11.9 TYPE 2 DIABETES MELLITUS WITHOUT COMPLICATION, WITHOUT LONG-TERM CURRENT USE OF INSULIN (H): ICD-10-CM

## 2024-05-14 RX ORDER — METFORMIN HCL 500 MG
TABLET, EXTENDED RELEASE 24 HR ORAL
Qty: 90 TABLET | Refills: 0 | Status: SHIPPED | OUTPATIENT
Start: 2024-05-14 | End: 2024-06-04

## 2024-06-02 ENCOUNTER — HEALTH MAINTENANCE LETTER (OUTPATIENT)
Age: 49
End: 2024-06-02

## 2024-06-02 ASSESSMENT — ASTHMA QUESTIONNAIRES
QUESTION_1 LAST FOUR WEEKS HOW MUCH OF THE TIME DID YOUR ASTHMA KEEP YOU FROM GETTING AS MUCH DONE AT WORK, SCHOOL OR AT HOME: A LITTLE OF THE TIME
QUESTION_3 LAST FOUR WEEKS HOW OFTEN DID YOUR ASTHMA SYMPTOMS (WHEEZING, COUGHING, SHORTNESS OF BREATH, CHEST TIGHTNESS OR PAIN) WAKE YOU UP AT NIGHT OR EARLIER THAN USUAL IN THE MORNING: NOT AT ALL
QUESTION_5 LAST FOUR WEEKS HOW WOULD YOU RATE YOUR ASTHMA CONTROL: WELL CONTROLLED
ACT_TOTALSCORE: 22
ACT_TOTALSCORE: 22
QUESTION_2 LAST FOUR WEEKS HOW OFTEN HAVE YOU HAD SHORTNESS OF BREATH: ONCE OR TWICE A WEEK
QUESTION_4 LAST FOUR WEEKS HOW OFTEN HAVE YOU USED YOUR RESCUE INHALER OR NEBULIZER MEDICATION (SUCH AS ALBUTEROL): NOT AT ALL

## 2024-06-03 DIAGNOSIS — I26.99 PULMONARY EMBOLISM, OTHER, UNSPECIFIED CHRONICITY, UNSPECIFIED WHETHER ACUTE COR PULMONALE PRESENT (H): ICD-10-CM

## 2024-06-04 ENCOUNTER — OFFICE VISIT (OUTPATIENT)
Dept: FAMILY MEDICINE | Facility: CLINIC | Age: 49
End: 2024-06-04
Payer: COMMERCIAL

## 2024-06-04 VITALS
OXYGEN SATURATION: 96 % | TEMPERATURE: 98.1 F | WEIGHT: 315 LBS | BODY MASS INDEX: 49.44 KG/M2 | DIASTOLIC BLOOD PRESSURE: 60 MMHG | HEART RATE: 90 BPM | RESPIRATION RATE: 22 BRPM | SYSTOLIC BLOOD PRESSURE: 102 MMHG | HEIGHT: 67 IN

## 2024-06-04 DIAGNOSIS — E66.1 CLASS 3 DRUG-INDUCED OBESITY WITH SERIOUS COMORBIDITY IN ADULT, UNSPECIFIED BMI: ICD-10-CM

## 2024-06-04 DIAGNOSIS — I26.93 SINGLE SUBSEGMENTAL PULMONARY EMBOLISM WITHOUT ACUTE COR PULMONALE (H): ICD-10-CM

## 2024-06-04 DIAGNOSIS — F15.11 METHAMPHETAMINE ABUSE IN REMISSION (H): ICD-10-CM

## 2024-06-04 DIAGNOSIS — I27.20 PULMONARY HYPERTENSION (H): ICD-10-CM

## 2024-06-04 DIAGNOSIS — J96.11 CHRONIC RESPIRATORY FAILURE WITH HYPOXIA (H): ICD-10-CM

## 2024-06-04 DIAGNOSIS — E66.813 CLASS 3 DRUG-INDUCED OBESITY WITH SERIOUS COMORBIDITY IN ADULT, UNSPECIFIED BMI: ICD-10-CM

## 2024-06-04 DIAGNOSIS — E11.9 TYPE 2 DIABETES MELLITUS WITHOUT COMPLICATION, WITHOUT LONG-TERM CURRENT USE OF INSULIN (H): Primary | ICD-10-CM

## 2024-06-04 LAB
ALBUMIN SERPL BCG-MCNC: 4.2 G/DL (ref 3.5–5.2)
ALP SERPL-CCNC: 116 U/L (ref 40–150)
ALT SERPL W P-5'-P-CCNC: 19 U/L (ref 0–70)
ANION GAP SERPL CALCULATED.3IONS-SCNC: 11 MMOL/L (ref 7–15)
AST SERPL W P-5'-P-CCNC: 20 U/L (ref 0–45)
BILIRUB SERPL-MCNC: 0.4 MG/DL
BUN SERPL-MCNC: 23.8 MG/DL (ref 6–20)
CALCIUM SERPL-MCNC: 9.1 MG/DL (ref 8.6–10)
CHLORIDE SERPL-SCNC: 104 MMOL/L (ref 98–107)
CREAT SERPL-MCNC: 0.92 MG/DL (ref 0.67–1.17)
DEPRECATED HCO3 PLAS-SCNC: 25 MMOL/L (ref 22–29)
EGFRCR SERPLBLD CKD-EPI 2021: >90 ML/MIN/1.73M2
ERYTHROCYTE [DISTWIDTH] IN BLOOD BY AUTOMATED COUNT: 12.7 % (ref 10–15)
GLUCOSE SERPL-MCNC: 98 MG/DL (ref 70–99)
HBA1C MFR BLD: 5.8 % (ref 0–5.6)
HCT VFR BLD AUTO: 43.4 % (ref 40–53)
HGB BLD-MCNC: 14.2 G/DL (ref 13.3–17.7)
MCH RBC QN AUTO: 30.3 PG (ref 26.5–33)
MCHC RBC AUTO-ENTMCNC: 32.7 G/DL (ref 31.5–36.5)
MCV RBC AUTO: 93 FL (ref 78–100)
PLATELET # BLD AUTO: 221 10E3/UL (ref 150–450)
POTASSIUM SERPL-SCNC: 4 MMOL/L (ref 3.4–5.3)
PROT SERPL-MCNC: 7.3 G/DL (ref 6.4–8.3)
RBC # BLD AUTO: 4.68 10E6/UL (ref 4.4–5.9)
SODIUM SERPL-SCNC: 140 MMOL/L (ref 135–145)
WBC # BLD AUTO: 8.7 10E3/UL (ref 4–11)

## 2024-06-04 PROCEDURE — 82607 VITAMIN B-12: CPT | Performed by: FAMILY MEDICINE

## 2024-06-04 PROCEDURE — 83036 HEMOGLOBIN GLYCOSYLATED A1C: CPT | Performed by: FAMILY MEDICINE

## 2024-06-04 PROCEDURE — 99214 OFFICE O/P EST MOD 30 MIN: CPT | Performed by: FAMILY MEDICINE

## 2024-06-04 PROCEDURE — 36415 COLL VENOUS BLD VENIPUNCTURE: CPT | Performed by: FAMILY MEDICINE

## 2024-06-04 PROCEDURE — G2211 COMPLEX E/M VISIT ADD ON: HCPCS | Performed by: FAMILY MEDICINE

## 2024-06-04 PROCEDURE — 80053 COMPREHEN METABOLIC PANEL: CPT | Performed by: FAMILY MEDICINE

## 2024-06-04 PROCEDURE — 85027 COMPLETE CBC AUTOMATED: CPT | Performed by: FAMILY MEDICINE

## 2024-06-04 RX ORDER — METFORMIN HCL 500 MG
TABLET, EXTENDED RELEASE 24 HR ORAL
Qty: 90 TABLET | Refills: 3 | Status: SHIPPED | OUTPATIENT
Start: 2024-06-04 | End: 2024-09-05

## 2024-06-04 ASSESSMENT — PAIN SCALES - GENERAL: PAINLEVEL: NO PAIN (0)

## 2024-06-04 NOTE — PROGRESS NOTES
"  Assessment & Plan     Type 2 diabetes mellitus without complication, without long-term current use of insulin (H)  Adjust meds as indicated by above labs.   - HEMOGLOBIN A1C; Future  - empagliflozin (JARDIANCE) 10 MG TABS tablet; Take 1 tablet (10 mg) by mouth daily  - metFORMIN (GLUCOPHAGE XR) 500 MG 24 hr tablet; TAKE ONE TABLET BY MOUTH ONCE DAILY  - Comprehensive metabolic panel; Future  - CBC with platelets; Future  - Vitamin B12; Future  - HEMOGLOBIN A1C  - Comprehensive metabolic panel  - CBC with platelets  - Vitamin B12    Methamphetamine abuse in remission (H)  No drug use     Single subsegmental pulmonary embolism without acute cor pulmonale (H)  Following with hematology     Chronic respiratory failure with hypoxia (H)  Following with pulmonary     Pulmonary hypertension (H)  Following with pulmonary     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 Due to the added complexity in care, I will continue to support Refugio in the subsequent management and with ongoing continuity of care.        BMI  Estimated body mass index is 58.33 kg/m  as calculated from the following:    Height as of this encounter: 1.702 m (5' 7\").    Weight as of this encounter: 168.9 kg (372 lb 6.4 oz).             Eliseo Huff is a 48 year old, presenting for the following health issues:  Diabetes        6/4/2024     1:32 PM   Additional Questions   Roomed by Anastasia EDMOND CMA     History of Present Illness       Diabetes:   He presents for follow up of diabetes.  He is checking home blood glucose one time daily.   He checks blood glucose before meals.  Blood glucose is never over 200 and never under 70.  When his blood glucose is low, the patient is asymptomatic for confusion, blurred vision, lethargy and reports not feeling dizzy, shaky, or weak.  He " "is concerned about other.   He is having excessive thirst.            He eats 0-1 servings of fruits and vegetables daily.He consumes 0 sweetened beverage(s) daily.He exercises with enough effort to increase his heart rate 9 or less minutes per day.  He exercises with enough effort to increase his heart rate 3 or less days per week. He is missing 1 dose(s) of medications per week.  He is not taking prescribed medications regularly due to remembering to take.           Pulm emboli   On xerlto   Doing well           Review of Systems  Constitutional, HEENT, cardiovascular, pulmonary, gi and gu systems are negative, except as otherwise noted.      Objective    /60 (BP Location: Right arm, Patient Position: Sitting, Cuff Size: Adult Large)   Pulse 90   Temp 98.1  F (36.7  C) (Tympanic)   Resp 22   Ht 1.702 m (5' 7\")   Wt (!) 168.9 kg (372 lb 6.4 oz)   SpO2 96%   BMI 58.33 kg/m    Body mass index is 58.33 kg/m .  Physical Exam   GENERAL: alert and no distress  RESP: lungs clear to auscultation - no rales, rhonchi or wheezes  CV: regular rate and rhythm, normal S1 S2, no S3 or S4, no murmur, click or rub, no peripheral edema   MS: no gross musculoskeletal defects noted, no edema            Signed Electronically by: Erika Espino MD    "

## 2024-06-05 LAB — VIT B12 SERPL-MCNC: 713 PG/ML (ref 232–1245)

## 2024-06-12 NOTE — PROGRESS NOTES
"    [facility]  [facility]  [facility] [address], [address], [place] [address]  Main: [phone], Fax: [phone]      Outpatient Visit Note:    Patient: Refugio Kennedy  MRN: [id number]  : 1975  CRISTI: 2024    History of Present Illness:  Refugio Kennedy is a 48 year old man with a history of obesity, hypoventilation syndrome/FARZANA, chronic respiratory failure, pulmonary hypertension, insulin dependent type 2 diabetes mellitus, dyslipidemia, venous insufficiency, and unprovoked PE on 2023. He is maintained on indefinite anticoagulation (currently with Xarelto 20mg daily) and presents today for routine follow-up. Please refer to my initial consultation note dated 2023 for additional derails regarding his history.    Interval History:  I last saw Refugio in May 2023. In the interim, he has continued on Xarelto 20mg daily. Tolerating it well. No bleeding concerns--major or nuisance. He has had no recurrent venous thromboembolism. Held anticoagulation for colonoscopy in 2023; There were no complications. He does have intermittent bilateral lower extremity swelling, typically after prolonged periods of time on his feet. He finds compression stockings helpful in the prevention and treatment of this but notes that he hasn't been wearing them as consistently lately.    He has a a known left lower extremity lipoma along the medial aspect of the distal calf. He reports that he now can feel a similar firmness/nodule on the right medial calf. It is sometimes painful but this waxes and wanes.    ROS:  Denies epistaxis, gingival bleeding, excessive bruising/ecchymosis, hematuria, hematochezia, and melena.   Denies LE pain/swelling. Denies chest pain. Denies shortness of breath.     Exam:  /65   Pulse 74   Temp 98.5  F (36.9  C) (Oral)   Ht 1.715 m (5' 7.5\")   Wt (!) 167.6 kg (369 lb 8 oz)   SpO2 94%   BMI 57.02 kg/m    Exam:   Constitutional: " Well appearing. Not in distress.  Respiratory: Breathing comfortably on room air.  Skin: No significant ecchymosis.  Neuro: Aox3.  Lower extremities: No significant edema. Mild varicosities, bilaterally. Skin is dry and intact. No significant statsis detmatitis. The lipoma along the medial aspect of the distal left calf is palpable. There is similar area of firmness on the medial aspect of the distal right calf.    Labs:  Component      Latest Ref Rng 6/4/2024  1:55 PM   Sodium      135 - 145 mmol/L 140    Potassium      3.4 - 5.3 mmol/L 4.0    Carbon Dioxide (CO2)      22 - 29 mmol/L 25    Anion Gap      7 - 15 mmol/L 11    Urea Nitrogen      6.0 - 20.0 mg/dL 23.8 (H)    Creatinine      0.67 - 1.17 mg/dL 0.92    GFR Estimate      >60 mL/min/1.73m2 >90    Calcium      8.6 - 10.0 mg/dL 9.1    Chloride      98 - 107 mmol/L 104    Glucose      70 - 99 mg/dL 98    Alkaline Phosphatase      40 - 150 U/L 116    AST      0 - 45 U/L 20    ALT      0 - 70 U/L 19    Protein Total      6.4 - 8.3 g/dL 7.3    Albumin      3.5 - 5.2 g/dL 4.2    Bilirubin Total      <=1.2 mg/dL 0.4    Estimated Creatinine Clearance: 145.7 mL/min (based on SCr of 0.92 mg/dL).    Imaging:  EXAM: CT CHEST PULMONARY EMBOLISM W CONTRAST  LOCATION: [facility]  DATE/TIME: 2/19/2023 1:39 PM  INDICATION: Elevated d-dimer. Shortness of breath.  COMPARISON: Chest radiograph 03/29/2021.  TECHNIQUE: CT chest pulmonary angiogram during arterial phase injection of IV contrast. Multiplanar reformats and MIP reconstructions were performed. Dose reduction techniques were used.   CONTRAST: 96mL Isovue 370  FINDINGS:  ANGIOGRAM CHEST: Pulmonary arteries are mildly dilated, with the main pulmonary trunk measuring up to 3.5 cm in caliber. Acute subsegmental pulmonary embolism within the right lower lobe (4/#186). No other pulmonary emboli. Thoracic aorta is negative for   dissection. No CT evidence of right heart strain.  LUNGS AND PLEURA:  No consolidations or pleural effusions. Central airways are patent.  MEDIASTINUM/AXILLAE: Normal cardiac size. No pericardial effusion. No enlarged thoracic lymph node.  CORONARY ARTERY CALCIFICATION: None.  UPPER ABDOMEN: Partially visualized right renal cyst.  MUSCULOSKELETAL: Multilevel degenerative change of the spine. No acute bony abnormality.                          IMPRESSION:  1.  Acute subsegmental pulmonary embolism within the right lower lobe. No other pulmonary emboli.  2.  No evidence of right heart strain.     EXAM: US LOWER EXTREMITY VENOUS DUPLEX LEFT  LOCATION: [facility]  DATE/TIME: 2/19/2023 2:02 PM  INDICATION: Left medial leg pain, lower leg and upper inner thigh  COMPARISON: None.  TECHNIQUE: Venous Duplex ultrasound of the left lower extremity with and without compression, augmentation and duplex. Color flow and spectral Doppler with waveform analysis performed.  FINDINGS: Exam includes the common femoral, femoral, popliteal, and contralateral common femoral veins as well as segmentally visualized deep calf veins and greater saphenous vein.   LEFT: No deep vein thrombosis. No superficial thrombophlebitis. No popliteal cyst.  Scanning was also performed in the region of palpable abnormality that shows a focal 1.9 x 1.3 x 2.8 cm lesion in the subcutaneous tissues with echogenicity similar to the adjacent subcutaneous fat.                       IMPRESSION:  1.  No deep venous thrombosis in the left lower extremity.  2.  Palpable region at the left mid medial calf corresponds with a 2.8 cm nodular structure with similar echogenicity to the adjacent subcutaneous tissues. As such this could be a lipoma. However, if further imaging assessment is desired, a nonemergent CT or MRI could be performed.     Assessment/Plan:  In summary, Refugio is a 48 year old male with a history of obesity, hypoventilation syndrome/FARZANA, chronic respiratory failure, pulmonary hypertension,  insulin dependent type 2 diabetes mellitus, dyslipidemia, venous insufficiency, and unprovoked PE on 02/19/2023. Given the unprovoked nature of his PE in 2023, indefinite anticoagulation remains indicated. Her remains an appropriate Xarelto candidate. The safety profile of and indications for holding this medication were reviewed. He was advised to contact us in the event of new bleeding concerns and prior to scheduled surgeries/procedures.    We reviewed that lipomas are benign, but can be removed if bothersome. He will contact us and/or his primary care provider in the even that he wants further intervention.    Follow-up: 1 year.    20 minutes spent on the date of the encounter doing chart review, history and exam, documentation and further activities per the note.           Apoorva Molina PA-C, [facility]  [facility]ot[facility]  [address] [place] [address], [address], [place] [address]  Main: [phone], Fax: [phone]

## 2024-06-18 ENCOUNTER — OFFICE VISIT (OUTPATIENT)
Dept: HEMATOLOGY | Facility: CLINIC | Age: 49
End: 2024-06-18
Attending: PHYSICIAN ASSISTANT
Payer: COMMERCIAL

## 2024-06-18 VITALS
BODY MASS INDEX: 47.74 KG/M2 | HEART RATE: 74 BPM | HEIGHT: 68 IN | OXYGEN SATURATION: 94 % | WEIGHT: 315 LBS | SYSTOLIC BLOOD PRESSURE: 116 MMHG | DIASTOLIC BLOOD PRESSURE: 65 MMHG | TEMPERATURE: 98.5 F

## 2024-06-18 DIAGNOSIS — I26.99 PULMONARY EMBOLISM, OTHER, UNSPECIFIED CHRONICITY, UNSPECIFIED WHETHER ACUTE COR PULMONALE PRESENT (H): ICD-10-CM

## 2024-06-18 PROCEDURE — 99213 OFFICE O/P EST LOW 20 MIN: CPT | Performed by: PHYSICIAN ASSISTANT

## 2024-06-18 NOTE — PATIENT INSTRUCTIONS
[facility]  [facility] Bleeding [facility] Clotting Disorders  [address], [address], [place] [address]  Main: [phone], Fax: [phone]

## 2024-08-20 ENCOUNTER — TELEPHONE (OUTPATIENT)
Dept: ENDOCRINOLOGY | Facility: CLINIC | Age: 49
End: 2024-08-20
Payer: COMMERCIAL

## 2024-08-20 NOTE — TELEPHONE ENCOUNTER
Patient confirmed scheduled appointment:  Date: 02/07/2024  Time: 1040 am   Visit type: RET MWM   Provider:   Location: telephone   Testing/imaging: n/a  Additional notes: Pt rescheduled from 10/11/2024, added to wait list

## 2024-08-27 ENCOUNTER — TRANSFERRED RECORDS (OUTPATIENT)
Dept: HEALTH INFORMATION MANAGEMENT | Facility: CLINIC | Age: 49
End: 2024-08-27
Payer: COMMERCIAL

## 2024-08-28 DIAGNOSIS — E66.1 CLASS 3 DRUG-INDUCED OBESITY WITH SERIOUS COMORBIDITY IN ADULT, UNSPECIFIED BMI: Primary | ICD-10-CM

## 2024-08-28 DIAGNOSIS — E66.813 CLASS 3 DRUG-INDUCED OBESITY WITH SERIOUS COMORBIDITY IN ADULT, UNSPECIFIED BMI: Primary | ICD-10-CM

## 2024-08-28 DIAGNOSIS — E11.9 TYPE 2 DIABETES MELLITUS WITHOUT COMPLICATION, WITHOUT LONG-TERM CURRENT USE OF INSULIN (H): ICD-10-CM

## 2024-09-05 ENCOUNTER — MYC MEDICAL ADVICE (OUTPATIENT)
Dept: FAMILY MEDICINE | Facility: CLINIC | Age: 49
End: 2024-09-05
Payer: COMMERCIAL

## 2024-09-05 ENCOUNTER — MYC MEDICAL ADVICE (OUTPATIENT)
Dept: ENDOCRINOLOGY | Facility: CLINIC | Age: 49
End: 2024-09-05
Payer: COMMERCIAL

## 2024-09-05 DIAGNOSIS — E78.5 HYPERLIPIDEMIA LDL GOAL <100: ICD-10-CM

## 2024-09-05 DIAGNOSIS — E66.813 CLASS 3 DRUG-INDUCED OBESITY WITH SERIOUS COMORBIDITY IN ADULT, UNSPECIFIED BMI: ICD-10-CM

## 2024-09-05 DIAGNOSIS — E66.1 CLASS 3 DRUG-INDUCED OBESITY WITH SERIOUS COMORBIDITY IN ADULT, UNSPECIFIED BMI: ICD-10-CM

## 2024-09-05 DIAGNOSIS — I27.20 PULMONARY HYPERTENSION (H): ICD-10-CM

## 2024-09-05 DIAGNOSIS — J30.1 SEASONAL ALLERGIC RHINITIS DUE TO POLLEN: ICD-10-CM

## 2024-09-05 DIAGNOSIS — G47.33 OBSTRUCTIVE SLEEP APNEA-HYPOPNEA SYNDROME: ICD-10-CM

## 2024-09-05 DIAGNOSIS — E11.9 TYPE 2 DIABETES MELLITUS WITHOUT COMPLICATION, WITHOUT LONG-TERM CURRENT USE OF INSULIN (H): ICD-10-CM

## 2024-09-05 DIAGNOSIS — I87.8 VENOUS STASIS: ICD-10-CM

## 2024-09-05 RX ORDER — TOPIRAMATE 100 MG/1
200 TABLET, FILM COATED ORAL 2 TIMES DAILY
Qty: 360 TABLET | Refills: 3 | Status: SHIPPED | OUTPATIENT
Start: 2024-09-05

## 2024-09-05 RX ORDER — SIMVASTATIN 10 MG
10 TABLET ORAL AT BEDTIME
Qty: 90 TABLET | Refills: 0 | Status: SHIPPED | OUTPATIENT
Start: 2024-09-05

## 2024-09-05 RX ORDER — MODAFINIL 200 MG/1
TABLET ORAL
Qty: 60 TABLET | Refills: 5 | Status: CANCELLED | OUTPATIENT
Start: 2024-09-05

## 2024-09-05 RX ORDER — CETIRIZINE HYDROCHLORIDE 10 MG/1
TABLET ORAL
Qty: 90 TABLET | Refills: 0 | Status: SHIPPED | OUTPATIENT
Start: 2024-09-05

## 2024-09-05 RX ORDER — TOPIRAMATE 100 MG/1
200 TABLET, FILM COATED ORAL 2 TIMES DAILY
Qty: 360 TABLET | Refills: 3 | Status: CANCELLED | OUTPATIENT
Start: 2024-09-05

## 2024-09-05 RX ORDER — LISINOPRIL 5 MG/1
5 TABLET ORAL DAILY
Qty: 90 TABLET | Refills: 1 | Status: SHIPPED | OUTPATIENT
Start: 2024-09-05

## 2024-09-05 RX ORDER — METFORMIN HCL 500 MG
TABLET, EXTENDED RELEASE 24 HR ORAL
Qty: 90 TABLET | Refills: 0 | Status: SHIPPED | OUTPATIENT
Start: 2024-09-05

## 2024-09-05 RX ORDER — FUROSEMIDE 20 MG
TABLET ORAL
Qty: 360 TABLET | Refills: 1 | Status: SHIPPED | OUTPATIENT
Start: 2024-09-05

## 2024-09-05 NOTE — TELEPHONE ENCOUNTER
Pharmacy transfer.   Prescription approved per [facility] Refill Protocol.  Julie Behrendt RN

## 2024-09-05 NOTE — TELEPHONE ENCOUNTER
Patient requesting pharmacy transfer to mail order.     Rx's pended, message routed to med refill pool for protocols.    Julie Behrendt RN

## 2024-09-05 NOTE — TELEPHONE ENCOUNTER
Requested Prescriptions   Pending Prescriptions Disp Refills    modafinil (PROVIGIL) 200 MG tablet 60 tablet 5     Sig: Take 1/2 tablet by mouth 3-4 times daily as needed for sleepiness.       There is no refill protocol information for this order       topiramate (TOPAMAX) 100 MG tablet 360 tablet 3     Sig: Take 2 tablets (200 mg) by mouth 2 times daily.       Anti-Seizure Meds Protocol  Failed - 9/5/2024  1:08 PM        Failed - Review Authorizing provider's last note.      Refer to last progress notes: confirm request is for original authorizing provider (cannot be through other providers).          Failed - Medication indicated for associated diagnosis     Medication is associated with one or more of the following diagnoses:     Bipolar   Dementia   Depression   Epilepsy   Migraine   Seizure   Trigeminal Neuralgia   Cyclothymia          Passed - Normal ALT or AST on file in past 26 months     Recent Labs   Lab Test 06/04/24  1355   ALT 19     Recent Labs   Lab Test 06/04/24  1355   AST 20             Passed - Medication is active on med list        Passed - Has GFR on file in past 12 months and most recent value is normal        Passed - Recent (12 mo) or future (90 days) visit within the authorizing provider's specialty     The patient must have completed an in-person or virtual visit within the past 12 months or has a future visit scheduled within the next 90 days with the authorizing provider s specialty.  Urgent care and e-visits do not quality as an office visit for this protocol.           Pharmacy transfer.    Last office visit: 6/4/2024 ; last virtual visit: Visit date not found with prescribing provider:     Future Office Visit:   Next 5 appointments (look out 90 days)      Oct 29, 2024 9:30 AM  ROE Lama with Lauren Turner Bloch, RPH  [facility] ([facility] ) [address] [place] [id number] [phone]         Julie Behrendt  RN

## 2024-09-05 NOTE — TELEPHONE ENCOUNTER
Patient will contact prescribing providers for refills of modafanil and topomax.    Julie Behrendt RN

## 2024-09-06 DIAGNOSIS — G47.33 OBSTRUCTIVE SLEEP APNEA-HYPOPNEA SYNDROME: ICD-10-CM

## 2024-09-06 RX ORDER — MODAFINIL 200 MG/1
TABLET ORAL
Qty: 60 TABLET | Refills: 5 | Status: SHIPPED | OUTPATIENT
Start: 2024-09-06

## 2024-09-24 ENCOUNTER — PATIENT OUTREACH (OUTPATIENT)
Dept: CARE COORDINATION | Facility: CLINIC | Age: 49
End: 2024-09-24
Payer: COMMERCIAL

## 2024-10-06 DIAGNOSIS — E11.9 TYPE 2 DIABETES, HBA1C GOAL < 8% (H): ICD-10-CM

## 2024-10-07 RX ORDER — LANCETS
EACH MISCELLANEOUS
Qty: 300 EACH | Refills: 0 | Status: SHIPPED | OUTPATIENT
Start: 2024-10-07

## 2024-10-07 NOTE — TELEPHONE ENCOUNTER
Prescription approved per [facility] Refill Protocol.  Julie Behrendt RN

## 2024-10-08 ENCOUNTER — PATIENT OUTREACH (OUTPATIENT)
Dept: CARE COORDINATION | Facility: CLINIC | Age: 49
End: 2024-10-08
Payer: COMMERCIAL

## 2024-10-29 ENCOUNTER — VIRTUAL VISIT (OUTPATIENT)
Dept: PHARMACY | Facility: CLINIC | Age: 49
End: 2024-10-29
Attending: INTERNAL MEDICINE
Payer: COMMERCIAL

## 2024-10-29 DIAGNOSIS — E11.9 TYPE 2 DIABETES MELLITUS WITHOUT COMPLICATION, WITHOUT LONG-TERM CURRENT USE OF INSULIN (H): ICD-10-CM

## 2024-10-29 DIAGNOSIS — E78.5 HYPERLIPIDEMIA LDL GOAL <100: ICD-10-CM

## 2024-10-29 DIAGNOSIS — R40.0 HAS DAYTIME DROWSINESS: ICD-10-CM

## 2024-10-29 DIAGNOSIS — I26.99 ACUTE PULMONARY EMBOLISM, UNSPECIFIED PULMONARY EMBOLISM TYPE, UNSPECIFIED WHETHER ACUTE COR PULMONALE PRESENT (H): ICD-10-CM

## 2024-10-29 DIAGNOSIS — E66.01 MORBID OBESITY (H): Primary | ICD-10-CM

## 2024-10-29 DIAGNOSIS — I87.8 VENOUS STASIS: ICD-10-CM

## 2024-10-29 DIAGNOSIS — Z78.9 TAKES DIETARY SUPPLEMENTS: ICD-10-CM

## 2024-10-29 DIAGNOSIS — J30.2 SEASONAL ALLERGIES: ICD-10-CM

## 2024-10-29 ASSESSMENT — PAIN SCALES - GENERAL: PAINLEVEL_OUTOF10: MILD PAIN (3)

## 2024-10-29 NOTE — PROGRESS NOTES
"Medication Therapy Management (MTM) Encounter    ASSESSMENT:                            Medication Adherence/Access: No issues identified.    Venous Stasis: stable.     Hyperlipidemia: patient at goal of LDL < 100.     Allergy: controlled.     History of Pulmonary Embolism: patient would benefit from continuing to take Xarelto with dinner.     Daytime Drowsiness:   Patient would benefit from a follow up with sleep medicine provider. Given reported feeling of a plateau in terms of efficacy, could consider Armodafinil (Nuvigil) discussion with sleep provider.    Supplements:   Theoretical interaction between lion's annie mushroom and anticaogulant/antiplatelet medications - no direct interaction seen with Xarelto or other factor Xa inhibitors specifically.       Weight Management / Diabetes:A1c at goal < 7%. patient would benefit from increasing Ozempic to 1.5 mg weekly for 4 weeks then 2 mg weekly thereafter. Consider evaluation of Jardiance within regimen, unclear continued need due diabetes very much in control.     PLAN:                            Increase Ozempic to 1.5 mg weekly for 4 weeks then increase to 2 mg weekly thereafter. Will be using the 2 mg pen. Pharmacist to send information on how to count \"clicks\" for the 1.5 mg dose.   Theoretical interaction with donna valencia with anticoagluats/antiplatelets - although no direct interaction with Xarelto seen.   Patient to follow up with sleep medicine regarding alternative option from modafinil - armodafinil?    Follow-up:   Appointments in Next Year      Dec 06, 2024 8:30 AM  (Arrive by 8:10 AM)  Adult Preventative Visit with Erika Espino MD  [facility] ([facility] ) [phone]     Feb 07, 2025 10:40 AM  (Arrive by 10:25 AM)  Return Weight Management Visit with Mohsen Plascencia MD  [facility] [place] ([facility] [place] ) " [phone]            SUBJECTIVE/OBJECTIVE:                          Refugio Kennedy is a 49 year old male seen for follow up He was referred to me from Dr. Plascencia.     Reason for visit: Comprehensive Medication Review.    Allergies/ADRs: Reviewed in chart  Past Medical History: Reviewed in chart  Tobacco: He reports that he quit smoking about 12 years ago. His smoking use included cigarettes and cigars. He started smoking about 13 years ago. He has a 0.5 pack-year smoking history. He has never used smokeless tobacco.  Alcohol: Less than 1 beverages / week  Caffeine: 2 cups of coffee per day     Medication Adherence/Access: no issues reported.    Venous Stasis  Furosemide 40 mg tablets 2 times daily     Denies any issues of lower leg swelling more than normal. No medication side effects.       Hyperlipidemia   Simvastatin 10 mg daily    Patient reports no significant myalgias or other side effects.  The ASCVD Risk score (Mica BROUSSARD, et al., 2019) failed to calculate for the following reasons:    The valid total cholesterol range is 130 to 320 mg/dL     Allergy   Cetirizine 10 mg once daily    Patient reports he takes this medication daily, expect during the winter months when his symptoms are improved. Patient feels that current therapy is effective.   Patient reports no current medication side effects. Primary triggers are dust mites, pollens, and mold.       History of Pulmonary Embolism:   Xarelto 20 mg daily - takes with dinner between and bedtime.     Patient reports that he takes this medication between dinner and bedtime. No major issues of bleeding or bruising reported. History of unprovoked pulmonary embolism on 2/19/23, on Xarelto indefinitely.     Daytime Drowsiness:   Modafinil: 100-200 mg 3-4 times daily prn for sleepiness    Patient reports taking 1 full tablet in the morning and 1 full tablet in the afternoon. He wonders if he is plateuing on this medication as he is unable to tell if it is providing at  benefit. Reports occasional headaches that he has had before. Similar headache to when he was getting off drugs in the past. Reports headaches occur on average 2-3 times in the last month.     Supplements:   Brain Support Vitamin with Lion's Brendon once daily   Daily multivitamin     Patient started brain support vitamin for brain and memory health. Inquired about potential for interactions with his other medications. No reported side effects at this time.          Weight Management / Diabetes  Ozempic 1 mg once weekly   Jardiance 10 mg daily   Metformin  mg tablet: 1 tablet daily     Weight Loss Medication(s)   Topiramate 200 mg twice daily     Patient wondering about increasing dose of Ozempic. Has gone well but is experiencing cravings. He reports these cravings occur most often when he is at home and not doing anything. Reports mild constipation that is relieved with Miralax. He reports taking Miralax the day of his Ozempic injection and the day after, then daily as needed. Reports having a bowel movement once every couple days, which has not changed from his baseline. Reports no other side effects.     Nutrition/Eating Habits:    Breakfast: 2 slices of bread and peanut butter when he wakes up, then greek yogurt and a banana at work   Lunch: varies- salad, soup or 1/2 burrito   Dinner: varies- mac and cheese, tacos, burgers, occasional vegetables.  Snack: ice cream nightly- reports trying to kick this habit, almonds at work occasionally     Exercise/Activity: was previously doing yoga, reports he has been slacking off lately but would like to get back into it.      Blood Sugar Monitoring: yes, in the morning before breakfast   Ranges: , fasting   Symptoms of low blood sugar? None recently, lowest he can recall is 99   Statin: yes, simvastatin 10 mg daily without side effects  ACE I/ARB: yes, lisinopril 5 mg daily without side effects   Aspirin?: no, on Xarelto so not indicated.     Lab Results  "  Component Value Date    [facility]  10/24/2023      Comment:      Unable to calculate, urine albumin and/or urine creatinine is outside detectable limits.  Microalbuminuria is defined as an albumin:creatinine ratio of 17 to 299 for males and 25 to 299 for females. A ratio of albumin:creatinine of 300 or higher is indicative of overt proteinuria.  Due to biologic variability, positive results should be confirmed by a second, first-morning random or 24-hour timed urine specimen. If there is discrepancy, a third specimen is recommended. When 2 out of 3 results are in the microalbuminuria range, this is evidence for incipient nephropathy and warrants increased efforts at glucose control, blood pressure control, and institution of therapy with an angiotensin-converting-enzyme (ACE) inhibitor (if the patient can tolerate it).          Medications Tried/Failed:  Naltrexone: 100 mg daily  Orlistat: 60 mg three times daily     Initial Consult Weight: 454 lb      Current weight today: no recent weight obtained from patient       Wt Readings from Last 4 Encounters:   06/18/24 (!) 369 lb 8 oz (167.6 kg)   06/04/24 (!) 372 lb 6.4 oz (168.9 kg)   05/03/24 274 lb 4.8 oz (124.4 kg)   10/24/23 (!) 370 lb (167.8 kg)     Estimated body mass index is 57.02 kg/m  as calculated from the following:    Height as of 6/18/24: 5' 7.5\" (1.715 m).    Weight as of 6/18/24: 369 lb 8 oz (167.6 kg).    ----------------    I spent 35 minutes with this patient today. All changes were made via collaborative practice agreement with Dr. Plascencia. A copy of the visit note was provided to the patient's provider(s).    A summary of these recommendations was sent via [employer].    Rosaura Lobato, Pharmacy Student     Lauren Bloch, PharmD, BCACP   Medication Therapy Management Pharmacist   [facility] [place]    Telemedicine Visit Details  The patient's medications can be safely assessed via a telemedicine encounter.  Type of service:  " Telephone visit  Originating Location (pt. Location): Home  Distant Location (provider location):  Off-site  Start Time: 9:38 AM  End Time: 10:12 AM     Medication Therapy Recommendations  Has daytime drowsiness   1 Current Medication: modafinil (PROVIGIL) 200 MG tablet   Current Medication Sig: Take 1/2 tablet by mouth 3-4 times daily as needed for sleepiness.   Rationale: More effective medication available - Ineffective medication - Effectiveness   Recommendation: Change Medication - ARMODAFINIL   Status: Contact Provider - Awaiting Response   Identified Date: 10/29/2024         Morbid obesity (H)   1 Current Medication: Semaglutide, 1 MG/DOSE, (OZEMPIC) 4 MG/3ML pen (Discontinued)   Current Medication Sig: Inject 1 mg subcutaneously every 7 days.   Rationale: Dose too low - Dosage too low - Effectiveness   Recommendation: Increase Dose - Ozempic (2 MG/DOSE) 8 MG/3ML Sopn   Status: Accepted per CPA   Identified Date: 10/29/2024 Completed Date: 10/29/2024

## 2024-10-29 NOTE — PATIENT INSTRUCTIONS
"Recommendations from today's MTM visit:                                                    Today we reviewed what your medicines are for, how to know if they are working, that your medicines are safe and how to make your medicine regimen as easy as possible.      Start 1.5 mg dose of Ozempic using 2 mg pen.   There are 74 clicks to get to 2 mg in the Ozempic 2 mg pen. To get 1.5 mg on the 2 mg pen, you will count to 56 clicks. There will not be any denotation/marking that you have gotten to 1.5 mg on the pen, so it is important to ensure you count the clicks correctly. The first time you are counting clicks, it is recommended to count the 74 clicks to get to 2 mg to ensure counting correctly (the pen will say 2 mg once you get to that dose), then turn the knob back to \"0\" and count to 56 clicks to get the 1.5 mg dose.  Continue to use 1 capful of Miralax daily as needed.   Pharmacist to look verify no medication interactions with brain supplement. Will follow up via [employer] message if interactions found.   Pharmacist to ask primary for a refill on albuterol inhaler to have on hand.     Follow-up:   Appointments in Next Year      Dec 06, 2024 8:30 AM  (Arrive by 8:10 AM)  Adult Preventative Visit with Erika Espino MD  [facility] ([facility] ) [phone]     Feb 07, 2025 10:40 AM  (Arrive by 10:25 AM)  Return Weight Management Visit with Moshen Plascencia MD  [facility] [place] ([facility] ) [phone]            It was great speaking with you today.  I value your experience and would be very thankful for your time in providing feedback in our clinic survey. In the next few days, you may receive an email or text message from [employer] with a link to a survey related to your  clinical pharmacist.\"     To schedule another MT appointment, please call the clinic directly or you " may call the [facility] scheduling line at [phone] or toll-free at [phone].     My Clinical Pharmacist's contact information:                                                      Please feel free to contact me with any questions or concerns you have.      Lauren Bloch, PharmD, [facility]   Medication Therapy Management Pharmacist   [facility]

## 2024-11-03 NOTE — PROGRESS NOTES
Diabetes Self Management Training: Individual Review Visit, wt loss visit too    Refugio Kennedy presents today for education related to Type 2 diabetes.    He is accompanied by self    Patient's diabetes management related comments/concerns: none    Patient's emotional response to diabetes: expresses readiness to learn    Patient would like this visit to be focused around the following diabetes-related behaviors and goals: Healthy Eating, Being Active, Monitoring and Taking Medication    ASSESSMENT:  Patient Problem List and Family Medical History reviewed for relevant medical history, current medical status, and diabetes risk factors.    Current Diabetes Management per Patient:  Taking diabetes medications?   yes:     Diabetes Medication(s)     Biguanides Sig    metFORMIN modified (GLUMETZA) 500 MG 24 hr tablet Take 1 tablet (500 mg) by mouth daily (with breakfast)    metFORMIN modified (GLUMETZA) 500 MG 24 hr tablet Take 1 tablet (500 mg) by mouth daily (with breakfast) Ok to dispense generic glucophage xr as previous          *Abbreviated insulin dose documentation key: Insulin Trade Name (othxipdlb-nvqty-kzcxcp-bedtime) - i.e. Humalog 5-5-5-0 (Humalog 5 units at breakfast, 5 units at lunch, and 5 units at dinner).    Past Diabetes Education: Yes    Patient glucose self monitoring as follows: .   BG meter: Accu-Chek Cheryl meter  BG results:     Breakfast pp Lunch pp Supper  pp HS   16-Jan 112         17-Jan 122         18-Jan 105         19-Jan 100         20-Jan 108         21-Jan 104         22-Jan 103         23-Jan 102          #DIV/0! #DIV/0! #DIV/0! #DIV/0! #DIV/0! #DIV/0!       BG values are: In goal  Patient's most recent A1C      5.9   11/29/2016 is meeting goal of <7.0    Nutrition:  Breakfast- toast and coffee  Couple hours later: egg or corn beef hash  Lunch or supper trying to do a salad  Other meal: sandwich and milk or cheeseburger or pizza.  Has cut back on the boxed meals: mac and  "cheese  Snacks- bananas  Carrots/broccoli   Has not baked anything lately  Avoiding the dry carbs  Has cut out the regular pop, is drinking more water than coffee, pop now.        Physical Activity:    Does not have the money for the gym now  Weather has been nicer, if not slippery try to walk outside    Diabetes Risk Factors:  overweight/obesity and inactivity    Diabetes Complications:  Not discussed today.    Vitals:  There were no vitals taken for this visit.  Estimated body mass index is 64.08 kg/(m^2) as calculated from the following:    Height as of 1/13/17: 1.715 m (5' 7.5\").    Weight as of 1/13/17: 188.47 kg (415 lb 8 oz).   Last 3 BP:   BP Readings from Last 3 Encounters:   01/13/17 123/67   12/20/16 109/68   11/29/16 102/60       History   Smoking status     Former Smoker -- 0.50 packs/day for 1 years     Types: Cigarettes, Cigars     Quit date: 05/21/2012   Smokeless tobacco     Never Used       Labs:  A1C      5.9   11/29/2016  GLC      103   11/29/2016  LDL       59   11/29/2016  LDL       87   6/9/2014  HDL CHOLESTEROL   Date Value Ref Range Status   11/29/2016 35* >39 mg/dL Final   ]  GFR ESTIMATE   Date Value Ref Range Status   11/29/2016 76 >60 mL/min/1.7m2 Final     Comment:     Non  GFR Calc     GFR ESTIMATE IF BLACK   Date Value Ref Range Status   11/29/2016 >90   GFR Calc   >60 mL/min/1.7m2 Final     CR     1.06   11/29/2016  No results found for this basename: microalbumin    Socio/Economic Considerations:    Support system: family    Health Beliefs and Attitudes:   Patient Activation Measure Survey Score:  MARY Score (Last Two) 1/25/2011   MARY Raw Score 36   Activation Score 47.4   MARY Level 2       Stage of Change: ACTION (Actively working towards change)      Diabetes knowledge and skills assessment:     Patient is knowledgeable in diabetes management concepts related to: Healthy Eating, Being Active, Monitoring and Taking Medication    Patient needs further " education on the following diabetes management concepts: Healthy Eating and Being Active    Barriers to Learning Assessment: No Barriers identified    Based on learning assessment above, most appropriate setting for further diabetes education would be: Individual setting.    INTERVENTION:   Wt down in last month 7.5 lbs, has really been watching his food choices/options  Education provided today on:  AADE Self-Care Behaviors:  Healthy Eating: consistency in amount, composition, and timing of food intake  Being Active: relationship to blood glucose and describe appropriate activity program    Opportunities for ongoing education and support in diabetes-self management were discussed.    Pt verbalized understanding of concepts discussed and recommendations provided today.       Education Materials Provided:  No new materials provided today    PLAN:  See Patient Instructions for co-developed, patient-stated behavior change goals.  AVS printed and provided to patient today.    FOLLOW-UP:  Follow-up appointment scheduled on march 6th.  Chart routed to referring provider.    Ongoing plan for education and support: Follow-up visit with diabetes educator in 6 weeks      Time Spent: 30 minutes  Encounter Type: Individual    Any diabetes medication dose changes were made via the CDE Protocol and Collaborative Practice Agreement with the patient's primary care provider. A copy of this encounter was shared with the provider.     Unknown

## 2024-11-07 ENCOUNTER — MYC MEDICAL ADVICE (OUTPATIENT)
Dept: PHARMACY | Facility: CLINIC | Age: 49
End: 2024-11-07
Payer: COMMERCIAL

## 2024-11-07 DIAGNOSIS — G47.33 OSA (OBSTRUCTIVE SLEEP APNEA): Primary | ICD-10-CM

## 2024-11-11 ENCOUNTER — MEDICAL CORRESPONDENCE (OUTPATIENT)
Dept: HEALTH INFORMATION MANAGEMENT | Facility: CLINIC | Age: 49
End: 2024-11-11

## 2024-11-14 ENCOUNTER — TELEPHONE (OUTPATIENT)
Dept: SLEEP MEDICINE | Facility: CLINIC | Age: 49
End: 2024-11-14
Payer: COMMERCIAL

## 2024-11-14 DIAGNOSIS — G47.33 OSA (OBSTRUCTIVE SLEEP APNEA): Primary | ICD-10-CM

## 2024-11-14 NOTE — TELEPHONE ENCOUNTER
General Call    Contacts       Contact Date/Time Type Contact Phone/Fax    11/14/2024 01:13 PM CST Phone (Incoming) Kennedy, Refugio JACKSON (Self) [phone] (M)          Reason for Call:  cpap supplies    What are your questions or concerns:  PT scheduled for 02/14/2025 but needs prescription for cpap supplies before appt      [facility] or would you prefer to receive a phone call?:   Patient would prefer a phone call   Okay to leave a detailed message?: Yes at Cell number on file:    Telephone Information:   Mobile [phone]

## 2024-11-19 NOTE — TELEPHONE ENCOUNTER
Last ov 5/13/21  Next ov 2/4/21    Patient requesting updated order for CPAP supplies prior to appointment. Order pended and routed to provider for consideration.    ALON HOLLIDAY RN  [facility]

## 2024-11-25 DIAGNOSIS — J30.1 SEASONAL ALLERGIC RHINITIS DUE TO POLLEN: ICD-10-CM

## 2024-11-25 DIAGNOSIS — E78.5 HYPERLIPIDEMIA LDL GOAL <100: ICD-10-CM

## 2024-11-25 RX ORDER — CETIRIZINE HYDROCHLORIDE 10 MG/1
TABLET ORAL
Qty: 90 TABLET | Refills: 1 | Status: SHIPPED | OUTPATIENT
Start: 2024-11-25

## 2024-11-25 RX ORDER — SIMVASTATIN 10 MG
10 TABLET ORAL AT BEDTIME
Qty: 30 TABLET | Refills: 0 | Status: SHIPPED | OUTPATIENT
Start: 2024-11-25

## 2024-11-25 NOTE — TELEPHONE ENCOUNTER
LDL Cholesterol Calculated   Date Value Ref Range Status   10/24/2023 62 <=100 mg/dL Final   12/08/2020 44 <100 mg/dL Final     Comment:     Desirable:       <100 mg/dl

## 2024-12-03 DIAGNOSIS — I26.99 PULMONARY EMBOLISM, OTHER, UNSPECIFIED CHRONICITY, UNSPECIFIED WHETHER ACUTE COR PULMONALE PRESENT (H): ICD-10-CM

## 2024-12-11 DIAGNOSIS — E11.9 TYPE 2 DIABETES, HBA1C GOAL < 8% (H): ICD-10-CM

## 2024-12-11 RX ORDER — LANCETS
EACH MISCELLANEOUS
Qty: 300 EACH | Refills: 1 | Status: SHIPPED | OUTPATIENT
Start: 2024-12-11

## 2025-01-20 ENCOUNTER — TELEPHONE (OUTPATIENT)
Dept: ENDOCRINOLOGY | Facility: CLINIC | Age: 50
End: 2025-01-20
Payer: COMMERCIAL

## 2025-01-20 NOTE — TELEPHONE ENCOUNTER
Patient confirmed scheduled appointment:     Date: 8/15/25  Time: 10:40 AM  Visit type: Return Weight Management  Visit mode: Virtual Visit  Provider:  Dr. Mohsen Plascencia  Location: [facility] [place]    Additional Notes:

## 2025-02-09 ASSESSMENT — SLEEP AND FATIGUE QUESTIONNAIRES
HOW LIKELY ARE YOU TO NOD OFF OR FALL ASLEEP IN A CAR, WHILE STOPPED FOR A FEW MINUTES IN TRAFFIC: WOULD NEVER DOZE
HOW LIKELY ARE YOU TO NOD OFF OR FALL ASLEEP WHILE WATCHING TV: MODERATE CHANCE OF DOZING
HOW LIKELY ARE YOU TO NOD OFF OR FALL ASLEEP WHILE SITTING AND READING: SLIGHT CHANCE OF DOZING
HOW LIKELY ARE YOU TO NOD OFF OR FALL ASLEEP WHILE SITTING QUIETLY AFTER LUNCH WITHOUT ALCOHOL: WOULD NEVER DOZE
HOW LIKELY ARE YOU TO NOD OFF OR FALL ASLEEP WHILE LYING DOWN TO REST IN THE AFTERNOON WHEN CIRCUMSTANCES PERMIT: SLIGHT CHANCE OF DOZING
HOW LIKELY ARE YOU TO NOD OFF OR FALL ASLEEP WHEN YOU ARE A PASSENGER IN A CAR FOR AN HOUR WITHOUT A BREAK: WOULD NEVER DOZE
HOW LIKELY ARE YOU TO NOD OFF OR FALL ASLEEP WHILE SITTING AND TALKING TO SOMEONE: WOULD NEVER DOZE
HOW LIKELY ARE YOU TO NOD OFF OR FALL ASLEEP WHILE SITTING INACTIVE IN A PUBLIC PLACE: SLIGHT CHANCE OF DOZING

## 2025-02-14 ENCOUNTER — OFFICE VISIT (OUTPATIENT)
Dept: SLEEP MEDICINE | Facility: CLINIC | Age: 50
End: 2025-02-14
Attending: FAMILY MEDICINE
Payer: COMMERCIAL

## 2025-02-14 VITALS
HEIGHT: 67 IN | OXYGEN SATURATION: 95 % | BODY MASS INDEX: 49.44 KG/M2 | HEART RATE: 90 BPM | WEIGHT: 315 LBS | SYSTOLIC BLOOD PRESSURE: 114 MMHG | DIASTOLIC BLOOD PRESSURE: 70 MMHG

## 2025-02-14 DIAGNOSIS — G47.30 SLEEP APNEA WITH HYPERSOMNOLENCE: ICD-10-CM

## 2025-02-14 DIAGNOSIS — G47.33 OSA (OBSTRUCTIVE SLEEP APNEA): Primary | ICD-10-CM

## 2025-02-14 DIAGNOSIS — G47.10 SLEEP APNEA WITH HYPERSOMNOLENCE: ICD-10-CM

## 2025-02-14 DIAGNOSIS — E66.2 OBESITY HYPOVENTILATION SYNDROME (H): ICD-10-CM

## 2025-02-14 PROCEDURE — 99204 OFFICE O/P NEW MOD 45 MIN: CPT

## 2025-02-14 RX ORDER — MODAFINIL 200 MG/1
200 TABLET ORAL 2 TIMES DAILY
Qty: 60 TABLET | Refills: 2 | Status: SHIPPED | OUTPATIENT
Start: 2025-02-14 | End: 2025-02-18

## 2025-02-14 NOTE — PROGRESS NOTES
Outpatient Sleep Medicine Consultation:      Name: Refugio Kennedy MRN# [id number]   Age: 49 year old YOB: 1975     Date of Consultation: February 14, 2025  Consultation is requested by: Erika Espino MD  [address] [place] [id number] Erika Espino  Primary care provider: Erika Espino       Reason for Sleep Consult:     Refugio Kennedy is sent by Erika Espino for a sleep consultation regarding previously diagnosed FARZANA.    Patient s Reason for visit  Refugio Kennedy main reason for visit: (Patient-Rptd) New sleep dr,see if need new sleep study,ask my meds,and my cpap and # tanks water per night  Patient states problem(s) started: (Patient-Rptd) Few years ago,  Refugio Kennedy's goals for this visit: (Patient-Rptd) Meet new dr, get a plan together, figure out whats best going forward. For a good night's rest           Assessment and Plan:     Summary Sleep Diagnoses:  (G47.33) FARZANA (obstructive sleep apnea) (primary encounter diagnosis) (E66.2) Obesity hypoventilation syndrome (H)  Comment: Refugio is a 49-year-old male who presents to the sleep clinic to reestablish care for previously diagnosed severe FARZANA/hypoventilation syndrome. Refugio's last visit was in 2021 with NANCY Arshad CNP. Refugio does use his BiPAP machine regularly. He has a back up machine he uses when he is not at home. His download shows 54/90 days of use and only 44% of days > 4 hours of use. His average use is 4 hours 59 minutes. His apnea is well controlled with a residual AHI of 1.9 events per hour. His leak is significantly increased at 102.4 L/min. Refugio is tolerating his pressures. He reports using a lot of water overnight.   Plan: Comprehensive DME  Continue BiPAP 21/14 cmH2O. A prescription was written for new supplies. We reviewed recommendations for cleaning and replacing supplies. Informed Refugio he is using so much water because he has excess mask leak. He did try his full face mask on in the clinic and it  appeared too small. Recommended trying a bigger full face mask and replacing his supplies more frequently.      (G47.10, G47.30) Sleep apnea with hypersomnolence  Comment: Refugio has been taking modafinil since 2004 for narcolepsy; however, this is a questionable diagnosis given it sounds like he did not tolerate CPAP during his initial study; therefore, untreated apnea likely skewed his MSLT results. Nevertheless, Refugio does feel benefit from the modafinil and he very well could have sleep apnea with hypersomnolence but I informed Refugio a lot of his sleepiness is likely due to insufficient sleep and not using CPAP throughout the whole night.   Plan: modafinil (PROVIGIL) 200 MG tablet  Informed Refugio I would renew his prescription for modafinil 200 mg twice daily; however, informed him he will have to increase his CPAP usage and total sleep time if he would like me to continue prescribing this medication in the future.     Comorbid Diagnoses:  Morbid obesity, asthma, Type 2 DM, pulmonary HTN, hx methamphetamine abuse in remission    Summary Recommendations:  Orders Placed This Encounter   Procedures    Comprehensive DME     Summary Counseling:    Sleep Testing Reviewed  Obstructive Sleep Apnea Reviewed  Complications of Untreated Sleep Apnea Reviewed    Refugio will follow up in 1 year.  NANCY Ruiz CNP    Total time spent reviewing medical records, history and physical examination, review of previous testing and interpretation as well as documentation on this date: 40 minutes     CC: Erika Espino MD           History of Present Illness:     He is tolerating his BiPAP pressures.     He does wake more in the night and sleep poorly without BiPAP. He is more tired during the day too.     He is taking 200 mg modafinil twice daily. He denies any side effects.     He has been taking ozempic for less than one year.     Past Sleep Evaluations: Date: 2/07/2011. Weight 420 pounds. AHI: 54.9/hr. RDI 59.6/hr. O2  guille 51%.    Sleep Study 1998- AHI 51. Rx 16 cm. S/p UPPP 1998, weight loss. Sleep study 9/10/03 (weight 379)- AHI 34.9. Lo2 58%, with reported hypoventilation. Sleep study 9/29/03- CPAP 14 cm appeared effective. MSLT- 1.8 minutes, 2 sleep onset REMs, diagnosed with 'narcolepsy', placed on Provigil (though he did not tolerate CPAP on 1st study 9/03).   Sleep study 2/11- AHI 54.9, with desaturations to 51%. Transcutaneous CO2 monitoring showed some elevations consistent with hypoventilation. CPAP titration: No fully effective pressure was found. CPAP was titrated to 18 cm with improvement in severe desaturations, but some persistent hypopneas and arousals. Supine REM was noted at this pressure. Brief trial of 20/16 showed persistent events. Efficacy was limited by mask leak throughout most of the study.     Snoring: He is not aware of any snoring. No gasp arousals.   Mask Leak: Yes, but he is not aware of mask leak   Type of Mask: full face mask, he replaces his cushions twice per year   Dry Nose or Mouth: He wakes with a dry mouth. He has tried Biotene or Xylimelts.   Condensation in hose or mask: No  Nasal/Skin irritation: A little on the bridge of his nose.     ResMed AirCurve 10 Vauto  BiPAP 21/14 cmH2O: 11/09/2024-02/06/2025  The compliance data shows that the patient used the CPAP for 54/90 nights, 44% of nights for >4 hours.  The 95th% leak is 102.4 lpm.  The average nightly usage is 4 hours 59 minutes.  The average AHI is 1.9 events/hr.    SLEEP-WAKE SCHEDULE:     Work/School Days: Patient goes to school/work: (Patient-Rptd) Yes   Usually gets into bed at (Patient-Rptd) 9pm  Takes patient about (Patient-Rptd) 5-10 min? to fall asleep  Has trouble falling asleep (Patient-Rptd) 2-3 nights per week  Wakes up in the middle of the night (Patient-Rptd) 2-4,mostly fill water tank.  Wakes up due to (Patient-Rptd) External stimuli (bed partner, pets, noise, etc);Use the bathroom;Other  He has trouble falling back  asleep (Patient-Rptd) 5 times a week.   It usually takes (Patient-Rptd) Varied, sometimes it fast other its longer to get back to sleep  Patient is usually up at (Patient-Rptd) 530 am  Uses alarm: (Patient-Rptd) Yes    Weekends/Non-work Days/All Other Days:  Usually gets into bed at (Patient-Rptd) 9pm   Takes patient about (Patient-Rptd) 3-7 min to fall asleep  Patient is usually up at (Patient-Rptd) 5-7am  Uses alarm: (Patient-Rptd) No    Sleep Need  Patient gets (Patient-Rptd) 5-7 sleep on average   Patient thinks he needs about (Patient-Rptd) 8 sleep    [facility] prefers to sleep in this position(s): (Patient-Rptd) Side   Patient states they do the following activities in bed:      Naps  Patient takes a purposeful nap 0 times a week and naps are usually   in duration  He feels better after a nap:    He dozes off unintentionally   days per week  Patient has had a driving accident or near-miss due to sleepiness/drowsiness: (Patient-Rptd) No    SLEEP DISRUPTIONS:    Breathing/Snoring  Patient snores: (Patient-Rptd) Yes, not with BiPAP   Other people complain about his snoring: (Patient-Rptd) Yes, not with BiPAP  Patient has been told he stops breathing in his sleep: (Patient-Rptd) Yes, not with BiPAP   He has issues with the following: (Patient-Rptd) Morning headaches;Morning mouth dryness;Stuffy nose when you wake up;Getting up to urinate more than once Occasional morning headaches.     Movement:  Patient gets pain, discomfort, with an urge to move: (Patient-Rptd) Yes He denies restless legs. He does get pain at night.   It happens when he is resting: (Patient-Rptd) Yes  It happens more at night:     Patient has been told he kicks his legs at night: (Patient-Rptd) No     Behaviors in Sleep:  [facility] has experienced the following behaviors while sleeping: (Patient-Rptd) Sleep-talking  He has experienced sudden muscle weakness during the day:    Pt denies bruxism, sleep walking, and dream enactment behavior. Pt  denies sleep paralysis, hypnagogue and cataplexy.    Is there anything else you would like your sleep provider to know:      CAFFEINE AND OTHER SUBSTANCES:    Patient consumes caffeinated beverages per day: (Patient-Rptd) Usually 2 cups coffee a day,maybe 2-3 cans soda a week (diet)  Last caffeine use is usually: (Patient-Rptd) Late afternoon-early evening, not coffee.  List of any prescribed or over the counter stimulants that patient takes: (Patient-Rptd) Modafinil  List of any prescribed or over the counter sleep medication patient takes: None   List of previous sleep medications that patient has tried:    Patient drinks alcohol to help them sleep: (Patient-Rptd) No  Patient drinks alcohol near bedtime: (Patient-Rptd) No    Family History:  Patient has a family member been diagnosed with a sleep disorder: (Patient-Rptd) No      SCALES:    EPWORTH SLEEPINESS SCALE         2/9/2025     9:32 AM    [place] Sleepiness Scale ( MICKY Grady  1750-1998<br>ESS - [place]/English - Final version - 21 Nov 07 - [facility] [place].)   Sitting and reading Slight chance of dozing   Watching TV Moderate chance of dozing   Sitting, inactive in a public place (e.g. a theatre or a meeting) Slight chance of dozing   As a passenger in a car for an hour without a break Would never doze   Lying down to rest in the afternoon when circumstances permit Slight chance of dozing   Sitting and talking to someone Would never doze   Sitting quietly after a lunch without alcohol Would never doze   In a car, while stopped for a few minutes in traffic Would never doze   [place] Score (MC) 5   [place] Score (Sleep) 5        Patient-reported         INSOMNIA SEVERITY INDEX (JANET)          2/9/2025     9:13 AM   Insomnia Severity Index (JANET)   Difficulty falling asleep 0   Difficulty staying asleep 2   Problems waking up too early 1   How SATISFIED/DISSATISFIED are you with your CURRENT sleep pattern? 3   How NOTICEABLE to others do you think your sleep  "problem is in terms of impairing the quality of your life? 2   How WORRIED/DISTRESSED are you about your current sleep problem? 2   To what extent do you consider your sleep problem to INTERFERE with your daily functioning (e.g. daytime fatigue, mood, ability to function at work/daily chores, concentration, memory, mood, etc.) CURRENTLY? 3   JANET Total Score 13        Patient-reported       Guidelines for Scoring/Interpretation:  Total score categories:  0-7 = No clinically significant insomnia   8-14 = Subthreshold insomnia   15-21 = Clinical insomnia (moderate severity)  22-28 = Clinical insomnia (severe)  Used via courtesy of www.[employer].com.va.gov with permission from Ashish Hawthorne PhD., [facility]      STOP BANG         2/14/2025    10:00 AM   STOP BANG Questionnaire (  2008, the American Society of Anesthesiologists, Inc. [employer]   Neck Cir (cm) Clinic: 54 cm   B/P Clinic: 114/70   BMI Clinic: 55.13         GAD7        4/21/2021    11:43 AM   ERMELINDA-7    1. Feeling nervous, anxious, or on edge 1   2. Not being able to stop or control worrying 0   3. Worrying too much about different things 0   4. Trouble relaxing 0   5. Being so restless that it is hard to sit still 0   6. Becoming easily annoyed or irritable 0   7. Feeling afraid, as if something awful might happen 0   ERMELINDA-7 Total Score 1         CAGE-AID        6/26/2017     2:00 PM   CAGE-AID Flowsheet   Have you ever felt you should Cut down on your drinking or drug use? 1   Have people Annoyed you by criticizing your drinking or drug use? 1   Have you ever felt bad or Guilty about your drinking or drug use? 1   Have you ever had a drink or used drugs first thing in the morning to steady your nerves or to get rid of a hangover? (Eye opener) 1   CAGE-AID SCORE 4       CAGE-AID reprinted with permission from the [place] Medical Journal, CHERI Serrano. and LATIA Escamilla, \"Conjoint screening questionnaires for alcohol and drug abuse\" " [place] Medical Journal 94: 135-140, 1995.      PATIENT HEALTH QUESTIONNAIRE-9 (PHQ - 9)        4/21/2021    11:43 AM   PHQ-9 ([employer])   1.  Little interest or pleasure in doing things 0   2.  Feeling down, depressed, or hopeless 0   3.  Trouble falling or staying asleep, or sleeping too much 1   4.  Feeling tired or having little energy 1   5.  Poor appetite or overeating 1   6.  Feeling bad about yourself - or that you are a failure or have let yourself or your family down 0   7.  Trouble concentrating on things, such as reading the newspaper or watching television 0   8.  Moving or speaking so slowly that other people could have noticed. Or the opposite - being so fidgety or restless that you have been moving around a lot more than usual 0   9.  Thoughts that you would be better off dead, or of hurting yourself in some way 0   PHQ-9 Total Score 3   6.  Feeling bad about yourself 0   7.  Trouble concentrating 0   8.  Moving slowly or restless 0   9.  Suicidal or self-harm thoughts 0       Developed by Tony Pelaez, Karen Gaitan, Phil Fraser and colleagues, with an educational van from [employer] No permission required to reproduce, translate, display or distribute.        Allergies:    No Known Allergies    Medications:    Current Outpatient Medications   Medication Sig Dispense Refill    ACCU-CHEK CHRIS PLUS test strip USE TO TEST BLOOD SUGAR 2 TIMES DAILY OR AS DIRECTED. 200 strip 1    acetaminophen (TYLENOL) 500 MG tablet Take 750 mg by mouth daily      albuterol (2.5 MG/3ML) 0.083% neb solution Take 1 vial (2.5 mg) by nebulization every 4 hours as needed for shortness of breath / dyspnea or wheezing 25 vial 0    albuterol (PROAIR HFA/PROVENTIL HFA/VENTOLIN HFA) 108 (90 Base) MCG/ACT inhaler Inhale 2 puffs into the lungs every 4 hours as needed for shortness of breath. 18 g 3    blood glucose (NO BRAND SPECIFIED) test strip Use to test blood sugar 2 times daily or as directed. 180 strip 3     blood glucose monitoring (SOFTCLIX) lancets USE TO TEST THREE TIMES DAILY 300 each 1    cetirizine (ZYRTEC) 10 MG tablet TAKE ONE TABLET BY MOUTH EVERY EVENING 90 tablet 1    DIABETIC STERILE LANCETS device 1 Device 3 times daily. 1 Box 12    empagliflozin (JARDIANCE) 10 MG TABS tablet Take 1 tablet (10 mg) by mouth daily 90 tablet 3    fluticasone (FLONASE) 50 MCG/ACT spray Spray 1-2 sprays into both nostrils daily 16 g 0    furosemide (LASIX) 20 MG tablet TAKE TWO TABLETS BY MOUTH TWICE A  tablet 3    lisinopril (ZESTRIL) 5 MG tablet Take 1 tablet (5 mg) by mouth daily. 90 tablet 3    metFORMIN (GLUCOPHAGE XR) 500 MG 24 hr tablet TAKE ONE TABLET BY MOUTH ONCE DAILY 90 tablet 3    modafinil (PROVIGIL) 200 MG tablet Take 1/2 tablet by mouth 3-4 times daily as needed for sleepiness. 60 tablet 5    Multiple Vitamin (MULTIVITAMIN OR) Take 1 tablet by mouth daily.      order for DME Equipment being ordered: Dynaflex insert 1 Units 0    order for DME Equipment being ordered: Dynaflex insert 1 Units 0    order for DME Nebulizer 1 Units 0    order for DME Equipment being ordered: BIPAP Refugio P Kennedy received a Resmed AirCurve 10 Bilevel. Pressures were set at 23/14 cm H2O.      ORDER FOR DME Auto-BiPAP:  IPAP max 21 cm H2O  EPAP min 15 cm H2O  Pressure support 5 cm  Changed in clinic  Lifetime need and heated humidity.          rivaroxaban ANTICOAGULANT (XARELTO) 20 MG TABS tablet Take 1 tablet (20 mg) by mouth daily with food. 90 tablet 1    Semaglutide, 2 MG/DOSE, (OZEMPIC) 8 MG/3ML pen Inject 2 mg subcutaneously every 7 days. 9 mL 3    simvastatin (ZOCOR) 10 MG tablet Take 1 tablet (10 mg) by mouth at bedtime. 90 tablet 3    topiramate (TOPAMAX) 100 MG tablet Take 2 tablets (200 mg) by mouth 2 times daily. 360 tablet 3    triamcinolone (KENALOG) 0.1 % external cream Apply topically 2 times daily 45 g 1       Problem List:  Patient Active Problem List    Diagnosis Date Noted    Single subsegmental pulmonary  embolism without acute cor pulmonale (H) 02/22/2023     Priority: Medium    Methamphetamine abuse in remission (H) 07/17/2017     Priority: Medium    Type 2 diabetes mellitus without complication, without long-term current use of insulin (H) 10/17/2016     Priority: Medium    Mild intermittent asthma without complication 03/24/2015     Priority: Medium    Pulmonary hypertension (H) 06/22/2014     Priority: Medium    Hyperlipidemia LDL goal <100 06/08/2012     Priority: Medium    Venous stasis 06/08/2012     Priority: Medium    Chronic respiratory failure (H) 02/11/2011     Priority: Medium     ABG 2/11- 7.38/51/75 RA      FARZANA (obstructive sleep apnea)/Hypoventilation Syndrome- Severe      Priority: Medium     Sleep Study 1998- AHI 51. Rx 16 cm. S/p UVPP 1998, weight loss. Sleep study 9/10/03 (weight 379)- AHI 34.9. Lo2 58%, with reported hypoventilation.  Sleep study 9/29/03- CPAP 14 cm appeared effective. MSLT- 1.8 minutes, 2 sleep onset REMs, diagnosed with 'narcolepsy', placed on Provigil (though he did not tolerate CPAP on 1st study 9/03).   Sleep study 2/11- AHI 54.9, with desaturations to 51%. Transcutaneous CO2 monitoring showed some elevations consistent with hypoventilation. CPAP titration: No fully effective pressure was found. CPAP was titrated to 18 cm with improvement in severe desaturations, but some persistent hypopneas and arousals. Supine REM was noted at this pressure. Brief trial of 20/16 showed persistent events. Efficacy was limited by mask leak throughout most of the study.       Morbid obesity (H)      Priority: Medium     Morbid obesity          Past Medical/Surgical History:  Past Medical History:   Diagnosis Date    2019 novel coronavirus disease (COVID-19) 03/29/2021    Arthritis     Cataplexy and narcolepsy     Narcolepsy    Cellulitis     Chronic respiratory failure with hypercapnia (H) 03/29/2021    COPD (chronic obstructive pulmonary disease) (H)     Diabetes     Hypoventilation      Morbid obesity (H) 2021    Obesity     FARZANA (obstructive sleep apnea)     uses CPAP    FARZANA (obstructive sleep apnea) 2021    Uncomplicated asthma      Past Surgical History:   Procedure Laterality Date    ABDOMEN SURGERY      COLONOSCOPY N/A 2023    Procedure: COLONOSCOPY, WITH POLYPECTOMY AND BIOPSY;  Surgeon: Andrews Aguayo MD;  Location: [facility]    ENT SURGERY      LAPAROSCOPIC HERNIORRHAPHY VENTRAL N/A 2016    Procedure: LAPAROSCOPIC HERNIORRHAPHY VENTRAL;  Surgeon: Yves Jimenes MD;  Location: [facility]    LAPAROSCOPIC HERNIORRHAPHY VENTRAL N/A 2016    Procedure: LAPAROSCOPIC HERNIORRHAPHY VENTRAL;  Surgeon: Yves Jimenes MD;  Location: [facility]    SURGICAL HISTORY OF -   1998    UVPP       Social History:  Social History     Socioeconomic History    Marital status: Single     Spouse name: Not on file    Number of children: Not on file    Years of education: Not on file    Highest education level: Not on file   Occupational History    Not on file   Tobacco Use    Smoking status: Former     Current packs/day: 0.00     Average packs/day: 0.5 packs/day for 1 year (0.5 ttl pk-yrs)     Types: Cigarettes, Cigars     Start date: 2011     Quit date: 2012     Years since quittin.7    Smokeless tobacco: Never   Vaping Use    Vaping status: Never Used   Substance and Sexual Activity    Alcohol use: Yes     Comment: rare    Drug use: No     Comment: 1 month ago -     Sexual activity: Yes     Partners: Female     Birth control/protection: Pull-out method   Other Topics Concern    Parent/sibling w/ CABG, MI or angioplasty before 65F 55M? No   Social History Narrative    Not on file     Social Drivers of Health     Financial Resource Strain: Low Risk  (2024)    Financial Resource Strain     Within the past 12 months, have you or your family members you live with been unable to get utilities (heat, electricity) when it was really needed?: No   Food Insecurity: Low Risk   (12/1/2024)    Food Insecurity     Within the past 12 months, did you worry that your food would run out before you got money to buy more?: No     Within the past 12 months, did the food you bought just not last and you didn t have money to get more?: No   Transportation Needs: Low Risk  (12/1/2024)    Transportation Needs     Within the past 12 months, has lack of transportation kept you from medical appointments, getting your medicines, non-medical meetings or appointments, work, or from getting things that you need?: No   Physical Activity: Insufficiently Active (12/1/2024)    Exercise Vital Sign     Days of Exercise per Week: 1 day     Minutes of Exercise per Session: 10 min   Stress: No Stress Concern Present (12/1/2024)    [place] [employer] - Occupational Stress Questionnaire     Feeling of Stress : Not at all   Social Connections: Unknown (12/1/2024)    Social Connection and Isolation Panel [NHANES]     Frequency of Communication with Friends and Family: Not on file     Frequency of Social Gatherings with Friends and Family: Never     Attends [employer] Services: Not on file     Active Member of Clubs or Organizations: Not on file     Attends Club or Organization Meetings: Not on file     Marital Status: Not on file   Interpersonal Safety: Low Risk  (12/6/2024)    Interpersonal Safety     Do you feel physically and emotionally safe where you currently live?: Yes     Within the past 12 months, have you been hit, slapped, kicked or otherwise physically hurt by someone?: No     Within the past 12 months, have you been humiliated or emotionally abused in other ways by your partner or ex-partner?: No   Housing Stability: Low Risk  (12/1/2024)    Housing Stability     Do you have housing? : Yes     Are you worried about losing your housing?: No       Family History:  Family History   Problem Relation Age of Onset    Anxiety Disorder Mother     Asthma Mother     Depression Mother     Thyroid  "Disease Mother     Anxiety Disorder Father     Substance Abuse Father     Depression Father     Hypertension Father     Cancer Maternal Grandfather     Heart Disease Maternal Grandfather     Diabetes Maternal Grandfather     Hypertension Maternal Grandfather     Other Cancer Maternal Grandfather     Cancer Paternal Grandfather         unknown    Other Cancer Paternal Grandfather     Anxiety Disorder Brother     Substance Abuse Brother     Mental Illness Brother     Depression Brother     Cancer Brother     Other Cancer Brother     Obesity No family hx of        Review of Systems:  A complete review of systems reviewed by me is negative with the exeption of what has been mentioned in the history of present illness.  In the last TWO WEEKS have you experienced any of the following symptoms?  Fevers: (Patient-Rptd) No  Night Sweats: (Patient-Rptd) No  Weight Gain: (Patient-Rptd) No  Pain at Night: (Patient-Rptd) Yes  Double Vision: (Patient-Rptd) No  Changes in Vision: (Patient-Rptd) No  Difficulty Breathing through Nose: (Patient-Rptd) Yes  Sore Throat in Morning: (Patient-Rptd) Yes  Dry Mouth in the Morning: (Patient-Rptd) Yes  Shortness of Breath Lying Flat: (Patient-Rptd) No  Shortness of Breath With Activity: (Patient-Rptd) Yes  Awakening with Shortness of Breath: (Patient-Rptd) No  Increased Cough: (Patient-Rptd) Yes  Heart Racing at Night: (Patient-Rptd) No  Swelling in Feet or Legs: (Patient-Rptd) Yes  Diarrhea at Night: (Patient-Rptd) No  Heartburn at Night: (Patient-Rptd) No  Urinating More than Once at Night: (Patient-Rptd) Yes  Losing Control of Urine at Night: (Patient-Rptd) No  Joint Pains at Night: (Patient-Rptd) Yes  Headaches in Morning: (Patient-Rptd) Yes  Weakness in Arms or Legs: (Patient-Rptd) No  Depressed Mood: (Patient-Rptd) No  Anxiety: (Patient-Rptd) Yes     Physical Examination:  Vitals: /70   Pulse 90   Ht 1.695 m (5' 6.73\")   Wt (!) 158.4 kg (349 lb 3.2 oz)   SpO2 95%   BMI 55.13 " "kg/m    BMI= Body mass index is 55.13 kg/m .    Neck Cir (cm): 54 cm    GENERAL APPEARANCE: alert, no distress, cooperative, and obese  EYES: Eyes grossly normal to inspection, PERRL, and conjunctivae and sclerae normal  NECK: no asymmetry, masses, or scars  RESP: no increased work of breathing noted, no audible cough or wheeze  NEURO: mentation intact and speech normal  PSYCH: mentation appears normal and affect normal/bright         Data: All pertinent previous laboratory data reviewed     Recent Labs   Lab Test 06/04/24  1355 10/24/23  1518    141   POTASSIUM 4.0 3.8   CHLORIDE 104 104   CO2 25 29   ANIONGAP 11 8   GLC 98 87   BUN 23.8* 21.5*   CR 0.92 0.95   KARINA 9.1 9.2       Recent Labs   Lab Test 06/04/24  1355   WBC 8.7   RBC 4.68   HGB 14.2   HCT 43.4   MCV 93   MCH 30.3   MCHC 32.7   RDW 12.7          Recent Labs   Lab Test 06/04/24  1355   PROTTOTAL 7.3   ALBUMIN 4.2   BILITOTAL 0.4   ALKPHOS 116   AST 20   ALT 19       TSH (mU/L)   Date Value   12/19/2019 1.93   01/14/2019 1.66       No results found for: \"UAMP\", \"UBARB\", \"BENZODIAZEUR\", \"UCANN\", \"UCOC\", \"OPIT\", \"UPCP\"    Iron Sat Index   Date/Time Value Ref Range Status   05/11/2023 09:54 AM 40 15 - 46 % Final     Ferritin   Date/Time Value Ref Range Status   05/11/2023 09:54  31 - 409 ng/mL Final   03/29/2021 03:33 PM 1,416 (H) 26 - 388 ng/mL Final       pH Arterial (pH)   Date Value   07/06/2011 7.40   02/07/2011 7.38     pO2 Arterial (mm Hg)   Date Value   07/06/2011 72 (L)   02/07/2011 75 (L)     pCO2 Arterial (mm Hg)   Date Value   07/06/2011 45   02/07/2011 51 (H)     Bicarbonate Arterial (mmol/L)   Date Value   07/06/2011 27   02/07/2011 29 (H)     Base Excess Art (mmol/L)   Date Value   07/06/2011 2.4   02/07/2011 3.3       @LABRCNTIPR(phv:4,pco2v:4,po2v:4,hco3v:4,ifeanyi:4,o2per:4)@    Echocardiology: No results found for this or any previous visit (from the past 4320 hours).    Chest x-ray:   No results found for this or any " previous visit from the past 365 days.      Chest CT:   No results found for this or any previous visit from the past 365 days.      PFT: Most Recent B[facility]ze Pulmonary Function Testing    FVC-Pred   Date Value Ref Range Status   06/24/2014 4.33 L    06/24/2014 4.33 L      FVC-Pre   Date Value Ref Range Status   06/24/2014 3.85 L    06/24/2014 3.85 L      FVC-%Pred-Pre   Date Value Ref Range Status   06/24/2014 88 %    06/24/2014 88 %      FEV1-Pre   Date Value Ref Range Status   06/24/2014 3.12 L    06/24/2014 3.12 L      FEV1-%Pred-Pre   Date Value Ref Range Status   06/24/2014 87 %    06/24/2014 87 %      FEV1FVC-Pred   Date Value Ref Range Status   06/24/2014 82 %    06/24/2014 82 %      FEV1FVC-Pre   Date Value Ref Range Status   06/24/2014 81 %    06/24/2014 81 %      FGO9CFN-%Pred-Pre   Date Value Ref Range Status   06/24/2014 98 %    06/24/2014 98 %      FEFMax-Pred   Date Value Ref Range Status   06/24/2014 9.49 L/sec    06/24/2014 9.49 L/sec      FEFMax-Pre   Date Value Ref Range Status   06/24/2014 8.56 L/sec    06/24/2014 8.56 L/sec      FEFMax-%Pred-Pre   Date Value Ref Range Status   06/24/2014 90 %    06/24/2014 90 %      ExpTime-Pre   Date Value Ref Range Status   06/24/2014 6.53 sec    06/24/2014 6.53 sec      FIFMax-Pre   Date Value Ref Range Status   06/24/2014 6.52 L/sec    06/24/2014 6.52 L/sec      FEV1FEV6-Pred   Date Value Ref Range Status   06/24/2014 82 %    06/24/2014 82 %      FEV1FEV6-Pre   Date Value Ref Range Status   06/24/2014 81 %    06/24/2014 81 %      HUE5BEQ0-%Pred-Pre   Date Value Ref Range Status   06/24/2014 98 %    06/24/2014 98 %      Oliva Batres, APRN CNP 2/14/2025

## 2025-02-15 ENCOUNTER — MYC MEDICAL ADVICE (OUTPATIENT)
Dept: SLEEP MEDICINE | Facility: CLINIC | Age: 50
End: 2025-02-15
Payer: COMMERCIAL

## 2025-02-18 RX ORDER — MODAFINIL 200 MG/1
200 TABLET ORAL 2 TIMES DAILY
Qty: 180 TABLET | Refills: 1 | Status: SHIPPED | OUTPATIENT
Start: 2025-02-18

## 2025-03-13 ENCOUNTER — TELEPHONE (OUTPATIENT)
Dept: AUDIOLOGY | Facility: CLINIC | Age: 50
End: 2025-03-13
Payer: COMMERCIAL

## 2025-03-13 NOTE — TELEPHONE ENCOUNTER
I offered suggestions on things to try at home to 'fix' the right ear hearing aid and explained that if those did not work then the hearing aid would probably have to go in for a factory repair. Patient will try the suggestions and then 'go from there'.    -Chad Weber CCC-A

## 2025-03-13 NOTE — TELEPHONE ENCOUNTER
[facility] Call Center    Phone Message    May a detailed message be left on voicemail: yes     Reason for Call: Other: Pt states hearing aids are not holding a charge and would like a call back to discuss his warranty.  [place] location     Action Taken: Other: AUD    Travel Screening: Not Applicable     Date of Service:

## 2025-03-19 ENCOUNTER — MYC MEDICAL ADVICE (OUTPATIENT)
Dept: SLEEP MEDICINE | Facility: CLINIC | Age: 50
End: 2025-03-19
Payer: COMMERCIAL

## 2025-03-19 DIAGNOSIS — G47.33 OSA (OBSTRUCTIVE SLEEP APNEA): Primary | ICD-10-CM

## 2025-05-07 ENCOUNTER — TELEPHONE (OUTPATIENT)
Dept: ENDOCRINOLOGY | Facility: CLINIC | Age: 50
End: 2025-05-07
Payer: COMMERCIAL

## 2025-05-07 ENCOUNTER — VIRTUAL VISIT (OUTPATIENT)
Dept: PHARMACY | Facility: CLINIC | Age: 50
End: 2025-05-07
Attending: INTERNAL MEDICINE
Payer: COMMERCIAL

## 2025-05-07 VITALS — HEIGHT: 67 IN | WEIGHT: 315 LBS | BODY MASS INDEX: 49.44 KG/M2

## 2025-05-07 DIAGNOSIS — E66.01 MORBID OBESITY (H): ICD-10-CM

## 2025-05-07 DIAGNOSIS — E11.9 TYPE 2 DIABETES MELLITUS WITHOUT COMPLICATION, WITHOUT LONG-TERM CURRENT USE OF INSULIN (H): Primary | ICD-10-CM

## 2025-05-07 DIAGNOSIS — E78.5 HYPERLIPIDEMIA LDL GOAL <100: ICD-10-CM

## 2025-05-07 RX ORDER — POLYETHYLENE GLYCOL 3350 17 G/17G
1 POWDER, FOR SOLUTION ORAL DAILY PRN
COMMUNITY

## 2025-05-07 ASSESSMENT — PAIN SCALES - GENERAL: PAINLEVEL_OUTOF10: NO PAIN (0)

## 2025-05-07 NOTE — PATIENT INSTRUCTIONS
"Recommendations from today's MTM visit:                                                       Continue current regimen for now  Labs ordered to complete  Pharmacist to work on Ozempic Prior Authorization    Follow-up: Return in about 3 months (around 8/7/2025) for Medication Therapy Management Pharmacist Visit.  Appointments in Next Year      Jun 24, 2025 9:30 AM  RETURN CLOTTING DISORDER with Apoorva Molina PA-C  [facility] ([facility] ) [phone]     Aug 15, 2025 10:40 AM  (Arrive by 10:25 AM)  Return Weight Management Visit with Mohsen Plascencia MD  [facility] ([facility] ) [phone]     Dec 09, 2025 10:00 AM  (Arrive by 9:40 AM)  Preventative Adult Visit with Erika Espino MD  [facility] ([facility] ) [phone]            It was great speaking with you today.  I value your experience and would be very thankful for your time in providing feedback in our clinic survey. In the next few days, you may receive an email or text message from [facility] [employer] with a link to a survey related to your  clinical pharmacist.\"     To schedule another MTM appointment, please call the clinic directly or you may call the MTM scheduling line at [phone] or toll-free at [phone].     My Clinical Pharmacist's contact information:                                                      Please feel free to contact me with any questions or concerns you have.      Lauren Bloch, PharmD  Medication Therapy Management Pharmacist   [facility] [place]             "

## 2025-05-07 NOTE — TELEPHONE ENCOUNTER
"Prior Authorization Retail Medication Request    Medication/Dose: Ozempic  Diagnosis and ICD code (if different than what is on RX):    New/renewal/insurance change PA/secondary ins. PA:  Previously Tried and Failed:  metformin, Jardiance, Victoza  Rationale:  Refugio Kennedy is a 49 year old male with a diagnosis of Type 2 Diabetes . Looking for continued coverage of Ozempic for diabetes.     Estimated body mass index is 52.94 kg/m  as calculated from the following:    Height as of an earlier encounter on 5/7/25: 5' 7\" (1.702 m).    Weight as of an earlier encounter on 5/7/25: 338 lb (153.3 kg).      Insurance   Primary:   Insurance ID:      Secondary (if applicable):  Insurance ID:     Pharmacy Information (if different than what is on RX)  Name:    Phone:    Fax:    Clinic Information  Preferred routing pool for dept communication:   Lauren Bloch, PharmD, BCACP   Medication Therapy Management Pharmacist   [facility]        "

## 2025-05-07 NOTE — PROGRESS NOTES
Medication Therapy Management (MTM) Encounter    ASSESSMENT:                            Medication Adherence/Access: No issues identified.    Type 2 Diabetes/Weight Management:   A1c well within goal < 7%. Will repeat BMP and A1c as due. Discussed potential de-escalation of topiramate dose in future given dose and baseline daytime drowsiness, will hold for now until labs return. Will work on Ozempic Prior Authorization. Weight loss progressing well, no changes otherwise suggested today. Given A1c status could also make consideration to de-escalate Jardiance and maintain on Ozempic and metformin due to continued weight loss on regimen.     Hyperlipidemia:   LDL < 100. Stable.     PLAN:                            Continue current regimen for now  Labs ordered to complete  Pharmacist to work on Ozempic Prior Authorization  Pharmacist to reach out to primary care provider after lab results to see thoughts of considering holding Jardiance in future given current status. Will await lab results for verification of A1c status.     Follow-up: Return in about 3 months (around 8/7/2025) for Medication Therapy Management Pharmacist Visit.    SUBJECTIVE/OBJECTIVE:                          Refugio Kennedy is a 49 year old male seen for a follow-up visit.       Reason for visit: diabetes/Weight Management follow up.    Allergies/ADRs: Reviewed in chart  Past Medical History: Reviewed in chart  Tobacco: He reports that he quit smoking about 12 years ago. His smoking use included cigarettes and cigars. He started smoking about 13 years ago. He has a 0.5 pack-year smoking history. He has never used smokeless tobacco.  Alcohol: not currently using    Medication Adherence/Access: Patient takes medications directly from bottles.    Diabetes   Type 2 Diabetes/Weight Management:   Ozempic 2 mg weekly   Metformin  mg daily   Jardiance 10 mg daily     Weight loss medications:   Topiramate 200 mg twice daily     [facility]. Finds that weight is coming down slowly. Reports he received message that Ozempic will need a new Prior Authorization from insurance, requesting this be completed. Sleep Medicine follow up in 2025 for FARZANA to re-assess PAP due to weight loss, continued therapy.   Patient reports no current medication side effects. Occasional constipation and so doing Miralax I capful day of Ozempic injection and finds works well for bowel movement regularity. He reports that he doesn't tolerate fatty rich or greasy foods or ice cream as well anymore so doesn't eat as much.   Current diabetes symptoms: none.   Reports smaller portions. Finds at times he is taking larger portions and not finishing meals.   Diet/Exercise: Eating 3 meals per day. At work does have snacks in between - almonds, cashews or beef sticks.      Blood sugar monitorin time(s) daily; Ranges: (patient reported) Fasting-  mg/dL    Eye exam is up to date  Foot exam: up to date    Lab Results   Component Value Date    A1C 5.8 2024    A1C 5.8 2024    A1C 5.7 10/24/2023    A1C 5.7 10/03/2022    A1C 5.7 2021    A1C 5.5 2020    A1C 5.6 2019    A1C 5.6 2019    A1C 5.7 2019    A1C 5.5 2018     Wt Readings from Last 4 Encounters:   25 (!) 338 lb (153.3 kg)   25 (!) 349 lb 3.2 oz (158.4 kg)   01/10/25 (!) 359 lb (162.8 kg)   24 (!) 363 lb (164.7 kg)     Initial Weight (lbs): 454 lbs   Current Weight: 338 lb   Cumulative Weight Loss: 116 lb, 25.6% from baseline         Hyperlipidemia   Simvastatin 10 mg daily    Patient reports no significant myalgias or other side effects.       Today's Vitals: Ht 5' 7\" (1.702 m)   Wt (!) 338 lb (153.3 kg)   BMI 52.94 kg/m    ----------------    I spent 15 minutes with this patient today. All changes were made via collaborative practice agreement with Mohsen Plascencia.     A summary of these recommendations was sent " via [employer].    Lauren Bloch, PharmD, BCACP   Medication Therapy Management Pharmacist   [facility]    Telemedicine Visit Details  The patient's medications can be safely assessed via a telemedicine encounter.  Type of service:  Telephone visit  Originating Location (pt. Location): Home    Distant Location (provider location):  Off-site  Start Time: 9:34 AM  End Time: 9:49 AM     Medication Therapy Recommendations  Type 2 diabetes mellitus without complication, without long-term current use of insulin (H)   1 Current Medication: empagliflozin (JARDIANCE) 10 MG TABS tablet   Current Medication Sig: Take 1 tablet (10 mg) by mouth daily   Rationale: No medical indication at this time - Unnecessary medication therapy - Indication   Recommendation: Discontinue Medication   Status: Contact Provider - Awaiting Response   Identified Date: 5/7/2025

## 2025-05-07 NOTE — NURSING NOTE
Current patient location: [address] [place], [place]    Is the patient currently in the state of [place]? YES    Visit mode: TELEPHONE    If the visit is dropped, the patient can be reconnected by:VIDEO VISIT: Text to cell phone:   Telephone Information:   Mobile [phone]       Will anyone else be joining the visit? NO  (If patient encounters technical issues they should call [phone]    Are changes needed to the allergy or medication list? No    Are refills needed on medications prescribed by this physician? Discuss with provider    Rooming Documentation:  Questionnaire(s) not pre-assigned    Reason for visit: Medication Therapy Management    King MARTINEZ

## 2025-05-07 NOTE — TELEPHONE ENCOUNTER
PA Initiation    Medication: OZEMPIC (2 MG/DOSE) 8 MG/3ML SC SOPN  Insurance Company: [facility] - Phone [phone] Fax [phone]  Pharmacy Filling the Rx:    Filling Pharmacy Phone:    Filling Pharmacy Fax:    Start Date: 5/7/2025    [id number]

## 2025-05-08 NOTE — TELEPHONE ENCOUNTER
Prior Authorization Approval    Medication: OZEMPIC (2 MG/DOSE) 8 MG/3ML SC SOPN  Authorization Effective Date: 4/8/2025  Authorization Expiration Date: 5/8/2026  Approved Dose/Quantity: 3ml per 28 days  Reference #: [id number]   Insurance Company: [employer] [place] - Phone [phone] Fax [phone]  Expected CoPay: $    CoPay Card Available:      Financial Assistance Needed:   Which Pharmacy is filling the prescription:    Pharmacy Notified:   Patient Notified:

## 2025-05-12 ENCOUNTER — RESULTS FOLLOW-UP (OUTPATIENT)
Dept: PHARMACY | Facility: CLINIC | Age: 50
End: 2025-05-12

## 2025-05-12 ENCOUNTER — LAB (OUTPATIENT)
Dept: LAB | Facility: CLINIC | Age: 50
End: 2025-05-12
Payer: COMMERCIAL

## 2025-05-12 DIAGNOSIS — E11.9 TYPE 2 DIABETES MELLITUS WITHOUT COMPLICATION, WITHOUT LONG-TERM CURRENT USE OF INSULIN (H): ICD-10-CM

## 2025-05-12 LAB
ANION GAP SERPL CALCULATED.3IONS-SCNC: 10 MMOL/L (ref 7–15)
BUN SERPL-MCNC: 19.8 MG/DL (ref 6–20)
CALCIUM SERPL-MCNC: 9.5 MG/DL (ref 8.8–10.4)
CHLORIDE SERPL-SCNC: 105 MMOL/L (ref 98–107)
CREAT SERPL-MCNC: 0.89 MG/DL (ref 0.67–1.17)
EGFRCR SERPLBLD CKD-EPI 2021: >90 ML/MIN/1.73M2
EST. AVERAGE GLUCOSE BLD GHB EST-MCNC: 111 MG/DL
GLUCOSE SERPL-MCNC: 114 MG/DL (ref 70–99)
HBA1C MFR BLD: 5.5 % (ref 0–5.6)
HCO3 SERPL-SCNC: 26 MMOL/L (ref 22–29)
POTASSIUM SERPL-SCNC: 4.1 MMOL/L (ref 3.4–5.3)
SODIUM SERPL-SCNC: 141 MMOL/L (ref 135–145)

## 2025-05-12 PROCEDURE — 36415 COLL VENOUS BLD VENIPUNCTURE: CPT

## 2025-05-12 PROCEDURE — 83036 HEMOGLOBIN GLYCOSYLATED A1C: CPT

## 2025-05-12 PROCEDURE — 80048 BASIC METABOLIC PNL TOTAL CA: CPT

## 2025-05-16 ENCOUNTER — MYC MEDICAL ADVICE (OUTPATIENT)
Dept: FAMILY MEDICINE | Facility: CLINIC | Age: 50
End: 2025-05-16
Payer: COMMERCIAL

## 2025-05-16 DIAGNOSIS — E11.9 TYPE 2 DIABETES MELLITUS WITHOUT COMPLICATION, WITHOUT LONG-TERM CURRENT USE OF INSULIN (H): ICD-10-CM

## 2025-05-16 NOTE — TELEPHONE ENCOUNTER
Last Written Prescription Date:  06/04/24  Last Fill Quantity: 90,  # refills: 3   Last office visit: 12/6/2024 with PCP ; last virtual visit: 01/10/25 with endocrinology and  05/07/25 with MTM     Future Office Visit: 12/09/25 with PCP     Copied from MTM visit on 05/07/25:    Type 2 Diabetes/Weight Management:   A1c well within goal < 7%. Will repeat BMP and A1c as due. Discussed potential de-escalation of topiramate dose in future given dose and baseline daytime drowsiness, will hold for now until labs return. Will work on Ozempic Prior Authorization. Weight loss progressing well, no changes otherwise suggested today. Given A1c status could also make consideration to de-escalate Jardiance and maintain on Ozempic and metformin due to continued weight loss on regimen.     Continue current regimen for now  Labs ordered to complete  Pharmacist to work on Ozempic Prior Authorization   SEMAGLUTIDE  APPROVED  Pharmacist to reach out to primary care provider after lab results to see thoughts of considering holding Jardiance in future given current status. Will await lab results for verification of A1c status.      Hemoglobin A1C   Date Value Ref Range Status   05/12/2025 5.5 0.0 - 5.6 % Final     Comment:     Normal <5.7%   Prediabetes 5.7-6.4%    Diabetes 6.5% or higher     Note: Adopted from ADA consensus guidelines.   12/08/2020 5.5 0 - 5.6 % Final     Comment:     Normal <5.7% Prediabetes 5.7-6.4%  Diabetes 6.5% or higher - adopted from ADA   consensus guidelines.       Please advise, Ciarra HUTSON RN

## 2025-05-16 NOTE — TELEPHONE ENCOUNTER
Requested Prescriptions   Pending Prescriptions Disp Refills    empagliflozin (JARDIANCE) 10 MG TABS tablet 90 tablet 3     Sig: Take 1 tablet (10 mg) by mouth daily.       Sodium Glucose Co-Transport Inhibitor Agents Failed - 5/16/2025  1:33 PM        Failed - Recent (6 mo) or future (90 days) visit within the authorizing provider's specialty     The patient must have completed an in-person or virtual visit within the past 6 months or has a future visit scheduled within the next 90 days with the authorizing provider s specialty.  Urgent care and e-visits do not quality as an office visit for this protocol.          Passed - Patient has documented A1c within the specified period of time.     If HgbA1C is 8 or greater, it needs to be on file within the past 3 months.  If less than 8, must be on file within the past 6 months.     Recent Labs   Lab Test 05/12/25  1237   A1C 5.5             Passed - Medication is active on med list and the sig matches. RN to manually verify dose and sig if red X/fail.     If the protocol passes (green check), you do not need to verify med dose and sig.    A prescription matches if they are the same clinical intention.    For Example: once daily and every morning are the same.    The protocol can not identify upper and lower case letters as matching and will fail.     For Example: Take 1 tablet (50 mg) by mouth daily     TAKE 1 TABLET (50 MG) BY MOUTH DAILY    For all fails (red x), verify dose and sig.    If the refill does match what is on file, the RN can still proceed to approve the refill request.       If they do not match, route to the appropriate provider.             Passed - Medication indicated for associated diagnosis     Medication is associated with one or more of the following diagnoses:     Diabetic nephropathy, With Albuminuria - Type 2 diabetes mellitus     Disorder of cardiovascular system; Prophylaxis - Type 2 diabetes mellitus     Type 2 diabetes mellitus    Disorder  of cardiovascular system; Prophylaxis - Heart failure   Chronic kidney disease, (At risk of progression) to reduce the risk of sustained   estimated GFR decline, end-stage kidney disease, cardiovascular death,   and hospitalization for heart failure     Heart failure, (NYHA class II to IV, reduced ejection fraction) to reduce risk of  cardiovascular death and hospitalization           Passed - Has GFR on file in past 12 months and most recent value is >30        Passed - Patient is age 18 or older        Passed - Patient has documented normal Potassium within the last 12 mos.     Recent Labs   Lab Test 05/12/25  1237   POTASSIUM 4.1

## 2025-06-17 NOTE — PROGRESS NOTES
"    [facility]  [facility]  [facility], [address], [place] [address]  Main: [phone], Fax: [phone]      Outpatient Visit Note:    Patient: Refugio Kennedy  MRN: [id number]  : 1975  CRISTI: 2025    Reason for Visit:  Chronic anticoagulation follow-up    History of Present Illness:  In summary, Refugio is a 49 year old male with a history of obesity, hypoventilation syndrome/FARZANA, chronic respiratory failure, pulmonary hypertension, insulin dependent type 2 diabetes mellitus, dyslipidemia, venous insufficiency, and unprovoked PE on 2023. He is maintained on indefinite anticoagulation in the form of Xarelto 20mg daily presents today for routine follow-up. Please refer to my previous notes for additional details regarding his history. He presents today for routine follow-up.    Interval History:  Last seen by me on 24. Continues on Xarelto 20mg daily. No recurrent venous thromboembolism. Other than easier bruising, he has no significant bleeding concerns. Denies frequent/prolonged epistaxis, hematuria, hematochezia, and melena. No surgeries planned for the year. Has some upcoming road travel to [place] for a black vannessa tournament with his father.    Unrelated to his clotting history/anticoagulation, he notes that over the past 2 weeks, he has noticed some intermittent mild \"white noise\" (not ringing) and odd sensation (not pain, not necessarily a \"plugged\" or \"popped\" feeling) in the ears/back side of his head.  Again, this is not painful. This comes and goes on its own. Nothing makes it better or worse. He does wear hearing aids. He does have some seasonal allergies/hay fever. I can't examine his ears via video visit but I encouraged adequate hydration and trying an OTC second generation antihistamine in case seasonal allergies/hay fever is contributing to some eustachian tube dysfunction. If worsening/not resolving, I advised seeing " his GP for evaluation.    Exam via Televideo:  Constitutional: Appears well. Not in distress.   Respiratory: Breathing comfortably on room air.  Neuro: Aox3.    Labs:  Component      Latest Ref Rng 5/12/2025  12:37 PM   Sodium      135 - 145 mmol/L 141    Potassium      3.4 - 5.3 mmol/L 4.1    Chloride      98 - 107 mmol/L 105    Carbon Dioxide (CO2)      22 - 29 mmol/L 26    Anion Gap      7 - 15 mmol/L 10    Urea Nitrogen      6.0 - 20.0 mg/dL 19.8    Creatinine      0.67 - 1.17 mg/dL 0.89    GFR Estimate      >60 mL/min/1.73m2 >90    Calcium      8.8 - 10.4 mg/dL 9.5    Glucose      70 - 99 mg/dL 114 (H)      Assessment/Plan:  In summary, Refugio is a 49 year old male with a history of obesity, hypoventilation syndrome/FARZANA, chronic respiratory failure, pulmonary hypertension, insulin dependent type 2 diabetes mellitus, dyslipidemia, venous insufficiency, and unprovoked PE on 02/19/2023. Given the unprovoked nature of his PE in 2023, indefinite anticoagulation remains indicated. Her remains an appropriate Xarelto candidate. The safety profile of and indications for holding this medication were reviewed. He was advised to contact us in the event of new bleeding concerns and prior to scheduled surgeries/procedures. Xarelto 20mg once daily refilled.    Video Visit  Patient location: Home.  Provider location: [facility].  Joined the call at 6/24/2025, 9:31:29 am.  Left the call at 6/24/2025, 9:54:24 am.  You were on the call for 15 minutes       26 minutes spent on the date of the encounter doing chart review, history and exam, documentation and further activities per the note.           Apoorva Molina PA-C, [facility]  [facility]  [address], [address], [place] [address]  Main: [phone], Fax: [phone]

## 2025-06-24 ENCOUNTER — VIRTUAL VISIT (OUTPATIENT)
Dept: HEMATOLOGY | Facility: CLINIC | Age: 50
End: 2025-06-24
Attending: PHYSICIAN ASSISTANT
Payer: COMMERCIAL

## 2025-06-24 DIAGNOSIS — I26.99 PULMONARY EMBOLISM, OTHER, UNSPECIFIED CHRONICITY, UNSPECIFIED WHETHER ACUTE COR PULMONALE PRESENT (H): ICD-10-CM

## 2025-06-30 ENCOUNTER — MYC MEDICAL ADVICE (OUTPATIENT)
Dept: FAMILY MEDICINE | Facility: CLINIC | Age: 50
End: 2025-06-30
Payer: COMMERCIAL

## 2025-07-01 ENCOUNTER — RESULTS FOLLOW-UP (OUTPATIENT)
Dept: FAMILY MEDICINE | Facility: CLINIC | Age: 50
End: 2025-07-01

## 2025-07-01 ENCOUNTER — OFFICE VISIT (OUTPATIENT)
Dept: FAMILY MEDICINE | Facility: CLINIC | Age: 50
End: 2025-07-01
Payer: COMMERCIAL

## 2025-07-01 ENCOUNTER — ANCILLARY PROCEDURE (OUTPATIENT)
Dept: GENERAL RADIOLOGY | Facility: CLINIC | Age: 50
End: 2025-07-01
Attending: STUDENT IN AN ORGANIZED HEALTH CARE EDUCATION/TRAINING PROGRAM
Payer: COMMERCIAL

## 2025-07-01 VITALS
DIASTOLIC BLOOD PRESSURE: 64 MMHG | HEIGHT: 67 IN | BODY MASS INDEX: 49.44 KG/M2 | TEMPERATURE: 97.5 F | HEART RATE: 74 BPM | OXYGEN SATURATION: 97 % | WEIGHT: 315 LBS | SYSTOLIC BLOOD PRESSURE: 112 MMHG | RESPIRATION RATE: 18 BRPM

## 2025-07-01 DIAGNOSIS — R10.9 FLANK PAIN: ICD-10-CM

## 2025-07-01 DIAGNOSIS — R39.9 URINARY SYMPTOM OR SIGN: Primary | ICD-10-CM

## 2025-07-01 DIAGNOSIS — R31.9 HEMATURIA, UNSPECIFIED TYPE: ICD-10-CM

## 2025-07-01 DIAGNOSIS — R39.9 URINARY SYMPTOM OR SIGN: ICD-10-CM

## 2025-07-01 LAB
ALBUMIN SERPL BCG-MCNC: 4 G/DL (ref 3.5–5.2)
ALBUMIN UR-MCNC: NEGATIVE MG/DL
ALP SERPL-CCNC: 114 U/L (ref 40–150)
ALT SERPL W P-5'-P-CCNC: 18 U/L (ref 0–70)
ANION GAP SERPL CALCULATED.3IONS-SCNC: 13 MMOL/L (ref 7–15)
APPEARANCE UR: CLEAR
AST SERPL W P-5'-P-CCNC: 17 U/L (ref 0–45)
BASOPHILS # BLD AUTO: 0 10E3/UL (ref 0–0.2)
BASOPHILS NFR BLD AUTO: 0 %
BILIRUB SERPL-MCNC: 0.3 MG/DL
BILIRUB UR QL STRIP: NEGATIVE
BUN SERPL-MCNC: 19.5 MG/DL (ref 6–20)
CALCIUM SERPL-MCNC: 9.2 MG/DL (ref 8.8–10.4)
CHLORIDE SERPL-SCNC: 106 MMOL/L (ref 98–107)
COLOR UR AUTO: YELLOW
CREAT SERPL-MCNC: 0.9 MG/DL (ref 0.67–1.17)
CRP SERPL-MCNC: <3 MG/L
EGFRCR SERPLBLD CKD-EPI 2021: >90 ML/MIN/1.73M2
EOSINOPHIL # BLD AUTO: 0.2 10E3/UL (ref 0–0.7)
EOSINOPHIL NFR BLD AUTO: 3 %
ERYTHROCYTE [DISTWIDTH] IN BLOOD BY AUTOMATED COUNT: 13.1 % (ref 10–15)
GLUCOSE SERPL-MCNC: 102 MG/DL (ref 70–99)
GLUCOSE UR STRIP-MCNC: NEGATIVE MG/DL
HCO3 SERPL-SCNC: 23 MMOL/L (ref 22–29)
HCT VFR BLD AUTO: 42.9 % (ref 40–53)
HGB BLD-MCNC: 14.1 G/DL (ref 13.3–17.7)
HGB UR QL STRIP: NEGATIVE
IMM GRANULOCYTES # BLD: 0 10E3/UL
IMM GRANULOCYTES NFR BLD: 0 %
KETONES UR STRIP-MCNC: NEGATIVE MG/DL
LEUKOCYTE ESTERASE UR QL STRIP: NEGATIVE
LYMPHOCYTES # BLD AUTO: 2.8 10E3/UL (ref 0.8–5.3)
LYMPHOCYTES NFR BLD AUTO: 34 %
MCH RBC QN AUTO: 30.6 PG (ref 26.5–33)
MCHC RBC AUTO-ENTMCNC: 32.9 G/DL (ref 31.5–36.5)
MCV RBC AUTO: 93 FL (ref 78–100)
MONOCYTES # BLD AUTO: 0.8 10E3/UL (ref 0–1.3)
MONOCYTES NFR BLD AUTO: 10 %
NEUTROPHILS # BLD AUTO: 4.5 10E3/UL (ref 1.6–8.3)
NEUTROPHILS NFR BLD AUTO: 54 %
NITRATE UR QL: NEGATIVE
PH UR STRIP: 7 [PH] (ref 5–7)
PLATELET # BLD AUTO: 223 10E3/UL (ref 150–450)
POTASSIUM SERPL-SCNC: 3.8 MMOL/L (ref 3.4–5.3)
PROT SERPL-MCNC: 7.2 G/DL (ref 6.4–8.3)
PSA SERPL DL<=0.01 NG/ML-MCNC: 0.88 NG/ML (ref 0–3.5)
RBC # BLD AUTO: 4.61 10E6/UL (ref 4.4–5.9)
SODIUM SERPL-SCNC: 142 MMOL/L (ref 135–145)
SP GR UR STRIP: 1.01 (ref 1–1.03)
UROBILINOGEN UR STRIP-ACNC: 0.2 E.U./DL
WBC # BLD AUTO: 8.3 10E3/UL (ref 4–11)

## 2025-07-01 PROCEDURE — 3078F DIAST BP <80 MM HG: CPT | Performed by: STUDENT IN AN ORGANIZED HEALTH CARE EDUCATION/TRAINING PROGRAM

## 2025-07-01 PROCEDURE — 36415 COLL VENOUS BLD VENIPUNCTURE: CPT | Performed by: STUDENT IN AN ORGANIZED HEALTH CARE EDUCATION/TRAINING PROGRAM

## 2025-07-01 PROCEDURE — 87086 URINE CULTURE/COLONY COUNT: CPT | Performed by: STUDENT IN AN ORGANIZED HEALTH CARE EDUCATION/TRAINING PROGRAM

## 2025-07-01 PROCEDURE — 74018 RADEX ABDOMEN 1 VIEW: CPT | Mod: TC | Performed by: INTERNAL MEDICINE

## 2025-07-01 PROCEDURE — 99214 OFFICE O/P EST MOD 30 MIN: CPT | Performed by: STUDENT IN AN ORGANIZED HEALTH CARE EDUCATION/TRAINING PROGRAM

## 2025-07-01 PROCEDURE — 80053 COMPREHEN METABOLIC PANEL: CPT | Performed by: STUDENT IN AN ORGANIZED HEALTH CARE EDUCATION/TRAINING PROGRAM

## 2025-07-01 PROCEDURE — 1125F AMNT PAIN NOTED PAIN PRSNT: CPT | Performed by: STUDENT IN AN ORGANIZED HEALTH CARE EDUCATION/TRAINING PROGRAM

## 2025-07-01 PROCEDURE — 3074F SYST BP LT 130 MM HG: CPT | Performed by: STUDENT IN AN ORGANIZED HEALTH CARE EDUCATION/TRAINING PROGRAM

## 2025-07-01 PROCEDURE — 81003 URINALYSIS AUTO W/O SCOPE: CPT | Performed by: STUDENT IN AN ORGANIZED HEALTH CARE EDUCATION/TRAINING PROGRAM

## 2025-07-01 PROCEDURE — 85025 COMPLETE CBC W/AUTO DIFF WBC: CPT | Performed by: STUDENT IN AN ORGANIZED HEALTH CARE EDUCATION/TRAINING PROGRAM

## 2025-07-01 PROCEDURE — G0103 PSA SCREENING: HCPCS | Performed by: STUDENT IN AN ORGANIZED HEALTH CARE EDUCATION/TRAINING PROGRAM

## 2025-07-01 PROCEDURE — 86140 C-REACTIVE PROTEIN: CPT | Performed by: STUDENT IN AN ORGANIZED HEALTH CARE EDUCATION/TRAINING PROGRAM

## 2025-07-01 ASSESSMENT — ASTHMA QUESTIONNAIRES
QUESTION_3 LAST FOUR WEEKS HOW OFTEN DID YOUR ASTHMA SYMPTOMS (WHEEZING, COUGHING, SHORTNESS OF BREATH, CHEST TIGHTNESS OR PAIN) WAKE YOU UP AT NIGHT OR EARLIER THAN USUAL IN THE MORNING: NOT AT ALL
QUESTION_4 LAST FOUR WEEKS HOW OFTEN HAVE YOU USED YOUR RESCUE INHALER OR NEBULIZER MEDICATION (SUCH AS ALBUTEROL): NOT AT ALL
QUESTION_5 LAST FOUR WEEKS HOW WOULD YOU RATE YOUR ASTHMA CONTROL: WELL CONTROLLED
QUESTION_2 LAST FOUR WEEKS HOW OFTEN HAVE YOU HAD SHORTNESS OF BREATH: ONCE OR TWICE A WEEK
ACT_TOTALSCORE: 23
QUESTION_1 LAST FOUR WEEKS HOW MUCH OF THE TIME DID YOUR ASTHMA KEEP YOU FROM GETTING AS MUCH DONE AT WORK, SCHOOL OR AT HOME: NONE OF THE TIME

## 2025-07-01 ASSESSMENT — PAIN SCALES - GENERAL: PAINLEVEL_OUTOF10: MILD PAIN (3)

## 2025-07-01 NOTE — PROGRESS NOTES
"  Assessment & Plan     Urinary symptom or sign  - UA Macroscopic with reflex to Microscopic and Culture - Clinic Collect  - Urine Culture; Future  - Urine Culture  - XR KUB; Future    Flank pain  - Urine Culture; Future  - Urine Culture  - XR KUB; Future  - PSA, screen; Future  - CBC with platelets and differential; Future  - Comprehensive metabolic panel (BMP + Alb, Alk Phos, ALT, AST, Total. Bili, TP); Future  - CRP, inflammation; Future  - CRP, inflammation  - Comprehensive metabolic panel (BMP + Alb, Alk Phos, ALT, AST, Total. Bili, TP)  - CBC with platelets and differential  - PSA, screen    Hematuria, unspecified type  - PSA, screen; Future  - CBC with platelets and differential; Future  - Comprehensive metabolic panel (BMP + Alb, Alk Phos, ALT, AST, Total. Bili, TP); Future  - CRP, inflammation; Future  - CRP, inflammation  - Comprehensive metabolic panel (BMP + Alb, Alk Phos, ALT, AST, Total. Bili, TP)  - CBC with platelets and differential  - PSA, screen      Patient is a  49 year old who presents today for left flank and low back pain with new dysuria and episodes of hematuria. No history of kidney stones or UTI. He is on Xarelto. UA today without signs of infection, but did add on Ucx. Tenderness left low back as well as across low abdomen and suprapubic region. XR completed to evaluate for possible stones, no obvious findings. Did have some bowel gas. Unclear cause of symptoms. Did obtain labs as above and given precautions for when to present to the ED. Could try OTC pyridium.     BMI  Estimated body mass index is 53.09 kg/m  as calculated from the following:    Height as of this encounter: 1.702 m (5' 7\").    Weight as of this encounter: 153.8 kg (339 lb).       Subjective   Refugio is a 49 year old, presenting for the following health issues:  Urinary Problem (X 2-3 days, blood and pain), Flank Pain (X3-4 days), and Back Pain (Few weeks, unsure if related)        7/1/2025     9:22 AM   Additional " "Questions   Roomed by Loli TURCIOS   Accompanied by self     History of Present Illness       Reason for visit:  Kidney pain?,painfull urinating, have seen red tint in urine 2x in past 3 days. Have had lower back Kidney area discomfort for past few weeks on and off  Symptom onset:  1-3 days ago  Symptoms include:  Discomfort urinating, pain in kidney area lower back,Discomfort in tip of penis  Symptom intensity:  Moderate  Symptom progression:  Improving  Had these symptoms before:  No  What makes it worse:  Not sure  What makes it better:  Not sure He is missing 1 dose(s) of medications per week.  He is not taking prescribed medications regularly due to remembering to take.        On xarelto.   Lower back pain couple weeks. No injury.     3-4 days ago, flank started hurting 2-3 days with painful urination  Now some blood once each the past 2 days.   No h/o uti or kidney stones.     Pain with urination. Last 4 days, 2 of the days red tint in the urine in the toilet.   Discomfort with urinating.   Even sitting here some discomfort.   Gotten a little better last 24 hours, now just mild.   Back pain on and off past couple weeks, but last 4 days, low back swelling.   Just the left >right for side pain    With back pain, with a wrong step feels discomfort and has to stand still for a moment to resolve that.     No recent med changes.   No history of UTI or kidney stone.     Recent spinach and artichoke dip,  Did have some alcoholic beverages this past weeken.     Review of Systems  Constitutional, HEENT, cardiovascular, pulmonary, gi and gu systems are negative, except as otherwise noted.      Objective    /64 (BP Location: Right arm, Patient Position: Sitting, Cuff Size: Adult Large)   Pulse 74   Temp 97.5  F (36.4  C) (Tympanic)   Resp 18   Ht 1.702 m (5' 7\")   Wt (!) 153.8 kg (339 lb)   SpO2 97%   BMI 53.09 kg/m    Body mass index is 53.09 kg/m .  Physical Exam  Constitutional:       Appearance: Normal " appearance.   HENT:      Head: Normocephalic.   Eyes:      General: No scleral icterus.     Extraocular Movements: Extraocular movements intact.      Conjunctiva/sclera: Conjunctivae normal.   Cardiovascular:      Rate and Rhythm: Normal rate.   Pulmonary:      Effort: Pulmonary effort is normal.   Abdominal:      Tenderness: There is no right CVA tenderness or left CVA tenderness.       Musculoskeletal:        Back:    Neurological:      General: No focal deficit present.      Mental Status: He is alert and oriented to person, place, and time.         Results from this visit  Results for orders placed or performed in visit on 07/01/25   UA Macroscopic with reflex to Microscopic and Culture - Clinic Collect     Status: Normal    Specimen: Urine, Clean Catch   Result Value Ref Range    Color Urine Yellow Colorless, Straw, Light Yellow, Yellow    Appearance Urine Clear Clear    Glucose Urine Negative Negative mg/dL    Bilirubin Urine Negative Negative    Ketones Urine Negative Negative mg/dL    Specific Gravity Urine 1.015 1.003 - 1.035    Blood Urine Negative Negative    pH Urine 7.0 5.0 - 7.0    Protein Albumin Urine Negative Negative mg/dL    Urobilinogen Urine 0.2 0.2, 1.0 E.U./dL    Nitrite Urine Negative Negative    Leukocyte Esterase Urine Negative Negative    Narrative    Microscopic not indicated   CBC with platelets and differential     Status: None   Result Value Ref Range    WBC Count 8.3 4.0 - 11.0 10e3/uL    RBC Count 4.61 4.40 - 5.90 10e6/uL    Hemoglobin 14.1 13.3 - 17.7 g/dL    Hematocrit 42.9 40.0 - 53.0 %    MCV 93 78 - 100 fL    MCH 30.6 26.5 - 33.0 pg    MCHC 32.9 31.5 - 36.5 g/dL    RDW 13.1 10.0 - 15.0 %    Platelet Count 223 150 - 450 10e3/uL    % Neutrophils 54 %    % Lymphocytes 34 %    % Monocytes 10 %    % Eosinophils 3 %    % Basophils 0 %    % Immature Granulocytes 0 %    Absolute Neutrophils 4.5 1.6 - 8.3 10e3/uL    Absolute Lymphocytes 2.8 0.8 - 5.3 10e3/uL    Absolute Monocytes 0.8 0.0  - 1.3 10e3/uL    Absolute Eosinophils 0.2 0.0 - 0.7 10e3/uL    Absolute Basophils 0.0 0.0 - 0.2 10e3/uL    Absolute Immature Granulocytes 0.0 <=0.4 10e3/uL   CBC with platelets and differential     Status: None    Narrative    The following orders were created for panel order CBC with platelets and differential.  Procedure                               Abnormality         Status                     ---------                               -----------         ------                     CBC with platelets and ...[[id number]]                      Final result                 Please view results for these tests on the individual orders.                 Signed Electronically by: Janice Jones MD

## 2025-07-02 LAB — BACTERIA UR CULT: NO GROWTH

## 2025-07-09 DIAGNOSIS — I26.99 PULMONARY EMBOLISM, OTHER, UNSPECIFIED CHRONICITY, UNSPECIFIED WHETHER ACUTE COR PULMONALE PRESENT (H): ICD-10-CM

## 2025-07-09 NOTE — CONFIDENTIAL NOTE
Incoming refill request from [facility] pharmacy for Xarelto. Per chart review patient recently had a script sent to [facility] pharmacy. I called the patient to clarify where he would like to fill the med. He states after checking with [facility] he thinks he can get if for cheaper through [facility]. Specifically he would like the script sent to the [facility] in [place]. Script sent.    Boy PIPER RN  [facility]  Office: [phone]  Clinic: [phone]  Fax: [phone]

## 2025-07-21 DIAGNOSIS — E66.813: ICD-10-CM

## 2025-07-21 DIAGNOSIS — E66.1: ICD-10-CM

## 2025-07-22 RX ORDER — TOPIRAMATE 100 MG/1
200 TABLET, FILM COATED ORAL 2 TIMES DAILY
Qty: 360 TABLET | Refills: 0 | Status: SHIPPED | OUTPATIENT
Start: 2025-07-22

## 2025-08-01 ENCOUNTER — MYC REFILL (OUTPATIENT)
Dept: SLEEP MEDICINE | Facility: CLINIC | Age: 50
End: 2025-08-01
Payer: COMMERCIAL

## 2025-08-01 DIAGNOSIS — G47.10 SLEEP APNEA WITH HYPERSOMNOLENCE: Primary | ICD-10-CM

## 2025-08-01 DIAGNOSIS — G47.30 SLEEP APNEA WITH HYPERSOMNOLENCE: Primary | ICD-10-CM

## 2025-08-04 ENCOUNTER — MYC MEDICAL ADVICE (OUTPATIENT)
Dept: SLEEP MEDICINE | Facility: CLINIC | Age: 50
End: 2025-08-04
Payer: COMMERCIAL

## 2025-08-04 RX ORDER — MODAFINIL 200 MG/1
200 TABLET ORAL 2 TIMES DAILY
Qty: 180 TABLET | Refills: 1 | Status: SHIPPED | OUTPATIENT
Start: 2025-08-04

## 2025-08-18 ENCOUNTER — OFFICE VISIT (OUTPATIENT)
Dept: FAMILY MEDICINE | Facility: CLINIC | Age: 50
End: 2025-08-18
Payer: COMMERCIAL

## 2025-08-18 VITALS
OXYGEN SATURATION: 97 % | RESPIRATION RATE: 14 BRPM | TEMPERATURE: 97.7 F | WEIGHT: 315 LBS | HEIGHT: 68 IN | HEART RATE: 76 BPM | SYSTOLIC BLOOD PRESSURE: 110 MMHG | DIASTOLIC BLOOD PRESSURE: 66 MMHG | BODY MASS INDEX: 47.74 KG/M2

## 2025-08-18 DIAGNOSIS — E66.01 MORBID OBESITY (H): ICD-10-CM

## 2025-08-18 DIAGNOSIS — K42.9 UMBILICAL HERNIA WITHOUT OBSTRUCTION AND WITHOUT GANGRENE: Primary | ICD-10-CM

## 2025-08-18 PROCEDURE — 1125F AMNT PAIN NOTED PAIN PRSNT: CPT | Performed by: FAMILY MEDICINE

## 2025-08-18 PROCEDURE — 99214 OFFICE O/P EST MOD 30 MIN: CPT | Performed by: FAMILY MEDICINE

## 2025-08-18 PROCEDURE — 3074F SYST BP LT 130 MM HG: CPT | Performed by: FAMILY MEDICINE

## 2025-08-18 PROCEDURE — 3078F DIAST BP <80 MM HG: CPT | Performed by: FAMILY MEDICINE

## 2025-08-18 ASSESSMENT — PAIN SCALES - GENERAL: PAINLEVEL_OUTOF10: MILD PAIN (3)

## 2025-08-21 ENCOUNTER — OFFICE VISIT (OUTPATIENT)
Dept: SURGERY | Facility: CLINIC | Age: 50
End: 2025-08-21
Attending: FAMILY MEDICINE
Payer: COMMERCIAL

## 2025-08-21 VITALS
WEIGHT: 315 LBS | SYSTOLIC BLOOD PRESSURE: 114 MMHG | DIASTOLIC BLOOD PRESSURE: 71 MMHG | BODY MASS INDEX: 47.74 KG/M2 | HEIGHT: 68 IN | OXYGEN SATURATION: 97 % | HEART RATE: 76 BPM

## 2025-08-21 DIAGNOSIS — K43.2 INCISIONAL HERNIA, WITHOUT OBSTRUCTION OR GANGRENE: Primary | ICD-10-CM

## 2025-08-21 DIAGNOSIS — E66.01 MORBID OBESITY WITH BMI OF 50.0-59.9, ADULT (H): ICD-10-CM

## 2025-08-21 ASSESSMENT — PAIN SCALES - GENERAL: PAINLEVEL_OUTOF10: MILD PAIN (3)

## 2025-08-23 ENCOUNTER — LAB (OUTPATIENT)
Dept: LAB | Facility: CLINIC | Age: 50
End: 2025-08-23
Payer: COMMERCIAL

## 2025-08-23 DIAGNOSIS — E11.9 TYPE 2 DIABETES MELLITUS WITHOUT COMPLICATION, WITHOUT LONG-TERM CURRENT USE OF INSULIN (H): ICD-10-CM

## 2025-08-23 LAB
CHOLEST SERPL-MCNC: 124 MG/DL
FASTING STATUS PATIENT QL REPORTED: NO
HDLC SERPL-MCNC: 39 MG/DL
LDLC SERPL CALC-MCNC: 70 MG/DL
NONHDLC SERPL-MCNC: 85 MG/DL
TRIGL SERPL-MCNC: 73 MG/DL

## 2025-08-23 PROCEDURE — 80061 LIPID PANEL: CPT

## 2025-08-23 PROCEDURE — 36415 COLL VENOUS BLD VENIPUNCTURE: CPT

## 2025-08-23 PROCEDURE — 80048 BASIC METABOLIC PNL TOTAL CA: CPT

## 2025-08-24 ENCOUNTER — RESULTS FOLLOW-UP (OUTPATIENT)
Dept: FAMILY MEDICINE | Facility: CLINIC | Age: 50
End: 2025-08-24
Payer: COMMERCIAL

## 2025-08-24 DIAGNOSIS — E11.9 TYPE 2 DIABETES MELLITUS WITHOUT COMPLICATION, WITHOUT LONG-TERM CURRENT USE OF INSULIN (H): Primary | ICD-10-CM

## 2025-08-25 LAB
ANION GAP SERPL CALCULATED.3IONS-SCNC: 10 MMOL/L (ref 7–15)
BUN SERPL-MCNC: 24.7 MG/DL (ref 6–20)
CALCIUM SERPL-MCNC: 9.1 MG/DL (ref 8.8–10.4)
CHLORIDE SERPL-SCNC: 105 MMOL/L (ref 98–107)
CREAT SERPL-MCNC: 0.94 MG/DL (ref 0.67–1.17)
EGFRCR SERPLBLD CKD-EPI 2021: >90 ML/MIN/1.73M2
FASTING STATUS PATIENT QL REPORTED: NO
GLUCOSE SERPL-MCNC: 73 MG/DL (ref 70–99)
HCO3 SERPL-SCNC: 24 MMOL/L (ref 22–29)
POTASSIUM SERPL-SCNC: 4.1 MMOL/L (ref 3.4–5.3)
SODIUM SERPL-SCNC: 139 MMOL/L (ref 135–145)

## (undated) DEVICE — DEVICE ENDO CLIP INSTINCT PLUS INSC-P-7-230-S  G58010

## (undated) DEVICE — ENDO SNARE EXACTO COLD 9MM LOOP 2.4MMX230CM 00711115

## (undated) RX ORDER — PROPOFOL 10 MG/ML
INJECTION, EMULSION INTRAVENOUS
Status: DISPENSED
Start: 2023-09-05

## (undated) RX ORDER — GLYCOPYRROLATE 0.2 MG/ML
INJECTION, SOLUTION INTRAMUSCULAR; INTRAVENOUS
Status: DISPENSED
Start: 2023-09-05